# Patient Record
Sex: FEMALE | Race: BLACK OR AFRICAN AMERICAN | NOT HISPANIC OR LATINO | URBAN - METROPOLITAN AREA
[De-identification: names, ages, dates, MRNs, and addresses within clinical notes are randomized per-mention and may not be internally consistent; named-entity substitution may affect disease eponyms.]

---

## 2019-09-15 ENCOUNTER — EMERGENCY (EMERGENCY)
Facility: HOSPITAL | Age: 69
LOS: 1 days | Discharge: HOME | End: 2019-09-15
Attending: EMERGENCY MEDICINE | Admitting: EMERGENCY MEDICINE
Payer: MEDICARE

## 2019-09-15 VITALS
RESPIRATION RATE: 18 BRPM | TEMPERATURE: 98 F | HEART RATE: 80 BPM | OXYGEN SATURATION: 99 % | SYSTOLIC BLOOD PRESSURE: 186 MMHG | DIASTOLIC BLOOD PRESSURE: 90 MMHG

## 2019-09-15 DIAGNOSIS — S05.11XA CONTUSION OF EYEBALL AND ORBITAL TISSUES, RIGHT EYE, INITIAL ENCOUNTER: ICD-10-CM

## 2019-09-15 DIAGNOSIS — S80.212A ABRASION, LEFT KNEE, INITIAL ENCOUNTER: ICD-10-CM
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
## 2019-09-15 DIAGNOSIS — S09.90XA UNSPECIFIED INJURY OF HEAD, INITIAL ENCOUNTER: ICD-10-CM

## 2019-09-15 PROCEDURE — 70450 CT HEAD/BRAIN W/O DYE: CPT | Mod: 26

## 2019-09-15 PROCEDURE — 70486 CT MAXILLOFACIAL W/O DYE: CPT

## 2019-09-15 PROCEDURE — 99284 EMERGENCY DEPT VISIT MOD MDM: CPT | Mod: 25

## 2019-09-15 PROCEDURE — 70486 CT MAXILLOFACIAL W/O DYE: CPT | Mod: 26

## 2019-09-15 PROCEDURE — 99284 EMERGENCY DEPT VISIT MOD MDM: CPT

## 2019-09-15 PROCEDURE — 70450 CT HEAD/BRAIN W/O DYE: CPT

## 2019-09-15 RX ORDER — ACETAMINOPHEN 500 MG
650 TABLET ORAL ONCE
Refills: 0 | Status: COMPLETED | OUTPATIENT
Start: 2019-09-15 | End: 2019-09-15

## 2019-09-15 RX ADMIN — Medication 650 MILLIGRAM(S): at 20:58

## 2019-09-15 NOTE — ED PROVIDER NOTE - CLINICAL SUMMARY MEDICAL DECISION MAKING FREE TEXT BOX
69F PMH DM p/w pain s/p fall. Pt was walking in street ~4-5hrs ago, tripped on raised grate, landed on hands/knees and face, no LOC. Now c/o pain to R eli-orbital region, L knee, L hip. Also w/ delayed onset generalized HA. No neuro or other systemic symptoms. Hypertensive, other vitals wnl. Exam as above.   ddx: mechanical fall. Possible subsequent minor injury/facial fx. Low suspicion for ICH. Clinically no other fx.   tylenol, CTs, reassess.

## 2019-09-15 NOTE — ED PROVIDER NOTE - PHYSICAL EXAMINATION
Mild R periorbital swelling, minimal infraorbital ecchymosis, minimal periorbital bony ttp no proptosis. minimal L temporal ttp, no overlying skin changes/deformities. PERRL, EOMI, no nystagmus, acuity grossly intact. No spinal ttp, neck FROM. Strength 5/5. No bony ttp, FROM all extremities. Normal equal distal pulses. Steady unassisted gait.   Small superficial L knee abrasions.   no LE edema, normal equal distal pulses.

## 2019-09-15 NOTE — ED PROVIDER NOTE - OBJECTIVE STATEMENT
69F PMH DM p/w pain s/p fall. Pt was walking in street ~4-5hrs ago, tripped on raised grate, landed on hands/knees and face, no LOC. Now c/o pain to R eli-orbital region, L knee, L hip. Also w/ delayed onset generalized HA. Denies LOC. Denies vision changes, focal weakness/numbness, neck/back pain, SOB/CP, abd pain, NVD, black/bloody stool, urinary complaints, URI symptoms. Denies recent illnesses or medication changes. Not on AC.

## 2019-09-15 NOTE — ED PROVIDER NOTE - PATIENT PORTAL LINK FT
You can access the FollowMyHealth Patient Portal offered by [facility] by registering at the following website: [email or web address]. By joining [employer]’s FollowMyHealth portal, you will also be able to view your health information using other applications (apps) compatible with our system.

## 2019-09-15 NOTE — ED PROVIDER NOTE - NSFOLLOWUPINSTRUCTIONS_ED_ALL_ED_FT
Can take tylenol 650mg every 6hrs as needed for pain.  Stay well hydrated.  Return for fevers, persistent vomit, uncontrolled pain, worsening breathing, worsening lightheaded, focal weakness/numbness.   Follow up with primary doctor within 1-2 days.     Head Injury, Adult    There are many types of head injuries. Head injuries can be as minor as a bump, or they can be more severe. More severe head injuries include:  A jarring injury to the brain (concussion).  A bruise of the brain (contusion). This means there is bleeding in the brain that can cause swelling.  A cracked skull (skull fracture).  Bleeding in the brain that collects, clots, and forms a bump (hematoma).    After a head injury, you may need to be observed for a while in the emergency department or urgent care. Sometimes admission to the hospital is needed.    After a head injury has happened, most problems occur within the first 24 hours, but side effects may occur up to 7–10 days after the injury. It is important to watch your condition for any changes.    What are the causes?  There are many possible causes of a head injury. A serious head injury may happen to someone who is in a car accident (motor vehicle collision). Other causes of major head injuries include bicycle or motorcycle accidents, sports injuries, and falls.    Risk factors  This condition is more likely to occur in people who:  Drink a lot of alcohol or use drugs.  Are over the age of 65.  Are at risk for falls.    What are the symptoms?  There are many possible symptoms of a head injury. Visible symptoms of a head injury include a bruise, bump, or bleeding at the site of the injury. Other non-visible symptoms include:  Feeling sleepy or not being able to stay awake.  Passing out.  Headache.  Seizures.  Dizziness.  Confusion.  Memory problems.  Nausea or vomiting.  Other possible symptoms that may develop after the head injury include:  Poor attention and concentration.  Fatigue or tiring easily.  Irritability.  Being uncomfortable around bright lights or loud noises.  Anxiety or depression.  Disturbed sleep.    How is this diagnosed?  This condition can usually be diagnosed based on your symptoms, a description of the injury, and a physical exam. You may also have imaging tests done, such as a CT scan or MRI. You will also be closely watched.    How is this treated?  Treatment for this condition depends on the severity and type of injury you have. The main goal of treatment is to prevent complications and allow the brain time to heal.    For mild head injury, you may be sent home and treatment may include:  Observation. A responsible adult should stay with you for 24 hours after your injury and check on you often.  Physical rest.  Brain rest.  Pain medicines.  For severe brain injury, treatment may include:  Close observation. This includes hospitalization with frequent physical exams. You may need to go to a hospital that specializes in head injury.  Pain medicines.  Breathing support. This may include using a ventilator.  Managing the pressure inside the brain (intracranial pressure, or ICP). This may include:  Monitoring the ICP.  Giving medicines to decrease the ICP.  Positioning you to decrease the ICP.  Medicine to prevent seizures.  Surgery to stop bleeding or to remove blood clots (craniotomy).  Surgery to remove part of the skull (decompressive craniectomy). This allows room for the brain to swell.    Follow these instructions at home:  Activity   Rest as much as possible and avoid activities that are physically hard or tiring.  Make sure you get enough sleep.  Limit activities that require a lot of thought or attention, such as:  Watching TV.  Playing memory games and puzzles.  Job-related work or homework.  Working on the computer, social media, and texting.  Avoid activities that could cause another head injury, such as playing sports, until your health care provider approves. Having another head injury, especially before the first one has healed, can be dangerous.    Ask your health care provider when it is safe for you to return to your regular activities, including work or school. Ask your health care provider for a step-by-step plan for gradually returning to activities.  Ask your health care provider when you can drive, ride a bicycle, or use heavy machinery. Your ability to react may be slower after a brain injury. Never do these activities if you are dizzy.    Lifestyle     Do not drink alcohol until your health care provider approves, and avoid drug use. Alcohol and certain drugs may slow your recovery and can put you at risk of further injury.  If it is harder than usual to remember things, write them down.  If you are easily distracted, try to do one thing at a time.  Talk with family members or close friends when making important decisions.  Tell your friends, family, a trusted colleague, and  about your injury, symptoms, and restrictions. Have them watch for any new or worsening problems.  General instructions     Take over-the-counter and prescription medicines only as told by your health care provider.  Have someone stay with you for 24 hours after your head injury. This person should watch you for any changes in your symptoms and be ready to seek medical help, as needed.  Keep all follow-up visits as told by your health care provider. This is important.    How is this prevented?  Work on improving your balance and strength to avoid falls.  Wear a seatbelt when you are in a moving vehicle.  Wear a helmet when riding a bicycle, skiing, or doing any other sport or activity that has a risk of injury.  Drink alcohol only in moderation.  Take safety measures in your home, such as:  Removing clutter and tripping hazards from floors and stairways.  Using grab bars in bathrooms and handrails by stairs.  Placing non-slip mats on floors and in bathtubs.  Improving lighting in dim areas.    Get help right away if:  You have:  A severe headache that is not helped by medicine.  Trouble walking, have weakness in your arms and legs, or lose your balance.  Clear or bloody fluid coming from your nose or ears.  Changes in your vision.  A seizure.  You vomit.  Your symptoms get worse.  Your speech is slurred.  You pass out.  You are sleepier and have trouble staying awake.  Your pupils change size.  These symptoms may represent a serious problem that is an emergency. Do not wait to see if the symptoms will go away. Get medical help right away. Call your local emergency services (911 in the [place] Do not drive yourself to the hospital.

## 2020-09-13 ENCOUNTER — TRANSCRIPTION ENCOUNTER (OUTPATIENT)
Age: 70
End: 2020-09-13

## 2020-09-13 ENCOUNTER — INPATIENT (INPATIENT)
Facility: HOSPITAL | Age: 70
LOS: 2 days | Discharge: HOME | DRG: 418 | End: 2020-09-16
Attending: SURGERY | Admitting: SURGERY
Payer: COMMERCIAL

## 2020-09-13 VITALS
WEIGHT: 182.1 LBS | SYSTOLIC BLOOD PRESSURE: 158 MMHG | HEART RATE: 86 BPM | HEIGHT: 66 IN | OXYGEN SATURATION: 98 % | TEMPERATURE: 98 F | DIASTOLIC BLOOD PRESSURE: 73 MMHG | RESPIRATION RATE: 18 BRPM

## 2020-09-13 LAB
ALBUMIN SERPL ELPH-MCNC: 3.8 G/DL — SIGNIFICANT CHANGE UP (ref 3.3–5)
ALP SERPL-CCNC: 69 U/L — SIGNIFICANT CHANGE UP (ref 40–120)
ALT FLD-CCNC: 10 U/L — SIGNIFICANT CHANGE UP (ref 10–45)
ANION GAP SERPL CALC-SCNC: 10 MMOL/L — SIGNIFICANT CHANGE UP (ref 5–17)
APPEARANCE UR: CLEAR — SIGNIFICANT CHANGE UP
APTT BLD: 32 SEC — SIGNIFICANT CHANGE UP (ref 27.5–35.5)
AST SERPL-CCNC: 16 U/L — SIGNIFICANT CHANGE UP (ref 10–40)
BASOPHILS # BLD AUTO: 0.02 K/UL — SIGNIFICANT CHANGE UP (ref 0–0.2)
BASOPHILS NFR BLD AUTO: 0.2 % — SIGNIFICANT CHANGE UP (ref 0–2)
BILIRUB SERPL-MCNC: 0.7 MG/DL — SIGNIFICANT CHANGE UP (ref 0.2–1.2)
BILIRUB UR-MCNC: NEGATIVE — SIGNIFICANT CHANGE UP
BLD GP AB SCN SERPL QL: NEGATIVE — SIGNIFICANT CHANGE UP
BUN SERPL-MCNC: 22 MG/DL — SIGNIFICANT CHANGE UP (ref 7–23)
CALCIUM SERPL-MCNC: 9.2 MG/DL — SIGNIFICANT CHANGE UP (ref 8.4–10.5)
CHLORIDE SERPL-SCNC: 101 MMOL/L — SIGNIFICANT CHANGE UP (ref 96–108)
CO2 SERPL-SCNC: 26 MMOL/L — SIGNIFICANT CHANGE UP (ref 22–31)
COLOR SPEC: YELLOW — SIGNIFICANT CHANGE UP
CREAT SERPL-MCNC: 1.11 MG/DL — SIGNIFICANT CHANGE UP (ref 0.5–1.3)
DIFF PNL FLD: NEGATIVE — SIGNIFICANT CHANGE UP
EOSINOPHIL # BLD AUTO: 0.22 K/UL — SIGNIFICANT CHANGE UP (ref 0–0.5)
EOSINOPHIL NFR BLD AUTO: 1.9 % — SIGNIFICANT CHANGE UP (ref 0–6)
GLUCOSE BLDC GLUCOMTR-MCNC: 140 MG/DL — HIGH (ref 70–99)
GLUCOSE SERPL-MCNC: 121 MG/DL — HIGH (ref 70–99)
GLUCOSE UR QL: NEGATIVE — SIGNIFICANT CHANGE UP
HCT VFR BLD CALC: 33.3 % — LOW (ref 34.5–45)
HGB BLD-MCNC: 10.2 G/DL — LOW (ref 11.5–15.5)
IMM GRANULOCYTES NFR BLD AUTO: 0.5 % — SIGNIFICANT CHANGE UP (ref 0–1.5)
INR BLD: 1.12 — SIGNIFICANT CHANGE UP (ref 0.88–1.16)
KETONES UR-MCNC: NEGATIVE — SIGNIFICANT CHANGE UP
LACTATE SERPL-SCNC: 1 MMOL/L — SIGNIFICANT CHANGE UP (ref 0.5–2)
LEUKOCYTE ESTERASE UR-ACNC: NEGATIVE — SIGNIFICANT CHANGE UP
LIDOCAIN IGE QN: 29 U/L — SIGNIFICANT CHANGE UP (ref 7–60)
LYMPHOCYTES # BLD AUTO: 1.6 K/UL — SIGNIFICANT CHANGE UP (ref 1–3.3)
LYMPHOCYTES # BLD AUTO: 13.8 % — SIGNIFICANT CHANGE UP (ref 13–44)
MCHC RBC-ENTMCNC: 27.3 PG — SIGNIFICANT CHANGE UP (ref 27–34)
MCHC RBC-ENTMCNC: 30.6 GM/DL — LOW (ref 32–36)
MCV RBC AUTO: 89 FL — SIGNIFICANT CHANGE UP (ref 80–100)
MONOCYTES # BLD AUTO: 0.95 K/UL — HIGH (ref 0–0.9)
MONOCYTES NFR BLD AUTO: 8.2 % — SIGNIFICANT CHANGE UP (ref 2–14)
NEUTROPHILS # BLD AUTO: 8.75 K/UL — HIGH (ref 1.8–7.4)
NEUTROPHILS NFR BLD AUTO: 75.4 % — SIGNIFICANT CHANGE UP (ref 43–77)
NITRITE UR-MCNC: NEGATIVE — SIGNIFICANT CHANGE UP
NRBC # BLD: 0 /100 WBCS — SIGNIFICANT CHANGE UP (ref 0–0)
PH UR: 7 — SIGNIFICANT CHANGE UP (ref 5–8)
PLATELET # BLD AUTO: 226 K/UL — SIGNIFICANT CHANGE UP (ref 150–400)
POTASSIUM SERPL-MCNC: 4.2 MMOL/L — SIGNIFICANT CHANGE UP (ref 3.5–5.3)
POTASSIUM SERPL-SCNC: 4.2 MMOL/L — SIGNIFICANT CHANGE UP (ref 3.5–5.3)
PROT SERPL-MCNC: 7.2 G/DL — SIGNIFICANT CHANGE UP (ref 6–8.3)
PROT UR-MCNC: NEGATIVE MG/DL — SIGNIFICANT CHANGE UP
PROTHROM AB SERPL-ACNC: 13.4 SEC — SIGNIFICANT CHANGE UP (ref 10.6–13.6)
RBC # BLD: 3.74 M/UL — LOW (ref 3.8–5.2)
RBC # FLD: 13.5 % — SIGNIFICANT CHANGE UP (ref 10.3–14.5)
RH IG SCN BLD-IMP: POSITIVE — SIGNIFICANT CHANGE UP
SARS-COV-2 RNA SPEC QL NAA+PROBE: SIGNIFICANT CHANGE UP
SODIUM SERPL-SCNC: 137 MMOL/L — SIGNIFICANT CHANGE UP (ref 135–145)
SP GR SPEC: 1.01 — SIGNIFICANT CHANGE UP (ref 1–1.03)
UROBILINOGEN FLD QL: 0.2 E.U./DL — SIGNIFICANT CHANGE UP
WBC # BLD: 11.6 K/UL — HIGH (ref 3.8–10.5)
WBC # FLD AUTO: 11.6 K/UL — HIGH (ref 3.8–10.5)

## 2020-09-13 PROCEDURE — 99285 EMERGENCY DEPT VISIT HI MDM: CPT

## 2020-09-13 PROCEDURE — 76705 ECHO EXAM OF ABDOMEN: CPT | Mod: 26

## 2020-09-13 PROCEDURE — 71045 X-RAY EXAM CHEST 1 VIEW: CPT | Mod: 26

## 2020-09-13 PROCEDURE — 93010 ELECTROCARDIOGRAM REPORT: CPT

## 2020-09-13 RX ORDER — INFLUENZA VIRUS VACCINE 15; 15; 15; 15 UG/.5ML; UG/.5ML; UG/.5ML; UG/.5ML
0.7 SUSPENSION INTRAMUSCULAR ONCE
Refills: 0 | Status: COMPLETED | OUTPATIENT
Start: 2020-09-13 | End: 2020-09-15

## 2020-09-13 RX ORDER — GLUCAGON INJECTION, SOLUTION 0.5 MG/.1ML
1 INJECTION, SOLUTION SUBCUTANEOUS ONCE
Refills: 0 | Status: DISCONTINUED | OUTPATIENT
Start: 2020-09-13 | End: 2020-09-14

## 2020-09-13 RX ORDER — INFLUENZA VIRUS VACCINE 15; 15; 15; 15 UG/.5ML; UG/.5ML; UG/.5ML; UG/.5ML
0.5 SUSPENSION INTRAMUSCULAR ONCE
Refills: 0 | Status: DISCONTINUED | OUTPATIENT
Start: 2020-09-13 | End: 2020-09-13

## 2020-09-13 RX ORDER — INSULIN LISPRO 100/ML
VIAL (ML) SUBCUTANEOUS
Refills: 0 | Status: DISCONTINUED | OUTPATIENT
Start: 2020-09-13 | End: 2020-09-14

## 2020-09-13 RX ORDER — ONDANSETRON 8 MG/1
4 TABLET, FILM COATED ORAL ONCE
Refills: 0 | Status: COMPLETED | OUTPATIENT
Start: 2020-09-13 | End: 2020-09-13

## 2020-09-13 RX ORDER — METRONIDAZOLE 500 MG
500 TABLET ORAL EVERY 8 HOURS
Refills: 0 | Status: DISCONTINUED | OUTPATIENT
Start: 2020-09-13 | End: 2020-09-14

## 2020-09-13 RX ORDER — DEXTROSE 50 % IN WATER 50 %
12.5 SYRINGE (ML) INTRAVENOUS ONCE
Refills: 0 | Status: DISCONTINUED | OUTPATIENT
Start: 2020-09-13 | End: 2020-09-14

## 2020-09-13 RX ORDER — SODIUM CHLORIDE 9 MG/ML
1000 INJECTION, SOLUTION INTRAVENOUS
Refills: 0 | Status: DISCONTINUED | OUTPATIENT
Start: 2020-09-13 | End: 2020-09-14

## 2020-09-13 RX ORDER — ONDANSETRON 8 MG/1
4 TABLET, FILM COATED ORAL EVERY 6 HOURS
Refills: 0 | Status: DISCONTINUED | OUTPATIENT
Start: 2020-09-13 | End: 2020-09-14

## 2020-09-13 RX ORDER — HYDROMORPHONE HYDROCHLORIDE 2 MG/ML
0.5 INJECTION INTRAMUSCULAR; INTRAVENOUS; SUBCUTANEOUS EVERY 4 HOURS
Refills: 0 | Status: DISCONTINUED | OUTPATIENT
Start: 2020-09-13 | End: 2020-09-14

## 2020-09-13 RX ORDER — ACETAMINOPHEN 500 MG
1000 TABLET ORAL ONCE
Refills: 0 | Status: COMPLETED | OUTPATIENT
Start: 2020-09-13 | End: 2020-09-13

## 2020-09-13 RX ORDER — MORPHINE SULFATE 50 MG/1
4 CAPSULE, EXTENDED RELEASE ORAL ONCE
Refills: 0 | Status: DISCONTINUED | OUTPATIENT
Start: 2020-09-13 | End: 2020-09-13

## 2020-09-13 RX ORDER — DEXTROSE 50 % IN WATER 50 %
25 SYRINGE (ML) INTRAVENOUS ONCE
Refills: 0 | Status: DISCONTINUED | OUTPATIENT
Start: 2020-09-13 | End: 2020-09-14

## 2020-09-13 RX ORDER — HEPARIN SODIUM 5000 [USP'U]/ML
5000 INJECTION INTRAVENOUS; SUBCUTANEOUS EVERY 8 HOURS
Refills: 0 | Status: DISCONTINUED | OUTPATIENT
Start: 2020-09-13 | End: 2020-09-14

## 2020-09-13 RX ORDER — CEFTRIAXONE 500 MG/1
1000 INJECTION, POWDER, FOR SOLUTION INTRAMUSCULAR; INTRAVENOUS EVERY 24 HOURS
Refills: 0 | Status: DISCONTINUED | OUTPATIENT
Start: 2020-09-13 | End: 2020-09-14

## 2020-09-13 RX ORDER — DEXTROSE 50 % IN WATER 50 %
15 SYRINGE (ML) INTRAVENOUS ONCE
Refills: 0 | Status: DISCONTINUED | OUTPATIENT
Start: 2020-09-13 | End: 2020-09-14

## 2020-09-13 RX ADMIN — SODIUM CHLORIDE 110 MILLILITER(S): 9 INJECTION, SOLUTION INTRAVENOUS at 23:09

## 2020-09-13 RX ADMIN — Medication 1000 MILLIGRAM(S): at 23:24

## 2020-09-13 RX ADMIN — Medication 400 MILLIGRAM(S): at 23:09

## 2020-09-13 RX ADMIN — HEPARIN SODIUM 5000 UNIT(S): 5000 INJECTION INTRAVENOUS; SUBCUTANEOUS at 23:40

## 2020-09-13 NOTE — H&P ADULT - HISTORY OF PRESENT ILLNESS
HPI: 70 F PMH DM, thyroid disease (pt unsure), PSH csectionX2, JOANA, known cholelithiasis x3 years, presenting with one week of RUQ pain that significantly worsened this morning. Pt reports severe RUQ pain that started one week ago and slightly improved to intermittent RUQ achiness for the past week. This morning, patient awoke to severe 10/10 RUQ pain that has not improved. She denies associated nausea/vomiting/fever/chills. She reports tolerating regular diet every day without any issues. Passing flatus, having regular BM (last this morning). Voiding well without dysuria/pyuria. Last cscope 2019 and demonstrated 2 polyps, denies ever having EGD.    PMH: as above, patient unsure of thyroid disease but denies taking medication, patient is a poor historian  PSH: as above and vocal cord nodule resection  Meds: DM medication (unsure of meds)  Social: denies smoking, denies illicit drug use, denies etoh use  Allergies: PCN allergy- possible anaphylaxis

## 2020-09-13 NOTE — ED ADULT NURSE NOTE - TEMPLATE LIST FOR HEAD TO TOE ASSESSMENT

## 2020-09-13 NOTE — ED ADULT NURSE NOTE - CHPI ED NUR SYMPTOMS NEG
no vomiting/no hematuria/no burning urination/no dysuria/no diarrhea/no abdominal distension/no chills/no fever/no nausea/no blood in stool

## 2020-09-13 NOTE — ED PROVIDER NOTE - PHYSICAL EXAMINATION
GEN: Well appearing, obesem awake, alert, oriented to person, place, time/situation and in no apparent distress. NTAF  ENT: Airway patent, Nasal mucosa clear. Mouth with normal mucosa.  EYES: Clear bilaterally. PERRL, EOMI  RESPIRATORY: Breathing comfortably with normal RR. No W/C/R, no hypoxia or resp distress.  CARDIAC: Regular rate and rhythm, no M/R/G  ABDOMEN: Soft, obese, +TTP RUQ, +navarro's +bowel sounds, no rebound, rigidity, or guarding.  MSK: Range of motion is not limited, no deformities noted.  NEURO: Alert and oriented, no focal deficits.  SKIN: Skin normal color for race, warm, dry and intact. No evidence of rash.  PSYCH: Alert and oriented to person, place, time/situation. normal mood and affect. no apparent risk to self or others.

## 2020-09-13 NOTE — CHART NOTE - NSCHARTNOTEFT_GEN_A_CORE
GENERAL SURGERY - CHIEF RESIDENT NOTE     70F w/ HTN, HLD, T2DM on metformin, hypothyroidism, & known h/o cholelithiasis treated w/ ursodiol in the past who now p/w worsening RUQ abd pain x 1 wk. Pain worse w/ PO intake. No nausea/vomiting or fevers/chills. In the ED, T 97.9F, HR 86, /73, SpO2 98% RA, RR 18. Non-toxic appearing, abd soft, non-distended, TTP in RUQ w/ +Rueda sign. Labs- WBC 11.60, H/H 10.2/33.3, plts 226, INR 1.12, BUN/Cr 22/1.11, LA 1.0. RUQ US showed CBD 0.3cm, multiple small shadowing demario, no GBWT, no PCF, positive sono Rueda sign.     PLAN: Admit to regional, NPO, IVF, ISS, ceftriaxone/Flagyl, add to OR for laparoscopic cholecystectomy in AM, AM labs, DVT ppx.

## 2020-09-13 NOTE — H&P ADULT - NSHPPHYSICALEXAM_GEN_ALL_CORE
Physical exam:  General: NAD, resting comfortably  Neuro: AAOX3, EOMI, PERRLA, no scleral icterus  Pulm: CTAB, Nonlabored breathing  CV: S1/S2 normal, no murmurs  Abdomen: soft, nondistended, severe RUQ tenderness, positive Rueda's sign, no rebound, no guarding  Extremities: WWP, No LE edema b/l

## 2020-09-13 NOTE — ED PROVIDER NOTE - CLINICAL SUMMARY MEDICAL DECISION MAKING FREE TEXT BOX
70F with a h/o DM on metformin, HTN, high cholesterol, hypothyroidism, PShx of  and hysterectomy in the past who p/w RUQ pain intermittent x 1 week, worse after eating and worse today. No fever. VSS, +TTP RUQ. Bedside POCUS performed and shows +sono navarro's GB wall thickening/edema and gallstones c/w cholecystitis. Labs reveal leukocytosis. Surgery was notified and we are awaiting their evaluation.  Anticipate admission to surgery for cholecystitis.

## 2020-09-13 NOTE — H&P ADULT - NSHPLABSRESULTS_GEN_ALL_CORE
Procedure was performed in Emergency Department by a credentialed Emergency Medicine Attending Physician    EXAM:  ER US ABDOMEN LTD                          *** ADDENDUM 09/13/2020  ***    Grayscale imaging of the right upper quadrant was performed.  The gallbladder was visualized and scanned in longitudinal and transverse planes.  The anterior gallbladder wall measured  0.86 cm.  The common bile duct measured 0.29 cm.  Gallstones present.  Pericholecystic fluid: trace.  Sonographic Rueda's sign present.    IMPRESSION:Gallstones, +sonographic rueda's, and gallbladder wall thickening present, concerning for cholecystitis.    *** END OF ADDENDUM 09/13/2020  ***       PROCEDURE DATE:  09/13/2020               INTERPRETATION:  Grayscale imaging of the right upper quadrant was performed.  The gallbladder was visualized and scanned in longitudinal and transverse planes.  The anterior gallbladder wall measured  . cm.  The common bile duct measured 0.29 cm.  Gallstones present.  Pericholecystic fluid: trace.  Gallbladder wall edema present.  Sonographic Rueda's sign present.    IMPRESSION: Gallstones, +sonographic rueda's, and gallbladder wall edema and thickening present, concerning for cholecystitis.

## 2020-09-13 NOTE — ED PROVIDER NOTE - OBJECTIVE STATEMENT
70F with a h/o DM on metformin, HTN, high cholesterol, hypothyroidism, PShx of  and hysterectomy in the past who p/w RUQ pain intermittent x 1 week, worse after eating and worse today. No f/c, no n/v, no diarrhea. No urinary sx.   Pt reports hx of gallstones in the remote pasty and states she "took pills" but never followed up after that (she has previously had her care at [facility]).   Last PO 10am.

## 2020-09-13 NOTE — H&P ADULT - ASSESSMENT
A/P: 70 F PMH DM, unknown thyroid disease, PSH csectionX2, JOANA, known cholelithiasis x3 years, presenting with one week of RUQ pain that significantly worsened this morning.      - [facility] under Dr. Garcia  - Ceftriaxon/flagyl IV  - pain control   - NPO/IV fluids  - ISS  - Hepsubq   - preop ffor OR tomorrow under Dr. Clifford  - Call Dr. Rogel to consult  - Obtain her home medication regime tomorrow calling her pharmacy ([phone]) - Pt doesn't remember or have a list with active meds

## 2020-09-14 ENCOUNTER — RESULT REVIEW (OUTPATIENT)
Age: 70
End: 2020-09-14

## 2020-09-14 LAB
A1C WITH ESTIMATED AVERAGE GLUCOSE RESULT: 6.7 % — HIGH (ref 4–5.6)
ALBUMIN SERPL ELPH-MCNC: 4 G/DL — SIGNIFICANT CHANGE UP (ref 3.3–5)
ALP SERPL-CCNC: 72 U/L — SIGNIFICANT CHANGE UP (ref 40–120)
ALT FLD-CCNC: 10 U/L — SIGNIFICANT CHANGE UP (ref 10–45)
ANION GAP SERPL CALC-SCNC: 12 MMOL/L — SIGNIFICANT CHANGE UP (ref 5–17)
APTT BLD: 32.1 SEC — SIGNIFICANT CHANGE UP (ref 27.5–35.5)
AST SERPL-CCNC: 14 U/L — SIGNIFICANT CHANGE UP (ref 10–40)
BILIRUB SERPL-MCNC: 0.8 MG/DL — SIGNIFICANT CHANGE UP (ref 0.2–1.2)
BUN SERPL-MCNC: 18 MG/DL — SIGNIFICANT CHANGE UP (ref 7–23)
CALCIUM SERPL-MCNC: 9.1 MG/DL — SIGNIFICANT CHANGE UP (ref 8.4–10.5)
CHLORIDE SERPL-SCNC: 103 MMOL/L — SIGNIFICANT CHANGE UP (ref 96–108)
CO2 SERPL-SCNC: 27 MMOL/L — SIGNIFICANT CHANGE UP (ref 22–31)
CREAT SERPL-MCNC: 1.02 MG/DL — SIGNIFICANT CHANGE UP (ref 0.5–1.3)
ESTIMATED AVERAGE GLUCOSE: 146 MG/DL — HIGH (ref 68–114)
GLUCOSE BLDC GLUCOMTR-MCNC: 116 MG/DL — HIGH (ref 70–99)
GLUCOSE BLDC GLUCOMTR-MCNC: 135 MG/DL — HIGH (ref 70–99)
GLUCOSE SERPL-MCNC: 133 MG/DL — HIGH (ref 70–99)
HCT VFR BLD CALC: 31.7 % — LOW (ref 34.5–45)
HCV AB S/CO SERPL IA: 0.08 S/CO — SIGNIFICANT CHANGE UP
HCV AB SERPL-IMP: SIGNIFICANT CHANGE UP
HGB BLD-MCNC: 9.7 G/DL — LOW (ref 11.5–15.5)
INR BLD: 1.15 — SIGNIFICANT CHANGE UP (ref 0.88–1.16)
MAGNESIUM SERPL-MCNC: 2.4 MG/DL — SIGNIFICANT CHANGE UP (ref 1.6–2.6)
MCHC RBC-ENTMCNC: 26.9 PG — LOW (ref 27–34)
MCHC RBC-ENTMCNC: 30.6 GM/DL — LOW (ref 32–36)
MCV RBC AUTO: 87.8 FL — SIGNIFICANT CHANGE UP (ref 80–100)
NRBC # BLD: 0 /100 WBCS — SIGNIFICANT CHANGE UP (ref 0–0)
PHOSPHATE SERPL-MCNC: 3.8 MG/DL — SIGNIFICANT CHANGE UP (ref 2.5–4.5)
PLATELET # BLD AUTO: 218 K/UL — SIGNIFICANT CHANGE UP (ref 150–400)
POTASSIUM SERPL-MCNC: 3.7 MMOL/L — SIGNIFICANT CHANGE UP (ref 3.5–5.3)
POTASSIUM SERPL-SCNC: 3.7 MMOL/L — SIGNIFICANT CHANGE UP (ref 3.5–5.3)
PROT SERPL-MCNC: 7.1 G/DL — SIGNIFICANT CHANGE UP (ref 6–8.3)
PROTHROM AB SERPL-ACNC: 13.7 SEC — HIGH (ref 10.6–13.6)
RBC # BLD: 3.61 M/UL — LOW (ref 3.8–5.2)
RBC # FLD: 13.7 % — SIGNIFICANT CHANGE UP (ref 10.3–14.5)
SODIUM SERPL-SCNC: 142 MMOL/L — SIGNIFICANT CHANGE UP (ref 135–145)
WBC # BLD: 10.68 K/UL — HIGH (ref 3.8–10.5)
WBC # FLD AUTO: 10.68 K/UL — HIGH (ref 3.8–10.5)

## 2020-09-14 PROCEDURE — 88304 TISSUE EXAM BY PATHOLOGIST: CPT | Mod: 26

## 2020-09-14 PROCEDURE — 88300 SURGICAL PATH GROSS: CPT | Mod: 26,59

## 2020-09-14 RX ORDER — HEPARIN SODIUM 5000 [USP'U]/ML
5000 INJECTION INTRAVENOUS; SUBCUTANEOUS EVERY 8 HOURS
Refills: 0 | Status: DISCONTINUED | OUTPATIENT
Start: 2020-09-14 | End: 2020-09-16

## 2020-09-14 RX ORDER — OXYCODONE AND ACETAMINOPHEN 5; 325 MG/1; MG/1
2 TABLET ORAL EVERY 4 HOURS
Refills: 0 | Status: DISCONTINUED | OUTPATIENT
Start: 2020-09-14 | End: 2020-09-16

## 2020-09-14 RX ORDER — HYDROMORPHONE HYDROCHLORIDE 2 MG/ML
0.5 INJECTION INTRAMUSCULAR; INTRAVENOUS; SUBCUTANEOUS EVERY 6 HOURS
Refills: 0 | Status: DISCONTINUED | OUTPATIENT
Start: 2020-09-14 | End: 2020-09-14

## 2020-09-14 RX ORDER — OXYCODONE AND ACETAMINOPHEN 5; 325 MG/1; MG/1
1 TABLET ORAL EVERY 4 HOURS
Refills: 0 | Status: DISCONTINUED | OUTPATIENT
Start: 2020-09-14 | End: 2020-09-16

## 2020-09-14 RX ORDER — HYDROMORPHONE HYDROCHLORIDE 2 MG/ML
0.5 INJECTION INTRAMUSCULAR; INTRAVENOUS; SUBCUTANEOUS
Refills: 0 | Status: DISCONTINUED | OUTPATIENT
Start: 2020-09-14 | End: 2020-09-15

## 2020-09-14 RX ORDER — POTASSIUM CHLORIDE 20 MEQ
10 PACKET (EA) ORAL
Refills: 0 | Status: DISCONTINUED | OUTPATIENT
Start: 2020-09-14 | End: 2020-09-14

## 2020-09-14 RX ORDER — ONDANSETRON 8 MG/1
4 TABLET, FILM COATED ORAL EVERY 6 HOURS
Refills: 0 | Status: DISCONTINUED | OUTPATIENT
Start: 2020-09-14 | End: 2020-09-16

## 2020-09-14 RX ORDER — HYDROMORPHONE HYDROCHLORIDE 2 MG/ML
1 INJECTION INTRAMUSCULAR; INTRAVENOUS; SUBCUTANEOUS EVERY 6 HOURS
Refills: 0 | Status: DISCONTINUED | OUTPATIENT
Start: 2020-09-14 | End: 2020-09-14

## 2020-09-14 RX ADMIN — OXYCODONE AND ACETAMINOPHEN 2 TABLET(S): 5; 325 TABLET ORAL at 16:26

## 2020-09-14 RX ADMIN — OXYCODONE AND ACETAMINOPHEN 2 TABLET(S): 5; 325 TABLET ORAL at 23:47

## 2020-09-14 RX ADMIN — Medication 100 MILLIGRAM(S): at 10:23

## 2020-09-14 RX ADMIN — HYDROMORPHONE HYDROCHLORIDE 0.5 MILLIGRAM(S): 2 INJECTION INTRAMUSCULAR; INTRAVENOUS; SUBCUTANEOUS at 14:13

## 2020-09-14 RX ADMIN — HEPARIN SODIUM 5000 UNIT(S): 5000 INJECTION INTRAVENOUS; SUBCUTANEOUS at 06:02

## 2020-09-14 RX ADMIN — HEPARIN SODIUM 5000 UNIT(S): 5000 INJECTION INTRAVENOUS; SUBCUTANEOUS at 22:12

## 2020-09-14 RX ADMIN — Medication 100 MILLIGRAM(S): at 01:05

## 2020-09-14 RX ADMIN — CEFTRIAXONE 100 MILLIGRAM(S): 500 INJECTION, POWDER, FOR SOLUTION INTRAMUSCULAR; INTRAVENOUS at 00:05

## 2020-09-14 RX ADMIN — HYDROMORPHONE HYDROCHLORIDE 0.5 MILLIGRAM(S): 2 INJECTION INTRAMUSCULAR; INTRAVENOUS; SUBCUTANEOUS at 14:51

## 2020-09-14 RX ADMIN — OXYCODONE AND ACETAMINOPHEN 2 TABLET(S): 5; 325 TABLET ORAL at 16:49

## 2020-09-14 RX ADMIN — HEPARIN SODIUM 5000 UNIT(S): 5000 INJECTION INTRAVENOUS; SUBCUTANEOUS at 14:54

## 2020-09-14 NOTE — PROGRESS NOTE ADULT - ASSESSMENT
70 F PMH DM, unknown thyroid disease, PSH csectionX2, JOANA, known cholelithiasis x3 years, presenting with one week of RUQ pain. Now s/p lap dev for acute cholecystitis 9/14. Now s/p difficult Lap Dev non perf non gang 9/14 AM    - Clears, advance as tolerated  - Pain/nausea control   - IS/OOBA  - SQH/SCD  - d/c tomorrow, no AM labs

## 2020-09-14 NOTE — PROGRESS NOTE ADULT - ASSESSMENT
70 F PMH DM, unknown thyroid disease, PSH csectionX2, JOANA, known cholelithiasis x3 years, presenting with one week of RUQ pain that significantly worsened this morning.    - NPO/IVF  - IV Ceftriaxone/flagyl (9/13-)  - Pain/nausea control   - IS/OOBA  - SQH/SCD  - AM labs  - OR 70 F PMH DM, unknown thyroid disease, PSH csectionX2, JOANA, known cholelithiasis x3 years, presenting with one week of RUQ pain    - NPO/IVF  - IV Ceftriaxone/flagyl (9/13-)  - Pain/nausea control   - IS/OOBA  - SQH/SCD  - AM labs  - OR

## 2020-09-14 NOTE — BRIEF OPERATIVE NOTE - OPERATION/FINDINGS
Pt consented in the holding area, was then taken to the OR and was placed under general anaesthesia. A formal timeout was observed and an incision was made in the LUQ for the camera post. Abdomen was entered safely and intrabdominally dense adhesions of bowel to the abdominal wall were observed. Under careful visualisation 3 more ports together were placed in the RUQ. Pt placed in reverse Trendelenburg w/ right side up. Dense adhesions between the gall bladder and bowel were carefully dissected. In the process suprisingly a foreign body which looked like a metal wire was observed in the adhesions, and it was taken out and submitted to pathology.  Once we were confident that there were no more adhesions between bowel and GB, Cystic duct and artery dissected free. Critical view of safety obtained. Cystic duct and artery clipped and divided- 2 clips proximally and one distally for both. GB was later dissected from the liver bed- there seemed to be severe adhesions between the liver and the GB, possibly even a fistulous tract from a probable perc cholecystotyomy. Hemostasis achieved,. No leakage of bile from cystic duct stump. Pt consented in the holding area, was then taken to the OR and was placed under general anaesthesia. A formal timeout was observed and an incision was made in the LUQ for the camera post. Abdomen was entered safely and intrabdominally dense adhesions of bowel to the abdominal wall were observed. Under careful visualisation 3 more ports together were placed in the RUQ. Pt placed in reverse Trendelenburg w/ right side up. Dense adhesions between the gall bladder and bowel were carefully dissected. In the process suprisingly a foreign body which looked like a metal wire was observed in the adhesions, and it was taken out and submitted to pathology.  Once we were confident that there were no more adhesions between bowel and GB, Cystic duct and artery dissected free. Critical view of safety obtained. Cystic duct and artery clipped and divided- 2 clips proximally and one distally for both. GB was later dissected from the liver bed- there seemed to be severe adhesions between the liver and the GB, possibly even a fistulous tract from a probable previous (but unkown) perc cholecystotyomy. Safely taken off liver bed. Hemostasis achieved,. No leakage of bile from cystic duct stump. Post incisions closed safely.

## 2020-09-14 NOTE — BRIEF OPERATIVE NOTE - NSICDXBRIEFPOSTOP_GEN_ALL_CORE_FT
POST-OP DIAGNOSIS:  Intra-abdominal adhesions 14-Sep-2020 13:34:38  Mitchel Willis  Acute cholecystitis 14-Sep-2020 13:34:11  Mitchel Willis

## 2020-09-14 NOTE — PACU DISCHARGE NOTE - COMMENTS
transfered back to [facility], report given to RN Ignacio. Pt VSS,afberile. No sign of distress or SOB noted, 02sat 98% on RA. No c/o pain. Abdomeninal dressing of derma bond on 4 x lap site , intact, no bleeding noted. Voided 14;00, with 400ml. IVL patent and intact, no infiltration or phlebitis noted.

## 2020-09-15 ENCOUNTER — TRANSCRIPTION ENCOUNTER (OUTPATIENT)
Age: 70
End: 2020-09-15

## 2020-09-15 DIAGNOSIS — D64.9 ANEMIA, UNSPECIFIED: ICD-10-CM

## 2020-09-15 DIAGNOSIS — E11.9 TYPE 2 DIABETES MELLITUS WITHOUT COMPLICATIONS: ICD-10-CM

## 2020-09-15 DIAGNOSIS — N18.3 CHRONIC KIDNEY DISEASE, STAGE 3 (MODERATE): ICD-10-CM

## 2020-09-15 DIAGNOSIS — Z86.39 PERSONAL HISTORY OF OTHER ENDOCRINE, NUTRITIONAL AND METABOLIC DISEASE: ICD-10-CM

## 2020-09-15 DIAGNOSIS — K81.9 CHOLECYSTITIS, UNSPECIFIED: ICD-10-CM

## 2020-09-15 DIAGNOSIS — D72.829 ELEVATED WHITE BLOOD CELL COUNT, UNSPECIFIED: ICD-10-CM

## 2020-09-15 LAB
ALBUMIN SERPL ELPH-MCNC: 3.7 G/DL — SIGNIFICANT CHANGE UP (ref 3.3–5)
ALBUMIN SERPL ELPH-MCNC: 4 G/DL — SIGNIFICANT CHANGE UP (ref 3.3–5)
ALP SERPL-CCNC: 67 U/L — SIGNIFICANT CHANGE UP (ref 40–120)
ALP SERPL-CCNC: 73 U/L — SIGNIFICANT CHANGE UP (ref 40–120)
ALT FLD-CCNC: 13 U/L — SIGNIFICANT CHANGE UP (ref 10–45)
ALT FLD-CCNC: 15 U/L — SIGNIFICANT CHANGE UP (ref 10–45)
ANION GAP SERPL CALC-SCNC: 11 MMOL/L — SIGNIFICANT CHANGE UP (ref 5–17)
ANION GAP SERPL CALC-SCNC: 13 MMOL/L — SIGNIFICANT CHANGE UP (ref 5–17)
APTT BLD: 28.8 SEC — SIGNIFICANT CHANGE UP (ref 27.5–35.5)
AST SERPL-CCNC: 23 U/L — SIGNIFICANT CHANGE UP (ref 10–40)
AST SERPL-CCNC: 25 U/L — SIGNIFICANT CHANGE UP (ref 10–40)
BASOPHILS # BLD AUTO: 0.02 K/UL — SIGNIFICANT CHANGE UP (ref 0–0.2)
BASOPHILS NFR BLD AUTO: 0.2 % — SIGNIFICANT CHANGE UP (ref 0–2)
BILIRUB SERPL-MCNC: 0.5 MG/DL — SIGNIFICANT CHANGE UP (ref 0.2–1.2)
BILIRUB SERPL-MCNC: 0.7 MG/DL — SIGNIFICANT CHANGE UP (ref 0.2–1.2)
BUN SERPL-MCNC: 14 MG/DL — SIGNIFICANT CHANGE UP (ref 7–23)
BUN SERPL-MCNC: 15 MG/DL — SIGNIFICANT CHANGE UP (ref 7–23)
CALCIUM SERPL-MCNC: 8.8 MG/DL — SIGNIFICANT CHANGE UP (ref 8.4–10.5)
CALCIUM SERPL-MCNC: 8.8 MG/DL — SIGNIFICANT CHANGE UP (ref 8.4–10.5)
CHLORIDE SERPL-SCNC: 101 MMOL/L — SIGNIFICANT CHANGE UP (ref 96–108)
CHLORIDE SERPL-SCNC: 102 MMOL/L — SIGNIFICANT CHANGE UP (ref 96–108)
CO2 SERPL-SCNC: 24 MMOL/L — SIGNIFICANT CHANGE UP (ref 22–31)
CO2 SERPL-SCNC: 24 MMOL/L — SIGNIFICANT CHANGE UP (ref 22–31)
CREAT SERPL-MCNC: 1.16 MG/DL — SIGNIFICANT CHANGE UP (ref 0.5–1.3)
CREAT SERPL-MCNC: 1.19 MG/DL — SIGNIFICANT CHANGE UP (ref 0.5–1.3)
CULTURE RESULTS: SIGNIFICANT CHANGE UP
EOSINOPHIL # BLD AUTO: 0.56 K/UL — HIGH (ref 0–0.5)
EOSINOPHIL NFR BLD AUTO: 4.6 % — SIGNIFICANT CHANGE UP (ref 0–6)
GLUCOSE BLDC GLUCOMTR-MCNC: 131 MG/DL — HIGH (ref 70–99)
GLUCOSE SERPL-MCNC: 126 MG/DL — HIGH (ref 70–99)
GLUCOSE SERPL-MCNC: 138 MG/DL — HIGH (ref 70–99)
HCT VFR BLD CALC: 30.5 % — LOW (ref 34.5–45)
HCT VFR BLD CALC: 32.1 % — LOW (ref 34.5–45)
HGB BLD-MCNC: 9.5 G/DL — LOW (ref 11.5–15.5)
HGB BLD-MCNC: 9.7 G/DL — LOW (ref 11.5–15.5)
IMM GRANULOCYTES NFR BLD AUTO: 0.5 % — SIGNIFICANT CHANGE UP (ref 0–1.5)
INR BLD: 1.19 — HIGH (ref 0.88–1.16)
LACTATE SERPL-SCNC: 1 MMOL/L — SIGNIFICANT CHANGE UP (ref 0.5–2)
LYMPHOCYTES # BLD AUTO: 17.3 % — SIGNIFICANT CHANGE UP (ref 13–44)
LYMPHOCYTES # BLD AUTO: 2.12 K/UL — SIGNIFICANT CHANGE UP (ref 1–3.3)
MAGNESIUM SERPL-MCNC: 2.2 MG/DL — SIGNIFICANT CHANGE UP (ref 1.6–2.6)
MCHC RBC-ENTMCNC: 27 PG — SIGNIFICANT CHANGE UP (ref 27–34)
MCHC RBC-ENTMCNC: 27.3 PG — SIGNIFICANT CHANGE UP (ref 27–34)
MCHC RBC-ENTMCNC: 30.2 GM/DL — LOW (ref 32–36)
MCHC RBC-ENTMCNC: 31.1 GM/DL — LOW (ref 32–36)
MCV RBC AUTO: 87.6 FL — SIGNIFICANT CHANGE UP (ref 80–100)
MCV RBC AUTO: 89.4 FL — SIGNIFICANT CHANGE UP (ref 80–100)
MONOCYTES # BLD AUTO: 1.04 K/UL — HIGH (ref 0–0.9)
MONOCYTES NFR BLD AUTO: 8.5 % — SIGNIFICANT CHANGE UP (ref 2–14)
NEUTROPHILS # BLD AUTO: 8.47 K/UL — HIGH (ref 1.8–7.4)
NEUTROPHILS NFR BLD AUTO: 68.9 % — SIGNIFICANT CHANGE UP (ref 43–77)
NRBC # BLD: 0 /100 WBCS — SIGNIFICANT CHANGE UP (ref 0–0)
NRBC # BLD: 0 /100 WBCS — SIGNIFICANT CHANGE UP (ref 0–0)
PHOSPHATE SERPL-MCNC: 3.2 MG/DL — SIGNIFICANT CHANGE UP (ref 2.5–4.5)
PLATELET # BLD AUTO: 231 K/UL — SIGNIFICANT CHANGE UP (ref 150–400)
PLATELET # BLD AUTO: 245 K/UL — SIGNIFICANT CHANGE UP (ref 150–400)
POTASSIUM SERPL-MCNC: 4 MMOL/L — SIGNIFICANT CHANGE UP (ref 3.5–5.3)
POTASSIUM SERPL-MCNC: 4.1 MMOL/L — SIGNIFICANT CHANGE UP (ref 3.5–5.3)
POTASSIUM SERPL-SCNC: 4 MMOL/L — SIGNIFICANT CHANGE UP (ref 3.5–5.3)
POTASSIUM SERPL-SCNC: 4.1 MMOL/L — SIGNIFICANT CHANGE UP (ref 3.5–5.3)
PROT SERPL-MCNC: 6.9 G/DL — SIGNIFICANT CHANGE UP (ref 6–8.3)
PROT SERPL-MCNC: 7.3 G/DL — SIGNIFICANT CHANGE UP (ref 6–8.3)
PROTHROM AB SERPL-ACNC: 14.2 SEC — HIGH (ref 10.6–13.6)
RBC # BLD: 3.48 M/UL — LOW (ref 3.8–5.2)
RBC # BLD: 3.59 M/UL — LOW (ref 3.8–5.2)
RBC # FLD: 13.5 % — SIGNIFICANT CHANGE UP (ref 10.3–14.5)
RBC # FLD: 13.7 % — SIGNIFICANT CHANGE UP (ref 10.3–14.5)
SODIUM SERPL-SCNC: 136 MMOL/L — SIGNIFICANT CHANGE UP (ref 135–145)
SODIUM SERPL-SCNC: 139 MMOL/L — SIGNIFICANT CHANGE UP (ref 135–145)
SPECIMEN SOURCE: SIGNIFICANT CHANGE UP
WBC # BLD: 11.5 K/UL — HIGH (ref 3.8–10.5)
WBC # BLD: 12.27 K/UL — HIGH (ref 3.8–10.5)
WBC # FLD AUTO: 11.5 K/UL — HIGH (ref 3.8–10.5)
WBC # FLD AUTO: 12.27 K/UL — HIGH (ref 3.8–10.5)

## 2020-09-15 PROCEDURE — 99291 CRITICAL CARE FIRST HOUR: CPT

## 2020-09-15 PROCEDURE — 99223 1ST HOSP IP/OBS HIGH 75: CPT

## 2020-09-15 RX ADMIN — OXYCODONE AND ACETAMINOPHEN 2 TABLET(S): 5; 325 TABLET ORAL at 13:43

## 2020-09-15 RX ADMIN — INFLUENZA VIRUS VACCINE 0.7 MILLILITER(S): 15; 15; 15; 15 SUSPENSION INTRAMUSCULAR at 15:47

## 2020-09-15 RX ADMIN — HEPARIN SODIUM 5000 UNIT(S): 5000 INJECTION INTRAVENOUS; SUBCUTANEOUS at 05:25

## 2020-09-15 RX ADMIN — OXYCODONE AND ACETAMINOPHEN 2 TABLET(S): 5; 325 TABLET ORAL at 13:13

## 2020-09-15 RX ADMIN — OXYCODONE AND ACETAMINOPHEN 2 TABLET(S): 5; 325 TABLET ORAL at 05:25

## 2020-09-15 RX ADMIN — HEPARIN SODIUM 5000 UNIT(S): 5000 INJECTION INTRAVENOUS; SUBCUTANEOUS at 13:13

## 2020-09-15 RX ADMIN — OXYCODONE AND ACETAMINOPHEN 2 TABLET(S): 5; 325 TABLET ORAL at 00:30

## 2020-09-15 RX ADMIN — OXYCODONE AND ACETAMINOPHEN 2 TABLET(S): 5; 325 TABLET ORAL at 06:15

## 2020-09-15 RX ADMIN — OXYCODONE AND ACETAMINOPHEN 2 TABLET(S): 5; 325 TABLET ORAL at 18:58

## 2020-09-15 RX ADMIN — OXYCODONE AND ACETAMINOPHEN 2 TABLET(S): 5; 325 TABLET ORAL at 19:28

## 2020-09-15 RX ADMIN — HEPARIN SODIUM 5000 UNIT(S): 5000 INJECTION INTRAVENOUS; SUBCUTANEOUS at 22:16

## 2020-09-15 NOTE — DISCHARGE NOTE PROVIDER - INSTRUCTIONS
CHIEF COMPLAINT:    Chief Complaint   Patient presents with   • Follow-up     Recheck [facility]       HISTORY OF PRESENT ILLNESS:    Dale Mann is a 77 y.o. male who Was seen previously for a symptomatic left inguinal hernia.  He has not seen his cardiologist and obtained cardiac clearance to undergo surgery.  He was also taken off of his Effient by the cardiologist.  He would like to schedule his hernia repair soon.    EXAM:  Vitals:    11/30/17 1324   BP: 116/58         Reducible left inguinal hernia    ASSESSMENT:    Reducible left inguinal hernia, symptomatic    PLAN:    He would like to undergo left inguinal hernia repair.  The risks and benefits of open left inguinal hernia repair with mesh were discussed with him today that he is agreeable with proceeding.  He has been scheduled for 12/8/2017.        This document has been electronically signed by Dale Diehl MD on November 30, 2017 4:33 PM      
Please continue to eat a low fat diet until follow-up with Dr. Garcia in his office.

## 2020-09-15 NOTE — DISCHARGE NOTE PROVIDER - CARE PROVIDER_API CALL
Guero Garcia  SURGERY  [address] [place] [address], [facility], [place] [id number]  Phone: [phone]  Fax: [phone]  Follow Up Time:

## 2020-09-15 NOTE — DISCHARGE NOTE PROVIDER - NSDCFUADDAPPT_GEN_ALL_CORE_FT
Please follow up with Dr. Garcia in 1-2 weeks. Call his office to schedule an appointment: Call [phone].   Please follow up with Dr. Garcia in 1-2 weeks. Call his office to schedule an appointment: Call [phone].

## 2020-09-15 NOTE — DISCHARGE NOTE PROVIDER - NSDCACTIVITY_GEN_ALL_CORE
Stairs allowed/Walking - Outdoors allowed/No heavy lifting/straining/Showering allowed/Walking - Indoors allowed

## 2020-09-15 NOTE — CHART NOTE - NSCHARTNOTEFT_GEN_A_CORE
rapid response called at 1830, pt was ambulating when she became dizzy and started to fall, was caught by nurse and pca. No LOC, no head trauma. Pt gently lowered to floor, finger stick 131, VSS. Pt transfered back to room, EKG w/nl. CBC/CMP/Lactate sent, pt denies CP, SOB, endorses pain of RUQ. Stable. Discussed w rapid team, no other acute intervention needed. Per nursing/PCA, overheard discussion b/w pt and family member about "staging fall" so that pt's last night stay would be covered by insurance. Lactate w/nl. CMP w/nl, Orthostatics w/nl. CBC showed increase in wbc to 12.5 from 11.5 rapid response called at 1830, pt was ambulating when she became dizzy and started to fall, was caught by nurse and pca. No LOC, no head trauma. Pt gently lowered to floor, finger stick 131, VSS. Pt transfered back to room, EKG w/nl. CBC/CMP/Lactate sent, pt denies CP, SOB, endorses pain of RUQ. Stable. Discussed w rapid team, no other acute intervention needed. Per nursing/PCA, overheard discussion b/w pt and family member about "staging fall" so that pt's last night stay would be covered by insurance. Lactate w/nl. CMP w/nl, Orthostatics w/nl. CBC showed increase in wbc to 12.5 from 11.5    GENERAL SURGERY SENIOR NOTE:  Patient seen and examined at bedside, at this point patient was already in her room, resting in bed and communicating about what she ate for dinner and that she tolerated dinner well. She was awake and, could recall the event that occurred, and feels slightly better. She states that she felt dizzy at the time when the nurse and PCA came to help her. She did not fall to the ground, and she did not hit her head. She did not lose consciousness. In talking with her, she focuses on the fact that she wants to try to have a BM.    On physical exam,  General: resting in bed, chatty, communicative  Neuro: AAOx3, motor and neuro strength and sensation in tact of upper and lower extremities   HEENT: PERRLA, pupils 3 mm, reactive. EOM in tact.  Mucous membranes moist  Resp: no increased work of breathing. no wheezing  Cards: NSR  Abd: soft, minimally tender, nondistended. port site incisions c/d/i  LE: nonedematous b/l  Skin: dry, nondiaphoretic    Orthostatics were complete, within normal limits (147/74 laying mari-> 157/80 sitting -> 170/75 standing).     Labs show hgb stable.     Patient is currently stable, will continue to monitor. Encourage PO intake. Ambulate with assist tonight for bathroom visits and in hallways.

## 2020-09-15 NOTE — PROGRESS NOTE ADULT - ASSESSMENT
70 F PMH DM, unknown thyroid disease, PSH csectionX2, JOANA, known cholelithiasis x3 years, presenting with one week of RUQ pain. Now s/p lap dev for acute cholecystitis 9/14. Now s/p difficult Lap Dev non perf non gang 9/14.    - Clears, advance as tolerated  - Pain/nausea control   - IS/OOBA  - SQH/SCD  - Possible discharge home today.

## 2020-09-15 NOTE — CONSULT NOTE ADULT - SUBJECTIVE AND OBJECTIVE BOX
Patient is a 70y old  Female who presents with a chief complaint of acute cholecystitis (15 Sep 2020 07:45)      HPI:  70 F PMH DM, thyroid disease (pt unsure), PSH csectionX2, JOANA, known cholelithiasis x3 years, presenting with one week of RUQ pain that significantly worsened this morning. Pt reports severe RUQ pain that started one week ago and slightly improved to intermittent RUQ achiness for the past week. This morning, patient awoke to severe 10/10 RUQ pain that has not improved. She denies associated nausea/vomiting/fever/chills. She reports tolerating regular diet every day without any issues. Passing flatus, having regular BM (last this morning). Voiding well without dysuria/pyuria. Last cscope  and demonstrated 2 polyps, denies ever having EGD. Pt is now s/p lap dev    Subjective:  Pt reports trouble urinating. Does not feel that she has complete bladder emptying and feels that she needs to apply pressure/cough in order to urinate. Has some abdominal pain/sorenss after the surgery but is otherwise asymptomatic. ROS Is otherwise negative.     Allergies    penicillin (Rash)    Intolerances    Home meds: Metformin, denies thyroid medications    MEDICATIONS  (STANDING):  heparin   Injectable 5000 Unit(s) SubCutaneous every 8 hours  influenza  Vaccine (HIGH DOSE) 0.7 milliLiter(s) IntraMuscular once    MEDICATIONS  (PRN):  ondansetron    Tablet 4 milliGRAM(s) Oral every 6 hours PRN Nausea and/or Vomiting  oxycodone    5 mG/acetaminophen 325 mG 1 Tablet(s) Oral every 4 hours PRN Moderate Pain (4 - 6)  oxycodone    5 mG/acetaminophen 325 mG 2 Tablet(s) Oral every 4 hours PRN Severe Pain (7 - 10)      Drug Dosing Weight  Height (cm): 167.6 (14 Sep 2020 03:18)  Weight (kg): 82.6 (14 Sep 2020 03:18)  BMI (kg/m2): 29.4 (14 Sep 2020 03:18)  BSA (m2): 1.92 (14 Sep 2020 03:18)    PAST MEDICAL & SURGICAL HISTORY:  H/O thyroid disease    Diabetes        FAMILY HISTORY:  no cardiac history reported    SOCIAL HISTORY:  denies smoking, denies illicit drug use, denies etoh use    Vital Signs Last 24 Hrs  T(C): 36.7 (15 Sep 2020 08:40), Max: 37.2 (15 Sep 2020 00:50)  T(F): 98.1 (15 Sep 2020 08:40), Max: 98.9 (15 Sep 2020 00:50)  HR: 74 (15 Sep 2020 08:40) (63 - 89)  BP: 126/71 (15 Sep 2020 08:40) (126/71 - 148/68)  BP(mean): 89 (15 Sep 2020 05:53) (89 - 97)  ABP: --  ABP(mean): --  RR: 17 (15 Sep 2020 08:40) (13 - 20)  SpO2: 96% (15 Sep 2020 08:40) (96% - 100%)          I&O's Detail    14 Sep 2020 07:01  -  15 Sep 2020 07:00  --------------------------------------------------------  IN:    Lactated Ringers Bolus: 800 mL  Total IN: 800 mL    OUT:    Voided (mL): 1175 mL  Total OUT: 1175 mL    Total NET: -375 mL      15 Sep 2020 07:01  -  15 Sep 2020 12:53  --------------------------------------------------------  IN:    Oral Fluid: 540 mL  Total IN: 540 mL    OUT:  Total OUT: 0 mL    Total NET: 540 mL          PHYSICAL EXAM:      Constitutional: NAD  Eyes: PERRLA  ENMT: MMM  Neck: supple  Back: midline  Respiratory: CTA b/l  Cardiovascular: rrr, s1s2, no m/r/g  Gastrointestinal: soft, nondistended, appropriately tender around incisions, no rebound, no guarding, no tympany. Incisions CDI and without hematoma or erythema    Extremities: wwp  Vascular: + 2 pulses DP/TP  Neurological: AAO x 4  Skin: no rash  Lymph Nodes: no LAD  Musculoskeletal: no joint swelling  Psychiatric: normal affect        LABS:  CBC Full  -  ( 15 Sep 2020 07:04 )  WBC Count : 11.50 K/uL  RBC Count : 3.48 M/uL  Hemoglobin : 9.5 g/dL  Hematocrit : 30.5 %  Platelet Count - Automated : 231 K/uL  Mean Cell Volume : 87.6 fl  Mean Cell Hemoglobin : 27.3 pg  Mean Cell Hemoglobin Concentration : 31.1 gm/dL  Auto Neutrophil # : x  Auto Lymphocyte # : x  Auto Monocyte # : x  Auto Eosinophil # : x  Auto Basophil # : x  Auto Neutrophil % : x  Auto Lymphocyte % : x  Auto Monocyte % : x  Auto Eosinophil % : x  Auto Basophil % : x    09-15    136  |  101  |  15  ----------------------------<  126<H>  4.1   |  24  |  1.16    Ca    8.8      15 Sep 2020 07:04  Phos  3.2     09-15  Mg     2.2     09-15    TPro  6.9  /  Alb  3.7  /  TBili  0.7  /  DBili  x   /  AST  23  /  ALT  13  /  AlkPhos  67  09-15    CAPILLARY BLOOD GLUCOSE      POCT Blood Glucose.: 135 mg/dL (14 Sep 2020 14:09)    PT/INR - ( 14 Sep 2020 06:34 )   PT: 13.7 sec;   INR: 1.15          PTT - ( 14 Sep 2020 06:34 )  PTT:32.1 sec  Urinalysis Basic - ( 13 Sep 2020 17:43 )    Color: Yellow / Appearance: Clear / S.010 / pH: x  Gluc: x / Ketone: NEGATIVE  / Bili: Negative / Urobili: 0.2 E.U./dL   Blood: x / Protein: NEGATIVE mg/dL / Nitrite: NEGATIVE   Leuk Esterase: NEGATIVE / RBC: x / WBC x   Sq Epi: x / Non Sq Epi: x / Bacteria: x        EKG:    ECHO, US:    RADIOLOGY:  < from: US Abdomen Limited (ED) (20 @ 19:24) >  IMPRESSION:Gallstones, +sonographic navarro's, and gallbladder wall thickening present, concerning for cholecystitis.    < end of copied text >

## 2020-09-15 NOTE — DISCHARGE NOTE PROVIDER - NSDCMRMEDTOKEN_GEN_ALL_CORE_FT
atorvastatin 20 mg oral tablet: 1 tab(s) orally once a day  hydroCHLOROthiazide 25 mg oral tablet: 1 tab(s) orally once a day  labetalol 200 mg oral tablet: 1 tab(s) orally 2 times a day  losartan 100 mg oral tablet: 1 tab(s) orally once a day  metFORMIN 500 mg oral tablet, extended release: 1 tab(s) orally once a day  NIFEdipine 60 mg oral tablet, extended release: 1 tab(s) orally once a day  SUMAtriptan 100 mg oral tablet: 1 tab(s) orally once   atorvastatin 20 mg oral tablet: 1 tab(s) orally once a day  hydroCHLOROthiazide 25 mg oral tablet: 1 tab(s) orally once a day  labetalol 200 mg oral tablet: 1 tab(s) orally 2 times a day  losartan 100 mg oral tablet: 1 tab(s) orally once a day  metFORMIN 500 mg oral tablet, extended release: 1 tab(s) orally once a day  NIFEdipine 60 mg oral tablet, extended release: 1 tab(s) orally once a day  oxycodone-acetaminophen 5 mg-325 mg oral tablet: 1 tab(s) orally every 6 hours, As Needed -for severe pain MDD:4  SUMAtriptan 100 mg oral tablet: 1 tab(s) orally once

## 2020-09-15 NOTE — CHART NOTE - NSCHARTNOTEFT_GEN_A_CORE
***Rapid Response Clinical Impact Nurse Practitioner Note***    HPI: Patient is a 70y old  Female with PMH DM, thyroid disease (pt unsure), PSH csectionX2, JOANA, known cholelithiasis x3 years, presenting with one week of RUQ pain that significantly worsened this morning. Pt reports severe RUQ pain that started one week ago and slightly improved to intermittent RUQ achiness for the past week. This morning, patient awoke to severe 10/10 RUQ pain that has not improved. She denies associated nausea/vomiting/fever/chills. She reports tolerating regular diet every day without any issues. Passing flatus, having regular BM (last this morning). Voiding well without dysuria/pyuria. Last cscope 2019 and demonstrated 2 polyps, denies ever having EGD.  Now s/p lap dev for acute cholecystitis 9/14. Now s/p difficult Lap Dev non perf non gang 9/14.    PMH: as above, patient unsure of thyroid disease but denies taking medication, patient is a poor historian  PSH: as above and vocal cord nodule resection  Meds: DM medication (unsure of meds)  Social: denies smoking, denies illicit drug use, denies etoh use  Allergies: PCN allergy- possible anaphylaxis (13 Sep 2020 22:43)    Rapid response team called for dizziness and weakness.  Nursing staff reports pt was ambulating in hallway when she began to complain of dizziness.  Nursing staff was assisting her to sit in chair when she slid off edge of chair. She was lowered to the ground. No reported LOC or trauma.     Patient was seen and examined at the bedside by the rapid response team.    Pt reports feeling of dizziness resolved. Denies headache, palpitations, chest pain, shortness of breathe. She endorses pain from surgical sites while ambulating.     Allergies    penicillin (Rash)    Intolerances      PAST MEDICAL & SURGICAL HISTORY:  H/O thyroid disease    Diabetes    Vital Signs Last 24 Hrs  T(C): 36.3 (15 Sep 2020 17:25), Max: 37.2 (15 Sep 2020 00:50)  T(F): 97.4 (15 Sep 2020 17:25), Max: 98.9 (15 Sep 2020 00:50)  HR: 77 (15 Sep 2020 17:25) (74 - 89)  BP: 149/79 (15 Sep 2020 17:25) (126/71 - 149/79)  BP(mean): 89 (15 Sep 2020 05:53) (89 - 94)  RR: 18 (15 Sep 2020 17:25) (17 - 18)  SpO2: 97% (15 Sep 2020 17:25) (96% - 99%)    GENERAL: The patient is awake and alert in no apparent distress.   HEENT: Head is normocephalic and atraumatic. Extraocular muscles are intact. Mucous membranes are moist. No throat erythema/exudates no lymphadenopathy, no JVD,   NECK: Supple.  LUNGS: Clear to auscultation BL without wheezing, rales or rhonchi; respirations unlabored  HEART: Regular rate and rhythm ,+S1/+S2, no murmurs, rubs, gallops  ABDOMEN: Soft, mildly distended and tender, + bowel sounds in all 4 quadrants  EXTREMITIES: Without any cyanosis, clubbing, rash, lesions or edema.  SKIN: No new rashes or lesions.  MSK: strength equal BL  VASCULAR: Radial and Dorsal pedal pulses palpable BL  NEUROLOGIC: Grossly intact.  PSYCH: No new changes.    09-14 @ 07:01  -  09-15 @ 07:00  --------------------------------------------------------  IN: 800 mL / OUT: 1175 mL / NET: -375 mL    09-15 @ 07:01  -  09-15 @ 18:53  --------------------------------------------------------  IN: 1160 mL / OUT: 650 mL / NET: 510 mL                            9.5    11.50 )-----------( 231      ( 15 Sep 2020 07:04 )             30.5     09-15    136  |  101  |  15  ----------------------------<  126<H>  4.1   |  24  |  1.16    Ca    8.8      15 Sep 2020 07:04  Phos  3.2     09-15  Mg     2.2     09-15    TPro  6.9  /  Alb  3.7  /  TBili  0.7  /  DBili  x   /  AST  23  /  ALT  13  /  AlkPhos  67  09-15        LIVER FUNCTIONS - ( 15 Sep 2020 07:04 )  Alb: 3.7 g/dL / Pro: 6.9 g/dL / ALK PHOS: 67 U/L / ALT: 13 U/L / AST: 23 U/L / GGT: x              PT/INR - ( 14 Sep 2020 06:34 )   PT: 13.7 sec;   INR: 1.15          PTT - ( 14 Sep 2020 06:34 )  PTT:32.1 sec    MEDICATIONS  (STANDING):  heparin   Injectable 5000 Unit(s) SubCutaneous every 8 hours    MEDICATIONS  (PRN):  ondansetron    Tablet 4 milliGRAM(s) Oral every 6 hours PRN Nausea and/or Vomiting  oxycodone    5 mG/acetaminophen 325 mG 1 Tablet(s) Oral every 4 hours PRN Moderate Pain (4 - 6)  oxycodone    5 mG/acetaminophen 325 mG 2 Tablet(s) Oral every 4 hours PRN Severe Pain (7 - 10)      Assessment- Rapid Response called for 70y year old Female with a past medical history of DM, unknown thyroid disease, PSH csectionX2, JOANA, known cholelithiasis x3 years, presenting with one week of RUQ pain, now s/p lap dev for acute cholecystitis 9/14. Now s/p difficult Lap Dev non perf non gang 9/14.      Plan- ***Rapid Response Clinical Impact Nurse Practitioner Note***    HPI: Patient is a 70y old  Female with PMH DM, thyroid disease (pt unsure), PSH csectionX2, JOANA, known cholelithiasis x3 years, presenting with one week of RUQ pain that significantly worsened this morning. Pt reports severe RUQ pain that started one week ago and slightly improved to intermittent RUQ achiness for the past week. This morning, patient awoke to severe 10/10 RUQ pain that has not improved. She denies associated nausea/vomiting/fever/chills. She reports tolerating regular diet every day without any issues. Passing flatus, having regular BM (last this morning). Voiding well without dysuria/pyuria. Last cscope 2019 and demonstrated 2 polyps, denies ever having EGD.  Now s/p lap dev for acute cholecystitis 9/14. Now s/p difficult Lap Dev non perf non gang 9/14.    PMH: as above, patient unsure of thyroid disease but denies taking medication, patient is a poor historian  PSH: as above and vocal cord nodule resection  Meds: DM medication (unsure of meds)  Social: denies smoking, denies illicit drug use, denies etoh use  Allergies: PCN allergy- possible anaphylaxis (13 Sep 2020 22:43)    Rapid response team called for dizziness and weakness.  Nursing staff reports pt was ambulating in hallway when she began to complain of dizziness.  Nursing staff was assisting her to sit in chair when she slid off edge of chair. She was lowered to the ground. No reported LOC or trauma.     Patient was seen and examined at the bedside by the rapid response team.    Pt reports feeling of dizziness resolved. Denies headache, palpitations, chest pain, shortness of breathe. She endorses pain from surgical sites while ambulating.     Allergies    penicillin (Rash)    Intolerances      PAST MEDICAL & SURGICAL HISTORY:  H/O thyroid disease    Diabetes    Vital Signs Last 24 Hrs  T(C): 36.3 (15 Sep 2020 17:25), Max: 37.2 (15 Sep 2020 00:50)  T(F): 97.4 (15 Sep 2020 17:25), Max: 98.9 (15 Sep 2020 00:50)  HR: 77 (15 Sep 2020 17:25) (74 - 89)  BP: 149/79 (15 Sep 2020 17:25) (126/71 - 149/79)  BP(mean): 89 (15 Sep 2020 05:53) (89 - 94)  RR: 18 (15 Sep 2020 17:25) (17 - 18)  SpO2: 97% (15 Sep 2020 17:25) (96% - 99%)    GENERAL: The patient is awake and alert in no apparent distress.   HEENT: Head is normocephalic and atraumatic. Extraocular muscles are intact. Mucous membranes are moist. No throat erythema/exudates no lymphadenopathy, no JVD,   NECK: Supple.  LUNGS: Clear to auscultation BL without wheezing, rales or rhonchi; respirations unlabored  HEART: Regular rate and rhythm ,+S1/+S2, no murmurs, rubs, gallops  ABDOMEN: Soft, mildly distended and tender, + bowel sounds in all 4 quadrants  EXTREMITIES: Without any cyanosis, clubbing, rash, lesions or edema.  SKIN: No new rashes or lesions.  MSK: strength equal BL  VASCULAR: Radial and Dorsal pedal pulses palpable BL  NEUROLOGIC: Grossly intact.  PSYCH: No new changes.    09-14 @ 07:01  -  09-15 @ 07:00  --------------------------------------------------------  IN: 800 mL / OUT: 1175 mL / NET: -375 mL    09-15 @ 07:01  -  09-15 @ 18:53  --------------------------------------------------------  IN: 1160 mL / OUT: 650 mL / NET: 510 mL                            9.5    11.50 )-----------( 231      ( 15 Sep 2020 07:04 )             30.5     09-15    136  |  101  |  15  ----------------------------<  126<H>  4.1   |  24  |  1.16    Ca    8.8      15 Sep 2020 07:04  Phos  3.2     09-15  Mg     2.2     09-15    TPro  6.9  /  Alb  3.7  /  TBili  0.7  /  DBili  x   /  AST  23  /  ALT  13  /  AlkPhos  67  09-15        LIVER FUNCTIONS - ( 15 Sep 2020 07:04 )  Alb: 3.7 g/dL / Pro: 6.9 g/dL / ALK PHOS: 67 U/L / ALT: 13 U/L / AST: 23 U/L / GGT: x              PT/INR - ( 14 Sep 2020 06:34 )   PT: 13.7 sec;   INR: 1.15          PTT - ( 14 Sep 2020 06:34 )  PTT:32.1 sec    MEDICATIONS  (STANDING):  heparin   Injectable 5000 Unit(s) SubCutaneous every 8 hours    MEDICATIONS  (PRN):  ondansetron    Tablet 4 milliGRAM(s) Oral every 6 hours PRN Nausea and/or Vomiting  oxycodone    5 mG/acetaminophen 325 mG 1 Tablet(s) Oral every 4 hours PRN Moderate Pain (4 - 6)  oxycodone    5 mG/acetaminophen 325 mG 2 Tablet(s) Oral every 4 hours PRN Severe Pain (7 - 10)      Assessment- Rapid Response called for 70y year old female with a past medical history of DM, unknown thyroid disease, PSH csectionX2, JOANA, known cholelithiasis x3 years, presenting with one week of RUQ pain, s/p lap dev for acute cholecystitis 9/14, now s/p RRT for presyncopal episode. Presyncopal episode with prodrome suggestive of vasovagal event potentially triggered by pain.    Plan-  .  BP at baseline.  EKG NSR.  F/u stat labs.  Appears euvolemic on exam, Monitor I/O. Avoid dehydration.  f/u stat labs.  Ambulate with assist for safety.  Pain management.  Remainder of plan as per primary team.  Case discussed with ICU and primary team.   Pt stable to remain on 9 Uris. ***Rapid Response Clinical Impact Nurse Practitioner Note***    HPI: Patient is a 70y old  Female with PMH DM, thyroid disease (pt unsure), PSH csectionX2, JOANA, known cholelithiasis x3 years, presenting with one week of RUQ pain that significantly worsened this morning. Pt reports severe RUQ pain that started one week ago and slightly improved to intermittent RUQ achiness for the past week. This morning, patient awoke to severe 10/10 RUQ pain that has not improved. She denies associated nausea/vomiting/fever/chills. She reports tolerating regular diet every day without any issues. Passing flatus, having regular BM (last this morning). Voiding well without dysuria/pyuria. Last cscope 2019 and demonstrated 2 polyps, denies ever having EGD.  Now s/p lap dev for acute cholecystitis 9/14. Now s/p difficult Lap Dev non perf non gang 9/14.    PMH: as above, patient unsure of thyroid disease but denies taking medication, patient is a poor historian  PSH: as above and vocal cord nodule resection  Meds: DM medication (unsure of meds)  Social: denies smoking, denies illicit drug use, denies etoh use  Allergies: PCN allergy- possible anaphylaxis (13 Sep 2020 22:43)    Rapid response team called for dizziness and weakness.  Nursing staff reports pt was ambulating in hallway when she began to complain of dizziness.  Nursing staff was assisting her to sit in chair when she slid off edge of chair. She was lowered to the ground. No reported LOC or trauma.     Patient was seen and examined at the bedside by the rapid response team.    Pt reports feeling of dizziness resolved. Denies headache, palpitations, chest pain, shortness of breathe. She endorses pain from surgical sites while ambulating.     Allergies    penicillin (Rash)    Intolerances      PAST MEDICAL & SURGICAL HISTORY:  H/O thyroid disease    Diabetes    Vital Signs Last 24 Hrs  T(C): 36.3 (15 Sep 2020 17:25), Max: 37.2 (15 Sep 2020 00:50)  T(F): 97.4 (15 Sep 2020 17:25), Max: 98.9 (15 Sep 2020 00:50)  HR: 77 (15 Sep 2020 17:25) (74 - 89)  BP: 149/79 (15 Sep 2020 17:25) (126/71 - 149/79)  BP(mean): 89 (15 Sep 2020 05:53) (89 - 94)  RR: 18 (15 Sep 2020 17:25) (17 - 18)  SpO2: 97% (15 Sep 2020 17:25) (96% - 99%)    GENERAL: The patient is awake and alert in no apparent distress.   HEENT: Head is normocephalic and atraumatic. Extraocular muscles are intact. Mucous membranes are moist. No throat erythema/exudates no lymphadenopathy, no JVD,   NECK: Supple.  LUNGS: Clear to auscultation BL without wheezing, rales or rhonchi; respirations unlabored  HEART: Regular rate and rhythm ,+S1/+S2, no murmurs, rubs, gallops  ABDOMEN: Soft, mildly distended and tender, + bowel sounds in all 4 quadrants  EXTREMITIES: Without any cyanosis, clubbing, rash, lesions or edema.  SKIN: No new rashes or lesions.  MSK: strength equal BL  VASCULAR: Radial and Dorsal pedal pulses palpable BL  NEUROLOGIC: Grossly intact.  PSYCH: No new changes.    09-14 @ 07:01  -  09-15 @ 07:00  --------------------------------------------------------  IN: 800 mL / OUT: 1175 mL / NET: -375 mL    09-15 @ 07:01  -  09-15 @ 18:53  --------------------------------------------------------  IN: 1160 mL / OUT: 650 mL / NET: 510 mL                            9.5    11.50 )-----------( 231      ( 15 Sep 2020 07:04 )             30.5     09-15    136  |  101  |  15  ----------------------------<  126<H>  4.1   |  24  |  1.16    Ca    8.8      15 Sep 2020 07:04  Phos  3.2     09-15  Mg     2.2     09-15    TPro  6.9  /  Alb  3.7  /  TBili  0.7  /  DBili  x   /  AST  23  /  ALT  13  /  AlkPhos  67  09-15        LIVER FUNCTIONS - ( 15 Sep 2020 07:04 )  Alb: 3.7 g/dL / Pro: 6.9 g/dL / ALK PHOS: 67 U/L / ALT: 13 U/L / AST: 23 U/L / GGT: x              PT/INR - ( 14 Sep 2020 06:34 )   PT: 13.7 sec;   INR: 1.15          PTT - ( 14 Sep 2020 06:34 )  PTT:32.1 sec    MEDICATIONS  (STANDING):  heparin   Injectable 5000 Unit(s) SubCutaneous every 8 hours    MEDICATIONS  (PRN):  ondansetron    Tablet 4 milliGRAM(s) Oral every 6 hours PRN Nausea and/or Vomiting  oxycodone    5 mG/acetaminophen 325 mG 1 Tablet(s) Oral every 4 hours PRN Moderate Pain (4 - 6)  oxycodone    5 mG/acetaminophen 325 mG 2 Tablet(s) Oral every 4 hours PRN Severe Pain (7 - 10)      Assessment- Rapid Response called for 70y year old female with a past medical history of DM, unknown thyroid disease, PSH csectionX2, JOANA, known cholelithiasis x3 years, presenting with one week of RUQ pain, s/p lap dev for acute cholecystitis 9/14, now s/p RRT for presyncopal episode. Presyncopal episode with prodrome suggestive of vasovagal event potentially triggered by pain.    Plan-  .  BP at baseline.  EKG NSR.  F/u stat labs.  Appears euvolemic on exam, Monitor I/O. Avoid dehydration.  Ambulate with assist for safety.  Pain management.  Remainder of plan as per primary team.  Case discussed with ICU and primary team.   Pt stable to remain on 9 Uris.

## 2020-09-15 NOTE — DISCHARGE NOTE PROVIDER - HOSPITAL COURSE
70 woman with a past medical history of diabetes mellitus and a past surgical history of   section x2, total abdominal  hysterectomy and know 3 year history of cholelithiasis who presented with one week of worsening right upper quadrant pain without associated nausea/vomiting/fever/chills. And ultrasound in the emergency department was significant for cholelithiasis, positive sonographic Rueda sign, and gallbladder wall thickening concerning for acute cholecystitis.     Patient underwent laparoscopic cholecystectomy on . Post-operatively, the patient did well, passed her trial of void, and her diet was advanced as tolerated. On the day of discharge, the patient is ambulating well, tolerating a low fat diet without nausea or vomiting, and is aware to follow-up with Dr. Garcia next week. She has been counseled and demonstrates understanding of warning signs.

## 2020-09-15 NOTE — CONSULT NOTE ADULT - PROBLEM SELECTOR RECOMMENDATION 2
Pt unable to provide more comprehensive history, reports that she had some sort of thyroid surgery but that subsequent tests have shown that her thyroid level is normal  -Need to obtain collateral

## 2020-09-15 NOTE — DISCHARGE NOTE NURSING/CASE MANAGEMENT/SOCIAL WORK - NSDCFUADDAPPT_GEN_ALL_CORE_FT
Please follow up with Dr. Garcia in 1-2 weeks. Call his office to schedule an appointment: Call [phone].

## 2020-09-15 NOTE — CONSULT NOTE ADULT - ASSESSMENT
70 F PMH DM, thyroid disease (pt unsure), PSH csectionX2, JOANA, known cholelithiasis x3 years, presenting with one week of RUQ pain that significantly worsened now s/p lap dev

## 2020-09-15 NOTE — DISCHARGE NOTE PROVIDER - NSDCFUADDINST_GEN_ALL_CORE_FT
General Discharge Instructions:  Please resume all regular home medications unless specifically advised not to take a particular medication. Also, please take any new medications as prescribed.  Please get plenty of rest, continue to ambulate several times per day, and drink adequate amounts of fluids. Avoid lifting weights greater than 5-10 lbs until you follow-up with your surgeon, who will instruct you further regarding activity restrictions.  Avoid driving or operating heavy machinery while taking pain medications.  Please follow-up with your surgeon and Primary Care Provider (PCP) as advised.  Incision Care:  *Please call your doctor or nurse practitioner if you have increased pain, swelling, redness, or drainage from the incision site.  *Avoid swimming and baths until your follow-up appointment.  *You may shower, and wash surgical incisions with a mild soap and warm water. Gently pat the area dry.  *Please allow water to wash over your surgical sites/glue, pat dry after showering, do not scrub.     Warning Signs:  Please call your doctor or nurse practitioner if you experience the following:  *You experience new chest pain, pressure, squeezing or tightness.  *New or worsening cough, shortness of breath, or wheeze.  *If you are vomiting and cannot keep down fluids or your medications.  *You are getting dehydrated due to continued vomiting, diarrhea, or other reasons. Signs of dehydration include dry mouth, rapid heartbeat, or feeling dizzy or faint when standing.  *You see blood or dark/black material when you vomit or have a bowel movement.  *You experience burning when you urinate, have blood in your urine, or experience a discharge.  *Your pain is not improving within 8-12 hours or is not gone within 24 hours. Call or return immediately if your pain is getting worse, changes location, or moves to your chest or back.  *You have shaking chills, or fever greater than 101.5 degrees Fahrenheit or 38 degrees Celsius.  *Any change in your symptoms, or any new symptoms that concern you. General Discharge Instructions:  Please resume all regular home medications unless specifically advised not to take a particular medication. Also, please take any new medications as prescribed.   For pain, you may take over-the-counter Tylenol and/or NSAIDs (such as Motrin/Advil) as labeled, as needed. For breakthrough pain you may take Percocet, which contains Tylenol. Do NOT exceed 4000mg acetaminophen/Tylenol total within 24 hours. Please take Colace 1 tab twice daily while taking narcotic pain medication to avoid constipation  Please get plenty of rest, continue to ambulate several times per day, and drink adequate amounts of fluids. Avoid lifting weights greater than 5-10 lbs until you follow-up with your surgeon, who will instruct you further regarding activity restrictions.  Avoid driving or operating heavy machinery while taking pain medications.  Please follow-up with your surgeon and Primary Care Provider (PCP) as advised.  Incision Care:  *Please call your doctor or nurse practitioner if you have increased pain, swelling, redness, or drainage from the incision site.  *Avoid swimming and baths until your follow-up appointment.  *You may shower, and wash surgical incisions with a mild soap and warm water. Gently pat the area dry.  *Please allow water to wash over your surgical sites/glue, pat dry after showering, do not scrub.     Warning Signs:  Please call your doctor or nurse practitioner if you experience the following:  *You experience new chest pain, pressure, squeezing or tightness.  *New or worsening cough, shortness of breath, or wheeze.  *If you are vomiting and cannot keep down fluids or your medications.  *You are getting dehydrated due to continued vomiting, diarrhea, or other reasons. Signs of dehydration include dry mouth, rapid heartbeat, or feeling dizzy or faint when standing.  *You see blood or dark/black material when you vomit or have a bowel movement.  *You experience burning when you urinate, have blood in your urine, or experience a discharge.  *Your pain is not improving within 8-12 hours or is not gone within 24 hours. Call or return immediately if your pain is getting worse, changes location, or moves to your chest or back.  *You have shaking chills, or fever greater than 101.5 degrees Fahrenheit or 38 degrees Celsius.  *Any change in your symptoms, or any new symptoms that concern you.

## 2020-09-15 NOTE — DISCHARGE NOTE NURSING/CASE MANAGEMENT/SOCIAL WORK - PATIENT PORTAL LINK FT
You can access the FollowMyHealth Patient Portal offered by [facility] by registering at the following website: [email or web address]. By joining [email or web address]’s FollowMyHealth portal, you will also be able to view your health information using other applications (apps) compatible with our system.

## 2020-09-16 VITALS
HEART RATE: 91 BPM | OXYGEN SATURATION: 95 % | TEMPERATURE: 99 F | DIASTOLIC BLOOD PRESSURE: 63 MMHG | SYSTOLIC BLOOD PRESSURE: 103 MMHG | RESPIRATION RATE: 17 BRPM

## 2020-09-16 DIAGNOSIS — R55 SYNCOPE AND COLLAPSE: ICD-10-CM
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
## 2020-09-16 PROCEDURE — 83735 ASSAY OF MAGNESIUM: CPT

## 2020-09-16 PROCEDURE — 85025 COMPLETE CBC W/AUTO DIFF WBC: CPT

## 2020-09-16 PROCEDURE — 86900 BLOOD TYPING SEROLOGIC ABO: CPT

## 2020-09-16 PROCEDURE — 85730 THROMBOPLASTIN TIME PARTIAL: CPT

## 2020-09-16 PROCEDURE — 85610 PROTHROMBIN TIME: CPT

## 2020-09-16 PROCEDURE — 86850 RBC ANTIBODY SCREEN: CPT

## 2020-09-16 PROCEDURE — U0003: CPT

## 2020-09-16 PROCEDURE — 36415 COLL VENOUS BLD VENIPUNCTURE: CPT

## 2020-09-16 PROCEDURE — 81003 URINALYSIS AUTO W/O SCOPE: CPT

## 2020-09-16 PROCEDURE — 88300 SURGICAL PATH GROSS: CPT

## 2020-09-16 PROCEDURE — 83690 ASSAY OF LIPASE: CPT

## 2020-09-16 PROCEDURE — 76705 ECHO EXAM OF ABDOMEN: CPT

## 2020-09-16 PROCEDURE — 82962 GLUCOSE BLOOD TEST: CPT

## 2020-09-16 PROCEDURE — 90662 IIV NO PRSV INCREASED AG IM: CPT

## 2020-09-16 PROCEDURE — 93005 ELECTROCARDIOGRAM TRACING: CPT

## 2020-09-16 PROCEDURE — 86901 BLOOD TYPING SEROLOGIC RH(D): CPT

## 2020-09-16 PROCEDURE — 87086 URINE CULTURE/COLONY COUNT: CPT

## 2020-09-16 PROCEDURE — 83605 ASSAY OF LACTIC ACID: CPT

## 2020-09-16 PROCEDURE — 99285 EMERGENCY DEPT VISIT HI MDM: CPT

## 2020-09-16 PROCEDURE — 71045 X-RAY EXAM CHEST 1 VIEW: CPT

## 2020-09-16 PROCEDURE — 99233 SBSQ HOSP IP/OBS HIGH 50: CPT | Mod: GC

## 2020-09-16 PROCEDURE — 84100 ASSAY OF PHOSPHORUS: CPT

## 2020-09-16 PROCEDURE — 88304 TISSUE EXAM BY PATHOLOGIST: CPT

## 2020-09-16 PROCEDURE — 80053 COMPREHEN METABOLIC PANEL: CPT

## 2020-09-16 PROCEDURE — 86803 HEPATITIS C AB TEST: CPT

## 2020-09-16 PROCEDURE — 85027 COMPLETE CBC AUTOMATED: CPT

## 2020-09-16 PROCEDURE — 83036 HEMOGLOBIN GLYCOSYLATED A1C: CPT

## 2020-09-16 NOTE — PROGRESS NOTE ADULT - SUBJECTIVE AND OBJECTIVE BOX
SUBJECTIVE: Feeling ok, pain at incision sites, no N/V, demetri diet. Patient seen and examined bedside by chief resident.    heparin   Injectable 5000 Unit(s) SubCutaneous every 8 hours    MEDICATIONS  (PRN):  ondansetron    Tablet 4 milliGRAM(s) Oral every 6 hours PRN Nausea and/or Vomiting  oxycodone    5 mG/acetaminophen 325 mG 1 Tablet(s) Oral every 4 hours PRN Moderate Pain (4 - 6)  oxycodone    5 mG/acetaminophen 325 mG 2 Tablet(s) Oral every 4 hours PRN Severe Pain (7 - 10)      I&O's Detail    15 Sep 2020 07:01  -  16 Sep 2020 07:00  --------------------------------------------------------  IN:    Oral Fluid: 1910 mL  Total IN: 1910 mL    OUT:    Voided (mL): 1475 mL  Total OUT: 1475 mL    Total NET: 435 mL          Vital Signs Last 24 Hrs  T(C): 37.1 (16 Sep 2020 05:46), Max: 37.1 (16 Sep 2020 05:46)  T(F): 98.7 (16 Sep 2020 05:46), Max: 98.7 (16 Sep 2020 05:46)  HR: 75 (16 Sep 2020 05:46) (74 - 78)  BP: 121/70 (16 Sep 2020 05:46) (121/70 - 149/79)  BP(mean): --  RR: 17 (16 Sep 2020 05:46) (17 - 18)  SpO2: 96% (16 Sep 2020 05:46) (96% - 99%)    General: NAD, resting comfortably in bed  C/V: NSR  Pulm: Nonlabored breathing, no respiratory distress  Abd: soft, NT/ND, incision CDI  Extrem: SCDs in place    LABS:                        9.7    12.27 )-----------( 245      ( 15 Sep 2020 18:52 )             32.1     09-15    139  |  102  |  14  ----------------------------<  138<H>  4.0   |  24  |  1.19    Ca    8.8      15 Sep 2020 18:52  Phos  3.2     09-15  Mg     2.2     09-15    TPro  7.3  /  Alb  4.0  /  TBili  0.5  /  DBili  x   /  AST  25  /  ALT  15  /  AlkPhos  73  09-15    PT/INR - ( 15 Sep 2020 18:52 )   PT: 14.2 sec;   INR: 1.19          PTT - ( 15 Sep 2020 18:52 )  PTT:28.8 sec  
  SUBJECTIVE: C/o abd pain, no N/V.  Patient seen and examined bedside by chief resident.    cefTRIAXone   IVPB 1000 milliGRAM(s) IV Intermittent every 24 hours  heparin   Injectable 5000 Unit(s) SubCutaneous every 8 hours  metroNIDAZOLE  IVPB 500 milliGRAM(s) IV Intermittent every 8 hours    MEDICATIONS  (PRN):  dextrose 40% Gel 15 Gram(s) Oral once PRN Blood Glucose LESS THAN 70 milliGRAM(s)/deciliter  glucagon  Injectable 1 milliGRAM(s) IntraMuscular once PRN Glucose LESS THAN 70 milligrams/deciliter  HYDROmorphone  Injectable 0.5 milliGRAM(s) IV Push every 4 hours PRN Moderate Pain (4 - 6)  ondansetron Injectable 4 milliGRAM(s) IV Push every 6 hours PRN Nausea and/or Vomiting      I&O's Detail    13 Sep 2020 07:01  -  14 Sep 2020 07:00  --------------------------------------------------------  IN:    IV PiggyBack: 200 mL    Lactated Ringers: 825 mL  Total IN: 1025 mL    OUT:    Voided (mL): 300 mL  Total OUT: 300 mL    Total NET: 725 mL          Vital Signs Last 24 Hrs  T(C): 36.8 (14 Sep 2020 05:05), Max: 36.9 (13 Sep 2020 20:04)  T(F): 98.3 (14 Sep 2020 05:05), Max: 98.5 (13 Sep 2020 23:00)  HR: 96 (14 Sep 2020 05:05) (79 - 96)  BP: 106/54 (14 Sep 2020 05:05) (106/54 - 160/68)  BP(mean): --  RR: 17 (14 Sep 2020 05:05) (17 - 18)  SpO2: 95% (14 Sep 2020 05:05) (95% - 100%)    General: NAD, resting comfortably in bed  C/V: NSR  Pulm: Nonlabored breathing, no respiratory distress  Abd: soft, nondistended, TTP RUQ, no rebound/guarding  Extrem: SCDs in place    LABS:                        9.7    10.68 )-----------( 218      ( 14 Sep 2020 06:34 )             31.7     09-14    142  |  103  |  18  ----------------------------<  133<H>  3.7   |  27  |  1.02    Ca    9.1      14 Sep 2020 06:34  Phos  3.8     -  Mg     2.4     -    TPro  7.1  /  Alb  4.0  /  TBili  0.8  /  DBili  x   /  AST  14  /  ALT  10  /  AlkPhos  72  09-14    PT/INR - ( 14 Sep 2020 06:34 )   PT: 13.7 sec;   INR: 1.15          PTT - ( 14 Sep 2020 06:34 )  PTT:32.1 sec  Urinalysis Basic - ( 13 Sep 2020 17:43 )    Color: Yellow / Appearance: Clear / S.010 / pH: x  Gluc: x / Ketone: NEGATIVE  / Bili: Negative / Urobili: 0.2 E.U./dL   Blood: x / Protein: NEGATIVE mg/dL / Nitrite: NEGATIVE   Leuk Esterase: NEGATIVE / RBC: x / WBC x   Sq Epi: x / Non Sq Epi: x / Bacteria: x    
POST-OPERATIVE NOTE    Procedure: lap cholecystectomy    Diagnosis/Indication: cholecystitis    Surgeon: Dr. Clifford    S: Pt has no complaints. Denies CP, SOB, calf tenderness. Pain controlled with medication. Denies nausea, vomiting.    O:  T(C): 36.1 (09-14-20 @ 13:30), Max: 36.1 (09-14-20 @ 13:30)  T(F): 97 (09-14-20 @ 13:30), Max: 97 (09-14-20 @ 13:30)  HR: 65 (09-14-20 @ 14:45) (63 - 73)  BP: 132/62 (09-14-20 @ 14:45) (129/63 - 138/67)  RR: 15 (09-14-20 @ 14:30) (13 - 20)  SpO2: 99% (09-14-20 @ 14:45) (98% - 100%)  Wt(kg): --                        9.7    10.68 )-----------( 218      ( 14 Sep 2020 06:34 )             31.7     09-14    142  |  103  |  18  ----------------------------<  133<H>  3.7   |  27  |  1.02    Ca    9.1      14 Sep 2020 06:34  Phos  3.8     09-14  Mg     2.4     09-14    TPro  7.1  /  Alb  4.0  /  TBili  0.8  /  DBili  x   /  AST  14  /  ALT  10  /  AlkPhos  72  09-14      Gen: NAD, resting comfortably in bed  C/V: NSR  Pulm: Nonlabored breathing, no respiratory distress  Abd: soft, NT/ND, incisions CDI  Extrem: no calf tenderness, SCDs in place    
SUBJECTIVE: Patient seen and examined bedside by chief resident. Patient reports feeling soreness duet to procedure, and is more intense when coughing. Does report intermittent pain on RUQ. Specifically denies nausea, vomit, chest pain, SOB, calves tenderness, fever, dizziness, disuria. Good acceptance of liquid diet without nausea, vomit or postpandrial discomfort.      heparin   Injectable 5000 Unit(s) SubCutaneous every 8 hours      Vital Signs Last 24 Hrs  T(C): 37.1 (15 Sep 2020 05:53), Max: 37.2 (15 Sep 2020 00:50)  T(F): 98.8 (15 Sep 2020 05:53), Max: 98.9 (15 Sep 2020 00:50)  HR: 89 (15 Sep 2020 05:53) (63 - 89)  BP: 136/65 (15 Sep 2020 05:53) (113/69 - 148/68)  BP(mean): 89 (15 Sep 2020 05:53) (89 - 97)  RR: 18 (15 Sep 2020 05:53) (13 - 20)  SpO2: 97% (15 Sep 2020 05:53) (97% - 100%)  I&O's Detail    14 Sep 2020 07:01  -  15 Sep 2020 07:00  --------------------------------------------------------  IN:    Lactated Ringers Bolus: 800 mL  Total IN: 800 mL    OUT:    Voided (mL): 1175 mL  Total OUT: 1175 mL    Total NET: -375 mL          PHYSICAL EXAMINATION   General: NAD, resting comfortably in bed.   NEURO: AAOx3, follows commands.   HEENT: MMM, non icteric,   CV: RRR, no MRG,   PULM: nonlabored breathing, no respiratory distress  ABD: soft, nondistended, appropriately tender around incisions, no rebound, no guarding, no tympany. Incisions CDI and without hematoma or erythema    EXTREM: WWP, no edema, no calf tenderness  VASC: No cyanosis, no pallor, palpable radial and pulses pedis 2+ bilaterally  SKIN: No rashes noted  PSYCH: Appropriate affect, answers questions appropriately      LABS:                        9.5    11.50 )-----------( 231      ( 15 Sep 2020 07:04 )             30.5     -14    142  |  103  |  18  ----------------------------<  133<H>  3.7   |  27  |  1.02    Ca    9.1      14 Sep 2020 06:34  Phos  3.8     -  Mg     2.4     -    TPro  7.1  /  Alb  4.0  /  TBili  0.8  /  DBili  x   /  AST  14  /  ALT  10  /  AlkPhos  72  09-14    PT/INR - ( 14 Sep 2020 06:34 )   PT: 13.7 sec;   INR: 1.15          PTT - ( 14 Sep 2020 06:34 )  PTT:32.1 sec  Urinalysis Basic - ( 13 Sep 2020 17:43 )    Color: Yellow / Appearance: Clear / S.010 / pH: x  Gluc: x / Ketone: NEGATIVE  / Bili: Negative / Urobili: 0.2 E.U./dL   Blood: x / Protein: NEGATIVE mg/dL / Nitrite: NEGATIVE   Leuk Esterase: NEGATIVE / RBC: x / WBC x   Sq Epi: x / Non Sq Epi: x / Bacteria: x       
O/N and interval events: Patient had reported fall in the evening. Please see chart note for full details. Briefly pt had reported fall - whole event was witnessed. There was no apparent loss of consciousness. EKG, orthostatics and lab work was done and was grossly unremarkable. Additionally pt was reportedly overheard having a conversation with family discussing staging a fall to increase hospital length of stay.     Subjective:  Pt denies current complaints. Expresses concern that she has poor support at home. Denies abdominal pain, CP, palpitations, dizziness, light headedness, changes in motor/sensory. ROS Is otherwise negative.     Allergies    penicillin (Rash)    Intolerances    Home meds: Metformin, denies thyroid medications    MEDICATIONS  (STANDING):  heparin   Injectable 5000 Unit(s) SubCutaneous every 8 hours    MEDICATIONS  (PRN):  ondansetron    Tablet 4 milliGRAM(s) Oral every 6 hours PRN Nausea and/or Vomiting  oxycodone    5 mG/acetaminophen 325 mG 1 Tablet(s) Oral every 4 hours PRN Moderate Pain (4 - 6)  oxycodone    5 mG/acetaminophen 325 mG 2 Tablet(s) Oral every 4 hours PRN Severe Pain (7 - 10)        Drug Dosing Weight  Height (cm): 167.6 (14 Sep 2020 03:18)  Weight (kg): 82.6 (14 Sep 2020 03:18)  BMI (kg/m2): 29.4 (14 Sep 2020 03:18)  BSA (m2): 1.92 (14 Sep 2020 03:18)    PAST MEDICAL & SURGICAL HISTORY:  H/O thyroid disease    Diabetes        FAMILY HISTORY:  no cardiac history reported    SOCIAL HISTORY:  denies smoking, denies illicit drug use, denies etoh use    Vital Signs Last 24 Hrs  T(C): 37 (16 Sep 2020 08:04), Max: 37.1 (16 Sep 2020 05:46)  T(F): 98.6 (16 Sep 2020 08:04), Max: 98.7 (16 Sep 2020 05:46)  HR: 91 (16 Sep 2020 08:04) (75 - 91)  BP: 103/63 (16 Sep 2020 08:04) (103/63 - 121/70)  BP(mean): --  RR: 17 (16 Sep 2020 08:04) (17 - 17)  SpO2: 95% (16 Sep 2020 08:04) (95% - 96%)      PHYSICAL EXAM:      Constitutional: NAD  Eyes: PERRLA  ENMT: MMM  Neck: supple  Back: midline  Respiratory: CTA b/l  Cardiovascular: rrr, s1s2, no m/r/g  Gastrointestinal: soft, nondistended, appropriately tender around incisions, no rebound, no guarding, no tympany. Incisions CDI and without hematoma or erythema    Extremities: wwp  Vascular: + 2 pulses DP/TP  Neurological: AAO x 4  Skin: no rash  Lymph Nodes: no LAD  Musculoskeletal: no joint swelling  Psychiatric: normal affect        LABS:                        9.7    12.27 )-----------( 245      ( 15 Sep 2020 18:52 )             32.1   09-15    139  |  102  |  14  ----------------------------<  138<H>  4.0   |  24  |  1.19    Ca    8.8      15 Sep 2020 18:52  Phos  3.2     09-15  Mg     2.2     09-15    TPro  7.3  /  Alb  4.0  /  TBili  0.5  /  DBili  x   /  AST  25  /  ALT  15  /  AlkPhos  73  09-15          EKG:  NSR  ECHO, US:    RADIOLOGY:  < from: US Abdomen Limited (ED) (09.13.20 @ 19:24) >  IMPRESSION:Gallstones, +sonographic navarro's, and gallbladder wall thickening present, concerning for cholecystitis.    < end of copied text >

## 2020-09-16 NOTE — PROGRESS NOTE ADULT - PROBLEM SELECTOR PLAN 2
Possible pre-syncopal event, after review of history provided by patient and staff most likely etiology is vasovagal  -Malingering/secondary gain should be included on differential given report   -Low suspicion for ACS given normal EKG, no cardiac enzymes drawn although pt had no further symptoms after the event, furthermore does not have a reported cardiac history or prior syncopal or presyncopal events  -Orthostatics negative  -Pt also likely did not have true syncope - as she reports being able to hear/see the whole event, this was further confirmed by the staff that witnessed the event in its entirety  -Pt counseled to follow up with her outpatient provider within 1 week of discharge; plan was also explained to her family

## 2020-09-16 NOTE — PROGRESS NOTE ADULT - ASSESSMENT
70 F PMH DM, unknown thyroid disease, PSH csectionX2, JOANA, known cholelithiasis x3 years, presenting with one week of RUQ pain. Now s/p lap dev for acute cholecystitis 9/14. Now s/p difficult Lap Dev non perf non gang 9/14.    - Low fat diet   - Pain/nausea control   - IS/OOBA  - SQH/SCD  - d/c tomorrow, no AM labs
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
## 2020-09-17 LAB — SURGICAL PATHOLOGY STUDY: SIGNIFICANT CHANGE UP

## 2020-09-21 DIAGNOSIS — E78.5 HYPERLIPIDEMIA, UNSPECIFIED: ICD-10-CM

## 2020-09-21 DIAGNOSIS — D64.9 ANEMIA, UNSPECIFIED: ICD-10-CM

## 2020-09-21 DIAGNOSIS — K66.0 PERITONEAL ADHESIONS (POSTPROCEDURAL) (POSTINFECTION): ICD-10-CM

## 2020-09-21 DIAGNOSIS — Z90.710 ACQUIRED ABSENCE OF BOTH CERVIX AND UTERUS: ICD-10-CM

## 2020-09-21 DIAGNOSIS — K80.62 CALCULUS OF GALLBLADDER AND BILE DUCT WITH ACUTE CHOLECYSTITIS WITHOUT OBSTRUCTION: ICD-10-CM

## 2020-09-21 DIAGNOSIS — T81.508A: ICD-10-CM

## 2020-09-21 DIAGNOSIS — E11.22 TYPE 2 DIABETES MELLITUS WITH DIABETIC CHRONIC KIDNEY DISEASE: ICD-10-CM

## 2020-09-21 DIAGNOSIS — Z88.0 ALLERGY STATUS TO PENICILLIN: ICD-10-CM
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
## 2020-09-21 DIAGNOSIS — E03.9 HYPOTHYROIDISM, UNSPECIFIED: ICD-10-CM

## 2020-09-21 DIAGNOSIS — K82.3 FISTULA OF GALLBLADDER: ICD-10-CM

## 2020-09-21 DIAGNOSIS — E78.00 PURE HYPERCHOLESTEROLEMIA, UNSPECIFIED: ICD-10-CM

## 2020-09-21 DIAGNOSIS — R10.11 RIGHT UPPER QUADRANT PAIN: ICD-10-CM

## 2020-09-21 DIAGNOSIS — R55 SYNCOPE AND COLLAPSE: ICD-10-CM

## 2020-09-21 DIAGNOSIS — Z79.84 LONG TERM (CURRENT) USE OF ORAL HYPOGLYCEMIC DRUGS: ICD-10-CM

## 2020-09-21 DIAGNOSIS — N18.3 CHRONIC KIDNEY DISEASE, STAGE 3 (MODERATE): ICD-10-CM
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
## 2020-09-28 ENCOUNTER — APPOINTMENT (OUTPATIENT)
Dept: SURGERY | Facility: CLINIC | Age: 70
End: 2020-09-28

## 2020-10-26 NOTE — ED ADULT NURSE NOTE - OBJECTIVE STATEMENT
89 pt had a mechanical fall in the street , pain in right eli orbital area , left hip and knee , no LOC

## 2021-08-12 PROBLEM — Z86.39 PERSONAL HISTORY OF OTHER ENDOCRINE, NUTRITIONAL AND METABOLIC DISEASE: Chronic | Status: ACTIVE | Noted: 2020-09-13

## 2021-09-29 ENCOUNTER — APPOINTMENT (OUTPATIENT)
Dept: GASTROENTEROLOGY | Facility: CLINIC | Age: 71
End: 2021-09-29

## 2021-12-22 ENCOUNTER — APPOINTMENT (OUTPATIENT)
Dept: GASTROENTEROLOGY | Facility: CLINIC | Age: 71
End: 2021-12-22

## 2022-05-12 ENCOUNTER — APPOINTMENT (OUTPATIENT)
Dept: INTERNAL MEDICINE | Facility: CLINIC | Age: 72
End: 2022-05-12
Payer: MEDICARE

## 2022-05-12 VITALS
HEIGHT: 67 IN | WEIGHT: 211 LBS | SYSTOLIC BLOOD PRESSURE: 150 MMHG | BODY MASS INDEX: 33.12 KG/M2 | RESPIRATION RATE: 16 BRPM | DIASTOLIC BLOOD PRESSURE: 75 MMHG | HEART RATE: 65 BPM | OXYGEN SATURATION: 98 %

## 2022-05-12 DIAGNOSIS — Z83.3 FAMILY HISTORY OF DIABETES MELLITUS: ICD-10-CM

## 2022-05-12 DIAGNOSIS — K63.5 POLYP OF COLON: ICD-10-CM

## 2022-05-12 DIAGNOSIS — Z00.01 ENCOUNTER FOR GENERAL ADULT MEDICAL EXAMINATION WITH ABNORMAL FINDINGS: ICD-10-CM

## 2022-05-12 PROCEDURE — 99387 INIT PM E/M NEW PAT 65+ YRS: CPT | Mod: 25

## 2022-05-12 PROCEDURE — 99203 OFFICE O/P NEW LOW 30 MIN: CPT | Mod: GC,25

## 2022-05-12 NOTE — HISTORY OF PRESENT ILLNESS
[FreeTextEntry1] : establish care  [de-identified] : 72F PMH HTN, HLD, Pre-DM, and ? Hyperthyroidism ( s/p resection 40 years ago) presents to establish care

## 2022-05-17 LAB
ALBUMIN SERPL ELPH-MCNC: 4.6 G/DL
ALP BLD-CCNC: 79 U/L
ALT SERPL-CCNC: 10 U/L
ANION GAP SERPL CALC-SCNC: 14 MMOL/L
AST SERPL-CCNC: 14 U/L
BASOPHILS # BLD AUTO: 0.02 K/UL
BASOPHILS NFR BLD AUTO: 0.2 %
BILIRUB SERPL-MCNC: 0.6 MG/DL
BUN SERPL-MCNC: 28 MG/DL
CALCIUM SERPL-MCNC: 9.7 MG/DL
CHLORIDE SERPL-SCNC: 104 MMOL/L
CHOLEST SERPL-MCNC: 220 MG/DL
CO2 SERPL-SCNC: 23 MMOL/L
CREAT SERPL-MCNC: 1.24 MG/DL
EGFR: 47 ML/MIN/1.73M2
EOSINOPHIL # BLD AUTO: 0.17 K/UL
EOSINOPHIL NFR BLD AUTO: 2.1 %
ESTIMATED AVERAGE GLUCOSE: 154 MG/DL
GLUCOSE SERPL-MCNC: 103 MG/DL
HBA1C MFR BLD HPLC: 7 %
HCT VFR BLD CALC: 34.4 %
HDLC SERPL-MCNC: 63 MG/DL
HGB BLD-MCNC: 10.1 G/DL
IMM GRANULOCYTES NFR BLD AUTO: 0.4 %
LDLC SERPL CALC-MCNC: 136 MG/DL
LYMPHOCYTES # BLD AUTO: 1.63 K/UL
LYMPHOCYTES NFR BLD AUTO: 20.1 %
MAN DIFF?: NORMAL
MCHC RBC-ENTMCNC: 27.3 PG
MCHC RBC-ENTMCNC: 29.4 GM/DL
MCV RBC AUTO: 93 FL
MONOCYTES # BLD AUTO: 0.48 K/UL
MONOCYTES NFR BLD AUTO: 5.9 %
NEUTROPHILS # BLD AUTO: 5.79 K/UL
NEUTROPHILS NFR BLD AUTO: 71.3 %
NONHDLC SERPL-MCNC: 158 MG/DL
PLATELET # BLD AUTO: 255 K/UL
POTASSIUM SERPL-SCNC: 4.5 MMOL/L
PROT SERPL-MCNC: 7.1 G/DL
RBC # BLD: 3.7 M/UL
RBC # FLD: 15.4 %
SODIUM SERPL-SCNC: 142 MMOL/L
TRIGL SERPL-MCNC: 110 MG/DL
WBC # FLD AUTO: 8.12 K/UL

## 2022-06-16 ENCOUNTER — APPOINTMENT (OUTPATIENT)
Dept: INTERNAL MEDICINE | Facility: CLINIC | Age: 72
End: 2022-06-16
Payer: MEDICARE

## 2022-06-16 VITALS
TEMPERATURE: 98 F | OXYGEN SATURATION: 100 % | DIASTOLIC BLOOD PRESSURE: 57 MMHG | HEART RATE: 66 BPM | HEIGHT: 67 IN | SYSTOLIC BLOOD PRESSURE: 155 MMHG | WEIGHT: 207 LBS | BODY MASS INDEX: 32.49 KG/M2

## 2022-06-16 DIAGNOSIS — H61.20 IMPACTED CERUMEN, UNSPECIFIED EAR: ICD-10-CM

## 2022-06-16 PROCEDURE — 99214 OFFICE O/P EST MOD 30 MIN: CPT | Mod: GC

## 2022-06-16 RX ORDER — NIFEDIPINE 60 MG/1
60 TABLET, EXTENDED RELEASE ORAL
Refills: 0 | Status: DISCONTINUED | COMMUNITY
Start: 2022-05-12 | End: 2022-06-16

## 2022-06-23 NOTE — ED ADULT NURSE NOTE - NSIMPLEMENTINTERV_GEN_ALL_ED
Brigido Banks (: 2001) is a 27 y.o. female, patient, here for evaluation of the following chief complaint(s):  Fracture (left humerus fracture follow up)       ASSESSMENT/PLAN:  Below is the assessment and plan developed based on review of pertinent history, physical exam, labs, studies, and medications. 1. Closed nondisplaced avulsion fracture of medial epicondyle of left humerus with routine healing, subsequent encounter  -     XR ELBOW LT AP/LAT; Future      Return if symptoms worsen or fail to improve. She is doing well clinically and x-rays look good. She no longer needs to wear the sling. She can resume activities as tolerated and return to clinic as needed. SUBJECTIVE/OBJECTIVE:  Brigido Banks (: 2001) is a 27 y.o. female who presents today for the following:  Chief Complaint   Patient presents with    Fracture     left humerus fracture follow up       She had a medial epicondyle avulsion fracture. She wore the sling for a couple of weeks but is recently stopped. She has some mild soreness but the pain is improved a lot. She limits her motion slightly because it hurts when she tries to fully extend it. She comes in for follow-up x-rays. IMAGING:    XR Results (most recent):  Results from Appointment encounter on 22    XR ELBOW LT AP/LAT    Narrative  2 view left elbow x-rays obtained today were reviewed and show the small avulsion fracture off of her medial epicondyle. There is some early healing callus around the fracture site. No Known Allergies    No current outpatient medications on file. No current facility-administered medications for this visit. History reviewed. No pertinent past medical history. History reviewed. No pertinent surgical history. History reviewed. No pertinent family history.      Social History     Socioeconomic History    Marital status:      Spouse name: Not on file    Number of children: Not on file    Years of education: Not on file    Highest education level: Not on file   Occupational History    Not on file   Tobacco Use    Smoking status: Never Smoker    Smokeless tobacco: Never Used   Substance and Sexual Activity    Alcohol use: Not on file    Drug use: Not on file    Sexual activity: Not on file   Other Topics Concern    Not on file   Social History Narrative    Not on file     Social Determinants of Health     Financial Resource Strain:     Difficulty of Paying Living Expenses: Not on file   Food Insecurity:     Worried About Running Out of Food in the Last Year: Not on file    Braxton of Food in the Last Year: Not on file   Transportation Needs:     Lack of Transportation (Medical): Not on file    Lack of Transportation (Non-Medical): Not on file   Physical Activity:     Days of Exercise per Week: Not on file    Minutes of Exercise per Session: Not on file   Stress:     Feeling of Stress : Not on file   Social Connections:     Frequency of Communication with Friends and Family: Not on file    Frequency of Social Gatherings with Friends and Family: Not on file    Attends Restorationist Services: Not on file    Active Member of [address] [place] [address] or Organizations: Not on file    Attends Club or Organization Meetings: Not on file    Marital Status: Not on file   Intimate Partner Violence:     Fear of Current or Ex-Partner: Not on file    Emotionally Abused: Not on file    Physically Abused: Not on file    Sexually Abused: Not on file   Housing Stability:     Unable to Pay for Housing in the Last Year: Not on file    Number of [place] in the Last Year: Not on file    Unstable Housing in the Last Year: Not on file       ROS:  ROS negative with the exception of the left elbow. Vitals:  Ht 5' 5\" (1.651 m)   Wt 135 lb (61.2 kg)   BMI 22.47 kg/m²    Body mass index is 22.47 kg/m². Physical Exam    Focused exam of the left elbow shows some mild swelling over the medial epicondyle.   She does have some soreness with palpation over the medial epicondyle. There is no pain elsewhere. When we had her fully extend it she comes about 20 degrees short of terminal extension. With passive extension I can just about fully extend her although she is hesitant. She is neurovascularly intact throughout. An electronic signature was used to authenticate this note.   -- Ryan Romero MD Implemented All Universal Safety Interventions:  [place] to call system. Call bell, personal items and telephone within reach. Instruct patient to call for assistance. Room bathroom lighting operational. Non-slip footwear when patient is off stretcher. Physically safe environment: no spills, clutter or unnecessary equipment. Stretcher in lowest position, wheels locked, appropriate side rails in place.

## 2022-06-29 ENCOUNTER — APPOINTMENT (OUTPATIENT)
Dept: GASTROENTEROLOGY | Facility: CLINIC | Age: 72
End: 2022-06-29

## 2022-07-05 ENCOUNTER — APPOINTMENT (OUTPATIENT)
Dept: INTERNAL MEDICINE | Facility: CLINIC | Age: 72
End: 2022-07-05

## 2022-07-11 ENCOUNTER — APPOINTMENT (OUTPATIENT)
Dept: INTERNAL MEDICINE | Facility: CLINIC | Age: 72
End: 2022-07-11

## 2022-07-11 VITALS
RESPIRATION RATE: 16 BRPM | HEART RATE: 70 BPM | OXYGEN SATURATION: 100 % | SYSTOLIC BLOOD PRESSURE: 120 MMHG | DIASTOLIC BLOOD PRESSURE: 60 MMHG

## 2022-07-11 PROCEDURE — 99215 OFFICE O/P EST HI 40 MIN: CPT | Mod: 25,GC

## 2022-07-11 PROCEDURE — 36415 COLL VENOUS BLD VENIPUNCTURE: CPT

## 2022-07-11 PROCEDURE — 83013 H PYLORI (C-13) BREATH: CPT

## 2022-07-11 NOTE — HISTORY OF PRESENT ILLNESS
[de-identified] : 71 yo F with PMHx HTN, HLD, DM (last A1C 7 in 5/2022), chronic back pain, hemithyroidectomy presents to [facility] for BP follow-up. Patient currently complaining of years of epigastric abdominal pain. Pt says that she had an abdominal surgery (per chart review, appears to be cholecystectomy) and since then she has had abdominal pain any time that she eats. She says it doesn't matter what food it is, but when she eats she develops a twisting abdominal pain. Pt unsure if she has lost weight. At previous visit a couple weeks ago, pt was prescribed PPI for the pain that she takes suboptimally.

## 2022-07-11 NOTE — PLAN
[FreeTextEntry1] : 73 yo F with PMHx HTN, HLD, DM (last A1C 7 in 5/2022), chronic back pain, hemithyroidectomy presents to [facility] for BP follow-up. \par \par #abdominal pain\par - complaining of years of epigastric abdominal pain\par - reports that it has happened since abdominal surgery (appears to be cholecystectomy) \par - reports that she gets post prandial twisting abdominal pain that will last for hours regardless of what she eats\par - previous visit a couple weeks ago, pt was prescribed PPI for the pain that she takes suboptimally\par - epigastric tenderness on exam without RUQ pain. negative Rueda's sign\par Plan:\par - likely gastritis given epigastric location and association with food\par - switched PPI to famotidine as pt will likely need EGD\par - GI referral given for likely EGD\par - f/u urea breath test\par - advised lifestyle modifications\par - f/u CBC, Fe studies\par \par #HLD\par - increased lipitor from 20 to 40 given high ASCVD and suboptimal medication adherence\par \par #hx of hemithyroidectomy\par - unclear indication\par - f/u TSH w/ reflex T4\par \par #DM\par - a1c 7 05/2022\par - continue metformin 500mg OD\par \par #BP\par - well-controlled today \par - continue losartan 50mg, labetalol 200mg\par - avoid nifedipine as previously caused pedal edema\par - f/u BMP\par \par #ASAEL\par Hx of ASAEL, no longer on iron supplementation. Last CBC with Hb 10. No acute signs/sx of bleeding. \par - Repeat CBC and iron studies today\par \par #HCM\par - Colonoscopy (8/2020): 2 sessile polyps in sigmoid/descending colon, 6mm polyp in rectum (pedunculated)\par - DEXA (4/2021): Osteopenia, Repeat in 2023\par - Mammogram (4/2021): BIRAD1 \par \par RTC 3 weeks for repeat labs. \par

## 2022-07-11 NOTE — REVIEW OF SYSTEMS
[Abdominal Pain] : abdominal pain [Fever] : no fever [Chills] : no chills [Chest Pain] : no chest pain [Lower Ext Edema] : no lower extremity edema [Shortness Of Breath] : no shortness of breath [Dyspnea on Exertion] : no dyspnea on exertion [Nausea] : no nausea [Constipation] : no constipation [Diarrhea] : diarrhea [Vomiting] : no vomiting

## 2022-07-11 NOTE — PHYSICAL EXAM
[Normal] : normal rate, regular rhythm, normal S1 and S2 and no murmur heard [Pedal Pulses Present] : the pedal pulses are present [No Edema] : there was no peripheral edema [Soft] : abdomen soft [Non-distended] : non-distended [Coordination Grossly Intact] : coordination grossly intact [No Focal Deficits] : no focal deficits [Normal Gait] : normal gait [de-identified] : epigastric tenderness w/o rebound or guarding. negative Rueda's sign [de-identified] : tearful at times

## 2022-07-11 NOTE — END OF VISIT
[] : Resident [FreeTextEntry3] : 73 yo F with PMHx HTN, HLD, DM (last A1C 7 in 5/2022), chronic back pain, hemithyroidectomy presents to [facility] for BP follow-up. \par With persistent abdominal pain after her lap dev, felt no improvement on the PPI, took once last week otherwise has not bene taking, h pylori breath test today, GI referral, LSM and dietary changes discussed \par HLD- increase lipitor to 40mg QD\par HTN- well controlled, cw home regimen \par ASAEL- check CBC and iron panel today  [Time Spent: ___ minutes] : I have spent [unfilled] minutes of time on the encounter.

## 2022-07-14 ENCOUNTER — NON-APPOINTMENT (OUTPATIENT)
Age: 72
End: 2022-07-14

## 2022-07-14 LAB
ANION GAP SERPL CALC-SCNC: 15 MMOL/L
BASOPHILS # BLD AUTO: 0.03 K/UL
BASOPHILS NFR BLD AUTO: 0.3 %
BUN SERPL-MCNC: 26 MG/DL
CALCIUM SERPL-MCNC: 9.1 MG/DL
CHLORIDE SERPL-SCNC: 105 MMOL/L
CO2 SERPL-SCNC: 21 MMOL/L
CREAT SERPL-MCNC: 1.29 MG/DL
EGFR: 45 ML/MIN/1.73M2
EOSINOPHIL # BLD AUTO: 0.29 K/UL
EOSINOPHIL NFR BLD AUTO: 3 %
FERRITIN SERPL-MCNC: 89 NG/ML
GLUCOSE SERPL-MCNC: 141 MG/DL
HCT VFR BLD CALC: 35.3 %
HGB BLD-MCNC: 10.2 G/DL
IMM GRANULOCYTES NFR BLD AUTO: 0.3 %
IRON SATN MFR SERPL: 17 %
IRON SERPL-MCNC: 47 UG/DL
LYMPHOCYTES # BLD AUTO: 1.78 K/UL
LYMPHOCYTES NFR BLD AUTO: 18.6 %
MAN DIFF?: NORMAL
MCHC RBC-ENTMCNC: 27.2 PG
MCHC RBC-ENTMCNC: 28.9 GM/DL
MCV RBC AUTO: 94.1 FL
MONOCYTES # BLD AUTO: 0.54 K/UL
MONOCYTES NFR BLD AUTO: 5.7 %
NEUTROPHILS # BLD AUTO: 6.88 K/UL
NEUTROPHILS NFR BLD AUTO: 72.1 %
PLATELET # BLD AUTO: 214 K/UL
POTASSIUM SERPL-SCNC: 3.9 MMOL/L
RBC # BLD: 3.75 M/UL
RBC # FLD: 15.3 %
SODIUM SERPL-SCNC: 141 MMOL/L
TIBC SERPL-MCNC: 267 UG/DL
TRANSFERRIN SERPL-MCNC: 223 MG/DL
TSH SERPL-ACNC: 0.6 UIU/ML
UIBC SERPL-MCNC: 221 UG/DL
UREA BREATH TEST QL: NEGATIVE
WBC # FLD AUTO: 9.55 K/UL

## 2022-09-11 ENCOUNTER — INPATIENT (INPATIENT)
Facility: HOSPITAL | Age: 72
LOS: 39 days | Discharge: NURSING, REHAB OR LONG-TERM CARE | DRG: 329 | End: 2022-10-21
Attending: SURGERY | Admitting: SURGERY
Payer: MEDICARE

## 2022-09-11 VITALS
OXYGEN SATURATION: 99 % | HEIGHT: 66 IN | WEIGHT: 203.05 LBS | HEART RATE: 91 BPM | TEMPERATURE: 98 F | DIASTOLIC BLOOD PRESSURE: 94 MMHG | RESPIRATION RATE: 18 BRPM | SYSTOLIC BLOOD PRESSURE: 174 MMHG

## 2022-09-11 DIAGNOSIS — Z90.49 ACQUIRED ABSENCE OF OTHER SPECIFIED PARTS OF DIGESTIVE TRACT: Chronic | ICD-10-CM

## 2022-09-11 DIAGNOSIS — Z98.891 HISTORY OF UTERINE SCAR FROM PREVIOUS SURGERY: Chronic | ICD-10-CM

## 2022-09-11 DIAGNOSIS — Z90.710 ACQUIRED ABSENCE OF BOTH CERVIX AND UTERUS: Chronic | ICD-10-CM

## 2022-09-11 LAB
ALBUMIN SERPL ELPH-MCNC: 4.8 G/DL — SIGNIFICANT CHANGE UP (ref 3.3–5)
ALP SERPL-CCNC: 89 U/L — SIGNIFICANT CHANGE UP (ref 40–120)
ALT FLD-CCNC: 13 U/L — SIGNIFICANT CHANGE UP (ref 10–45)
ANION GAP SERPL CALC-SCNC: 17 MMOL/L — SIGNIFICANT CHANGE UP (ref 5–17)
APPEARANCE UR: ABNORMAL
APTT BLD: 31.8 SEC — SIGNIFICANT CHANGE UP (ref 27.5–35.5)
AST SERPL-CCNC: 24 U/L — SIGNIFICANT CHANGE UP (ref 10–40)
BASOPHILS # BLD AUTO: 0.03 K/UL — SIGNIFICANT CHANGE UP (ref 0–0.2)
BASOPHILS NFR BLD AUTO: 0.2 % — SIGNIFICANT CHANGE UP (ref 0–2)
BILIRUB SERPL-MCNC: 1.3 MG/DL — HIGH (ref 0.2–1.2)
BILIRUB UR-MCNC: NEGATIVE — SIGNIFICANT CHANGE UP
BLD GP AB SCN SERPL QL: NEGATIVE — SIGNIFICANT CHANGE UP
BUN SERPL-MCNC: 19 MG/DL — SIGNIFICANT CHANGE UP (ref 7–23)
CALCIUM SERPL-MCNC: 10.1 MG/DL — SIGNIFICANT CHANGE UP (ref 8.4–10.5)
CHLORIDE SERPL-SCNC: 96 MMOL/L — SIGNIFICANT CHANGE UP (ref 96–108)
CO2 SERPL-SCNC: 24 MMOL/L — SIGNIFICANT CHANGE UP (ref 22–31)
COLOR SPEC: YELLOW — SIGNIFICANT CHANGE UP
CREAT SERPL-MCNC: 1.5 MG/DL — HIGH (ref 0.5–1.3)
DIFF PNL FLD: ABNORMAL
EGFR: 37 ML/MIN/1.73M2 — LOW
EOSINOPHIL # BLD AUTO: 0.01 K/UL — SIGNIFICANT CHANGE UP (ref 0–0.5)
EOSINOPHIL NFR BLD AUTO: 0.1 % — SIGNIFICANT CHANGE UP (ref 0–6)
GLUCOSE SERPL-MCNC: 189 MG/DL — HIGH (ref 70–99)
GLUCOSE UR QL: NEGATIVE — SIGNIFICANT CHANGE UP
HCT VFR BLD CALC: 37.3 % — SIGNIFICANT CHANGE UP (ref 34.5–45)
HGB BLD-MCNC: 11.6 G/DL — SIGNIFICANT CHANGE UP (ref 11.5–15.5)
IMM GRANULOCYTES NFR BLD AUTO: 0.4 % — SIGNIFICANT CHANGE UP (ref 0–1.5)
INR BLD: 1.13 — SIGNIFICANT CHANGE UP (ref 0.88–1.16)
KETONES UR-MCNC: NEGATIVE — SIGNIFICANT CHANGE UP
LACTATE SERPL-SCNC: 0.7 MMOL/L — SIGNIFICANT CHANGE UP (ref 0.5–2)
LEUKOCYTE ESTERASE UR-ACNC: NEGATIVE — SIGNIFICANT CHANGE UP
LIDOCAIN IGE QN: 34 U/L — SIGNIFICANT CHANGE UP (ref 7–60)
LYMPHOCYTES # BLD AUTO: 1.17 K/UL — SIGNIFICANT CHANGE UP (ref 1–3.3)
LYMPHOCYTES # BLD AUTO: 8.3 % — LOW (ref 13–44)
MCHC RBC-ENTMCNC: 26.9 PG — LOW (ref 27–34)
MCHC RBC-ENTMCNC: 31.1 GM/DL — LOW (ref 32–36)
MCV RBC AUTO: 86.3 FL — SIGNIFICANT CHANGE UP (ref 80–100)
MONOCYTES # BLD AUTO: 0.95 K/UL — HIGH (ref 0–0.9)
MONOCYTES NFR BLD AUTO: 6.7 % — SIGNIFICANT CHANGE UP (ref 2–14)
NEUTROPHILS # BLD AUTO: 11.87 K/UL — HIGH (ref 1.8–7.4)
NEUTROPHILS NFR BLD AUTO: 84.3 % — HIGH (ref 43–77)
NITRITE UR-MCNC: NEGATIVE — SIGNIFICANT CHANGE UP
NRBC # BLD: 0 /100 WBCS — SIGNIFICANT CHANGE UP (ref 0–0)
PH UR: 6 — SIGNIFICANT CHANGE UP (ref 5–8)
PLATELET # BLD AUTO: 283 K/UL — SIGNIFICANT CHANGE UP (ref 150–400)
POTASSIUM SERPL-MCNC: 4.4 MMOL/L — SIGNIFICANT CHANGE UP (ref 3.5–5.3)
POTASSIUM SERPL-SCNC: 4.4 MMOL/L — SIGNIFICANT CHANGE UP (ref 3.5–5.3)
PROT SERPL-MCNC: 8.3 G/DL — SIGNIFICANT CHANGE UP (ref 6–8.3)
PROT UR-MCNC: 100 MG/DL
PROTHROM AB SERPL-ACNC: 13.5 SEC — HIGH (ref 10.5–13.4)
RBC # BLD: 4.32 M/UL — SIGNIFICANT CHANGE UP (ref 3.8–5.2)
RBC # FLD: 14.6 % — HIGH (ref 10.3–14.5)
RH IG SCN BLD-IMP: POSITIVE — SIGNIFICANT CHANGE UP
SARS-COV-2 RNA SPEC QL NAA+PROBE: POSITIVE
SODIUM SERPL-SCNC: 137 MMOL/L — SIGNIFICANT CHANGE UP (ref 135–145)
SP GR SPEC: 1.01 — SIGNIFICANT CHANGE UP (ref 1–1.03)
UROBILINOGEN FLD QL: 1 E.U./DL — SIGNIFICANT CHANGE UP
WBC # BLD: 14.09 K/UL — HIGH (ref 3.8–10.5)
WBC # FLD AUTO: 14.09 K/UL — HIGH (ref 3.8–10.5)

## 2022-09-11 PROCEDURE — 74018 RADEX ABDOMEN 1 VIEW: CPT | Mod: 26,76

## 2022-09-11 PROCEDURE — 99285 EMERGENCY DEPT VISIT HI MDM: CPT

## 2022-09-11 PROCEDURE — 74177 CT ABD & PELVIS W/CONTRAST: CPT | Mod: 26,MA

## 2022-09-11 DEVICE — CLIP APPLIER ETHICON LIGACLIP 11.5" MEDIUM: Type: IMPLANTABLE DEVICE | Status: FUNCTIONAL

## 2022-09-11 DEVICE — CLIP APPLIER ETHICON LIGACLIP 13" LARGE: Type: IMPLANTABLE DEVICE | Status: FUNCTIONAL

## 2022-09-11 DEVICE — CLIP APPLIER ETHICON LIGACLIP 9 3/8" SMALL: Type: IMPLANTABLE DEVICE | Status: FUNCTIONAL

## 2022-09-11 DEVICE — STAPLER ETHICON PROXIMATE RELOAD 75MM BLUE: Type: IMPLANTABLE DEVICE | Status: FUNCTIONAL

## 2022-09-11 RX ORDER — DIATRIZOATE MEGLUMINE 180 MG/ML
30 INJECTION, SOLUTION INTRAVESICAL ONCE
Refills: 0 | Status: COMPLETED | OUTPATIENT
Start: 2022-09-11 | End: 2022-09-11

## 2022-09-11 RX ORDER — MORPHINE SULFATE 50 MG/1
4 CAPSULE, EXTENDED RELEASE ORAL ONCE
Refills: 0 | Status: DISCONTINUED | OUTPATIENT
Start: 2022-09-11 | End: 2022-09-11

## 2022-09-11 RX ORDER — FAMOTIDINE 10 MG/ML
20 INJECTION INTRAVENOUS ONCE
Refills: 0 | Status: COMPLETED | OUTPATIENT
Start: 2022-09-11 | End: 2022-09-11

## 2022-09-11 RX ORDER — SODIUM CHLORIDE 9 MG/ML
1000 INJECTION, SOLUTION INTRAVENOUS
Refills: 0 | Status: DISCONTINUED | OUTPATIENT
Start: 2022-09-11 | End: 2022-09-13

## 2022-09-11 RX ORDER — HEPARIN SODIUM 5000 [USP'U]/ML
5000 INJECTION INTRAVENOUS; SUBCUTANEOUS EVERY 8 HOURS
Refills: 0 | Status: DISCONTINUED | OUTPATIENT
Start: 2022-09-11 | End: 2022-09-12

## 2022-09-11 RX ORDER — SODIUM CHLORIDE 9 MG/ML
1000 INJECTION INTRAMUSCULAR; INTRAVENOUS; SUBCUTANEOUS ONCE
Refills: 0 | Status: COMPLETED | OUTPATIENT
Start: 2022-09-11 | End: 2022-09-11

## 2022-09-11 RX ORDER — ONDANSETRON 8 MG/1
4 TABLET, FILM COATED ORAL ONCE
Refills: 0 | Status: COMPLETED | OUTPATIENT
Start: 2022-09-11 | End: 2022-09-11

## 2022-09-11 RX ORDER — ONDANSETRON 8 MG/1
4 TABLET, FILM COATED ORAL EVERY 8 HOURS
Refills: 0 | Status: DISCONTINUED | OUTPATIENT
Start: 2022-09-11 | End: 2022-10-21

## 2022-09-11 RX ADMIN — DIATRIZOATE MEGLUMINE 30 MILLILITER(S): 180 INJECTION, SOLUTION INTRAVESICAL at 15:45

## 2022-09-11 RX ADMIN — FAMOTIDINE 20 MILLIGRAM(S): 10 INJECTION INTRAVENOUS at 15:41

## 2022-09-11 RX ADMIN — ONDANSETRON 4 MILLIGRAM(S): 8 TABLET, FILM COATED ORAL at 15:41

## 2022-09-11 RX ADMIN — MORPHINE SULFATE 4 MILLIGRAM(S): 50 CAPSULE, EXTENDED RELEASE ORAL at 15:56

## 2022-09-11 RX ADMIN — MORPHINE SULFATE 4 MILLIGRAM(S): 50 CAPSULE, EXTENDED RELEASE ORAL at 15:41

## 2022-09-11 RX ADMIN — DIATRIZOATE MEGLUMINE 30 MILLILITER(S): 180 INJECTION, SOLUTION INTRAVESICAL at 23:33

## 2022-09-11 RX ADMIN — SODIUM CHLORIDE 1000 MILLILITER(S): 9 INJECTION INTRAMUSCULAR; INTRAVENOUS; SUBCUTANEOUS at 15:44

## 2022-09-11 NOTE — CHART NOTE - NSCHARTNOTEFT_GEN_A_CORE
Senior Resident Chart Note:    Called to bedside at OR  for patient report of return of bowel function and reduction of hernia. Dr. So present at bedside. Patient states she felt a pop on her abdomen, which she attributes to her hernia going back in and then had several bowel movements in the stretcher. She states she is passing gas and has the urge to continue having bowel movements. She is not nauseous, NGT is in place and draining well. Given findings, will defer operative intervention for now with plans for formal repair in the coming days. Patient will be given gastrograffin and under xray in AM to evaluate passage of contrast. Will continue to monitor very closely and alert Dr. So of any clinical changes. Plan discussed with patient.

## 2022-09-11 NOTE — ED PROVIDER NOTE - GASTROINTESTINAL, MLM
[Generalized tenderness. Distended.

## 2022-09-11 NOTE — H&P ADULT - NSHPLABSRESULTS_GEN_ALL_CORE
.  LABS:                         11.6   14.09 )-----------( 283      ( 11 Sep 2022 15:05 )             37.3         137  |  96  |  19  ----------------------------<  189<H>  4.4   |  24  |  1.50<H>    Ca    10.1      11 Sep 2022 15:05    TPro  8.3  /  Alb  4.8  /  TBili  1.3<H>  /  DBili  x   /  AST  24  /  ALT  13  /  AlkPhos  89      PT/INR - ( 11 Sep 2022 15:05 )   PT: 13.5 sec;   INR: 1.13          PTT - ( 11 Sep 2022 15:05 )  PTT:31.8 sec  Urinalysis Basic - ( 11 Sep 2022 19:11 )    Color: Yellow / Appearance: SL Cloudy / S.015 / pH: x  Gluc: x / Ketone: NEGATIVE  / Bili: Negative / Urobili: 1.0 E.U./dL   Blood: x / Protein: 100 mg/dL / Nitrite: NEGATIVE   Leuk Esterase: NEGATIVE / RBC: < 5 /HPF / WBC < 5 /HPF   Sq Epi: x / Non Sq Epi: 0-5 /HPF / Bacteria: Present /HPF        Lactate, Blood: 0.7 mmol/L ( @ 19:00)      RADIOLOGY, EKG & ADDITIONAL TESTS: Reviewed.     CT scan:  LIVER: Within normal limits.  BILE DUCTS: Mild extrahepatic biliary dilatation, likely due to postcholecystectomy state.  GALLBLADDER: Cholecystectomy.  SPLEEN: Within normal limits.  PANCREAS: Within normal limits.  ADRENALS: Within normal limits.  KIDNEYS/URETERS: Bilateral cortical thinning. No hydronephrosis or nephrolithiasis    BLADDER: Compressed by a dilated loops of bowel.  REPRODUCTIVE ORGANS: Hysterectomy.    BOWEL: Large 9 x 4 x 6 cm (tr x ap x cc) left periumbilical hernia contains fat and knuckle of thickened transverse colon. Hernia neck measures 3 x 2 cm. Colon proximally is borderline dilated and multiple small bowel loops are mildly dilated. Colon distally is collapsed. Small lower ventral wall hernia to the left of midline contains a nonthickened small bowel loop containing an air-fluid level. Small bowel loops proximal to this hernia are mildly dilated. Stomach is distended with oral contrast. Colonic diverticulosis without diverticulitis. Appendix is normal.  PERITONEUM: No ascites or pneumoperitoneum.  VESSELS: Within normal limits.  RETROPERITONEUM/LYMPH NODES: No lymphadenopathy.  ABDOMINAL WALL: Several small to moderate-sized fat-containing periumbilical and midline epigastric wall hernias. Superiormost hernia also contains mesenteric vessels. Additional hernias as described above.  BONES: Degenerative changes. Chronic deformity of the L2 superior endplate.    IMPRESSION:    Large left periumbilical hernia containing fat and a thickened loop of transverse colon which is concerning for strangulation. Upstream large and small bowel is obstructed. Additional small left lower ventral wall hernia contains a nonthickened small bowel loop with an air-fluid level for which incarceration is not excluded.

## 2022-09-11 NOTE — ED PROVIDER NOTE - ATTENDING CONTRIBUTION TO CARE
Attending Statement: I have personally performed a face to face diagnostic evaluation on this patient. I have reviewed the MS note and agree with the history, exam and plan of care, except as noted.     Attending Contribution to Care:  72F with a h/o cholecystectomy and hysterectomy in the past who p/w diffuse abd pain and distention associated with nausea, vomits and decreased BM. Pt in pain and exam concerning for obstruction, no peritoneal signs, VSS. Pain control, labs and CT performed and pt found to have 2 hernias  with incarceration/strangulation, lactate normal. Surgery as bedside for attempted reduction and NGT placement, and they request admit to surgery/tele for further mgmt.

## 2022-09-11 NOTE — H&P ADULT - NSHPPHYSICALEXAM_GEN_ALL_CORE
LOS:     VITALS:   T(C): 37 (09-11-22 @ 22:30), Max: 37.2 (09-11-22 @ 17:07)  HR: 78 (09-11-22 @ 22:30) (78 - 91)  BP: 177/81 (09-11-22 @ 22:30) (171/89 - 177/81)  RR: 16 (09-11-22 @ 22:30) (16 - 18)  SpO2: 98% (09-11-22 @ 22:30) (98% - 99%)    GENERAL: NAD, lying in bed comfortably  HEAD:  Atraumatic, Normocephalic  EYES: EOMI, PERRLA, conjunctiva and sclera clear  ENT: Moist mucous membranes  CHEST/LUNG:  Unlabored respirations  HEART: Regular rate and rhythm;  ABDOMEN: Soft, mildly tender in the R upper and lower quadrants and in the epigastric region, large hernia palpated supraumbilically and to the left of the umbilicus, minimally tender without any overlying skin changes  EXTREMITIES:  WWP, No clubbing, cyanosis, or edema  NERVOUS SYSTEM:  A&Ox3, no focal deficits   SKIN: No rashes or lesions

## 2022-09-11 NOTE — ED PROVIDER NOTE - OBJECTIVE STATEMENT
72 F, with PMH of DM, PSH of  section x2, total abdominal  hysterectomy, and cholecystectomy, presents for abdominal pain starting this morning. Pt notes associated sx of nausea and vomiting x3 today, and a decreased PO intake. Pt notes this pain is similar to previous episodes over the last 2 years of bloating and "gassy stomach" that is usually alleviated by gas-x. However the pain is more severe today. Pt reports normal BM this morning. Pt denies any blood in stool, bloody emesis, fever, CP, SOB.

## 2022-09-11 NOTE — ED ADULT NURSE NOTE - OBJECTIVE STATEMENT
Patient presents to the ED complaining of abdominal pain since her abdominal surgery 2 years ago where she reports they removed a "bag". Patient unsure of what abdominal surgery had. Reports abdominal pain on and off since surgery, however pain has gotten worst the past two days. Reports nausea and abdominal bloating. Hx of DM.

## 2022-09-11 NOTE — ED PROVIDER NOTE - CLINICAL SUMMARY MEDICAL DECISION MAKING FREE TEXT BOX
72 F presenting for abd pain and N/V starting today. Similar to previous episodes of distention and feeling gassy. Generalized abd tenderness. Tx with Zofran and Pepcid. CTAP r/o obstruction, infection.

## 2022-09-11 NOTE — ED PROVIDER NOTE - NSICDXPASTSURGICALHX_GEN_ALL_CORE_FT
PAST SURGICAL HISTORY:  History of      History of laparoscopic cholecystectomy     S/P total abdominal hysterectomy     
male

## 2022-09-11 NOTE — H&P ADULT - HISTORY OF PRESENT ILLNESS
73 yo female with PMH of HTN, DM and PSH of  x2, hysterectomy and lap dev ( w/ Dr. Clifford) no presenting with 2 days of nausea/vomiting and abdominal pain. Patient reports that she has 73 yo female with PMH of HTN, DM and PSH of  x2, hysterectomy and lap dev ( w/ Dr. Clifford) presenting with 2 days of nausea/vomiting and abd pain. Patient reports that she has felt a hernia for about a year she decreibes it as her abdomen being "bloated" but when she takes medication for gas the abdomen "flatten out." She complains of intermittent pain over the hernia but over the past 2 days it has been worse and has been traveling throughout the epigastric region. Last BM a couple of days ago. Doesn't recall if she passing gas. No fevers/chills  Colonoscopy - 10 years ago - maybe a polyp?  Endoscopy - never    ED: Afebrile, VSS. WBC 14.09, Hb 12.6, lactate 0.7. CT scan findings cocnerning for large left periumbilical hernia containing fat and a thickened loop of transverse colon which is concerning for strangulation.       PMH: HTN, DM  PSH:  x2, hysterectomy, lap dev  FHx: no cancer or IBD  Meds: Metformin 500 daily, has HTN meds that she doesn't take because they make her nauseous  Soc: No tobacco

## 2022-09-11 NOTE — ED PROVIDER NOTE - NS ED ATTENDING STATEMENT MOD
I have seen and examined this patient and fully participated in the care of this patient as the teaching attending.  The service was shared with the NINO.  I reviewed and verified the documentation and independently performed the documented:

## 2022-09-11 NOTE — ED ADULT TRIAGE NOTE - CHIEF COMPLAINT QUOTE
Pt co abdominal pain, states, "I had the surgery 3 years ago they removed the bag that everyone has, and my stomach hasn't been right since." Pt endorses taking gas-x which usually relieves sx and is concerned that sx persisted over the past few days. Denies fevers, chills, N/V/D, urinary sx.

## 2022-09-11 NOTE — H&P ADULT - ATTENDING COMMENTS
On the way to the OR incarcerated transverse colon spontaneously reduced and patient had large bowel movement and start passing flatus, on the examination abdomen distended, soft, BS+, Flatus+, BM+ , large incisional hernia, now reduced, cardiology consult, admit to surgery, F/U with abdominal XR, will repair incisional hernia on this admission.

## 2022-09-11 NOTE — H&P ADULT - ASSESSMENT
71 yo female with PMH of HTN, DM and PSH of  x2, hysterectomy and lap dev ( w/ Dr. Clifford) presenting with 2 days of nausea/vomiting and abd pain. Hemodynamically stable in the ED, WBC 14, lactate wnl. CT scan concerning for large bowel obstruction secondary to strangulated large bowel within umbilical hernia. Patient was planned to go to OR for ex lap, however, patient had return of bowel function prior to OR.     Plan:  NPO/IVF  NGT to [facility]  no ABX  IS/OOBA/HSQ  BRENDA  AM labs  Gastrograffin through NGT at [facility] and repeat XR 2 view (official) in AM  f/u recs from Dr. Fierro   Patient examined by chief resident and attending

## 2022-09-12 LAB
ALBUMIN SERPL ELPH-MCNC: 3.8 G/DL — SIGNIFICANT CHANGE UP (ref 3.3–5)
ALP SERPL-CCNC: 68 U/L — SIGNIFICANT CHANGE UP (ref 40–120)
ALT FLD-CCNC: 11 U/L — SIGNIFICANT CHANGE UP (ref 10–45)
ANION GAP SERPL CALC-SCNC: 9 MMOL/L — SIGNIFICANT CHANGE UP (ref 5–17)
AST SERPL-CCNC: 19 U/L — SIGNIFICANT CHANGE UP (ref 10–40)
BILIRUB SERPL-MCNC: 0.9 MG/DL — SIGNIFICANT CHANGE UP (ref 0.2–1.2)
BUN SERPL-MCNC: 18 MG/DL — SIGNIFICANT CHANGE UP (ref 7–23)
CALCIUM SERPL-MCNC: 8.7 MG/DL — SIGNIFICANT CHANGE UP (ref 8.4–10.5)
CHLORIDE SERPL-SCNC: 102 MMOL/L — SIGNIFICANT CHANGE UP (ref 96–108)
CO2 SERPL-SCNC: 25 MMOL/L — SIGNIFICANT CHANGE UP (ref 22–31)
CREAT SERPL-MCNC: 1.09 MG/DL — SIGNIFICANT CHANGE UP (ref 0.5–1.3)
EGFR: 55 ML/MIN/1.73M2 — LOW
GLUCOSE BLDC GLUCOMTR-MCNC: 100 MG/DL — HIGH (ref 70–99)
GLUCOSE BLDC GLUCOMTR-MCNC: 119 MG/DL — HIGH (ref 70–99)
GLUCOSE BLDC GLUCOMTR-MCNC: 85 MG/DL — SIGNIFICANT CHANGE UP (ref 70–99)
GLUCOSE BLDC GLUCOMTR-MCNC: 90 MG/DL — SIGNIFICANT CHANGE UP (ref 70–99)
GLUCOSE SERPL-MCNC: 98 MG/DL — SIGNIFICANT CHANGE UP (ref 70–99)
HCT VFR BLD CALC: 32.4 % — LOW (ref 34.5–45)
HGB BLD-MCNC: 9.8 G/DL — LOW (ref 11.5–15.5)
MAGNESIUM SERPL-MCNC: 2 MG/DL — SIGNIFICANT CHANGE UP (ref 1.6–2.6)
MCHC RBC-ENTMCNC: 26.7 PG — LOW (ref 27–34)
MCHC RBC-ENTMCNC: 30.2 GM/DL — LOW (ref 32–36)
MCV RBC AUTO: 88.3 FL — SIGNIFICANT CHANGE UP (ref 80–100)
NRBC # BLD: 0 /100 WBCS — SIGNIFICANT CHANGE UP (ref 0–0)
PHOSPHATE SERPL-MCNC: 3 MG/DL — SIGNIFICANT CHANGE UP (ref 2.5–4.5)
PLATELET # BLD AUTO: 213 K/UL — SIGNIFICANT CHANGE UP (ref 150–400)
POTASSIUM SERPL-MCNC: 3.8 MMOL/L — SIGNIFICANT CHANGE UP (ref 3.5–5.3)
POTASSIUM SERPL-SCNC: 3.8 MMOL/L — SIGNIFICANT CHANGE UP (ref 3.5–5.3)
PROT SERPL-MCNC: 6.9 G/DL — SIGNIFICANT CHANGE UP (ref 6–8.3)
RBC # BLD: 3.67 M/UL — LOW (ref 3.8–5.2)
RBC # FLD: 14.6 % — HIGH (ref 10.3–14.5)
SODIUM SERPL-SCNC: 136 MMOL/L — SIGNIFICANT CHANGE UP (ref 135–145)
WBC # BLD: 7.34 K/UL — SIGNIFICANT CHANGE UP (ref 3.8–10.5)
WBC # FLD AUTO: 7.34 K/UL — SIGNIFICANT CHANGE UP (ref 3.8–10.5)

## 2022-09-12 PROCEDURE — 99223 1ST HOSP IP/OBS HIGH 75: CPT

## 2022-09-12 PROCEDURE — 74019 RADEX ABDOMEN 2 VIEWS: CPT | Mod: 26

## 2022-09-12 RX ORDER — POTASSIUM CHLORIDE 20 MEQ
10 PACKET (EA) ORAL
Refills: 0 | Status: COMPLETED | OUTPATIENT
Start: 2022-09-12 | End: 2022-09-12

## 2022-09-12 RX ORDER — INFLUENZA VIRUS VACCINE 15; 15; 15; 15 UG/.5ML; UG/.5ML; UG/.5ML; UG/.5ML
0.7 SUSPENSION INTRAMUSCULAR ONCE
Refills: 0 | Status: DISCONTINUED | OUTPATIENT
Start: 2022-09-12 | End: 2022-10-21

## 2022-09-12 RX ORDER — NIFEDIPINE 30 MG
30 TABLET, EXTENDED RELEASE 24 HR ORAL ONCE
Refills: 0 | Status: COMPLETED | OUTPATIENT
Start: 2022-09-12 | End: 2022-09-12

## 2022-09-12 RX ORDER — ENOXAPARIN SODIUM 100 MG/ML
40 INJECTION SUBCUTANEOUS EVERY 24 HOURS
Refills: 0 | Status: DISCONTINUED | OUTPATIENT
Start: 2022-09-12 | End: 2022-09-15

## 2022-09-12 RX ORDER — DEXTROSE 50 % IN WATER 50 %
15 SYRINGE (ML) INTRAVENOUS ONCE
Refills: 0 | Status: DISCONTINUED | OUTPATIENT
Start: 2022-09-12 | End: 2022-10-21

## 2022-09-12 RX ORDER — GLUCAGON INJECTION, SOLUTION 0.5 MG/.1ML
1 INJECTION, SOLUTION SUBCUTANEOUS ONCE
Refills: 0 | Status: DISCONTINUED | OUTPATIENT
Start: 2022-09-12 | End: 2022-10-21

## 2022-09-12 RX ORDER — DEXTROSE 50 % IN WATER 50 %
25 SYRINGE (ML) INTRAVENOUS ONCE
Refills: 0 | Status: DISCONTINUED | OUTPATIENT
Start: 2022-09-12 | End: 2022-10-21

## 2022-09-12 RX ORDER — DEXTROSE 50 % IN WATER 50 %
12.5 SYRINGE (ML) INTRAVENOUS ONCE
Refills: 0 | Status: DISCONTINUED | OUTPATIENT
Start: 2022-09-12 | End: 2022-10-21

## 2022-09-12 RX ORDER — SODIUM CHLORIDE 9 MG/ML
1000 INJECTION, SOLUTION INTRAVENOUS
Refills: 0 | Status: DISCONTINUED | OUTPATIENT
Start: 2022-09-12 | End: 2022-10-21

## 2022-09-12 RX ORDER — LABETALOL HCL 100 MG
200 TABLET ORAL
Refills: 0 | Status: DISCONTINUED | OUTPATIENT
Start: 2022-09-12 | End: 2022-09-14

## 2022-09-12 RX ORDER — BENZOCAINE AND MENTHOL 5; 1 G/100ML; G/100ML
1 LIQUID ORAL
Refills: 0 | Status: DISCONTINUED | OUTPATIENT
Start: 2022-09-12 | End: 2022-09-14

## 2022-09-12 RX ORDER — ACETAMINOPHEN 500 MG
1000 TABLET ORAL ONCE
Refills: 0 | Status: COMPLETED | OUTPATIENT
Start: 2022-09-12 | End: 2022-09-12

## 2022-09-12 RX ORDER — INSULIN LISPRO 100/ML
VIAL (ML) SUBCUTANEOUS
Refills: 0 | Status: DISCONTINUED | OUTPATIENT
Start: 2022-09-12 | End: 2022-09-16

## 2022-09-12 RX ORDER — LOSARTAN POTASSIUM 100 MG/1
50 TABLET, FILM COATED ORAL DAILY
Refills: 0 | Status: DISCONTINUED | OUTPATIENT
Start: 2022-09-12 | End: 2022-09-12

## 2022-09-12 RX ORDER — NIFEDIPINE 30 MG
60 TABLET, EXTENDED RELEASE 24 HR ORAL ONCE
Refills: 0 | Status: DISCONTINUED | OUTPATIENT
Start: 2022-09-12 | End: 2022-09-12

## 2022-09-12 RX ADMIN — Medication 100 MILLIEQUIVALENT(S): at 12:03

## 2022-09-12 RX ADMIN — Medication 200 MILLIGRAM(S): at 18:02

## 2022-09-12 RX ADMIN — HEPARIN SODIUM 5000 UNIT(S): 5000 INJECTION INTRAVENOUS; SUBCUTANEOUS at 06:24

## 2022-09-12 RX ADMIN — Medication 30 MILLIGRAM(S): at 01:39

## 2022-09-12 RX ADMIN — Medication 100 MILLIEQUIVALENT(S): at 16:51

## 2022-09-12 RX ADMIN — Medication 400 MILLIGRAM(S): at 19:59

## 2022-09-12 RX ADMIN — HEPARIN SODIUM 5000 UNIT(S): 5000 INJECTION INTRAVENOUS; SUBCUTANEOUS at 16:52

## 2022-09-12 RX ADMIN — ENOXAPARIN SODIUM 40 MILLIGRAM(S): 100 INJECTION SUBCUTANEOUS at 20:44

## 2022-09-12 RX ADMIN — LOSARTAN POTASSIUM 50 MILLIGRAM(S): 100 TABLET, FILM COATED ORAL at 12:03

## 2022-09-12 RX ADMIN — Medication 1000 MILLIGRAM(S): at 20:30

## 2022-09-12 RX ADMIN — HEPARIN SODIUM 5000 UNIT(S): 5000 INJECTION INTRAVENOUS; SUBCUTANEOUS at 00:03

## 2022-09-12 NOTE — CONSULT NOTE ADULT - SUBJECTIVE AND OBJECTIVE BOX
HPI:  73 yo female with PMH of HTN, DM and PSH of  x2, hysterectomy and lap dev ( w/ Dr. Clifford) presenting with 2 days of nausea/vomiting and abd pain. Patient reports that she has felt a hernia for about a year she decreibes it as her abdomen being "bloated" but when she takes medication for gas the abdomen "flatten out." She complains of intermittent pain over the hernia but over the past 2 days it has been worse and has been traveling throughout the epigastric region. Last BM a couple of days ago. Doesn't recall if she is passing gas. No fevers/chills  Colonoscopy - 10 years ago - maybe a polyp?  Endoscopy - never    ED: Afebrile, VSS. WBC 14.09, Hb 12.6, lactate 0.7. CT scan findings cocnerning for large left periumbilical hernia containing fat and a thickened loop of transverse colon which is concerning for strangulation.     PMH: HTN, DM2  PSH:  x2, hysterectomy, lap dev  Family history: No colorectal cancer in father or in mother   Meds: Metformin 500 daily, has HTN meds that she takes inconsistently because they make her nauseated.  Social history : No tobacco, on disability      (11 Sep 2022 21:07)      PAST MEDICAL & SURGICAL HISTORY:  Diabetes      H/O thyroid disease      S/P total abdominal hysterectomy      History of laparoscopic cholecystectomy      History of         Home Medications:  atorvastatin 20 mg oral tablet: 1 tab(s) orally once a day (14 Sep 2020 10:23)  hydroCHLOROthiazide 25 mg oral tablet: 1 tab(s) orally once a day (14 Sep 2020 10:22)  labetalol 200 mg oral tablet: 1 tab(s) orally 2 times a day (14 Sep 2020 10:23)  losartan 100 mg oral tablet: 1 tab(s) orally once a day (14 Sep 2020 10:23)  metFORMIN 500 mg oral tablet, extended release: 1 tab(s) orally once a day (14 Sep 2020 10:23)  NIFEdipine 60 mg oral tablet, extended release: 1 tab(s) orally once a day (14 Sep 2020 10:22)  SUMAtriptan 100 mg oral tablet: 1 tab(s) orally once (14 Sep 2020 10:22)    Allergies    penicillin (Rash)    Intolerances      FAMILY HISTORY:  No pertinent family history in first degree relatives      Social History:  No tobacco (11 Sep 2022 21:07)      REVIEW OF SYSTEMS:  CONSTITUTIONAL: No fever, weight loss  EYES: No eye pain, or discharge  ENMT:  No tinnitus, vertigo  NECK: No pain or stiffness  RESPIRATORY: No cough, No dyspnea  CARDIOVASCULAR: No chest pain, or leg swelling  GASTROINTESTINAL: Abdominal symptoms as per HPI.   GENITOURINARY: No dysuria, frequency, or hematuria  NEUROLOGICAL: No numbness, or tremors  SKIN: No itching, burning, rashes, or lesions   ENDOCRINE: No heat or cold intolerance;  MUSCULOSKELETAL: No joint pain or swelling;   PSYCHIATRIC: No mood swings, or difficulty sleeping  HEME/LYMPH: No easy bruising, or bleeding gums  ALLERGY AND IMMUNOLOGIC: No hives or eczema    Diet, Clear Liquid (22 @ 13:59) [Active]      CURRENT MEDICATIONS:   benzocaine 15 mG/menthol 3.6 mG Lozenge 1 Lozenge Oral every 3 hours PRN  dextrose 5%. 1000 milliLiter(s) IV Continuous <Continuous>  dextrose 5%. 1000 milliLiter(s) IV Continuous <Continuous>  dextrose 50% Injectable 25 Gram(s) IV Push once  dextrose 50% Injectable 12.5 Gram(s) IV Push once  dextrose 50% Injectable 25 Gram(s) IV Push once  dextrose Oral Gel 15 Gram(s) Oral once PRN  enoxaparin Injectable 40 milliGRAM(s) SubCutaneous every 24 hours  glucagon  Injectable 1 milliGRAM(s) IntraMuscular once  influenza  Vaccine (HIGH DOSE) 0.7 milliLiter(s) IntraMuscular once  insulin lispro (ADMELOG) corrective regimen sliding scale   SubCutaneous Before meals and at bedtime  labetalol 200 milliGRAM(s) Oral two times a day  lactated ringers. 1000 milliLiter(s) IV Continuous <Continuous>  ondansetron Injectable 4 milliGRAM(s) IV Push every 8 hours PRN      VITAL SIGNS, INS/OUTS (last 24 hours):  Vital Signs Last 24 Hrs  T(C): 37.3 (12 Sep 2022 20:59), Max: 37.3 (12 Sep 2022 16:30)  T(F): 99.2 (12 Sep 2022 20:59), Max: 99.2 (12 Sep 2022 20:59)  HR: 74 (12 Sep 2022 20:59) (66 - 75)  BP: 134/64 (12 Sep 2022 20:59) (134/64 - 173/71)  BP(mean): --  RR: 16 (12 Sep 2022 20:59) (15 - 18)  SpO2: 94% (12 Sep 2022 20:59) (94% - 97%)    Parameters below as of 12 Sep 2022 20:59  Patient On (Oxygen Delivery Method): room air      I&O's Summary    11 Sep 2022 07:01  -  12 Sep 2022 07:00  --------------------------------------------------------  IN: 840 mL / OUT: 1150 mL / NET: -310 mL    12 Sep 2022 07:01  -  13 Sep 2022 01:04  --------------------------------------------------------  IN: 600 mL / OUT: 0 mL / NET: 600 mL        PHYSICAL EXAM:  Gen: Reclining in bed at time of exam, appears stated age  HEENT: NCAT, MMM, clear OP  Neck: supple, trachea at midline  CV: RRR, +S1/S2  Pulm: adequate respiratory effort, no increase in work of breathing  Abd: soft, ND;   Skin: warm and dry,  Ext: WWP, no LE edema  Neuro: AOx3, no gross focal neurological deficits  Psych: affect and behavior appropriate, pleasant at time of interview    BASIC LABS:                        9.8    7.34  )-----------( 213      ( 12 Sep 2022 05:30 )             32.4     09-12    136  |  102  |  18  ----------------------------<  98  3.8   |  25  |  1.09    Ca    8.7      12 Sep 2022 05:30  Phos  3.0     -12  Mg     2.0     -12    TPro  6.9  /  Alb  3.8  /  TBili  0.9  /  DBili  x   /  AST  19  /  ALT  11  /  AlkPhos  68  09-12    PT/INR - ( 11 Sep 2022 15:05 )   PT: 13.5 sec;   INR: 1.13          PTT - ( 11 Sep 2022 15:05 )  PTT:31.8 sec  Urinalysis Basic - ( 11 Sep 2022 19:11 )    Color: Yellow / Appearance: SL Cloudy / S.015 / pH: x  Gluc: x / Ketone: NEGATIVE  / Bili: Negative / Urobili: 1.0 E.U./dL   Blood: x / Protein: 100 mg/dL / Nitrite: NEGATIVE   Leuk Esterase: NEGATIVE / RBC: < 5 /HPF / WBC < 5 /HPF   Sq Epi: x / Non Sq Epi: 0-5 /HPF / Bacteria: Present /HPF      CAPILLARY BLOOD GLUCOSE      POCT Blood Glucose.: 119 mg/dL (12 Sep 2022 22:00)  POCT Blood Glucose.: 85 mg/dL (12 Sep 2022 17:13)  POCT Blood Glucose.: 90 mg/dL (12 Sep 2022 11:23)  POCT Blood Glucose.: 100 mg/dL (12 Sep 2022 08:02)      OTHER LABS:        MICRODATA:    Urinalysis with Rflx Culture (collected 11 Sep 2022 19:11)        IMAGING:    EKG:    #Diet - Diet, Clear Liquid (22 @ 13:59) [Active]        #DVT PPx - enoxaparin Injectable: [Ordered as LOVENOX]  40 milliGRAM(s), SubCutaneous, every 24 hours  Administration Instructions: This is a High Alert Medication.  Provider's Contact #: [phone] (22 @ 17:09)

## 2022-09-12 NOTE — PATIENT PROFILE ADULT - FALL HARM RISK - HARM RISK INTERVENTIONS

## 2022-09-12 NOTE — PROGRESS NOTE ADULT - SUBJECTIVE AND OBJECTIVE BOX
BRENDA    S: Patient feels better relative to several hours ago; less abdominal pain. She did vomit once over her NG tube when she was coming upstairs and it was disconnected from suction. No other specific complaints at this time. Lots of questions about her positive covid test. She has had several bowel movements.     O: AFVSS although hypertensive at this time, had just vomited.     Exam:     General: Awake, alert. Well appearing  HEENT: NGT in place, attached to wall but not properly configured. I reset it producing 350 cc of gastric contents. NGT retracted ~5cm.   Abd: Abdomen soft/non tender/nondistended. Ventral hernia nontender.     Imaging: Urgent KUB w/ tip of NGT prepyloric over the LUQ.     A/P:     72F w/ ventral hernia who presented with incarceration of her transverse colon which apparently has spontaneously reduced, now with bowel function and benign abdominal exam.   - Continue serial abdominal exam  - gastrograffin via NGT now  - 2 view abdominal xray in AM  - rest of plan per day team BRENDA    S: Patient feels better relative to several hours ago; less abdominal pain. She did vomit once over her NG tube when she was coming upstairs and it was disconnected from suction. No other specific complaints at this time. Lots of questions about her positive covid test. She has had several bowel movements.     O: AFVSS although hypertensive at this time, had just vomited.     Exam:     General: Awake, alert. Well appearing  HEENT: NGT in place, attached to wall but not properly configured. I reset it producing 350 cc of gastric contents. NGT retracted ~5cm.   Abd: Abdomen soft/non tender/nondistended. Ventral hernia nontender.     Imaging: Urgent KUB w/ tip of NGT prepyloric over the LUQ.     A/P:     72F w/ ventral hernia who presented with incarceration of her transverse colon which apparently has spontaneously reduced, now with bowel function and benign abdominal exam.   - Continue serial abdominal exam  - gastrograffin via NGT now  - 2 view abdominal xray in AM  - rest of plan per day team  - follow up repeat blood pressure measurement after this vomiting episode.

## 2022-09-12 NOTE — PROGRESS NOTE ADULT - ASSESSMENT
73 yo female with PMH of HTN, DM and PSH of  x2, hysterectomy and lap dev ( w/ Dr. Clifford) presenting with 2 days of nausea/vomiting and abd pain w/ imaging findings consistent with large bowel obstruction. However, prior to operative exploration, the patient had return of bowel function.     Plan:  - NPO/IVF, ISS  - NGT to [facility]  - f/u KUB in AM  - Consult Dr. Walter for cardiology clearance for possible OR wednesday  - IS/OOBA/HSQ  - BRENDA  - AM labs

## 2022-09-12 NOTE — PROGRESS NOTE ADULT - SUBJECTIVE AND OBJECTIVE BOX
INTERVAL HPI/OVERNIGHT EVENTS:  Overnight patient was admitted. Taken up to OR, but started having bowel movements and case was deferred. Abdominal pain pain improved as per patient. NGT retracted ~5cm and KUB repeated. Patient was seen and examined by chief resident and team on AM rounds. Patient without nausea or vomiting, NGT in place with 300cc output. +F/+BM.     SUBJECTIVE:    MEDICATIONS  (STANDING):  dextrose 5%. 1000 milliLiter(s) (50 mL/Hr) IV Continuous <Continuous>  dextrose 5%. 1000 milliLiter(s) (100 mL/Hr) IV Continuous <Continuous>  dextrose 50% Injectable 25 Gram(s) IV Push once  dextrose 50% Injectable 12.5 Gram(s) IV Push once  dextrose 50% Injectable 25 Gram(s) IV Push once  glucagon  Injectable 1 milliGRAM(s) IntraMuscular once  heparin   Injectable 5000 Unit(s) SubCutaneous every 8 hours  influenza  Vaccine (HIGH DOSE) 0.7 milliLiter(s) IntraMuscular once  insulin lispro (ADMELOG) corrective regimen sliding scale   SubCutaneous Before meals and at bedtime  lactated ringers. 1000 milliLiter(s) (120 mL/Hr) IV Continuous <Continuous>    MEDICATIONS  (PRN):  dextrose Oral Gel 15 Gram(s) Oral once PRN Blood Glucose LESS THAN 70 milliGRAM(s)/deciliter  ondansetron Injectable 4 milliGRAM(s) IV Push every 8 hours PRN Nausea and/or Vomiting      Vital Signs Last 24 Hrs  T(C): 37 (12 Sep 2022 05:42), Max: 37.2 (11 Sep 2022 17:07)  T(F): 98.6 (12 Sep 2022 05:42), Max: 98.9 (11 Sep 2022 17:07)  HR: 70 (12 Sep 2022 05:42) (66 - 91)  BP: 168/80 (12 Sep 2022 05:42) (159/70 - 177/81)  BP(mean): --  RR: 18 (12 Sep 2022 05:42) (16 - 18)  SpO2: 96% (12 Sep 2022 05:42) (96% - 99%)    Parameters below as of 12 Sep 2022 05:42  Patient On (Oxygen Delivery Method): room air        PHYSICAL EXAM:  Constitutional: A&Ox3  Respiratory: non labored breathing, no respiratory distress  Cardiovascular: NSR, RRR  Gastrointestinal:                  Incision:   Extremities: (-) edema        I&O's Detail    11 Sep 2022 07:01  -  12 Sep 2022 07:00  --------------------------------------------------------  IN:    Lactated Ringers: 840 mL  Total IN: 840 mL    OUT:    Nasogastric/Oral tube (mL): 300 mL    Voided (mL): 850 mL  Total OUT: 1150 mL    Total NET: -310 mL          LABS:                        11.6   14.09 )-----------( 283      ( 11 Sep 2022 15:05 )             37.3     09-11    137  |  96  |  19  ----------------------------<  189<H>  4.4   |  24  |  1.50<H>    Ca    10.1      11 Sep 2022 15:05    TPro  8.3  /  Alb  4.8  /  TBili  1.3<H>  /  DBili  x   /  AST  24  /  ALT  13  /  AlkPhos  89  09-11    PT/INR - ( 11 Sep 2022 15:05 )   PT: 13.5 sec;   INR: 1.13          PTT - ( 11 Sep 2022 15:05 )  PTT:31.8 sec  Urinalysis Basic - ( 11 Sep 2022 19:11 )    Color: Yellow / Appearance: SL Cloudy / S.015 / pH: x  Gluc: x / Ketone: NEGATIVE  / Bili: Negative / Urobili: 1.0 E.U./dL   Blood: x / Protein: 100 mg/dL / Nitrite: NEGATIVE   Leuk Esterase: NEGATIVE / RBC: < 5 /HPF / WBC < 5 /HPF   Sq Epi: x / Non Sq Epi: 0-5 /HPF / Bacteria: Present /HPF        RADIOLOGY & ADDITIONAL STUDIES: INTERVAL HPI/OVERNIGHT EVENTS:  Overnight patient was admitted. Taken up to OR, but started having bowel movements and case was deferred. Abdominal pain pain improved as per patient. NGT retracted ~5cm and KUB repeated. Patient was seen and examined by chief resident and team on AM rounds. Patient without nausea or vomiting, NGT in place with 300cc output. +F/+BM.     SUBJECTIVE:    MEDICATIONS  (STANDING):  dextrose 5%. 1000 milliLiter(s) (50 mL/Hr) IV Continuous <Continuous>  dextrose 5%. 1000 milliLiter(s) (100 mL/Hr) IV Continuous <Continuous>  dextrose 50% Injectable 25 Gram(s) IV Push once  dextrose 50% Injectable 12.5 Gram(s) IV Push once  dextrose 50% Injectable 25 Gram(s) IV Push once  glucagon  Injectable 1 milliGRAM(s) IntraMuscular once  heparin   Injectable 5000 Unit(s) SubCutaneous every 8 hours  influenza  Vaccine (HIGH DOSE) 0.7 milliLiter(s) IntraMuscular once  insulin lispro (ADMELOG) corrective regimen sliding scale   SubCutaneous Before meals and at bedtime  lactated ringers. 1000 milliLiter(s) (120 mL/Hr) IV Continuous <Continuous>    MEDICATIONS  (PRN):  dextrose Oral Gel 15 Gram(s) Oral once PRN Blood Glucose LESS THAN 70 milliGRAM(s)/deciliter  ondansetron Injectable 4 milliGRAM(s) IV Push every 8 hours PRN Nausea and/or Vomiting      Vital Signs Last 24 Hrs  T(C): 37 (12 Sep 2022 05:42), Max: 37.2 (11 Sep 2022 17:07)  T(F): 98.6 (12 Sep 2022 05:42), Max: 98.9 (11 Sep 2022 17:07)  HR: 70 (12 Sep 2022 05:42) (66 - 91)  BP: 168/80 (12 Sep 2022 05:42) (159/70 - 177/81)  BP(mean): --  RR: 18 (12 Sep 2022 05:42) (16 - 18)  SpO2: 96% (12 Sep 2022 05:42) (96% - 99%)    Parameters below as of 12 Sep 2022 05:42  Patient On (Oxygen Delivery Method): room air        PHYSICAL EXAM:  Constitutional: A&Ox3  Respiratory: non labored breathing, no respiratory distress  Cardiovascular: NSR, RRR  Gastrointestinal: soft, NTND. No rebound or guarding  Extremities: (-) edema        I&O's Detail    11 Sep 2022 07:01  -  12 Sep 2022 07:00  --------------------------------------------------------  IN:    Lactated Ringers: 840 mL  Total IN: 840 mL    OUT:    Nasogastric/Oral tube (mL): 300 mL    Voided (mL): 850 mL  Total OUT: 1150 mL    Total NET: -310 mL          LABS:                        11.6   14.09 )-----------( 283      ( 11 Sep 2022 15:05 )             37.3     09-11    137  |  96  |  19  ----------------------------<  189<H>  4.4   |  24  |  1.50<H>    Ca    10.1      11 Sep 2022 15:05    TPro  8.3  /  Alb  4.8  /  TBili  1.3<H>  /  DBili  x   /  AST  24  /  ALT  13  /  AlkPhos  89  09-11    PT/INR - ( 11 Sep 2022 15:05 )   PT: 13.5 sec;   INR: 1.13          PTT - ( 11 Sep 2022 15:05 )  PTT:31.8 sec  Urinalysis Basic - ( 11 Sep 2022 19:11 )    Color: Yellow / Appearance: SL Cloudy / S.015 / pH: x  Gluc: x / Ketone: NEGATIVE  / Bili: Negative / Urobili: 1.0 E.U./dL   Blood: x / Protein: 100 mg/dL / Nitrite: NEGATIVE   Leuk Esterase: NEGATIVE / RBC: < 5 /HPF / WBC < 5 /HPF   Sq Epi: x / Non Sq Epi: 0-5 /HPF / Bacteria: Present /HPF        RADIOLOGY & ADDITIONAL STUDIES:

## 2022-09-12 NOTE — CONSULT NOTE ADULT - ASSESSMENT
73 yo female with PMH of HTN, DM and PSH of  x2, hysterectomy and lap dev presenting with large bowel obstruction secondary to strangulated large bowel within umbilical hernia. Patient was planned to go to OR for ex lap,  No active cardiac symptoms or signs  ECG within normal limits  No exertional symptoms nor past cardiac history  Given the above and integrating surgical risks and benefits, patient is cleared for her abdominal surgery from a cardiac standpoint. Keep Labetalol eli and post op.  Will obtain echo as a baseline study.

## 2022-09-12 NOTE — CONSULT NOTE ADULT - SUBJECTIVE AND OBJECTIVE BOX
Interventional Cardiology Adult Consult Note    Subjective Assessment: No chest pain or dyspnea; still with some abdominal pains  	  MEDICATIONS:  labetalol 200 milliGRAM(s) Oral two times a day  ondansetron Injectable 4 milliGRAM(s) IV Push every 8 hours PRN      dextrose 50% Injectable 25 Gram(s) IV Push once  dextrose 50% Injectable 12.5 Gram(s) IV Push once  dextrose 50% Injectable 25 Gram(s) IV Push once  dextrose Oral Gel 15 Gram(s) Oral once PRN  glucagon  Injectable 1 milliGRAM(s) IntraMuscular once  insulin lispro (ADMELOG) corrective regimen sliding scale   SubCutaneous Before meals and at bedtime    benzocaine 15 mG/menthol 3.6 mG Lozenge 1 Lozenge Oral every 3 hours PRN  dextrose 5%. 1000 milliLiter(s) IV Continuous <Continuous>  dextrose 5%. 1000 milliLiter(s) IV Continuous <Continuous>  enoxaparin Injectable 40 milliGRAM(s) SubCutaneous every 24 hours  influenza  Vaccine (HIGH DOSE) 0.7 milliLiter(s) IntraMuscular once  lactated ringers. 1000 milliLiter(s) IV Continuous <Continuous>      	    [PHYSICAL EXAM:  TELEMETRY:  T(C): 37.3 (09-12-22 @ 16:30), Max: 37.3 (09-12-22 @ 16:30)  HR: 75 (09-12-22 @ 16:30) (66 - 78)  BP: 166/75 (09-12-22 @ 16:30) (159/70 - 177/81)  RR: 15 (09-12-22 @ 16:30) (15 - 18)  SpO2: 97% (09-12-22 @ 16:30) (96% - 98%)  Wt(kg): --  I&O's Summary    11 Sep 2022 07:01  -  12 Sep 2022 07:00  --------------------------------------------------------  IN: 840 mL / OUT: 1150 mL / NET: -310 mL      Height (cm): 167.6 (09-11 @ 21:22)  Weight (kg): 92.1 (09-11 @ 21:22)  BMI (kg/m2): 32.8 (09-11 @ 21:22)  BSA (m2): 2.01 (09-11 @ 21:22)  Rios:  Central/PICC/Mid Line:                                         Appearance: Normal	  HEENT:   Normal oral mucosa, PERRL, EOMI	  Neck: Supple, - JVD; Carotid Bruit   Cardiovascular: Normal S1 S2, No JVD, No murmurs,   Respiratory: Lungs clear to auscultation/Decreased Breath Sounds/No Rales, Rhonchi, Wheezing	  Extremities: Normal range of motion, No edema    ECG:  	NSR  RADIOLOGY:   DIAGNOSTIC TESTING:  [ ] Echocardiogram: pending  [ ]  Catheterization:  [ ] Stress Test:    [ ] KIKI  OTHER: 	    LABS:	 	  CARDIAC MARKERS:                                  9.8    7.34  )-----------( 213      ( 12 Sep 2022 05:30 )             32.4     09-12    136  |  102  |  18  ----------------------------<  98  3.8   |  25  |  1.09    Ca    8.7      12 Sep 2022 05:30  Phos  3.0     09-12  Mg     2.0     09-12    TPro  6.9  /  Alb  3.8  /  TBili  0.9  /  DBili  x   /  AST  19  /  ALT  11  /  AlkPhos  68  09-12    proBNP:   Lipid Profile:   HgA1c:   TSH:   PT/INR - ( 11 Sep 2022 15:05 )   PT: 13.5 sec;   INR: 1.13          PTT - ( 11 Sep 2022 15:05 )  PTT:31.8 sec

## 2022-09-13 DIAGNOSIS — I10 ESSENTIAL (PRIMARY) HYPERTENSION: ICD-10-CM

## 2022-09-13 DIAGNOSIS — U07.1 COVID-19: ICD-10-CM

## 2022-09-13 DIAGNOSIS — E11.65 TYPE 2 DIABETES MELLITUS WITH HYPERGLYCEMIA: ICD-10-CM

## 2022-09-13 DIAGNOSIS — Z01.818 ENCOUNTER FOR OTHER PREPROCEDURAL EXAMINATION: ICD-10-CM

## 2022-09-13 DIAGNOSIS — K56.609 UNSPECIFIED INTESTINAL OBSTRUCTION, UNSPECIFIED AS TO PARTIAL VERSUS COMPLETE OBSTRUCTION: ICD-10-CM

## 2022-09-13 LAB
ANION GAP SERPL CALC-SCNC: 11 MMOL/L — SIGNIFICANT CHANGE UP (ref 5–17)
BUN SERPL-MCNC: 15 MG/DL — SIGNIFICANT CHANGE UP (ref 7–23)
CALCIUM SERPL-MCNC: 9 MG/DL — SIGNIFICANT CHANGE UP (ref 8.4–10.5)
CHLORIDE SERPL-SCNC: 100 MMOL/L — SIGNIFICANT CHANGE UP (ref 96–108)
CO2 SERPL-SCNC: 26 MMOL/L — SIGNIFICANT CHANGE UP (ref 22–31)
CREAT SERPL-MCNC: 1.03 MG/DL — SIGNIFICANT CHANGE UP (ref 0.5–1.3)
EGFR: 58 ML/MIN/1.73M2 — LOW
GLUCOSE BLDC GLUCOMTR-MCNC: 102 MG/DL — HIGH (ref 70–99)
GLUCOSE BLDC GLUCOMTR-MCNC: 105 MG/DL — HIGH (ref 70–99)
GLUCOSE BLDC GLUCOMTR-MCNC: 81 MG/DL — SIGNIFICANT CHANGE UP (ref 70–99)
GLUCOSE BLDC GLUCOMTR-MCNC: 94 MG/DL — SIGNIFICANT CHANGE UP (ref 70–99)
GLUCOSE SERPL-MCNC: 117 MG/DL — HIGH (ref 70–99)
HCT VFR BLD CALC: 33.8 % — LOW (ref 34.5–45)
HGB BLD-MCNC: 10.2 G/DL — LOW (ref 11.5–15.5)
LACTATE SERPL-SCNC: 0.9 MMOL/L — SIGNIFICANT CHANGE UP (ref 0.5–2)
MAGNESIUM SERPL-MCNC: 2.1 MG/DL — SIGNIFICANT CHANGE UP (ref 1.6–2.6)
MCHC RBC-ENTMCNC: 26.5 PG — LOW (ref 27–34)
MCHC RBC-ENTMCNC: 30.2 GM/DL — LOW (ref 32–36)
MCV RBC AUTO: 87.8 FL — SIGNIFICANT CHANGE UP (ref 80–100)
NRBC # BLD: 0 /100 WBCS — SIGNIFICANT CHANGE UP (ref 0–0)
PHOSPHATE SERPL-MCNC: 4 MG/DL — SIGNIFICANT CHANGE UP (ref 2.5–4.5)
PLATELET # BLD AUTO: 236 K/UL — SIGNIFICANT CHANGE UP (ref 150–400)
POTASSIUM SERPL-MCNC: 3.8 MMOL/L — SIGNIFICANT CHANGE UP (ref 3.5–5.3)
POTASSIUM SERPL-SCNC: 3.8 MMOL/L — SIGNIFICANT CHANGE UP (ref 3.5–5.3)
RBC # BLD: 3.85 M/UL — SIGNIFICANT CHANGE UP (ref 3.8–5.2)
RBC # FLD: 14.5 % — SIGNIFICANT CHANGE UP (ref 10.3–14.5)
SODIUM SERPL-SCNC: 137 MMOL/L — SIGNIFICANT CHANGE UP (ref 135–145)
WBC # BLD: 8.84 K/UL — SIGNIFICANT CHANGE UP (ref 3.8–10.5)
WBC # FLD AUTO: 8.84 K/UL — SIGNIFICANT CHANGE UP (ref 3.8–10.5)

## 2022-09-13 PROCEDURE — 99232 SBSQ HOSP IP/OBS MODERATE 35: CPT

## 2022-09-13 PROCEDURE — 74018 RADEX ABDOMEN 1 VIEW: CPT | Mod: 26

## 2022-09-13 RX ORDER — OXYCODONE HYDROCHLORIDE 5 MG/1
5 TABLET ORAL ONCE
Refills: 0 | Status: DISCONTINUED | OUTPATIENT
Start: 2022-09-13 | End: 2022-09-13

## 2022-09-13 RX ORDER — ACETAMINOPHEN 500 MG
1000 TABLET ORAL ONCE
Refills: 0 | Status: COMPLETED | OUTPATIENT
Start: 2022-09-13 | End: 2022-09-13

## 2022-09-13 RX ORDER — POTASSIUM CHLORIDE 20 MEQ
10 PACKET (EA) ORAL
Refills: 0 | Status: COMPLETED | OUTPATIENT
Start: 2022-09-13 | End: 2022-09-13

## 2022-09-13 RX ORDER — DEXTROSE MONOHYDRATE, SODIUM CHLORIDE, AND POTASSIUM CHLORIDE 50; .745; 4.5 G/1000ML; G/1000ML; G/1000ML
1000 INJECTION, SOLUTION INTRAVENOUS
Refills: 0 | Status: DISCONTINUED | OUTPATIENT
Start: 2022-09-13 | End: 2022-09-14

## 2022-09-13 RX ADMIN — Medication 100 MILLIEQUIVALENT(S): at 14:12

## 2022-09-13 RX ADMIN — Medication 1000 MILLIGRAM(S): at 02:50

## 2022-09-13 RX ADMIN — OXYCODONE HYDROCHLORIDE 5 MILLIGRAM(S): 5 TABLET ORAL at 23:48

## 2022-09-13 RX ADMIN — Medication 1000 MILLIGRAM(S): at 15:00

## 2022-09-13 RX ADMIN — SODIUM CHLORIDE 130 MILLILITER(S): 9 INJECTION, SOLUTION INTRAVENOUS at 12:08

## 2022-09-13 RX ADMIN — DEXTROSE MONOHYDRATE, SODIUM CHLORIDE, AND POTASSIUM CHLORIDE 120 MILLILITER(S): 50; .745; 4.5 INJECTION, SOLUTION INTRAVENOUS at 18:33

## 2022-09-13 RX ADMIN — Medication 200 MILLIGRAM(S): at 06:34

## 2022-09-13 RX ADMIN — ENOXAPARIN SODIUM 40 MILLIGRAM(S): 100 INJECTION SUBCUTANEOUS at 18:47

## 2022-09-13 RX ADMIN — Medication 400 MILLIGRAM(S): at 02:20

## 2022-09-13 RX ADMIN — Medication 400 MILLIGRAM(S): at 14:57

## 2022-09-13 RX ADMIN — Medication 1000 MILLIGRAM(S): at 10:00

## 2022-09-13 RX ADMIN — Medication 400 MILLIGRAM(S): at 09:23

## 2022-09-13 RX ADMIN — Medication 200 MILLIGRAM(S): at 18:47

## 2022-09-13 RX ADMIN — Medication 100 MILLIEQUIVALENT(S): at 17:05

## 2022-09-13 NOTE — PROGRESS NOTE ADULT - SUBJECTIVE AND OBJECTIVE BOX
SUBJECTIVE: Pt seen and examined at bedside this am by surgery team. Patient is lying comfortably in bed complaining of mild abdominal pain. Patient denies fever, nausea, vomiting, chest pain, and shortness of breath. Patient is passing gas and having soft bowel movements.     MEDICATIONS  (STANDING):  dextrose 5%. 1000 milliLiter(s) (100 mL/Hr) IV Continuous <Continuous>  dextrose 5%. 1000 milliLiter(s) (50 mL/Hr) IV Continuous <Continuous>  dextrose 50% Injectable 25 Gram(s) IV Push once  dextrose 50% Injectable 12.5 Gram(s) IV Push once  dextrose 50% Injectable 25 Gram(s) IV Push once  enoxaparin Injectable 40 milliGRAM(s) SubCutaneous every 24 hours  glucagon  Injectable 1 milliGRAM(s) IntraMuscular once  influenza  Vaccine (HIGH DOSE) 0.7 milliLiter(s) IntraMuscular once  insulin lispro (ADMELOG) corrective regimen sliding scale   SubCutaneous Before meals and at bedtime  labetalol 200 milliGRAM(s) Oral two times a day  lactated ringers. 1000 milliLiter(s) (50 mL/Hr) IV Continuous <Continuous>    MEDICATIONS  (PRN):  benzocaine 15 mG/menthol 3.6 mG Lozenge 1 Lozenge Oral every 3 hours PRN Sore Throat  dextrose Oral Gel 15 Gram(s) Oral once PRN Blood Glucose LESS THAN 70 milliGRAM(s)/deciliter  ondansetron Injectable 4 milliGRAM(s) IV Push every 8 hours PRN Nausea and/or Vomiting      Vital Signs Last 24 Hrs  T(C): 37.3 (13 Sep 2022 06:47), Max: 37.3 (12 Sep 2022 16:30)  T(F): 99.1 (13 Sep 2022 06:47), Max: 99.2 (12 Sep 2022 20:59)  HR: 66 (13 Sep 2022 06:47) (66 - 75)  BP: 153/70 (13 Sep 2022 06:47) (134/64 - 166/75)  BP(mean): --  RR: 18 (13 Sep 2022 06:47) (15 - 18)  SpO2: 94% (13 Sep 2022 06:47) (94% - 97%)    Parameters below as of 13 Sep 2022 06:47  Patient On (Oxygen Delivery Method): room air      Physical Exam  General: Patient is doing well and lying in bed comfortably  Constitutional: alert and oriented   Pulm: Nonlabored breathing, no respiratory distress  CV: Regular rate and rhythm, normal sinus rhythm  Abd:  soft, mildly tender, mildly distended. Difficult to assess hernia due to body habitus. No rebound, no guarding.   Extremities: warm, well perfused, no edema    I&O's Detail    12 Sep 2022 07:01  -  13 Sep 2022 07:00  --------------------------------------------------------  IN:    IV PiggyBack: 100 mL    Lactated Ringers: 1100 mL  Total IN: 1200 mL    OUT:    Voided (mL): 750 mL  Total OUT: 750 mL    Total NET: 450 mL      13 Sep 2022 07:01  -  13 Sep 2022 07:48  --------------------------------------------------------  IN:    Lactated Ringers: 50 mL  Total IN: 50 mL    OUT:  Total OUT: 0 mL    Total NET: 50 mL        LABS:                        9.8    7.34  )-----------( 213      ( 12 Sep 2022 05:30 )             32.4     09-12    136  |  102  |  18  ----------------------------<  98  3.8   |  25  |  1.09    Ca    8.7      12 Sep 2022 05:30  Phos  3.0     09-12  Mg     2.0     09-12    TPro  6.9  /  Alb  3.8  /  TBili  0.9  /  DBili  x   /  AST  19  /  ALT  11  /  AlkPhos  68  09-12    PT/INR - ( 11 Sep 2022 15:05 )   PT: 13.5 sec;   INR: 1.13          PTT - ( 11 Sep 2022 15:05 )  PTT:31.8 sec  Urinalysis Basic - ( 11 Sep 2022 19:11 )    Color: Yellow / Appearance: SL Cloudy / S.015 / pH: x  Gluc: x / Ketone: NEGATIVE  / Bili: Negative / Urobili: 1.0 E.U./dL   Blood: x / Protein: 100 mg/dL / Nitrite: NEGATIVE   Leuk Esterase: NEGATIVE / RBC: < 5 /HPF / WBC < 5 /HPF   Sq Epi: x / Non Sq Epi: 0-5 /HPF / Bacteria: Present /HPF

## 2022-09-13 NOTE — PROGRESS NOTE ADULT - ASSESSMENT
71 yo female with PMH of HTN, DM and PSH of  x2, hysterectomy and lap dev ( w/ Dr. Clifford) presenting with 2 days of nausea/vomiting and abd pain w/ imaging findings consistent with large bowel obstruction. However, prior to operative exploration, the patient had return of bowel function.     CLD/IVF  ISS  IS/OOBA/HSQ  AM labs  Preop for OR

## 2022-09-13 NOTE — PROGRESS NOTE ADULT - ASSESSMENT
72 year old woman with HTN, DM2, past surgical history of  x 2, hysterectomy, and laparoscopic cholecystectomy (), admitted for large bowel obstruction, had spontaneous return of bowel function before surgical exploration.     # Large bowel obstruction   - continue to monitor serial abdominal exams   - possible OR on 22  - rest of care per primary team     # Pre-operative evaluation   Defer evaluation of eli-operative cardiac risk to cardiology consult   pending TTE    #HTN   Agree with cardiology recs to continue labetalol 200mg BID eli-operatively.   If SBP >180 mmHg persistently, [ ] Get bladder scan [ ] if pain well-controlled, and bladder scan shows no urinary retention, can use labetalol 10mg IV push if HR >60 bpm or hydralazine IV 5mg    # Type 2 Diabetes complicated by hyperglycemia   Takes metformin at home.   cw insulin sliding scale while inpatient.   resumed metformin on discharge.     # COVID 19 infection (present on admission)   Incidental finding of COVID 19 infection on admission nasal swab. Without any dyspnea, or sore throat.   No O2 requirement, does not meet criteria for remdesivir/dexamethasone.   Recommend enoxaparin for DVT ppx     DVT ppx - lovenox

## 2022-09-13 NOTE — PROGRESS NOTE ADULT - SUBJECTIVE AND OBJECTIVE BOX
Patient is a 72y old  Female who presents with a chief complaint of Large Bowel obstruction (12 Sep 2022 19:02)      INTERVAL HPI/OVERNIGHT EVENTS: abdominal pain, states she had emesis in the morning     MEDICATIONS  (STANDING):  dextrose 5%. 1000 milliLiter(s) (100 mL/Hr) IV Continuous <Continuous>  dextrose 5%. 1000 milliLiter(s) (50 mL/Hr) IV Continuous <Continuous>  dextrose 50% Injectable 25 Gram(s) IV Push once  dextrose 50% Injectable 12.5 Gram(s) IV Push once  dextrose 50% Injectable 25 Gram(s) IV Push once  enoxaparin Injectable 40 milliGRAM(s) SubCutaneous every 24 hours  glucagon  Injectable 1 milliGRAM(s) IntraMuscular once  influenza  Vaccine (HIGH DOSE) 0.7 milliLiter(s) IntraMuscular once  insulin lispro (ADMELOG) corrective regimen sliding scale   SubCutaneous Before meals and at bedtime  labetalol 200 milliGRAM(s) Oral two times a day  lactated ringers. 1000 milliLiter(s) (130 mL/Hr) IV Continuous <Continuous>  potassium chloride  10 mEq/100 mL IVPB 10 milliEquivalent(s) IV Intermittent every 1 hour    MEDICATIONS  (PRN):  benzocaine 15 mG/menthol 3.6 mG Lozenge 1 Lozenge Oral every 3 hours PRN Sore Throat  dextrose Oral Gel 15 Gram(s) Oral once PRN Blood Glucose LESS THAN 70 milliGRAM(s)/deciliter  ondansetron Injectable 4 milliGRAM(s) IV Push every 8 hours PRN Nausea and/or Vomiting      __________________________________________________  REVIEW OF SYSTEMS:    CONSTITUTIONAL: No fever,   EYES: no acute visual disturbances  NECK: No pain or stiffness  RESPIRATORY: No cough; No shortness of breath  CARDIOVASCULAR: No chest pain, no palpitations  GASTROINTESTINAL:+ abdominal pain, nausea and vomiting   NEUROLOGICAL: No headache or numbness, no tremors  MUSCULOSKELETAL: No joint pain, no muscle pain  GENITOURINARY: no dysuria, no frequency, no hesitancy  PSYCHIATRY: no depression , no anxiety  ALL OTHER  ROS negative        Vital Signs Last 24 Hrs  T(C): 36.7 (13 Sep 2022 09:05), Max: 37.3 (12 Sep 2022 16:30)  T(F): 98 (13 Sep 2022 09:05), Max: 99.2 (12 Sep 2022 20:59)  HR: 78 (13 Sep 2022 09:05) (66 - 78)  BP: 155/67 (13 Sep 2022 09:05) (134/64 - 166/75)  BP(mean): --  RR: 17 (13 Sep 2022 09:05) (15 - 18)  SpO2: 95% (13 Sep 2022 09:05) (94% - 97%)    Parameters below as of 13 Sep 2022 09:05  Patient On (Oxygen Delivery Method): room air        ________________________________________________  PHYSICAL EXAM:  GENERAL: NAD  HEENT: Normocephalic;  conjunctivae and sclerae clear; moist mucous membranes;   NECK : supple  CHEST/LUNG: Clear to auscultation bilaterally with good air entry   HEART: S1 S2  regular; no murmurs, gallops or rubs  ABDOMEN: Soft, + tender, Nondistended; Bowel sounds present  EXTREMITIES: no cyanosis; no edema; no calf tenderness  SKIN: warm and dry; no rash  NERVOUS SYSTEM:  Awake and alert; Oriented  to place, person and time ; no new deficits    _________________________________________________  LABS:                        10.2   8.84  )-----------( 236      ( 13 Sep 2022 11:00 )             33.8     09-13    137  |  100  |  15  ----------------------------<  117<H>  3.8   |  26  |  1.03    Ca    9.0      13 Sep 2022 11:00  Phos  4.0     09-13  Mg     2.1     -13    TPro  6.9  /  Alb  3.8  /  TBili  0.9  /  DBili  x   /  AST  19  /  ALT  11  /  AlkPhos  68  09-12    PT/INR - ( 11 Sep 2022 15:05 )   PT: 13.5 sec;   INR: 1.13          PTT - ( 11 Sep 2022 15:05 )  PTT:31.8 sec  Urinalysis Basic - ( 11 Sep 2022 19:11 )    Color: Yellow / Appearance: SL Cloudy / S.015 / pH: x  Gluc: x / Ketone: NEGATIVE  / Bili: Negative / Urobili: 1.0 E.U./dL   Blood: x / Protein: 100 mg/dL / Nitrite: NEGATIVE   Leuk Esterase: NEGATIVE / RBC: < 5 /HPF / WBC < 5 /HPF   Sq Epi: x / Non Sq Epi: 0-5 /HPF / Bacteria: Present /HPF      CAPILLARY BLOOD GLUCOSE      POCT Blood Glucose.: 105 mg/dL (13 Sep 2022 12:44)  POCT Blood Glucose.: 102 mg/dL (13 Sep 2022 08:06)  POCT Blood Glucose.: 119 mg/dL (12 Sep 2022 22:00)  POCT Blood Glucose.: 85 mg/dL (12 Sep 2022 17:13)        RADIOLOGY & ADDITIONAL TESTS:      Plan of care was discussed with patient and /or primary care giver; all questions and concerns were addressed and care was aligned with patient's wishes.

## 2022-09-14 ENCOUNTER — TRANSCRIPTION ENCOUNTER (OUTPATIENT)
Age: 72
End: 2022-09-14

## 2022-09-14 ENCOUNTER — RESULT REVIEW (OUTPATIENT)
Age: 72
End: 2022-09-14

## 2022-09-14 LAB
ANION GAP SERPL CALC-SCNC: 10 MMOL/L — SIGNIFICANT CHANGE UP (ref 5–17)
APTT BLD: 32.7 SEC — SIGNIFICANT CHANGE UP (ref 27.5–35.5)
BLD GP AB SCN SERPL QL: NEGATIVE — SIGNIFICANT CHANGE UP
BUN SERPL-MCNC: 17 MG/DL — SIGNIFICANT CHANGE UP (ref 7–23)
CALCIUM SERPL-MCNC: 8.5 MG/DL — SIGNIFICANT CHANGE UP (ref 8.4–10.5)
CHLORIDE SERPL-SCNC: 103 MMOL/L — SIGNIFICANT CHANGE UP (ref 96–108)
CO2 SERPL-SCNC: 24 MMOL/L — SIGNIFICANT CHANGE UP (ref 22–31)
CREAT SERPL-MCNC: 1.12 MG/DL — SIGNIFICANT CHANGE UP (ref 0.5–1.3)
EGFR: 53 ML/MIN/1.73M2 — LOW
GLUCOSE BLDC GLUCOMTR-MCNC: 114 MG/DL — HIGH (ref 70–99)
GLUCOSE BLDC GLUCOMTR-MCNC: 133 MG/DL — HIGH (ref 70–99)
GLUCOSE BLDC GLUCOMTR-MCNC: 154 MG/DL — HIGH (ref 70–99)
GLUCOSE SERPL-MCNC: 139 MG/DL — HIGH (ref 70–99)
HCT VFR BLD CALC: 33.1 % — LOW (ref 34.5–45)
HGB BLD-MCNC: 9.9 G/DL — LOW (ref 11.5–15.5)
INR BLD: 1.09 — SIGNIFICANT CHANGE UP (ref 0.88–1.16)
MAGNESIUM SERPL-MCNC: 2.2 MG/DL — SIGNIFICANT CHANGE UP (ref 1.6–2.6)
MCHC RBC-ENTMCNC: 26.7 PG — LOW (ref 27–34)
MCHC RBC-ENTMCNC: 29.9 GM/DL — LOW (ref 32–36)
MCV RBC AUTO: 89.2 FL — SIGNIFICANT CHANGE UP (ref 80–100)
NRBC # BLD: 0 /100 WBCS — SIGNIFICANT CHANGE UP (ref 0–0)
PHOSPHATE SERPL-MCNC: 3.3 MG/DL — SIGNIFICANT CHANGE UP (ref 2.5–4.5)
PLATELET # BLD AUTO: 255 K/UL — SIGNIFICANT CHANGE UP (ref 150–400)
POTASSIUM SERPL-MCNC: 3.8 MMOL/L — SIGNIFICANT CHANGE UP (ref 3.5–5.3)
POTASSIUM SERPL-SCNC: 3.8 MMOL/L — SIGNIFICANT CHANGE UP (ref 3.5–5.3)
PROTHROM AB SERPL-ACNC: 13 SEC — SIGNIFICANT CHANGE UP (ref 10.5–13.4)
RBC # BLD: 3.71 M/UL — LOW (ref 3.8–5.2)
RBC # FLD: 14.7 % — HIGH (ref 10.3–14.5)
RH IG SCN BLD-IMP: POSITIVE — SIGNIFICANT CHANGE UP
SARS-COV-2 RNA SPEC QL NAA+PROBE: DETECTED
SODIUM SERPL-SCNC: 137 MMOL/L — SIGNIFICANT CHANGE UP (ref 135–145)
WBC # BLD: 6.04 K/UL — SIGNIFICANT CHANGE UP (ref 3.8–10.5)
WBC # FLD AUTO: 6.04 K/UL — SIGNIFICANT CHANGE UP (ref 3.8–10.5)

## 2022-09-14 PROCEDURE — 88302 TISSUE EXAM BY PATHOLOGIST: CPT | Mod: 26

## 2022-09-14 PROCEDURE — 99233 SBSQ HOSP IP/OBS HIGH 50: CPT

## 2022-09-14 PROCEDURE — 88307 TISSUE EXAM BY PATHOLOGIST: CPT | Mod: 26

## 2022-09-14 PROCEDURE — 88300 SURGICAL PATH GROSS: CPT | Mod: 26,59

## 2022-09-14 DEVICE — MESH HERNIA PERFIX PLUG LARGE 1.6 X 1.90": Type: IMPLANTABLE DEVICE | Status: FUNCTIONAL

## 2022-09-14 DEVICE — MESH HERNIA PERFIX PLUG SMALL 1 X 1.35": Type: IMPLANTABLE DEVICE | Status: FUNCTIONAL

## 2022-09-14 DEVICE — MESH HERNIA PERFIX PLUG MEDIUM 1.3 X 1.55": Type: IMPLANTABLE DEVICE | Status: FUNCTIONAL

## 2022-09-14 DEVICE — MESH HERNIA VENTRAL / INCISIONAL PARIETEX PROGRIP 30 X 15CM: Type: IMPLANTABLE DEVICE | Status: FUNCTIONAL

## 2022-09-14 DEVICE — STAPLER ETHICON PROXIMATE 75MM WITH BLUE RELOAD: Type: IMPLANTABLE DEVICE | Status: FUNCTIONAL

## 2022-09-14 RX ORDER — CHLORHEXIDINE GLUCONATE 213 G/1000ML
1 SOLUTION TOPICAL DAILY
Refills: 0 | Status: DISCONTINUED | OUTPATIENT
Start: 2022-09-15 | End: 2022-09-18

## 2022-09-14 RX ORDER — HYDROMORPHONE HYDROCHLORIDE 2 MG/ML
0.25 INJECTION INTRAMUSCULAR; INTRAVENOUS; SUBCUTANEOUS ONCE
Refills: 0 | Status: DISCONTINUED | OUTPATIENT
Start: 2022-09-14 | End: 2022-09-14

## 2022-09-14 RX ORDER — ACETAMINOPHEN 500 MG
1000 TABLET ORAL ONCE
Refills: 0 | Status: COMPLETED | OUTPATIENT
Start: 2022-09-14 | End: 2022-09-14

## 2022-09-14 RX ORDER — BUPIVACAINE HCL/PF 7.5 MG/ML
400 VIAL (ML) INJECTION
Refills: 0 | Status: COMPLETED | OUTPATIENT
Start: 2022-09-14 | End: 2022-09-17

## 2022-09-14 RX ORDER — POTASSIUM CHLORIDE 20 MEQ
10 PACKET (EA) ORAL
Refills: 0 | Status: COMPLETED | OUTPATIENT
Start: 2022-09-14 | End: 2022-09-14

## 2022-09-14 RX ORDER — SODIUM CHLORIDE 9 MG/ML
500 INJECTION, SOLUTION INTRAVENOUS ONCE
Refills: 0 | Status: COMPLETED | OUTPATIENT
Start: 2022-09-14 | End: 2022-09-14

## 2022-09-14 RX ADMIN — Medication 400 MILLIGRAM(S): at 03:22

## 2022-09-14 RX ADMIN — SODIUM CHLORIDE 1000 MILLILITER(S): 9 INJECTION, SOLUTION INTRAVENOUS at 17:01

## 2022-09-14 RX ADMIN — Medication 200 MILLIGRAM(S): at 18:28

## 2022-09-14 RX ADMIN — DEXTROSE MONOHYDRATE, SODIUM CHLORIDE, AND POTASSIUM CHLORIDE 120 MILLILITER(S): 50; .745; 4.5 INJECTION, SOLUTION INTRAVENOUS at 11:25

## 2022-09-14 RX ADMIN — Medication 100 MILLIEQUIVALENT(S): at 15:56

## 2022-09-14 RX ADMIN — Medication 1000 MILLIGRAM(S): at 11:45

## 2022-09-14 RX ADMIN — Medication 2: at 18:24

## 2022-09-14 RX ADMIN — HYDROMORPHONE HYDROCHLORIDE 0.25 MILLIGRAM(S): 2 INJECTION INTRAMUSCULAR; INTRAVENOUS; SUBCUTANEOUS at 15:56

## 2022-09-14 RX ADMIN — HYDROMORPHONE HYDROCHLORIDE 0.25 MILLIGRAM(S): 2 INJECTION INTRAMUSCULAR; INTRAVENOUS; SUBCUTANEOUS at 16:15

## 2022-09-14 RX ADMIN — Medication 1000 MILLIGRAM(S): at 04:22

## 2022-09-14 RX ADMIN — Medication 400 MILLIGRAM(S): at 11:25

## 2022-09-14 RX ADMIN — Medication 200 MILLIGRAM(S): at 06:25

## 2022-09-14 RX ADMIN — OXYCODONE HYDROCHLORIDE 5 MILLIGRAM(S): 5 TABLET ORAL at 00:48

## 2022-09-14 RX ADMIN — Medication 100 MILLIEQUIVALENT(S): at 17:52

## 2022-09-14 NOTE — PRE-OP CHECKLIST - PATIENT'S PERSONAL PROPERTY GIVEN TO
[Puts on clothing] : puts on clothing [Imitates vertical line] : imitates vertical line [Turns pages of book 1 at a time] : turns pages of book 1 at a time [Jumps up] : jumps up [Kicks ball] : kicks ball [Walks up and down stairs 1 step at a time] : walks up and down stairs 1 step at a time [Speech half understanable] : speech half understandable [Says >20 words] : says >20 words [Combines words] : combines words [Follows 2 step command] : follows 2 step command [Brushes teeth with help] : brushes teeth with help [Puts on clothing with help] : puts on clothing with help [Puts on T-shirt] : puts on t-shirt [Washes and dries hands] : washes and dries hands  [Plays pretend] : plays pretend  [Copies vertical line] : copies vertical line [3-4 word phrases] : 3-4 word phrases [Understandable speech 50% of time] : understandable speech 50% of time [Names 1 color] : names 1 color [Throws ball overhead] : throws ball overhead [Balances on each foot for 1 second] : balances on each foot for 1 second [Broad jump] : broad jump  [Passed] : passed [Plays with other children] : plays with other children [FreeTextEntry3] : says "I don't want this" or "I don't like this" [FreeTextEntry1] : 0 security/safe

## 2022-09-14 NOTE — PROGRESS NOTE ADULT - SUBJECTIVE AND OBJECTIVE BOX
SUBJECTIVE: Pt seen and examined at bedside this am by surgery team. Patient is lying comfortably in bed with no complaints. Pain well controlled with current regimen. Patient denies fever, nausea, vomiting, chest pain, and shortness of breath. Patient is passing gas and having bowel movements.    MEDICATIONS  (STANDING):  acetaminophen   IVPB .. 1000 milliGRAM(s) IV Intermittent once  dextrose 5% + sodium chloride 0.45% with potassium chloride 20 mEq/L 1000 milliLiter(s) (120 mL/Hr) IV Continuous <Continuous>  dextrose 5%. 1000 milliLiter(s) (100 mL/Hr) IV Continuous <Continuous>  dextrose 5%. 1000 milliLiter(s) (50 mL/Hr) IV Continuous <Continuous>  dextrose 50% Injectable 25 Gram(s) IV Push once  dextrose 50% Injectable 12.5 Gram(s) IV Push once  dextrose 50% Injectable 25 Gram(s) IV Push once  enoxaparin Injectable 40 milliGRAM(s) SubCutaneous every 24 hours  glucagon  Injectable 1 milliGRAM(s) IntraMuscular once  influenza  Vaccine (HIGH DOSE) 0.7 milliLiter(s) IntraMuscular once  insulin lispro (ADMELOG) corrective regimen sliding scale   SubCutaneous Before meals and at bedtime  labetalol 200 milliGRAM(s) Oral two times a day    MEDICATIONS  (PRN):  benzocaine 15 mG/menthol 3.6 mG Lozenge 1 Lozenge Oral every 3 hours PRN Sore Throat  dextrose Oral Gel 15 Gram(s) Oral once PRN Blood Glucose LESS THAN 70 milliGRAM(s)/deciliter  ondansetron Injectable 4 milliGRAM(s) IV Push every 8 hours PRN Nausea and/or Vomiting      Vital Signs Last 24 Hrs  T(C): 36.5 (14 Sep 2022 08:45), Max: 37.3 (14 Sep 2022 00:43)  T(F): 97.7 (14 Sep 2022 08:45), Max: 99.2 (14 Sep 2022 00:43)  HR: 77 (14 Sep 2022 08:45) (70 - 86)  BP: 142/63 (14 Sep 2022 08:45) (139/64 - 167/72)  BP(mean): --  RR: 18 (14 Sep 2022 08:45) (16 - 19)  SpO2: 98% (14 Sep 2022 08:45) (95% - 98%)    Parameters below as of 14 Sep 2022 08:45  Patient On (Oxygen Delivery Method): room air        Physical Exam  General: Patient is doing well and lying in bed comfortably  Constitutional: alert and oriented   Pulm: Nonlabored breathing, no respiratory distress  CV: Regular rate and rhythm, normal sinus rhythm  Abd: soft, mild diffuse tenderness, mildly distended. No rebound, no guarding.   Extremities: warm, well perfused, no edema    I&O's Detail    13 Sep 2022 07:01  -  14 Sep 2022 07:00  --------------------------------------------------------  IN:    dextrose 5% + sodium chloride 0.45% w/ Additives: 960 mL    IV PiggyBack: 200 mL    Lactated Ringers: 590 mL  Total IN: 1750 mL    OUT:    Emesis (mL): 100 mL  Total OUT: 100 mL    Total NET: 1650 mL        LABS:                        10.2   8.84  )-----------( 236      ( 13 Sep 2022 11:00 )             33.8     09-13    137  |  100  |  15  ----------------------------<  117<H>  3.8   |  26  |  1.03    Ca    9.0      13 Sep 2022 11:00  Phos  4.0     09-13  Mg     2.1     09-13

## 2022-09-14 NOTE — PROGRESS NOTE ADULT - ASSESSMENT
71 yo female with PMH of HTN, DM and PSH of  x2, hysterectomy and lap dev ( w/ Dr. Clifford) presenting with 2 days of nausea/vomiting and abd pain w/ imaging findings consistent with large bowel obstruction. However, prior to operative exploration, the patient had return of bowel function.     NPO/IVF  ISS  IS/OOBA/HSQ  AM labs  OR

## 2022-09-14 NOTE — PROGRESS NOTE ADULT - ASSESSMENT
72 year old woman with HTN, DM2, past surgical history of  x 2, hysterectomy, and laparoscopic cholecystectomy (), admitted for large bowel obstruction, had spontaneous return of bowel function before surgical exploration.     # Large bowel obstruction   - continue to monitor serial abdominal exams   - possible OR on 22  - rest of care per primary team     # Pre-operative evaluation   Defer evaluation of eli-operative cardiac risk to cardiology consult     #HTN   Agree with cardiology recs to continue labetalol 200mg BID eli-operatively.   If SBP >180 mmHg persistently, [ ] Get bladder scan [ ] if pain well-controlled, and bladder scan shows no urinary retention, can use labetalol 10mg IV push if HR >60 bpm or hydralazine IV 5mg    # Type 2 Diabetes complicated by hyperglycemia   Takes metformin at home.   cw insulin sliding scale while inpatient.   resumed metformin on discharge.     # COVID 19 infection (present on admission)   Incidental finding of COVID 19 infection on admission nasal swab. Without any dyspnea, or sore throat.   No O2 requirement, does not meet criteria for remdesivir/dexamethasone.   Recommend enoxaparin for DVT ppx     DVT ppx - lovenox

## 2022-09-14 NOTE — PROGRESS NOTE ADULT - SUBJECTIVE AND OBJECTIVE BOX
Interventional Cardiology Adult Progress Note    Subjective Assessment: no chest pain or dyspnea  	  MEDICATIONS:  labetalol 200 milliGRAM(s) Oral two times a day        ondansetron Injectable 4 milliGRAM(s) IV Push every 8 hours PRN      dextrose 50% Injectable 25 Gram(s) IV Push once  dextrose 50% Injectable 12.5 Gram(s) IV Push once  dextrose 50% Injectable 25 Gram(s) IV Push once  dextrose Oral Gel 15 Gram(s) Oral once PRN  glucagon  Injectable 1 milliGRAM(s) IntraMuscular once  insulin lispro (ADMELOG) corrective regimen sliding scale   SubCutaneous Before meals and at bedtime    benzocaine 15 mG/menthol 3.6 mG Lozenge 1 Lozenge Oral every 3 hours PRN  dextrose 5% + sodium chloride 0.45% with potassium chloride 20 mEq/L 1000 milliLiter(s) IV Continuous <Continuous>  dextrose 5%. 1000 milliLiter(s) IV Continuous <Continuous>  dextrose 5%. 1000 milliLiter(s) IV Continuous <Continuous>  enoxaparin Injectable 40 milliGRAM(s) SubCutaneous every 24 hours  influenza  Vaccine (HIGH DOSE) 0.7 milliLiter(s) IntraMuscular once      	    [PHYSICAL EXAM:  TELEMETRY:  T(C): 36.6 (09-14-22 @ 05:02), Max: 37.3 (09-14-22 @ 00:43)  HR: 70 (09-14-22 @ 05:02) (70 - 86)  BP: 139/64 (09-14-22 @ 05:02) (139/64 - 167/72)  RR: 18 (09-14-22 @ 05:02) (16 - 19)  SpO2: 98% (09-14-22 @ 05:02) (95% - 98%)  Wt(kg): --  I&O's Summary    13 Sep 2022 07:01  -  14 Sep 2022 07:00  --------------------------------------------------------  IN: 1750 mL / OUT: 100 mL / NET: 1650 mL        Rois:  Central/PICC/Mid Line:                                         Appearance: Normal	  HEENT:   Normal oral mucosa, PERRL, EOMI	  Neck: Supple,  - JVD; Carotid Bruit   Cardiovascular: Normal S1 S2, No JVD, No murmurs,   Respiratory: Lungs clear to auscultation/Decreased Breath Sounds/No Rales, Rhonchi, Wheezing	  	    ECG:  	NSR  RADIOLOGY:   DIAGNOSTIC TESTING:  [ ] Echocardiogram: pending  [ ]  Catheterization:  [ ] Stress Test:    [ ] KIKI  OTHER: 	    LABS:	 	  CARDIAC MARKERS:                                  10.2   8.84  )-----------( 236      ( 13 Sep 2022 11:00 )             33.8     09-13    137  |  100  |  15  ----------------------------<  117<H>  3.8   |  26  |  1.03    Ca    9.0      13 Sep 2022 11:00  Phos  4.0     09-13  Mg     2.1     09-13      proBNP:   Lipid Profile:   HgA1c:   TSH:

## 2022-09-14 NOTE — PROGRESS NOTE ADULT - ASSESSMENT
73 yo female with PMH of HTN, DM and PSH of  x2, hysterectomy and lap dev presenting with large bowel obstruction secondary to strangulated large bowel within umbilical hernia planned to go to OR for ex lap,  No active cardiac symptoms or signs for the last 2 days  ECG within normal limits; awaiting echo as a baseline study.  No exertional symptoms nor past cardiac history  Given the above and integrating surgical risks and benefits, patient is cleared for her abdominal surgery from a cardiac standpoint. Keep Labetalol eli and post op.

## 2022-09-14 NOTE — PROGRESS NOTE ADULT - SUBJECTIVE AND OBJECTIVE BOX
Patient is a 72y old  Female who presents with a chief complaint of abdominal surgery (14 Sep 2022 08:17)    INTERVAL EVENTS:  - no nausea today, no dsyuria. ; having multiple loose bowel movements a day     SUBJECTIVE:  Patient was seen and examined at bedside.    Review of systems: No fever, chills, dizziness, HA, Changes in vision, CP, dyspnea, vomiting, dysuria, LE edema. Rest of 12 point Review of systems negative unless otherwise documented elsewhere in note.     Diet, NPO:   Except Medications (09-13-22 @ 23:06) [Active]  Diet, NPO after Midnight:      NPO Start Date: 13-Sep-2022,   NPO Start Time: 23:59 (09-13-22 @ 06:03) [Active]      MEDICATIONS:  MEDICATIONS  (STANDING):  BUpivacaine 0.5% On-Q Pump 400 milliLiter(s) (4 mL/Hr) IntraDermal. <Continuous>  chlorhexidine 0.12% Liquid 15 milliLiter(s) Oral Mucosa every 12 hours  chlorhexidine 2% Cloths 1 Application(s) Topical daily  dextrose 5%. 1000 milliLiter(s) (100 mL/Hr) IV Continuous <Continuous>  dextrose 5%. 1000 milliLiter(s) (50 mL/Hr) IV Continuous <Continuous>  dextrose 50% Injectable 25 Gram(s) IV Push once  dextrose 50% Injectable 12.5 Gram(s) IV Push once  dextrose 50% Injectable 25 Gram(s) IV Push once  fentaNYL   Infusion. 0.5 MICROgram(s)/kG/Hr (4.61 mL/Hr) IV Continuous <Continuous>  glucagon  Injectable 1 milliGRAM(s) IntraMuscular once  influenza  Vaccine (HIGH DOSE) 0.7 milliLiter(s) IntraMuscular once  insulin lispro (ADMELOG) corrective regimen sliding scale   SubCutaneous Before meals and at bedtime  pantoprazole  Injectable 40 milliGRAM(s) IV Push daily  propofol Infusion 9.048 MICROgram(s)/kG/Min (5 mL/Hr) IV Continuous <Continuous>    MEDICATIONS  (PRN):  dextrose Oral Gel 15 Gram(s) Oral once PRN Blood Glucose LESS THAN 70 milliGRAM(s)/deciliter  ondansetron Injectable 4 milliGRAM(s) IV Push every 8 hours PRN Nausea and/or Vomiting      Allergies    penicillin (Rash)    Intolerances        OBJECTIVE:  Vital Signs Last 24 Hrs  T(C): 36.4 (14 Sep 2022 16:24), Max: 36.6 (14 Sep 2022 05:02)  T(F): 97.6 (14 Sep 2022 16:24), Max: 97.9 (14 Sep 2022 05:02)  HR: 71 (15 Sep 2022 02:05) (65 - 77)  BP: 155/65 (14 Sep 2022 20:26) (139/64 - 166/65)  BP(mean): --  RR: 14 (15 Sep 2022 02:05) (14 - 18)  SpO2: 100% (15 Sep 2022 02:05) (98% - 100%)    Parameters below as of 15 Sep 2022 02:05  Patient On (Oxygen Delivery Method): ventilator    O2 Concentration (%): 100  I&O's Summary    13 Sep 2022 07:01  -  14 Sep 2022 07:00  --------------------------------------------------------  IN: 1750 mL / OUT: 100 mL / NET: 1650 mL        PHYSICAL EXAM:  Gen: Reclining in bed at time of exam, appears stated age  HEENT: NCAT, MMM, clear OP  Neck: supple, trachea at midline  CV: RRR, +S1/S2  Pulm: adequate respiratory effort, no increase in work of breathing  Abd: soft, ND; tender to palpation diffusely  Skin: warm and dry,   Ext: WWP, no LE edema  Neuro: AOx3, no gross focal neurological deficits  Psych: affect and behavior appropriate, pleasant at time of interview    LABS:                        9.9    6.04  )-----------( 255      ( 14 Sep 2022 11:33 )             33.1     09-14    137  |  103  |  17  ----------------------------<  139<H>  3.8   |  24  |  1.12    Ca    8.5      14 Sep 2022 11:33  Phos  3.3     09-14  Mg     2.2     09-14        PT/INR - ( 14 Sep 2022 11:33 )   PT: 13.0 sec;   INR: 1.09          PTT - ( 14 Sep 2022 11:33 )  PTT:32.7 sec  CAPILLARY BLOOD GLUCOSE      POCT Blood Glucose.: 154 mg/dL (14 Sep 2022 18:19)  POCT Blood Glucose.: 133 mg/dL (14 Sep 2022 11:56)  POCT Blood Glucose.: 114 mg/dL (14 Sep 2022 07:28)        MICRODATA:      RADIOLOGY/OTHER STUDIES:

## 2022-09-15 LAB
ANION GAP SERPL CALC-SCNC: 12 MMOL/L — SIGNIFICANT CHANGE UP (ref 5–17)
ANION GAP SERPL CALC-SCNC: 12 MMOL/L — SIGNIFICANT CHANGE UP (ref 5–17)
APPEARANCE UR: CLEAR — SIGNIFICANT CHANGE UP
BACTERIA # UR AUTO: PRESENT /HPF
BASE EXCESS BLDA CALC-SCNC: -6.6 MMOL/L — LOW (ref -2–3)
BILIRUB UR-MCNC: NEGATIVE — SIGNIFICANT CHANGE UP
BUN SERPL-MCNC: 14 MG/DL — SIGNIFICANT CHANGE UP (ref 7–23)
BUN SERPL-MCNC: 21 MG/DL — SIGNIFICANT CHANGE UP (ref 7–23)
CALCIUM SERPL-MCNC: 7.4 MG/DL — LOW (ref 8.4–10.5)
CALCIUM SERPL-MCNC: 7.5 MG/DL — LOW (ref 8.4–10.5)
CHLORIDE SERPL-SCNC: 105 MMOL/L — SIGNIFICANT CHANGE UP (ref 96–108)
CHLORIDE SERPL-SCNC: 105 MMOL/L — SIGNIFICANT CHANGE UP (ref 96–108)
CO2 BLDA-SCNC: 19 MMOL/L — SIGNIFICANT CHANGE UP (ref 19–24)
CO2 SERPL-SCNC: 17 MMOL/L — LOW (ref 22–31)
CO2 SERPL-SCNC: 18 MMOL/L — LOW (ref 22–31)
COLOR SPEC: YELLOW — SIGNIFICANT CHANGE UP
CREAT ?TM UR-MCNC: 42 MG/DL — SIGNIFICANT CHANGE UP
CREAT SERPL-MCNC: 1.03 MG/DL — SIGNIFICANT CHANGE UP (ref 0.5–1.3)
CREAT SERPL-MCNC: 2.26 MG/DL — HIGH (ref 0.5–1.3)
DIFF PNL FLD: ABNORMAL
EGFR: 23 ML/MIN/1.73M2 — LOW
EGFR: 58 ML/MIN/1.73M2 — LOW
GAS PNL BLDA: SIGNIFICANT CHANGE UP
GLUCOSE BLDC GLUCOMTR-MCNC: 156 MG/DL — HIGH (ref 70–99)
GLUCOSE BLDC GLUCOMTR-MCNC: 172 MG/DL — HIGH (ref 70–99)
GLUCOSE BLDC GLUCOMTR-MCNC: 178 MG/DL — HIGH (ref 70–99)
GLUCOSE BLDC GLUCOMTR-MCNC: 180 MG/DL — HIGH (ref 70–99)
GLUCOSE SERPL-MCNC: 174 MG/DL — HIGH (ref 70–99)
GLUCOSE SERPL-MCNC: 209 MG/DL — HIGH (ref 70–99)
GLUCOSE UR QL: NEGATIVE — SIGNIFICANT CHANGE UP
GRAN CASTS # UR COMP ASSIST: ABNORMAL /LPF
HCO3 BLDA-SCNC: 18 MMOL/L — LOW (ref 21–28)
HCT VFR BLD CALC: 28.6 % — LOW (ref 34.5–45)
HCT VFR BLD CALC: 34.2 % — LOW (ref 34.5–45)
HGB BLD-MCNC: 10.3 G/DL — LOW (ref 11.5–15.5)
HGB BLD-MCNC: 8.9 G/DL — LOW (ref 11.5–15.5)
HYALINE CASTS # UR AUTO: SIGNIFICANT CHANGE UP /LPF (ref 0–2)
KETONES UR-MCNC: NEGATIVE — SIGNIFICANT CHANGE UP
LACTATE SERPL-SCNC: 1.6 MMOL/L — SIGNIFICANT CHANGE UP (ref 0.5–2)
LACTATE SERPL-SCNC: 1.7 MMOL/L — SIGNIFICANT CHANGE UP (ref 0.5–2)
LEUKOCYTE ESTERASE UR-ACNC: NEGATIVE — SIGNIFICANT CHANGE UP
MAGNESIUM SERPL-MCNC: 1.6 MG/DL — SIGNIFICANT CHANGE UP (ref 1.6–2.6)
MAGNESIUM SERPL-MCNC: 2.1 MG/DL — SIGNIFICANT CHANGE UP (ref 1.6–2.6)
MCHC RBC-ENTMCNC: 26.5 PG — LOW (ref 27–34)
MCHC RBC-ENTMCNC: 27.1 PG — SIGNIFICANT CHANGE UP (ref 27–34)
MCHC RBC-ENTMCNC: 30.1 GM/DL — LOW (ref 32–36)
MCHC RBC-ENTMCNC: 31.1 GM/DL — LOW (ref 32–36)
MCV RBC AUTO: 87.2 FL — SIGNIFICANT CHANGE UP (ref 80–100)
MCV RBC AUTO: 87.9 FL — SIGNIFICANT CHANGE UP (ref 80–100)
NITRITE UR-MCNC: NEGATIVE — SIGNIFICANT CHANGE UP
NRBC # BLD: 0 /100 WBCS — SIGNIFICANT CHANGE UP (ref 0–0)
NRBC # BLD: 0 /100 WBCS — SIGNIFICANT CHANGE UP (ref 0–0)
OSMOLALITY UR: 301 MOSM/KG — SIGNIFICANT CHANGE UP (ref 300–900)
PCO2 BLDA: 34 MMHG — SIGNIFICANT CHANGE UP (ref 32–35)
PH BLDA: 7.34 — LOW (ref 7.35–7.45)
PH UR: 5.5 — SIGNIFICANT CHANGE UP (ref 5–8)
PHOSPHATE SERPL-MCNC: 2.9 MG/DL — SIGNIFICANT CHANGE UP (ref 2.5–4.5)
PHOSPHATE SERPL-MCNC: 4.5 MG/DL — SIGNIFICANT CHANGE UP (ref 2.5–4.5)
PLATELET # BLD AUTO: 245 K/UL — SIGNIFICANT CHANGE UP (ref 150–400)
PLATELET # BLD AUTO: 256 K/UL — SIGNIFICANT CHANGE UP (ref 150–400)
PO2 BLDA: 165 MMHG — HIGH (ref 83–108)
POTASSIUM SERPL-MCNC: 4.7 MMOL/L — SIGNIFICANT CHANGE UP (ref 3.5–5.3)
POTASSIUM SERPL-MCNC: 5.1 MMOL/L — SIGNIFICANT CHANGE UP (ref 3.5–5.3)
POTASSIUM SERPL-SCNC: 4.7 MMOL/L — SIGNIFICANT CHANGE UP (ref 3.5–5.3)
POTASSIUM SERPL-SCNC: 5.1 MMOL/L — SIGNIFICANT CHANGE UP (ref 3.5–5.3)
POTASSIUM UR-SCNC: 21 MMOL/L — SIGNIFICANT CHANGE UP
PROT ?TM UR-MCNC: 17 MG/DL — HIGH (ref 0–12)
PROT UR-MCNC: NEGATIVE MG/DL — SIGNIFICANT CHANGE UP
PROT/CREAT UR-RTO: 0.4 RATIO — HIGH (ref 0–0.2)
RBC # BLD: 3.28 M/UL — LOW (ref 3.8–5.2)
RBC # BLD: 3.89 M/UL — SIGNIFICANT CHANGE UP (ref 3.8–5.2)
RBC # FLD: 14.6 % — HIGH (ref 10.3–14.5)
RBC # FLD: 14.7 % — HIGH (ref 10.3–14.5)
RBC CASTS # UR COMP ASSIST: < 5 /HPF — SIGNIFICANT CHANGE UP
SAO2 % BLDA: 99.6 % — HIGH (ref 94–98)
SODIUM SERPL-SCNC: 134 MMOL/L — LOW (ref 135–145)
SODIUM SERPL-SCNC: 135 MMOL/L — SIGNIFICANT CHANGE UP (ref 135–145)
SODIUM UR-SCNC: 103 MMOL/L — SIGNIFICANT CHANGE UP
SP GR SPEC: <=1.005 — SIGNIFICANT CHANGE UP (ref 1–1.03)
UROBILINOGEN FLD QL: 0.2 E.U./DL — SIGNIFICANT CHANGE UP
UUN UR-MCNC: 84 MG/DL — SIGNIFICANT CHANGE UP
WBC # BLD: 19.44 K/UL — HIGH (ref 3.8–10.5)
WBC # BLD: 6.63 K/UL — SIGNIFICANT CHANGE UP (ref 3.8–10.5)
WBC # FLD AUTO: 19.44 K/UL — HIGH (ref 3.8–10.5)
WBC # FLD AUTO: 6.63 K/UL — SIGNIFICANT CHANGE UP (ref 3.8–10.5)
WBC UR QL: < 5 /HPF — SIGNIFICANT CHANGE UP

## 2022-09-15 PROCEDURE — 36600 WITHDRAWAL OF ARTERIAL BLOOD: CPT

## 2022-09-15 PROCEDURE — 76937 US GUIDE VASCULAR ACCESS: CPT | Mod: 26

## 2022-09-15 PROCEDURE — 74018 RADEX ABDOMEN 1 VIEW: CPT | Mod: 26

## 2022-09-15 PROCEDURE — 71045 X-RAY EXAM CHEST 1 VIEW: CPT | Mod: 26

## 2022-09-15 PROCEDURE — 99223 1ST HOSP IP/OBS HIGH 75: CPT | Mod: GC

## 2022-09-15 RX ORDER — CHLORHEXIDINE GLUCONATE 213 G/1000ML
15 SOLUTION TOPICAL EVERY 12 HOURS
Refills: 0 | Status: DISCONTINUED | OUTPATIENT
Start: 2022-09-15 | End: 2022-09-15

## 2022-09-15 RX ORDER — CEFTRIAXONE 500 MG/1
1000 INJECTION, POWDER, FOR SOLUTION INTRAMUSCULAR; INTRAVENOUS EVERY 24 HOURS
Refills: 0 | Status: DISCONTINUED | OUTPATIENT
Start: 2022-09-15 | End: 2022-09-16

## 2022-09-15 RX ORDER — HYDROMORPHONE HYDROCHLORIDE 2 MG/ML
0.25 INJECTION INTRAMUSCULAR; INTRAVENOUS; SUBCUTANEOUS EVERY 4 HOURS
Refills: 0 | Status: DISCONTINUED | OUTPATIENT
Start: 2022-09-15 | End: 2022-09-15

## 2022-09-15 RX ORDER — ACETAMINOPHEN 500 MG
1000 TABLET ORAL ONCE
Refills: 0 | Status: COMPLETED | OUTPATIENT
Start: 2022-09-15 | End: 2022-09-15

## 2022-09-15 RX ORDER — ENOXAPARIN SODIUM 100 MG/ML
40 INJECTION SUBCUTANEOUS EVERY 24 HOURS
Refills: 0 | Status: DISCONTINUED | OUTPATIENT
Start: 2022-09-15 | End: 2022-09-16

## 2022-09-15 RX ORDER — HYDROMORPHONE HYDROCHLORIDE 2 MG/ML
0.5 INJECTION INTRAMUSCULAR; INTRAVENOUS; SUBCUTANEOUS EVERY 4 HOURS
Refills: 0 | Status: DISCONTINUED | OUTPATIENT
Start: 2022-09-15 | End: 2022-09-15

## 2022-09-15 RX ORDER — METRONIDAZOLE 500 MG
500 TABLET ORAL EVERY 8 HOURS
Refills: 0 | Status: DISCONTINUED | OUTPATIENT
Start: 2022-09-15 | End: 2022-09-16

## 2022-09-15 RX ORDER — FUROSEMIDE 40 MG
20 TABLET ORAL ONCE
Refills: 0 | Status: COMPLETED | OUTPATIENT
Start: 2022-09-15 | End: 2022-09-15

## 2022-09-15 RX ORDER — SODIUM CHLORIDE 9 MG/ML
500 INJECTION, SOLUTION INTRAVENOUS ONCE
Refills: 0 | Status: DISCONTINUED | OUTPATIENT
Start: 2022-09-15 | End: 2022-09-15

## 2022-09-15 RX ORDER — SODIUM CHLORIDE 9 MG/ML
1000 INJECTION, SOLUTION INTRAVENOUS ONCE
Refills: 0 | Status: COMPLETED | OUTPATIENT
Start: 2022-09-15 | End: 2022-09-15

## 2022-09-15 RX ORDER — METRONIDAZOLE 500 MG
TABLET ORAL
Refills: 0 | Status: DISCONTINUED | OUTPATIENT
Start: 2022-09-15 | End: 2022-09-16

## 2022-09-15 RX ORDER — FENTANYL CITRATE 50 UG/ML
0.5 INJECTION INTRAVENOUS
Qty: 2500 | Refills: 0 | Status: DISCONTINUED | OUTPATIENT
Start: 2022-09-15 | End: 2022-09-15

## 2022-09-15 RX ORDER — PANTOPRAZOLE SODIUM 20 MG/1
40 TABLET, DELAYED RELEASE ORAL DAILY
Refills: 0 | Status: DISCONTINUED | OUTPATIENT
Start: 2022-09-15 | End: 2022-09-15

## 2022-09-15 RX ORDER — SODIUM CHLORIDE 9 MG/ML
1000 INJECTION, SOLUTION INTRAVENOUS
Refills: 0 | Status: ACTIVE | OUTPATIENT
Start: 2022-09-15 | End: 2023-08-14

## 2022-09-15 RX ORDER — METRONIDAZOLE 500 MG
500 TABLET ORAL ONCE
Refills: 0 | Status: COMPLETED | OUTPATIENT
Start: 2022-09-15 | End: 2022-09-15

## 2022-09-15 RX ORDER — PROPOFOL 10 MG/ML
9.05 INJECTION, EMULSION INTRAVENOUS
Qty: 1000 | Refills: 0 | Status: DISCONTINUED | OUTPATIENT
Start: 2022-09-15 | End: 2022-09-15

## 2022-09-15 RX ORDER — MAGNESIUM SULFATE 500 MG/ML
2 VIAL (ML) INJECTION ONCE
Refills: 0 | Status: COMPLETED | OUTPATIENT
Start: 2022-09-15 | End: 2022-09-15

## 2022-09-15 RX ADMIN — Medication 25 GRAM(S): at 05:20

## 2022-09-15 RX ADMIN — Medication 1000 MILLIGRAM(S): at 22:00

## 2022-09-15 RX ADMIN — Medication 400 MILLIGRAM(S): at 21:22

## 2022-09-15 RX ADMIN — Medication 400 MILLIGRAM(S): at 15:29

## 2022-09-15 RX ADMIN — PROPOFOL 5 MICROGRAM(S)/KG/MIN: 10 INJECTION, EMULSION INTRAVENOUS at 02:45

## 2022-09-15 RX ADMIN — CEFTRIAXONE 100 MILLIGRAM(S): 500 INJECTION, POWDER, FOR SOLUTION INTRAMUSCULAR; INTRAVENOUS at 13:26

## 2022-09-15 RX ADMIN — Medication 100 MILLIGRAM(S): at 21:22

## 2022-09-15 RX ADMIN — SODIUM CHLORIDE 1000 MILLILITER(S): 9 INJECTION, SOLUTION INTRAVENOUS at 13:31

## 2022-09-15 RX ADMIN — FENTANYL CITRATE 4.61 MICROGRAM(S)/KG/HR: 50 INJECTION INTRAVENOUS at 02:45

## 2022-09-15 RX ADMIN — Medication 2: at 07:59

## 2022-09-15 RX ADMIN — Medication 2: at 21:35

## 2022-09-15 RX ADMIN — Medication 2: at 17:31

## 2022-09-15 RX ADMIN — ENOXAPARIN SODIUM 40 MILLIGRAM(S): 100 INJECTION SUBCUTANEOUS at 11:23

## 2022-09-15 RX ADMIN — CHLORHEXIDINE GLUCONATE 15 MILLILITER(S): 213 SOLUTION TOPICAL at 05:20

## 2022-09-15 RX ADMIN — Medication 100 MILLIGRAM(S): at 15:29

## 2022-09-15 RX ADMIN — SODIUM CHLORIDE 150 MILLILITER(S): 9 INJECTION, SOLUTION INTRAVENOUS at 21:22

## 2022-09-15 RX ADMIN — SODIUM CHLORIDE 4000 MILLILITER(S): 9 INJECTION, SOLUTION INTRAVENOUS at 19:09

## 2022-09-15 RX ADMIN — CHLORHEXIDINE GLUCONATE 1 APPLICATION(S): 213 SOLUTION TOPICAL at 13:19

## 2022-09-15 RX ADMIN — Medication 20 MILLIGRAM(S): at 17:13

## 2022-09-15 RX ADMIN — Medication 1000 MILLIGRAM(S): at 17:00

## 2022-09-15 RX ADMIN — Medication 2: at 11:57

## 2022-09-15 RX ADMIN — Medication 100 MILLIGRAM(S): at 05:19

## 2022-09-15 RX ADMIN — SODIUM CHLORIDE 1000 MILLILITER(S): 9 INJECTION, SOLUTION INTRAVENOUS at 21:22

## 2022-09-15 NOTE — PROVIDER CONTACT NOTE (CHANGE IN STATUS NOTIFICATION) - BACKGROUND
Came to the ED with abdominal pain. Postop day one : incarcerated hernia repair with small bowel resection.

## 2022-09-15 NOTE — PROGRESS NOTE ADULT - SUBJECTIVE AND OBJECTIVE BOX
ON: Admitted to SICU intubated in PACU. Post op LA 1.6. ABG WNL    SUBJECTIVE:  Pt on ventilator this morning, sedation weaned, following commands, tolerating CPAP and was extubated.   AM X Ray showed NG Tube past pylorus, so tube was pulled back, then removed later this morning. PPI was discontinued.   Starting SQL today  PT ordered and will encourage pt to get out of bed.   No acute complaints.     MEDICATIONS  (STANDING):  BUpivacaine 0.5% On-Q Pump 400 milliLiter(s) (4 mL/Hr) IntraDermal. <Continuous>  cefTRIAXone   IVPB 1000 milliGRAM(s) IV Intermittent every 24 hours  chlorhexidine 0.12% Liquid 15 milliLiter(s) Oral Mucosa every 12 hours  chlorhexidine 2% Cloths 1 Application(s) Topical daily  dextrose 5%. 1000 milliLiter(s) (100 mL/Hr) IV Continuous <Continuous>  dextrose 5%. 1000 milliLiter(s) (50 mL/Hr) IV Continuous <Continuous>  dextrose 50% Injectable 25 Gram(s) IV Push once  dextrose 50% Injectable 12.5 Gram(s) IV Push once  dextrose 50% Injectable 25 Gram(s) IV Push once  enoxaparin Injectable 40 milliGRAM(s) SubCutaneous every 24 hours  glucagon  Injectable 1 milliGRAM(s) IntraMuscular once  influenza  Vaccine (HIGH DOSE) 0.7 milliLiter(s) IntraMuscular once  insulin lispro (ADMELOG) corrective regimen sliding scale   SubCutaneous Before meals and at bedtime  lactated ringers. 1000 milliLiter(s) (120 mL/Hr) IV Continuous <Continuous>  metroNIDAZOLE  IVPB      metroNIDAZOLE  IVPB 500 milliGRAM(s) IV Intermittent every 8 hours    MEDICATIONS  (PRN):  dextrose Oral Gel 15 Gram(s) Oral once PRN Blood Glucose LESS THAN 70 milliGRAM(s)/deciliter  ondansetron Injectable 4 milliGRAM(s) IV Push every 8 hours PRN Nausea and/or Vomiting      Drips:     ICU Vital Signs Last 24 Hrs  T(C): 36.1 (15 Sep 2022 07:55), Max: 36.7 (15 Sep 2022 02:15)  T(F): 97 (15 Sep 2022 07:55), Max: 98 (15 Sep 2022 02:15)  HR: 72 (15 Sep 2022 12:00) (66 - 81)  BP: 126/60 (15 Sep 2022 12:00) (84/55 - 166/65)  BP(mean): 86 (15 Sep 2022 12:00) (63 - 127)  ABP: --  ABP(mean): --  RR: 21 (15 Sep 2022 12:00) (11 - 29)  SpO2: 100% (15 Sep 2022 12:00) (98% - 100%)    O2 Parameters below as of 15 Sep 2022 12:00  Patient On (Oxygen Delivery Method): nasal cannula  O2 Flow (L/min): 6          Physical Exam:  General: AAOx3, NAD, following commands   HEENT: NC/AT, EOMI, PERRLA, normal hearing, no oral lesions, neck supple w/o LAD  Pulmonary: Nonlabored breathing, no respiratory distress, CTA-B  Cardiovascular: NSR, normal s1 s2, no s3 s4, no murmurs  Abdominal: soft, non distended, appropriately tender, midline vac, RLQ SHOSHANA, NG Tube removed  : velarde in place  Extremities: WWP, no edema, SCDs in place   Pulses: palpable distal pulses    I&O's Summary    14 Sep 2022 07:01  -  15 Sep 2022 07:00  --------------------------------------------------------  IN: 761.2 mL / OUT: 610 mL / NET: 151.2 mL    15 Sep 2022 07:01  -  15 Sep 2022 12:50  --------------------------------------------------------  IN: 50 mL / OUT: 0 mL / NET: 50 mL        LABS:                        10.3   6.63  )-----------( 256      ( 15 Sep 2022 03:15 )             34.2     09-15    134<L>  |  105  |  14  ----------------------------<  174<H>  4.7   |  17<L>  |  1.03    Ca    7.4<L>      15 Sep 2022 03:07  Phos  2.9     09-15  Mg     1.6     09-15      PT/INR - ( 14 Sep 2022 11:33 )   PT: 13.0 sec;   INR: 1.09          PTT - ( 14 Sep 2022 11:33 )  PTT:32.7 sec    CAPILLARY BLOOD GLUCOSE      POCT Blood Glucose.: 172 mg/dL (15 Sep 2022 11:33)  POCT Blood Glucose.: 180 mg/dL (15 Sep 2022 07:34)  POCT Blood Glucose.: 154 mg/dL (14 Sep 2022 18:19)        Cultures:    RADIOLOGY & ADDITIONAL STUDIES:

## 2022-09-15 NOTE — CONSULT NOTE ADULT - NS ATTEND AMEND GEN_ALL_CORE FT
seen and d/w SICU team.    s/p exlap, SBR  kept intubatred post op-hemodynamically stable and oxygenating  Plan for SBT, probable extubation in AM  time spent 40 min

## 2022-09-15 NOTE — PROVIDER CONTACT NOTE (OTHER) - SITUATION
Team aware that patient still has no urine output. Lasix ordered and 1L of LR bolus given. At 1705, patient's urine output was 30ml/hr. No interventions at this time.

## 2022-09-15 NOTE — CONSULT NOTE ADULT - CONSULT REASON
71 yo female with PMH of HTN, DM and PSH of  x2, hysterectomy and lap dev ( w/ Dr. Clifford) presenting with 2 days of nausea/vomiting and abd pain w/ imaging findings consistent with large bowel obstruction. However, prior to operative exploration, the patient had return of bowel function. NGT removed however bowel obstruction recurred therefore pt taken to OR for Exlap JOSEFINA, SBR ( 100 cm)  EBL:200 IVF:2000 UO:250. Pt kept intubated post op and transferred to SICU team.

## 2022-09-15 NOTE — PROVIDER CONTACT NOTE (CHANGE IN STATUS NOTIFICATION) - ASSESSMENT
Afebrile. No complains of pain. HR 76, /65, RR 21, Oxygen Saturation 100% on NC 6L. Rios catheter placed in the OR. LR 125ml/hr infusion ongoing. L 18g IV placed at the hand. L 22g IV placed at the wrist - became infiltrated.

## 2022-09-15 NOTE — PROGRESS NOTE ADULT - ASSESSMENT
73 yo female with PMH of HTN, DM and PSH of  x2, hysterectomy and lap dev ( w/ Dr. Clifford) presenting with 2 days of nausea/vomiting and abd pain w/ imaging findings consistent with large bowel obstruction. However, prior to operative exploration, the patient had return of bowel function. NGT removed however bowel obstruction recurred therefore pt taken to OR for Exlap JOSEFINA, SBR ( 100 cm)  EBL:200 IVF:2000 UO:250. Pt kept intubated post op and transferred to SICU team. Now extubated 9/15.     Neuro: on Q pump with Marcaine; no narcotics, off sedation, Zofran   CV: HD stable  Goal map > 65 Cards Following   Pulm: Extubated to NC, Asymptomatic Covid +.   GI: LBO & SBO: S/p JOSEFINA and SBR.  NPO, NG tube removed,   : Rios   ID: Ceftriaxone ( 9/15-) Flagyl (9/15-) D/c: Cefotetan x1 in OR   Endo: Hx of DM: Cont ISS   PPx: SCD, SQL  Lines: PIV x 2  Wounds: Midline incision SHOSHANA x1 in SQ  + On q Pump, Prevena vac   PT/OT: Ordered  Dispo: SICU

## 2022-09-15 NOTE — PROVIDER CONTACT NOTE (CHANGE IN STATUS NOTIFICATION) - SITUATION
Notified [facility] SICU regarding no urine output from patient's arrival at 10am. 1pm SICU team at bedside called for ultrasound guided IVs, unsuccessful placing IVs. Fluids on hold due to no IV access. Awaiting for IV access by the SICU team.

## 2022-09-15 NOTE — PROVIDER CONTACT NOTE (OTHER) - RECOMMENDATIONS
SICU team ultrasound the heart and no lungs - no fluid found. Ordered lasix and another 1L of LR bolus.

## 2022-09-15 NOTE — BRIEF OPERATIVE NOTE - OPERATION/FINDINGS
Exploratory laparotomy revealing multiple abdominal wall defects with fat, transverse colon, and small bowel. Extensive sharp lysis of adhesions was undertaken, multiple serosal tears oversewn, and in the pelvis the small bowel had been sutured to the abdominal wall with previously placed proline sutures. During takedown of this bowel multiple enterotomies were made, requiring resection of 100 cm small bowel with side to side stapled anastomosis, with 150 cm small bowel remaining. Abdomen was thoroughly washed out. Due to significant contamination with bowel contents, fascia was closed primarily with PDS and definitive repair of hernias with mesh was not undertaken. SubQ faye drain x1 placed. OnQ pump placed.

## 2022-09-15 NOTE — BRIEF OPERATIVE NOTE - NSICDXBRIEFPROCEDURE_GEN_ALL_CORE_FT
PROCEDURES:  Exploratory laparotomy with lysis of adhesions 15-Sep-2022 01:41:34  Juwan Oneal  Incisional hernia repair 15-Sep-2022 01:41:58  Juwan Oneal  Small bowel resection 15-Sep-2022 01:42:38  Juwan Oneal

## 2022-09-15 NOTE — CONSULT NOTE ADULT - SUBJECTIVE AND OBJECTIVE BOX
HPI:  73 yo female with PMH of HTN, DM and PSH of  x2, hysterectomy and lap dev ( w/ Dr. Clifford) presenting with 2 days of nausea/vomiting and abd pain w/ imaging findings consistent with large bowel obstruction. However, prior to operative exploration, the patient had return of bowel function. NGT removed however bowel obstruction recurred therefore pt taken to OR for Exlap JOSEFINA, SBR ( 100 cm)  EBL:200 IVF:2000 UO:250. Pt kept intubated post op and transferred to SICU team.      PMH: HTN, DM  PSH:  x2, hysterectomy, lap dev  FHx: no cancer or IBD  Meds: Metformin 500 daily, has HTN meds that she doesn't take because they make her nauseous  Soc: No tobacco       (11 Sep 2022 21:07)      PAST MEDICAL & SURGICAL HISTORY:  Diabetes      H/O thyroid disease      S/P total abdominal hysterectomy      History of laparoscopic cholecystectomy      History of           ROS: See HPI    MEDICATIONS  (STANDING):  BUpivacaine 0.5% On-Q Pump 400 milliLiter(s) (4 mL/Hr) IntraDermal. <Continuous>  cefTRIAXone   IVPB 1000 milliGRAM(s) IV Intermittent every 24 hours  chlorhexidine 0.12% Liquid 15 milliLiter(s) Oral Mucosa every 12 hours  chlorhexidine 2% Cloths 1 Application(s) Topical daily  dextrose 5%. 1000 milliLiter(s) (100 mL/Hr) IV Continuous <Continuous>  dextrose 5%. 1000 milliLiter(s) (50 mL/Hr) IV Continuous <Continuous>  dextrose 50% Injectable 25 Gram(s) IV Push once  dextrose 50% Injectable 12.5 Gram(s) IV Push once  dextrose 50% Injectable 25 Gram(s) IV Push once  fentaNYL   Infusion. 0.5 MICROgram(s)/kG/Hr (4.61 mL/Hr) IV Continuous <Continuous>  glucagon  Injectable 1 milliGRAM(s) IntraMuscular once  influenza  Vaccine (HIGH DOSE) 0.7 milliLiter(s) IntraMuscular once  insulin lispro (ADMELOG) corrective regimen sliding scale   SubCutaneous Before meals and at bedtime  lactated ringers. 1000 milliLiter(s) (120 mL/Hr) IV Continuous <Continuous>  metroNIDAZOLE  IVPB      metroNIDAZOLE  IVPB 500 milliGRAM(s) IV Intermittent once  metroNIDAZOLE  IVPB 500 milliGRAM(s) IV Intermittent every 8 hours  pantoprazole  Injectable 40 milliGRAM(s) IV Push daily  propofol Infusion 9.048 MICROgram(s)/kG/Min (5 mL/Hr) IV Continuous <Continuous>    MEDICATIONS  (PRN):  dextrose Oral Gel 15 Gram(s) Oral once PRN Blood Glucose LESS THAN 70 milliGRAM(s)/deciliter  ondansetron Injectable 4 milliGRAM(s) IV Push every 8 hours PRN Nausea and/or Vomiting      Allergies    penicillin (Rash)    Intolerances        SOCIAL HISTORY:  Smoke: Never Smoker  EtOH: occasional    FAMILY HISTORY:  No pertinent family history in first degree relatives        Vital Signs Last 24 Hrs  T(C): 36.4 (14 Sep 2022 16:24), Max: 36.6 (14 Sep 2022 05:02)  T(F): 97.6 (14 Sep 2022 16:24), Max: 97.9 (14 Sep 2022 05:02)  HR: 71 (15 Sep 2022 02:05) (65 - 77)  BP: 155/65 (14 Sep 2022 20:26) (139/64 - 166/65)  BP(mean): --  RR: 14 (15 Sep 2022 02:05) (14 - 18)  SpO2: 100% (15 Sep 2022 02:05) (98% - 100%)    Parameters below as of 15 Sep 2022 02:05  Patient On (Oxygen Delivery Method): ventilator    O2 Concentration (%): 100    PHYSICAL EXAM    Gen: NAD sedated intubated   Neuro: Sedated & intubated Pupils reactive to light  CV: RRR reg s1s2 no M  Pulm: CTA B/L no w/w/r  Abd: Soft, + Midline incision with prevena wound vac in place + Rt LQ SHOSHANA ss, + RUQ on- Q pump in place Port sites c/d/i   Ext: No C/C/E warm well perfused  Skin: No rashes erythema or ecchymosis  MSK: No joint swelling noted  Psych: Unable to assess    LABS:                        9.9    6.04  )-----------( 255      ( 14 Sep 2022 11:33 )             33.1     09-14    137  |  103  |  17  ----------------------------<  139<H>  3.8   |  24  |  1.12    Ca    8.5      14 Sep 2022 11:33  Phos  3.3     09-14  Mg     2.2     09-14      PT/INR - ( 14 Sep 2022 11:33 )   PT: 13.0 sec;   INR: 1.09          PTT - ( 14 Sep 2022 11:33 )  PTT:32.7 sec      RADIOLOGY & ADDITIONAL STUDIES:    Assessment and Plan:  73 yo female with PMH of HTN, DM and PSH of  x2, hysterectomy and lap dev ( w/ Dr. Clifford) presenting with 2 days of nausea/vomiting and abd pain w/ imaging findings consistent with large bowel obstruction. However, prior to operative exploration, the patient had return of bowel function. NGT removed however bowel obstruction recurred therefore pt taken to OR for Exlap JOSEFINA, SBR ( 100 cm)  EBL:200 IVF:2000 UO:250. Pt kept intubated post op and transferred to SICU team.    Neuro: Propofol fentanyl on Q pump with Marcaine wean sedation in am for Sedation holiday/SBT, Zofran   CV: HD stable  Goal map > 65 Cards Following Cont LR @120  Pulm: Pt kept intubated post op Satting well on 40%/ 470/12/5 F/u CXR, will wean to Extubate Asymptomatic Covid +.   GI: Large Bowel obstruction & SBO: S/p SBR and JOSEFINA.  NPO NGT F/u xray for placement PPI   : Rios for strict I/O while intubated.   ID: Ceftriaxone ( 9/15-) Flagyl( 9/15-) Cefotetan x1 in OR   Endo: Hx of DM: Cont ISS   PPx: SCD  SQL on hold 2* bleeding risk  Lines: PIV x2  Wounds: Midline incision SHOSHANA x1 in SQ  + On q Pump, Prevena vac   PT/OT: Not ordered strict bed rest  Dispo: SICU

## 2022-09-16 LAB
ANION GAP SERPL CALC-SCNC: 12 MMOL/L — SIGNIFICANT CHANGE UP (ref 5–17)
BUN SERPL-MCNC: 25 MG/DL — HIGH (ref 7–23)
CALCIUM SERPL-MCNC: 7.7 MG/DL — LOW (ref 8.4–10.5)
CHLORIDE SERPL-SCNC: 104 MMOL/L — SIGNIFICANT CHANGE UP (ref 96–108)
CO2 SERPL-SCNC: 19 MMOL/L — LOW (ref 22–31)
CREAT SERPL-MCNC: 1.9 MG/DL — HIGH (ref 0.5–1.3)
EGFR: 28 ML/MIN/1.73M2 — LOW
GLUCOSE BLDC GLUCOMTR-MCNC: 116 MG/DL — HIGH (ref 70–99)
GLUCOSE BLDC GLUCOMTR-MCNC: 117 MG/DL — HIGH (ref 70–99)
GLUCOSE BLDC GLUCOMTR-MCNC: 121 MG/DL — HIGH (ref 70–99)
GLUCOSE BLDC GLUCOMTR-MCNC: 128 MG/DL — HIGH (ref 70–99)
GLUCOSE SERPL-MCNC: 133 MG/DL — HIGH (ref 70–99)
HCT VFR BLD CALC: 24.4 % — LOW (ref 34.5–45)
HGB BLD-MCNC: 7.7 G/DL — LOW (ref 11.5–15.5)
MAGNESIUM SERPL-MCNC: 2.1 MG/DL — SIGNIFICANT CHANGE UP (ref 1.6–2.6)
MCHC RBC-ENTMCNC: 27 PG — SIGNIFICANT CHANGE UP (ref 27–34)
MCHC RBC-ENTMCNC: 31.6 GM/DL — LOW (ref 32–36)
MCV RBC AUTO: 85.6 FL — SIGNIFICANT CHANGE UP (ref 80–100)
NRBC # BLD: 0 /100 WBCS — SIGNIFICANT CHANGE UP (ref 0–0)
PHOSPHATE SERPL-MCNC: 3.6 MG/DL — SIGNIFICANT CHANGE UP (ref 2.5–4.5)
PLATELET # BLD AUTO: 257 K/UL — SIGNIFICANT CHANGE UP (ref 150–400)
POTASSIUM SERPL-MCNC: 4.7 MMOL/L — SIGNIFICANT CHANGE UP (ref 3.5–5.3)
POTASSIUM SERPL-SCNC: 4.7 MMOL/L — SIGNIFICANT CHANGE UP (ref 3.5–5.3)
RBC # BLD: 2.85 M/UL — LOW (ref 3.8–5.2)
RBC # FLD: 14.8 % — HIGH (ref 10.3–14.5)
SODIUM SERPL-SCNC: 135 MMOL/L — SIGNIFICANT CHANGE UP (ref 135–145)
WBC # BLD: 20.24 K/UL — HIGH (ref 3.8–10.5)
WBC # FLD AUTO: 20.24 K/UL — HIGH (ref 3.8–10.5)

## 2022-09-16 PROCEDURE — 99233 SBSQ HOSP IP/OBS HIGH 50: CPT | Mod: GC

## 2022-09-16 RX ORDER — CEFTRIAXONE 500 MG/1
2000 INJECTION, POWDER, FOR SOLUTION INTRAMUSCULAR; INTRAVENOUS EVERY 24 HOURS
Refills: 0 | Status: DISCONTINUED | OUTPATIENT
Start: 2022-09-16 | End: 2022-09-16

## 2022-09-16 RX ORDER — ACETAMINOPHEN 500 MG
1000 TABLET ORAL ONCE
Refills: 0 | Status: COMPLETED | OUTPATIENT
Start: 2022-09-16 | End: 2022-09-16

## 2022-09-16 RX ORDER — MEROPENEM 1 G/30ML
1000 INJECTION INTRAVENOUS EVERY 12 HOURS
Refills: 0 | Status: ACTIVE | OUTPATIENT
Start: 2022-09-16 | End: 2022-09-21

## 2022-09-16 RX ORDER — LABETALOL HCL 100 MG
10 TABLET ORAL ONCE
Refills: 0 | Status: COMPLETED | OUTPATIENT
Start: 2022-09-16 | End: 2022-09-16

## 2022-09-16 RX ORDER — HYDROMORPHONE HYDROCHLORIDE 2 MG/ML
0.5 INJECTION INTRAMUSCULAR; INTRAVENOUS; SUBCUTANEOUS ONCE
Refills: 0 | Status: DISCONTINUED | OUTPATIENT
Start: 2022-09-16 | End: 2022-09-16

## 2022-09-16 RX ORDER — SIMETHICONE 80 MG/1
80 TABLET, CHEWABLE ORAL ONCE
Refills: 0 | Status: COMPLETED | OUTPATIENT
Start: 2022-09-16 | End: 2022-09-16

## 2022-09-16 RX ORDER — LANOLIN ALCOHOL/MO/W.PET/CERES
3 CREAM (GRAM) TOPICAL AT BEDTIME
Refills: 0 | Status: DISCONTINUED | OUTPATIENT
Start: 2022-09-16 | End: 2022-09-19

## 2022-09-16 RX ORDER — ACETAMINOPHEN 500 MG
1000 TABLET ORAL ONCE
Refills: 0 | Status: DISCONTINUED | OUTPATIENT
Start: 2022-09-16 | End: 2022-09-16

## 2022-09-16 RX ORDER — HEPARIN SODIUM 5000 [USP'U]/ML
5000 INJECTION INTRAVENOUS; SUBCUTANEOUS EVERY 8 HOURS
Refills: 0 | Status: DISCONTINUED | OUTPATIENT
Start: 2022-09-16 | End: 2022-09-17

## 2022-09-16 RX ORDER — INSULIN LISPRO 100/ML
VIAL (ML) SUBCUTANEOUS EVERY 6 HOURS
Refills: 0 | Status: DISCONTINUED | OUTPATIENT
Start: 2022-09-16 | End: 2022-09-18

## 2022-09-16 RX ADMIN — SODIUM CHLORIDE 150 MILLILITER(S): 9 INJECTION, SOLUTION INTRAVENOUS at 20:34

## 2022-09-16 RX ADMIN — SODIUM CHLORIDE 150 MILLILITER(S): 9 INJECTION, SOLUTION INTRAVENOUS at 02:46

## 2022-09-16 RX ADMIN — HEPARIN SODIUM 5000 UNIT(S): 5000 INJECTION INTRAVENOUS; SUBCUTANEOUS at 14:26

## 2022-09-16 RX ADMIN — CEFTRIAXONE 100 MILLIGRAM(S): 500 INJECTION, POWDER, FOR SOLUTION INTRAMUSCULAR; INTRAVENOUS at 10:13

## 2022-09-16 RX ADMIN — SIMETHICONE 80 MILLIGRAM(S): 80 TABLET, CHEWABLE ORAL at 11:12

## 2022-09-16 RX ADMIN — Medication 10 MILLIGRAM(S): at 12:49

## 2022-09-16 RX ADMIN — ONDANSETRON 4 MILLIGRAM(S): 8 TABLET, FILM COATED ORAL at 17:04

## 2022-09-16 RX ADMIN — Medication 3 MILLIGRAM(S): at 00:58

## 2022-09-16 RX ADMIN — Medication 400 MILLIGRAM(S): at 08:20

## 2022-09-16 RX ADMIN — ONDANSETRON 4 MILLIGRAM(S): 8 TABLET, FILM COATED ORAL at 04:18

## 2022-09-16 RX ADMIN — Medication 400 MILLIGRAM(S): at 20:34

## 2022-09-16 RX ADMIN — CHLORHEXIDINE GLUCONATE 1 APPLICATION(S): 213 SOLUTION TOPICAL at 12:09

## 2022-09-16 RX ADMIN — HEPARIN SODIUM 5000 UNIT(S): 5000 INJECTION INTRAVENOUS; SUBCUTANEOUS at 22:34

## 2022-09-16 RX ADMIN — Medication 1000 MILLIGRAM(S): at 09:00

## 2022-09-16 RX ADMIN — HYDROMORPHONE HYDROCHLORIDE 0.5 MILLIGRAM(S): 2 INJECTION INTRAMUSCULAR; INTRAVENOUS; SUBCUTANEOUS at 02:45

## 2022-09-16 RX ADMIN — MEROPENEM 100 MILLIGRAM(S): 1 INJECTION INTRAVENOUS at 15:04

## 2022-09-16 RX ADMIN — Medication 1000 MILLIGRAM(S): at 15:08

## 2022-09-16 RX ADMIN — Medication 1000 MILLIGRAM(S): at 20:50

## 2022-09-16 RX ADMIN — HYDROMORPHONE HYDROCHLORIDE 0.5 MILLIGRAM(S): 2 INJECTION INTRAMUSCULAR; INTRAVENOUS; SUBCUTANEOUS at 02:18

## 2022-09-16 RX ADMIN — Medication 100 MILLIGRAM(S): at 05:26

## 2022-09-16 RX ADMIN — Medication 400 MILLIGRAM(S): at 14:26

## 2022-09-16 NOTE — PROGRESS NOTE ADULT - SUBJECTIVE AND OBJECTIVE BOX
ON: Urine Na 103, FEUre >1902.7% intrinsic, pain gave tylenol and later dilaudid 0.5, low UOP - 1L bolus, Bladder scan 0,    SUBJECTIVE:  Pt is POD2 from Ex Lap with JOSEFINA and SBR c/b SO.   This morning, she complains of generalized abdominal pain, improving with iv tylenol. Had mild nausea, improved with zofran, no vomiting.   Pt was making good urine overnight, >50 cc every hour. Creatinine downtrending 1.9 (from 2.26). Has LR running at 150 cc/hr. AFVSS.       MEDICATIONS  (STANDING):  acetaminophen   IVPB .. 1000 milliGRAM(s) IV Intermittent once  acetaminophen   IVPB .. 1000 milliGRAM(s) IV Intermittent once  BUpivacaine 0.5% On-Q Pump 400 milliLiter(s) (4 mL/Hr) IntraDermal. <Continuous>  cefTRIAXone   IVPB 2000 milliGRAM(s) IV Intermittent every 24 hours  chlorhexidine 2% Cloths 1 Application(s) Topical daily  dextrose 5%. 1000 milliLiter(s) (100 mL/Hr) IV Continuous <Continuous>  dextrose 5%. 1000 milliLiter(s) (50 mL/Hr) IV Continuous <Continuous>  dextrose 50% Injectable 25 Gram(s) IV Push once  dextrose 50% Injectable 12.5 Gram(s) IV Push once  dextrose 50% Injectable 25 Gram(s) IV Push once  glucagon  Injectable 1 milliGRAM(s) IntraMuscular once  heparin   Injectable 5000 Unit(s) SubCutaneous every 8 hours  influenza  Vaccine (HIGH DOSE) 0.7 milliLiter(s) IntraMuscular once  insulin lispro (ADMELOG) corrective regimen sliding scale   SubCutaneous every 6 hours  lactated ringers. 1000 milliLiter(s) (150 mL/Hr) IV Continuous <Continuous>  melatonin 3 milliGRAM(s) Oral at bedtime  metroNIDAZOLE  IVPB      metroNIDAZOLE  IVPB 500 milliGRAM(s) IV Intermittent every 8 hours    MEDICATIONS  (PRN):  dextrose Oral Gel 15 Gram(s) Oral once PRN Blood Glucose LESS THAN 70 milliGRAM(s)/deciliter  ondansetron Injectable 4 milliGRAM(s) IV Push every 8 hours PRN Nausea and/or Vomiting      Drips:     ICU Vital Signs Last 24 Hrs  T(C): 37.4 (16 Sep 2022 11:00), Max: 37.4 (16 Sep 2022 11:00)  T(F): 99.4 (16 Sep 2022 11:00), Max: 99.4 (16 Sep 2022 11:00)  HR: 98 (16 Sep 2022 12:08) (80 - 102)  BP: 174/77 (16 Sep 2022 12:08) (127/61 - 185/79)  BP(mean): 111 (16 Sep 2022 12:08) (68 - 113)  ABP: --  ABP(mean): --  RR: 19 (16 Sep 2022 12:08) (10 - 28)  SpO2: 98% (16 Sep 2022 12:08) (97% - 100%)    O2 Parameters below as of 16 Sep 2022 12:08  Patient On (Oxygen Delivery Method): room air            Physical Exam:  General: AAOx3, NAD  HEENT: EOMI, mucus membranes moist   Pulmonary: Nonlabored breathing, no respiratory distress, CTA-B on RA   Cardiovascular: NSR, normal s1 s2, no s3 s4, no murmurs  Abdominal: soft, non distended, appropriate incisional tender, binder in place, vac  Extremities: WWP, SCDs   Pulses: radial and DP pulses 2+ b/l     Lines/tubes/drains: rectal tube, RLQ drain  Rios: urine becoming less concentrated in color     Vent settings:      I&O's Summary    15 Sep 2022 07:01  -  16 Sep 2022 07:00  --------------------------------------------------------  IN: 6560 mL / OUT: 1675 mL / NET: 4885 mL    16 Sep 2022 07:01  -  16 Sep 2022 13:22  --------------------------------------------------------  IN: 900 mL / OUT: 370 mL / NET: 530 mL        LABS:                        7.7    20.24 )-----------( 257      ( 16 Sep 2022 05:26 )             24.4     09-16    135  |  104  |  25<H>  ----------------------------<  133<H>  4.7   |  19<L>  |  1.90<H>    Ca    7.7<L>      16 Sep 2022 05:26  Phos  3.6     09-16  Mg     2.1     09-16        Urinalysis Basic - ( 15 Sep 2022 19:44 )    Color: Yellow / Appearance: Clear / SG: <=1.005 / pH: x  Gluc: x / Ketone: NEGATIVE  / Bili: Negative / Urobili: 0.2 E.U./dL   Blood: x / Protein: NEGATIVE mg/dL / Nitrite: NEGATIVE   Leuk Esterase: NEGATIVE / RBC: < 5 /HPF / WBC < 5 /HPF   Sq Epi: x / Non Sq Epi: x / Bacteria: Present /HPF      CAPILLARY BLOOD GLUCOSE      POCT Blood Glucose.: 121 mg/dL (16 Sep 2022 12:02)  POCT Blood Glucose.: 128 mg/dL (16 Sep 2022 06:11)  POCT Blood Glucose.: 156 mg/dL (15 Sep 2022 21:25)  POCT Blood Glucose.: 178 mg/dL (15 Sep 2022 17:19)        Cultures:    RADIOLOGY & ADDITIONAL STUDIES:

## 2022-09-16 NOTE — PROGRESS NOTE ADULT - SUBJECTIVE AND OBJECTIVE BOX
Infectious Diseases Anti-infective Approval Note    Medication: Meropenem  Dose:1gr  Route:IV  Frequency:q12h  Duration:7 days  *THIS IS NOT AN INFECTIOUS DISEASES CONSULTATION*

## 2022-09-16 NOTE — PROGRESS NOTE ADULT - ASSESSMENT
71 yo female with PMH of HTN, DM and PSH of  x2, hysterectomy and lap dev ( w/ Dr. Clifford) presenting with 2 days of nausea/vomiting and abd pain w/ imaging findings consistent with large bowel obstruction. However, prior to operative exploration, the patient had return of bowel function. NGT removed however bowel obstruction recurred therefore pt taken to OR for Exlap JOSEFINA, SBR ( 100 cm)  EBL:200 IVF:2000 UO:250. Pt kept intubated post op and transferred to SICU team. Now extubated 9/15. c/b SO    Neuro: on Q pump with Marcaine; tylenol, avoid narcotics, off sedation, Zofran   CV: HD stable  Goal map > 65 Cards Following   Pulm: Extubated to NC, Asymptomatic Covid +.   GI: LBO & SBO: S/p JOSEFINA and SBR.  NPO, NG tube removed,   : Rios, SO, Cr downtrending   ID: Ceftriaxone ( 9/15-) Flagyl (9/15-) D/c: Cefotetan x1 in OR   Endo: Hx of DM: Cont ISS   PPx: SCD, SQH  Lines: PIV x 2  Wounds: Midline incision SHOSHANA x1 in SQ  + On q Pump, Prevena vac   PT/OT: Ordered  Dispo: SDU

## 2022-09-17 LAB
ANION GAP SERPL CALC-SCNC: 11 MMOL/L — SIGNIFICANT CHANGE UP (ref 5–17)
ANION GAP SERPL CALC-SCNC: 9 MMOL/L — SIGNIFICANT CHANGE UP (ref 5–17)
APPEARANCE UR: CLEAR — SIGNIFICANT CHANGE UP
BACTERIA # UR AUTO: SIGNIFICANT CHANGE UP /HPF
BASE EXCESS BLDV CALC-SCNC: 0.9 MMOL/L — SIGNIFICANT CHANGE UP (ref -2–3)
BILIRUB UR-MCNC: NEGATIVE — SIGNIFICANT CHANGE UP
BUN SERPL-MCNC: 18 MG/DL — SIGNIFICANT CHANGE UP (ref 7–23)
BUN SERPL-MCNC: 19 MG/DL — SIGNIFICANT CHANGE UP (ref 7–23)
CALCIUM SERPL-MCNC: 8.1 MG/DL — LOW (ref 8.4–10.5)
CALCIUM SERPL-MCNC: 8.4 MG/DL — SIGNIFICANT CHANGE UP (ref 8.4–10.5)
CHLORIDE SERPL-SCNC: 102 MMOL/L — SIGNIFICANT CHANGE UP (ref 96–108)
CHLORIDE SERPL-SCNC: 106 MMOL/L — SIGNIFICANT CHANGE UP (ref 96–108)
CO2 BLDV-SCNC: 25 MMOL/L — SIGNIFICANT CHANGE UP (ref 22–26)
CO2 SERPL-SCNC: 24 MMOL/L — SIGNIFICANT CHANGE UP (ref 22–31)
CO2 SERPL-SCNC: 25 MMOL/L — SIGNIFICANT CHANGE UP (ref 22–31)
COLOR SPEC: YELLOW — SIGNIFICANT CHANGE UP
CREAT SERPL-MCNC: 0.93 MG/DL — SIGNIFICANT CHANGE UP (ref 0.5–1.3)
CREAT SERPL-MCNC: 1.07 MG/DL — SIGNIFICANT CHANGE UP (ref 0.5–1.3)
DIFF PNL FLD: ABNORMAL
EGFR: 56 ML/MIN/1.73M2 — LOW
EGFR: 66 ML/MIN/1.73M2 — SIGNIFICANT CHANGE UP
EPI CELLS # UR: SIGNIFICANT CHANGE UP /HPF (ref 0–5)
GLUCOSE BLDC GLUCOMTR-MCNC: 109 MG/DL — HIGH (ref 70–99)
GLUCOSE BLDC GLUCOMTR-MCNC: 87 MG/DL — SIGNIFICANT CHANGE UP (ref 70–99)
GLUCOSE BLDC GLUCOMTR-MCNC: 92 MG/DL — SIGNIFICANT CHANGE UP (ref 70–99)
GLUCOSE BLDC GLUCOMTR-MCNC: 97 MG/DL — SIGNIFICANT CHANGE UP (ref 70–99)
GLUCOSE SERPL-MCNC: 101 MG/DL — HIGH (ref 70–99)
GLUCOSE SERPL-MCNC: 88 MG/DL — SIGNIFICANT CHANGE UP (ref 70–99)
GLUCOSE UR QL: NEGATIVE — SIGNIFICANT CHANGE UP
HCO3 BLDV-SCNC: 24 MMOL/L — SIGNIFICANT CHANGE UP (ref 22–29)
HCT VFR BLD CALC: 20.8 % — CRITICAL LOW (ref 34.5–45)
HCT VFR BLD CALC: 20.9 % — CRITICAL LOW (ref 34.5–45)
HCT VFR BLD CALC: 26.7 % — LOW (ref 34.5–45)
HGB BLD-MCNC: 6.4 G/DL — CRITICAL LOW (ref 11.5–15.5)
HGB BLD-MCNC: 6.6 G/DL — CRITICAL LOW (ref 11.5–15.5)
HGB BLD-MCNC: 8.6 G/DL — LOW (ref 11.5–15.5)
INR BLD: 1.26 — HIGH (ref 0.88–1.16)
KETONES UR-MCNC: 15 MG/DL
LACTATE SERPL-SCNC: 1.1 MMOL/L — SIGNIFICANT CHANGE UP (ref 0.5–2)
LEUKOCYTE ESTERASE UR-ACNC: NEGATIVE — SIGNIFICANT CHANGE UP
MAGNESIUM SERPL-MCNC: 2.1 MG/DL — SIGNIFICANT CHANGE UP (ref 1.6–2.6)
MAGNESIUM SERPL-MCNC: 2.2 MG/DL — SIGNIFICANT CHANGE UP (ref 1.6–2.6)
MCHC RBC-ENTMCNC: 26.3 PG — LOW (ref 27–34)
MCHC RBC-ENTMCNC: 26.8 PG — LOW (ref 27–34)
MCHC RBC-ENTMCNC: 27.5 PG — SIGNIFICANT CHANGE UP (ref 27–34)
MCHC RBC-ENTMCNC: 30.8 GM/DL — LOW (ref 32–36)
MCHC RBC-ENTMCNC: 31.6 GM/DL — LOW (ref 32–36)
MCHC RBC-ENTMCNC: 32.2 GM/DL — SIGNIFICANT CHANGE UP (ref 32–36)
MCV RBC AUTO: 85 FL — SIGNIFICANT CHANGE UP (ref 80–100)
MCV RBC AUTO: 85.3 FL — SIGNIFICANT CHANGE UP (ref 80–100)
MCV RBC AUTO: 85.6 FL — SIGNIFICANT CHANGE UP (ref 80–100)
NITRITE UR-MCNC: NEGATIVE — SIGNIFICANT CHANGE UP
NRBC # BLD: 0 /100 WBCS — SIGNIFICANT CHANGE UP (ref 0–0)
PCO2 BLDV: 33 MMHG — LOW (ref 39–42)
PH BLDV: 7.47 — HIGH (ref 7.32–7.43)
PH UR: 6.5 — SIGNIFICANT CHANGE UP (ref 5–8)
PHOSPHATE SERPL-MCNC: 2.2 MG/DL — LOW (ref 2.5–4.5)
PHOSPHATE SERPL-MCNC: 2.6 MG/DL — SIGNIFICANT CHANGE UP (ref 2.5–4.5)
PLATELET # BLD AUTO: 259 K/UL — SIGNIFICANT CHANGE UP (ref 150–400)
PLATELET # BLD AUTO: 273 K/UL — SIGNIFICANT CHANGE UP (ref 150–400)
PLATELET # BLD AUTO: 286 K/UL — SIGNIFICANT CHANGE UP (ref 150–400)
PO2 BLDV: 49 MMHG — HIGH (ref 25–45)
POTASSIUM SERPL-MCNC: 3.9 MMOL/L — SIGNIFICANT CHANGE UP (ref 3.5–5.3)
POTASSIUM SERPL-MCNC: 4.4 MMOL/L — SIGNIFICANT CHANGE UP (ref 3.5–5.3)
POTASSIUM SERPL-SCNC: 3.9 MMOL/L — SIGNIFICANT CHANGE UP (ref 3.5–5.3)
POTASSIUM SERPL-SCNC: 4.4 MMOL/L — SIGNIFICANT CHANGE UP (ref 3.5–5.3)
PROT UR-MCNC: ABNORMAL MG/DL
PROTHROM AB SERPL-ACNC: 15 SEC — HIGH (ref 10.5–13.4)
RBC # BLD: 2.43 M/UL — LOW (ref 3.8–5.2)
RBC # BLD: 2.46 M/UL — LOW (ref 3.8–5.2)
RBC # BLD: 3.13 M/UL — LOW (ref 3.8–5.2)
RBC # FLD: 14.7 % — HIGH (ref 10.3–14.5)
RBC # FLD: 14.8 % — HIGH (ref 10.3–14.5)
RBC # FLD: 14.8 % — HIGH (ref 10.3–14.5)
RBC CASTS # UR COMP ASSIST: < 5 /HPF — SIGNIFICANT CHANGE UP
SAO2 % BLDV: 83.9 % — SIGNIFICANT CHANGE UP (ref 67–88)
SODIUM SERPL-SCNC: 138 MMOL/L — SIGNIFICANT CHANGE UP (ref 135–145)
SODIUM SERPL-SCNC: 139 MMOL/L — SIGNIFICANT CHANGE UP (ref 135–145)
SP GR SPEC: 1.01 — SIGNIFICANT CHANGE UP (ref 1–1.03)
UROBILINOGEN FLD QL: 0.2 E.U./DL — SIGNIFICANT CHANGE UP
WBC # BLD: 19.38 K/UL — HIGH (ref 3.8–10.5)
WBC # BLD: 20 K/UL — HIGH (ref 3.8–10.5)
WBC # BLD: 21.52 K/UL — HIGH (ref 3.8–10.5)
WBC # FLD AUTO: 19.38 K/UL — HIGH (ref 3.8–10.5)
WBC # FLD AUTO: 20 K/UL — HIGH (ref 3.8–10.5)
WBC # FLD AUTO: 21.52 K/UL — HIGH (ref 3.8–10.5)
WBC UR QL: < 5 /HPF — SIGNIFICANT CHANGE UP

## 2022-09-17 PROCEDURE — 99233 SBSQ HOSP IP/OBS HIGH 50: CPT

## 2022-09-17 RX ORDER — NIFEDIPINE 30 MG
60 TABLET, EXTENDED RELEASE 24 HR ORAL EVERY 24 HOURS
Refills: 0 | Status: DISCONTINUED | OUTPATIENT
Start: 2022-09-17 | End: 2022-10-21

## 2022-09-17 RX ORDER — LABETALOL HCL 100 MG
20 TABLET ORAL ONCE
Refills: 0 | Status: COMPLETED | OUTPATIENT
Start: 2022-09-17 | End: 2022-09-17

## 2022-09-17 RX ORDER — LABETALOL HCL 100 MG
40 TABLET ORAL ONCE
Refills: 0 | Status: COMPLETED | OUTPATIENT
Start: 2022-09-17 | End: 2022-09-17

## 2022-09-17 RX ORDER — LABETALOL HCL 100 MG
200 TABLET ORAL EVERY 12 HOURS
Refills: 0 | Status: DISCONTINUED | OUTPATIENT
Start: 2022-09-17 | End: 2022-10-04

## 2022-09-17 RX ORDER — POTASSIUM PHOSPHATE, MONOBASIC POTASSIUM PHOSPHATE, DIBASIC 236; 224 MG/ML; MG/ML
15 INJECTION, SOLUTION INTRAVENOUS ONCE
Refills: 0 | Status: COMPLETED | OUTPATIENT
Start: 2022-09-17 | End: 2022-09-18

## 2022-09-17 RX ORDER — MEROPENEM 1 G/30ML
1000 INJECTION INTRAVENOUS EVERY 8 HOURS
Refills: 0 | Status: DISCONTINUED | OUTPATIENT
Start: 2022-09-17 | End: 2022-09-23

## 2022-09-17 RX ORDER — LABETALOL HCL 100 MG
10 TABLET ORAL ONCE
Refills: 0 | Status: COMPLETED | OUTPATIENT
Start: 2022-09-17 | End: 2022-09-17

## 2022-09-17 RX ADMIN — Medication 3 MILLIGRAM(S): at 22:55

## 2022-09-17 RX ADMIN — ONDANSETRON 4 MILLIGRAM(S): 8 TABLET, FILM COATED ORAL at 02:15

## 2022-09-17 RX ADMIN — Medication 60 MILLIGRAM(S): at 13:56

## 2022-09-17 RX ADMIN — HEPARIN SODIUM 5000 UNIT(S): 5000 INJECTION INTRAVENOUS; SUBCUTANEOUS at 05:04

## 2022-09-17 RX ADMIN — MEROPENEM 100 MILLIGRAM(S): 1 INJECTION INTRAVENOUS at 05:04

## 2022-09-17 RX ADMIN — Medication 10 MILLIGRAM(S): at 12:01

## 2022-09-17 RX ADMIN — MEROPENEM 100 MILLIGRAM(S): 1 INJECTION INTRAVENOUS at 21:51

## 2022-09-17 RX ADMIN — Medication 40 MILLIGRAM(S): at 16:38

## 2022-09-17 RX ADMIN — Medication 20 MILLIGRAM(S): at 12:33

## 2022-09-17 RX ADMIN — SODIUM CHLORIDE 150 MILLILITER(S): 9 INJECTION, SOLUTION INTRAVENOUS at 05:06

## 2022-09-17 RX ADMIN — Medication 200 MILLIGRAM(S): at 17:52

## 2022-09-17 RX ADMIN — Medication 4 MILLILITER(S): at 22:45

## 2022-09-17 NOTE — PROGRESS NOTE ADULT - SUBJECTIVE AND OBJECTIVE BOX
Pt seen and examined by me at bedside this AM  reports +chest pain/sob during transfusion. transfusion was stopped prior to me examining the patient  LUE excess established    VSS, SBP 200s, HR 100s, 100%RA  comfortable   +tachy  +decreased BS on bases   +BS, +surgical wound     labs reviewed

## 2022-09-17 NOTE — CONSULT NOTE ADULT - SUBJECTIVE AND OBJECTIVE BOX
SICU Consult Note    HPI:  71 yo female with PMH of HTN, DM and PSH of  x2, hysterectomy and lap dev ( w/ Dr. Clifford) presenting with 2 days of nausea/vomiting and abd pain. Patient reports that she has felt a hernia for about a year she decreibes it as her abdomen being "bloated" but when she takes medication for gas the abdomen "flatten out." She complains of intermittent pain over the hernia but over the past 2 days it has been worse and has been traveling throughout the epigastric region. Last BM a couple of days ago. Doesn't recall if she passing gas. No fevers/chills  Colonoscopy - 10 years ago - maybe a polyp?  Endoscopy - never    ED: Afebrile, VSS. WBC 14.09, Hb 12.6, lactate 0.7. CT scan findings cocnerning for large left periumbilical hernia containing fat and a thickened loop of transverse colon which is concerning for strangulation.       PMH: HTN, DM  PSH:  x2, hysterectomy, lap dev  FHx: no cancer or IBD  Meds: Metformin 500 daily, has HTN meds that she doesn't take because they make her nauseous  Soc: No tobacco       (11 Sep 2022 21:07)      SICU Addendum:  Patient on telemetry floor following step down from ICU care. SICU consulted in setting of hypertension today. Hypertensive in 170's- 200's/100, Tachycardic to 100's during blood transfusion. Received Labetalol 10mg, 20mg, 40mg followed by restarting of home procardia and labetalol with improvement in pressure to 178 systolic. Found to have Hgb 6.4 this AM s/p 1U pRBC. Subjectively feeling tired, pain is well controlled. Denies headache, vision changes, dizziness, chest pain, dyspnea, abdominal pain, nausea or vomiting.       PAST MEDICAL & SURGICAL HISTORY:  Diabetes      H/O thyroid disease      S/P total abdominal hysterectomy      History of laparoscopic cholecystectomy      History of           MEDICATIONS  (STANDING):  BUpivacaine 0.5% On-Q Pump 400 milliLiter(s) (4 mL/Hr) IntraDermal. <Continuous>  chlorhexidine 2% Cloths 1 Application(s) Topical daily  dextrose 5%. 1000 milliLiter(s) (100 mL/Hr) IV Continuous <Continuous>  dextrose 5%. 1000 milliLiter(s) (50 mL/Hr) IV Continuous <Continuous>  dextrose 50% Injectable 25 Gram(s) IV Push once  dextrose 50% Injectable 12.5 Gram(s) IV Push once  dextrose 50% Injectable 25 Gram(s) IV Push once  glucagon  Injectable 1 milliGRAM(s) IntraMuscular once  influenza  Vaccine (HIGH DOSE) 0.7 milliLiter(s) IntraMuscular once  insulin lispro (ADMELOG) corrective regimen sliding scale   SubCutaneous every 6 hours  labetalol 200 milliGRAM(s) Oral every 12 hours  melatonin 3 milliGRAM(s) Oral at bedtime  meropenem  IVPB 1000 milliGRAM(s) IV Intermittent every 12 hours  NIFEdipine XL 60 milliGRAM(s) Oral every 24 hours  potassium phosphate IVPB 15 milliMole(s) IV Intermittent once    MEDICATIONS  (PRN):  dextrose Oral Gel 15 Gram(s) Oral once PRN Blood Glucose LESS THAN 70 milliGRAM(s)/deciliter  ondansetron Injectable 4 milliGRAM(s) IV Push every 8 hours PRN Nausea and/or Vomiting      Allergies    penicillin (Rash)    Intolerances        SOCIAL HISTORY:    FAMILY HISTORY:  No pertinent family history in first degree relatives        Vital Signs Last 24 Hrs  T(C): 37.2 (17 Sep 2022 17:13), Max: 37.8 (17 Sep 2022 08:41)  T(F): 99 (17 Sep 2022 17:13), Max: 100 (17 Sep 2022 08:41)  HR: 81 (17 Sep 2022 17:13) (81 - 108)  BP: 178/96 (17 Sep 2022 17:13) (150/70 - 210/104)  BP(mean): --  RR: 18 (17 Sep 2022 17:01) (16 - 19)  SpO2: 100% (17 Sep 2022 17:01) (95% - 100%)    Parameters below as of 17 Sep 2022 17:01  Patient On (Oxygen Delivery Method): room air        I&O's Summary    16 Sep 2022 07:01  -  17 Sep 2022 07:00  --------------------------------------------------------  IN: 4500 mL / OUT: 2285 mL / NET: 2215 mL    17 Sep 2022 07:01  -  17 Sep 2022 18:09  --------------------------------------------------------  IN: 0 mL / OUT: 1505 mL / NET: -1505 mL        Physical Exam:  General: NAD, resting comfortably  HEENT: NC/AT, EOMI, normal hearing, no oral lesions, no LAD, neck supple  Pulmonary: normal resp effort, CTA-B  Cardiovascular: NSR, no murmurs  Abdominal: soft, mild to moderate distention, pravena in place, appropriately tender, drain SS, velarde in place yellow urine  Extremities: WWP, normal strength, no clubbing/cyanosis/edema  Neuro: A/O x 3, CNs II-XII grossly intact, normal sensation, no focal deficits  Pulses: palpable distal pulses    Lines/drains/tubes:    LABS:                        6.6    19.38 )-----------( 273      ( 17 Sep 2022 12:19 )             20.9     09-17    139  |  106  |  19  ----------------------------<  88  3.9   |  24  |  1.07    Ca    8.1<L>      17 Sep 2022 07:27  Phos  2.2       Mg     2.1             Urinalysis Basic - ( 17 Sep 2022 12:05 )    Color: Yellow / Appearance: Clear / S.015 / pH: x  Gluc: x / Ketone: 15 mg/dL  / Bili: Negative / Urobili: 0.2 E.U./dL   Blood: x / Protein: Trace mg/dL / Nitrite: NEGATIVE   Leuk Esterase: NEGATIVE / RBC: < 5 /HPF / WBC < 5 /HPF   Sq Epi: x / Non Sq Epi: 0-5 /HPF / Bacteria: None /HPF      CAPILLARY BLOOD GLUCOSE      POCT Blood Glucose.: 92 mg/dL (17 Sep 2022 13:38)  POCT Blood Glucose.: 87 mg/dL (17 Sep 2022 06:10)  POCT Blood Glucose.: 116 mg/dL (16 Sep 2022 22:28)  POCT Blood Glucose.: 117 mg/dL (16 Sep 2022 18:15)        Cultures:      RADIOLOGY & ADDITIONAL STUDIES:

## 2022-09-17 NOTE — CONSULT NOTE ADULT - ASSESSMENT
71 yo female with PMH of HTN, DM and PSH of  x2, hysterectomy and lap dev ( w/ Dr. Clifford) presenting with 2 days of nausea/vomiting and abd pain w/ imaging findings consistent with large bowel obstruction. However, prior to operative exploration, the patient had return of bowel function. NGT removed however bowel obstruction recurred therefore pt taken to OR for Exlap JOSEFINA, SBR ( 100 cm)  EBL:200 IVF:2000 UO:250. Pt kept intubated post op and transferred to SICU team, extubated and SDU on . Returns to SICU for hypertension and anemia    Neuro: OnQ pump with Marcaine; Tylenol, avoid narcotics, Zofran   CV: Hypertensive to 200's, non-responsive to labetalol, Restarted on Procardia, PO labetalol, Goal map > 65, Cards Following   Pulm: Asymptomatic Covid+   GI: LBO & SBO: S/p JOSEFINA and SBR on . NPO, Holding mIVF, bolus therapy  : Rios, SO resolved, Cr downtrending   ID: Meropenem 1q8 (redosed following resolution of SO (-), s/p Ceftriaxone (9/15-) Flagyl (9/15) D/c: Cefotetan x1 in OR   Endo: Hx of DM: Cont mISS  Heme: Hgb 6.4, s/p 1U PRBC on , follow up post transfusion CBC   PPx: holding SQH in setting of anemia, SCD  Lines: PIV x 2, endurance cath (-)  Wounds: Midline incision SHOSHANA x1 in SQ  + On q Pump, Prevena vac   PT/OT: Ordered  Dispo: SICU

## 2022-09-17 NOTE — PROGRESS NOTE ADULT - ASSESSMENT
73 yo female with PMH of HTN, DM and PSH of  x2, hysterectomy and lap dev ( w/ Dr. Clifford) presenting with 2 days of nausea/vomiting and abd pain w/ imaging findings consistent with large bowel obstruction. However, prior to operative exploration, the patient had return of bowel function. NGT removed however bowel obstruction recurred therefore pt taken to OR for Exlap JOSEFINA, SBR ( 100 cm)  EBL:200 IVF:2000 UO:250. Pt kept intubated post op and transferred to SICU team. Now extubated 9/15. c/b SO. Today w/ hemoglobin 6.4.    NPO/IVF  On Q pump  1U PRBC and f/u CBC  Possible CAP w/ IV contrast  Meropenem 1q12 (-), Ceftriaxone (9/15-), Flagyl (9/15)   ISS  Holding HSQ in setting of hemoglobin drop/SCDs/OOBA/IS  Rios  AM labs

## 2022-09-17 NOTE — CHART NOTE - NSCHARTNOTEFT_GEN_A_CORE
Hematology-Oncology Chart Note    Answered page by surgery at 12:15pm regarding concerns of a transfusion reaction.     Briefly, this is a 72F with HTN, DM2, past surgical history of  x 2, hysterectomy, and laparoscopic cholecystectomy (), admitted for large bowel obstruction. Pt was noted to have a hemoglobin of 6.6 this morning, and was ordered 1U pRBCs. At about 15 minutes after transfusion was initiated, pt complained of discomfort and chest tightness and transfusion was paused. Of note, pt had been hypertensive with recent BPs in the 200s/80s-90s since 8am this morning. Pt noted to be afebrile, saturating in the high 90s on room air, and tachycardic. Pt did report temporary shortness of breath which appears to be anxiety driven, and acknowledged improvement of symptoms with treatment of blood pressure. Pt has not been on her home antihypertensive regimen.     This is unlikely to be a transfusion reaction. Surgery reassured, advised to treat BP, and restart blood transfusion at slower rate with monitoring of pt's vital signs.     The presence of the following symptoms: fevers, chills, wheezing, rales, respiratory distress, hives, angioedema, HYPOtension would be more concerning for a transfusion reaction.     Went to check in on patient after she completed her blood transfusion. Still hypertensive, but no signs and symptoms of any transfusion reaction.

## 2022-09-17 NOTE — PROGRESS NOTE ADULT - ASSESSMENT
72 year old woman with HTN, DM2, past surgical history of  x 2, hysterectomy, and laparoscopic cholecystectomy (), admitted for large bowel obstruction,     # Large bowel obstruction   Ex Lap with JOSEFINA and SBR,   care as per primary team    #Acute blood loss, noted hgb to continue to downtrend  prbc ordered, check post-transfusion CBC    #HTN   s/p labetalol 10mg ivx1 and 20mg iv  started on home procardia 60mg, (home labetalol 200mg bid on hold)    # Type 2 Diabetes complicated by hyperglycemia   Takes metformin at home.   cw insulin sliding scale while inpatient.   resumed metformin on discharge.

## 2022-09-17 NOTE — PROGRESS NOTE ADULT - SUBJECTIVE AND OBJECTIVE BOX
INTERVAL HPI/OVERNIGHT EVENTS:    STATUS POST:      POST OPERATIVE DAY #:     SUBJECTIVE:      heparin   Injectable 5000 Unit(s) SubCutaneous every 8 hours  meropenem  IVPB 1000 milliGRAM(s) IV Intermittent every 12 hours      Vital Signs Last 24 Hrs  T(C): 37.1 (17 Sep 2022 05:00), Max: 37.4 (16 Sep 2022 11:00)  T(F): 98.7 (17 Sep 2022 05:00), Max: 99.4 (16 Sep 2022 11:00)  HR: 101 (17 Sep 2022 05:00) (85 - 102)  BP: 162/82 (17 Sep 2022 05:00) (139/56 - 185/79)  BP(mean): 90 (16 Sep 2022 13:00) (80 - 113)  RR: 16 (17 Sep 2022 05:00) (12 - 22)  SpO2: 96% (17 Sep 2022 05:00) (95% - 100%)    Parameters below as of 17 Sep 2022 05:00  Patient On (Oxygen Delivery Method): room air      I&O's Detail    15 Sep 2022 07:01  -  16 Sep 2022 07:00  --------------------------------------------------------  IN:    IV PiggyBack: 200 mL    IV PiggyBack: 2300 mL    Lactated Ringers: 2820 mL    Lactated Ringers Bolus: 1240 mL  Total IN: 6560 mL    OUT:    Bulb (mL): 30 mL    Indwelling Catheter - Urethral (mL): 1445 mL    Nasogastric/Oral tube (mL): 0 mL    Rectal Tube (mL): 200 mL  Total OUT: 1675 mL    Total NET: 4885 mL      16 Sep 2022 07:01  -  17 Sep 2022 06:14  --------------------------------------------------------  IN:    IV PiggyBack: 1050 mL    IV PiggyBack: 100 mL    IV PiggyBack: 50 mL    Lactated Ringers: 3300 mL  Total IN: 4500 mL    OUT:    Bulb (mL): 25 mL    Emesis (mL): 200 mL    Indwelling Catheter - Urethral (mL): 2010 mL    Rectal Tube (mL): 50 mL  Total OUT: 2285 mL    Total NET: 2215 mL          General: NAD, resting comfortably in bed  C/V: NSR  Pulm: Nonlabored breathing, no respiratory distress  Abd: soft, NT/ND.  Extrem: WWP, no edema, SCDs in place  Drains:  Rios:      LABS:                        7.7    20.24 )-----------( 257      ( 16 Sep 2022 05:26 )             24.4     09-16    135  |  104  |  25<H>  ----------------------------<  133<H>  4.7   |  19<L>  |  1.90<H>    Ca    7.7<L>      16 Sep 2022 05:26  Phos  3.6     09-16  Mg     2.1     09-16        Urinalysis Basic - ( 15 Sep 2022 19:44 )    Color: Yellow / Appearance: Clear / SG: <=1.005 / pH: x  Gluc: x / Ketone: NEGATIVE  / Bili: Negative / Urobili: 0.2 E.U./dL   Blood: x / Protein: NEGATIVE mg/dL / Nitrite: NEGATIVE   Leuk Esterase: NEGATIVE / RBC: < 5 /HPF / WBC < 5 /HPF   Sq Epi: x / Non Sq Epi: x / Bacteria: Present /HPF        RADIOLOGY & ADDITIONAL STUDIES:   INTERVAL HPI/OVERNIGHT EVENTS: Emesis x 1; refusing NGT; refusing Zofran    STATUS POST: ex-lap JOSEFINA and SBR 9/14    SUBJECTIVE: Patient seen and examined at bedside with chief. She states overnight has improved, denies any emesis, abdominal pain, cp, sob. +gas/+BM per patient.       heparin   Injectable 5000 Unit(s) SubCutaneous every 8 hours  meropenem  IVPB 1000 milliGRAM(s) IV Intermittent every 12 hours      Vital Signs Last 24 Hrs  T(C): 37.1 (17 Sep 2022 05:00), Max: 37.4 (16 Sep 2022 11:00)  T(F): 98.7 (17 Sep 2022 05:00), Max: 99.4 (16 Sep 2022 11:00)  HR: 101 (17 Sep 2022 05:00) (85 - 102)  BP: 162/82 (17 Sep 2022 05:00) (139/56 - 185/79)  BP(mean): 90 (16 Sep 2022 13:00) (80 - 113)  RR: 16 (17 Sep 2022 05:00) (12 - 22)  SpO2: 96% (17 Sep 2022 05:00) (95% - 100%)    Parameters below as of 17 Sep 2022 05:00  Patient On (Oxygen Delivery Method): room air      I&O's Detail    15 Sep 2022 07:01  -  16 Sep 2022 07:00  --------------------------------------------------------  IN:    IV PiggyBack: 200 mL    IV PiggyBack: 2300 mL    Lactated Ringers: 2820 mL    Lactated Ringers Bolus: 1240 mL  Total IN: 6560 mL    OUT:    Bulb (mL): 30 mL    Indwelling Catheter - Urethral (mL): 1445 mL    Nasogastric/Oral tube (mL): 0 mL    Rectal Tube (mL): 200 mL  Total OUT: 1675 mL    Total NET: 4885 mL      16 Sep 2022 07:01  -  17 Sep 2022 06:14  --------------------------------------------------------  IN:    IV PiggyBack: 1050 mL    IV PiggyBack: 100 mL    IV PiggyBack: 50 mL    Lactated Ringers: 3300 mL  Total IN: 4500 mL    OUT:    Bulb (mL): 25 mL    Emesis (mL): 200 mL    Indwelling Catheter - Urethral (mL): 2010 mL    Rectal Tube (mL): 50 mL  Total OUT: 2285 mL    Total NET: 2215 mL          General: NAD, resting comfortably in bed  C/V: NSR  Pulm: Nonlabored breathing, no respiratory distress  Abd: soft, NT/ND. Incisions c/d/i.  Extrem: WWP, no edema, SCDs in place  Rios to gravity      LABS:                        7.7    20.24 )-----------( 257      ( 16 Sep 2022 05:26 )             24.4     09-16    135  |  104  |  25<H>  ----------------------------<  133<H>  4.7   |  19<L>  |  1.90<H>    Ca    7.7<L>      16 Sep 2022 05:26  Phos  3.6     09-16  Mg     2.1     09-16        Urinalysis Basic - ( 15 Sep 2022 19:44 )    Color: Yellow / Appearance: Clear / SG: <=1.005 / pH: x  Gluc: x / Ketone: NEGATIVE  / Bili: Negative / Urobili: 0.2 E.U./dL   Blood: x / Protein: NEGATIVE mg/dL / Nitrite: NEGATIVE   Leuk Esterase: NEGATIVE / RBC: < 5 /HPF / WBC < 5 /HPF   Sq Epi: x / Non Sq Epi: x / Bacteria: Present /HPF        RADIOLOGY & ADDITIONAL STUDIES:

## 2022-09-18 LAB
ANION GAP SERPL CALC-SCNC: 14 MMOL/L — SIGNIFICANT CHANGE UP (ref 5–17)
ANISOCYTOSIS BLD QL: SIGNIFICANT CHANGE UP
BASOPHILS # BLD AUTO: 0 K/UL — SIGNIFICANT CHANGE UP (ref 0–0.2)
BASOPHILS NFR BLD AUTO: 0 % — SIGNIFICANT CHANGE UP (ref 0–2)
BUN SERPL-MCNC: 18 MG/DL — SIGNIFICANT CHANGE UP (ref 7–23)
BURR CELLS BLD QL SMEAR: SLIGHT — SIGNIFICANT CHANGE UP
CALCIUM SERPL-MCNC: 8.5 MG/DL — SIGNIFICANT CHANGE UP (ref 8.4–10.5)
CHLORIDE SERPL-SCNC: 101 MMOL/L — SIGNIFICANT CHANGE UP (ref 96–108)
CO2 SERPL-SCNC: 24 MMOL/L — SIGNIFICANT CHANGE UP (ref 22–31)
CREAT SERPL-MCNC: 0.83 MG/DL — SIGNIFICANT CHANGE UP (ref 0.5–1.3)
EGFR: 76 ML/MIN/1.73M2 — SIGNIFICANT CHANGE UP
ELLIPTOCYTES BLD QL SMEAR: SLIGHT — SIGNIFICANT CHANGE UP
EOSINOPHIL # BLD AUTO: 0 K/UL — SIGNIFICANT CHANGE UP (ref 0–0.5)
EOSINOPHIL NFR BLD AUTO: 0 % — SIGNIFICANT CHANGE UP (ref 0–6)
GIANT PLATELETS BLD QL SMEAR: PRESENT — SIGNIFICANT CHANGE UP
GLUCOSE BLDC GLUCOMTR-MCNC: 164 MG/DL — HIGH (ref 70–99)
GLUCOSE BLDC GLUCOMTR-MCNC: 190 MG/DL — HIGH (ref 70–99)
GLUCOSE SERPL-MCNC: 107 MG/DL — HIGH (ref 70–99)
HCT VFR BLD CALC: 24.2 % — LOW (ref 34.5–45)
HCT VFR BLD CALC: 24.7 % — LOW (ref 34.5–45)
HCT VFR BLD CALC: 25.5 % — LOW (ref 34.5–45)
HGB BLD-MCNC: 8 G/DL — LOW (ref 11.5–15.5)
HGB BLD-MCNC: 8.1 G/DL — LOW (ref 11.5–15.5)
HGB BLD-MCNC: 8.3 G/DL — LOW (ref 11.5–15.5)
HYPOCHROMIA BLD QL: SLIGHT — SIGNIFICANT CHANGE UP
LYMPHOCYTES # BLD AUTO: 1.11 K/UL — SIGNIFICANT CHANGE UP (ref 1–3.3)
LYMPHOCYTES # BLD AUTO: 7.7 % — LOW (ref 13–44)
MACROCYTES BLD QL: SLIGHT — SIGNIFICANT CHANGE UP
MAGNESIUM SERPL-MCNC: 2.2 MG/DL — SIGNIFICANT CHANGE UP (ref 1.6–2.6)
MANUAL SMEAR VERIFICATION: SIGNIFICANT CHANGE UP
MCHC RBC-ENTMCNC: 27.3 PG — SIGNIFICANT CHANGE UP (ref 27–34)
MCHC RBC-ENTMCNC: 27.5 PG — SIGNIFICANT CHANGE UP (ref 27–34)
MCHC RBC-ENTMCNC: 27.6 PG — SIGNIFICANT CHANGE UP (ref 27–34)
MCHC RBC-ENTMCNC: 32.5 GM/DL — SIGNIFICANT CHANGE UP (ref 32–36)
MCHC RBC-ENTMCNC: 32.8 GM/DL — SIGNIFICANT CHANGE UP (ref 32–36)
MCHC RBC-ENTMCNC: 33.1 GM/DL — SIGNIFICANT CHANGE UP (ref 32–36)
MCV RBC AUTO: 82.6 FL — SIGNIFICANT CHANGE UP (ref 80–100)
MCV RBC AUTO: 83.7 FL — SIGNIFICANT CHANGE UP (ref 80–100)
MCV RBC AUTO: 84.7 FL — SIGNIFICANT CHANGE UP (ref 80–100)
MONOCYTES # BLD AUTO: 0.75 K/UL — SIGNIFICANT CHANGE UP (ref 0–0.9)
MONOCYTES NFR BLD AUTO: 5.2 % — SIGNIFICANT CHANGE UP (ref 2–14)
MYELOCYTES NFR BLD: 0.9 % — HIGH (ref 0–0)
NEUTROPHILS # BLD AUTO: 12.27 K/UL — HIGH (ref 1.8–7.4)
NEUTROPHILS NFR BLD AUTO: 84.5 % — HIGH (ref 43–77)
NEUTS BAND # BLD: 0.8 % — SIGNIFICANT CHANGE UP (ref 0–8)
NRBC # BLD: 0 /100 WBCS — SIGNIFICANT CHANGE UP (ref 0–0)
NRBC # BLD: 0 /100 WBCS — SIGNIFICANT CHANGE UP (ref 0–0)
OVALOCYTES BLD QL SMEAR: SLIGHT — SIGNIFICANT CHANGE UP
PHOSPHATE SERPL-MCNC: 3 MG/DL — SIGNIFICANT CHANGE UP (ref 2.5–4.5)
PLAT MORPH BLD: ABNORMAL
PLATELET # BLD AUTO: 312 K/UL — SIGNIFICANT CHANGE UP (ref 150–400)
PLATELET # BLD AUTO: 317 K/UL — SIGNIFICANT CHANGE UP (ref 150–400)
PLATELET # BLD AUTO: 337 K/UL — SIGNIFICANT CHANGE UP (ref 150–400)
POIKILOCYTOSIS BLD QL AUTO: SIGNIFICANT CHANGE UP
POLYCHROMASIA BLD QL SMEAR: SLIGHT — SIGNIFICANT CHANGE UP
POTASSIUM SERPL-MCNC: 4.2 MMOL/L — SIGNIFICANT CHANGE UP (ref 3.5–5.3)
POTASSIUM SERPL-SCNC: 4.2 MMOL/L — SIGNIFICANT CHANGE UP (ref 3.5–5.3)
RBC # BLD: 2.93 M/UL — LOW (ref 3.8–5.2)
RBC # BLD: 2.95 M/UL — LOW (ref 3.8–5.2)
RBC # BLD: 3.01 M/UL — LOW (ref 3.8–5.2)
RBC # FLD: 14.6 % — HIGH (ref 10.3–14.5)
RBC # FLD: 14.7 % — HIGH (ref 10.3–14.5)
RBC # FLD: 15 % — HIGH (ref 10.3–14.5)
RBC BLD AUTO: ABNORMAL
SODIUM SERPL-SCNC: 139 MMOL/L — SIGNIFICANT CHANGE UP (ref 135–145)
VARIANT LYMPHS # BLD: 0.9 % — SIGNIFICANT CHANGE UP (ref 0–6)
WBC # BLD: 14.38 K/UL — HIGH (ref 3.8–10.5)
WBC # BLD: 18.63 K/UL — HIGH (ref 3.8–10.5)
WBC # BLD: 19.75 K/UL — HIGH (ref 3.8–10.5)
WBC # FLD AUTO: 14.38 K/UL — HIGH (ref 3.8–10.5)
WBC # FLD AUTO: 18.63 K/UL — HIGH (ref 3.8–10.5)
WBC # FLD AUTO: 19.75 K/UL — HIGH (ref 3.8–10.5)

## 2022-09-18 PROCEDURE — 99233 SBSQ HOSP IP/OBS HIGH 50: CPT | Mod: GC

## 2022-09-18 RX ORDER — INSULIN LISPRO 100/ML
VIAL (ML) SUBCUTANEOUS
Refills: 0 | Status: DISCONTINUED | OUTPATIENT
Start: 2022-09-18 | End: 2022-10-21

## 2022-09-18 RX ORDER — INSULIN LISPRO 100/ML
VIAL (ML) SUBCUTANEOUS AT BEDTIME
Refills: 0 | Status: DISCONTINUED | OUTPATIENT
Start: 2022-09-18 | End: 2022-10-21

## 2022-09-18 RX ORDER — HEPARIN SODIUM 5000 [USP'U]/ML
5000 INJECTION INTRAVENOUS; SUBCUTANEOUS EVERY 8 HOURS
Refills: 0 | Status: DISCONTINUED | OUTPATIENT
Start: 2022-09-18 | End: 2022-10-07

## 2022-09-18 RX ADMIN — Medication 60 MILLIGRAM(S): at 12:00

## 2022-09-18 RX ADMIN — MEROPENEM 100 MILLIGRAM(S): 1 INJECTION INTRAVENOUS at 13:47

## 2022-09-18 RX ADMIN — MEROPENEM 100 MILLIGRAM(S): 1 INJECTION INTRAVENOUS at 05:38

## 2022-09-18 RX ADMIN — Medication 2: at 18:40

## 2022-09-18 RX ADMIN — HEPARIN SODIUM 5000 UNIT(S): 5000 INJECTION INTRAVENOUS; SUBCUTANEOUS at 22:30

## 2022-09-18 RX ADMIN — Medication 2: at 12:08

## 2022-09-18 RX ADMIN — Medication 200 MILLIGRAM(S): at 05:38

## 2022-09-18 RX ADMIN — Medication 200 MILLIGRAM(S): at 19:23

## 2022-09-18 RX ADMIN — POTASSIUM PHOSPHATE, MONOBASIC POTASSIUM PHOSPHATE, DIBASIC 62.5 MILLIMOLE(S): 236; 224 INJECTION, SOLUTION INTRAVENOUS at 00:21

## 2022-09-18 RX ADMIN — MEROPENEM 100 MILLIGRAM(S): 1 INJECTION INTRAVENOUS at 22:30

## 2022-09-18 NOTE — DIETITIAN INITIAL EVALUATION ADULT - NSICDXPASTSURGICALHX_GEN_ALL_CORE_FT
PAST SURGICAL HISTORY:  History of      History of laparoscopic cholecystectomy     S/P total abdominal hysterectomy

## 2022-09-18 NOTE — DIETITIAN INITIAL EVALUATION ADULT - ADD RECOMMEND
1. Continue clear liquid diet +add Ensure Clear TID (each 240kcal/8gpro)  >>Monitor tolerance to PO and advance diet as tolerated to low fiber  >>If unable to advance past clears within 24hrs, consider alternate nutrition route as pt not meeting nutrition needs since admission (7 days)  2. Continue zofran  3. Monitor bowel function  4. Provide education prn

## 2022-09-18 NOTE — DIETITIAN INITIAL EVALUATION ADULT - OTHER INFO
71 yo female with PMH of HTN, DM and PSH of  x2, hysterectomy and lap dev ( w/ Dr. Clifford) presenting with 2 days of nausea/vomiting and abd pain w/ imaging findings consistent with large bowel obstruction. However, prior to operative exploration, the patient had return of bowel function. NGT removed however bowel obstruction recurred therefore pt taken to OR for Exlap JOSEFINA, SBR ( 100 cm)  EBL:200 IVF:2000 UO:250. Pt kept intubated post op and transferred to SICU team. Now extubated 9/15.    Pt seen in room for nutrition assessment, receiving care by RN. Pt NPO/clears since admission (x7days). NGT d/c 9/15. Last episode emesis . Pt had BM , diet now advanced to clears this AM. Recommend monitor tolerance to PO and advance as tolerated to low fiber. Per team, not need for PICC/TPN. Per EMR wts: (182lbs) Sep 2020, (203lbs (Sep 2022) reflects 21lb/11% gain x2 years. Will continue to monitor nutrition plan and provide recommendations prn. Please see full nutrition recommendations below. Will continue to follow per RD protocol.

## 2022-09-18 NOTE — PROGRESS NOTE ADULT - SUBJECTIVE AND OBJECTIVE BOX
STATUS POST:  ex-lap JOSEFINA and SBR     POST OPERATIVE DAY #: 4    SUBJECTIVE: Pt seen and examined at bedside this am by surgery team. Patient is sitting comfortably in chair with no complaints. She is hungry. Pain is well controlled with current regimen. Patient denies fever, nausea, vomiting, chest pain, and shortness of breath. Patient is passing gas and having bowel movements.     MEDICATIONS  (STANDING):  chlorhexidine 2% Cloths 1 Application(s) Topical daily  dextrose 5%. 1000 milliLiter(s) (100 mL/Hr) IV Continuous <Continuous>  dextrose 5%. 1000 milliLiter(s) (50 mL/Hr) IV Continuous <Continuous>  dextrose 50% Injectable 25 Gram(s) IV Push once  dextrose 50% Injectable 12.5 Gram(s) IV Push once  dextrose 50% Injectable 25 Gram(s) IV Push once  glucagon  Injectable 1 milliGRAM(s) IntraMuscular once  influenza  Vaccine (HIGH DOSE) 0.7 milliLiter(s) IntraMuscular once  insulin lispro (ADMELOG) corrective regimen sliding scale   SubCutaneous every 6 hours  labetalol 200 milliGRAM(s) Oral every 12 hours  melatonin 3 milliGRAM(s) Oral at bedtime  meropenem  IVPB 1000 milliGRAM(s) IV Intermittent every 8 hours  NIFEdipine XL 60 milliGRAM(s) Oral every 24 hours    MEDICATIONS  (PRN):  dextrose Oral Gel 15 Gram(s) Oral once PRN Blood Glucose LESS THAN 70 milliGRAM(s)/deciliter  ondansetron Injectable 4 milliGRAM(s) IV Push every 8 hours PRN Nausea and/or Vomiting      Vital Signs Last 24 Hrs  T(C): 37.4 (18 Sep 2022 02:50), Max: 37.4 (17 Sep 2022 12:48)  T(F): 99.4 (18 Sep 2022 02:50), Max: 99.4 (17 Sep 2022 12:48)  HR: 81 (18 Sep 2022 11:00) (81 - 94)  BP: 133/60 (18 Sep 2022 11:00) (122/58 - 210/104)  BP(mean): 87 (18 Sep 2022 11:00) (83 - 114)  RR: 16 (18 Sep 2022 11:00) (13 - 19)  SpO2: 95% (18 Sep 2022 11:00) (91% - 100%)    Parameters below as of 18 Sep 2022 11:00  Patient On (Oxygen Delivery Method): room air    Physical Exam  General: Patient is doing well and sitting in chair comfortably  Constitutional: alert and oriented   Pulm: Nonlabored breathing, no respiratory distress  CV: Regular rate and rhythm, normal sinus rhythm  Abd:  soft, nontender, nondistended. No rebound, no guarding.             Incisions: clean, dry, intact and healing well, no erythema/induration/edema  Extremities: warm, well perfused, no edema      I&O's Detail    17 Sep 2022 07:01  -  18 Sep 2022 07:00  --------------------------------------------------------  IN:    IV PiggyBack: 362.5 mL  Total IN: 362.5 mL    OUT:    Bulb (mL): 10 mL    Drain (mL): 0 mL    Indwelling Catheter - Urethral (mL): 2900 mL    Voided (mL): 5 mL  Total OUT: 2915 mL    Total NET: -2552.5 mL      18 Sep 2022 07:01  -  18 Sep 2022 11:54  --------------------------------------------------------  IN:  Total IN: 0 mL    OUT:    Bulb (mL): 10 mL    Indwelling Catheter - Urethral (mL): 295 mL  Total OUT: 305 mL    Total NET: -305 mL        LABS:                        8.0    18.63 )-----------( 312      ( 18 Sep 2022 04:53 )             24.2     09-18    139  |  101  |  18  ----------------------------<  107<H>  4.2   |  24  |  0.83    Ca    8.5      18 Sep 2022 04:53  Phos  3.0     09-18  Mg     2.2     09-18      PT/INR - ( 17 Sep 2022 18:53 )   PT: 15.0 sec;   INR: 1.26            Urinalysis Basic - ( 17 Sep 2022 12:05 )    Color: Yellow / Appearance: Clear / S.015 / pH: x  Gluc: x / Ketone: 15 mg/dL  / Bili: Negative / Urobili: 0.2 E.U./dL   Blood: x / Protein: Trace mg/dL / Nitrite: NEGATIVE   Leuk Esterase: NEGATIVE / RBC: < 5 /HPF / WBC < 5 /HPF   Sq Epi: x / Non Sq Epi: 0-5 /HPF / Bacteria: None /HPF

## 2022-09-18 NOTE — DIETITIAN INITIAL EVALUATION ADULT - PERTINENT LABORATORY DATA
09-18    139  |  101  |  18  ----------------------------<  107<H>  4.2   |  24  |  0.83    Ca    8.5      18 Sep 2022 04:53  Phos  3.0     09-18  Mg     2.2     09-18    POCT Blood Glucose.: 109 mg/dL (09-17-22 @ 23:45)

## 2022-09-18 NOTE — DIETITIAN INITIAL EVALUATION ADULT - PERTINENT MEDS FT
MEDICATIONS  (STANDING):  chlorhexidine 2% Cloths 1 Application(s) Topical daily  dextrose 5%. 1000 milliLiter(s) (100 mL/Hr) IV Continuous <Continuous>  dextrose 5%. 1000 milliLiter(s) (50 mL/Hr) IV Continuous <Continuous>  dextrose 50% Injectable 25 Gram(s) IV Push once  dextrose 50% Injectable 12.5 Gram(s) IV Push once  dextrose 50% Injectable 25 Gram(s) IV Push once  glucagon  Injectable 1 milliGRAM(s) IntraMuscular once  influenza  Vaccine (HIGH DOSE) 0.7 milliLiter(s) IntraMuscular once  insulin lispro (ADMELOG) corrective regimen sliding scale   SubCutaneous every 6 hours  labetalol 200 milliGRAM(s) Oral every 12 hours  melatonin 3 milliGRAM(s) Oral at bedtime  meropenem  IVPB 1000 milliGRAM(s) IV Intermittent every 8 hours  NIFEdipine XL 60 milliGRAM(s) Oral every 24 hours    MEDICATIONS  (PRN):  dextrose Oral Gel 15 Gram(s) Oral once PRN Blood Glucose LESS THAN 70 milliGRAM(s)/deciliter  ondansetron Injectable 4 milliGRAM(s) IV Push every 8 hours PRN Nausea and/or Vomiting

## 2022-09-18 NOTE — PROGRESS NOTE ADULT - ASSESSMENT
71 yo female with PMH of HTN, DM and PSH of  x2, hysterectomy and lap dev ( w/ Dr. Clifford) presenting with 2 days of nausea/vomiting and abd pain w/ imaging findings consistent with large bowel obstruction. However, prior to operative exploration, the patient had return of bowel function. NGT removed however bowel obstruction recurred therefore pt taken to OR for Exlap JOSEFINA, SBR ( 100 cm)  EBL:200 IVF:2000 UO:250. Pt kept intubated post op and transferred to SICU team. Now extubated 9/15. c/b SO. Today w/ hemoglobin 6.4.    Step Down   Recommend CLD  Continue with on Q pump  Continue with Meropenem q12 (-), Ceftriaxone (9/15-), Flagyl (9/15)   ISS  Holding HSQ in setting of hemoglobin drop/SCDs/OOBA/IS  AM labs

## 2022-09-18 NOTE — PROGRESS NOTE ADULT - ASSESSMENT
72-year-old female with PMHx of HTN, DM and PSH of  x2, hysterectomy and lap dev ( w/ Dr. Clifford) presenting with 2 days of nausea/vomiting and abd pain w/ imaging findings consistent with large bowel obstruction. However, prior to operative exploration, the patient had return of bowel function. NGT removed however bowel obstruction recurred, therefore pt taken to OR.  She is POD 2 s/p Exploratory Laparotomy, JOSEFINA, incisional hernia repair and SBR (100 cm) with EBL:200 IVF:2000 UO:250. Patient was originally kept intubated post-op and transferred to SICU team followed by extubation and step-down on 2022. Patient returned to SICU for management of hypertension after non-responsive to Labetalol and anemia.    Neuro: OnQ pump with Marcaine; Tylenol, avoid narcotics  CV: Hypertensive to 200's, Restarted Procardia, PO labetalol, Goal MAP > 65mmHg, Cards Following   Pulm: Asymptomatic Covid+   GI: LBO & SBO: S/p JOSEFINA and SBR on . NPO, Holding mIVF, bolus therapy, Zofran  : Rios, SO resolved, Cr downtrending   ID: Meropenem 1q8 (redosed following resolution of SO (-), s/p Ceftriaxone (9/15-) Flagyl (9/15) D/c: Cefotetan x1 in OR   Endo: Hx of DM: Cont mISS  Heme: Hgb 6.4, s/p 1U PRBC on , follow up post transfusion CBC   PPx: holding SQH in setting of anemia, SCD  Lines: PIV x 2, endurance cath (-)  Wounds: Midline incision SHOSHANA x1 in SQ  + On q Pump, Prevena vac   PT/OT: Ordered  Dispo: SICU

## 2022-09-19 LAB
A1C WITH ESTIMATED AVERAGE GLUCOSE RESULT: 6.8 % — HIGH (ref 4–5.6)
ANION GAP SERPL CALC-SCNC: 9 MMOL/L — SIGNIFICANT CHANGE UP (ref 5–17)
BASOPHILS # BLD AUTO: 0.02 K/UL — SIGNIFICANT CHANGE UP (ref 0–0.2)
BASOPHILS NFR BLD AUTO: 0.2 % — SIGNIFICANT CHANGE UP (ref 0–2)
BUN SERPL-MCNC: 18 MG/DL — SIGNIFICANT CHANGE UP (ref 7–23)
CALCIUM SERPL-MCNC: 8.5 MG/DL — SIGNIFICANT CHANGE UP (ref 8.4–10.5)
CHLORIDE SERPL-SCNC: 101 MMOL/L — SIGNIFICANT CHANGE UP (ref 96–108)
CO2 SERPL-SCNC: 27 MMOL/L — SIGNIFICANT CHANGE UP (ref 22–31)
CREAT SERPL-MCNC: 0.79 MG/DL — SIGNIFICANT CHANGE UP (ref 0.5–1.3)
EGFR: 80 ML/MIN/1.73M2 — SIGNIFICANT CHANGE UP
EOSINOPHIL # BLD AUTO: 0.21 K/UL — SIGNIFICANT CHANGE UP (ref 0–0.5)
EOSINOPHIL NFR BLD AUTO: 1.6 % — SIGNIFICANT CHANGE UP (ref 0–6)
ESTIMATED AVERAGE GLUCOSE: 148 MG/DL — HIGH (ref 68–114)
GLUCOSE BLDC GLUCOMTR-MCNC: 123 MG/DL — HIGH (ref 70–99)
GLUCOSE BLDC GLUCOMTR-MCNC: 129 MG/DL — HIGH (ref 70–99)
GLUCOSE BLDC GLUCOMTR-MCNC: 132 MG/DL — HIGH (ref 70–99)
GLUCOSE BLDC GLUCOMTR-MCNC: 151 MG/DL — HIGH (ref 70–99)
GLUCOSE SERPL-MCNC: 144 MG/DL — HIGH (ref 70–99)
HCT VFR BLD CALC: 24.7 % — LOW (ref 34.5–45)
HGB BLD-MCNC: 7.8 G/DL — LOW (ref 11.5–15.5)
IMM GRANULOCYTES NFR BLD AUTO: 5.5 % — HIGH (ref 0–0.9)
LYMPHOCYTES # BLD AUTO: 1.87 K/UL — SIGNIFICANT CHANGE UP (ref 1–3.3)
LYMPHOCYTES # BLD AUTO: 14.1 % — SIGNIFICANT CHANGE UP (ref 13–44)
MAGNESIUM SERPL-MCNC: 2.2 MG/DL — SIGNIFICANT CHANGE UP (ref 1.6–2.6)
MCHC RBC-ENTMCNC: 27.1 PG — SIGNIFICANT CHANGE UP (ref 27–34)
MCHC RBC-ENTMCNC: 31.6 GM/DL — LOW (ref 32–36)
MCV RBC AUTO: 85.8 FL — SIGNIFICANT CHANGE UP (ref 80–100)
MONOCYTES # BLD AUTO: 1.36 K/UL — HIGH (ref 0–0.9)
MONOCYTES NFR BLD AUTO: 10.2 % — SIGNIFICANT CHANGE UP (ref 2–14)
NEUTROPHILS # BLD AUTO: 9.08 K/UL — HIGH (ref 1.8–7.4)
NEUTROPHILS NFR BLD AUTO: 68.4 % — SIGNIFICANT CHANGE UP (ref 43–77)
NRBC # BLD: 0 /100 WBCS — SIGNIFICANT CHANGE UP (ref 0–0)
PHOSPHATE SERPL-MCNC: 1.8 MG/DL — LOW (ref 2.5–4.5)
PLATELET # BLD AUTO: 377 K/UL — SIGNIFICANT CHANGE UP (ref 150–400)
POTASSIUM SERPL-MCNC: 4 MMOL/L — SIGNIFICANT CHANGE UP (ref 3.5–5.3)
POTASSIUM SERPL-SCNC: 4 MMOL/L — SIGNIFICANT CHANGE UP (ref 3.5–5.3)
RBC # BLD: 2.88 M/UL — LOW (ref 3.8–5.2)
RBC # FLD: 15.2 % — HIGH (ref 10.3–14.5)
SODIUM SERPL-SCNC: 137 MMOL/L — SIGNIFICANT CHANGE UP (ref 135–145)
WBC # BLD: 13.27 K/UL — HIGH (ref 3.8–10.5)
WBC # FLD AUTO: 13.27 K/UL — HIGH (ref 3.8–10.5)

## 2022-09-19 PROCEDURE — 99233 SBSQ HOSP IP/OBS HIGH 50: CPT

## 2022-09-19 RX ORDER — LOSARTAN POTASSIUM 100 MG/1
50 TABLET, FILM COATED ORAL DAILY
Refills: 0 | Status: DISCONTINUED | OUTPATIENT
Start: 2022-09-19 | End: 2022-09-21

## 2022-09-19 RX ORDER — ATORVASTATIN CALCIUM 80 MG/1
20 TABLET, FILM COATED ORAL AT BEDTIME
Refills: 0 | Status: DISCONTINUED | OUTPATIENT
Start: 2022-09-19 | End: 2022-10-07

## 2022-09-19 RX ORDER — BUPIVACAINE HCL/PF 7.5 MG/ML
400 VIAL (ML) INJECTION
Refills: 0 | Status: DISCONTINUED | OUTPATIENT
Start: 2022-09-19 | End: 2022-10-01

## 2022-09-19 RX ADMIN — HEPARIN SODIUM 5000 UNIT(S): 5000 INJECTION INTRAVENOUS; SUBCUTANEOUS at 06:30

## 2022-09-19 RX ADMIN — MEROPENEM 100 MILLIGRAM(S): 1 INJECTION INTRAVENOUS at 13:29

## 2022-09-19 RX ADMIN — Medication 200 MILLIGRAM(S): at 06:30

## 2022-09-19 RX ADMIN — Medication 200 MILLIGRAM(S): at 19:31

## 2022-09-19 RX ADMIN — Medication 60 MILLIGRAM(S): at 13:27

## 2022-09-19 RX ADMIN — HEPARIN SODIUM 5000 UNIT(S): 5000 INJECTION INTRAVENOUS; SUBCUTANEOUS at 22:22

## 2022-09-19 RX ADMIN — HEPARIN SODIUM 5000 UNIT(S): 5000 INJECTION INTRAVENOUS; SUBCUTANEOUS at 13:27

## 2022-09-19 RX ADMIN — Medication 2: at 12:08

## 2022-09-19 RX ADMIN — ATORVASTATIN CALCIUM 20 MILLIGRAM(S): 80 TABLET, FILM COATED ORAL at 22:22

## 2022-09-19 RX ADMIN — MEROPENEM 100 MILLIGRAM(S): 1 INJECTION INTRAVENOUS at 22:22

## 2022-09-19 RX ADMIN — Medication 85 MILLIMOLE(S): at 14:41

## 2022-09-19 RX ADMIN — MEROPENEM 100 MILLIGRAM(S): 1 INJECTION INTRAVENOUS at 06:30

## 2022-09-19 NOTE — PHYSICAL THERAPY INITIAL EVALUATION ADULT - GAIT DEVIATIONS NOTED, PT EVAL
slightly unsteady gait however no LOB/knee buckling noted; increased time required with turning; patient reports "I just don't feel like I have all my strength back yet"; patient endorses she appreciates use of rolling walker/decreased isaac/decreased velocity of limb motion/decreased step length/decreased weight-shifting ability

## 2022-09-19 NOTE — PHYSICAL THERAPY INITIAL EVALUATION ADULT - NSPTDMEREC_GEN_A_CORE
Patient may benefit from rolling walker upon discharge from [facility]; will continue to assess need

## 2022-09-19 NOTE — PHYSICAL THERAPY INITIAL EVALUATION ADULT - ADDITIONAL COMMENTS
Patient reports previously independent with all ADLs/IADLs prior to admission. No additional HHAs. Denies history of mechanical falls within the past 6 months. Patient reports "I don't like anyone coming to my house".

## 2022-09-19 NOTE — PHYSICAL THERAPY INITIAL EVALUATION ADULT - PERTINENT HX OF CURRENT PROBLEM, REHAB EVAL
Contacted pt regarding ACT group referral.  Pt reported ongoing interest in ACT and reported he was available on Wednesday afternoons.      During call, pt brought up a concern regarding discontinued physical therapy and ongoing needs for back exercise equipment.  Pt reported he has some dumbbells at home but reported he needed more.  Spoke with pt about potentially being able to find out about gym resources or low cost gym memberships and pt reported feeling comfortable going to the gym to work out and continue his physical therapy exercises.  Clinician to follow up with treatment team regarding request.   71 yo female with PMH of HTN, DM and PSH of  x2, hysterectomy and lap dev ( w/ Dr. Clifford) presenting with 2 days of nausea/vomiting and abd pain. Patient reports that she has felt a hernia for about a year she decreibes it as her abdomen being "bloated" but when she takes medication for gas the abdomen "flatten out." She complains of intermittent pain over the hernia but over the past 2 days it has been worse and has been traveling throughout the epigastric region. Last BM a couple of days ago. Doesn't recall if she passing gas. No fevers/chills. Colonoscopy - 10 years ago - maybe a polyp?. Endoscopy - never... Please refer to [facility]&P on [place] for remaining.

## 2022-09-19 NOTE — PHYSICAL THERAPY INITIAL EVALUATION ADULT - THERAPY FREQUENCY, PT EVAL
Patient educated on frequency of inpatient physical therapy at [facility], patient verbalized understanding./3-5x/week

## 2022-09-19 NOTE — PROGRESS NOTE ADULT - SUBJECTIVE AND OBJECTIVE BOX
SUBJECTIVE: Feeling well, demetri clears, +BF. Patient seen and examined bedside by chief resident.    heparin   Injectable 5000 Unit(s) SubCutaneous every 8 hours  labetalol 200 milliGRAM(s) Oral every 12 hours  meropenem  IVPB 1000 milliGRAM(s) IV Intermittent every 8 hours  NIFEdipine XL 60 milliGRAM(s) Oral every 24 hours    MEDICATIONS  (PRN):  dextrose Oral Gel 15 Gram(s) Oral once PRN Blood Glucose LESS THAN 70 milliGRAM(s)/deciliter  ondansetron Injectable 4 milliGRAM(s) IV Push every 8 hours PRN Nausea and/or Vomiting      I&O's Detail    18 Sep 2022 07:01  -  19 Sep 2022 07:00  --------------------------------------------------------  IN:    IV PiggyBack: 100 mL  Total IN: 100 mL    OUT:    Bulb (mL): 30 mL    Drain (mL): 0 mL    Indwelling Catheter - Urethral (mL): 905 mL  Total OUT: 935 mL    Total NET: -835 mL          Vital Signs Last 24 Hrs  T(C): 37.1 (19 Sep 2022 08:44), Max: 37.4 (18 Sep 2022 16:13)  T(F): 98.8 (19 Sep 2022 08:44), Max: 99.3 (18 Sep 2022 16:13)  HR: 83 (19 Sep 2022 08:44) (81 - 84)  BP: 158/78 (19 Sep 2022 08:44) (113/56 - 158/78)  BP(mean): 88 (18 Sep 2022 15:00) (81 - 98)  RR: 19 (19 Sep 2022 08:44) (14 - 22)  SpO2: 95% (19 Sep 2022 08:44) (93% - 96%)    Parameters below as of 19 Sep 2022 08:44  Patient On (Oxygen Delivery Method): room air        General: NAD, resting comfortably in bed  C/V: NSR  Pulm: Nonlabored breathing, no respiratory distress  Abd: soft, NT/ND, vac in place and functional  Extrem: SCDs in place    LABS:                        7.8    13.27 )-----------( 377      ( 19 Sep 2022 05:30 )             24.7     09-19    137  |  101  |  18  ----------------------------<  144<H>  4.0   |  27  |  0.79    Ca    8.5      19 Sep 2022 05:30  Phos  1.8       Mg     2.2           PT/INR - ( 17 Sep 2022 18:53 )   PT: 15.0 sec;   INR: 1.26            Urinalysis Basic - ( 17 Sep 2022 12:05 )    Color: Yellow / Appearance: Clear / S.015 / pH: x  Gluc: x / Ketone: 15 mg/dL  / Bili: Negative / Urobili: 0.2 E.U./dL   Blood: x / Protein: Trace mg/dL / Nitrite: NEGATIVE   Leuk Esterase: NEGATIVE / RBC: < 5 /HPF / WBC < 5 /HPF   Sq Epi: x / Non Sq Epi: 0-5 /HPF / Bacteria: None /HPF

## 2022-09-19 NOTE — PHYSICAL THERAPY INITIAL EVALUATION ADULT - GENERAL OBSERVATIONS, REHAB EVAL
Received call from [place] at [facility] with [employer]. Subjective: Caller states was working outside last [employer], something got in his left eye,thought he got particle out. Current Symptoms: Left top eyelid swollen,red,tender to touch. No drainage this morning. Has had white drainage few times since Thurs. Onset: Last Thurs    Associated Symptoms: N/A    Pain Severity:Denies    Temperature: Denies    What has been tried:       Recommended disposition: See PCP within 24 Hours    Care advice provided, patient verbalizes understanding; denies any other questions or concerns; instructed to call back for any new or worsening symptoms. Patient/Caller agrees with recommended disposition; writer provided warm transfer to SOMA [place] at [facility] for appointment scheduling    Attention Provider: Thank you for allowing me to participate in the care of your patient. The patient was connected to triage in response to information provided to the [facility]. Please do not respond through this encounter as the response is not directed to a shared pool.     Reason for Disposition   Eyelid is red and painful (or tender to touch)    Protocols used: EYE - [facility] PT IE completed. Chart reviewed. Patient without complaints of pain at rest, agreeable to PT. Patient received semi-supine, NAD, +CONTACT/AIRBONE precautions, +tele, +abdominal binder, +abd wound vac, +(L)IV, +(R)abd SHOSHANA, +On-Q pump, VILMA Montanez cleared patient for treatment.

## 2022-09-19 NOTE — PROGRESS NOTE ADULT - SUBJECTIVE AND OBJECTIVE BOX
Patient is a 72y old  Female who presents with a chief complaint of ABD PAIN  (18 Sep 2022 11:29)    INTERVAL EVENTS:  - no dyspnea, no dysuria ; had loose bowel movements x 2 this AM ; Tolerating full liquid diet     SUBJECTIVE:  Patient was seen and examined at bedside.  Review of systems: No fever, chills, HA, CP, dyspnea, nausea or vomiting, dysuria, LE edema. Rest of 12 point Review of systems negative unless otherwise documented elsewhere in note.     MEDICATIONS:  MEDICATIONS  (STANDING):  atorvastatin 20 milliGRAM(s) Oral at bedtime  BUpivacaine 0.5% On-Q Pump 400 milliLiter(s) (4 mL/Hr) IntraDermal. <Continuous>  dextrose 5%. 1000 milliLiter(s) (100 mL/Hr) IV Continuous <Continuous>  dextrose 5%. 1000 milliLiter(s) (50 mL/Hr) IV Continuous <Continuous>  dextrose 50% Injectable 25 Gram(s) IV Push once  dextrose 50% Injectable 12.5 Gram(s) IV Push once  dextrose 50% Injectable 25 Gram(s) IV Push once  glucagon  Injectable 1 milliGRAM(s) IntraMuscular once  heparin   Injectable 5000 Unit(s) SubCutaneous every 8 hours  influenza  Vaccine (HIGH DOSE) 0.7 milliLiter(s) IntraMuscular once  insulin lispro (ADMELOG) corrective regimen sliding scale   SubCutaneous three times a day before meals  insulin lispro (ADMELOG) corrective regimen sliding scale   SubCutaneous at bedtime  labetalol 200 milliGRAM(s) Oral every 12 hours  losartan 50 milliGRAM(s) Oral daily  meropenem  IVPB 1000 milliGRAM(s) IV Intermittent every 8 hours  NIFEdipine XL 60 milliGRAM(s) Oral every 24 hours    MEDICATIONS  (PRN):  dextrose Oral Gel 15 Gram(s) Oral once PRN Blood Glucose LESS THAN 70 milliGRAM(s)/deciliter  ondansetron Injectable 4 milliGRAM(s) IV Push every 8 hours PRN Nausea and/or Vomiting      Allergies    penicillin (Rash)    Intolerances        OBJECTIVE:  Vital Signs Last 24 Hrs  T(C): 37.1 (19 Sep 2022 08:44), Max: 37.1 (18 Sep 2022 22:36)  T(F): 98.8 (19 Sep 2022 08:44), Max: 98.8 (18 Sep 2022 22:36)  HR: 79 (19 Sep 2022 13:03) (79 - 84)  BP: 158/80 (19 Sep 2022 13:03) (134/61 - 158/80)  BP(mean): --  RR: 17 (19 Sep 2022 13:03) (17 - 22)  SpO2: 98% (19 Sep 2022 13:03) (93% - 98%)    Parameters below as of 19 Sep 2022 13:03  Patient On (Oxygen Delivery Method): room air      I&O's Summary    18 Sep 2022 07:01  -  19 Sep 2022 07:00  --------------------------------------------------------  IN: 100 mL / OUT: 935 mL / NET: -835 mL    19 Sep 2022 07:01  -  19 Sep 2022 16:56  --------------------------------------------------------  IN: 0 mL / OUT: 200 mL / NET: -200 mL        PHYSICAL EXAM:  Gen: Reclining in bed at time of exam, appears stated age  HEENT: NCAT, MMM, clear OP  Neck: supple, trachea at midline  CV: RRR, +S1/S2  Pulm: adequate respiratory effort, no increase in work of breathing  Abd: soft, ND ; minimal tenderness to light palpation ; abdominal incisions clean, dry.   Skin: warm and dry,   Ext: WWP, no LE edema  Neuro: AOx3, no gross focal neurological deficits  Psych: affect and behavior appropriate, pleasant at time of interview    LABS:                        7.8    13.27 )-----------( 377      ( 19 Sep 2022 05:30 )             24.7     09-19    137  |  101  |  18  ----------------------------<  144<H>  4.0   |  27  |  0.79    Ca    8.5      19 Sep 2022 05:30  Phos  1.8     09-19  Mg     2.2     09-19        PT/INR - ( 17 Sep 2022 18:53 )   PT: 15.0 sec;   INR: 1.26            CAPILLARY BLOOD GLUCOSE      POCT Blood Glucose.: 151 mg/dL (19 Sep 2022 11:53)  POCT Blood Glucose.: 132 mg/dL (19 Sep 2022 07:17)  POCT Blood Glucose.: 164 mg/dL (18 Sep 2022 22:20)  POCT Blood Glucose.: 190 mg/dL (18 Sep 2022 18:19)        MICRODATA:    Urinalysis with Rflx Culture (collected 17 Sep 2022 12:05)        RADIOLOGY/OTHER STUDIES:

## 2022-09-19 NOTE — PROGRESS NOTE ADULT - ASSESSMENT
73 yo female with PMH of HTN, DM and PSH of  x2, hysterectomy and lap dev ( w/ Dr. Clifford) admitted  to surgery [facility] with 2 days of nausea/vomiting and abd pain w/ imaging findings consistent with large bowel obstruction. However, prior to operative exploration, the patient had return of bowel function. NGT removed however bowel obstruction recurred therefore pt taken to OR 9/15 for Exlap JOSEFINA, SBR ( 100 cm)  EBL:200 IVF:2000 UO:250. Pt kept intubated post op and transferred to SICU team, extubated on POD#0, stable for transfer to tele on POD#1. return to SICU POD#2 due to hypertension (BP  210/104), gotten labetalol 10, then 20, then 40 while in telemetry, asymptomatic, repeat BP down to 165/67. She was transfer back to SICU for close BP monitoring. pt baseline on labetalol 200 PO bid, losartan 100 qd, hctz 25 qd and nifedipine 60 qd at home. Her SBP remained below 160's all night, did not need any additional antihypertensives. transfer back to SDU next day ()     FLD, ISS  Pain/nausea control, avoid narcotics  Procardia 60qd, PO labetalol 200 bid,   Meropenem 1q12 (-) ID following  SCD/SQH  OOBA/IS  Endurance cath (-)  SHOSHANA x1 in SQ   AM labs

## 2022-09-19 NOTE — PROGRESS NOTE ADULT - ASSESSMENT
72 year old woman with HTN, DM2, past surgical history of  x 2, hysterectomy, and laparoscopic cholecystectomy (), admitted for large bowel obstruction; had spontaneous return of bowel function initially; however, bowel obstruction recurred, so taken to OR for ex lap with lysis of adhesions and bowel resection () . Monitored on SICU post -op .     # Large bowel obstruction   s/p OR for ex lap with lysis of adhesions and bowel resection ()  - rest of care per primary team         #HTN   Agree with cardiology recs to continue labetalol 200mg BID eli-operatively.   If SBP >180 mmHg persistently, [ ] Get bladder scan [ ] if pain well-controlled, and bladder scan shows no urinary retention, can use labetalol 10mg IV push if HR >60 bpm or hydralazine IV 5mg    # Type 2 Diabetes complicated by hyperglycemia   Takes metformin at home.   cw insulin sliding scale while inpatient.   resumed metformin on discharge.     # COVID 19 infection (present on admission)   Incidental finding of COVID 19 infection on admission nasal swab. Without any dyspnea, or sore throat.   No O2 requirement, does not meet criteria for remdesivir/dexamethasone.   Recommend enoxaparin for DVT ppx     DVT ppx - lovenox    72 year old woman with HTN, DM2, past surgical history of  x 2, hysterectomy, and laparoscopic cholecystectomy (), admitted for large bowel obstruction; had spontaneous return of bowel function initially; however, bowel obstruction recurred, so taken to OR for ex lap with lysis of adhesions and bowel resection () . Monitored on SICU post -op .     # Large bowel obstruction   s/p OR for ex lap with lysis of adhesions and bowel resection ()  - rest of care per primary team     #HTN   takes HCTZ 25mg qd, losartan 100mg qd, labetalol 200mg BID, nifedipine 60mg ER qd at home.   If SBP >180 mmHg persistently, [ ] Get bladder scan [ ] if pain well-controlled, and bladder scan shows no urinary retention, can use labetalol 10mg IV push if HR >60 bpm or hydralazine IV 5mg  - continue labetalol 200mg BID, nifepidine 60m ER qd  - Resume losartan at 50mg qd given BPs consistently >150mmHg; If remains elevated on 50mg qd, can resume full dose at 100mg qd   - Continue holding HCTZ until consistently tolerating PO    # Type 2 Diabetes complicated by hyperglycemia   Takes metformin at home.   cw insulin sliding scale while inpatient.   resume metformin on discharge.     # COVID 19 infection (present on admission)   Incidental finding of COVID 19 infection on admission nasal swab. Without any dyspnea, or sore throat.   No O2 requirement, does not meet criteria for remdesivir/dexamethasone.   Recommend enoxaparin for DVT ppx     # Acute blood loss anemia   Hgb downtrended from time of admission  Hemoglobin: 7.8 g/dL (22 @ 05:30)  Hemoglobin: 9.9 g/dL (22 @ 11:33)  Hemoglobin: 10.2 g/dL (22 @ 11:00)  Partially attributable to operative blood losses   continue to trend daily CBC while inpatient  [ ] recommend checking   - age appropriate cancer screening outpatient     # Hypophosphatemia - Phosphorus Level, Serum: 1.8 mg/dL (22 @ 05:30)  Phosphorus Level, Serum: 3.0 mg/dL (22 @ 04:53)  Phosphorus Level, Serum: 2.6 mg/dL (22 @ 18:53)   ; Likely 2/2 poor PO intake Replete    DVT ppx - lovenox

## 2022-09-20 LAB
ANION GAP SERPL CALC-SCNC: 11 MMOL/L — SIGNIFICANT CHANGE UP (ref 5–17)
BUN SERPL-MCNC: 11 MG/DL — SIGNIFICANT CHANGE UP (ref 7–23)
C DIFF GDH STL QL: NEGATIVE — SIGNIFICANT CHANGE UP
C DIFF GDH STL QL: SIGNIFICANT CHANGE UP
CALCIUM SERPL-MCNC: 8.5 MG/DL — SIGNIFICANT CHANGE UP (ref 8.4–10.5)
CHLORIDE SERPL-SCNC: 102 MMOL/L — SIGNIFICANT CHANGE UP (ref 96–108)
CO2 SERPL-SCNC: 25 MMOL/L — SIGNIFICANT CHANGE UP (ref 22–31)
CREAT SERPL-MCNC: 0.73 MG/DL — SIGNIFICANT CHANGE UP (ref 0.5–1.3)
EGFR: 88 ML/MIN/1.73M2 — SIGNIFICANT CHANGE UP
FERRITIN SERPL-MCNC: 442 NG/ML — HIGH (ref 15–150)
FOLATE SERPL-MCNC: 10.5 NG/ML — SIGNIFICANT CHANGE UP
GLUCOSE BLDC GLUCOMTR-MCNC: 111 MG/DL — HIGH (ref 70–99)
GLUCOSE BLDC GLUCOMTR-MCNC: 114 MG/DL — HIGH (ref 70–99)
GLUCOSE BLDC GLUCOMTR-MCNC: 160 MG/DL — HIGH (ref 70–99)
GLUCOSE BLDC GLUCOMTR-MCNC: 99 MG/DL — SIGNIFICANT CHANGE UP (ref 70–99)
GLUCOSE SERPL-MCNC: 111 MG/DL — HIGH (ref 70–99)
HCT VFR BLD CALC: 24.9 % — LOW (ref 34.5–45)
HGB BLD-MCNC: 7.9 G/DL — LOW (ref 11.5–15.5)
IRON SATN MFR SERPL: 22 % — SIGNIFICANT CHANGE UP (ref 14–50)
IRON SATN MFR SERPL: 29 UG/DL — LOW (ref 30–160)
MAGNESIUM SERPL-MCNC: 2 MG/DL — SIGNIFICANT CHANGE UP (ref 1.6–2.6)
MCHC RBC-ENTMCNC: 27.5 PG — SIGNIFICANT CHANGE UP (ref 27–34)
MCHC RBC-ENTMCNC: 31.7 GM/DL — LOW (ref 32–36)
MCV RBC AUTO: 86.8 FL — SIGNIFICANT CHANGE UP (ref 80–100)
NRBC # BLD: 0 /100 WBCS — SIGNIFICANT CHANGE UP (ref 0–0)
PHOSPHATE SERPL-MCNC: 2.7 MG/DL — SIGNIFICANT CHANGE UP (ref 2.5–4.5)
PLATELET # BLD AUTO: 384 K/UL — SIGNIFICANT CHANGE UP (ref 150–400)
POTASSIUM SERPL-MCNC: 3.8 MMOL/L — SIGNIFICANT CHANGE UP (ref 3.5–5.3)
POTASSIUM SERPL-SCNC: 3.8 MMOL/L — SIGNIFICANT CHANGE UP (ref 3.5–5.3)
RBC # BLD: 2.87 M/UL — LOW (ref 3.8–5.2)
RBC # FLD: 15.5 % — HIGH (ref 10.3–14.5)
SODIUM SERPL-SCNC: 138 MMOL/L — SIGNIFICANT CHANGE UP (ref 135–145)
TIBC SERPL-MCNC: 130 UG/DL — LOW (ref 220–430)
UIBC SERPL-MCNC: 101 UG/DL — LOW (ref 110–370)
VIT B12 SERPL-MCNC: 1480 PG/ML — HIGH (ref 232–1245)
WBC # BLD: 15.82 K/UL — HIGH (ref 3.8–10.5)
WBC # FLD AUTO: 15.82 K/UL — HIGH (ref 3.8–10.5)

## 2022-09-20 PROCEDURE — 99232 SBSQ HOSP IP/OBS MODERATE 35: CPT

## 2022-09-20 PROCEDURE — 36569 INSJ PICC 5 YR+ W/O IMAGING: CPT

## 2022-09-20 RX ORDER — DIATRIZOATE MEGLUMINE 180 MG/ML
30 INJECTION, SOLUTION INTRAVESICAL ONCE
Refills: 0 | Status: COMPLETED | OUTPATIENT
Start: 2022-09-20 | End: 2022-09-20

## 2022-09-20 RX ORDER — CHLORHEXIDINE GLUCONATE 213 G/1000ML
1 SOLUTION TOPICAL
Refills: 0 | Status: DISCONTINUED | OUTPATIENT
Start: 2022-09-20 | End: 2022-10-21

## 2022-09-20 RX ORDER — LIDOCAINE 4 G/100G
1 CREAM TOPICAL ONCE
Refills: 0 | Status: COMPLETED | OUTPATIENT
Start: 2022-09-20 | End: 2022-09-20

## 2022-09-20 RX ORDER — POTASSIUM PHOSPHATE, MONOBASIC POTASSIUM PHOSPHATE, DIBASIC 236; 224 MG/ML; MG/ML
15 INJECTION, SOLUTION INTRAVENOUS ONCE
Refills: 0 | Status: COMPLETED | OUTPATIENT
Start: 2022-09-20 | End: 2022-09-20

## 2022-09-20 RX ORDER — SODIUM CHLORIDE 9 MG/ML
10 INJECTION INTRAMUSCULAR; INTRAVENOUS; SUBCUTANEOUS
Refills: 0 | Status: DISCONTINUED | OUTPATIENT
Start: 2022-09-20 | End: 2022-10-21

## 2022-09-20 RX ADMIN — HEPARIN SODIUM 5000 UNIT(S): 5000 INJECTION INTRAVENOUS; SUBCUTANEOUS at 14:49

## 2022-09-20 RX ADMIN — LIDOCAINE 1 PATCH: 4 CREAM TOPICAL at 18:44

## 2022-09-20 RX ADMIN — MEROPENEM 100 MILLIGRAM(S): 1 INJECTION INTRAVENOUS at 14:49

## 2022-09-20 RX ADMIN — POTASSIUM PHOSPHATE, MONOBASIC POTASSIUM PHOSPHATE, DIBASIC 62.5 MILLIMOLE(S): 236; 224 INJECTION, SOLUTION INTRAVENOUS at 10:54

## 2022-09-20 RX ADMIN — LOSARTAN POTASSIUM 50 MILLIGRAM(S): 100 TABLET, FILM COATED ORAL at 05:57

## 2022-09-20 RX ADMIN — Medication 200 MILLIGRAM(S): at 21:00

## 2022-09-20 RX ADMIN — MEROPENEM 100 MILLIGRAM(S): 1 INJECTION INTRAVENOUS at 21:00

## 2022-09-20 RX ADMIN — Medication 2: at 12:13

## 2022-09-20 RX ADMIN — ATORVASTATIN CALCIUM 20 MILLIGRAM(S): 80 TABLET, FILM COATED ORAL at 21:00

## 2022-09-20 RX ADMIN — HEPARIN SODIUM 5000 UNIT(S): 5000 INJECTION INTRAVENOUS; SUBCUTANEOUS at 05:57

## 2022-09-20 RX ADMIN — MEROPENEM 100 MILLIGRAM(S): 1 INJECTION INTRAVENOUS at 05:57

## 2022-09-20 RX ADMIN — DIATRIZOATE MEGLUMINE 30 MILLILITER(S): 180 INJECTION, SOLUTION INTRAVESICAL at 16:56

## 2022-09-20 RX ADMIN — Medication 60 MILLIGRAM(S): at 14:49

## 2022-09-20 RX ADMIN — HEPARIN SODIUM 5000 UNIT(S): 5000 INJECTION INTRAVENOUS; SUBCUTANEOUS at 21:00

## 2022-09-20 RX ADMIN — Medication 200 MILLIGRAM(S): at 09:53

## 2022-09-20 NOTE — CONSULT NOTE ADULT - SUBJECTIVE AND OBJECTIVE BOX
Vascular Access Service Consult Note    72yFemaleHEAL ISSUES - PROBLEM Dx:  Large bowel obstruction    Pre-operative examination    Hypertension    Type 2 diabetes mellitus with hyperglycemia    2019 novel coronavirus disease (COVID-19)               Diagnosis: abdominal abscess     Indications for Vascular Access (Check all that apply)  [ X ]  Antibiotic Therapy       Antibiotic Prescribed: meropenem x 7 days                                                                                  [  ]  IV Hydration  [  ]  Total Parenteral Nutrition  [  ]  Chemotherapy  [  ]  Difficult Venous Access  [  ]  CVP monitoring  [  ]  Medications with high potential for tissue necrosis on extravasation  [  ]  Other    Screening (Check all that apply)  Previous Radiation to chest  [  ] Yes      [X  ]  No  Breast Cancer                          [  ] Left     [  ]  Right    [ X ]  No  Lymph Node Dissection         [  ] Left     [  ]  Right    [ X ]  No  Pacemaker or ICD                   [  ] Left     [  ]  Right    [X  ]  No  Upper Extremity DVT             [  ] Left     [  ]  Right    [  X]  No  Chronic Kidney Disease         [  ]  Yes     [X  ]  No  Hemodialysis                           [  ]  Yes     [X ]  No  AV Fistula/ Graft                     [  ]  Left    [  ]  Right    [ X ]  No  Temp>101F in past 24 H       [  ]  Yes     [  ]X  No  H/O PICC/Midline                   [  ]  Yes     [ X ]  No    Lab data:                        7.9    15.82 )-----------( 384      ( 20 Sep 2022 07:40 )             24.9     09-20    138  |  102  |  11  ----------------------------<  111<H>  3.8   |  25  |  0.73    Ca    8.5      20 Sep 2022 07:40  Phos  2.7     09-20  Mg     2.0     09-20                I have reviewed the chart, interviewed and examined the patient and determined that this patient:  [  ] Is a candidate for a PICC line  [ X ] Is a candidate for a Midline  [  ] Is not a candidate for vascular access device (reason)    Lumens:    [X  ] Single  [  ] Double

## 2022-09-20 NOTE — PROGRESS NOTE ADULT - ASSESSMENT
72 year old woman with HTN, DM2, past surgical history of  x 2, hysterectomy, and laparoscopic cholecystectomy (), admitted for large bowel obstruction; had spontaneous return of bowel function initially; however, bowel obstruction recurred, so taken to OR for ex lap with lysis of adhesions and bowel resection () . Monitored on SICU post -op .     # Large bowel obstruction   s/p OR for ex lap with lysis of adhesions and bowel resection ()  - rest of care per primary team     #HTN   takes HCTZ 25mg qd, losartan 100mg qd, labetalol 200mg BID, nifedipine 60mg ER qd at home.   If SBP >180 mmHg persistently, [ ] Get bladder scan [ ] if pain well-controlled, and bladder scan shows no urinary retention, can use labetalol 10mg IV push if HR >60 bpm or hydralazine IV 5mg  - continue labetalol 200mg BID, nifepidine 60m ER qd  - continue losartan at 50mg qd; If BP remains consistently elevated >150mmHg on 50mg qd, can resume full home dose at 100mg qd   - Continue holding HCTZ until consistently tolerating PO    # Type 2 Diabetes complicated by hyperglycemia   Takes metformin at home.   cw insulin sliding scale while inpatient.   resume metformin on discharge.     # COVID 19 infection (present on admission)   Incidental finding of COVID 19 infection on admission nasal swab. Without any dyspnea, or sore throat.   No O2 requirement, does not meet criteria for remdesivir/dexamethasone.   Recommend enoxaparin for DVT ppx     # Acute blood loss anemia   Hgb downtrended from time of admission  Hemoglobin: 7.8 g/dL (22 @ 05:30)  Hemoglobin: 9.9 g/dL (22 @ 11:33)  Hemoglobin: 10.2 g/dL (22 @ 11:00)  Partially attributable to operative blood losses   continue to trend daily CBC while inpatient;   ferritin, iron sat not low  - age appropriate cancer screening outpatient     # Hypophosphatemia - resolved    DVT ppx - lovenox

## 2022-09-20 NOTE — PROGRESS NOTE ADULT - ASSESSMENT
71 yo female with PMH of HTN, DM and PSH of  x2, hysterectomy and lap dev ( w/ Dr. Clifford) admitted  to surgery [facility] with 2 days of nausea/vomiting and abd pain w/ imaging findings consistent with large bowel obstruction. However, prior to operative exploration, the patient had return of bowel function. NGT removed however bowel obstruction recurred therefore pt taken to OR 9/15 for Exlap JOSEFINA, SBR ( 100 cm)  EBL:200 IVF:2000 UO:250. Pt kept intubated post op and transferred to SICU team, extubated on POD#0, stable for transfer to tele on POD#1. return to SICU POD#2 due to hypertension (BP  210/104), gotten labetalol 10, then 20, then 40 while in telemetry, asymptomatic, repeat BP down to 165/67. She was transfer back to SICU for close BP monitoring. pt baseline on labetalol 200 PO bid, losartan 100 qd, hctz 25 qd and nifedipine 60 qd at home. Her SBP remained below 160's all night, did not need any additional antihypertensives. transfer back to SDU next day ()     Soft diet, ISS  Pain/nausea control, avoid narcotics  Procardia 60qd, PO labetalol 200 bid,   Meropenem 1q12 (-) ID following  SCD/SQH  OOBA/IS  Endurance cath (-)  SHOSHANA x1 in SQ   AM labs

## 2022-09-20 NOTE — PROGRESS NOTE ADULT - SUBJECTIVE AND OBJECTIVE BOX
STATUS POST:  Exlap JOSEFINA, SBR (100cm)    POST OPERATIVE DAY #: 5    SUBJECTIVE: Pt seen and examined at bedside this am by surgery team. Patient is lying comfortably in bed. Tolerating diet, pain well controlled with current regimen. Patient denies fever, nausea, vomiting, chest pain, and shortness of breath. Patient is passing gas and having bowel movements.     MEDICATIONS  (STANDING):  atorvastatin 20 milliGRAM(s) Oral at bedtime  BUpivacaine 0.5% On-Q Pump 400 milliLiter(s) (4 mL/Hr) IntraDermal. <Continuous>  dextrose 5%. 1000 milliLiter(s) (100 mL/Hr) IV Continuous <Continuous>  dextrose 5%. 1000 milliLiter(s) (50 mL/Hr) IV Continuous <Continuous>  dextrose 50% Injectable 25 Gram(s) IV Push once  dextrose 50% Injectable 12.5 Gram(s) IV Push once  dextrose 50% Injectable 25 Gram(s) IV Push once  glucagon  Injectable 1 milliGRAM(s) IntraMuscular once  heparin   Injectable 5000 Unit(s) SubCutaneous every 8 hours  influenza  Vaccine (HIGH DOSE) 0.7 milliLiter(s) IntraMuscular once  insulin lispro (ADMELOG) corrective regimen sliding scale   SubCutaneous three times a day before meals  insulin lispro (ADMELOG) corrective regimen sliding scale   SubCutaneous at bedtime  labetalol 200 milliGRAM(s) Oral every 12 hours  losartan 50 milliGRAM(s) Oral daily  meropenem  IVPB 1000 milliGRAM(s) IV Intermittent every 8 hours  NIFEdipine XL 60 milliGRAM(s) Oral every 24 hours    MEDICATIONS  (PRN):  dextrose Oral Gel 15 Gram(s) Oral once PRN Blood Glucose LESS THAN 70 milliGRAM(s)/deciliter  ondansetron Injectable 4 milliGRAM(s) IV Push every 8 hours PRN Nausea and/or Vomiting      Vital Signs Last 24 Hrs  T(C): 37.1 (20 Sep 2022 05:35), Max: 37.2 (19 Sep 2022 17:20)  T(F): 98.7 (20 Sep 2022 05:35), Max: 98.9 (19 Sep 2022 17:20)  HR: 85 (20 Sep 2022 05:35) (79 - 90)  BP: 162/75 (20 Sep 2022 05:35) (152/79 - 162/75)  BP(mean): --  RR: 18 (20 Sep 2022 05:35) (16 - 19)  SpO2: 96% (20 Sep 2022 05:35) (95% - 99%)    Parameters below as of 20 Sep 2022 05:35  Patient On (Oxygen Delivery Method): room air        Physical Exam  General: Patient is doing well and lying in bed comfortably  Constitutional: alert and oriented   Pulm: Nonlabored breathing, no respiratory distress  CV: Regular rate and rhythm, normal sinus rhythm  Abd: soft, nontender, nondistended. No rebound, no guarding.             Incisions: clean, dry, intact and healing well, no erythema/induration/edema  Extremities: warm, well perfused, no edema    I&O's Detail    19 Sep 2022 07:01  -  20 Sep 2022 07:00  --------------------------------------------------------  IN:  Total IN: 0 mL    OUT:    Voided (mL): 550 mL  Total OUT: 550 mL    Total NET: -550 mL        LABS:                        7.9    15.82 )-----------( 384      ( 20 Sep 2022 07:40 )             24.9     09-19    137  |  101  |  18  ----------------------------<  144<H>  4.0   |  27  |  0.79    Ca    8.5      19 Sep 2022 05:30  Phos  1.8     09-19  Mg     2.2     09-19

## 2022-09-20 NOTE — PROGRESS NOTE ADULT - SUBJECTIVE AND OBJECTIVE BOX
Patient is a 72y old  Female who presents with a chief complaint of Large bowel obstruction (19 Sep 2022 16:55)    INTERVAL EVENTS:  - WBC uptrending today   - C diff negative   - no nausea, no dysuria     SUBJECTIVE:  Patient was seen and examined at bedside.  Review of systems: No fever, chills, HA, CP, dyspnea, nausea or vomiting, dysuria, LE edema. Rest of 12 point Review of systems negative unless otherwise documented elsewhere in note.     MEDICATIONS:  MEDICATIONS  (STANDING):  atorvastatin 20 milliGRAM(s) Oral at bedtime  BUpivacaine 0.5% On-Q Pump 400 milliLiter(s) (4 mL/Hr) IntraDermal. <Continuous>  chlorhexidine 2% Cloths 1 Application(s) Topical <User Schedule>  dextrose 5%. 1000 milliLiter(s) (100 mL/Hr) IV Continuous <Continuous>  dextrose 5%. 1000 milliLiter(s) (50 mL/Hr) IV Continuous <Continuous>  dextrose 50% Injectable 25 Gram(s) IV Push once  dextrose 50% Injectable 12.5 Gram(s) IV Push once  dextrose 50% Injectable 25 Gram(s) IV Push once  diatrizoate meglumine/diatrizoate sodium. 30 milliLiter(s) Oral once  glucagon  Injectable 1 milliGRAM(s) IntraMuscular once  heparin   Injectable 5000 Unit(s) SubCutaneous every 8 hours  influenza  Vaccine (HIGH DOSE) 0.7 milliLiter(s) IntraMuscular once  insulin lispro (ADMELOG) corrective regimen sliding scale   SubCutaneous three times a day before meals  insulin lispro (ADMELOG) corrective regimen sliding scale   SubCutaneous at bedtime  labetalol 200 milliGRAM(s) Oral every 12 hours  lidocaine   4% Patch 1 Patch Transdermal once  losartan 50 milliGRAM(s) Oral daily  meropenem  IVPB 1000 milliGRAM(s) IV Intermittent every 8 hours  NIFEdipine XL 60 milliGRAM(s) Oral every 24 hours    MEDICATIONS  (PRN):  dextrose Oral Gel 15 Gram(s) Oral once PRN Blood Glucose LESS THAN 70 milliGRAM(s)/deciliter  ondansetron Injectable 4 milliGRAM(s) IV Push every 8 hours PRN Nausea and/or Vomiting  sodium chloride 0.9% lock flush 10 milliLiter(s) IV Push every 1 hour PRN Pre/post blood products, medications, blood draw, and to maintain line patency    Allergies    penicillin (Rash)    Intolerances    OBJECTIVE:  Vital Signs Last 24 Hrs  T(C): 36.4 (20 Sep 2022 12:45), Max: 37.2 (19 Sep 2022 17:20)  T(F): 97.5 (20 Sep 2022 12:45), Max: 98.9 (19 Sep 2022 17:20)  HR: 84 (20 Sep 2022 14:30) (79 - 90)  BP: 177/84 (20 Sep 2022 14:30) (141/67 - 177/84)  BP(mean): --  RR: 18 (20 Sep 2022 12:45) (16 - 18)  SpO2: 97% (20 Sep 2022 12:45) (96% - 99%)    Parameters below as of 20 Sep 2022 12:45  Patient On (Oxygen Delivery Method): room air      I&O's Summary    19 Sep 2022 07:01  -  20 Sep 2022 07:00  --------------------------------------------------------  IN: 0 mL / OUT: 565 mL / NET: -565 mL      PHYSICAL EXAM:  Gen: Reclining in bed at time of exam, appears stated age  HEENT: NCAT, MMM, clear OP  Neck: supple, trachea at midline  CV: RRR, +S1/S2  Pulm: adequate respiratory effort, no increase in work of breathing  Abd: soft, ND ; minimal tenderness to light palpation ; abdominal incisions clean, dry.   Skin: warm and dry,   Ext: WWP, no LE edema  Neuro: AOx3, no gross focal neurological deficits  Psych: affect and behavior appropriate, pleasant at time of interview      LABS:                        7.9    15.82 )-----------( 384      ( 20 Sep 2022 07:40 )             24.9     09-20    138  |  102  |  11  ----------------------------<  111<H>  3.8   |  25  |  0.73    Ca    8.5      20 Sep 2022 07:40  Phos  2.7     09-20  Mg     2.0     09-20          CAPILLARY BLOOD GLUCOSE      POCT Blood Glucose.: 160 mg/dL (20 Sep 2022 12:07)  POCT Blood Glucose.: 114 mg/dL (20 Sep 2022 08:02)  POCT Blood Glucose.: 123 mg/dL (19 Sep 2022 22:24)  POCT Blood Glucose.: 129 mg/dL (19 Sep 2022 17:42)        MICRODATA:      RADIOLOGY/OTHER STUDIES:

## 2022-09-21 LAB
ANION GAP SERPL CALC-SCNC: 12 MMOL/L — SIGNIFICANT CHANGE UP (ref 5–17)
APPEARANCE UR: CLEAR — SIGNIFICANT CHANGE UP
BACTERIA # UR AUTO: SIGNIFICANT CHANGE UP /HPF
BILIRUB UR-MCNC: NEGATIVE — SIGNIFICANT CHANGE UP
BUN SERPL-MCNC: 9 MG/DL — SIGNIFICANT CHANGE UP (ref 7–23)
CALCIUM SERPL-MCNC: 8.7 MG/DL — SIGNIFICANT CHANGE UP (ref 8.4–10.5)
CHLORIDE SERPL-SCNC: 101 MMOL/L — SIGNIFICANT CHANGE UP (ref 96–108)
CO2 SERPL-SCNC: 25 MMOL/L — SIGNIFICANT CHANGE UP (ref 22–31)
COLOR SPEC: YELLOW — SIGNIFICANT CHANGE UP
COMMENT - URINE: SIGNIFICANT CHANGE UP
CREAT SERPL-MCNC: 0.7 MG/DL — SIGNIFICANT CHANGE UP (ref 0.5–1.3)
DIFF PNL FLD: ABNORMAL
EGFR: 93 ML/MIN/1.73M2 — SIGNIFICANT CHANGE UP
EPI CELLS # UR: ABNORMAL /HPF (ref 0–5)
GLUCOSE BLDC GLUCOMTR-MCNC: 101 MG/DL — HIGH (ref 70–99)
GLUCOSE BLDC GLUCOMTR-MCNC: 111 MG/DL — HIGH (ref 70–99)
GLUCOSE BLDC GLUCOMTR-MCNC: 86 MG/DL — SIGNIFICANT CHANGE UP (ref 70–99)
GLUCOSE BLDC GLUCOMTR-MCNC: 99 MG/DL — SIGNIFICANT CHANGE UP (ref 70–99)
GLUCOSE SERPL-MCNC: 94 MG/DL — SIGNIFICANT CHANGE UP (ref 70–99)
GLUCOSE UR QL: NEGATIVE — SIGNIFICANT CHANGE UP
HCT VFR BLD CALC: 26.5 % — LOW (ref 34.5–45)
HGB BLD-MCNC: 8.5 G/DL — LOW (ref 11.5–15.5)
KETONES UR-MCNC: ABNORMAL MG/DL
LEUKOCYTE ESTERASE UR-ACNC: NEGATIVE — SIGNIFICANT CHANGE UP
MAGNESIUM SERPL-MCNC: 2.2 MG/DL — SIGNIFICANT CHANGE UP (ref 1.6–2.6)
MCHC RBC-ENTMCNC: 27.3 PG — SIGNIFICANT CHANGE UP (ref 27–34)
MCHC RBC-ENTMCNC: 32.1 GM/DL — SIGNIFICANT CHANGE UP (ref 32–36)
MCV RBC AUTO: 85.2 FL — SIGNIFICANT CHANGE UP (ref 80–100)
NITRITE UR-MCNC: NEGATIVE — SIGNIFICANT CHANGE UP
NRBC # BLD: 0 /100 WBCS — SIGNIFICANT CHANGE UP (ref 0–0)
PH UR: 6.5 — SIGNIFICANT CHANGE UP (ref 5–8)
PHOSPHATE SERPL-MCNC: 3 MG/DL — SIGNIFICANT CHANGE UP (ref 2.5–4.5)
PLATELET # BLD AUTO: 442 K/UL — HIGH (ref 150–400)
POTASSIUM SERPL-MCNC: 4.1 MMOL/L — SIGNIFICANT CHANGE UP (ref 3.5–5.3)
POTASSIUM SERPL-SCNC: 4.1 MMOL/L — SIGNIFICANT CHANGE UP (ref 3.5–5.3)
PROT UR-MCNC: 30 MG/DL
RBC # BLD: 3.11 M/UL — LOW (ref 3.8–5.2)
RBC # FLD: 15.9 % — HIGH (ref 10.3–14.5)
RBC CASTS # UR COMP ASSIST: < 5 /HPF — SIGNIFICANT CHANGE UP
SODIUM SERPL-SCNC: 138 MMOL/L — SIGNIFICANT CHANGE UP (ref 135–145)
SP GR SPEC: 1.01 — SIGNIFICANT CHANGE UP (ref 1–1.03)
UROBILINOGEN FLD QL: 0.2 E.U./DL — SIGNIFICANT CHANGE UP
WBC # BLD: 17.17 K/UL — HIGH (ref 3.8–10.5)
WBC # FLD AUTO: 17.17 K/UL — HIGH (ref 3.8–10.5)
WBC UR QL: < 5 /HPF — SIGNIFICANT CHANGE UP

## 2022-09-21 PROCEDURE — 74177 CT ABD & PELVIS W/CONTRAST: CPT | Mod: 26

## 2022-09-21 PROCEDURE — 99232 SBSQ HOSP IP/OBS MODERATE 35: CPT

## 2022-09-21 RX ORDER — LOSARTAN POTASSIUM 100 MG/1
100 TABLET, FILM COATED ORAL DAILY
Refills: 0 | Status: DISCONTINUED | OUTPATIENT
Start: 2022-09-21 | End: 2022-09-21

## 2022-09-21 RX ORDER — NIFEDIPINE 30 MG
30 TABLET, EXTENDED RELEASE 24 HR ORAL ONCE
Refills: 0 | Status: DISCONTINUED | OUTPATIENT
Start: 2022-09-21 | End: 2022-09-21

## 2022-09-21 RX ORDER — LABETALOL HCL 100 MG
10 TABLET ORAL ONCE
Refills: 0 | Status: COMPLETED | OUTPATIENT
Start: 2022-09-21 | End: 2022-09-21

## 2022-09-21 RX ORDER — LOSARTAN POTASSIUM 100 MG/1
50 TABLET, FILM COATED ORAL ONCE
Refills: 0 | Status: COMPLETED | OUTPATIENT
Start: 2022-09-21 | End: 2022-09-21

## 2022-09-21 RX ORDER — LOSARTAN POTASSIUM 100 MG/1
50 TABLET, FILM COATED ORAL DAILY
Refills: 0 | Status: DISCONTINUED | OUTPATIENT
Start: 2022-09-22 | End: 2022-10-07

## 2022-09-21 RX ADMIN — HEPARIN SODIUM 5000 UNIT(S): 5000 INJECTION INTRAVENOUS; SUBCUTANEOUS at 15:12

## 2022-09-21 RX ADMIN — Medication 60 MILLIGRAM(S): at 15:11

## 2022-09-21 RX ADMIN — CHLORHEXIDINE GLUCONATE 1 APPLICATION(S): 213 SOLUTION TOPICAL at 07:05

## 2022-09-21 RX ADMIN — LOSARTAN POTASSIUM 50 MILLIGRAM(S): 100 TABLET, FILM COATED ORAL at 07:07

## 2022-09-21 RX ADMIN — MEROPENEM 100 MILLIGRAM(S): 1 INJECTION INTRAVENOUS at 06:12

## 2022-09-21 RX ADMIN — MEROPENEM 100 MILLIGRAM(S): 1 INJECTION INTRAVENOUS at 15:11

## 2022-09-21 RX ADMIN — Medication 200 MILLIGRAM(S): at 06:13

## 2022-09-21 RX ADMIN — LIDOCAINE 1 PATCH: 4 CREAM TOPICAL at 07:05

## 2022-09-21 RX ADMIN — LOSARTAN POTASSIUM 50 MILLIGRAM(S): 100 TABLET, FILM COATED ORAL at 06:13

## 2022-09-21 RX ADMIN — Medication 10 MILLIGRAM(S): at 02:57

## 2022-09-21 RX ADMIN — ATORVASTATIN CALCIUM 20 MILLIGRAM(S): 80 TABLET, FILM COATED ORAL at 22:55

## 2022-09-21 RX ADMIN — MEROPENEM 100 MILLIGRAM(S): 1 INJECTION INTRAVENOUS at 22:55

## 2022-09-21 RX ADMIN — HEPARIN SODIUM 5000 UNIT(S): 5000 INJECTION INTRAVENOUS; SUBCUTANEOUS at 22:56

## 2022-09-21 RX ADMIN — HEPARIN SODIUM 5000 UNIT(S): 5000 INJECTION INTRAVENOUS; SUBCUTANEOUS at 06:13

## 2022-09-21 RX ADMIN — Medication 200 MILLIGRAM(S): at 19:03

## 2022-09-21 NOTE — PROGRESS NOTE ADULT - SUBJECTIVE AND OBJECTIVE BOX
STATUS POST:  ERIC Martinez   POST OPERATIVE DAY #: 6    SUBJECTIVE: Pt seen and examined by chief resident. Pt is doing well, resting comfortably on bed. Pain controlled. Diet tolerated. +F/+loose BMs. No nausea or vomiting. No complaints at this time.      Vital Signs Last 24 Hrs  T(C): 37.6 (21 Sep 2022 05:45), Max: 37.6 (20 Sep 2022 18:00)  T(F): 99.6 (21 Sep 2022 05:45), Max: 99.6 (20 Sep 2022 18:00)  HR: 84 (21 Sep 2022 05:45) (78 - 91)  BP: 158/83 (21 Sep 2022 05:45) (141/67 - 177/84)  BP(mean): --  RR: 17 (21 Sep 2022 05:45) (17 - 18)  SpO2: 96% (21 Sep 2022 00:11) (96% - 99%)    Parameters below as of 21 Sep 2022 05:45  Patient On (Oxygen Delivery Method): room air        I&O's Summary    20 Sep 2022 07:01  -  21 Sep 2022 07:00  --------------------------------------------------------  IN: 304 mL / OUT: 360 mL / NET: -56 mL        Physical Exam:  General Appearance: Appears well, NAD  Pulmonary: Nonlabored breathing, no respiratory distress  Abdomen: Soft, nondisteded, nontender, SHOSHANA serosanguinous prevena midline holding suction  Extremities: WWP, SCD's in place     LABS:                        7.9    15.82 )-----------( 384      ( 20 Sep 2022 07:40 )             24.9     09-20    138  |  102  |  11  ----------------------------<  111<H>  3.8   |  25  |  0.73    Ca    8.5      20 Sep 2022 07:40  Phos  2.7     09-20  Mg     2.0     09-20

## 2022-09-21 NOTE — PROGRESS NOTE ADULT - ASSESSMENT
72 year old woman with HTN, DM2, past surgical history of  x 2, hysterectomy, and laparoscopic cholecystectomy (), admitted for large bowel obstruction; had spontaneous return of bowel function initially; however, bowel obstruction recurred, so taken to OR for ex lap with lysis of adhesions and bowel resection () . Monitored in SICU post -op ; now on SDU     # Large bowel obstruction   s/p OR for ex lap with lysis of adhesions and bowel resection ()  - rest of care per primary team     # Leukocytosis - unclear etiology; Unlikely 2/2 COVID given that patient first tested positive on . continue meropenem for now; f/u CT abd pelvis final read     #HTN   takes HCTZ 25mg qd, losartan 100mg qd, labetalol 200mg BID, nifedipine 60mg ER qd at home.   If SBP >180 mmHg persistently, [ ] Get bladder scan [ ] if pain well-controlled, and bladder scan shows no urinary retention, can use labetalol 10mg IV push if HR >60 bpm or hydralazine IV 5mg  - continue labetalol 200mg BID, nifepidine 60m ER qd  - continue losartan at 50mg qd; If BP remains consistently elevated >150mmHg on 50mg qd, can resume full home dose at 100mg qd   - Continue holding HCTZ until consistently tolerating PO    # Type 2 Diabetes complicated by hyperglycemia   Takes metformin at home.   cw insulin sliding scale while inpatient.   resume metformin on discharge.     # COVID 19 infection (present on admission)   Incidental finding of COVID 19 infection on admission nasal swab. Without any dyspnea, or sore throat.   No O2 requirement, does not meet criteria for remdesivir/dexamethasone.   Recommend enoxaparin for DVT ppx     # Acute blood loss anemia   Hgb downtrended from time of admission  Hemoglobin: 7.8 g/dL (22 @ 05:30)  Hemoglobin: 9.9 g/dL (22 @ 11:33)  Hemoglobin: 10.2 g/dL (22 @ 11:00)  Partially attributable to operative blood losses   continue to trend daily CBC while inpatient;   ferritin, iron sat not low  - age appropriate cancer screening outpatient     # Hypophosphatemia - resolved    DVT ppx - lovenox

## 2022-09-21 NOTE — PROGRESS NOTE ADULT - SUBJECTIVE AND OBJECTIVE BOX
Patient is a 72y old  Female who presents with a chief complaint of Large bowel obstruction (20 Sep 2022 15:24)    INTERVAL EVENTS:  WBC still uptrending   no nausea, no dysuria, no dyspnea     SUBJECTIVE:  Patient was seen and examined at bedside.    Review of systems: No fever, chills, dizziness, HA, Changes in vision, CP, dyspnea, nausea or vomiting, dysuria, changes in bowel movements, LE edema. Rest of 12 point Review of systems negative unless otherwise documented elsewhere in note.     MEDICATIONS:  MEDICATIONS  (STANDING):  atorvastatin 20 milliGRAM(s) Oral at bedtime  BUpivacaine 0.5% On-Q Pump 400 milliLiter(s) (4 mL/Hr) IntraDermal. <Continuous>  chlorhexidine 2% Cloths 1 Application(s) Topical <User Schedule>  dextrose 5%. 1000 milliLiter(s) (100 mL/Hr) IV Continuous <Continuous>  dextrose 5%. 1000 milliLiter(s) (50 mL/Hr) IV Continuous <Continuous>  dextrose 50% Injectable 25 Gram(s) IV Push once  dextrose 50% Injectable 12.5 Gram(s) IV Push once  dextrose 50% Injectable 25 Gram(s) IV Push once  glucagon  Injectable 1 milliGRAM(s) IntraMuscular once  heparin   Injectable 5000 Unit(s) SubCutaneous every 8 hours  influenza  Vaccine (HIGH DOSE) 0.7 milliLiter(s) IntraMuscular once  insulin lispro (ADMELOG) corrective regimen sliding scale   SubCutaneous three times a day before meals  insulin lispro (ADMELOG) corrective regimen sliding scale   SubCutaneous at bedtime  labetalol 200 milliGRAM(s) Oral every 12 hours  losartan 50 milliGRAM(s) Oral daily  meropenem  IVPB 1000 milliGRAM(s) IV Intermittent every 8 hours  NIFEdipine XL 60 milliGRAM(s) Oral every 24 hours    MEDICATIONS  (PRN):  dextrose Oral Gel 15 Gram(s) Oral once PRN Blood Glucose LESS THAN 70 milliGRAM(s)/deciliter  ondansetron Injectable 4 milliGRAM(s) IV Push every 8 hours PRN Nausea and/or Vomiting  sodium chloride 0.9% lock flush 10 milliLiter(s) IV Push every 1 hour PRN Pre/post blood products, medications, blood draw, and to maintain line patency    Allergies    penicillin (Rash)    Intolerances        OBJECTIVE:  Vital Signs Last 24 Hrs  T(C): 36.6 (21 Sep 2022 21:01), Max: 37.6 (21 Sep 2022 05:45)  T(F): 97.8 (21 Sep 2022 21:), Max: 99.6 (21 Sep 2022 05:45)  HR: 88 (21 Sep 2022 21:) (80 - 91)  BP: 152/73 (21 Sep 2022 21:) (147/75 - 170/78)  BP(mean): --  RR: 18 (21 Sep 2022 21:) (17 - 18)  SpO2: 100% (21 Sep 2022 21:) (98% - 100%)    Parameters below as of 21 Sep 2022 21:  Patient On (Oxygen Delivery Method): room air      I&O's Summary    20 Sep 2022 07:01  -  21 Sep 2022 07:00  --------------------------------------------------------  IN: 304 mL / OUT: 370 mL / NET: -66 mL    21 Sep 2022 07:01  -  22 Sep 2022 00:13  --------------------------------------------------------  IN: 50 mL / OUT: 440 mL / NET: -390 mL        PHYSICAL EXAM:  Gen: Reclining in bed at time of exam, appears stated age  HEENT: NCAT, MMM, clear OP  Neck: supple, trachea at midline  CV: RRR, +S1/S2  Pulm: adequate respiratory effort, no increase in work of breathing  Abd: soft, NTND  Skin: warm and dry,   Ext: WWP, no LE edema  Neuro: AOx3, no gross focal neurological deficits  Psych: affect and behavior appropriate, pleasant at time of interview    LABS:                        8.5    17.17 )-----------( 442      ( 21 Sep 2022 05:30 )             26.5     09-21    138  |  101  |  9   ----------------------------<  94  4.1   |  25  |  0.70    Ca    8.7      21 Sep 2022 05:30  Phos  3.0     -  Mg     2.2     -          CAPILLARY BLOOD GLUCOSE      POCT Blood Glucose.: 99 mg/dL (21 Sep 2022 22:28)  POCT Blood Glucose.: 101 mg/dL (21 Sep 2022 18:36)  POCT Blood Glucose.: 111 mg/dL (21 Sep 2022 12:00)  POCT Blood Glucose.: 86 mg/dL (21 Sep 2022 08:06)    Urinalysis Basic - ( 21 Sep 2022 17:06 )    Color: Yellow / Appearance: Clear / S.015 / pH: x  Gluc: x / Ketone: Trace mg/dL  / Bili: Negative / Urobili: 0.2 E.U./dL   Blood: x / Protein: 30 mg/dL / Nitrite: NEGATIVE   Leuk Esterase: NEGATIVE / RBC: < 5 /HPF / WBC < 5 /HPF   Sq Epi: x / Non Sq Epi: Moderate /HPF / Bacteria: None /HPF        MICRODATA:      RADIOLOGY/OTHER STUDIES:

## 2022-09-21 NOTE — PROGRESS NOTE ADULT - ASSESSMENT
73 yo female with PMH of HTN, DM and PSH of  x2, hysterectomy and lap dev ( w/ Dr. Clifford) admitted  to surgery svc with 2 days of nausea/vomiting and abd pain w/ imaging findings consistent with large bowel obstruction. However, prior to operative exploration, the patient had return of bowel function. NGT removed however bowel obstruction recurred therefore pt taken to OR 9/15 for Exlap JOSEFINA, SBR ( 100 cm)  EBL:200 IVF:2000 UO:250. Pt kept intubated post op and transferred to SICU team, extubated on POD#0, stable for transfer to tele on POD#1. return to SICU POD#2 due to hypertension (BP  210/104), gotten labetalol 10, then 20, then 40 while in telemetry, asymptomatic, repeat BP down to 165/67. She was transfer back to SICU for close BP monitoring. pt baseline on labetalol 200 PO bid, losartan 100 qd, hctz 25 qd and nifedipine 60 qd at home. Her SBP remained below 160's all night, did not need any additional antihypertensives. transfer back to SDU next day ()     Soft diet, ISS  Pain/nausea control, avoid narcotics  Procardia 60qd, PO labetalol 200 bid,   Meropenem (-) ID following  SCD/SQH  OOBA/IS  Endurance cath (-)  SHOSHANA x1 in SQ   AM labs  PICC (-)

## 2022-09-22 LAB
ANION GAP SERPL CALC-SCNC: 8 MMOL/L — SIGNIFICANT CHANGE UP (ref 5–17)
BUN SERPL-MCNC: 8 MG/DL — SIGNIFICANT CHANGE UP (ref 7–23)
CALCIUM SERPL-MCNC: 8.6 MG/DL — SIGNIFICANT CHANGE UP (ref 8.4–10.5)
CHLORIDE SERPL-SCNC: 98 MMOL/L — SIGNIFICANT CHANGE UP (ref 96–108)
CO2 SERPL-SCNC: 28 MMOL/L — SIGNIFICANT CHANGE UP (ref 22–31)
CREAT SERPL-MCNC: 0.73 MG/DL — SIGNIFICANT CHANGE UP (ref 0.5–1.3)
EGFR: 88 ML/MIN/1.73M2 — SIGNIFICANT CHANGE UP
GLUCOSE BLDC GLUCOMTR-MCNC: 102 MG/DL — HIGH (ref 70–99)
GLUCOSE BLDC GLUCOMTR-MCNC: 87 MG/DL — SIGNIFICANT CHANGE UP (ref 70–99)
GLUCOSE BLDC GLUCOMTR-MCNC: 94 MG/DL — SIGNIFICANT CHANGE UP (ref 70–99)
GLUCOSE BLDC GLUCOMTR-MCNC: 98 MG/DL — SIGNIFICANT CHANGE UP (ref 70–99)
GLUCOSE SERPL-MCNC: 84 MG/DL — SIGNIFICANT CHANGE UP (ref 70–99)
GRAM STN FLD: SIGNIFICANT CHANGE UP
HCT VFR BLD CALC: 23.1 % — LOW (ref 34.5–45)
HGB BLD-MCNC: 7.3 G/DL — LOW (ref 11.5–15.5)
MAGNESIUM SERPL-MCNC: 2.7 MG/DL — HIGH (ref 1.6–2.6)
MCHC RBC-ENTMCNC: 27.8 PG — SIGNIFICANT CHANGE UP (ref 27–34)
MCHC RBC-ENTMCNC: 31.6 GM/DL — LOW (ref 32–36)
MCV RBC AUTO: 87.8 FL — SIGNIFICANT CHANGE UP (ref 80–100)
NRBC # BLD: 0 /100 WBCS — SIGNIFICANT CHANGE UP (ref 0–0)
PHOSPHATE SERPL-MCNC: 2.8 MG/DL — SIGNIFICANT CHANGE UP (ref 2.5–4.5)
PLATELET # BLD AUTO: 498 K/UL — HIGH (ref 150–400)
POTASSIUM SERPL-MCNC: 4 MMOL/L — SIGNIFICANT CHANGE UP (ref 3.5–5.3)
POTASSIUM SERPL-SCNC: 4 MMOL/L — SIGNIFICANT CHANGE UP (ref 3.5–5.3)
RBC # BLD: 2.63 M/UL — LOW (ref 3.8–5.2)
RBC # FLD: 15.9 % — HIGH (ref 10.3–14.5)
SODIUM SERPL-SCNC: 134 MMOL/L — LOW (ref 135–145)
SPECIMEN SOURCE: SIGNIFICANT CHANGE UP
SURGICAL PATHOLOGY STUDY: SIGNIFICANT CHANGE UP
WBC # BLD: 16.08 K/UL — HIGH (ref 3.8–10.5)
WBC # FLD AUTO: 16.08 K/UL — HIGH (ref 3.8–10.5)

## 2022-09-22 PROCEDURE — 99232 SBSQ HOSP IP/OBS MODERATE 35: CPT

## 2022-09-22 RX ORDER — ACETAMINOPHEN 500 MG
650 TABLET ORAL EVERY 6 HOURS
Refills: 0 | Status: DISCONTINUED | OUTPATIENT
Start: 2022-09-22 | End: 2022-10-05

## 2022-09-22 RX ADMIN — MEROPENEM 100 MILLIGRAM(S): 1 INJECTION INTRAVENOUS at 06:54

## 2022-09-22 RX ADMIN — CHLORHEXIDINE GLUCONATE 1 APPLICATION(S): 213 SOLUTION TOPICAL at 07:24

## 2022-09-22 RX ADMIN — MEROPENEM 100 MILLIGRAM(S): 1 INJECTION INTRAVENOUS at 21:33

## 2022-09-22 RX ADMIN — Medication 650 MILLIGRAM(S): at 06:16

## 2022-09-22 RX ADMIN — HEPARIN SODIUM 5000 UNIT(S): 5000 INJECTION INTRAVENOUS; SUBCUTANEOUS at 05:15

## 2022-09-22 RX ADMIN — Medication 200 MILLIGRAM(S): at 05:16

## 2022-09-22 RX ADMIN — Medication 60 MILLIGRAM(S): at 19:05

## 2022-09-22 RX ADMIN — HEPARIN SODIUM 5000 UNIT(S): 5000 INJECTION INTRAVENOUS; SUBCUTANEOUS at 14:41

## 2022-09-22 RX ADMIN — MEROPENEM 100 MILLIGRAM(S): 1 INJECTION INTRAVENOUS at 14:41

## 2022-09-22 RX ADMIN — HEPARIN SODIUM 5000 UNIT(S): 5000 INJECTION INTRAVENOUS; SUBCUTANEOUS at 21:33

## 2022-09-22 RX ADMIN — Medication 650 MILLIGRAM(S): at 05:16

## 2022-09-22 RX ADMIN — ATORVASTATIN CALCIUM 20 MILLIGRAM(S): 80 TABLET, FILM COATED ORAL at 21:33

## 2022-09-22 RX ADMIN — LOSARTAN POTASSIUM 50 MILLIGRAM(S): 100 TABLET, FILM COATED ORAL at 05:16

## 2022-09-22 RX ADMIN — Medication 200 MILLIGRAM(S): at 19:05

## 2022-09-22 NOTE — CHART NOTE - NSCHARTNOTEFT_GEN_A_CORE
Admitting Diagnosis:   Patient is a 72y old  Female who presents with a chief complaint of Large bowel obstruction (22 Sep 2022 13:24)      PAST MEDICAL & SURGICAL HISTORY:  Diabetes      H/O thyroid disease      S/P total abdominal hysterectomy      History of laparoscopic cholecystectomy      History of           Current Nutrition Order:   CST CHO, soft and bite sized diet    PO Intake: Good (%) [ x  ]  Fair (50-75%) [   ] Poor (<25%) [   ]    GI Issues: Pt denies n/v/d/c. +BM . no abd discomfort/distention    Pain: Denies pain    Skin Integrity: wanda 19, surgical incision noted  No PU or edema    Labs:       134<L>  |  98  |  8   ----------------------------<  84  4.0   |  28  |  0.73    Ca    8.6      22 Sep 2022 07:56  Phos  2.8       Mg     2.7           CAPILLARY BLOOD GLUCOSE      POCT Blood Glucose.: 102 mg/dL (22 Sep 2022 11:53)  POCT Blood Glucose.: 98 mg/dL (22 Sep 2022 07:40)  POCT Blood Glucose.: 99 mg/dL (21 Sep 2022 22:28)  POCT Blood Glucose.: 101 mg/dL (21 Sep 2022 18:36)      Medications:  MEDICATIONS  (STANDING):  atorvastatin 20 milliGRAM(s) Oral at bedtime  BUpivacaine 0.5% On-Q Pump 400 milliLiter(s) (4 mL/Hr) IntraDermal. <Continuous>  chlorhexidine 2% Cloths 1 Application(s) Topical <User Schedule>  dextrose 5%. 1000 milliLiter(s) (100 mL/Hr) IV Continuous <Continuous>  dextrose 5%. 1000 milliLiter(s) (50 mL/Hr) IV Continuous <Continuous>  dextrose 50% Injectable 25 Gram(s) IV Push once  dextrose 50% Injectable 12.5 Gram(s) IV Push once  dextrose 50% Injectable 25 Gram(s) IV Push once  glucagon  Injectable 1 milliGRAM(s) IntraMuscular once  heparin   Injectable 5000 Unit(s) SubCutaneous every 8 hours  influenza  Vaccine (HIGH DOSE) 0.7 milliLiter(s) IntraMuscular once  insulin lispro (ADMELOG) corrective regimen sliding scale   SubCutaneous three times a day before meals  insulin lispro (ADMELOG) corrective regimen sliding scale   SubCutaneous at bedtime  labetalol 200 milliGRAM(s) Oral every 12 hours  losartan 50 milliGRAM(s) Oral daily  meropenem  IVPB 1000 milliGRAM(s) IV Intermittent every 8 hours  NIFEdipine XL 60 milliGRAM(s) Oral every 24 hours    MEDICATIONS  (PRN):  acetaminophen     Tablet .. 650 milliGRAM(s) Oral every 6 hours PRN Mild Pain (1 - 3), Moderate Pain (4 - 6)  dextrose Oral Gel 15 Gram(s) Oral once PRN Blood Glucose LESS THAN 70 milliGRAM(s)/deciliter  ondansetron Injectable 4 milliGRAM(s) IV Push every 8 hours PRN Nausea and/or Vomiting  sodium chloride 0.9% lock flush 10 milliLiter(s) IV Push every 1 hour PRN Pre/post blood products, medications, blood draw, and to maintain line patency    Adm Anthropometrics:  Height for BMI (FEET)	5 Feet  Height for BMI (INCHES)	6 Inch(s)  Height for BMI (CENTIMETERS)	167.64 Centimeter(s)  Weight for BMI (lbs)	203 lb  Weight for BMI (kg)	92.1 kg  Body Mass Index	32.7    Weight Change: No new wts in EMR. Please obtain new wts biweekly    Estimated energy needs:  5089-3608 kcals (30-35 kcals/kg, IBW)  70.68-82.46 g protein (1.2-1.4 g/kg, IBW)  8267-0656 mls (30-35 mls/kg, IBW)  IBW used for calculations as pt >120% of IBW (156%), adjusted for age, post-op    Subjective:  71 yo female with PMH of HTN, DM and PSH of  x2, hysterectomy and lap dev ( w/ Dr. Clifford) presenting with 2 days of nausea/vomiting and abd pain w/ imaging findings consistent with large bowel obstruction. However, prior to operative exploration, the patient had return of bowel function. NGT removed however bowel obstruction recurred therefore pt taken to OR for Exlap JOSEFINA, SBR ( 100 cm)  EBL:200 IVF:2000 UO:250. Pt kept intubated post op and transferred to SICU team. Now extubated 9/15. stable for transfer to tele on POD#1. return to SICU POD#2 due to hypertension (BP  210/104), gotten labetalol 10, then 20, then 40 while in telemetry, asymptomatic, repeat BP down to 165/67. She was transfer back to SICU for close BP monitoring. pt baseline on labetalol 200 PO bid, losartan 100 qd, hctz 25 qd and nifedipine 60 qd at home. Her SBP remained below 160's all night, did not need any additional antihypertensives. transfer back to SDU next day ().    Pt seen resting in bed. On assessment, pt endorsed discomfort, she was irritated that she was transferred to a non-private room, endorsed that she has been unable to get any peace/silence while in current room, reports unable to get restful sleep. Limited diet edu  at this time d/t pt current emotional state. Discussed with pt current diet, importance of following, and making sure we follow diet restrictions for s/p OR. Pt verbalized understanding. She reports enjoying meals here, eating 100% of meals. Reviewed labs, , Mg 2.7, other BMP WNL. RD to follow per protocol. Please see below for nutr recs.      Previous Nutrition Diagnosis:  Inadeqate Energy Intake  RT NPO/clears x7 days  AEB meeting <75% EER    Active [   ]  Resolved [ x  ]    If resolved, new PES:   Nutrition/food knowledge deficit RT need to review diet edu/modification at this time AEB currently on CST CHO/ soft and bite sized diet    Goal: Pt to recall 2 main concepts from edu    Recommendations:  1. Continue current diet, CST CHO/soft and bite sized diet, include low fiber diet restriction  2. BM and pain regimen per team  3. Monitor GI tolerance, s/s distress  4. Monitor BMP, BG, POCT, lytes, replete prn    Education: reviewed current diet    Risk Level: High [   ] Moderate [ x  ] Low [   ]

## 2022-09-22 NOTE — PROGRESS NOTE ADULT - SUBJECTIVE AND OBJECTIVE BOX
STATUS POST:  Exlap JOSEFINA SBR    POST OPERATIVE DAY #: 7    SUBJECTIVE: Pt seen and examined at bedside this am by surgery team. Patient is lying comfortably in bed, upset that she was unable to sleep well due to noise. Tolerating diet, pain well controlled with current regimen. Patient denies fever, nausea, vomiting, chest pain, and shortness of breath. Patient is passing gas and having bowel movements, 2 loose BMs overnight.    MEDICATIONS  (STANDING):  atorvastatin 20 milliGRAM(s) Oral at bedtime  BUpivacaine 0.5% On-Q Pump 400 milliLiter(s) (4 mL/Hr) IntraDermal. <Continuous>  chlorhexidine 2% Cloths 1 Application(s) Topical <User Schedule>  dextrose 5%. 1000 milliLiter(s) (100 mL/Hr) IV Continuous <Continuous>  dextrose 5%. 1000 milliLiter(s) (50 mL/Hr) IV Continuous <Continuous>  dextrose 50% Injectable 25 Gram(s) IV Push once  dextrose 50% Injectable 12.5 Gram(s) IV Push once  dextrose 50% Injectable 25 Gram(s) IV Push once  glucagon  Injectable 1 milliGRAM(s) IntraMuscular once  heparin   Injectable 5000 Unit(s) SubCutaneous every 8 hours  influenza  Vaccine (HIGH DOSE) 0.7 milliLiter(s) IntraMuscular once  insulin lispro (ADMELOG) corrective regimen sliding scale   SubCutaneous three times a day before meals  insulin lispro (ADMELOG) corrective regimen sliding scale   SubCutaneous at bedtime  labetalol 200 milliGRAM(s) Oral every 12 hours  losartan 50 milliGRAM(s) Oral daily  meropenem  IVPB 1000 milliGRAM(s) IV Intermittent every 8 hours  NIFEdipine XL 60 milliGRAM(s) Oral every 24 hours    MEDICATIONS  (PRN):  acetaminophen     Tablet .. 650 milliGRAM(s) Oral every 6 hours PRN Mild Pain (1 - 3), Moderate Pain (4 - 6)  dextrose Oral Gel 15 Gram(s) Oral once PRN Blood Glucose LESS THAN 70 milliGRAM(s)/deciliter  ondansetron Injectable 4 milliGRAM(s) IV Push every 8 hours PRN Nausea and/or Vomiting  sodium chloride 0.9% lock flush 10 milliLiter(s) IV Push every 1 hour PRN Pre/post blood products, medications, blood draw, and to maintain line patency      Vital Signs Last 24 Hrs  T(C): 36.6 (22 Sep 2022 05:14), Max: 37.1 (22 Sep 2022 00:01)  T(F): 97.9 (22 Sep 2022 05:14), Max: 98.7 (22 Sep 2022 00:01)  HR: 90 (22 Sep 2022 05:14) (80 - 90)  BP: 158/69 (22 Sep 2022 05:14) (147/75 - 165/82)  BP(mean): --  RR: 18 (22 Sep 2022 05:14) (17 - 18)  SpO2: 100% (22 Sep 2022 05:14) (98% - 100%)    Parameters below as of 22 Sep 2022 05:14  Patient On (Oxygen Delivery Method): room air        Physical Exam  General: Patient is doing well and lying in bed comfortably  Constitutional: alert and oriented   Pulm: Nonlabored breathing, no respiratory distress  CV: Regular rate and rhythm, normal sinus rhythm  Abd:  soft, nontender, nondistended. No rebound, no guarding.             Incisions: staples intact, clean, dry and healing well, no erythema/induration/edema  Extremities: warm, well perfused, no edema    I&O's Detail    21 Sep 2022 07:01  -  22 Sep 2022 07:00  --------------------------------------------------------  IN:    IV PiggyBack: 50 mL  Total IN: 50 mL    OUT:    Bulb (mL): 40 mL    Voided (mL): 800 mL  Total OUT: 840 mL    Total NET: -790 mL        LABS:                        8.5    17.17 )-----------( 442      ( 21 Sep 2022 05:30 )             26.5     09-21    138  |  101  |  9   ----------------------------<  94  4.1   |  25  |  0.70    Ca    8.7      21 Sep 2022 05:30  Phos  3.0     09-21  Mg     2.2     09-21        Urinalysis Basic - ( 21 Sep 2022 17:06 )    Color: Yellow / Appearance: Clear / S.015 / pH: x  Gluc: x / Ketone: Trace mg/dL  / Bili: Negative / Urobili: 0.2 E.U./dL   Blood: x / Protein: 30 mg/dL / Nitrite: NEGATIVE   Leuk Esterase: NEGATIVE / RBC: < 5 /HPF / WBC < 5 /HPF   Sq Epi: x / Non Sq Epi: Moderate /HPF / Bacteria: None /HPF

## 2022-09-22 NOTE — PROGRESS NOTE ADULT - ASSESSMENT
72 year old woman with HTN, DM2, past surgical history of  x 2, hysterectomy, and laparoscopic cholecystectomy (), admitted for large bowel obstruction; had spontaneous return of bowel function initially; however, bowel obstruction recurred, so taken to OR for ex lap with lysis of adhesions and bowel resection () . Monitored in SICU post -op ; now on SDU     # Large bowel obstruction   s/p OR for ex lap with lysis of adhesions and bowel resection ()  - rest of care per primary team     # Leukocytosis -wbc still in 16, CT finding reviewed, 6 X 3 cm hematoma on right paracolic jenaro, ID following - continue on Meropenem through    ( she has 3 iv access- to remove the unnecessary one )    #HTN   takes HCTZ 25mg qd, losartan 100mg qd, labetalol 200mg BID, nifedipine 60mg ER qd at home.   If SBP >180 mmHg persistently, [ ] Get bladder scan [ ] if pain well-controlled, and bladder scan shows no urinary retention, can use labetalol 10mg IV push if HR >60 bpm or hydralazine IV 5mg  - continue labetalol 200mg BID, nifepidine 60m ER qd  - continue losartan at 50mg qd; If BP remains consistently elevated >150mmHg on 50mg qd, can resume full home dose at 100mg qd   - Continue holding HCTZ until consistently tolerating PO-- continue to hold HCTZ due to mild Hyponatremia.    # Type 2 Diabetes complicated by hyperglycemia   Takes metformin at home.   cw insulin sliding scale while inpatient.   resume metformin on discharge.     # COVID 19 infection (present on admission)   Incidental finding of COVID 19 infection on admission nasal swab. Without any dyspnea, or sore throat.   No O2 requirement, does not meet criteria for remdesivir/dexamethasone.   Recommend enoxaparin for DVT ppx     # Acute blood loss anemia   Hgb downtrended from time of admission  Hemoglobin: 7.8 g/dL (22 @ 05:30)  Hemoglobin: 9.9 g/dL (22 @ 11:33)  Hemoglobin: 10.2 g/dL (22 @ 11:00)  Partially attributable to operative blood losses   continue to trend daily CBC while inpatient;   ferritin, iron sat not low  - age appropriate cancer screening outpatient     # Hypophosphatemia - resolved    DVT ppx - lovenox

## 2022-09-22 NOTE — PROGRESS NOTE ADULT - SUBJECTIVE AND OBJECTIVE BOX
Patient is a 72y old  Female who presents with a chief complaint of Large bowel obstruction (20 Sep 2022 15:24)    INTERVAL EVENTS:  could not sleep due to noise from neighour.   she express desire to get up ambulate and be independent again,   no chest pain, no shortness of breath.    MEDICATIONS:  MEDICATIONS  (STANDING):  atorvastatin 20 milliGRAM(s) Oral at bedtime  BUpivacaine 0.5% On-Q Pump 400 milliLiter(s) (4 mL/Hr) IntraDermal. <Continuous>  chlorhexidine 2% Cloths 1 Application(s) Topical <User Schedule>  dextrose 5%. 1000 milliLiter(s) (100 mL/Hr) IV Continuous <Continuous>  dextrose 5%. 1000 milliLiter(s) (50 mL/Hr) IV Continuous <Continuous>  dextrose 50% Injectable 25 Gram(s) IV Push once  dextrose 50% Injectable 12.5 Gram(s) IV Push once  dextrose 50% Injectable 25 Gram(s) IV Push once  glucagon  Injectable 1 milliGRAM(s) IntraMuscular once  heparin   Injectable 5000 Unit(s) SubCutaneous every 8 hours  influenza  Vaccine (HIGH DOSE) 0.7 milliLiter(s) IntraMuscular once  insulin lispro (ADMELOG) corrective regimen sliding scale   SubCutaneous three times a day before meals  insulin lispro (ADMELOG) corrective regimen sliding scale   SubCutaneous at bedtime  labetalol 200 milliGRAM(s) Oral every 12 hours  losartan 50 milliGRAM(s) Oral daily  meropenem  IVPB 1000 milliGRAM(s) IV Intermittent every 8 hours  NIFEdipine XL 60 milliGRAM(s) Oral every 24 hours    MEDICATIONS  (PRN):  acetaminophen     Tablet .. 650 milliGRAM(s) Oral every 6 hours PRN Mild Pain (1 - 3), Moderate Pain (4 - 6)  dextrose Oral Gel 15 Gram(s) Oral once PRN Blood Glucose LESS THAN 70 milliGRAM(s)/deciliter  ondansetron Injectable 4 milliGRAM(s) IV Push every 8 hours PRN Nausea and/or Vomiting  sodium chloride 0.9% lock flush 10 milliLiter(s) IV Push every 1 hour PRN Pre/post blood products, medications, blood draw, and to maintain line patency      Allergies    penicillin (Rash)    Intolerances        OBJECTIVE:  Vital Signs Last 24 Hrs  T(C): 36.7 (22 Sep 2022 09:34), Max: 37.1 (22 Sep 2022 00:01)  T(F): 98.1 (22 Sep 2022 09:34), Max: 98.7 (22 Sep 2022 00:01)  HR: 84 (22 Sep 2022 09:34) (81 - 90)  BP: 155/67 (22 Sep 2022 09:34) (151/67 - 158/69)    RR: 18 (22 Sep 2022 09:34) (17 - 18)  SpO2: 99% (22 Sep 2022 09:34) (99% - 100%)    O2 Parameters below as of 22 Sep 2022 09:34  Patient On (Oxygen Delivery Method): room air              I&O's Summary    20 Sep 2022 07:01  -  21 Sep 2022 07:00  --------------------------------------------------------  IN: 304 mL / OUT: 370 mL / NET: -66 mL    21 Sep 2022 07:01  -  22 Sep 2022 00:13  --------------------------------------------------------  IN: 50 mL / OUT: 440 mL / NET: -390 mL        PHYSICAL EXAM:  Gen:  in bed, awake, conversant , on room air,   - she has two iv access on right hand/arm and one on left.   CV: RRR, +S1/S2  Pulm: adequate respiratory effort, no increase in work of breathing  Abd: abdominal binder in place, mid-line staples, wound clean and dry, soft, NTND, BS present.  Skin: warm and dry,   Ext: no LE edema, no tenderness.  Neuro: AOx3, no gross focal neurological deficits      LABS:                        7.3    16.08 )-----------( 498      ( 22 Sep 2022 07:44 )             23.1   09-22    134<L>  |  98  |  8   ----------------------------<  84  4.0   |  28  |  0.73    Ca    8.6      22 Sep 2022 07:56  Phos  2.8     -  Mg     2.7              Color: Yellow / Appearance: Clear / S.015 / pH: x  Gluc: x / Ketone: Trace mg/dL  / Bili: Negative / Urobili: 0.2 E.U./dL   Blood: x / Protein: 30 mg/dL / Nitrite: NEGATIVE   Leuk Esterase: NEGATIVE / RBC: < 5 /HPF / WBC < 5 /HPF   Sq Epi: x / Non Sq Epi: Moderate /HPF / Bacteria: None /HPF        MICRODATA:      RADIOLOGY/OTHER STUDIES:

## 2022-09-23 LAB
ANION GAP SERPL CALC-SCNC: 11 MMOL/L — SIGNIFICANT CHANGE UP (ref 5–17)
BUN SERPL-MCNC: 9 MG/DL — SIGNIFICANT CHANGE UP (ref 7–23)
CALCIUM SERPL-MCNC: 8.7 MG/DL — SIGNIFICANT CHANGE UP (ref 8.4–10.5)
CHLORIDE SERPL-SCNC: 96 MMOL/L — SIGNIFICANT CHANGE UP (ref 96–108)
CO2 SERPL-SCNC: 26 MMOL/L — SIGNIFICANT CHANGE UP (ref 22–31)
CREAT SERPL-MCNC: 0.68 MG/DL — SIGNIFICANT CHANGE UP (ref 0.5–1.3)
EGFR: 94 ML/MIN/1.73M2 — SIGNIFICANT CHANGE UP
GLUCOSE BLDC GLUCOMTR-MCNC: 71 MG/DL — SIGNIFICANT CHANGE UP (ref 70–99)
GLUCOSE BLDC GLUCOMTR-MCNC: 83 MG/DL — SIGNIFICANT CHANGE UP (ref 70–99)
GLUCOSE BLDC GLUCOMTR-MCNC: 87 MG/DL — SIGNIFICANT CHANGE UP (ref 70–99)
GLUCOSE BLDC GLUCOMTR-MCNC: 97 MG/DL — SIGNIFICANT CHANGE UP (ref 70–99)
GLUCOSE SERPL-MCNC: 71 MG/DL — SIGNIFICANT CHANGE UP (ref 70–99)
HCT VFR BLD CALC: 24.5 % — LOW (ref 34.5–45)
HGB BLD-MCNC: 7.7 G/DL — LOW (ref 11.5–15.5)
MAGNESIUM SERPL-MCNC: 2.1 MG/DL — SIGNIFICANT CHANGE UP (ref 1.6–2.6)
MCHC RBC-ENTMCNC: 27.6 PG — SIGNIFICANT CHANGE UP (ref 27–34)
MCHC RBC-ENTMCNC: 31.4 GM/DL — LOW (ref 32–36)
MCV RBC AUTO: 87.8 FL — SIGNIFICANT CHANGE UP (ref 80–100)
NRBC # BLD: 0 /100 WBCS — SIGNIFICANT CHANGE UP (ref 0–0)
PHOSPHATE SERPL-MCNC: 3 MG/DL — SIGNIFICANT CHANGE UP (ref 2.5–4.5)
PLATELET # BLD AUTO: 579 K/UL — HIGH (ref 150–400)
POTASSIUM SERPL-MCNC: 4.3 MMOL/L — SIGNIFICANT CHANGE UP (ref 3.5–5.3)
POTASSIUM SERPL-SCNC: 4.3 MMOL/L — SIGNIFICANT CHANGE UP (ref 3.5–5.3)
RBC # BLD: 2.79 M/UL — LOW (ref 3.8–5.2)
RBC # FLD: 16 % — HIGH (ref 10.3–14.5)
SARS-COV-2 RNA SPEC QL NAA+PROBE: DETECTED
SODIUM SERPL-SCNC: 133 MMOL/L — LOW (ref 135–145)
WBC # BLD: 18.7 K/UL — HIGH (ref 3.8–10.5)
WBC # FLD AUTO: 18.7 K/UL — HIGH (ref 3.8–10.5)

## 2022-09-23 PROCEDURE — 99232 SBSQ HOSP IP/OBS MODERATE 35: CPT

## 2022-09-23 RX ORDER — MEROPENEM 1 G/30ML
1000 INJECTION INTRAVENOUS EVERY 8 HOURS
Refills: 0 | Status: COMPLETED | OUTPATIENT
Start: 2022-09-23 | End: 2022-09-30

## 2022-09-23 RX ADMIN — Medication 200 MILLIGRAM(S): at 05:01

## 2022-09-23 RX ADMIN — LOSARTAN POTASSIUM 50 MILLIGRAM(S): 100 TABLET, FILM COATED ORAL at 05:01

## 2022-09-23 RX ADMIN — MEROPENEM 100 MILLIGRAM(S): 1 INJECTION INTRAVENOUS at 13:34

## 2022-09-23 RX ADMIN — MEROPENEM 100 MILLIGRAM(S): 1 INJECTION INTRAVENOUS at 21:57

## 2022-09-23 RX ADMIN — ATORVASTATIN CALCIUM 20 MILLIGRAM(S): 80 TABLET, FILM COATED ORAL at 21:25

## 2022-09-23 RX ADMIN — HEPARIN SODIUM 5000 UNIT(S): 5000 INJECTION INTRAVENOUS; SUBCUTANEOUS at 13:29

## 2022-09-23 RX ADMIN — Medication 650 MILLIGRAM(S): at 05:01

## 2022-09-23 RX ADMIN — Medication 650 MILLIGRAM(S): at 22:20

## 2022-09-23 RX ADMIN — Medication 200 MILLIGRAM(S): at 18:37

## 2022-09-23 RX ADMIN — MEROPENEM 100 MILLIGRAM(S): 1 INJECTION INTRAVENOUS at 06:54

## 2022-09-23 RX ADMIN — CHLORHEXIDINE GLUCONATE 1 APPLICATION(S): 213 SOLUTION TOPICAL at 06:55

## 2022-09-23 RX ADMIN — Medication 60 MILLIGRAM(S): at 12:51

## 2022-09-23 RX ADMIN — HEPARIN SODIUM 5000 UNIT(S): 5000 INJECTION INTRAVENOUS; SUBCUTANEOUS at 05:01

## 2022-09-23 RX ADMIN — HEPARIN SODIUM 5000 UNIT(S): 5000 INJECTION INTRAVENOUS; SUBCUTANEOUS at 21:24

## 2022-09-23 RX ADMIN — Medication 650 MILLIGRAM(S): at 06:01

## 2022-09-23 RX ADMIN — Medication 650 MILLIGRAM(S): at 21:25

## 2022-09-23 NOTE — PROGRESS NOTE ADULT - SUBJECTIVE AND OBJECTIVE BOX
STATUS POST:  Exploratory laparotomy with lysis of adhesions, SBR  POST OPERATIVE DAY #: 8  SUBJECTIVE: Pt seen and examined by chief resident. Pt is doing well, resting comfortably on bed. Pain controlled. Clear liquid diet tolerated. +F/-BM. No nausea or vomiting. No complaints at this time.    Vital Signs Last 24 Hrs  T(C): 36.4 (23 Sep 2022 04:57), Max: 36.7 (22 Sep 2022 09:34)  T(F): 97.6 (23 Sep 2022 04:57), Max: 98.1 (22 Sep 2022 09:34)  HR: 92 (23 Sep 2022 04:57) (80 - 92)  BP: 147/66 (23 Sep 2022 04:57) (147/66 - 157/64)  BP(mean): --  RR: 18 (23 Sep 2022 04:57) (16 - 18)  SpO2: 97% (23 Sep 2022 04:57) (96% - 100%)    Parameters below as of 23 Sep 2022 04:57  Patient On (Oxygen Delivery Method): room air        I&O's Summary    22 Sep 2022 07:01  -  23 Sep 2022 07:00  --------------------------------------------------------  IN: 1920 mL / OUT: 1341 mL / NET: 579 mL        Physical Exam:  General Appearance: Appears well, NAD  Pulmonary: Nonlabored breathing, no respiratory distress  Abdomen: Soft, nondisteded, appropriate incisional tenderness, midline incision clean and dry and intact, SHOSHANA serosanguinous with brown tint  Extremities: WWP, SCD's in place     LABS:                        7.3    16.08 )-----------( 498      ( 22 Sep 2022 07:44 )             23.1     09-22    134<L>  |  98  |  8   ----------------------------<  84  4.0   |  28  |  0.73    Ca    8.6      22 Sep 2022 07:56  Phos  2.8     09-22  Mg     2.7             Urinalysis Basic - ( 21 Sep 2022 17:06 )    Color: Yellow / Appearance: Clear / S.015 / pH: x  Gluc: x / Ketone: Trace mg/dL  / Bili: Negative / Urobili: 0.2 E.U./dL   Blood: x / Protein: 30 mg/dL / Nitrite: NEGATIVE   Leuk Esterase: NEGATIVE / RBC: < 5 /HPF / WBC < 5 /HPF   Sq Epi: x / Non Sq Epi: Moderate /HPF / Bacteria: None /HPF

## 2022-09-23 NOTE — CONSULT NOTE ADULT - SUBJECTIVE AND OBJECTIVE BOX
HPI:  73 yo female with PMH of HTN, DM and PSH of  x2, hysterectomy and lap dev ( w/ Dr. Clifford) presenting with 2 days of nausea/vomiting and abd pain. Patient reports that she has felt a hernia for about a year she decreibes it as her abdomen being "bloated" but when she takes medication for gas the abdomen "flatten out." She complains of intermittent pain over the hernia but over the past 2 days it has been worse and has been traveling throughout the epigastric region. Last BM a couple of days ago. Doesn't recall if she passing gas. No fevers/chills  Colonoscopy - 10 years ago - maybe a polyp?  Endoscopy - never    ED: Afebrile, VSS. WBC 14.09, Hb 12.6, lactate 0.7. CT scan findings cocnerning for large left periumbilical hernia containing fat and a thickened loop of transverse colon which is concerning for strangulation.       PMH: HTN, DM  PSH:  x2, hysterectomy, lap dev  FHx: no cancer or IBD  Meds: Metformin 500 daily, has HTN meds that she doesn't take because they make her nauseous  Soc: No tobacco       (11 Sep 2022 21:07)      PAST MEDICAL & SURGICAL HISTORY:  Diabetes      H/O thyroid disease      S/P total abdominal hysterectomy      History of laparoscopic cholecystectomy      History of           REVIEW OF SYSTEMS      General:	    Skin/Breast:  	  Ophthalmologic:  	  ENMT:	    Respiratory and Thorax:  	  Cardiovascular:	    Gastrointestinal:	    Genitourinary:	    Musculoskeletal:	    Neurological:	    Psychiatric:	    Hematology/Lymphatics:	    Endocrine:	    Allergic/Immunologic:	    MEDICATIONS  (STANDING):  atorvastatin 20 milliGRAM(s) Oral at bedtime  BUpivacaine 0.5% On-Q Pump 400 milliLiter(s) (4 mL/Hr) IntraDermal. <Continuous>  chlorhexidine 2% Cloths 1 Application(s) Topical <User Schedule>  dextrose 5%. 1000 milliLiter(s) (100 mL/Hr) IV Continuous <Continuous>  dextrose 5%. 1000 milliLiter(s) (50 mL/Hr) IV Continuous <Continuous>  dextrose 50% Injectable 25 Gram(s) IV Push once  dextrose 50% Injectable 12.5 Gram(s) IV Push once  dextrose 50% Injectable 25 Gram(s) IV Push once  glucagon  Injectable 1 milliGRAM(s) IntraMuscular once  heparin   Injectable 5000 Unit(s) SubCutaneous every 8 hours  influenza  Vaccine (HIGH DOSE) 0.7 milliLiter(s) IntraMuscular once  insulin lispro (ADMELOG) corrective regimen sliding scale   SubCutaneous three times a day before meals  insulin lispro (ADMELOG) corrective regimen sliding scale   SubCutaneous at bedtime  labetalol 200 milliGRAM(s) Oral every 12 hours  losartan 50 milliGRAM(s) Oral daily  meropenem  IVPB 1000 milliGRAM(s) IV Intermittent every 8 hours  NIFEdipine XL 60 milliGRAM(s) Oral every 24 hours    MEDICATIONS  (PRN):  acetaminophen     Tablet .. 650 milliGRAM(s) Oral every 6 hours PRN Mild Pain (1 - 3), Moderate Pain (4 - 6)  dextrose Oral Gel 15 Gram(s) Oral once PRN Blood Glucose LESS THAN 70 milliGRAM(s)/deciliter  ondansetron Injectable 4 milliGRAM(s) IV Push every 8 hours PRN Nausea and/or Vomiting  sodium chloride 0.9% lock flush 10 milliLiter(s) IV Push every 1 hour PRN Pre/post blood products, medications, blood draw, and to maintain line patency      Allergies    penicillin (Rash)    Intolerances        SOCIAL HISTORY:    FAMILY HISTORY:  No pertinent family history in first degree relatives        Vital Signs Last 24 Hrs  T(C): 36.7 (23 Sep 2022 09:17), Max: 36.7 (22 Sep 2022 16:59)  T(F): 98 (23 Sep 2022 09:17), Max: 98.1 (22 Sep 2022 21:11)  HR: 89 (23 Sep 2022 09:17) (80 - 92)  BP: 143/67 (23 Sep 2022 09:17) (143/67 - 157/64)  BP(mean): --  RR: 18 (23 Sep 2022 09:17) (16 - 18)  SpO2: 98% (23 Sep 2022 09:17) (96% - 100%)    Parameters below as of 23 Sep 2022 09:17  Patient On (Oxygen Delivery Method): room air        LABS:                        7.7    18.70 )-----------( 579      ( 23 Sep 2022 10:24 )             24.5     09-23    133<L>  |  96  |  9   ----------------------------<  71  4.3   |  26  |  0.68    Ca    8.7      23 Sep 2022 10:24  Phos  3.0     09-  Mg     2.1     -        Urinalysis Basic - ( 21 Sep 2022 17:06 )    Color: Yellow / Appearance: Clear / S.015 / pH: x  Gluc: x / Ketone: Trace mg/dL  / Bili: Negative / Urobili: 0.2 E.U./dL   Blood: x / Protein: 30 mg/dL / Nitrite: NEGATIVE   Leuk Esterase: NEGATIVE / RBC: < 5 /HPF / WBC < 5 /HPF   Sq Epi: x / Non Sq Epi: Moderate /HPF / Bacteria: None /HPF        RADIOLOGY & ADDITIONAL STUDIES: HPI:  71 yo female with PMH of HTN, DM and PSH of  x2, hysterectomy and lap dev ( w/ Dr. Clfiford) presenting with 2 days of nausea/vomiting and abd pain. Patient reports that she has felt a hernia for about a year she decreibes it as her abdomen being "bloated" but when she takes medication for gas the abdomen "flatten out." She complains of intermittent pain over the hernia but over the past 2 days it has been worse and has been traveling throughout the epigastric region. Last BM a couple of days ago. Doesn't recall if she passing gas. No fevers/chills  Colonoscopy - 10 years ago - maybe a polyp?  Endoscopy - never      On 9/15/22 patient underwent ex lap surgery with SBR          PMH: HTN, DM  PSH:  x2, hysterectomy, lap dev  FHx: no cancer or IBD  Meds: Metformin 500 daily, has HTN meds that she doesn't take because they make her nauseous  Soc: No tobacco       (11 Sep 2022 21:07)      PAST MEDICAL & SURGICAL HISTORY:  Diabetes      H/O thyroid disease      S/P total abdominal hysterectomy      History of laparoscopic cholecystectomy      History of           REVIEW OF SYSTEMS      General:	    Skin/Breast:  	  Ophthalmologic:  	  ENMT:	    Respiratory and Thorax:  	  Cardiovascular:	    Gastrointestinal:	    Genitourinary:	    Musculoskeletal:	    Neurological:	    Psychiatric:	    Hematology/Lymphatics:	    Endocrine:	    Allergic/Immunologic:	    MEDICATIONS  (STANDING):  atorvastatin 20 milliGRAM(s) Oral at bedtime  BUpivacaine 0.5% On-Q Pump 400 milliLiter(s) (4 mL/Hr) IntraDermal. <Continuous>  chlorhexidine 2% Cloths 1 Application(s) Topical <User Schedule>  dextrose 5%. 1000 milliLiter(s) (100 mL/Hr) IV Continuous <Continuous>  dextrose 5%. 1000 milliLiter(s) (50 mL/Hr) IV Continuous <Continuous>  dextrose 50% Injectable 25 Gram(s) IV Push once  dextrose 50% Injectable 12.5 Gram(s) IV Push once  dextrose 50% Injectable 25 Gram(s) IV Push once  glucagon  Injectable 1 milliGRAM(s) IntraMuscular once  heparin   Injectable 5000 Unit(s) SubCutaneous every 8 hours  influenza  Vaccine (HIGH DOSE) 0.7 milliLiter(s) IntraMuscular once  insulin lispro (ADMELOG) corrective regimen sliding scale   SubCutaneous three times a day before meals  insulin lispro (ADMELOG) corrective regimen sliding scale   SubCutaneous at bedtime  labetalol 200 milliGRAM(s) Oral every 12 hours  losartan 50 milliGRAM(s) Oral daily  meropenem  IVPB 1000 milliGRAM(s) IV Intermittent every 8 hours  NIFEdipine XL 60 milliGRAM(s) Oral every 24 hours    MEDICATIONS  (PRN):  acetaminophen     Tablet .. 650 milliGRAM(s) Oral every 6 hours PRN Mild Pain (1 - 3), Moderate Pain (4 - 6)  dextrose Oral Gel 15 Gram(s) Oral once PRN Blood Glucose LESS THAN 70 milliGRAM(s)/deciliter  ondansetron Injectable 4 milliGRAM(s) IV Push every 8 hours PRN Nausea and/or Vomiting  sodium chloride 0.9% lock flush 10 milliLiter(s) IV Push every 1 hour PRN Pre/post blood products, medications, blood draw, and to maintain line patency      Allergies    penicillin (Rash)    Intolerances        SOCIAL HISTORY:    FAMILY HISTORY:  No pertinent family history in first degree relatives        Vital Signs Last 24 Hrs  T(C): 36.7 (23 Sep 2022 09:17), Max: 36.7 (22 Sep 2022 16:59)  T(F): 98 (23 Sep 2022 09:17), Max: 98.1 (22 Sep 2022 21:11)  HR: 89 (23 Sep 2022 09:17) (80 - 92)  BP: 143/67 (23 Sep 2022 09:17) (143/67 - 157/64)  BP(mean): --  RR: 18 (23 Sep 2022 09:17) (16 - 18)  SpO2: 98% (23 Sep 2022 09:17) (96% - 100%)    Parameters below as of 23 Sep 2022 09:17  Patient On (Oxygen Delivery Method): room air        LABS:                        7.7    18.70 )-----------( 579      ( 23 Sep 2022 10:24 )             24.5     09-23    133<L>  |  96  |  9   ----------------------------<  71  4.3   |  26  |  0.68    Ca    8.7      23 Sep 2022 10:24  Phos  3.0     09-23  Mg     2.1     09-23        Urinalysis Basic - ( 21 Sep 2022 17:06 )    Color: Yellow / Appearance: Clear / S.015 / pH: x  Gluc: x / Ketone: Trace mg/dL  / Bili: Negative / Urobili: 0.2 E.U./dL   Blood: x / Protein: 30 mg/dL / Nitrite: NEGATIVE   Leuk Esterase: NEGATIVE / RBC: < 5 /HPF / WBC < 5 /HPF   Sq Epi: x / Non Sq Epi: Moderate /HPF / Bacteria: None /HPF        RADIOLOGY & ADDITIONAL STUDIES: HPI:  73 yo female with PMH of HTN, DM and PSH of  x2, hysterectomy and lap dev ( w/ Dr. Clifford) presenting with 2 days of nausea/vomiting and abd pain. Patient reports that she has felt a hernia for about a year she decreibes it as her abdomen being "bloated" but when she takes medication for gas the abdomen "flatten out." She complains of intermittent pain over the hernia but over the past 2 days it has been worse and has been traveling throughout the epigastric region. Last BM a couple of days ago. Doesn't recall if she passing gas. No fevers/chills  Colonoscopy - 10 years ago - maybe a polyp?  Endoscopy - never    ============================================    On 9/15/22 patient underwent ex lap surgery with extensive take down of prior enterotomies, SBR, JOSEFINA, repair of incisional hernias and wash out.  Per Operative Report, there was inadvertent contamination with bowel content.   Initially on Ceftriaxone and Metronidazole, antibiotics were changed to Meropenem on 22.  Regardless, patient's WBC has increased.  Also, patient was COVID positive on admission without meeting criteria for monoclonal antibodies or other treatment.  She was NPO but now started on clears    Subjectively, she c/o periodical exacerbations of abdominal pain/discomfort, loose stools and coughing.   Denies fever, congestion, sore throat or headache.  No N/V.  Otherwise ROS is negative        PMH: HTN, DM  PSH:  x2, hysterectomy, lap dev  FHx: no cancer or IBD  Meds: Metformin 500 daily, has HTN meds that she doesn't take because they make her nauseous  Soc: No tobacco       (11 Sep 2022 21:07)      PAST MEDICAL & SURGICAL HISTORY:  Diabetes      H/O thyroid disease      S/P total abdominal hysterectomy      History of laparoscopic cholecystectomy      History of         MEDICATIONS  (STANDING):  atorvastatin 20 milliGRAM(s) Oral at bedtime  BUpivacaine 0.5% On-Q Pump 400 milliLiter(s) (4 mL/Hr) IntraDermal. <Continuous>  chlorhexidine 2% Cloths 1 Application(s) Topical <User Schedule>  dextrose 5%. 1000 milliLiter(s) (100 mL/Hr) IV Continuous <Continuous>  dextrose 5%. 1000 milliLiter(s) (50 mL/Hr) IV Continuous <Continuous>  dextrose 50% Injectable 25 Gram(s) IV Push once  dextrose 50% Injectable 12.5 Gram(s) IV Push once  dextrose 50% Injectable 25 Gram(s) IV Push once  glucagon  Injectable 1 milliGRAM(s) IntraMuscular once  heparin   Injectable 5000 Unit(s) SubCutaneous every 8 hours  influenza  Vaccine (HIGH DOSE) 0.7 milliLiter(s) IntraMuscular once  insulin lispro (ADMELOG) corrective regimen sliding scale   SubCutaneous three times a day before meals  insulin lispro (ADMELOG) corrective regimen sliding scale   SubCutaneous at bedtime  labetalol 200 milliGRAM(s) Oral every 12 hours  losartan 50 milliGRAM(s) Oral daily  meropenem  IVPB 1000 milliGRAM(s) IV Intermittent every 8 hours  NIFEdipine XL 60 milliGRAM(s) Oral every 24 hours    MEDICATIONS  (PRN):  acetaminophen     Tablet .. 650 milliGRAM(s) Oral every 6 hours PRN Mild Pain (1 - 3), Moderate Pain (4 - 6)  dextrose Oral Gel 15 Gram(s) Oral once PRN Blood Glucose LESS THAN 70 milliGRAM(s)/deciliter  ondansetron Injectable 4 milliGRAM(s) IV Push every 8 hours PRN Nausea and/or Vomiting  sodium chloride 0.9% lock flush 10 milliLiter(s) IV Push every 1 hour PRN Pre/post blood products, medications, blood draw, and to maintain line patency      Allergies    penicillin (Rash)      SOCIAL HISTORY:  Lives at home      FAMILY HISTORY:  No pertinent family history in first degree relatives      EXAM  Vital Signs Last 24 Hrs  T(C): 36.7 (23 Sep 2022 09:17), Max: 36.7 (22 Sep 2022 16:59)  T(F): 98 (23 Sep 2022 09:17), Max: 98.1 (22 Sep 2022 21:11)  HR: 89 (23 Sep 2022 09:17) (80 - 92)  BP: 143/67 (23 Sep 2022 09:17) (143/67 - 157/64)  BP(mean): --  RR: 18 (23 Sep 2022 09:17) (16 - 18)  SpO2: 98% (23 Sep 2022 09:17) (96% - 100%)    Parameters below as of 23 Sep 2022 09:17  Patient On (Oxygen Delivery Method): room air  Awake and alert and responding appropriately   No distress  No oral lesions/thrush  No cervical adenopathy  RRR  Chest CTA  Abd softly distended and uncomfortable to light palpation  Staples intact and no wound dehiscence  SHOSHANA still in place with large amount of slightly turbid brownish output  LEs no edema without calf tenderness  No joint swelling.  Finger nails very long /synthetic  PICC in left arm without tenderness or erythema or swelling      LABS:                        7.7    18.70 )-----------( 579      ( 23 Sep 2022 10:24 )             24.5     09-23    133<L>  |  96  |  9   ----------------------------<  71  4.3   |  26  |  0.68    Ca    8.7      23 Sep 2022 10:24  Phos  3.0       Mg     2.1             Urinalysis Basic - ( 21 Sep 2022 17:06 )    Color: Yellow / Appearance: Clear / S.015 / pH: x  Gluc: x / Ketone: Trace mg/dL  / Bili: Negative / Urobili: 0.2 E.U./dL   Blood: x / Protein: 30 mg/dL / Nitrite: NEGATIVE   Leuk Esterase: NEGATIVE / RBC: < 5 /HPF / WBC < 5 /HPF   Sq Epi: x / Non Sq Epi: Moderate /HPF / Bacteria: None /HPF      COVID-19 PCR . (22 @ 13:54)    COVID-19 PCR: Detected: You can help in the fight against COVID-19. [facility] may contact  you to see if you are interested in voluntarily participating in one of  our clinical trials.  Testing is performed using polymerase chain reaction (PCR) or  transcription mediated amplification (TMA). This COVID-19 (SARS-CoV-2)  nucleic acid amplification test was validated by [facility] and is in use under the FDA Emergency Use Authorization  (EUA) for clinical labs CLIA-certified to perform high complexity  testing. Test results should be correlated with clinical presentation,  patient history, and epidemiology.    Culture - Body Fluid with Gram Stain (22 @ 15:57)    Gram Stain:   No organisms seen  Few WBC's    Specimen Source: Abdominal Fl Abdominal Fluid    Culture Results:   Rare Enterobacter cloacae complex  Susceptibility to follow.        RADIOLOGY & ADDITIONAL STUDIES:    CT Abdomen and Pelvis w/ Oral Cont and w/ IV Cont (22 @ 02:51) >  ACC: [id number] EXAM:  CT ABDOMEN AND PELVIS OC IC                          PROCEDURE DATE:  2022          INTERPRETATION:  CLINICAL INFORMATION: Rising white count    COMPARISON:     CONTRAST/COMPLICATIONS:  IV Contrast: Isovue 370  100 ccadministered  Oral Contrast: NONE  Complications: None reported at time of study completion    PROCEDURE:  CT of the Abdomen and Pelvis was performed.  Sagittal and coronal reformats were performed.    FINDINGS:  LOWER CHEST: Small effusions.    LIVER: Within normal limits.  BILE DUCTS: Normal caliber.  GALLBLADDER: Cholecystectomy.  SPLEEN: Within normal limits.  PANCREAS: Within normal limits.  ADRENALS: Within normal limits.  KIDNEYS/URETERS: Within normal limits.    BLADDER: Within normal limits.  REPRODUCTIVE ORGANS: Hysterectomy.    BOWEL: Dilated proximal small bowel without discrete transition point.   Oral contrast in the rectum. Status post small bowel resection with   anastomotic sutures in the left lower quadrant small none organized.   Anastomotic fluid without extravasated contrast or significant   extraluminal air to suggest active leak. 6 x 3 cm hematoma in the right   paracolic gutter.  PERITONEUM: Small anterior pelvic ascites. No contrast leak. No abscess.   No pneumoperitoneum. Small likely extraperitoneal air in the left groin.  VESSELS: Within normal limits.  RETROPERITONEUM/LYMPH NODES: No lymphadenopathy.  ABDOMINAL WALL: Postsurgical changes. Percutaneous drain. No abscess.  BONES: Within normal limits.    IMPRESSION:    Ileus versus partial small bowel obstruction. No evidence of leak,   abscess or significant pneumoperitoneum.    Small extraperitoneal hematoma in the right paracolic gutter/rectus   sheath. Small anterior pelvic ascites. Other postsurgical changes as   above.     HPI:  73 yo female with PMH of HTN, DM and PSH of  x2, hysterectomy and lap dev ( w/ Dr. Clifford) presenting with 2 days of nausea/vomiting and abd pain. Patient reports that she has felt a hernia for about a year she decreibes it as her abdomen being "bloated" but when she takes medication for gas the abdomen "flatten out." She complains of intermittent pain over the hernia but over the past 2 days it has been worse and has been traveling throughout the epigastric region. Last BM a couple of days ago. Doesn't recall if she passing gas. No fevers/chills  Colonoscopy - 10 years ago - maybe a polyp?  Endoscopy - never    ============================================    On 9/15/22 patient underwent ex lap surgery with extensive take down of prior enterotomies, SBR, JOSEFINA, repair of incisional hernias and wash out.  Per Operative Report, there was inadvertent contamination with bowel content.   Initially on Ceftriaxone and Metronidazole, antibiotics were changed to Meropenem on 22.  Regardless, patient's WBC has increased.  Also, patient was COVID positive on admission without meeting criteria for monoclonal antibodies or other treatment.  She was NPO but now started on clears    Subjectively, she c/o periodical exacerbations of abdominal pain/discomfort, loose stools and coughing.   Denies fever, congestion, sore throat or headache.  No N/V.  Otherwise ROS is negative        PMH: HTN, DM  PSH:  x2, hysterectomy, lap dev  FHx: no cancer or IBD  Meds: Metformin 500 daily, has HTN meds that she doesn't take because they make her nauseous  Soc: No tobacco       (11 Sep 2022 21:07)      PAST MEDICAL & SURGICAL HISTORY:  Diabetes      H/O thyroid disease      S/P total abdominal hysterectomy      History of laparoscopic cholecystectomy      History of         MEDICATIONS  (STANDING):  atorvastatin 20 milliGRAM(s) Oral at bedtime  BUpivacaine 0.5% On-Q Pump 400 milliLiter(s) (4 mL/Hr) IntraDermal. <Continuous>  chlorhexidine 2% Cloths 1 Application(s) Topical <User Schedule>  dextrose 5%. 1000 milliLiter(s) (100 mL/Hr) IV Continuous <Continuous>  dextrose 5%. 1000 milliLiter(s) (50 mL/Hr) IV Continuous <Continuous>  dextrose 50% Injectable 25 Gram(s) IV Push once  dextrose 50% Injectable 12.5 Gram(s) IV Push once  dextrose 50% Injectable 25 Gram(s) IV Push once  glucagon  Injectable 1 milliGRAM(s) IntraMuscular once  heparin   Injectable 5000 Unit(s) SubCutaneous every 8 hours  influenza  Vaccine (HIGH DOSE) 0.7 milliLiter(s) IntraMuscular once  insulin lispro (ADMELOG) corrective regimen sliding scale   SubCutaneous three times a day before meals  insulin lispro (ADMELOG) corrective regimen sliding scale   SubCutaneous at bedtime  labetalol 200 milliGRAM(s) Oral every 12 hours  losartan 50 milliGRAM(s) Oral daily  meropenem  IVPB 1000 milliGRAM(s) IV Intermittent every 8 hours  NIFEdipine XL 60 milliGRAM(s) Oral every 24 hours    MEDICATIONS  (PRN):  acetaminophen     Tablet .. 650 milliGRAM(s) Oral every 6 hours PRN Mild Pain (1 - 3), Moderate Pain (4 - 6)  dextrose Oral Gel 15 Gram(s) Oral once PRN Blood Glucose LESS THAN 70 milliGRAM(s)/deciliter  ondansetron Injectable 4 milliGRAM(s) IV Push every 8 hours PRN Nausea and/or Vomiting  sodium chloride 0.9% lock flush 10 milliLiter(s) IV Push every 1 hour PRN Pre/post blood products, medications, blood draw, and to maintain line patency      Allergies    penicillin (Rash)      SOCIAL HISTORY:  Lives at home      FAMILY HISTORY:  No pertinent family history in first degree relatives      EXAM  Vital Signs Last 24 Hrs  T(C): 36.7 (23 Sep 2022 09:17), Max: 36.7 (22 Sep 2022 16:59)  T(F): 98 (23 Sep 2022 09:17), Max: 98.1 (22 Sep 2022 21:11)  HR: 89 (23 Sep 2022 09:17) (80 - 92)  BP: 143/67 (23 Sep 2022 09:17) (143/67 - 157/64)  BP(mean): --  RR: 18 (23 Sep 2022 09:17) (16 - 18)  SpO2: 98% (23 Sep 2022 09:17) (96% - 100%)    Parameters below as of 23 Sep 2022 09:17  Patient On (Oxygen Delivery Method): room air  Awake and alert and responding appropriately   No distress  No oral lesions/thrush  No cervical adenopathy  RRR  Chest CTA  Abd softly distended and uncomfortable to light palpation  Staples intact and no wound dehiscence  SHOSHANA still in place with large amount of slightly turbid brownish output  LEs no edema without calf tenderness  No joint swelling.  Finger nails very long /synthetic  PICC in left arm without tenderness or erythema or swelling      LABS:                        7.7    18.70 )-----------( 579      ( 23 Sep 2022 10:24 )             24.5     09-23    133<L>  |  96  |  9   ----------------------------<  71  4.3   |  26  |  0.68    Ca    8.7      23 Sep 2022 10:24  Phos  3.0       Mg     2.1             Urinalysis Basic - ( 21 Sep 2022 17:06 )    Color: Yellow / Appearance: Clear / S.015 / pH: x  Gluc: x / Ketone: Trace mg/dL  / Bili: Negative / Urobili: 0.2 E.U./dL   Blood: x / Protein: 30 mg/dL / Nitrite: NEGATIVE   Leuk Esterase: NEGATIVE / RBC: < 5 /HPF / WBC < 5 /HPF   Sq Epi: x / Non Sq Epi: Moderate /HPF / Bacteria: None /HPF      COVID-19 PCR . (22 @ 13:54)    COVID-19 PCR: Detected: You can help in the fight against COVID-19. [facility] may contact  you to see if you are interested in voluntarily participating in one of  our clinical trials.  Testing is performed using polymerase chain reaction (PCR) or  transcription mediated amplification (TMA). This COVID-19 (SARS-CoV-2)  nucleic acid amplification test was validated by [facility] and is in use under the FDA Emergency Use Authorization  (EUA) for clinical labs CLIA-certified to perform high complexity  testing. Test results should be correlated with clinical presentation,  patient history, and epidemiology.    Culture - Body Fluid with Gram Stain (22 @ 15:57)    Gram Stain:   No organisms seen  Few WBC's    Specimen Source: Abdominal Fl Abdominal Fluid    Culture Results:   Rare Enterobacter cloacae complex  Susceptibility to follow.      C. difficile GDH &amp; toxins A/B by EIA (22 @ 13:11)    Clostridium difficile GDH Toxins A&amp;B, EIA:   Negative    Clostridium difficile GDH Interpretation: Negative for toxigenic C. Difficile.  This specimen is negative for C.  Difficile glutamate dehydrogenase (GDH) antigen and negative for C.  Difficile Toxins A & B, by EIA.  GDH is a highly sensitive screening  marker for C. Difficile that is produced in large amounts by all C.  Difficile strains, both toxigenic and nontoxigenic.  This assay has not  been validated as a test of cure.  Repeat testing during the same episode  of diarrhea is of limited value and is discouraged.  The results of this  assay should always be interpreted in conjunction with pateint's clinical  history.        RADIOLOGY & ADDITIONAL STUDIES:    CT Abdomen and Pelvis w/ Oral Cont and w/ IV Cont (22 @ 02:51) >  ACC: [id number] EXAM:  CT ABDOMEN AND PELVIS OC IC                          PROCEDURE DATE:  2022          INTERPRETATION:  CLINICAL INFORMATION: Rising white count    COMPARISON:     CONTRAST/COMPLICATIONS:  IV Contrast: Isovue 370  100 ccadministered  Oral Contrast: NONE  Complications: None reported at time of study completion    PROCEDURE:  CT of the Abdomen and Pelvis was performed.  Sagittal and coronal reformats were performed.    FINDINGS:  LOWER CHEST: Small effusions.    LIVER: Within normal limits.  BILE DUCTS: Normal caliber.  GALLBLADDER: Cholecystectomy.  SPLEEN: Within normal limits.  PANCREAS: Within normal limits.  ADRENALS: Within normal limits.  KIDNEYS/URETERS: Within normal limits.    BLADDER: Within normal limits.  REPRODUCTIVE ORGANS: Hysterectomy.    BOWEL: Dilated proximal small bowel without discrete transition point.   Oral contrast in the rectum. Status post small bowel resection with   anastomotic sutures in the left lower quadrant small none organized.   Anastomotic fluid without extravasated contrast or significant   extraluminal air to suggest active leak. 6 x 3 cm hematoma in the right   paracolic gutter.  PERITONEUM: Small anterior pelvic ascites. No contrast leak. No abscess.   No pneumoperitoneum. Small likely extraperitoneal air in the left groin.  VESSELS: Within normal limits.  RETROPERITONEUM/LYMPH NODES: No lymphadenopathy.  ABDOMINAL WALL: Postsurgical changes. Percutaneous drain. No abscess.  BONES: Within normal limits.    IMPRESSION:    Ileus versus partial small bowel obstruction. No evidence of leak,   abscess or significant pneumoperitoneum.    Small extraperitoneal hematoma in the right paracolic gutter/rectus   sheath. Small anterior pelvic ascites. Other postsurgical changes as   above.

## 2022-09-23 NOTE — PROGRESS NOTE ADULT - ASSESSMENT
72 year old woman with HTN, DM2, past surgical history of  x 2, hysterectomy, and laparoscopic cholecystectomy (), admitted for large bowel obstruction; had spontaneous return of bowel function initially; however, bowel obstruction recurred, so taken to OR for ex lap with lysis of adhesions and bowel resection () . Monitored in SICU post -op ; now on SDU     # Large bowel obstruction   s/p OR for ex lap with lysis of adhesions and bowel resection ()  - rest of care per primary team     # Leukocytosis -wbc uptrending. Unclear etiology -- defer recs for workup and management to ID consult.     #HTN   takes HCTZ 25mg qd, losartan 100mg qd, labetalol 200mg BID, nifedipine 60mg ER qd at home.   If SBP >180 mmHg persistently, [ ] Get bladder scan [ ] if pain well-controlled, and bladder scan shows no urinary retention, can use labetalol 10mg IV push if HR >60 bpm or hydralazine IV 5mg  - continue labetalol 200mg BID, nifepidine 60m ER qd  - continue losartan at 50mg qd; If BP remains consistently elevated >150mmHg on 50mg qd, can resume full home dose at 100mg qd   - Continue to hold HCTZ due to mild hyponatremia ;     # Type 2 Diabetes complicated by hyperglycemia   Takes metformin at home.   cw insulin sliding scale while inpatient.   resume metformin on discharge.     # COVID 19 infection (present on admission)   Incidental finding of COVID 19 infection on admission nasal swab. Without any dyspnea, or sore throat. Per [facility] treatment algorithm, did not meet criteria for monoclonal antibodies at time of admission.   No O2 requirement, does not meet criteria for remdesivir/dexamethasone.   Recommend enoxaparin for DVT ppx     # Acute blood loss anemia   Hgb downtrended from time of admission  Hemoglobin: 7.7 g/dL (22)  Hemoglobin: 9.9 g/dL (22 @ 11:33)  Hemoglobin: 10.2 g/dL (22 @ 11:00)  Partially attributable to operative blood losses   continue to trend daily CBC while inpatient;   ferritin, iron sat not low  - age appropriate cancer screening outpatient     # Hypophosphatemia - resolved    DVT ppx - lovenox    72 year old woman with HTN, DM2, past surgical history of  x 2, hysterectomy, and laparoscopic cholecystectomy (), admitted for large bowel obstruction; had spontaneous return of bowel function initially; however, bowel obstruction recurred, so taken to OR for ex lap with lysis of adhesions and bowel resection () . Monitored in SICU post -op ; now on SDU     # Large bowel obstruction   s/p OR for ex lap with lysis of adhesions and bowel resection ()  - rest of care per primary team     # Leukocytosis -wbc uptrending. Unclear etiology -- defer recs for workup and management to ID consult. [  ] Add on cell diff to CBC; Check CBCs with cell diff.     #HTN   takes HCTZ 25mg qd, losartan 100mg qd, labetalol 200mg BID, nifedipine 60mg ER qd at home.   If SBP >180 mmHg persistently, [ ] Get bladder scan [ ] if pain well-controlled, and bladder scan shows no urinary retention, can use labetalol 10mg IV push if HR >60 bpm or hydralazine IV 5mg  - continue labetalol 200mg BID, nifepidine 60m ER qd  - continue losartan at 50mg qd; If BP remains consistently elevated >150mmHg on 50mg qd, can resume full home dose at 100mg qd   - Continue to hold HCTZ due to mild hyponatremia ;     # Type 2 Diabetes complicated by hyperglycemia   Takes metformin at home.   cw insulin sliding scale while inpatient.   resume metformin on discharge.     # COVID 19 infection (present on admission)   Incidental finding of COVID 19 infection on admission nasal swab. Without any dyspnea, or sore throat. Per [facility] treatment algorithm, did not meet criteria for monoclonal antibodies at time of admission.   No O2 requirement, does not meet criteria for remdesivir/dexamethasone.   Recommend enoxaparin for DVT ppx     # Acute blood loss anemia   Hgb downtrended from time of admission  Hemoglobin: 7.7 g/dL (22)  Hemoglobin: 9.9 g/dL (22 @ 11:33)  Hemoglobin: 10.2 g/dL (22 @ 11:00)  Partially attributable to operative blood losses   continue to trend daily CBC while inpatient;   ferritin, iron sat not low  - age appropriate cancer screening outpatient     # Hypophosphatemia - resolved    DVT ppx - lovenox    72 year old woman with HTN, DM2, past surgical history of  x 2, hysterectomy, and laparoscopic cholecystectomy (), admitted for large bowel obstruction; had spontaneous return of bowel function initially; however, bowel obstruction recurred, so taken to OR for ex lap with lysis of adhesions and bowel resection () . Monitored in SICU post -op ; now on regional. Hospital course complicated by up-trending leukocytosis of unknown etiology.     # Large bowel obstruction   s/p OR for ex lap with lysis of adhesions and bowel resection ()  - rest of care per primary team     # Leukocytosis -wbc uptrending. Unclear etiology -- defer recs for workup and management to ID consult. [  ] Add on cell diff to CBC; Check CBCs with cell diff.     #HTN   takes HCTZ 25mg qd, losartan 100mg qd, labetalol 200mg BID, nifedipine 60mg ER qd at home.   If SBP >180 mmHg persistently, [ ] Get bladder scan [ ] if pain well-controlled, and bladder scan shows no urinary retention, can use labetalol 10mg IV push if HR >60 bpm or hydralazine IV 5mg  - continue labetalol 200mg BID, nifepidine 60m ER qd  - continue losartan at 50mg qd; If BP remains consistently elevated >150mmHg on 50mg qd, can resume full home dose at 100mg qd   - Continue to hold HCTZ due to mild hyponatremia ;     # Type 2 Diabetes complicated by hyperglycemia   Takes metformin at home.   cw insulin sliding scale while inpatient.   resume metformin on discharge.     # COVID 19 infection (present on admission)   Incidental finding of COVID 19 infection on admission nasal swab. Without any dyspnea, or sore throat. Per [facility] treatment algorithm, did not meet criteria for monoclonal antibodies at time of admission.   No O2 requirement, does not meet criteria for remdesivir/dexamethasone.   Recommend enoxaparin for DVT ppx     # Acute blood loss anemia   Hgb downtrended from time of admission  Hemoglobin: 7.7 g/dL (22)  Hemoglobin: 9.9 g/dL (22 @ 11:33)  Hemoglobin: 10.2 g/dL (22 @ 11:00)  Partially attributable to operative blood losses   continue to trend daily CBC while inpatient;   ferritin, iron sat not low  - age appropriate cancer screening outpatient     # Hypophosphatemia - resolved    DVT ppx - lovenox

## 2022-09-23 NOTE — PROGRESS NOTE ADULT - SUBJECTIVE AND OBJECTIVE BOX
Patient is a 72y old  Female who presents with a chief complaint of Large bowel obstruction (22 Sep 2022 13:24)    INTERVAL EVENTS:  - Tolerating diet   - no dyspnea, no dysuria, no nausea.   - last bowel movement today, soft     SUBJECTIVE:  Patient was seen and examined at bedside.  Review of systems: No fever, chills, HA, CP, dyspnea, nausea or vomiting, dysuria, LE edema. Rest of 12 point Review of systems negative unless otherwise documented elsewhere in note.     MEDICATIONS:  MEDICATIONS  (STANDING):  atorvastatin 20 milliGRAM(s) Oral at bedtime  BUpivacaine 0.5% On-Q Pump 400 milliLiter(s) (4 mL/Hr) IntraDermal. <Continuous>  chlorhexidine 2% Cloths 1 Application(s) Topical <User Schedule>  dextrose 5%. 1000 milliLiter(s) (100 mL/Hr) IV Continuous <Continuous>  dextrose 5%. 1000 milliLiter(s) (50 mL/Hr) IV Continuous <Continuous>  dextrose 50% Injectable 25 Gram(s) IV Push once  dextrose 50% Injectable 12.5 Gram(s) IV Push once  dextrose 50% Injectable 25 Gram(s) IV Push once  glucagon  Injectable 1 milliGRAM(s) IntraMuscular once  heparin   Injectable 5000 Unit(s) SubCutaneous every 8 hours  influenza  Vaccine (HIGH DOSE) 0.7 milliLiter(s) IntraMuscular once  insulin lispro (ADMELOG) corrective regimen sliding scale   SubCutaneous three times a day before meals  insulin lispro (ADMELOG) corrective regimen sliding scale   SubCutaneous at bedtime  labetalol 200 milliGRAM(s) Oral every 12 hours  losartan 50 milliGRAM(s) Oral daily  meropenem  IVPB 1000 milliGRAM(s) IV Intermittent every 8 hours  NIFEdipine XL 60 milliGRAM(s) Oral every 24 hours    MEDICATIONS  (PRN):  acetaminophen     Tablet .. 650 milliGRAM(s) Oral every 6 hours PRN Mild Pain (1 - 3), Moderate Pain (4 - 6)  dextrose Oral Gel 15 Gram(s) Oral once PRN Blood Glucose LESS THAN 70 milliGRAM(s)/deciliter  ondansetron Injectable 4 milliGRAM(s) IV Push every 8 hours PRN Nausea and/or Vomiting  sodium chloride 0.9% lock flush 10 milliLiter(s) IV Push every 1 hour PRN Pre/post blood products, medications, blood draw, and to maintain line patency    Allergies    penicillin (Rash)    Intolerances        OBJECTIVE:  Vital Signs Last 24 Hrs  T(C): 37.1 (23 Sep 2022 22:40), Max: 37.1 (23 Sep 2022 20:52)  T(F): 98.7 (23 Sep 2022 22:40), Max: 98.7 (23 Sep 2022 20:52)  HR: 86 (23 Sep 2022 22:40) (82 - 92)  BP: 138/74 (23 Sep 2022 22:40) (137/69 - 169/70)  BP(mean): --  RR: 17 (23 Sep 2022 22:40) (17 - 18)  SpO2: 96% (23 Sep 2022 22:40) (96% - 98%)    Parameters below as of 23 Sep 2022 22:40  Patient On (Oxygen Delivery Method): room air      I&O's Summary    22 Sep 2022 07:01  -  23 Sep 2022 07:00  --------------------------------------------------------  IN: 1920 mL / OUT: 1341 mL / NET: 579 mL    23 Sep 2022 07:01  -  23 Sep 2022 23:28  --------------------------------------------------------  IN: 1340 mL / OUT: 1320 mL / NET: 20 mL    PHYSICAL EXAM:  Gen: Reclining in bed at time of exam, appears stated age  HEENT: NCAT, MMM, clear OP  Neck: supple, trachea at midline  CV: RRR, +S1/S2  Pulm: adequate respiratory effort, no increase in work of breathing  Abd: soft, ND ; minimal tenderness to light palpation ; abdominal incisions clean, dry, staples in place. SHOSHANA drain in place with serosanguinous output ;   Skin: warm and dry,   Ext: WWP, no LE edema  Neuro: AOx3, no gross focal neurological deficits  Psych: affect and behavior appropriate, pleasant at time of interview    LABS:                        7.7    18.70 )-----------( 579      ( 23 Sep 2022 10:24 )             24.5     09-23    133<L>  |  96  |  9   ----------------------------<  71  4.3   |  26  |  0.68    Ca    8.7      23 Sep 2022 10:24  Phos  3.0     09-23  Mg     2.1     09-23          CAPILLARY BLOOD GLUCOSE      POCT Blood Glucose.: 97 mg/dL (23 Sep 2022 21:54)  POCT Blood Glucose.: 87 mg/dL (23 Sep 2022 15:49)  POCT Blood Glucose.: 83 mg/dL (23 Sep 2022 11:55)  POCT Blood Glucose.: 71 mg/dL (23 Sep 2022 07:32)        MICRODATA:    Culture - Body Fluid with Gram Stain (collected 22 Sep 2022 15:57)  Source: Abdominal Fl Abdominal Fluid  Gram Stain (22 Sep 2022 17:00):    No organisms seen    Few WBC's  Preliminary Report (23 Sep 2022 10:58):    Rare Enterobacter cloacae complex    Susceptibility to follow.        RADIOLOGY/OTHER STUDIES:

## 2022-09-23 NOTE — CONSULT NOTE ADULT - ASSESSMENT
RECOMMEND  Continue Meropenem for now  Check ESR and CRP  Check Fungitell level (Beta-D-Glucan)  Perform manual WBC differential to r/o left shift      Discussed with the PA      [phone] S/P Extensive abdominal surgery, following persistent SBO, including extensive SBR with anastomosis and inadvertent bowel content leak  Peritonitis   Presence of Enterobacter cloacae in the intraabdominal drainage - indicating patient at least colonized and at risk for infection with this potentially MDR organism  No abscess/collection on recent CT except for a right paracolic gutter hematoma but it appears extraperitoneal  COVID infection without prior evidence of pneumonia  Cough  Leukocytosis - unclear whether ongoing infectious process vs reactive e.g. due to hematoma.  Need to search for thrombosis if no further explanation of escalating leukocytosis      RECOMMEND  Continue Meropenem for now  Check ESR and CRP  Check Fungitell level (Beta-D-Glucan)  Perform manual WBC differential to r/o left shift   CXR / Chest imaging r/o pneumonia   Swab nostrils for MRSA to assess for MRSA/Staph aureus colonization  Screen for HIV         Discussed with the PA      [phone]

## 2022-09-23 NOTE — PROGRESS NOTE ADULT - ASSESSMENT
71 yo female with PMH of HTN, DM and PSH of  x2, hysterectomy and lap dev ( w/ Dr. Clifford) admitted  to surgery svc with 2 days of nausea/vomiting and abd pain w/ imaging findings consistent with large bowel obstruction. However, prior to operative exploration, the patient had return of bowel function. NGT removed however bowel obstruction recurred therefore pt taken to OR 9/15 for Exlap JOSEFINA, SBR ( 100 cm)  EBL:200 IVF:2000 UO:250. Pt kept intubated post op and transferred to SICU team, extubated on POD#0, stable for transfer to tele on POD#1. return to SICU POD#2 due to hypertension (BP  210/104), gotten labetalol 10, then 20, then 40 while in telemetry, asymptomatic, repeat BP down to 165/67. She was transfer back to SICU for close BP monitoring. pt baseline on labetalol 200 PO bid, losartan 100 qd, hctz 25 qd and nifedipine 60 qd at home. Her SBP remained below 160's all night, did not need any additional antihypertensives. transfer back to SDU next day ()     Soft diet, ISS  Pain/nausea control, avoid narcotics  Procardia 60qd, PO labetalol 200 bid,   Meropenem (-) ID following  SCD/SQH  OOBA/IS  Endurance cath (-)  SHOSHANA x1 in SQ , SHOSHANA cx sent yesterday  AM labs  PICC (-)

## 2022-09-24 LAB
-  AMPICILLIN/SULBACTAM: SIGNIFICANT CHANGE UP
-  AMPICILLIN: SIGNIFICANT CHANGE UP
-  CEFAZOLIN: SIGNIFICANT CHANGE UP
-  CEFEPIME: SIGNIFICANT CHANGE UP
-  CEFOXITIN: SIGNIFICANT CHANGE UP
-  CEFTRIAXONE: SIGNIFICANT CHANGE UP
-  CIPROFLOXACIN: SIGNIFICANT CHANGE UP
-  ERTAPENEM: SIGNIFICANT CHANGE UP
-  GENTAMICIN: SIGNIFICANT CHANGE UP
-  PIPERACILLIN/TAZOBACTAM: SIGNIFICANT CHANGE UP
-  TOBRAMYCIN: SIGNIFICANT CHANGE UP
-  TRIMETHOPRIM/SULFAMETHOXAZOLE: SIGNIFICANT CHANGE UP
ANION GAP SERPL CALC-SCNC: 13 MMOL/L — SIGNIFICANT CHANGE UP (ref 5–17)
BUN SERPL-MCNC: 10 MG/DL — SIGNIFICANT CHANGE UP (ref 7–23)
CALCIUM SERPL-MCNC: 9.2 MG/DL — SIGNIFICANT CHANGE UP (ref 8.4–10.5)
CHLORIDE SERPL-SCNC: 97 MMOL/L — SIGNIFICANT CHANGE UP (ref 96–108)
CO2 SERPL-SCNC: 25 MMOL/L — SIGNIFICANT CHANGE UP (ref 22–31)
CREAT SERPL-MCNC: 0.72 MG/DL — SIGNIFICANT CHANGE UP (ref 0.5–1.3)
CRP SERPL-MCNC: 203.2 MG/L — HIGH (ref 0–4)
CRP SERPL-MCNC: 208.2 MG/L — HIGH (ref 0–4)
EGFR: 90 ML/MIN/1.73M2 — SIGNIFICANT CHANGE UP
ERYTHROCYTE [SEDIMENTATION RATE] IN BLOOD: 120 MM/HR — HIGH
GLUCOSE BLDC GLUCOMTR-MCNC: 74 MG/DL — SIGNIFICANT CHANGE UP (ref 70–99)
GLUCOSE BLDC GLUCOMTR-MCNC: 83 MG/DL — SIGNIFICANT CHANGE UP (ref 70–99)
GLUCOSE BLDC GLUCOMTR-MCNC: 88 MG/DL — SIGNIFICANT CHANGE UP (ref 70–99)
GLUCOSE BLDC GLUCOMTR-MCNC: 89 MG/DL — SIGNIFICANT CHANGE UP (ref 70–99)
GLUCOSE SERPL-MCNC: 92 MG/DL — SIGNIFICANT CHANGE UP (ref 70–99)
HCT VFR BLD CALC: 26.6 % — LOW (ref 34.5–45)
HGB BLD-MCNC: 8.2 G/DL — LOW (ref 11.5–15.5)
MAGNESIUM SERPL-MCNC: 2 MG/DL — SIGNIFICANT CHANGE UP (ref 1.6–2.6)
MCHC RBC-ENTMCNC: 27.5 PG — SIGNIFICANT CHANGE UP (ref 27–34)
MCHC RBC-ENTMCNC: 30.8 GM/DL — LOW (ref 32–36)
MCV RBC AUTO: 89.3 FL — SIGNIFICANT CHANGE UP (ref 80–100)
METHOD TYPE: SIGNIFICANT CHANGE UP
NRBC # BLD: 0 /100 WBCS — SIGNIFICANT CHANGE UP (ref 0–0)
PHOSPHATE SERPL-MCNC: 3.6 MG/DL — SIGNIFICANT CHANGE UP (ref 2.5–4.5)
PLATELET # BLD AUTO: 396 K/UL — SIGNIFICANT CHANGE UP (ref 150–400)
POTASSIUM SERPL-MCNC: 4.2 MMOL/L — SIGNIFICANT CHANGE UP (ref 3.5–5.3)
POTASSIUM SERPL-SCNC: 4.2 MMOL/L — SIGNIFICANT CHANGE UP (ref 3.5–5.3)
RBC # BLD: 2.98 M/UL — LOW (ref 3.8–5.2)
RBC # FLD: 16 % — HIGH (ref 10.3–14.5)
SODIUM SERPL-SCNC: 135 MMOL/L — SIGNIFICANT CHANGE UP (ref 135–145)
WBC # BLD: 18.75 K/UL — HIGH (ref 3.8–10.5)
WBC # FLD AUTO: 18.75 K/UL — HIGH (ref 3.8–10.5)

## 2022-09-24 PROCEDURE — 99233 SBSQ HOSP IP/OBS HIGH 50: CPT

## 2022-09-24 PROCEDURE — 71045 X-RAY EXAM CHEST 1 VIEW: CPT | Mod: 26

## 2022-09-24 PROCEDURE — 74177 CT ABD & PELVIS W/CONTRAST: CPT | Mod: 26

## 2022-09-24 PROCEDURE — 71260 CT THORAX DX C+: CPT | Mod: 26

## 2022-09-24 RX ORDER — DIATRIZOATE MEGLUMINE 180 MG/ML
30 INJECTION, SOLUTION INTRAVESICAL ONCE
Refills: 0 | Status: COMPLETED | OUTPATIENT
Start: 2022-09-24 | End: 2022-09-24

## 2022-09-24 RX ORDER — LIDOCAINE 4 G/100G
1 CREAM TOPICAL ONCE
Refills: 0 | Status: COMPLETED | OUTPATIENT
Start: 2022-09-24 | End: 2022-09-24

## 2022-09-24 RX ADMIN — ATORVASTATIN CALCIUM 20 MILLIGRAM(S): 80 TABLET, FILM COATED ORAL at 21:40

## 2022-09-24 RX ADMIN — Medication 200 MILLIGRAM(S): at 07:02

## 2022-09-24 RX ADMIN — MEROPENEM 100 MILLIGRAM(S): 1 INJECTION INTRAVENOUS at 21:41

## 2022-09-24 RX ADMIN — HEPARIN SODIUM 5000 UNIT(S): 5000 INJECTION INTRAVENOUS; SUBCUTANEOUS at 13:30

## 2022-09-24 RX ADMIN — LIDOCAINE 1 PATCH: 4 CREAM TOPICAL at 09:46

## 2022-09-24 RX ADMIN — Medication 60 MILLIGRAM(S): at 15:04

## 2022-09-24 RX ADMIN — CHLORHEXIDINE GLUCONATE 1 APPLICATION(S): 213 SOLUTION TOPICAL at 07:02

## 2022-09-24 RX ADMIN — LIDOCAINE 1 PATCH: 4 CREAM TOPICAL at 18:52

## 2022-09-24 RX ADMIN — Medication 200 MILLIGRAM(S): at 18:50

## 2022-09-24 RX ADMIN — HEPARIN SODIUM 5000 UNIT(S): 5000 INJECTION INTRAVENOUS; SUBCUTANEOUS at 07:02

## 2022-09-24 RX ADMIN — Medication 650 MILLIGRAM(S): at 23:48

## 2022-09-24 RX ADMIN — Medication 650 MILLIGRAM(S): at 04:00

## 2022-09-24 RX ADMIN — Medication 650 MILLIGRAM(S): at 22:48

## 2022-09-24 RX ADMIN — ONDANSETRON 4 MILLIGRAM(S): 8 TABLET, FILM COATED ORAL at 19:07

## 2022-09-24 RX ADMIN — DIATRIZOATE MEGLUMINE 30 MILLILITER(S): 180 INJECTION, SOLUTION INTRAVESICAL at 14:58

## 2022-09-24 RX ADMIN — HEPARIN SODIUM 5000 UNIT(S): 5000 INJECTION INTRAVENOUS; SUBCUTANEOUS at 21:41

## 2022-09-24 RX ADMIN — MEROPENEM 100 MILLIGRAM(S): 1 INJECTION INTRAVENOUS at 13:29

## 2022-09-24 RX ADMIN — LOSARTAN POTASSIUM 50 MILLIGRAM(S): 100 TABLET, FILM COATED ORAL at 07:02

## 2022-09-24 RX ADMIN — LIDOCAINE 1 PATCH: 4 CREAM TOPICAL at 22:00

## 2022-09-24 RX ADMIN — Medication 650 MILLIGRAM(S): at 04:45

## 2022-09-24 RX ADMIN — MEROPENEM 100 MILLIGRAM(S): 1 INJECTION INTRAVENOUS at 07:01

## 2022-09-24 NOTE — PROGRESS NOTE ADULT - SUBJECTIVE AND OBJECTIVE BOX
INTERVAL HPI/OVERNIGHT EVENTS:    STATUS POST:      POST OPERATIVE DAY #:     SUBJECTIVE:  Flatus: [ ] YES [ ] NO             Bowel Movement: [ ] YES [ ] NO  Pain (0-10):            Pain Control Adequate: [ ] YES [ ] NO  Nausea: [ ] YES [ ] NO            Vomiting: [ ] YES [ ] NO  Diarrhea: [ ] YES [ ] NO         Constipation: [ ] YES [ ] NO     Chest Pain: [ ] YES [ ] NO    SOB:  [ ] YES [ ] NO    MEDICATIONS  (STANDING):  atorvastatin 20 milliGRAM(s) Oral at bedtime  BUpivacaine 0.5% On-Q Pump 400 milliLiter(s) (4 mL/Hr) IntraDermal. <Continuous>  chlorhexidine 2% Cloths 1 Application(s) Topical <User Schedule>  dextrose 5%. 1000 milliLiter(s) (100 mL/Hr) IV Continuous <Continuous>  dextrose 5%. 1000 milliLiter(s) (50 mL/Hr) IV Continuous <Continuous>  dextrose 50% Injectable 25 Gram(s) IV Push once  dextrose 50% Injectable 12.5 Gram(s) IV Push once  dextrose 50% Injectable 25 Gram(s) IV Push once  glucagon  Injectable 1 milliGRAM(s) IntraMuscular once  heparin   Injectable 5000 Unit(s) SubCutaneous every 8 hours  influenza  Vaccine (HIGH DOSE) 0.7 milliLiter(s) IntraMuscular once  insulin lispro (ADMELOG) corrective regimen sliding scale   SubCutaneous three times a day before meals  insulin lispro (ADMELOG) corrective regimen sliding scale   SubCutaneous at bedtime  labetalol 200 milliGRAM(s) Oral every 12 hours  losartan 50 milliGRAM(s) Oral daily  meropenem  IVPB 1000 milliGRAM(s) IV Intermittent every 8 hours  NIFEdipine XL 60 milliGRAM(s) Oral every 24 hours    MEDICATIONS  (PRN):  acetaminophen     Tablet .. 650 milliGRAM(s) Oral every 6 hours PRN Mild Pain (1 - 3), Moderate Pain (4 - 6)  dextrose Oral Gel 15 Gram(s) Oral once PRN Blood Glucose LESS THAN 70 milliGRAM(s)/deciliter  ondansetron Injectable 4 milliGRAM(s) IV Push every 8 hours PRN Nausea and/or Vomiting  sodium chloride 0.9% lock flush 10 milliLiter(s) IV Push every 1 hour PRN Pre/post blood products, medications, blood draw, and to maintain line patency      Vital Signs Last 24 Hrs  T(C): 36.9 (24 Sep 2022 05:00), Max: 37.4 (24 Sep 2022 00:00)  T(F): 98.5 (24 Sep 2022 05:00), Max: 99.3 (24 Sep 2022 00:00)  HR: 85 (24 Sep 2022 05:00) (82 - 90)  BP: 137/67 (24 Sep 2022 05:00) (137/67 - 169/70)  BP(mean): 90 (24 Sep 2022 05:00) (90 - 95)  RR: 17 (24 Sep 2022 05:00) (17 - 18)  SpO2: 99% (24 Sep 2022 05:00) (96% - 99%)    Parameters below as of 24 Sep 2022 05:00  Patient On (Oxygen Delivery Method): room air        PHYSICAL EXAM:      Constitutional: A&Ox3    Breasts:    Respiratory: non labored breathing, no respiratory distress    Cardiovascular: NSR, RRR    Gastrointestinal:                 Incision:    Genitourinary:    Extremities: (-) edema                  I&O's Detail    23 Sep 2022 07:01  -  24 Sep 2022 07:00  --------------------------------------------------------  IN:    Oral Fluid: 1340 mL  Total IN: 1340 mL    OUT:    Bulb (mL): 30 mL    Voided (mL): 1600 mL  Total OUT: 1630 mL    Total NET: -290 mL          LABS:                        8.2    18.75 )-----------( 396      ( 24 Sep 2022 06:45 )             26.6     09-24    135  |  97  |  10  ----------------------------<  92  4.2   |  25  |  0.72    Ca    9.2      24 Sep 2022 06:45  Phos  3.6     09-24  Mg     2.0     09-24            RADIOLOGY & ADDITIONAL STUDIES: INTERVAL HPI/OVERNIGHT EVENTS:    STATUS POST:  Exploratory Laparotomy , Extensive lysis of adhesions , small bowel resection with primary anastomosis, repair of recurrent incisional hernia with myofascial flaps bilaterally no mesh, abdominal washout, closure, drainage, PREVINA VAC, ON q pump.     POST OPERATIVE DAY #: 9    SUBJECTIVE:  Flatus: [x ] YES [ ] NO             Bowel Movement: [x ] YES [ ] NO  Pain (0-10):         2   Pain Control Adequate: [x ] YES [ ] NO  Nausea: [ ] YES [x ] NO            Vomiting: [ ] YES [x ] NO  Diarrhea: [ ] YES [x ] NO         Constipation: [ ] YES [x ] NO     Chest Pain: [ ] YES [ x] NO    SOB:  [ ] YES [x ] NO    MEDICATIONS  (STANDING):  atorvastatin 20 milliGRAM(s) Oral at bedtime  BUpivacaine 0.5% On-Q Pump 400 milliLiter(s) (4 mL/Hr) IntraDermal. <Continuous>  chlorhexidine 2% Cloths 1 Application(s) Topical <User Schedule>  dextrose 5%. 1000 milliLiter(s) (100 mL/Hr) IV Continuous <Continuous>  dextrose 5%. 1000 milliLiter(s) (50 mL/Hr) IV Continuous <Continuous>  dextrose 50% Injectable 25 Gram(s) IV Push once  dextrose 50% Injectable 12.5 Gram(s) IV Push once  dextrose 50% Injectable 25 Gram(s) IV Push once  glucagon  Injectable 1 milliGRAM(s) IntraMuscular once  heparin   Injectable 5000 Unit(s) SubCutaneous every 8 hours  influenza  Vaccine (HIGH DOSE) 0.7 milliLiter(s) IntraMuscular once  insulin lispro (ADMELOG) corrective regimen sliding scale   SubCutaneous three times a day before meals  insulin lispro (ADMELOG) corrective regimen sliding scale   SubCutaneous at bedtime  labetalol 200 milliGRAM(s) Oral every 12 hours  losartan 50 milliGRAM(s) Oral daily  meropenem  IVPB 1000 milliGRAM(s) IV Intermittent every 8 hours  NIFEdipine XL 60 milliGRAM(s) Oral every 24 hours    MEDICATIONS  (PRN):  acetaminophen     Tablet .. 650 milliGRAM(s) Oral every 6 hours PRN Mild Pain (1 - 3), Moderate Pain (4 - 6)  dextrose Oral Gel 15 Gram(s) Oral once PRN Blood Glucose LESS THAN 70 milliGRAM(s)/deciliter  ondansetron Injectable 4 milliGRAM(s) IV Push every 8 hours PRN Nausea and/or Vomiting  sodium chloride 0.9% lock flush 10 milliLiter(s) IV Push every 1 hour PRN Pre/post blood products, medications, blood draw, and to maintain line patency      Vital Signs Last 24 Hrs  T(C): 36.9 (24 Sep 2022 05:00), Max: 37.4 (24 Sep 2022 00:00)  T(F): 98.5 (24 Sep 2022 05:00), Max: 99.3 (24 Sep 2022 00:00)  HR: 85 (24 Sep 2022 05:00) (82 - 90)  BP: 137/67 (24 Sep 2022 05:00) (137/67 - 169/70)  BP(mean): 90 (24 Sep 2022 05:00) (90 - 95)  RR: 17 (24 Sep 2022 05:00) (17 - 18)  SpO2: 99% (24 Sep 2022 05:00) (96% - 99%)    Parameters below as of 24 Sep 2022 05:00  Patient On (Oxygen Delivery Method): room air        PHYSICAL EXAM:      Constitutional: A&Ox3    Breasts:    Respiratory: non labored breathing, no respiratory distress    Cardiovascular: NSR, RRR    Gastrointestinal:                 Incision:    Genitourinary:    Extremities: (-) edema                  I&O's Detail    23 Sep 2022 07:01  -  24 Sep 2022 07:00  --------------------------------------------------------  IN:    Oral Fluid: 1340 mL  Total IN: 1340 mL    OUT:    Bulb (mL): 30 mL    Voided (mL): 1600 mL  Total OUT: 1630 mL    Total NET: -290 mL          LABS:                        8.2    18.75 )-----------( 396      ( 24 Sep 2022 06:45 )             26.6     09-24    135  |  97  |  10  ----------------------------<  92  4.2   |  25  |  0.72    Ca    9.2      24 Sep 2022 06:45  Phos  3.6     09-24  Mg     2.0     09-24            RADIOLOGY & ADDITIONAL STUDIES: INTERVAL HPI/OVERNIGHT EVENTS:  AZUCENA overnight. Patient seen and examined by chief resident and team on AM rounds. +n/-v/+f/+bmx1 this AM.     STATUS POST:  Exploratory Laparotomy , Extensive lysis of adhesions , small bowel resection with primary anastomosis, repair of recurrent incisional hernia with myofascial flaps bilaterally no mesh, abdominal washout, closure, drainage, PREVINA VAC, ON q pump.     POST OPERATIVE DAY #: 9    SUBJECTIVE:  Flatus: [x ] YES [ ] NO             Bowel Movement: [x ] YES [ ] NO  Pain (0-10):         2   Pain Control Adequate: [x ] YES [ ] NO  Nausea: [ ] YES [x ] NO            Vomiting: [ ] YES [x ] NO  Diarrhea: [ ] YES [x ] NO         Constipation: [ ] YES [x ] NO     Chest Pain: [ ] YES [ x] NO    SOB:  [ ] YES [x ] NO    MEDICATIONS  (STANDING):  atorvastatin 20 milliGRAM(s) Oral at bedtime  BUpivacaine 0.5% On-Q Pump 400 milliLiter(s) (4 mL/Hr) IntraDermal. <Continuous>  chlorhexidine 2% Cloths 1 Application(s) Topical <User Schedule>  dextrose 5%. 1000 milliLiter(s) (100 mL/Hr) IV Continuous <Continuous>  dextrose 5%. 1000 milliLiter(s) (50 mL/Hr) IV Continuous <Continuous>  dextrose 50% Injectable 25 Gram(s) IV Push once  dextrose 50% Injectable 12.5 Gram(s) IV Push once  dextrose 50% Injectable 25 Gram(s) IV Push once  glucagon  Injectable 1 milliGRAM(s) IntraMuscular once  heparin   Injectable 5000 Unit(s) SubCutaneous every 8 hours  influenza  Vaccine (HIGH DOSE) 0.7 milliLiter(s) IntraMuscular once  insulin lispro (ADMELOG) corrective regimen sliding scale   SubCutaneous three times a day before meals  insulin lispro (ADMELOG) corrective regimen sliding scale   SubCutaneous at bedtime  labetalol 200 milliGRAM(s) Oral every 12 hours  losartan 50 milliGRAM(s) Oral daily  meropenem  IVPB 1000 milliGRAM(s) IV Intermittent every 8 hours  NIFEdipine XL 60 milliGRAM(s) Oral every 24 hours    MEDICATIONS  (PRN):  acetaminophen     Tablet .. 650 milliGRAM(s) Oral every 6 hours PRN Mild Pain (1 - 3), Moderate Pain (4 - 6)  dextrose Oral Gel 15 Gram(s) Oral once PRN Blood Glucose LESS THAN 70 milliGRAM(s)/deciliter  ondansetron Injectable 4 milliGRAM(s) IV Push every 8 hours PRN Nausea and/or Vomiting  sodium chloride 0.9% lock flush 10 milliLiter(s) IV Push every 1 hour PRN Pre/post blood products, medications, blood draw, and to maintain line patency      Vital Signs Last 24 Hrs  T(C): 36.9 (24 Sep 2022 05:00), Max: 37.4 (24 Sep 2022 00:00)  T(F): 98.5 (24 Sep 2022 05:00), Max: 99.3 (24 Sep 2022 00:00)  HR: 85 (24 Sep 2022 05:00) (82 - 90)  BP: 137/67 (24 Sep 2022 05:00) (137/67 - 169/70)  BP(mean): 90 (24 Sep 2022 05:00) (90 - 95)  RR: 17 (24 Sep 2022 05:00) (17 - 18)  SpO2: 99% (24 Sep 2022 05:00) (96% - 99%)    Parameters below as of 24 Sep 2022 05:00  Patient On (Oxygen Delivery Method): room air        PHYSICAL EXAM:      Constitutional: A&Ox3  Respiratory: non labored breathing, no respiratory distress  Cardiovascular: NSR, RRR  Gastrointestinal: soft, NTND abdomen, midline incision c/d/i. SHOSHANA with minimal serosanguinous output.    Extremities: (-) edema                  I&O's Detail    23 Sep 2022 07:01  -  24 Sep 2022 07:00  --------------------------------------------------------  IN:    Oral Fluid: 1340 mL  Total IN: 1340 mL    OUT:    Bulb (mL): 30 mL    Voided (mL): 1600 mL  Total OUT: 1630 mL    Total NET: -290 mL          LABS:                        8.2    18.75 )-----------( 396      ( 24 Sep 2022 06:45 )             26.6     09-24    135  |  97  |  10  ----------------------------<  92  4.2   |  25  |  0.72    Ca    9.2      24 Sep 2022 06:45  Phos  3.6     09-24  Mg     2.0     09-24            RADIOLOGY & ADDITIONAL STUDIES:

## 2022-09-24 NOTE — PROGRESS NOTE ADULT - SUBJECTIVE AND OBJECTIVE BOX
INTERVAL EVENTS: Passing gas, having BM. No N/V/D    PAST MEDICAL & SURGICAL HISTORY:  Diabetes    H/O thyroid disease    S/P total abdominal hysterectomy    History of laparoscopic cholecystectomy    History of         MEDICATIONS  (STANDING):  atorvastatin 20 milliGRAM(s) Oral at bedtime  BUpivacaine 0.5% On-Q Pump 400 milliLiter(s) (4 mL/Hr) IntraDermal. <Continuous>  chlorhexidine 2% Cloths 1 Application(s) Topical <User Schedule>  dextrose 5%. 1000 milliLiter(s) (100 mL/Hr) IV Continuous <Continuous>  dextrose 5%. 1000 milliLiter(s) (50 mL/Hr) IV Continuous <Continuous>  dextrose 50% Injectable 25 Gram(s) IV Push once  dextrose 50% Injectable 12.5 Gram(s) IV Push once  dextrose 50% Injectable 25 Gram(s) IV Push once  glucagon  Injectable 1 milliGRAM(s) IntraMuscular once  heparin   Injectable 5000 Unit(s) SubCutaneous every 8 hours  influenza  Vaccine (HIGH DOSE) 0.7 milliLiter(s) IntraMuscular once  insulin lispro (ADMELOG) corrective regimen sliding scale   SubCutaneous three times a day before meals  insulin lispro (ADMELOG) corrective regimen sliding scale   SubCutaneous at bedtime  labetalol 200 milliGRAM(s) Oral every 12 hours  losartan 50 milliGRAM(s) Oral daily  meropenem  IVPB 1000 milliGRAM(s) IV Intermittent every 8 hours  NIFEdipine XL 60 milliGRAM(s) Oral every 24 hours    MEDICATIONS  (PRN):  acetaminophen     Tablet .. 650 milliGRAM(s) Oral every 6 hours PRN Mild Pain (1 - 3), Moderate Pain (4 - 6)  dextrose Oral Gel 15 Gram(s) Oral once PRN Blood Glucose LESS THAN 70 milliGRAM(s)/deciliter  ondansetron Injectable 4 milliGRAM(s) IV Push every 8 hours PRN Nausea and/or Vomiting  sodium chloride 0.9% lock flush 10 milliLiter(s) IV Push every 1 hour PRN Pre/post blood products, medications, blood draw, and to maintain line patency    Vital Signs Last 24 Hrs  T(C): 36.6 (24 Sep 2022 08:48), Max: 37.4 (24 Sep 2022 00:00)  T(F): 97.9 (24 Sep 2022 08:48), Max: 99.3 (24 Sep 2022 00:00)  HR: 87 (24 Sep 2022 08:48) (82 - 90)  BP: 139/77 (24 Sep 2022 08:48) (137/67 - 169/70)  BP(mean): 90 (24 Sep 2022 05:00) (90 - 95)  RR: 17 (24 Sep 2022 08:48) (17 - 18)  SpO2: 99% (24 Sep 2022 08:48) (96% - 99%)    Parameters below as of 24 Sep 2022 08:48  Patient On (Oxygen Delivery Method): room air        PHYSICAL EXAM:  GEN: Awake, alert. NAD.   HEENT: NCAT, PERRL, EOMI. Mucosa moist. No JVD.  RESP: CTA b/l  CV: RRR. Normal S1/S2. No m/r/g.  ABD: Soft. NT/ND. BS+  EXT: Warm. No edema, clubbing, or cyanosis.   NEURO: AAOx3. No focal deficits.     LABS:                        8.2    18.75 )-----------( 396      ( 24 Sep 2022 06:45 )             26.6         135  |  97  |  10  ----------------------------<  92  4.2   |  25  |  0.72    Ca    9.2      24 Sep 2022 06:45  Phos  3.6       Mg     2.0                   I&O's Summary    23 Sep 2022 07:  -  24 Sep 2022 07:00  --------------------------------------------------------  IN: 1340 mL / OUT: 1630 mL / NET: -290 mL    24 Sep 2022 07:01  -  24 Sep 2022 11:24  --------------------------------------------------------  IN: 230 mL / OUT: 300 mL / NET: -70 mL

## 2022-09-24 NOTE — PROGRESS NOTE ADULT - ASSESSMENT
72 year old woman with HTN, DM2, past surgical history of  x 2, hysterectomy, and laparoscopic cholecystectomy (), admitted for large bowel obstruction; had spontaneous return of bowel function initially; however, bowel obstruction recurred, so taken to OR for ex lap with lysis of adhesions and bowel resection () . Monitored in SICU post -op ; now on regional. Hospital course complicated by up-trending leukocytosis of unknown etiology.     # Large bowel obstruction   s/p OR for ex lap with lysis of adhesions and bowel resection ()  - rest of care per primary team     # Leukocytosis -wbc uptrending. Unclear etiology -- defer recs for workup and management to ID consult  Was started on meropenem by ID yesterday     #HTN   takes HCTZ 25mg qd, losartan 100mg qd, labetalol 200mg BID, nifedipine 60mg ER qd at home.   - continue labetalol 200mg BID, nifepidine 60m ER qd  - continue losartan at 50mg qd; If BP remains consistently elevated >150mmHg on 50mg qd, can resume full home dose at 100mg qd   - Continue to hold HCTZ for now    # Type 2 Diabetes complicated by hyperglycemia   Takes metformin at home.   cw insulin sliding scale while inpatient.   resume metformin on discharge.     # COVID 19 infection (present on admission)   Incidental finding of COVID 19 infection on admission nasal swab. Without any dyspnea, or sore throat. Per [facility] treatment algorithm, did not meet criteria for monoclonal antibodies at time of admission.   No O2 requirement, does not meet criteria for remdesivir/dexamethasone.   Recommend enoxaparin for DVT ppx     # Acute blood loss anemia   Hgb downtrended from time of admission  Partially attributable to operative blood losses   continue to trend daily CBC while inpatient;   ferritin, iron sat not low  - age appropriate cancer screening outpatient       DVT ppx - lovenox

## 2022-09-24 NOTE — PROGRESS NOTE ADULT - ASSESSMENT
73 yo female with PMH of HTN, DM and PSH of  x2, hysterectomy and lap dev ( w/ Dr. Clifford) admitted  to surgery [facility] with 2 days of nausea/vomiting and abd pain w/ imaging findings consistent with large bowel obstruction. However, prior to operative exploration, the patient had return of bowel function. NGT removed however bowel obstruction recurred therefore pt taken to OR 9/15 for Exlap JOSEFINA, SBR ( 100 cm)  EBL:200 IVF:2000 UO:250. Pt kept intubated post op and transferred to SICU team, extubated on POD#0, stable for transfer to tele on POD#1. return to SICU POD#2 due to hypertension (BP  210/104), gotten labetalol 10, then 20, then 40 while in telemetry, asymptomatic, repeat BP down to 165/67. She was transfer back to SICU for close BP monitoring. pt baseline on labetalol 200 PO bid, losartan 100 qd, hctz 25 qd and nifedipine 60 qd at home. Her SBP remained below 160's all night, did not need any additional antihypertensives. transfer back to SDU next day ().    Plan:  -  71 yo female with PMH of HTN, DM and PSH of  x2, hysterectomy and lap dev ( w/ Dr. Clifford) admitted  to surgery [facility] [place] with 2 days of nausea/vomiting and abd pain w/ imaging findings consistent with large bowel obstruction. However, prior to operative exploration, the patient had return of bowel function. NGT removed however bowel obstruction recurred therefore pt taken to OR 9/15 for Exlap JOSEFINA, SBR ( 100 cm)  EBL:200 IVF:2000 UO:250. Pt kept intubated post op and transferred to SICU team, extubated on POD#0, stable for transfer to tele on POD#1. return to SICU POD#2 due to hypertension (BP  210/104), gotten labetalol 10, then 20, then 40 while in telemetry, asymptomatic, repeat BP down to 165/67. She was transfer back to SICU for close BP monitoring. pt baseline on labetalol 200 PO bid, losartan 100 qd, hctz 25 qd and nifedipine 60 qd at home. Her SBP remained below 160's all night, did not need any additional antihypertensives. transfer back to SDU next day ().    Plan:  - soft diet  - pain/nausea control prn  - appreciate ID recs  - CTAP +chest with PO/IV contrast as per Dr. So  - c/w home meds as appropriate  - SQH, SCDs

## 2022-09-25 LAB
-  CLINDAMYCIN: SIGNIFICANT CHANGE UP
-  ERYTHROMYCIN: SIGNIFICANT CHANGE UP
-  LINEZOLID: SIGNIFICANT CHANGE UP
-  OXACILLIN: SIGNIFICANT CHANGE UP
-  RIFAMPIN: SIGNIFICANT CHANGE UP
-  TRIMETHOPRIM/SULFAMETHOXAZOLE: SIGNIFICANT CHANGE UP
-  VANCOMYCIN: SIGNIFICANT CHANGE UP
ANION GAP SERPL CALC-SCNC: 12 MMOL/L — SIGNIFICANT CHANGE UP (ref 5–17)
ANION GAP SERPL CALC-SCNC: 15 MMOL/L — SIGNIFICANT CHANGE UP (ref 5–17)
BUN SERPL-MCNC: 11 MG/DL — SIGNIFICANT CHANGE UP (ref 7–23)
BUN SERPL-MCNC: 12 MG/DL — SIGNIFICANT CHANGE UP (ref 7–23)
CALCIUM SERPL-MCNC: 8.8 MG/DL — SIGNIFICANT CHANGE UP (ref 8.4–10.5)
CALCIUM SERPL-MCNC: 9 MG/DL — SIGNIFICANT CHANGE UP (ref 8.4–10.5)
CHLORIDE SERPL-SCNC: 96 MMOL/L — SIGNIFICANT CHANGE UP (ref 96–108)
CHLORIDE SERPL-SCNC: 96 MMOL/L — SIGNIFICANT CHANGE UP (ref 96–108)
CO2 SERPL-SCNC: 25 MMOL/L — SIGNIFICANT CHANGE UP (ref 22–31)
CO2 SERPL-SCNC: 26 MMOL/L — SIGNIFICANT CHANGE UP (ref 22–31)
CREAT SERPL-MCNC: 0.74 MG/DL — SIGNIFICANT CHANGE UP (ref 0.5–1.3)
CREAT SERPL-MCNC: 0.76 MG/DL — SIGNIFICANT CHANGE UP (ref 0.5–1.3)
EGFR: 84 ML/MIN/1.73M2 — SIGNIFICANT CHANGE UP
EGFR: 87 ML/MIN/1.73M2 — SIGNIFICANT CHANGE UP
GLUCOSE BLDC GLUCOMTR-MCNC: 111 MG/DL — HIGH (ref 70–99)
GLUCOSE BLDC GLUCOMTR-MCNC: 123 MG/DL — HIGH (ref 70–99)
GLUCOSE BLDC GLUCOMTR-MCNC: 80 MG/DL — SIGNIFICANT CHANGE UP (ref 70–99)
GLUCOSE BLDC GLUCOMTR-MCNC: 85 MG/DL — SIGNIFICANT CHANGE UP (ref 70–99)
GLUCOSE SERPL-MCNC: 116 MG/DL — HIGH (ref 70–99)
GLUCOSE SERPL-MCNC: 74 MG/DL — SIGNIFICANT CHANGE UP (ref 70–99)
HCT VFR BLD CALC: 24.7 % — LOW (ref 34.5–45)
HCT VFR BLD CALC: 25.3 % — LOW (ref 34.5–45)
HGB BLD-MCNC: 7.8 G/DL — LOW (ref 11.5–15.5)
HGB BLD-MCNC: 8.1 G/DL — LOW (ref 11.5–15.5)
HIV 1+2 AB+HIV1 P24 AG SERPL QL IA: SIGNIFICANT CHANGE UP
LACTATE SERPL-SCNC: 1 MMOL/L — SIGNIFICANT CHANGE UP (ref 0.5–2)
MAGNESIUM SERPL-MCNC: 2.1 MG/DL — SIGNIFICANT CHANGE UP (ref 1.6–2.6)
MAGNESIUM SERPL-MCNC: 2.1 MG/DL — SIGNIFICANT CHANGE UP (ref 1.6–2.6)
MCHC RBC-ENTMCNC: 27.6 PG — SIGNIFICANT CHANGE UP (ref 27–34)
MCHC RBC-ENTMCNC: 27.6 PG — SIGNIFICANT CHANGE UP (ref 27–34)
MCHC RBC-ENTMCNC: 31.6 GM/DL — LOW (ref 32–36)
MCHC RBC-ENTMCNC: 32 GM/DL — SIGNIFICANT CHANGE UP (ref 32–36)
MCV RBC AUTO: 86.3 FL — SIGNIFICANT CHANGE UP (ref 80–100)
MCV RBC AUTO: 87.3 FL — SIGNIFICANT CHANGE UP (ref 80–100)
METHOD TYPE: SIGNIFICANT CHANGE UP
NRBC # BLD: 0 /100 WBCS — SIGNIFICANT CHANGE UP (ref 0–0)
NRBC # BLD: 0 /100 WBCS — SIGNIFICANT CHANGE UP (ref 0–0)
PHOSPHATE SERPL-MCNC: 2.7 MG/DL — SIGNIFICANT CHANGE UP (ref 2.5–4.5)
PHOSPHATE SERPL-MCNC: 3.3 MG/DL — SIGNIFICANT CHANGE UP (ref 2.5–4.5)
PLATELET # BLD AUTO: 646 K/UL — HIGH (ref 150–400)
PLATELET # BLD AUTO: 736 K/UL — HIGH (ref 150–400)
POTASSIUM SERPL-MCNC: 3.9 MMOL/L — SIGNIFICANT CHANGE UP (ref 3.5–5.3)
POTASSIUM SERPL-MCNC: 4 MMOL/L — SIGNIFICANT CHANGE UP (ref 3.5–5.3)
POTASSIUM SERPL-SCNC: 3.9 MMOL/L — SIGNIFICANT CHANGE UP (ref 3.5–5.3)
POTASSIUM SERPL-SCNC: 4 MMOL/L — SIGNIFICANT CHANGE UP (ref 3.5–5.3)
RBC # BLD: 2.83 M/UL — LOW (ref 3.8–5.2)
RBC # BLD: 2.93 M/UL — LOW (ref 3.8–5.2)
RBC # FLD: 16.2 % — HIGH (ref 10.3–14.5)
RBC # FLD: 16.5 % — HIGH (ref 10.3–14.5)
SODIUM SERPL-SCNC: 134 MMOL/L — LOW (ref 135–145)
SODIUM SERPL-SCNC: 136 MMOL/L — SIGNIFICANT CHANGE UP (ref 135–145)
WBC # BLD: 16.75 K/UL — HIGH (ref 3.8–10.5)
WBC # BLD: 17.85 K/UL — HIGH (ref 3.8–10.5)
WBC # FLD AUTO: 16.75 K/UL — HIGH (ref 3.8–10.5)
WBC # FLD AUTO: 17.85 K/UL — HIGH (ref 3.8–10.5)

## 2022-09-25 PROCEDURE — 99233 SBSQ HOSP IP/OBS HIGH 50: CPT

## 2022-09-25 RX ADMIN — Medication 650 MILLIGRAM(S): at 22:04

## 2022-09-25 RX ADMIN — HEPARIN SODIUM 5000 UNIT(S): 5000 INJECTION INTRAVENOUS; SUBCUTANEOUS at 22:04

## 2022-09-25 RX ADMIN — CHLORHEXIDINE GLUCONATE 1 APPLICATION(S): 213 SOLUTION TOPICAL at 06:28

## 2022-09-25 RX ADMIN — HEPARIN SODIUM 5000 UNIT(S): 5000 INJECTION INTRAVENOUS; SUBCUTANEOUS at 14:19

## 2022-09-25 RX ADMIN — HEPARIN SODIUM 5000 UNIT(S): 5000 INJECTION INTRAVENOUS; SUBCUTANEOUS at 06:27

## 2022-09-25 RX ADMIN — Medication 200 MILLIGRAM(S): at 18:19

## 2022-09-25 RX ADMIN — ATORVASTATIN CALCIUM 20 MILLIGRAM(S): 80 TABLET, FILM COATED ORAL at 22:04

## 2022-09-25 RX ADMIN — Medication 650 MILLIGRAM(S): at 06:26

## 2022-09-25 RX ADMIN — MEROPENEM 100 MILLIGRAM(S): 1 INJECTION INTRAVENOUS at 22:03

## 2022-09-25 RX ADMIN — MEROPENEM 100 MILLIGRAM(S): 1 INJECTION INTRAVENOUS at 13:26

## 2022-09-25 RX ADMIN — LOSARTAN POTASSIUM 50 MILLIGRAM(S): 100 TABLET, FILM COATED ORAL at 06:27

## 2022-09-25 RX ADMIN — Medication 60 MILLIGRAM(S): at 13:26

## 2022-09-25 RX ADMIN — MEROPENEM 100 MILLIGRAM(S): 1 INJECTION INTRAVENOUS at 06:28

## 2022-09-25 RX ADMIN — Medication 200 MILLIGRAM(S): at 06:27

## 2022-09-25 RX ADMIN — Medication 650 MILLIGRAM(S): at 13:26

## 2022-09-25 RX ADMIN — Medication 650 MILLIGRAM(S): at 07:26

## 2022-09-25 NOTE — PROGRESS NOTE ADULT - SUBJECTIVE AND OBJECTIVE BOX
INTERVAL EVENTS: No new complaints     PAST MEDICAL & SURGICAL HISTORY:  Diabetes    H/O thyroid disease    S/P total abdominal hysterectomy    History of laparoscopic cholecystectomy    History of         MEDICATIONS  (STANDING):  atorvastatin 20 milliGRAM(s) Oral at bedtime  BUpivacaine 0.5% On-Q Pump 400 milliLiter(s) (4 mL/Hr) IntraDermal. <Continuous>  chlorhexidine 2% Cloths 1 Application(s) Topical <User Schedule>  dextrose 5%. 1000 milliLiter(s) (100 mL/Hr) IV Continuous <Continuous>  dextrose 5%. 1000 milliLiter(s) (50 mL/Hr) IV Continuous <Continuous>  dextrose 50% Injectable 25 Gram(s) IV Push once  dextrose 50% Injectable 12.5 Gram(s) IV Push once  dextrose 50% Injectable 25 Gram(s) IV Push once  glucagon  Injectable 1 milliGRAM(s) IntraMuscular once  heparin   Injectable 5000 Unit(s) SubCutaneous every 8 hours  influenza  Vaccine (HIGH DOSE) 0.7 milliLiter(s) IntraMuscular once  insulin lispro (ADMELOG) corrective regimen sliding scale   SubCutaneous three times a day before meals  insulin lispro (ADMELOG) corrective regimen sliding scale   SubCutaneous at bedtime  labetalol 200 milliGRAM(s) Oral every 12 hours  losartan 50 milliGRAM(s) Oral daily  meropenem  IVPB 1000 milliGRAM(s) IV Intermittent every 8 hours  NIFEdipine XL 60 milliGRAM(s) Oral every 24 hours    MEDICATIONS  (PRN):  acetaminophen     Tablet .. 650 milliGRAM(s) Oral every 6 hours PRN Mild Pain (1 - 3), Moderate Pain (4 - 6)  dextrose Oral Gel 15 Gram(s) Oral once PRN Blood Glucose LESS THAN 70 milliGRAM(s)/deciliter  ondansetron Injectable 4 milliGRAM(s) IV Push every 8 hours PRN Nausea and/or Vomiting  sodium chloride 0.9% lock flush 10 milliLiter(s) IV Push every 1 hour PRN Pre/post blood products, medications, blood draw, and to maintain line patency    Vital Signs Last 24 Hrs  T(C): 36.6 (25 Sep 2022 08:59), Max: 37.1 (24 Sep 2022 17:20)  T(F): 97.9 (25 Sep 2022 08:59), Max: 98.8 (24 Sep 2022 17:20)  HR: 84 (25 Sep 2022 08:59) (80 - 88)  BP: 133/74 (25 Sep 2022 08:59) (114/56 - 148/81)  BP(mean): --  RR: 17 (25 Sep 2022 08:59) (17 - 18)  SpO2: 99% (25 Sep 2022 08:59) (96% - 100%)    Parameters below as of 25 Sep 2022 08:59  Patient On (Oxygen Delivery Method): room air        PHYSICAL EXAM:  GEN: Awake, alert. NAD.   HEENT: NCAT, PERRL, EOMI. Mucosa moist. No JVD.  RESP: CTA b/l  CV: RRR. Normal S1/S2. No m/r/g.  ABD: Soft. NT/ND. BS+  EXT: Warm. No edema, clubbing, or cyanosis.   NEURO: AAOx3. No focal deficits.     LABS:                        7.8    17.85 )-----------( 646      ( 25 Sep 2022 05:30 )             24.7     0925    134<L>  |  96  |  11  ----------------------------<  74  3.9   |  26  |  0.76    Ca    8.8      25 Sep 2022 05:30  Phos  3.3       Mg     2.1                   I&O's Summary    24 Sep 2022 07:01  -  25 Sep 2022 07:00  --------------------------------------------------------  IN: 460 mL / OUT: 1800 mL / NET: -1340 mL      CT abdomen/ pelvis:   There is a multiloculated hematoma running along the right flank into   the right pelvis.    Small fluid collections in the pelvis are only partially walled off.   They could represent small hematomas or early developing abscesses.   Recommend clinical correlation and imaging follow-up as indicated.    There are bilateral lung nodules, measuring up to 7 mm in the right   upper lobe. Recommend follow-up chest CT in three months to ensure   stability.       INTERVAL EVENTS: Complaining of abdominal discomfort on the right side. Pain is limiting her ability to ambulate.     PAST MEDICAL & SURGICAL HISTORY:  Diabetes    H/O thyroid disease    S/P total abdominal hysterectomy    History of laparoscopic cholecystectomy    History of         MEDICATIONS  (STANDING):  atorvastatin 20 milliGRAM(s) Oral at bedtime  BUpivacaine 0.5% On-Q Pump 400 milliLiter(s) (4 mL/Hr) IntraDermal. <Continuous>  chlorhexidine 2% Cloths 1 Application(s) Topical <User Schedule>  dextrose 5%. 1000 milliLiter(s) (100 mL/Hr) IV Continuous <Continuous>  dextrose 5%. 1000 milliLiter(s) (50 mL/Hr) IV Continuous <Continuous>  dextrose 50% Injectable 25 Gram(s) IV Push once  dextrose 50% Injectable 12.5 Gram(s) IV Push once  dextrose 50% Injectable 25 Gram(s) IV Push once  glucagon  Injectable 1 milliGRAM(s) IntraMuscular once  heparin   Injectable 5000 Unit(s) SubCutaneous every 8 hours  influenza  Vaccine (HIGH DOSE) 0.7 milliLiter(s) IntraMuscular once  insulin lispro (ADMELOG) corrective regimen sliding scale   SubCutaneous three times a day before meals  insulin lispro (ADMELOG) corrective regimen sliding scale   SubCutaneous at bedtime  labetalol 200 milliGRAM(s) Oral every 12 hours  losartan 50 milliGRAM(s) Oral daily  meropenem  IVPB 1000 milliGRAM(s) IV Intermittent every 8 hours  NIFEdipine XL 60 milliGRAM(s) Oral every 24 hours    MEDICATIONS  (PRN):  acetaminophen     Tablet .. 650 milliGRAM(s) Oral every 6 hours PRN Mild Pain (1 - 3), Moderate Pain (4 - 6)  dextrose Oral Gel 15 Gram(s) Oral once PRN Blood Glucose LESS THAN 70 milliGRAM(s)/deciliter  ondansetron Injectable 4 milliGRAM(s) IV Push every 8 hours PRN Nausea and/or Vomiting  sodium chloride 0.9% lock flush 10 milliLiter(s) IV Push every 1 hour PRN Pre/post blood products, medications, blood draw, and to maintain line patency    Vital Signs Last 24 Hrs  T(C): 36.6 (25 Sep 2022 08:59), Max: 37.1 (24 Sep 2022 17:20)  T(F): 97.9 (25 Sep 2022 08:59), Max: 98.8 (24 Sep 2022 17:20)  HR: 84 (25 Sep 2022 08:59) (80 - 88)  BP: 133/74 (25 Sep 2022 08:59) (114/56 - 148/81)  BP(mean): --  RR: 17 (25 Sep 2022 08:59) (17 - 18)  SpO2: 99% (25 Sep 2022 08:59) (96% - 100%)    Parameters below as of 25 Sep 2022 08:59  Patient On (Oxygen Delivery Method): room air        PHYSICAL EXAM:  GEN: Awake, alert. NAD.   HEENT: NCAT, PERRL, EOMI. Mucosa moist. No JVD.  RESP: CTA b/l  CV: RRR. Normal S1/S2. No m/r/g.  ABD: Soft. NT/ND. BS+  EXT: Warm. No edema, clubbing, or cyanosis.   NEURO: AAOx3. No focal deficits.     LABS:                        7.8    17.85 )-----------( 646      ( 25 Sep 2022 05:30 )             24.7     09-25    134<L>  |  96  |  11  ----------------------------<  74  3.9   |  26  |  0.76    Ca    8.8      25 Sep 2022 05:30  Phos  3.3     25  Mg     2.1     25              I&O's Summary    24 Sep 2022 07:01  -  25 Sep 2022 07:00  --------------------------------------------------------  IN: 460 mL / OUT: 1800 mL / NET: -1340 mL      CT abdomen/ pelvis:   There is a multiloculated hematoma running along the right flank into   the right pelvis.    Small fluid collections in the pelvis are only partially walled off.   They could represent small hematomas or early developing abscesses.   Recommend clinical correlation and imaging follow-up as indicated.    There are bilateral lung nodules, measuring up to 7 mm in the right   upper lobe. Recommend follow-up chest CT in three months to ensure   stability.

## 2022-09-25 NOTE — PROGRESS NOTE ADULT - ASSESSMENT
72 year old woman with HTN, DM2, past surgical history of  x 2, hysterectomy, and laparoscopic cholecystectomy (), admitted for large bowel obstruction; had spontaneous return of bowel function initially; however, bowel obstruction recurred, so taken to OR for ex lap with lysis of adhesions and bowel resection () . Monitored in SICU post -op ; now on regional. Hospital course complicated by up-trending leukocytosis of unknown etiology.     # Large bowel obstruction   s/p OR for ex lap with lysis of adhesions and bowel resection ()  - rest of care per primary team     # Leukocytosis -wbc uptrending. Unclear etiology -- Had CT imaging done that showed : Small fluid collections in the pelvis are only partially walled off. They could represent small hematomas or early developing abscesses  Was started on meropenem by ID yesterday  Defer further management to ID    #HTN   takes HCTZ 25mg qd, losartan 100mg qd, labetalol 200mg BID, nifedipine 60mg ER qd at home.   - continue labetalol 200mg BID, nifepidine 60m ER qd  - continue losartan at 50mg qd; If BP remains consistently elevated >150mmHg on 50mg qd, can resume full home dose at 100mg qd   - Continue to hold HCTZ for now    # Type 2 Diabetes complicated by hyperglycemia   Takes metformin at home.   cw insulin sliding scale while inpatient.   resume metformin on discharge.     #Incidental finding of bilateral lung nodules  CT chest showing bilateral lung nodules, measuring up to 7 mm in the right   upper lobe   Recommend follow-up chest CT in three months to ensure stability.    # COVID 19 infection (present on admission)   Incidental finding of COVID 19 infection on admission nasal swab. Without any dyspnea, or sore throat. Per [facility] treatment algorithm, did not meet criteria for monoclonal antibodies at time of admission.   No O2 requirement, does not meet criteria for remdesivir/dexamethasone.   Recommend enoxaparin for DVT ppx     # Acute blood loss anemia   Hgb downtrended from time of admission  Partially attributable to operative blood losses   continue to trend daily CBC while inpatient;   ferritin, iron sat not low  - age appropriate cancer screening outpatient     DVT ppx - lovenox

## 2022-09-25 NOTE — CHART NOTE - NSCHARTNOTEFT_GEN_A_CORE
SUBJECTIVE  Surgery team paged by RN regarding mild drainage at incision site and around SHOSHANA.   Pt seen and examined at bedside; patient is lying comfortably complaining of drainage primarily around SHOSHANA site. SHOSHANA output shows changes to brown color with minimal drainage noted around SHOSHANA insertion and at incision site.   Pt denies fever/chills, is tolerating her diet, and denies n/v, chest pain/sob. She continues to pass gas and have BMs.     OBJECTIVE  Vital Signs Last 24 Hrs  T(C): 37 (09-25-22 @ 16:37), Max: 37.1 (09-25-22 @ 05:19)  T(F): 98.6 (09-25-22 @ 16:37), Max: 98.8 (09-25-22 @ 05:19)  HR: 86 (09-25-22 @ 16:37) (78 - 86)  BP: 133/75 (09-25-22 @ 16:37) (123/73 - 148/81)  BP(mean): --  RR: 18 (09-25-22 @ 16:37) (17 - 18)  SpO2: 97% (09-25-22 @ 16:37) (97% - 100%)    Parameters below as of 25 Sep 2022 16:37  Patient On (Oxygen Delivery Method): room air    Physical Exam  General: Patient is doing well and lying in bed comfortably  Constitutional: alert and oriented   Pulm: Nonlabored breathing, no respiratory distress  CV: Regular rate and rhythm, normal sinus rhythm  Abd: soft, nontender, nondistended. No rebound, no guarding.             Incisions: staple line - clean, dry, intact. minimal drainage noted at inferior aspect of staple line on gauze - no active draining, unable to express fluid from staple line. Minimal drainage around SHOSHANA site - drain sponge changed. No erythema/induration/edema. No tenderness noted at staple line or SHOSHANA insertion.   Extremities: warm, well perfused, no edema      A&P    72 year F now POD 10 from an exlap, JOSEFINA, and SBR with minimal drainage at incision sites and change in SHOSHANA output; afebrile, HDS, with abd exam wnl, concerned for leak vs liquefied hematoma.    Stat Labs  Discussed CT vs OR with attendings, will re-examine tomorrow AM SUBJECTIVE  Surgery team paged by RN regarding mild drainage at incision site and around SHOSHANA.   Pt seen and examined at bedside; patient is lying comfortably complaining of drainage primarily around SHOSHANA site. SHOSHANA output shows changes to brown color with minimal drainage noted around SHOSHANA insertion and at incision site.   Pt denies fever/chills, is tolerating her diet, and denies n/v, chest pain/sob. She continues to pass gas and have BMs.     OBJECTIVE  Vital Signs Last 24 Hrs  T(C): 37 (09-25-22 @ 16:37), Max: 37.1 (09-25-22 @ 05:19)  T(F): 98.6 (09-25-22 @ 16:37), Max: 98.8 (09-25-22 @ 05:19)  HR: 86 (09-25-22 @ 16:37) (78 - 86)  BP: 133/75 (09-25-22 @ 16:37) (123/73 - 148/81)  BP(mean): --  RR: 18 (09-25-22 @ 16:37) (17 - 18)  SpO2: 97% (09-25-22 @ 16:37) (97% - 100%)    Parameters below as of 25 Sep 2022 16:37  Patient On (Oxygen Delivery Method): room air    Physical Exam  General: Patient is doing well and lying in bed comfortably  Constitutional: alert and oriented   Pulm: Nonlabored breathing, no respiratory distress  CV: Regular rate and rhythm, normal sinus rhythm  Abd: soft, nontender, nondistended. No rebound, no guarding.             Incisions: staple line - clean, dry, intact. minimal drainage noted at inferior aspect of staple line on gauze - no active draining, unable to express fluid from staple line. Minimal drainage around SHOSHANA site - drain sponge changed. No erythema/induration/edema. No tenderness noted at staple line or SHOSHANA insertion.   Extremities: warm, well perfused, no edema      A&P    72 year F now POD 10 from an exlap, JOSEFINA, and SBR with minimal drainage at incision sites and change in SHOSHANA output; afebrile, HDS, with abd exam wnl, concerned for leak vs liquefied hematoma.    Stat Labs  Discussed CT vs OR with attendings, will re-examine tomorrow AM    CHIEF RESIDENT ADDENDUM  Agree with above. 72F POD10 ex lap small bowel resection complicated by persistently high WBC and intermittent fevers, s/p CT scan yesterday with pelvic fluid collections, anastomotic edema, contrast passing through the anastomosis, with sudden change in SHOSHANA output today. This morning, serosanguinous but now, foul smelling, thick and brown. Stat labs sent. Concern for anastomotic leak but SHOSHANA superficial to intact fascia vs liquefied hematoma. Given extensive dissection of skin flaps and overall clinically well, more likely liquified hematoma. Discussed utility of CT scan with PO contrast to re-evaluate the anastomosis but given CT obtained yesterday, will hold off per attending surgeon. Discussed in detail with attending. Will continue to closely monitor tonight. SUBJECTIVE  Surgery team paged by RN regarding mild drainage at incision site and around SHOSHANA.   Pt seen and examined at bedside; patient is lying comfortably complaining of drainage primarily around SHOSHANA site. SHOSHANA output shows changes to brown color with minimal drainage noted around SHOSHANA insertion and at incision site.   Pt denies fever/chills, is tolerating her diet, and denies n/v, chest pain/sob. She continues to pass gas and have BMs.     OBJECTIVE  Vital Signs Last 24 Hrs  T(C): 37 (09-25-22 @ 16:37), Max: 37.1 (09-25-22 @ 05:19)  T(F): 98.6 (09-25-22 @ 16:37), Max: 98.8 (09-25-22 @ 05:19)  HR: 86 (09-25-22 @ 16:37) (78 - 86)  BP: 133/75 (09-25-22 @ 16:37) (123/73 - 148/81)  BP(mean): --  RR: 18 (09-25-22 @ 16:37) (17 - 18)  SpO2: 97% (09-25-22 @ 16:37) (97% - 100%)    Parameters below as of 25 Sep 2022 16:37  Patient On (Oxygen Delivery Method): room air    Physical Exam  General: Patient is doing well and lying in bed comfortably  Constitutional: alert and oriented   Pulm: Nonlabored breathing, no respiratory distress  CV: Regular rate and rhythm, normal sinus rhythm  Abd: soft, nontender, nondistended. No rebound, no guarding.             Incisions: staple line - clean, dry, intact. minimal drainage noted at inferior aspect of staple line on gauze - no active draining, unable to express fluid from staple line. Minimal drainage around SHOSHANA site - drain sponge changed. No erythema/induration/edema. No tenderness noted at staple line or SHOSHANA insertion.   Extremities: warm, well perfused, no edema      A&P    72 year F now POD 10 from an exlap, JOSEFINA, and SBR with minimal drainage at incision sites and change in SHOSHANA output; afebrile, HDS, with abd exam wnl, concerned for leak vs liquefied hematoma.    Stat Labs  Discussed CT vs OR with attendings, will re-examine tomorrow AM    CHIEF RESIDENT ADDENDUM  Agree with above. 72F POD10 ex lap small bowel resection complicated by persistently high WBC and intermittent fevers, s/p CT scan yesterday with pelvic fluid collections, anastomotic edema, contrast passing through the anastomosis, with sudden change in SHOSHANA output today. This morning, serosanguinous but now, foul smelling, thick and brown. VSS, exam otherwise with soft abdomen, nontender, nondistended. Stat labs sent. Concern for anastomotic leak but SHOSHANA superficial to intact fascia vs liquefied hematoma. Given extensive dissection of skin flaps and overall clinically well, more likely liquified hematoma. Discussed utility of CT scan with PO contrast to re-evaluate the anastomosis but given CT obtained yesterday, will hold off per attending surgeon. Discussed in detail with attending. Will continue to closely monitor tonight.

## 2022-09-25 NOTE — PROGRESS NOTE ADULT - SUBJECTIVE AND OBJECTIVE BOX
STATUS POST:  ExLap, JOSEFINA, SBR on 9/15    POST OPERATIVE DAY #: 10    SUBJECTIVE: Pt seen and examined at bedside this am by surgery team. Patient is lying comfortably in bed with no complaints. Tolerating diet, pain well controlled with current regimen. Patient denies fever, nausea, vomiting, chest pain, and shortness of breath. Patient is passing gas and having bowel movements.     MEDICATIONS  (STANDING):  atorvastatin 20 milliGRAM(s) Oral at bedtime  BUpivacaine 0.5% On-Q Pump 400 milliLiter(s) (4 mL/Hr) IntraDermal. <Continuous>  chlorhexidine 2% Cloths 1 Application(s) Topical <User Schedule>  dextrose 5%. 1000 milliLiter(s) (100 mL/Hr) IV Continuous <Continuous>  dextrose 5%. 1000 milliLiter(s) (50 mL/Hr) IV Continuous <Continuous>  dextrose 50% Injectable 25 Gram(s) IV Push once  dextrose 50% Injectable 12.5 Gram(s) IV Push once  dextrose 50% Injectable 25 Gram(s) IV Push once  glucagon  Injectable 1 milliGRAM(s) IntraMuscular once  heparin   Injectable 5000 Unit(s) SubCutaneous every 8 hours  influenza  Vaccine (HIGH DOSE) 0.7 milliLiter(s) IntraMuscular once  insulin lispro (ADMELOG) corrective regimen sliding scale   SubCutaneous three times a day before meals  insulin lispro (ADMELOG) corrective regimen sliding scale   SubCutaneous at bedtime  labetalol 200 milliGRAM(s) Oral every 12 hours  losartan 50 milliGRAM(s) Oral daily  meropenem  IVPB 1000 milliGRAM(s) IV Intermittent every 8 hours  NIFEdipine XL 60 milliGRAM(s) Oral every 24 hours    MEDICATIONS  (PRN):  acetaminophen     Tablet .. 650 milliGRAM(s) Oral every 6 hours PRN Mild Pain (1 - 3), Moderate Pain (4 - 6)  dextrose Oral Gel 15 Gram(s) Oral once PRN Blood Glucose LESS THAN 70 milliGRAM(s)/deciliter  ondansetron Injectable 4 milliGRAM(s) IV Push every 8 hours PRN Nausea and/or Vomiting  sodium chloride 0.9% lock flush 10 milliLiter(s) IV Push every 1 hour PRN Pre/post blood products, medications, blood draw, and to maintain line patency      Vital Signs Last 24 Hrs  T(C): 37 (25 Sep 2022 16:37), Max: 37.1 (25 Sep 2022 05:19)  T(F): 98.6 (25 Sep 2022 16:37), Max: 98.8 (25 Sep 2022 05:19)  HR: 86 (25 Sep 2022 16:37) (78 - 86)  BP: 133/75 (25 Sep 2022 16:37) (123/73 - 148/81)  BP(mean): --  RR: 18 (25 Sep 2022 16:37) (17 - 18)  SpO2: 97% (25 Sep 2022 16:37) (97% - 100%)    Parameters below as of 25 Sep 2022 16:37  Patient On (Oxygen Delivery Method): room air      Physical Exam  General: Patient is doing well and lying in bed comfortably  Constitutional: alert and oriented   Pulm: Nonlabored breathing, no respiratory distress  CV: Regular rate and rhythm, normal sinus rhythm  Abd:  soft, nontender, nondistended. No rebound, no guarding.             Incisions: clean, dry, intact and healing well; staples in place, no erythema/induration/edema. SHOSHANA with ss output.   Extremities: warm, well perfused, no edema    I&O's Detail    24 Sep 2022 07:01  -  25 Sep 2022 07:00  --------------------------------------------------------  IN:    Oral Fluid: 460 mL  Total IN: 460 mL    OUT:    Bulb (mL): 50 mL    Voided (mL): 1750 mL  Total OUT: 1800 mL    Total NET: -1340 mL      25 Sep 2022 07:01  -  25 Sep 2022 20:34  --------------------------------------------------------  IN:    Oral Fluid: 380 mL  Total IN: 380 mL    OUT:    Bulb (mL): 15 mL  Total OUT: 15 mL    Total NET: 365 mL        LABS:                        7.8    17.85 )-----------( 646      ( 25 Sep 2022 05:30 )             24.7     09-25    134<L>  |  96  |  11  ----------------------------<  74  3.9   |  26  |  0.76    Ca    8.8      25 Sep 2022 05:30  Phos  3.3     09-25  Mg     2.1     09-25

## 2022-09-26 LAB
-  AMPICILLIN: SIGNIFICANT CHANGE UP
-  AMPICILLIN: SIGNIFICANT CHANGE UP
-  VANCOMYCIN: SIGNIFICANT CHANGE UP
-  VANCOMYCIN: SIGNIFICANT CHANGE UP
ANION GAP SERPL CALC-SCNC: 17 MMOL/L — SIGNIFICANT CHANGE UP (ref 5–17)
BUN SERPL-MCNC: 14 MG/DL — SIGNIFICANT CHANGE UP (ref 7–23)
CALCIUM SERPL-MCNC: 8.6 MG/DL — SIGNIFICANT CHANGE UP (ref 8.4–10.5)
CHLORIDE SERPL-SCNC: 99 MMOL/L — SIGNIFICANT CHANGE UP (ref 96–108)
CO2 SERPL-SCNC: 19 MMOL/L — LOW (ref 22–31)
CREAT SERPL-MCNC: 0.85 MG/DL — SIGNIFICANT CHANGE UP (ref 0.5–1.3)
CULTURE RESULTS: SIGNIFICANT CHANGE UP
EGFR: 74 ML/MIN/1.73M2 — SIGNIFICANT CHANGE UP
FUNGITELL: <31 PG/ML — SIGNIFICANT CHANGE UP
FUNGITELL: <31 PG/ML — SIGNIFICANT CHANGE UP
GLUCOSE BLDC GLUCOMTR-MCNC: 111 MG/DL — HIGH (ref 70–99)
GLUCOSE BLDC GLUCOMTR-MCNC: 129 MG/DL — HIGH (ref 70–99)
GLUCOSE BLDC GLUCOMTR-MCNC: 144 MG/DL — HIGH (ref 70–99)
GLUCOSE BLDC GLUCOMTR-MCNC: 180 MG/DL — HIGH (ref 70–99)
GLUCOSE SERPL-MCNC: 91 MG/DL — SIGNIFICANT CHANGE UP (ref 70–99)
HCT VFR BLD CALC: 28 % — LOW (ref 34.5–45)
HGB BLD-MCNC: 8 G/DL — LOW (ref 11.5–15.5)
MAGNESIUM SERPL-MCNC: 2.2 MG/DL — SIGNIFICANT CHANGE UP (ref 1.6–2.6)
MCHC RBC-ENTMCNC: 27.7 PG — SIGNIFICANT CHANGE UP (ref 27–34)
MCHC RBC-ENTMCNC: 28.6 GM/DL — LOW (ref 32–36)
MCV RBC AUTO: 96.9 FL — SIGNIFICANT CHANGE UP (ref 80–100)
METHOD TYPE: SIGNIFICANT CHANGE UP
METHOD TYPE: SIGNIFICANT CHANGE UP
NRBC # BLD: 0 /100 WBCS — SIGNIFICANT CHANGE UP (ref 0–0)
ORGANISM # SPEC MICROSCOPIC CNT: SIGNIFICANT CHANGE UP
PHOSPHATE SERPL-MCNC: 3.1 MG/DL — SIGNIFICANT CHANGE UP (ref 2.5–4.5)
PLATELET # BLD AUTO: 566 K/UL — HIGH (ref 150–400)
POTASSIUM SERPL-MCNC: 4.9 MMOL/L — SIGNIFICANT CHANGE UP (ref 3.5–5.3)
POTASSIUM SERPL-SCNC: 4.9 MMOL/L — SIGNIFICANT CHANGE UP (ref 3.5–5.3)
RBC # BLD: 2.89 M/UL — LOW (ref 3.8–5.2)
RBC # FLD: 17.1 % — HIGH (ref 10.3–14.5)
SODIUM SERPL-SCNC: 135 MMOL/L — SIGNIFICANT CHANGE UP (ref 135–145)
SPECIMEN SOURCE: SIGNIFICANT CHANGE UP
WBC # BLD: 14.23 K/UL — HIGH (ref 3.8–10.5)
WBC # FLD AUTO: 14.23 K/UL — HIGH (ref 3.8–10.5)

## 2022-09-26 PROCEDURE — 99233 SBSQ HOSP IP/OBS HIGH 50: CPT

## 2022-09-26 RX ADMIN — Medication 200 MILLIGRAM(S): at 17:40

## 2022-09-26 RX ADMIN — Medication 200 MILLIGRAM(S): at 06:09

## 2022-09-26 RX ADMIN — Medication 650 MILLIGRAM(S): at 10:59

## 2022-09-26 RX ADMIN — MEROPENEM 100 MILLIGRAM(S): 1 INJECTION INTRAVENOUS at 06:00

## 2022-09-26 RX ADMIN — Medication 650 MILLIGRAM(S): at 00:12

## 2022-09-26 RX ADMIN — Medication 650 MILLIGRAM(S): at 21:18

## 2022-09-26 RX ADMIN — CHLORHEXIDINE GLUCONATE 1 APPLICATION(S): 213 SOLUTION TOPICAL at 06:05

## 2022-09-26 RX ADMIN — HEPARIN SODIUM 5000 UNIT(S): 5000 INJECTION INTRAVENOUS; SUBCUTANEOUS at 06:00

## 2022-09-26 RX ADMIN — Medication 2: at 09:52

## 2022-09-26 RX ADMIN — HEPARIN SODIUM 5000 UNIT(S): 5000 INJECTION INTRAVENOUS; SUBCUTANEOUS at 21:44

## 2022-09-26 RX ADMIN — ATORVASTATIN CALCIUM 20 MILLIGRAM(S): 80 TABLET, FILM COATED ORAL at 21:44

## 2022-09-26 RX ADMIN — Medication 60 MILLIGRAM(S): at 23:13

## 2022-09-26 RX ADMIN — Medication 650 MILLIGRAM(S): at 20:18

## 2022-09-26 RX ADMIN — HEPARIN SODIUM 5000 UNIT(S): 5000 INJECTION INTRAVENOUS; SUBCUTANEOUS at 14:46

## 2022-09-26 RX ADMIN — MEROPENEM 100 MILLIGRAM(S): 1 INJECTION INTRAVENOUS at 14:46

## 2022-09-26 RX ADMIN — LOSARTAN POTASSIUM 50 MILLIGRAM(S): 100 TABLET, FILM COATED ORAL at 06:09

## 2022-09-26 RX ADMIN — Medication 650 MILLIGRAM(S): at 11:59

## 2022-09-26 RX ADMIN — ONDANSETRON 4 MILLIGRAM(S): 8 TABLET, FILM COATED ORAL at 03:11

## 2022-09-26 RX ADMIN — MEROPENEM 100 MILLIGRAM(S): 1 INJECTION INTRAVENOUS at 21:43

## 2022-09-26 NOTE — PROGRESS NOTE ADULT - SUBJECTIVE AND OBJECTIVE BOX
INTERVAL HPI/OVERNIGHT EVENTS:    Still c/o abdominal pain - central underneath the incision.    SHOSHANA was removed and several staples removed with some purulent material underlying the incision removed and debrided - per Surgery house staff    MEDICATIONS  (STANDING):  atorvastatin 20 milliGRAM(s) Oral at bedtime  BUpivacaine 0.5% On-Q Pump 400 milliLiter(s) (4 mL/Hr) IntraDermal. <Continuous>  chlorhexidine 2% Cloths 1 Application(s) Topical <User Schedule>  dextrose 5%. 1000 milliLiter(s) (100 mL/Hr) IV Continuous <Continuous>  dextrose 5%. 1000 milliLiter(s) (50 mL/Hr) IV Continuous <Continuous>  dextrose 50% Injectable 25 Gram(s) IV Push once  dextrose 50% Injectable 12.5 Gram(s) IV Push once  dextrose 50% Injectable 25 Gram(s) IV Push once  glucagon  Injectable 1 milliGRAM(s) IntraMuscular once  heparin   Injectable 5000 Unit(s) SubCutaneous every 8 hours  influenza  Vaccine (HIGH DOSE) 0.7 milliLiter(s) IntraMuscular once  insulin lispro (ADMELOG) corrective regimen sliding scale   SubCutaneous at bedtime  insulin lispro (ADMELOG) corrective regimen sliding scale   SubCutaneous three times a day before meals  labetalol 200 milliGRAM(s) Oral every 12 hours  losartan 50 milliGRAM(s) Oral daily  meropenem  IVPB 1000 milliGRAM(s) IV Intermittent every 8 hours  NIFEdipine XL 60 milliGRAM(s) Oral every 24 hours    MEDICATIONS  (PRN):  acetaminophen     Tablet .. 650 milliGRAM(s) Oral every 6 hours PRN Mild Pain (1 - 3), Moderate Pain (4 - 6)  dextrose Oral Gel 15 Gram(s) Oral once PRN Blood Glucose LESS THAN 70 milliGRAM(s)/deciliter  ondansetron Injectable 4 milliGRAM(s) IV Push every 8 hours PRN Nausea and/or Vomiting  sodium chloride 0.9% lock flush 10 milliLiter(s) IV Push every 1 hour PRN Pre/post blood products, medications, blood draw, and to maintain line patency      Allergies    penicillin (Rash)      EXAM  Vital Signs Last 24 Hrs  T(C): 36.9 (26 Sep 2022 17:00), Max: 37.4 (25 Sep 2022 21:25)  T(F): 98.4 (26 Sep 2022 17:00), Max: 99.3 (25 Sep 2022 21:25)  HR: 84 (26 Sep 2022 17:38) (81 - 85)  BP: 131/75 (26 Sep 2022 17:38) (105/68 - 131/78)  BP(mean): --  RR: 18 (26 Sep 2022 17:38) (18 - 18)  SpO2: 98% (26 Sep 2022 17:38) (94% - 100%)    Parameters below as of 26 Sep 2022 17:38  Patient On (Oxygen Delivery Method): room air  Awake and alert  No distress  No rash  No thrush  RRR  Chest CTA  Abd softly distended and tender to palpation center abdomen  LEs no edema        LABS:                        8.0    14.23 )-----------( 566      ( 26 Sep 2022 08:17 )             28.0     09-26    135  |  99  |  14  ----------------------------<  91  4.9   |  19<L>  |  0.85    Ca    8.6      26 Sep 2022 08:17  Phos  3.1     09-26  Mg     2.2     09-26    Sedimentation Rate, Erythrocyte (09.24.22 @ 10:16)    Sedimentation Rate, Erythrocyte: 120: [facility] performs Erythrocyte Sedimentation Rate assay  utilizing the Westergren method mm/Hr    C-Reactive Protein, Serum (09.24.22 @ 10:16)    C-Reactive Protein, Serum: 208.2 mg/L        MICROBIOLOGY:    Culture - Body Fluid with Gram Stain (09.22.22 @ 15:57)    -  Ampicillin: R >16 These ampicillin results predict results for amoxicillin    -  Ampicillin: S <=2 Predicts results to ampicillin/sulbactam, amoxacillin-clavulanate and  piperacillin-tazobactam.    -  Ampicillin: S <=2 Predicts results to ampicillin/sulbactam, amoxacillin-clavulanate and  piperacillin-tazobactam.    -  Ampicillin/Sulbactam: R 16/8 Enterobacter, Klebsiella aerogenes, Citrobacter, and Serratia may develop resistance during prolonged therapy (3-4 days)    -  Cefazolin: R >16 Enterobacter, Klebsiella aerogenes, Citrobacter, and Serratia may develop resistance during prolonged therapy (3-4 days)    -  Cefepime: S <=2    -  Cefoxitin: R >16    -  Ceftriaxone: S <=1 Enterobacter, Klebsiella aerogenes, Citrobacter, and Serratia may develop resistance during prolonged therapy    -  Ciprofloxacin: S <=0.25    -  Clindamycin: R <=0.25 This isolate is presumed to be clindamycin resistant based on detection of inducible resistance. Clindamycin may still be effective in some patients.    -  Ertapenem: S <=0.5    -  Erythromycin: R >4    -  Gentamicin: S <=2    -  Linezolid: S 1    -  Oxacillin: R >2    -  Piperacillin/Tazobactam: S <=8    -  Rifampin: S <=1 Should not be used as monotherapy    -  Tobramycin: S <=2    -  Trimethoprim/Sulfamethoxazole: S <=0.5/9.5    -  Trimethoprim/Sulfamethoxazole: S <=0.5/9.5    -  Vancomycin: S 2    -  Vancomycin: S 0.5    -  Vancomycin: S 2    Gram Stain:   No organisms seen  Few WBC's    Specimen Source: Abdominal Fl Abdominal Fluid    Culture Results:   Rare Enterobacter cloacae complex  Rare Staphylococcus epidermidis  Rare Enterococcus faecalis  Rare Enterococcus avium    Organism Identification: Enterobacter cloacae complex  Staphylococcus epidermidis  Enterococcus faecalis  Enterococcus avium    Organism: Enterobacter cloacae complex    Organism: Staphylococcus epidermidis    Organism: Enterococcus faecalis    Organism: Enterococcus avium    Method Type: CHRIS    Method Type: CHRIS    Method Type: CHRIS    Method Type: CHRIS  ===============================================    HIV-1/2 Antigen/Antibody Screen by [facility] (09.25.22 @ 05:30)    HIV-1/2 Combo Result: Nonreact: The HIV Ag/Ab Combo test performed screens for HIV-1 p24 antigen,  antibodies to HIV-1 (group M and group O), and antibodies to HIV-2. All  specimens repeatedly reactive will reflex to an HIV 1/2 antibody  confirmation and differentiation test. This assay detects p24 antigen  which may be present prior to the development of HIV antibodies,  therefore a reactive result with a negative HIV 1/2 AB Confirmation  should be followed up with HIV-1 RNA, HIV-2 RNA and repeat testing in 4-8  weeks. A nonreactive result does not preclude previous exposure to or  infection with HIV-1 or HIV-2.  ----------------------------------------    Osvaldo (09.24.22 @ 10:16)    Fungitell: <31: Interpretation: The Fungitell assay does not detect certain fungal  species such as the genus Cryptococcus (Wilma et al. 1991) which  produces very low levels of (1-3)-Beta-D-Glucan. The assay also does  not detect the Zygomycetes such as Absidia, Mucor and Rhizopus  (Geovanni et al. 1994) which are not known to produce  (1-3)-Beta-D-Glucan. In addition, the yeast phase of Blastomyces  dermatitidis produces little (1-3)-Beta-D-Glucan and may not be  detected by the assay (Bean et al. 2007).  Reference Range:  Less than 60 pg/mL. Glucan values of less than 60 pg/mL are  interpreted as negative.  Glucan values of 60 to 79 pg/mL are interpreted as indeterminate,  and suggest a possible fungal infection. Additional sampling and  testing of sera is required to interpret the results.  Glucan values of greater than or equal to 80 pg/mL are interpreted  as positive.  Due to the potential for environmental contamination when  transferred to pour-off tubes, which can lead to false positive  results, interpret positive results from samples provided in  pour-off tubes with caution.  Results should be used in conjunction  with clinical findings, and should not form the sole basis for a  diagnosis or treatment decision. The Fungitell test is approved or  cleared for in vitro diagnostic use by the U.[place] Food and Drug  Administration. Modifications to the approved package insert have  been made and the performance characteristics for these  modifications were determined by [facility].  Ifsample result is greater than 500 pg/mL, physician may order a  titer of the sample. Please contact [facility] if you would  like to order a retest of this sample to obtain an actual value.  Samples are held for 1 week after initial testing date.  ____________________________________________________________  Performed at:  [facility]  [address] [place] [address]  : Damián Kuo Ph.D., KEL (ABBI)  IA#: [id number]  Phone: [phone] pg/mL            RADIOLOGY & ADDITIONAL STUDIES:    CT Abdomen and Pelvis w/ Oral Cont and w/ IV Cont (09.24.22 @ 16:48) >  ACC: [id number] EXAM:  CT CHEST OC IC                        ACC: [id number] EXAM:  CT ABDOMEN AND PELVIS OC IC                          PROCEDURE DATE:  09/24/2022          INTERPRETATION:  CLINICAL INFORMATION: Status post exploratory   laparoscopy and small bowel resection. Leukocytosis.    COMPARISON: Most recent CT scan of abdomen and pelvis from 9/21/2022 and   additional prior imaging studies dating back to 9/13/2020.    CONTRAST/COMPLICATIONS:  IV Contrast: Isovue 370  93 cc administered   7 cc discarded  Oral Contrast: Gastroview  Complications: None reported at time of study completion    PROCEDURE:  CT of the Chest, Abdomen and Pelvis was performed.  Sagittal and coronal reformats were performed.    FINDINGS:  CHEST:  LUNGS AND LARGE AIRWAYS: 7 mm solid nodular opacity right apex, with   extension to the pleura. Has a tubular configuration. Could be lung   nodule versus an impacted bronchus. Small amount of passive atelectasis   in each lower lobe adjacent to pleural effusions. Linear atelectasis   right lower lobe and lingula. 5 mm groundglass opacity nodule anterior   segment left upper lobe. A few solid micronodules are present in the   right lung.  PLEURA: New small bilateral pleural effusions.  VESSELS: Small calcified plaque aorta.  HEART: Heart size is normal. Increased small pericardial effusion.  MEDIASTINUM AND DESTIN: No lymphadenopathy.  CHEST WALL AND LOWER NECK: Enlarged multinodular thyroid extends into the   superior mediastinum. The left lobe has mass effect upon the esophagus.   The trachea is mildly narrowed between both lobes.    ABDOMEN AND PELVIS:  LIVER: A few subcentimeter low-density lesions are too small to   characterize.  BILE DUCTS: Normal caliber.  GALLBLADDER: Cholecystectomy.  SPLEEN: Within normal limits.  PANCREAS: Within normal limits.  ADRENALS: Within normal limits.  KIDNEYS/URETERS: Within normal limits.    BLADDER: Small amount of gas in bladder is likely secondary to recent   instrumentation.  REPRODUCTIVE ORGANS: Hysterectomy.    BOWEL: Interval small bowel resection, with anastomosis in left lower   quadrant. The stomach, duodenum, and jejunum are abnormally dilated.   There is a transition point at the level of the anastomosis in the left   lower quadrant. The small bowel proximal to the anastomosis has a   diameter of 4.7 cm and the small bowel distal to the anastomosis has a   diameter of 2.2 cm. The anastomosis appears widely patent. Contrast   passes through the anastomosis. Minimal wall thickening at anastomosis.   These findings could represent low-grade incomplete small bowel   obstruction versus ileus. Contrast passes distally through normal caliber   small bowel into the cecum, which is located in the right pelvis. The   cecum and remainder of the colonare incompletely distended with   contrast, somewhat limiting evaluation. Contrast does reach the rectum. A   few diverticula are present in the sigmoid colon.  PERITONEUM: Small ascites. A heterogeneous fluid collection that runs   from the lateral right mid abdomen into the right lateral pelvis contains   dense components measuring up to 61 Hounsfield units, consistent with a   hematoma. This hematoma is multiloculated, with the largest portion at   the level of the right iliac crest measuring 7.8 x 5.5 x 2.9 cm. There   are a few smaller fluid collections in the pelvis which are partially   walled off. These collections are also more dense than simple fluid. They   could represent additional sites of hematoma, but early abscess   collection is not excluded. Mesenteric edema present.  VESSELS: Within normal limits.  RETROPERITONEUM/LYMPH NODES: No lymphadenopathy.  ABDOMINAL WALL: Postsurgical changes. Repair of ventral hernias. Midline   skin staples. Drain via right lower quadrant runs along the course of the   midline scar. No fluid collection.  BONES: Degenerative changes. Mild compression fracture L2.    IMPRESSION:  1. Since 9/11/2022, there has been small bowel resection and repair of   ventral hernias.    2. The stomach, duodenum, and jejunum are abnormally dilated, with   transition point at small bowel anastomosis in left lower quadrant. The   anastomosis appears widely patent and contrast passes through the   anastomosis. There could be a low-grade partial small bowelobstruction   due to edema at the anastomosis versus ileus causing the dilatation of   the stomach and proximal small bowel.    3. There is a multiloculated hematoma running along the right flank into   the right pelvis.    4. Small fluid collections in the pelvis are only partially walled off.   They could represent small hematomas or early developing abscesses.   Recommend clinical correlation and imaging follow-up as indicated.    5. There are bilateral lung nodules, measuring up to 7 mm in the right   upper lobe. Recommend follow-up chest CT in three months to ensure   stability.    6. Small bilateral pleural effusions and bibasilar atelectasis.    7. Multinodular goiter.    8. Small pericardial effusion.

## 2022-09-26 NOTE — PROGRESS NOTE ADULT - SUBJECTIVE AND OBJECTIVE BOX
Patient is a 72y old  Female who presents with a chief complaint of Large bowel obstruction (23 Sep 2022 17:27)      INTERVAL HPI/OVERNIGHT EVENTS: drainage around surgical site, packed by attending in am     MEDICATIONS  (STANDING):  atorvastatin 20 milliGRAM(s) Oral at bedtime  BUpivacaine 0.5% On-Q Pump 400 milliLiter(s) (4 mL/Hr) IntraDermal. <Continuous>  chlorhexidine 2% Cloths 1 Application(s) Topical <User Schedule>  dextrose 5%. 1000 milliLiter(s) (100 mL/Hr) IV Continuous <Continuous>  dextrose 5%. 1000 milliLiter(s) (50 mL/Hr) IV Continuous <Continuous>  dextrose 50% Injectable 25 Gram(s) IV Push once  dextrose 50% Injectable 12.5 Gram(s) IV Push once  dextrose 50% Injectable 25 Gram(s) IV Push once  glucagon  Injectable 1 milliGRAM(s) IntraMuscular once  heparin   Injectable 5000 Unit(s) SubCutaneous every 8 hours  influenza  Vaccine (HIGH DOSE) 0.7 milliLiter(s) IntraMuscular once  insulin lispro (ADMELOG) corrective regimen sliding scale   SubCutaneous three times a day before meals  insulin lispro (ADMELOG) corrective regimen sliding scale   SubCutaneous at bedtime  labetalol 200 milliGRAM(s) Oral every 12 hours  losartan 50 milliGRAM(s) Oral daily  meropenem  IVPB 1000 milliGRAM(s) IV Intermittent every 8 hours  NIFEdipine XL 60 milliGRAM(s) Oral every 24 hours    MEDICATIONS  (PRN):  acetaminophen     Tablet .. 650 milliGRAM(s) Oral every 6 hours PRN Mild Pain (1 - 3), Moderate Pain (4 - 6)  dextrose Oral Gel 15 Gram(s) Oral once PRN Blood Glucose LESS THAN 70 milliGRAM(s)/deciliter  ondansetron Injectable 4 milliGRAM(s) IV Push every 8 hours PRN Nausea and/or Vomiting  sodium chloride 0.9% lock flush 10 milliLiter(s) IV Push every 1 hour PRN Pre/post blood products, medications, blood draw, and to maintain line patency      __________________________________________________  REVIEW OF SYSTEMS:    CONSTITUTIONAL: No fever,   EYES: no acute visual disturbances  NECK: No pain or stiffness  RESPIRATORY: No cough; No shortness of breath  CARDIOVASCULAR: No chest pain, no palpitations  GASTROINTESTINAL: + abdominal pain. No nausea or vomiting; No diarrhea   NEUROLOGICAL: No headache or numbness, no tremors  MUSCULOSKELETAL: No joint pain, no muscle pain  GENITOURINARY: no dysuria, no frequency, no hesitancy  PSYCHIATRY: no depression , no anxiety  ALL OTHER  ROS negative        Vital Signs Last 24 Hrs  T(C): 36.7 (26 Sep 2022 12:22), Max: 37.4 (25 Sep 2022 21:25)  T(F): 98.1 (26 Sep 2022 12:22), Max: 99.3 (25 Sep 2022 21:25)  HR: 81 (26 Sep 2022 12:22) (78 - 86)  BP: 105/68 (26 Sep 2022 12:22) (105/68 - 133/75)  BP(mean): --  RR: 18 (26 Sep 2022 12:22) (18 - 18)  SpO2: 99% (26 Sep 2022 12:22) (94% - 100%)    Parameters below as of 26 Sep 2022 12:22  Patient On (Oxygen Delivery Method): room air        ________________________________________________  PHYSICAL EXAM:  GENERAL: NAD  HEENT: Normocephalic;  conjunctivae and sclerae clear; moist mucous membranes;   NECK : supple  CHEST/LUNG: Clear to auscultation bilaterally with good air entry   HEART: S1 S2  regular; no murmurs, gallops or rubs  ABDOMEN: Soft, +tenderness in middle quadrants, nondisteded, brownish drainage on dressing.  EXTREMITIES: no cyanosis; no edema; no calf tenderness  SKIN: warm and dry; no rash  NERVOUS SYSTEM:  Awake and alert; Oriented  to place, person and time ; no new deficits    _________________________________________________  LABS:                        8.0    14.23 )-----------( 566      ( 26 Sep 2022 08:17 )             28.0     09-26    135  |  99  |  14  ----------------------------<  91  4.9   |  19<L>  |  0.85    Ca    8.6      26 Sep 2022 08:17  Phos  3.1     09-26  Mg     2.2     09-26          CAPILLARY BLOOD GLUCOSE      POCT Blood Glucose.: 180 mg/dL (26 Sep 2022 09:49)  POCT Blood Glucose.: 123 mg/dL (25 Sep 2022 22:12)  POCT Blood Glucose.: 111 mg/dL (25 Sep 2022 17:33)        RADIOLOGY & ADDITIONAL TESTS:      Plan of care was discussed with patient and /or primary care giver; all questions and concerns were addressed and care was aligned with patient's wishes.

## 2022-09-26 NOTE — PROGRESS NOTE ADULT - ASSESSMENT
S/P Complex abdominal surgery including bowel resection following SBO  Peritonitis   Polymicrobial ariella in intraabdominal fluid including Enterobacter - but fluid collected from SHOSHANA days after surgery  Small fluid collections in the pelvis  Right sided extraperitoneal hematoma  Postoperative leukocytosis - improving  COVID positive since admission without apparent pneumonia  Markedly elevated inflammatory markers: ESR and CRP      RECOMMEND   Continue Meropenem for now  Please send cultures if further pocket of puss/drainage found/drained  Recheck ESR and CRP  Follow WBC trend        Discussed with Surgery PA    [phone]

## 2022-09-26 NOTE — PROGRESS NOTE ADULT - ASSESSMENT
71 yo female with PMH of HTN, DM and PSH of  x2, hysterectomy and lap dev ( w/ Dr. Clifford) admitted  to surgery svc with 2 days of nausea/vomiting and abd pain w/ imaging findings consistent with large bowel obstruction. However, prior to operative exploration, the patient had return of bowel function. NGT removed however bowel obstruction recurred therefore pt taken to OR 9/15 for Exlap JOSEFINA, SBR ( 100 cm)  EBL:200 IVF:2000 UO:250. Pt kept intubated post op and transferred to SICU team, extubated on POD#0, stable for transfer to tele on POD#1. return to SICU POD#2 due to hypertension (BP  210/104), gotten labetalol 10, then 20, then 40 while in telemetry, asymptomatic, repeat BP down to 165/67. She was transfer back to SICU for close BP monitoring. pt baseline on labetalol 200 PO bid, losartan 100 qd, hctz 25 qd and nifedipine 60 qd at home. Her SBP remained below 160's all night, did not need any additional antihypertensives. transfer back to SDU next day ()     Soft Diet  Pain/nausea prn  Meropenem (-), ID following  SQH/SCDs/OOBA/IS  ISS  SHOSHANA x1 in SQ   AM labs  Endurance Cath (-), PICC (-)

## 2022-09-26 NOTE — PROGRESS NOTE ADULT - SUBJECTIVE AND OBJECTIVE BOX
STATUS POST:  Exploratory laparotomy with lysis of adhesions. SBR    POST OPERATIVE DAY #: 15    SUBJECTIVE: Pt seen and examined by chief resident. Pt is doing well, resting comfortably on bed. Some lower abdominal pain. No nausea or vomiting.    Vital Signs Last 24 Hrs  T(C): 37.1 (26 Sep 2022 05:03), Max: 37.4 (25 Sep 2022 21:25)  T(F): 98.7 (26 Sep 2022 05:03), Max: 99.3 (25 Sep 2022 21:25)  HR: 85 (26 Sep 2022 05:03) (78 - 86)  BP: 123/71 (26 Sep 2022 05:03) (114/72 - 133/75)  BP(mean): --  RR: 18 (26 Sep 2022 05:03) (17 - 18)  SpO2: 100% (26 Sep 2022 05:03) (94% - 100%)    Parameters below as of 26 Sep 2022 05:03  Patient On (Oxygen Delivery Method): room air        I&O's Summary    25 Sep 2022 07:01  -  26 Sep 2022 07:00  --------------------------------------------------------  IN: 430 mL / OUT: 80 mL / NET: 350 mL        Physical Exam:  General Appearance: Appears well, NAD  Pulmonary: Nonlabored breathing, no respiratory distress  Cardiovascular: NSR  Abdomen: Soft, nondisteded, inferior aspect of midline incision with some brownish drainage. SHOSHANA drain with light brown output  Extremities: WWP, SCD's in place     LABS:                        8.1    16.75 )-----------( 736      ( 25 Sep 2022 21:13 )             25.3     09-25    136  |  96  |  12  ----------------------------<  116<H>  4.0   |  25  |  0.74    Ca    9.0      25 Sep 2022 21:13  Phos  2.7     09-25  Mg     2.1     09-25

## 2022-09-26 NOTE — PROGRESS NOTE ADULT - ASSESSMENT
72 year old woman with HTN, DM2, past surgical history of  x 2, hysterectomy, and laparoscopic cholecystectomy (), admitted for large bowel obstruction; had spontaneous return of bowel function initially; however, bowel obstruction recurred, so taken to OR for ex lap with lysis of adhesions and bowel resection () . Monitored in SICU post -op ; now on regional. Hospital course complicated by up-trending leukocytosis of unknown etiology.     # Large bowel obstruction   #post operative state  s/p OR for ex lap with lysis of adhesions and bowel resection ()  - rest of care per primary team     # Leukocytosis -wbc uptrending- down to 14k today   unclear etiology -- Had CT imaging done that showed : Small fluid collections in the pelvis are only partially walled off. They could represent small hematomas or early developing abscesses  Was started on meropenem by ID   Defer further management to ID  midline     #HTN   takes HCTZ 25mg qd, losartan 100mg qd, labetalol 200mg BID, nifedipine 60mg ER qd at home.   - continue labetalol 200mg BID  - on losartan at 50mg qd   - on nifepidine 60m ER qd ( can hold if bp is low )   - Continue to hold HCTZ for now    # Type 2 Diabetes complicated by hyperglycemia   Takes metformin at home.   cw insulin sliding scale while inpatient.   resume metformin on discharge.     #Incidental finding of bilateral lung nodules  CT chest showing bilateral lung nodules, measuring up to 7 mm in the right   upper lobe  Recommend follow-up chest CT in three months to ensure stability.    # COVID 19 infection (present on admission)   Incidental finding of COVID 19 infection on admission nasal swab. Without any dyspnea, or sore throat. Per [facility] treatment algorithm, did not meet criteria for monoclonal antibodies at time of admission.   No O2 requirement, does not meet criteria for remdesivir/dexamethasone.   Recommend enoxaparin for DVT ppx     # Acute blood loss anemia   Hgb downtrended from time of admission to 8   Partially attributable to operative blood losses   continue to trend daily CBC while inpatient;  ferritin, iron sat not low  age appropriate cancer screening outpatient     DVT ppx - lovenox    72 year old woman with HTN, DM2, past surgical history of  x 2, hysterectomy, and laparoscopic cholecystectomy (), admitted for large bowel obstruction; had spontaneous return of bowel function initially; however, bowel obstruction recurred, so taken to OR for ex lap with lysis of adhesions and bowel resection () . Monitored in SICU post -op ; now on regional. Hospital course complicated by up-trending leukocytosis of unknown etiology.     # Large bowel obstruction   #post operative state  s/p OR for ex lap with lysis of adhesions and bowel resection ()  - rest of care per primary team     # Leukocytosis -wbc uptrending- down to 14k today   unclear etiology -- Had CT imaging done that showed : Small fluid collections in the pelvis are only partially walled off. They could represent small hematomas or early developing abscesses  Was started on meropenem by ID   Defer further management to ID  midline     #HTN   takes HCTZ 25mg qd, losartan 100mg qd, labetalol 200mg BID, nifedipine 60mg ER qd at home.   - continue labetalol 200mg BID  - on losartan at 50mg qd   - on nifepidine 60m ER qd ( can hold if bp is low )   - Continue to hold HCTZ for now    # Type 2 Diabetes complicated by hyperglycemia   Takes metformin at home.   cw insulin sliding scale while inpatient.   resume metformin on discharge.     #Incidental finding of bilateral lung nodules  CT chest showing bilateral lung nodules, measuring up to 7 mm in the right   upper lobe  Recommend follow-up chest CT in three months to ensure stability.    # COVID 19 infection (present on admission)   Incidental finding of COVID 19 infection on admission nasal swab. Without any dyspnea, or sore throat. Per [facility] treatment algorithm, did not meet criteria for monoclonal antibodies at time of admission.   No O2 requirement, does not meet criteria for remdesivir/dexamethasone.   Recommend enoxaparin for DVT ppx     # Acute blood loss anemia   Hgb downtrended from time of admission to 8   Partially attributable to operative blood losses   continue to trend daily CBC while inpatient;  ferritin, iron sat not low  age appropriate cancer screening outpatient     DVT ppx - HSQ

## 2022-09-27 LAB
ANION GAP SERPL CALC-SCNC: 9 MMOL/L — SIGNIFICANT CHANGE UP (ref 5–17)
BUN SERPL-MCNC: 16 MG/DL — SIGNIFICANT CHANGE UP (ref 7–23)
CALCIUM SERPL-MCNC: 8.5 MG/DL — SIGNIFICANT CHANGE UP (ref 8.4–10.5)
CHLORIDE SERPL-SCNC: 97 MMOL/L — SIGNIFICANT CHANGE UP (ref 96–108)
CO2 SERPL-SCNC: 29 MMOL/L — SIGNIFICANT CHANGE UP (ref 22–31)
CREAT SERPL-MCNC: 0.92 MG/DL — SIGNIFICANT CHANGE UP (ref 0.5–1.3)
CRP SERPL-MCNC: 134.2 MG/L — HIGH (ref 0–4)
EGFR: 67 ML/MIN/1.73M2 — SIGNIFICANT CHANGE UP
ERYTHROCYTE [SEDIMENTATION RATE] IN BLOOD: 100 MM/HR — HIGH
GLUCOSE BLDC GLUCOMTR-MCNC: 110 MG/DL — HIGH (ref 70–99)
GLUCOSE BLDC GLUCOMTR-MCNC: 112 MG/DL — HIGH (ref 70–99)
GLUCOSE BLDC GLUCOMTR-MCNC: 116 MG/DL — HIGH (ref 70–99)
GLUCOSE BLDC GLUCOMTR-MCNC: 129 MG/DL — HIGH (ref 70–99)
GLUCOSE SERPL-MCNC: 109 MG/DL — HIGH (ref 70–99)
HCT VFR BLD CALC: 24.9 % — LOW (ref 34.5–45)
HGB BLD-MCNC: 7.7 G/DL — LOW (ref 11.5–15.5)
MAGNESIUM SERPL-MCNC: 2.3 MG/DL — SIGNIFICANT CHANGE UP (ref 1.6–2.6)
MCHC RBC-ENTMCNC: 27.4 PG — SIGNIFICANT CHANGE UP (ref 27–34)
MCHC RBC-ENTMCNC: 30.9 GM/DL — LOW (ref 32–36)
MCV RBC AUTO: 88.6 FL — SIGNIFICANT CHANGE UP (ref 80–100)
NRBC # BLD: 0 /100 WBCS — SIGNIFICANT CHANGE UP (ref 0–0)
PHOSPHATE SERPL-MCNC: 2.9 MG/DL — SIGNIFICANT CHANGE UP (ref 2.5–4.5)
PLATELET # BLD AUTO: 677 K/UL — HIGH (ref 150–400)
POTASSIUM SERPL-MCNC: 4.1 MMOL/L — SIGNIFICANT CHANGE UP (ref 3.5–5.3)
POTASSIUM SERPL-SCNC: 4.1 MMOL/L — SIGNIFICANT CHANGE UP (ref 3.5–5.3)
RBC # BLD: 2.81 M/UL — LOW (ref 3.8–5.2)
RBC # FLD: 16.4 % — HIGH (ref 10.3–14.5)
SARS-COV-2 RNA SPEC QL NAA+PROBE: SIGNIFICANT CHANGE UP
SODIUM SERPL-SCNC: 135 MMOL/L — SIGNIFICANT CHANGE UP (ref 135–145)
WBC # BLD: 15.65 K/UL — HIGH (ref 3.8–10.5)
WBC # FLD AUTO: 15.65 K/UL — HIGH (ref 3.8–10.5)

## 2022-09-27 PROCEDURE — 99233 SBSQ HOSP IP/OBS HIGH 50: CPT

## 2022-09-27 RX ADMIN — Medication 200 MILLIGRAM(S): at 18:17

## 2022-09-27 RX ADMIN — Medication 200 MILLIGRAM(S): at 05:44

## 2022-09-27 RX ADMIN — LOSARTAN POTASSIUM 50 MILLIGRAM(S): 100 TABLET, FILM COATED ORAL at 05:44

## 2022-09-27 RX ADMIN — ATORVASTATIN CALCIUM 20 MILLIGRAM(S): 80 TABLET, FILM COATED ORAL at 21:54

## 2022-09-27 RX ADMIN — MEROPENEM 100 MILLIGRAM(S): 1 INJECTION INTRAVENOUS at 13:29

## 2022-09-27 RX ADMIN — MEROPENEM 100 MILLIGRAM(S): 1 INJECTION INTRAVENOUS at 05:43

## 2022-09-27 RX ADMIN — HEPARIN SODIUM 5000 UNIT(S): 5000 INJECTION INTRAVENOUS; SUBCUTANEOUS at 21:55

## 2022-09-27 RX ADMIN — HEPARIN SODIUM 5000 UNIT(S): 5000 INJECTION INTRAVENOUS; SUBCUTANEOUS at 13:23

## 2022-09-27 RX ADMIN — Medication 650 MILLIGRAM(S): at 02:24

## 2022-09-27 RX ADMIN — HEPARIN SODIUM 5000 UNIT(S): 5000 INJECTION INTRAVENOUS; SUBCUTANEOUS at 05:43

## 2022-09-27 RX ADMIN — Medication 650 MILLIGRAM(S): at 23:10

## 2022-09-27 RX ADMIN — MEROPENEM 100 MILLIGRAM(S): 1 INJECTION INTRAVENOUS at 21:55

## 2022-09-27 RX ADMIN — CHLORHEXIDINE GLUCONATE 1 APPLICATION(S): 213 SOLUTION TOPICAL at 05:44

## 2022-09-27 RX ADMIN — ONDANSETRON 4 MILLIGRAM(S): 8 TABLET, FILM COATED ORAL at 09:02

## 2022-09-27 RX ADMIN — Medication 60 MILLIGRAM(S): at 13:28

## 2022-09-27 RX ADMIN — Medication 650 MILLIGRAM(S): at 03:24

## 2022-09-27 NOTE — PROGRESS NOTE ADULT - SUBJECTIVE AND OBJECTIVE BOX
Patient is a 72y old  Female who presents with a chief complaint of Large bowel obstruction (23 Sep 2022 17:27)      INTERVAL HPI/OVERNIGHT EVENTS: continues to have drainage from abdominal surgical site    MEDICATIONS  (STANDING):  atorvastatin 20 milliGRAM(s) Oral at bedtime  BUpivacaine 0.5% On-Q Pump 400 milliLiter(s) (4 mL/Hr) IntraDermal. <Continuous>  chlorhexidine 2% Cloths 1 Application(s) Topical <User Schedule>  dextrose 5%. 1000 milliLiter(s) (100 mL/Hr) IV Continuous <Continuous>  dextrose 5%. 1000 milliLiter(s) (50 mL/Hr) IV Continuous <Continuous>  dextrose 50% Injectable 25 Gram(s) IV Push once  dextrose 50% Injectable 12.5 Gram(s) IV Push once  dextrose 50% Injectable 25 Gram(s) IV Push once  glucagon  Injectable 1 milliGRAM(s) IntraMuscular once  heparin   Injectable 5000 Unit(s) SubCutaneous every 8 hours  influenza  Vaccine (HIGH DOSE) 0.7 milliLiter(s) IntraMuscular once  insulin lispro (ADMELOG) corrective regimen sliding scale   SubCutaneous three times a day before meals  insulin lispro (ADMELOG) corrective regimen sliding scale   SubCutaneous at bedtime  labetalol 200 milliGRAM(s) Oral every 12 hours  losartan 50 milliGRAM(s) Oral daily  meropenem  IVPB 1000 milliGRAM(s) IV Intermittent every 8 hours  NIFEdipine XL 60 milliGRAM(s) Oral every 24 hours    MEDICATIONS  (PRN):  acetaminophen     Tablet .. 650 milliGRAM(s) Oral every 6 hours PRN Mild Pain (1 - 3), Moderate Pain (4 - 6)  dextrose Oral Gel 15 Gram(s) Oral once PRN Blood Glucose LESS THAN 70 milliGRAM(s)/deciliter  ondansetron Injectable 4 milliGRAM(s) IV Push every 8 hours PRN Nausea and/or Vomiting  sodium chloride 0.9% lock flush 10 milliLiter(s) IV Push every 1 hour PRN Pre/post blood products, medications, blood draw, and to maintain line patency      __________________________________________________  REVIEW OF SYSTEMS:    CONSTITUTIONAL: No fever,   EYES: no acute visual disturbances  NECK: No pain or stiffness  RESPIRATORY: No cough; No shortness of breath  CARDIOVASCULAR: No chest pain, no palpitations  GASTROINTESTINAL: + abdominal pain    NEUROLOGICAL: No headache or numbness, no tremors  MUSCULOSKELETAL: No joint pain, no muscle pain  GENITOURINARY: no dysuria, no frequency, no hesitancy  PSYCHIATRY: no depression , no anxiety  ALL OTHER  ROS negative        Vital Signs Last 24 Hrs  T(C): 37.1 (27 Sep 2022 08:33), Max: 37.1 (27 Sep 2022 05:03)  T(F): 98.7 (27 Sep 2022 08:33), Max: 98.7 (27 Sep 2022 05:03)  HR: 81 (27 Sep 2022 08:33) (80 - 86)  BP: 117/57 (27 Sep 2022 08:33) (105/68 - 131/78)  BP(mean): --  RR: 17 (27 Sep 2022 08:33) (16 - 18)  SpO2: 93% (27 Sep 2022 08:33) (93% - 100%)    Parameters below as of 27 Sep 2022 08:33  Patient On (Oxygen Delivery Method): room air        ________________________________________________  PHYSICAL EXAM:  GENERAL: NAD  HEENT: Normocephalic;  conjunctivae and sclerae clear; moist mucous membranes;   NECK : supple  CHEST/LUNG: Clear to auscultation bilaterally with good air entry   HEART: S1 S2  regular; no murmurs, gallops or rubs  ABDOMEN: Soft, tender to palpate with brown drainage, Nondistended; Bowel sounds present  EXTREMITIES: no cyanosis; no edema; no calf tenderness  SKIN: warm and dry; no rash  NERVOUS SYSTEM:  Awake and alert; Oriented  to place, person and time ; no new deficits    _________________________________________________  LABS:                        7.7    15.65 )-----------( 677      ( 27 Sep 2022 05:30 )             24.9     09-27    135  |  97  |  16  ----------------------------<  109<H>  4.1   |  29  |  0.92    Ca    8.5      27 Sep 2022 05:30  Phos  2.9     09-27  Mg     2.3     09-27          CAPILLARY BLOOD GLUCOSE      POCT Blood Glucose.: 112 mg/dL (27 Sep 2022 07:41)  POCT Blood Glucose.: 144 mg/dL (26 Sep 2022 22:07)  POCT Blood Glucose.: 129 mg/dL (26 Sep 2022 17:09)  POCT Blood Glucose.: 111 mg/dL (26 Sep 2022 13:51)        RADIOLOGY & ADDITIONAL TESTS:      Plan of care was discussed with patient and /or primary care giver; all questions and concerns were addressed and care was aligned with patient's wishes.

## 2022-09-27 NOTE — PROGRESS NOTE ADULT - ASSESSMENT
73 yo female with PMH of HTN, DM and PSH of  x2, hysterectomy and lap dev ( w/ Dr. Clifford) admitted  to surgery sv with 2 days of nausea/vomiting and abd pain w/ imaging findings consistent with large bowel obstruction. However, prior to operative exploration, the patient had return of bowel function. NGT removed however bowel obstruction recurred therefore pt taken to OR 9/15 for Exlap JOSEFINA, SBR ( 100 cm)  EBL:200 IVF:2000 UO:250. Pt kept intubated post op and transferred to SICU team, extubated on POD#0, stable for transfer to tele on POD#1. return to SICU POD#2 due to hypertension (BP  210/104), gotten labetalol 10, then 20, then 40 while in telemetry, asymptomatic, repeat BP down to 165/67. She was transfer back to SICU for close BP monitoring. pt baseline on labetalol 200 PO bid, losartan 100 qd, hctz 25 qd and nifedipine 60 qd at home. Her SBP remained below 160's all night, did not need any additional antihypertensives. transfer back to SDU next day ()     Soft Diet  Pain/nausea prn  Meropenem (-), ID following  SQH/SCDs/OOBA/IS  ISS  AM labs  Endurance Cath (-), PICC (-)

## 2022-09-27 NOTE — PROGRESS NOTE ADULT - ASSESSMENT
72 year old woman with HTN, DM2, past surgical history of  x 2, hysterectomy, and laparoscopic cholecystectomy (), admitted for large bowel obstruction; had spontaneous return of bowel function initially; however, bowel obstruction recurred, so taken to OR for ex lap with lysis of adhesions and bowel resection () . Monitored in SICU post -op ; now on regional. Hospital course complicated by up-trending leukocytosis of unknown etiology.     # Large bowel obstruction   #post operative state  s/p OR for ex lap with lysis of adhesions and bowel resection ()  - rest of care per primary team     # Leukocytosis -wbc uptrending- down to 15.6k today   unclear etiology -- Had CT imaging done that showed : Small fluid collections in the pelvis are only partially walled off. They could represent small hematomas or early developing abscesses  Was started on meropenem by ID   ESR/CRP eleavted   Defer further management to ID  midline     #HTN   takes HCTZ 25mg qd, losartan 100mg qd, labetalol 200mg BID, nifedipine 60mg ER qd at home.   - continue labetalol 200mg BID  - on losartan at 50mg qd   - on nifepidine 60m ER qd ( can hold if bp is low )   - Continue to hold HCTZ for now    # Type 2 Diabetes complicated by hyperglycemia   Takes metformin at home.   cw insulin sliding scale while inpatient.   resume metformin on discharge.     #Incidental finding of bilateral lung nodules  CT chest showing bilateral lung nodules, measuring up to 7 mm in the right   upper lobe  Recommend follow-up chest CT in three months to ensure stability.    # COVID 19 infection (present on admission)   Incidental finding of COVID 19 infection on admission nasal swab. Without any dyspnea, or sore throat. Per [facility] treatment algorithm, did not meet criteria for monoclonal antibodies at time of admission.   No O2 requirement, does not meet criteria for remdesivir/dexamethasone.   Recommend enoxaparin for DVT ppx     # Acute blood loss anemia   Hgb downtrended from time of admission to-->7.7  Partially attributable to operative blood losses   continue to trend daily CBC while inpatient;  ferritin, iron sat not low  age appropriate cancer screening outpatient     DVT ppx - HSQ

## 2022-09-27 NOTE — PROGRESS NOTE ADULT - SUBJECTIVE AND OBJECTIVE BOX
STATUS POST:  Exkathy ROCHA, SBR     POST OPERATIVE DAY #:12    SUBJECTIVE: Pt seen and examined at bedside this am by surgery team. Patient is lying comfortably in bed with no complaints. Tolerating diet, pain well controlled with current regimen. Patient denies fever, nausea, vomiting, chest pain, and shortness of breath. Patient is passing gas and having loose bowel movements.     MEDICATIONS  (STANDING):  atorvastatin 20 milliGRAM(s) Oral at bedtime  BUpivacaine 0.5% On-Q Pump 400 milliLiter(s) (4 mL/Hr) IntraDermal. <Continuous>  chlorhexidine 2% Cloths 1 Application(s) Topical <User Schedule>  dextrose 5%. 1000 milliLiter(s) (100 mL/Hr) IV Continuous <Continuous>  dextrose 5%. 1000 milliLiter(s) (50 mL/Hr) IV Continuous <Continuous>  dextrose 50% Injectable 25 Gram(s) IV Push once  dextrose 50% Injectable 12.5 Gram(s) IV Push once  dextrose 50% Injectable 25 Gram(s) IV Push once  glucagon  Injectable 1 milliGRAM(s) IntraMuscular once  heparin   Injectable 5000 Unit(s) SubCutaneous every 8 hours  influenza  Vaccine (HIGH DOSE) 0.7 milliLiter(s) IntraMuscular once  insulin lispro (ADMELOG) corrective regimen sliding scale   SubCutaneous three times a day before meals  insulin lispro (ADMELOG) corrective regimen sliding scale   SubCutaneous at bedtime  labetalol 200 milliGRAM(s) Oral every 12 hours  losartan 50 milliGRAM(s) Oral daily  meropenem  IVPB 1000 milliGRAM(s) IV Intermittent every 8 hours  NIFEdipine XL 60 milliGRAM(s) Oral every 24 hours    MEDICATIONS  (PRN):  acetaminophen     Tablet .. 650 milliGRAM(s) Oral every 6 hours PRN Mild Pain (1 - 3), Moderate Pain (4 - 6)  dextrose Oral Gel 15 Gram(s) Oral once PRN Blood Glucose LESS THAN 70 milliGRAM(s)/deciliter  ondansetron Injectable 4 milliGRAM(s) IV Push every 8 hours PRN Nausea and/or Vomiting  sodium chloride 0.9% lock flush 10 milliLiter(s) IV Push every 1 hour PRN Pre/post blood products, medications, blood draw, and to maintain line patency      Vital Signs Last 24 Hrs  T(C): 37.1 (27 Sep 2022 05:03), Max: 37.1 (27 Sep 2022 05:03)  T(F): 98.7 (27 Sep 2022 05:03), Max: 98.7 (27 Sep 2022 05:03)  HR: 86 (27 Sep 2022 05:03) (80 - 86)  BP: 116/68 (27 Sep 2022 05:03) (105/68 - 131/78)  BP(mean): --  RR: 16 (27 Sep 2022 05:03) (16 - 18)  SpO2: 96% (27 Sep 2022 05:03) (96% - 100%)    Parameters below as of 27 Sep 2022 05:03  Patient On (Oxygen Delivery Method): room air        Physical Exam  General: Patient is doing well and lying in bed comfortably  Constitutional: alert and oriented   Pulm: Nonlabored breathing, no respiratory distress  CV: Regular rate and rhythm, normal sinus rhythm  Abd:  soft, nontender, nondistended. No rebound, no guarding.             Incisions: Packed at inferior end of incision with betadine kerlix and dressed with gauze and abd pad  Extremities: warm, well perfused, no edema    I&O's Detail    26 Sep 2022 07:01  -  27 Sep 2022 07:00  --------------------------------------------------------  IN:    IV PiggyBack: 100 mL    Oral Fluid: 640 mL  Total IN: 740 mL    OUT:  Total OUT: 0 mL    Total NET: 740 mL        LABS:                        8.0    14.23 )-----------( 566      ( 26 Sep 2022 08:17 )             28.0     09-26    135  |  99  |  14  ----------------------------<  91  4.9   |  19<L>  |  0.85    Ca    8.6      26 Sep 2022 08:17  Phos  3.1     09-26  Mg     2.2     09-26

## 2022-09-28 LAB
ANION GAP SERPL CALC-SCNC: 11 MMOL/L — SIGNIFICANT CHANGE UP (ref 5–17)
APTT BLD: 32.3 SEC — SIGNIFICANT CHANGE UP (ref 27.5–35.5)
BLD GP AB SCN SERPL QL: NEGATIVE — SIGNIFICANT CHANGE UP
BUN SERPL-MCNC: 15 MG/DL — SIGNIFICANT CHANGE UP (ref 7–23)
CALCIUM SERPL-MCNC: 8.7 MG/DL — SIGNIFICANT CHANGE UP (ref 8.4–10.5)
CHLORIDE SERPL-SCNC: 96 MMOL/L — SIGNIFICANT CHANGE UP (ref 96–108)
CO2 SERPL-SCNC: 29 MMOL/L — SIGNIFICANT CHANGE UP (ref 22–31)
CREAT SERPL-MCNC: 0.91 MG/DL — SIGNIFICANT CHANGE UP (ref 0.5–1.3)
EGFR: 68 ML/MIN/1.73M2 — SIGNIFICANT CHANGE UP
GLUCOSE BLDC GLUCOMTR-MCNC: 111 MG/DL — HIGH (ref 70–99)
GLUCOSE BLDC GLUCOMTR-MCNC: 114 MG/DL — HIGH (ref 70–99)
GLUCOSE BLDC GLUCOMTR-MCNC: 114 MG/DL — HIGH (ref 70–99)
GLUCOSE BLDC GLUCOMTR-MCNC: 122 MG/DL — HIGH (ref 70–99)
GLUCOSE SERPL-MCNC: 105 MG/DL — HIGH (ref 70–99)
HCT VFR BLD CALC: 26.4 % — LOW (ref 34.5–45)
HGB BLD-MCNC: 8 G/DL — LOW (ref 11.5–15.5)
INR BLD: 1.17 — HIGH (ref 0.88–1.16)
MAGNESIUM SERPL-MCNC: 2.2 MG/DL — SIGNIFICANT CHANGE UP (ref 1.6–2.6)
MCHC RBC-ENTMCNC: 26.9 PG — LOW (ref 27–34)
MCHC RBC-ENTMCNC: 30.3 GM/DL — LOW (ref 32–36)
MCV RBC AUTO: 88.9 FL — SIGNIFICANT CHANGE UP (ref 80–100)
NRBC # BLD: 0 /100 WBCS — SIGNIFICANT CHANGE UP (ref 0–0)
PHOSPHATE SERPL-MCNC: 3.1 MG/DL — SIGNIFICANT CHANGE UP (ref 2.5–4.5)
PLATELET # BLD AUTO: 719 K/UL — HIGH (ref 150–400)
POTASSIUM SERPL-MCNC: 4.2 MMOL/L — SIGNIFICANT CHANGE UP (ref 3.5–5.3)
POTASSIUM SERPL-SCNC: 4.2 MMOL/L — SIGNIFICANT CHANGE UP (ref 3.5–5.3)
PROTHROM AB SERPL-ACNC: 13.9 SEC — HIGH (ref 10.5–13.4)
RBC # BLD: 2.97 M/UL — LOW (ref 3.8–5.2)
RBC # FLD: 16.8 % — HIGH (ref 10.3–14.5)
RH IG SCN BLD-IMP: POSITIVE — SIGNIFICANT CHANGE UP
SODIUM SERPL-SCNC: 136 MMOL/L — SIGNIFICANT CHANGE UP (ref 135–145)
WBC # BLD: 11.25 K/UL — HIGH (ref 3.8–10.5)
WBC # FLD AUTO: 11.25 K/UL — HIGH (ref 3.8–10.5)

## 2022-09-28 PROCEDURE — 99233 SBSQ HOSP IP/OBS HIGH 50: CPT

## 2022-09-28 RX ADMIN — MEROPENEM 100 MILLIGRAM(S): 1 INJECTION INTRAVENOUS at 21:48

## 2022-09-28 RX ADMIN — Medication 200 MILLIGRAM(S): at 19:26

## 2022-09-28 RX ADMIN — ONDANSETRON 4 MILLIGRAM(S): 8 TABLET, FILM COATED ORAL at 06:46

## 2022-09-28 RX ADMIN — LOSARTAN POTASSIUM 50 MILLIGRAM(S): 100 TABLET, FILM COATED ORAL at 05:35

## 2022-09-28 RX ADMIN — MEROPENEM 100 MILLIGRAM(S): 1 INJECTION INTRAVENOUS at 13:36

## 2022-09-28 RX ADMIN — ATORVASTATIN CALCIUM 20 MILLIGRAM(S): 80 TABLET, FILM COATED ORAL at 21:48

## 2022-09-28 RX ADMIN — Medication 60 MILLIGRAM(S): at 13:36

## 2022-09-28 RX ADMIN — HEPARIN SODIUM 5000 UNIT(S): 5000 INJECTION INTRAVENOUS; SUBCUTANEOUS at 13:36

## 2022-09-28 RX ADMIN — Medication 200 MILLIGRAM(S): at 05:34

## 2022-09-28 RX ADMIN — HEPARIN SODIUM 5000 UNIT(S): 5000 INJECTION INTRAVENOUS; SUBCUTANEOUS at 05:34

## 2022-09-28 RX ADMIN — MEROPENEM 100 MILLIGRAM(S): 1 INJECTION INTRAVENOUS at 05:32

## 2022-09-28 RX ADMIN — CHLORHEXIDINE GLUCONATE 1 APPLICATION(S): 213 SOLUTION TOPICAL at 05:35

## 2022-09-28 RX ADMIN — Medication 650 MILLIGRAM(S): at 23:39

## 2022-09-28 RX ADMIN — HEPARIN SODIUM 5000 UNIT(S): 5000 INJECTION INTRAVENOUS; SUBCUTANEOUS at 21:48

## 2022-09-28 NOTE — PROGRESS NOTE ADULT - ASSESSMENT
71 yo female with PMH of HTN, DM and PSH of  x2, hysterectomy and lap dev ( w/ Dr. Clifford) admitted  to surgery sv with 2 days of nausea/vomiting and abd pain w/ imaging findings consistent with large bowel obstruction. However, prior to operative exploration, the patient had return of bowel function. NGT removed however bowel obstruction recurred therefore pt taken to OR 9/15 for Exlap JOSEFINA, SBR ( 100 cm)  EBL:200 IVF:2000 UO:250. Pt kept intubated post op and transferred to SICU team, extubated on POD#0, stable for transfer to tele on POD#1. return to SICU POD#2 due to hypertension (BP  210/104), gotten labetalol 10, then 20, then 40 while in telemetry, asymptomatic, repeat BP down to 165/67. She was transfer back to SICU for close BP monitoring. pt baseline on labetalol 200 PO bid, losartan 100 qd, hctz 25 qd and nifedipine 60 qd at home. Her SBP remained below 160's all night, did not need any additional antihypertensives. transfer back to SDU next day ()     Soft Diet  Pain/nausea prn  Meropenem (-), ID following  SQH/SCDs/OOBA/IS  ISS  AM labs  Endurance Cath (-), PICC (-)

## 2022-09-28 NOTE — PROGRESS NOTE ADULT - ASSESSMENT
72 year old woman with HTN, DM2, past surgical history of  x 2, hysterectomy, and laparoscopic cholecystectomy (), admitted for large bowel obstruction; had spontaneous return of bowel function initially; however, bowel obstruction recurred, so taken to OR for ex lap with lysis of adhesions and bowel resection () . Monitored in SICU post -op ; now on regional. Hospital course complicated by up-trending leukocytosis of unknown etiology.     # Large bowel obstruction   #post operative state  s/p OR for ex lap with lysis of adhesions and bowel resection ()  - rest of care per primary team     # Leukocytosis -wbc uptrending- down to 15.6k today   unclear etiology -- Had CT imaging done that showed : Small fluid collections in the pelvis are only partially walled off. They could represent small hematomas or early developing abscesses  Was started on meropenem by ID   ESR/CRP eleavted   midline   wbc trending down today to 11.2 k today from 15 k   Defer further management to ID    #reactive thrombocytosis  plts elevated from 672 --> 719k   will continue to monitor     #HTN   takes HCTZ 25mg qd, losartan 100mg qd, labetalol 200mg BID, nifedipine 60mg ER qd at home.   - continue labetalol 200mg BID  - on losartan at 50mg qd   - on nifepidine 60m ER qd ( can hold if bp is low )   - Continue to hold HCTZ for now    # Type 2 Diabetes complicated by hyperglycemia   Takes metformin at home.   cw insulin sliding scale while inpatient.   resume metformin on discharge.     #Incidental finding of bilateral lung nodules  CT chest showing bilateral lung nodules, measuring up to 7 mm in the right   upper lobe  Recommend follow-up chest CT in three months to ensure stability.    # COVID 19 infection (present on admission)   Incidental finding of COVID 19 infection on admission nasal swab. Without any dyspnea, or sore throat. Per [facility] treatment algorithm, did not meet criteria for monoclonal antibodies at time of admission.   No O2 requirement, does not meet criteria for remdesivir/dexamethasone.   Recommend enoxaparin for DVT ppx     # Acute blood loss anemia   Hgb downtrended from time of admission to-->8  Partially attributable to operative blood losses   continue to trend daily CBC while inpatient;  ferritin, iron sat not low  age appropriate cancer screening outpatient     DVT ppx - HSQ

## 2022-09-28 NOTE — PROGRESS NOTE ADULT - SUBJECTIVE AND OBJECTIVE BOX
STATUS POST: ERIC Martinez   POST OPERATIVE DAY #: 13    SUBJECTIVE: Pt seen and examined by chief resident. Pt is doing well, resting comfortably on bed. Pain controlled. Diet tolerated. Ambulating out of bed. +F/+BM. No nausea or vomiting. No complaints at this time.    Vital Signs Last 24 Hrs  T(C): 37.1 (28 Sep 2022 05:02), Max: 37.4 (27 Sep 2022 21:47)  T(F): 98.7 (28 Sep 2022 05:02), Max: 99.4 (27 Sep 2022 21:47)  HR: 85 (28 Sep 2022 05:02) (77 - 85)  BP: 113/68 (28 Sep 2022 05:02) (113/68 - 119/61)  BP(mean): 83 (28 Sep 2022 05:02) (79 - 83)  RR: 18 (28 Sep 2022 05:02) (17 - 18)  SpO2: 95% (28 Sep 2022 05:02) (93% - 99%)    Parameters below as of 28 Sep 2022 05:02  Patient On (Oxygen Delivery Method): room air        I&O's Summary    27 Sep 2022 07:01  -  28 Sep 2022 07:00  --------------------------------------------------------  IN: 740 mL / OUT: 800 mL / NET: -60 mL        Physical Exam:  General Appearance: Appears well, NAD  Pulmonary: Nonlabored breathing, no respiratory distress  Abdomen: Soft, nondisteded, midline incision betadine packing replaced, no drainage.   Extremities: WWP, SCD's in place     LABS:                        7.7    15.65 )-----------( 677      ( 27 Sep 2022 05:30 )             24.9     09-27    135  |  97  |  16  ----------------------------<  109<H>  4.1   |  29  |  0.92    Ca    8.5      27 Sep 2022 05:30  Phos  2.9     09-27  Mg     2.3     09-27

## 2022-09-29 ENCOUNTER — TRANSCRIPTION ENCOUNTER (OUTPATIENT)
Age: 72
End: 2022-09-29

## 2022-09-29 LAB
ANION GAP SERPL CALC-SCNC: 11 MMOL/L — SIGNIFICANT CHANGE UP (ref 5–17)
BUN SERPL-MCNC: 16 MG/DL — SIGNIFICANT CHANGE UP (ref 7–23)
CALCIUM SERPL-MCNC: 8.4 MG/DL — SIGNIFICANT CHANGE UP (ref 8.4–10.5)
CHLORIDE SERPL-SCNC: 94 MMOL/L — LOW (ref 96–108)
CO2 SERPL-SCNC: 27 MMOL/L — SIGNIFICANT CHANGE UP (ref 22–31)
CREAT SERPL-MCNC: 0.92 MG/DL — SIGNIFICANT CHANGE UP (ref 0.5–1.3)
EGFR: 67 ML/MIN/1.73M2 — SIGNIFICANT CHANGE UP
GLUCOSE BLDC GLUCOMTR-MCNC: 100 MG/DL — HIGH (ref 70–99)
GLUCOSE BLDC GLUCOMTR-MCNC: 115 MG/DL — HIGH (ref 70–99)
GLUCOSE BLDC GLUCOMTR-MCNC: 97 MG/DL — SIGNIFICANT CHANGE UP (ref 70–99)
GLUCOSE BLDC GLUCOMTR-MCNC: 98 MG/DL — SIGNIFICANT CHANGE UP (ref 70–99)
GLUCOSE SERPL-MCNC: 86 MG/DL — SIGNIFICANT CHANGE UP (ref 70–99)
HCT VFR BLD CALC: 24.4 % — LOW (ref 34.5–45)
HGB BLD-MCNC: 7.5 G/DL — LOW (ref 11.5–15.5)
MAGNESIUM SERPL-MCNC: 2.2 MG/DL — SIGNIFICANT CHANGE UP (ref 1.6–2.6)
MCHC RBC-ENTMCNC: 27.2 PG — SIGNIFICANT CHANGE UP (ref 27–34)
MCHC RBC-ENTMCNC: 30.7 GM/DL — LOW (ref 32–36)
MCV RBC AUTO: 88.4 FL — SIGNIFICANT CHANGE UP (ref 80–100)
NRBC # BLD: 0 /100 WBCS — SIGNIFICANT CHANGE UP (ref 0–0)
PHOSPHATE SERPL-MCNC: 3 MG/DL — SIGNIFICANT CHANGE UP (ref 2.5–4.5)
PLATELET # BLD AUTO: 614 K/UL — HIGH (ref 150–400)
POTASSIUM SERPL-MCNC: 4.3 MMOL/L — SIGNIFICANT CHANGE UP (ref 3.5–5.3)
POTASSIUM SERPL-SCNC: 4.3 MMOL/L — SIGNIFICANT CHANGE UP (ref 3.5–5.3)
RBC # BLD: 2.76 M/UL — LOW (ref 3.8–5.2)
RBC # FLD: 16.8 % — HIGH (ref 10.3–14.5)
SODIUM SERPL-SCNC: 132 MMOL/L — LOW (ref 135–145)
WBC # BLD: 10.67 K/UL — HIGH (ref 3.8–10.5)
WBC # FLD AUTO: 10.67 K/UL — HIGH (ref 3.8–10.5)

## 2022-09-29 PROCEDURE — 99233 SBSQ HOSP IP/OBS HIGH 50: CPT

## 2022-09-29 RX ADMIN — LOSARTAN POTASSIUM 50 MILLIGRAM(S): 100 TABLET, FILM COATED ORAL at 06:19

## 2022-09-29 RX ADMIN — ATORVASTATIN CALCIUM 20 MILLIGRAM(S): 80 TABLET, FILM COATED ORAL at 21:03

## 2022-09-29 RX ADMIN — Medication 650 MILLIGRAM(S): at 00:45

## 2022-09-29 RX ADMIN — MEROPENEM 100 MILLIGRAM(S): 1 INJECTION INTRAVENOUS at 14:00

## 2022-09-29 RX ADMIN — HEPARIN SODIUM 5000 UNIT(S): 5000 INJECTION INTRAVENOUS; SUBCUTANEOUS at 21:03

## 2022-09-29 RX ADMIN — HEPARIN SODIUM 5000 UNIT(S): 5000 INJECTION INTRAVENOUS; SUBCUTANEOUS at 06:20

## 2022-09-29 RX ADMIN — Medication 60 MILLIGRAM(S): at 14:00

## 2022-09-29 RX ADMIN — CHLORHEXIDINE GLUCONATE 1 APPLICATION(S): 213 SOLUTION TOPICAL at 06:19

## 2022-09-29 RX ADMIN — MEROPENEM 100 MILLIGRAM(S): 1 INJECTION INTRAVENOUS at 21:02

## 2022-09-29 RX ADMIN — MEROPENEM 100 MILLIGRAM(S): 1 INJECTION INTRAVENOUS at 06:19

## 2022-09-29 RX ADMIN — Medication 200 MILLIGRAM(S): at 06:19

## 2022-09-29 RX ADMIN — HEPARIN SODIUM 5000 UNIT(S): 5000 INJECTION INTRAVENOUS; SUBCUTANEOUS at 14:00

## 2022-09-29 NOTE — DISCHARGE NOTE PROVIDER - NSDCFUADDAPPT_GEN_ALL_CORE_FT
Please follow up with Dr. So in one week; you may call the office to make an appointment at your earliest convenience.

## 2022-09-29 NOTE — DISCHARGE NOTE PROVIDER - NSDCMRMEDTOKEN_GEN_ALL_CORE_FT
atorvastatin 20 mg oral tablet: 1 tab(s) orally once a day  hydroCHLOROthiazide 25 mg oral tablet: 1 tab(s) orally once a day  labetalol 200 mg oral tablet: 1 tab(s) orally 2 times a day  losartan 100 mg oral tablet: 1 tab(s) orally once a day  metFORMIN 500 mg oral tablet, extended release: 1 tab(s) orally once a day  NIFEdipine 60 mg oral tablet, extended release: 1 tab(s) orally once a day  oxycodone-acetaminophen 5 mg-325 mg oral tablet: 1 tab(s) orally every 6 hours, As Needed -for severe pain MDD:4  rolling walker ICD 10: 56.69:   SUMAtriptan 100 mg oral tablet: 1 tab(s) orally once   atorvastatin 20 mg oral tablet: 1 tab(s) orally once a day  Cozaar 50 mg oral tablet: 1 tab(s) orally once a day  hydroCHLOROthiazide 25 mg oral tablet: 1 tab(s) orally once a day  labetalol 200 mg oral tablet: 1 tab(s) orally 2 times a day  metFORMIN 500 mg oral tablet, extended release: 1 tab(s) orally once a day  NIFEdipine 60 mg oral tablet, extended release: 1 tab(s) orally once a day  SUMAtriptan 100 mg oral tablet: 1 tab(s) orally once   atorvastatin 20 mg oral tablet: 1 tab(s) orally once a day  Cozaar 50 mg oral tablet: 1 tab(s) orally once a day  DAPTOmycin: 750 milligram(s) intravenous every 24 hours through 10/14  hydroCHLOROthiazide 25 mg oral tablet: 1 tab(s) orally once a day  labetalol 200 mg oral tablet: 1 tab(s) orally 2 times a day  metFORMIN 500 mg oral tablet, extended release: 1 tab(s) orally once a day  NIFEdipine 60 mg oral tablet, extended release: 1 tab(s) orally once a day  SUMAtriptan 100 mg oral tablet: 1 tab(s) orally once  vancomycin 250 mg/5 mL oral solution: 5 milliliter(s) orally every 6 hours savita 10/14   acetaminophen 325 mg oral tablet: 1 tab(s) orally every 4 hours, As needed, Mild Pain (1 - 3)  atorvastatin 20 mg oral tablet: 1 tab(s) orally once a day  Cozaar 50 mg oral tablet: 1 tab(s) orally once a day  hydroCHLOROthiazide 25 mg oral tablet: 1 tab(s) orally once a day  labetalol 200 mg oral tablet: 1 tab(s) orally 2 times a day  metFORMIN 500 mg oral tablet, extended release: 1 tab(s) orally once a day  NIFEdipine 60 mg oral tablet, extended release: 1 tab(s) orally once a day  SUMAtriptan 100 mg oral tablet: 1 tab(s) orally once   atorvastatin 40 mg oral tablet: 1 tab(s) orally once a day  Cozaar 50 mg oral tablet: 1 tab(s) orally once a day  ferrous sulfate 325 mg (65 mg elemental iron) oral tablet: 1 tab(s) orally 3 times a day  metFORMIN 500 mg oral tablet, extended release: 1 tab(s) orally once a day  methIMAzole 5 mg oral tablet: 0.5 tab(s) orally once a day   propranolol 20 mg oral tablet: 1 tab(s) orally once a day

## 2022-09-29 NOTE — PROGRESS NOTE ADULT - ASSESSMENT
71 yo female with PMH of HTN, DM and PSH of  x2, hysterectomy and lap dev ( w/ Dr. Clifford) admitted  to surgery sv with 2 days of nausea/vomiting and abd pain w/ imaging findings consistent with large bowel obstruction. However, prior to operative exploration, the patient had return of bowel function. NGT removed however bowel obstruction recurred therefore pt taken to OR 9/15 for Exlap JOSEFINA, SBR ( 100 cm)  EBL:200 IVF:2000 UO:250. Pt kept intubated post op and transferred to SICU team, extubated on POD#0, stable for transfer to tele on POD#1. return to SICU POD#2 due to hypertension (BP  210/104), gotten labetalol 10, then 20, then 40 while in telemetry, asymptomatic, repeat BP down to 165/67. She was transfer back to SICU for close BP monitoring. pt baseline on labetalol 200 PO bid, losartan 100 qd, hctz 25 qd and nifedipine 60 qd at home. Her SBP remained below 160's all night, did not need any additional antihypertensives. transfer back to SDU next day ()     Soft Diet  Pain/nausea prn  Meropenem (-), ID following  SQH/SCDs/OOBA/IS  ISS  AM labs  Endurance Cath (-), PICC (-)  wound vac placement today

## 2022-09-29 NOTE — CHART NOTE - NSCHARTNOTEFT_GEN_A_CORE
Admitting Diagnosis:   Patient is a 72y old  Female who presents with a chief complaint of Large bowel obstruction (23 Sep 2022 17:27)      PAST MEDICAL & SURGICAL HISTORY:  Diabetes      H/O thyroid disease      S/P total abdominal hysterectomy      History of laparoscopic cholecystectomy      History of           Current Nutrition Order:   CST CHO Diet, soft and bite sized, Ensure Enlive TID (1050 kcal, 60g protein, 540mL free H2O)     PO Intake: Good (%) [   ]  Fair (50-75%) [   ] Poor (<25%) [   ] -- <50% PO intake     GI Issues: No n/v/d/c. Last BM . Belching    Pain: Back pain 4/10    Skin Integrity: Hany 20, surgical incision noted  No PU noted  No edema noted    Labs:       132<L>  |  94<L>  |  16  ----------------------------<  86  4.3   |  27  |  0.92    Ca    8.4      29 Sep 2022 05:44  Phos  3.0       Mg     2.2           CAPILLARY BLOOD GLUCOSE      POCT Blood Glucose.: 100 mg/dL (29 Sep 2022 12:23)  POCT Blood Glucose.: 115 mg/dL (29 Sep 2022 07:36)  POCT Blood Glucose.: 114 mg/dL (28 Sep 2022 21:50)  POCT Blood Glucose.: 111 mg/dL (28 Sep 2022 17:07)      Medications:  MEDICATIONS  (STANDING):  atorvastatin 20 milliGRAM(s) Oral at bedtime  BUpivacaine 0.5% On-Q Pump 400 milliLiter(s) (4 mL/Hr) IntraDermal. <Continuous>  chlorhexidine 2% Cloths 1 Application(s) Topical <User Schedule>  dextrose 5%. 1000 milliLiter(s) (100 mL/Hr) IV Continuous <Continuous>  dextrose 5%. 1000 milliLiter(s) (50 mL/Hr) IV Continuous <Continuous>  dextrose 50% Injectable 25 Gram(s) IV Push once  dextrose 50% Injectable 12.5 Gram(s) IV Push once  dextrose 50% Injectable 25 Gram(s) IV Push once  glucagon  Injectable 1 milliGRAM(s) IntraMuscular once  heparin   Injectable 5000 Unit(s) SubCutaneous every 8 hours  influenza  Vaccine (HIGH DOSE) 0.7 milliLiter(s) IntraMuscular once  insulin lispro (ADMELOG) corrective regimen sliding scale   SubCutaneous three times a day before meals  insulin lispro (ADMELOG) corrective regimen sliding scale   SubCutaneous at bedtime  labetalol 200 milliGRAM(s) Oral every 12 hours  losartan 50 milliGRAM(s) Oral daily  meropenem  IVPB 1000 milliGRAM(s) IV Intermittent every 8 hours  NIFEdipine XL 60 milliGRAM(s) Oral every 24 hours    MEDICATIONS  (PRN):  acetaminophen     Tablet .. 650 milliGRAM(s) Oral every 6 hours PRN Mild Pain (1 - 3), Moderate Pain (4 - 6)  dextrose Oral Gel 15 Gram(s) Oral once PRN Blood Glucose LESS THAN 70 milliGRAM(s)/deciliter  ondansetron Injectable 4 milliGRAM(s) IV Push every 8 hours PRN Nausea and/or Vomiting  sodium chloride 0.9% lock flush 10 milliLiter(s) IV Push every 1 hour PRN Pre/post blood products, medications, blood draw, and to maintain line patency    Adm Anthropometrics:  Height for BMI (FEET)	5 Feet  Height for BMI (INCHES)	6 Inch(s)  Height for BMI (CENTIMETERS)	167.64 Centimeter(s)  Weight for BMI (lbs)	203 lb  Weight for BMI (kg)	92.1 kg  Body Mass Index	32.7    Weight Change: No new wts in EMR. Please obtain new wts weekly    Estimated energy needs:   8142-7188 kcals (30-35 kcals/kg, IBW)  70.68-82.46 g protein (1.2-1.4 g/kg, IBW)  9219-2437 mls (30-35 mls/kg, IBW)  IBW used for calculations as pt >120% of IBW (156%), adjusted for age, post-op    Subjective:  71 yo female with PMH of HTN, DM and PSH of  x2, hysterectomy and lap dev ( w/ Dr. Clifford) presenting with 2 days of nausea/vomiting and abd pain w/ imaging findings consistent with large bowel obstruction. However, prior to operative exploration, the patient had return of bowel function. NGT removed however bowel obstruction recurred therefore pt taken to OR for Exlap JOSEFINA, SBR ( 100 cm)  EBL:200 IVF:2000 UO:250. Pt kept intubated post op and transferred to SICU team. Now extubated 9/15. stable for transfer to tele on POD#1. return to SICU POD#2 due to hypertension (BP  210/104), gotten labetalol 10, then 20, then 40 while in telemetry, asymptomatic, repeat BP down to 165/67. She was transfer back to SICU for close BP monitoring. pt baseline on labetalol 200 PO bid, losartan 100 qd, hctz 25 qd and nifedipine 60 qd at home. Her SBP remained below 160's all night, did not need any additional antihypertensives. transfer back to SDU next day (). Hospital course complicated by up-trending leukocytosis of unknown etiology, WBC trending down now .    Pt seen resting bed. Pt was emotional, reports that she wasn't able to eat her meals today, d/t pt was sleeping, and was not aware that meal tray arrived. Per PCA, she reports that she had tried to wake pt up but pt wanted to rest. Had observed ~50% PO intake at breakfast. Pt normally eating well, as noted in previous RD visit. Attempted to provided diet edu to pt, but unable to d/t pt's emotional state. Encouraged pt to eat as able, had ensure shakes on bedside table but pt reports that she has note tried yet. Encouraged to trial to supplement and need adequate energy needs. RD to follow per protocol. Please see below for nutr recs.    Previous Nutrition Diagnosis:  Nutrition/food knowledge deficit RT need to review diet edu/modification at this time AEB currently on CST CHO/ soft and bite sized diet  Active [   ]  Resolved [   ] -- update below    If resolved, new PES:  Nutrition/food knowledge deficit RT need to review diet edu/modification at this time AEB s/p Exlap JOSEFINA, SBR     Goal: Pt to recall 2 main concepts from edu    Recommendations:  1. Continue current diet, CST CHO/soft and bite sized diet, include low fiber diet restriction  2. BM and pain regimen per team  3. Monitor GI tolerance, s/s distress  4. Monitor BMP, BG, POCT, lytes, replete prn  5. diet edu as able    Education: deferred    Risk Level: High [   ] Moderate [  x ] Low [   ] Admitting Diagnosis:   Patient is a 72y old  Female who presents with a chief complaint of Large bowel obstruction (23 Sep 2022 17:27)      PAST MEDICAL & SURGICAL HISTORY:  Diabetes      H/O thyroid disease      S/P total abdominal hysterectomy      History of laparoscopic cholecystectomy      History of           Current Nutrition Order:   CST CHO Diet, soft and bite sized, Ensure Enlive TID (1050 kcal, 60g protein, 540mL free H2O)     PO Intake: Good (%) [   ]  Fair (50-75%) [   ] Poor (<25%) [   ] -- <50% PO intake     GI Issues: No n/v/d/c. Last BM . Belching    Pain: Back pain 4/10    Skin Integrity: Hany 20, surgical incision noted  No PU noted  No edema noted    Labs:       132<L>  |  94<L>  |  16  ----------------------------<  86  4.3   |  27  |  0.92    Ca    8.4      29 Sep 2022 05:44  Phos  3.0       Mg     2.2           CAPILLARY BLOOD GLUCOSE      POCT Blood Glucose.: 100 mg/dL (29 Sep 2022 12:23)  POCT Blood Glucose.: 115 mg/dL (29 Sep 2022 07:36)  POCT Blood Glucose.: 114 mg/dL (28 Sep 2022 21:50)  POCT Blood Glucose.: 111 mg/dL (28 Sep 2022 17:07)      Medications:  MEDICATIONS  (STANDING):  atorvastatin 20 milliGRAM(s) Oral at bedtime  BUpivacaine 0.5% On-Q Pump 400 milliLiter(s) (4 mL/Hr) IntraDermal. <Continuous>  chlorhexidine 2% Cloths 1 Application(s) Topical <User Schedule>  dextrose 5%. 1000 milliLiter(s) (100 mL/Hr) IV Continuous <Continuous>  dextrose 5%. 1000 milliLiter(s) (50 mL/Hr) IV Continuous <Continuous>  dextrose 50% Injectable 25 Gram(s) IV Push once  dextrose 50% Injectable 12.5 Gram(s) IV Push once  dextrose 50% Injectable 25 Gram(s) IV Push once  glucagon  Injectable 1 milliGRAM(s) IntraMuscular once  heparin   Injectable 5000 Unit(s) SubCutaneous every 8 hours  influenza  Vaccine (HIGH DOSE) 0.7 milliLiter(s) IntraMuscular once  insulin lispro (ADMELOG) corrective regimen sliding scale   SubCutaneous three times a day before meals  insulin lispro (ADMELOG) corrective regimen sliding scale   SubCutaneous at bedtime  labetalol 200 milliGRAM(s) Oral every 12 hours  losartan 50 milliGRAM(s) Oral daily  meropenem  IVPB 1000 milliGRAM(s) IV Intermittent every 8 hours  NIFEdipine XL 60 milliGRAM(s) Oral every 24 hours    MEDICATIONS  (PRN):  acetaminophen     Tablet .. 650 milliGRAM(s) Oral every 6 hours PRN Mild Pain (1 - 3), Moderate Pain (4 - 6)  dextrose Oral Gel 15 Gram(s) Oral once PRN Blood Glucose LESS THAN 70 milliGRAM(s)/deciliter  ondansetron Injectable 4 milliGRAM(s) IV Push every 8 hours PRN Nausea and/or Vomiting  sodium chloride 0.9% lock flush 10 milliLiter(s) IV Push every 1 hour PRN Pre/post blood products, medications, blood draw, and to maintain line patency    Adm Anthropometrics:  Height for BMI (FEET)	5 Feet  Height for BMI (INCHES)	6 Inch(s)  Height for BMI (CENTIMETERS)	167.64 Centimeter(s)  Weight for BMI (lbs)	203 lb  Weight for BMI (kg)	92.1 kg  Body Mass Index	32.7    Weight Change: No new wts in EMR. Please obtain new wts weekly    Estimated energy needs:   0996-1212 kcals (30-35 kcals/kg, IBW)  70.68-82.46 g protein (1.2-1.4 g/kg, IBW)  2232-7580 mls (30-35 mls/kg, IBW)  IBW used for calculations as pt >120% of IBW (156%), adjusted for age, post-op    Subjective:  73 yo female with PMH of HTN, DM and PSH of  x2, hysterectomy and lap dev ( w/ Dr. Clifford) presenting with 2 days of nausea/vomiting and abd pain w/ imaging findings consistent with large bowel obstruction. However, prior to operative exploration, the patient had return of bowel function. NGT removed however bowel obstruction recurred therefore pt taken to OR for Exlap JOSEFINA, SBR ( 100 cm)  EBL:200 IVF:2000 UO:250. Pt kept intubated post op and transferred to SICU team. Now extubated 9/15. stable for transfer to tele on POD#1. return to SICU POD#2 due to hypertension (BP  210/104), gotten labetalol 10, then 20, then 40 while in telemetry, asymptomatic, repeat BP down to 165/67. She was transfer back to SICU for close BP monitoring. pt baseline on labetalol 200 PO bid, losartan 100 qd, hctz 25 qd and nifedipine 60 qd at home. Her SBP remained below 160's all night, did not need any additional antihypertensives. transfer back to SDU next day (). Hospital course complicated by up-trending leukocytosis of unknown etiology, WBC trending down now .    Pt seen resting bed. Pt was emotional, reports that she wasn't able to eat her meals today, d/t pt was sleeping, and was not aware that meal tray arrived. Per PCA, she reports that she had tried to wake pt up but pt wanted to rest. Had observed ~50% PO intake at breakfast. Pt normally eating well, as noted in previous RD visit. Attempted to provided diet edu to pt, but unable to d/t pt's emotional state. Encouraged pt to eat as able, had ensure shakes on bedside table but pt reports that she has note tried yet. Encouraged to trial to supplement and need adequate energy needs. RD to follow per protocol. Please see below for nutr recs.    Previous Nutrition Diagnosis:  Nutrition/food knowledge deficit RT need to review diet edu/modification at this time AEB currently on CST CHO/ soft and bite sized diet  Active [   ]  Resolved [   ] -- update below    If resolved, new PES:  Nutrition/food knowledge deficit RT need to review diet edu/modification at this time AEB s/p Exlap JOSEFINA, SBR     Goal: Pt to recall 2 main concepts from edu    Recommendations:  1. Continue current diet, CST CHO/soft and bite sized diet, include low fiber diet restriction  2. BM and pain regimen per team  3. Monitor GI tolerance, s/s distress  4. Monitor BMP, BG, POCT, lytes, replete prn  5. diet edu as able  **Paged team    Education: deferred    Risk Level: High [   ] Moderate [  x ] Low [   ]

## 2022-09-29 NOTE — DISCHARGE NOTE PROVIDER - NSDCCPTREATMENT_GEN_ALL_CORE_FT
PRINCIPAL PROCEDURE  Procedure: Exploratory laparotomy with lysis of adhesions  Findings and Treatment:       SECONDARY PROCEDURE  Procedure: Small bowel resection  Findings and Treatment:     Procedure: Insertion of intravenous catheter with ultrasound guidance  Findings and Treatment:     Procedure: Incisional hernia repair  Findings and Treatment:

## 2022-09-29 NOTE — PROGRESS NOTE ADULT - ASSESSMENT
S/P Complex abdominal surgery including bowel resection following SBO  Peritonitis   Polymicrobial ariella in intraabdominal fluid including Enterobacter - but fluid collected from [facility] days after surgery  Small fluid collections in the pelvis  Right sided extraperitoneal hematoma  Superficial wound collection - possibly infected.  Now resolved after incision opened while still on antibiotics.  Postoperative leukocytosis - improving/resolving  S/P COVID   Markedly elevated inflammatory markers: ESR and CRP - improving      RECOMMEND  Continue Meropenem until discharge - anticipate next 24-48 hours  No need for antibiotics after discharge  Agree with plans for a wound VAC  Remove PICC before discharge      Discussed with Surgery house staff    [phone]

## 2022-09-29 NOTE — PROGRESS NOTE ADULT - SUBJECTIVE AND OBJECTIVE BOX
STATUS POST:  Exploratory laparotomy with lysis of adhesions    Incisional hernia repair    Small bowel resection    POST OPERATIVE DAY #: 14    SUBJECTIVE: Pt seen and examined by chief resident. Pt is doing well, resting comfortably on bed. Pain controlled. Diet tolerated. Ambulating out of bed to bathroom.  No complaints at this time.    Vital Signs Last 24 Hrs  T(C): 36.7 (29 Sep 2022 05:01), Max: 37.2 (28 Sep 2022 14:35)  T(F): 98 (29 Sep 2022 05:01), Max: 99 (28 Sep 2022 14:35)  HR: 83 (29 Sep 2022 05:01) (81 - 91)  BP: 130/69 (29 Sep 2022 05:01) (102/67 - 130/69)  BP(mean): 89 (29 Sep 2022 05:01) (89 - 89)  RR: 16 (29 Sep 2022 05:01) (16 - 19)  SpO2: 99% (29 Sep 2022 05:01) (96% - 100%)    Parameters below as of 29 Sep 2022 05:01  Patient On (Oxygen Delivery Method): room air        I&O's Summary    28 Sep 2022 07:01  -  29 Sep 2022 07:00  --------------------------------------------------------  IN: 760 mL / OUT: 950 mL / NET: -190 mL        Physical Exam:  General Appearance: Appears well, NAD  Pulmonary: Nonlabored breathing, no respiratory distress  Abdomen: Soft, nondisteded, midline incision betadine packing replaced, no drainage.   Extremities: WWP, SCD's in place     LABS:                        7.5    10.67 )-----------( 614      ( 29 Sep 2022 05:44 )             24.4     09-29    132<L>  |  94<L>  |  16  ----------------------------<  86  4.3   |  27  |  0.92    Ca    8.4      29 Sep 2022 05:44  Phos  3.0     09-29  Mg     2.2     09-29      PT/INR - ( 28 Sep 2022 05:30 )   PT: 13.9 sec;   INR: 1.17          PTT - ( 28 Sep 2022 05:30 )  PTT:32.3 sec

## 2022-09-29 NOTE — PROGRESS NOTE ADULT - ASSESSMENT
72 year old woman with HTN, DM2, past surgical history of  x 2, hysterectomy, and laparoscopic cholecystectomy (), admitted for large bowel obstruction; had spontaneous return of bowel function initially; however, bowel obstruction recurred, so taken to OR for ex lap with lysis of adhesions and bowel resection () . Monitored in SICU post -op ; now on regional. Hospital course complicated by up-trending leukocytosis of unknown etiology.     # Large bowel obstruction   #post operative state  s/p OR for ex lap with lysis of adhesions and bowel resection ()  - rest of care per primary team     # Leukocytosis -wbc uptrending- down to 15.6k today   unclear etiology -- Had CT imaging done that showed : Small fluid collections in the pelvis are only partially walled off. They could represent small hematomas or early developing abscesses  Was started on meropenem by ID   ESR/CRP eleavted   midline   wbc trending down today to 10 k today from 15 k   Defer duration of management to ID    #reactive thrombocytosis  plts elevated from 672 --> 719k ->615  will continue to monitor     #HTN   takes HCTZ 25mg qd, losartan 100mg qd, labetalol 200mg BID, nifedipine 60mg ER qd at home.   - continue labetalol 200mg BID  - on losartan at 50mg qd   - on nifepidine 60m ER qd ( can hold if bp is low )   - Continue to hold HCTZ for now    # Type 2 Diabetes complicated by hyperglycemia   Takes metformin at home.   cw insulin sliding scale while inpatient.   resume metformin on discharge.     #Incidental finding of bilateral lung nodules  CT chest showing bilateral lung nodules, measuring up to 7 mm in the right   upper lobe  Recommend follow-up chest CT in three months to ensure stability.    # COVID 19 infection (present on admission)   Incidental finding of COVID 19 infection on admission nasal swab. Without any dyspnea, or sore throat. Per [facility] treatment algorithm, did not meet criteria for monoclonal antibodies at time of admission.   No O2 requirement, does not meet criteria for remdesivir/dexamethasone.   Recommend enoxaparin for DVT ppx     # Acute blood loss anemia   Hgb downtrended from time of admission to-->8-> 7.5  Partially attributable to operative blood losses   continue to trend daily CBC while inpatient;  ferritin, iron sat not low  age appropriate cancer screening outpatient     DVT ppx - HSQ

## 2022-09-29 NOTE — PROGRESS NOTE ADULT - SUBJECTIVE AND OBJECTIVE BOX
INTERVAL HPI/OVERNIGHT EVENTS:    ANTIBIOTICS/RELEVANT:    MEDICATIONS  (STANDING):  atorvastatin 20 milliGRAM(s) Oral at bedtime  BUpivacaine 0.5% On-Q Pump 400 milliLiter(s) (4 mL/Hr) IntraDermal. <Continuous>  chlorhexidine 2% Cloths 1 Application(s) Topical <User Schedule>  dextrose 5%. 1000 milliLiter(s) (100 mL/Hr) IV Continuous <Continuous>  dextrose 5%. 1000 milliLiter(s) (50 mL/Hr) IV Continuous <Continuous>  dextrose 50% Injectable 25 Gram(s) IV Push once  dextrose 50% Injectable 12.5 Gram(s) IV Push once  dextrose 50% Injectable 25 Gram(s) IV Push once  glucagon  Injectable 1 milliGRAM(s) IntraMuscular once  heparin   Injectable 5000 Unit(s) SubCutaneous every 8 hours  influenza  Vaccine (HIGH DOSE) 0.7 milliLiter(s) IntraMuscular once  insulin lispro (ADMELOG) corrective regimen sliding scale   SubCutaneous three times a day before meals  insulin lispro (ADMELOG) corrective regimen sliding scale   SubCutaneous at bedtime  labetalol 200 milliGRAM(s) Oral every 12 hours  losartan 50 milliGRAM(s) Oral daily  meropenem  IVPB 1000 milliGRAM(s) IV Intermittent every 8 hours  NIFEdipine XL 60 milliGRAM(s) Oral every 24 hours    MEDICATIONS  (PRN):  acetaminophen     Tablet .. 650 milliGRAM(s) Oral every 6 hours PRN Mild Pain (1 - 3), Moderate Pain (4 - 6)  dextrose Oral Gel 15 Gram(s) Oral once PRN Blood Glucose LESS THAN 70 milliGRAM(s)/deciliter  ondansetron Injectable 4 milliGRAM(s) IV Push every 8 hours PRN Nausea and/or Vomiting  sodium chloride 0.9% lock flush 10 milliLiter(s) IV Push every 1 hour PRN Pre/post blood products, medications, blood draw, and to maintain line patency      Allergies    penicillin (Rash)    Intolerances        Vital Signs Last 24 Hrs  T(C): 36.7 (29 Sep 2022 05:01), Max: 37.2 (28 Sep 2022 14:35)  T(F): 98 (29 Sep 2022 05:01), Max: 99 (28 Sep 2022 14:35)  HR: 83 (29 Sep 2022 05:01) (81 - 91)  BP: 130/69 (29 Sep 2022 05:01) (102/67 - 130/69)  BP(mean): 89 (29 Sep 2022 05:01) (89 - 89)  RR: 16 (29 Sep 2022 05:01) (16 - 19)  SpO2: 99% (29 Sep 2022 05:01) (96% - 100%)    Parameters below as of 29 Sep 2022 05:01  Patient On (Oxygen Delivery Method): room air        PHYSICAL EXAM:      Constitutional:    Eyes:    ENMT:    Neck:    Breasts:    Back:    Respiratory:    Cardiovascular:    Gastrointestinal:    Genitourinary:    Rectal:    Extremities:    Vascular:    Neurological:    Skin:    Lymph Nodes:    Musculoskeletal:    Psychiatric:        LABS:                        7.5    10.67 )-----------( 614      ( 29 Sep 2022 05:44 )             24.4     09-29    132<L>  |  94<L>  |  16  ----------------------------<  86  4.3   |  27  |  0.92    Ca    8.4      29 Sep 2022 05:44  Phos  3.0     09-29  Mg     2.2     09-29      PT/INR - ( 28 Sep 2022 05:30 )   PT: 13.9 sec;   INR: 1.17          PTT - ( 28 Sep 2022 05:30 )  PTT:32.3 sec      MICROBIOLOGY:    RADIOLOGY & ADDITIONAL STUDIES: INTERVAL HPI/OVERNIGHT EVENTS:    C/O abdominal discomfort after eating and pain at the abdominal wound  Diet advanced    MEDICATIONS  (STANDING):  atorvastatin 20 milliGRAM(s) Oral at bedtime  BUpivacaine 0.5% On-Q Pump 400 milliLiter(s) (4 mL/Hr) IntraDermal. <Continuous>  chlorhexidine 2% Cloths 1 Application(s) Topical <User Schedule>  dextrose 5%. 1000 milliLiter(s) (100 mL/Hr) IV Continuous <Continuous>  dextrose 5%. 1000 milliLiter(s) (50 mL/Hr) IV Continuous <Continuous>  dextrose 50% Injectable 25 Gram(s) IV Push once  dextrose 50% Injectable 12.5 Gram(s) IV Push once  dextrose 50% Injectable 25 Gram(s) IV Push once  glucagon  Injectable 1 milliGRAM(s) IntraMuscular once  heparin   Injectable 5000 Unit(s) SubCutaneous every 8 hours  influenza  Vaccine (HIGH DOSE) 0.7 milliLiter(s) IntraMuscular once  insulin lispro (ADMELOG) corrective regimen sliding scale   SubCutaneous three times a day before meals  insulin lispro (ADMELOG) corrective regimen sliding scale   SubCutaneous at bedtime  labetalol 200 milliGRAM(s) Oral every 12 hours  losartan 50 milliGRAM(s) Oral daily  meropenem  IVPB 1000 milliGRAM(s) IV Intermittent every 8 hours  NIFEdipine XL 60 milliGRAM(s) Oral every 24 hours    MEDICATIONS  (PRN):  acetaminophen     Tablet .. 650 milliGRAM(s) Oral every 6 hours PRN Mild Pain (1 - 3), Moderate Pain (4 - 6)  dextrose Oral Gel 15 Gram(s) Oral once PRN Blood Glucose LESS THAN 70 milliGRAM(s)/deciliter  ondansetron Injectable 4 milliGRAM(s) IV Push every 8 hours PRN Nausea and/or Vomiting  sodium chloride 0.9% lock flush 10 milliLiter(s) IV Push every 1 hour PRN Pre/post blood products, medications, blood draw, and to maintain line patency      Allergies    penicillin (Rash)      EXAM  Vital Signs Last 24 Hrs  T(C): 36.7 (29 Sep 2022 05:01), Max: 37.2 (28 Sep 2022 14:35)  T(F): 98 (29 Sep 2022 05:01), Max: 99 (28 Sep 2022 14:35)  HR: 83 (29 Sep 2022 05:01) (81 - 91)  BP: 130/69 (29 Sep 2022 05:01) (102/67 - 130/69)  BP(mean): 89 (29 Sep 2022 05:01) (89 - 89)  RR: 16 (29 Sep 2022 05:01) (16 - 19)  SpO2: 99% (29 Sep 2022 05:01) (96% - 100%)    Parameters below as of 29 Sep 2022 05:01  Patient On (Oxygen Delivery Method): room air  Awake and alert  Comfortable appearing  No rash  No thrush  RRR  Chest CTA  Abd soft and uncomfortable to palpation but largely around wound.  Inferior surcical incision partially opened and packed with iodine soaked gauze  LEs no edema - patient supine in bed  PICC and IV sites OK        LABS:                        7.5    10.67 )-----------( 614      ( 29 Sep 2022 05:44 )             24.4     09-29    132<L>  |  94<L>  |  16  ----------------------------<  86  4.3   |  27  |  0.92    Ca    8.4      29 Sep 2022 05:44  Phos  3.0     09-29  Mg     2.2     09-29      PT/INR - ( 28 Sep 2022 05:30 )   PT: 13.9 sec;   INR: 1.17          PTT - ( 28 Sep 2022 05:30 )  PTT:32.3 sec      MICROBIOLOGY:      COVID-19 PCR . (09.27.22 @ 17:43)    COVID-19 PCR: NotDetec: You can help in the fight against COVID-19. [facility] may contact  you to see if you are interested in voluntarily participating in one of  our clinical trials.  Testing is performed using polymerase chain reaction (PCR) or  transcription mediated amplification (TMA). This COVID-19 (SARS-CoV-2)  nucleic acid amplification test was validated by [facility] and is in use under the FDA Emergency Use Authorization  (EUA) for clinical labs CLIA-certified to perform high complexity  testing. Test results should be correlated with clinical presentation,  patient history, and epidemiology.    Culture - Body Fluid with Gram Stain (09.22.22 @ 15:57)    -  Ampicillin: S <=2 Predicts results to ampicillin/sulbactam, amoxacillin-clavulanate and  piperacillin-tazobactam.    -  Ampicillin: S <=2 Predicts results to ampicillin/sulbactam, amoxacillin-clavulanate and  piperacillin-tazobactam.    -  Ampicillin: R >16 These ampicillin results predict results for amoxicillin    -  Ampicillin/Sulbactam: R 16/8 Enterobacter, Klebsiella aerogenes, Citrobacter, and Serratia may develop resistance during prolonged therapy (3-4 days)    -  Cefazolin: R >16 Enterobacter, Klebsiella aerogenes, Citrobacter, and Serratia may develop resistance during prolonged therapy (3-4 days)    -  Cefepime: S <=2    -  Cefoxitin: R >16    -  Ceftriaxone: S <=1 Enterobacter, Klebsiella aerogenes, Citrobacter, and Serratia may develop resistance during prolonged therapy    -  Ciprofloxacin: S <=0.25    -  Clindamycin: R <=0.25 This isolate is presumed to be clindamycin resistant based on detection of inducible resistance. Clindamycin may still be effective in some patients.    -  Ertapenem: S <=0.5    -  Erythromycin: R >4    -  Gentamicin: S <=2    -  Linezolid: S 1    -  Oxacillin: R >2    -  Piperacillin/Tazobactam: S <=8    -  Rifampin: S <=1 Should not be used as monotherapy    -  Tobramycin: S <=2    -  Trimethoprim/Sulfamethoxazole: S <=0.5/9.5    -  Trimethoprim/Sulfamethoxazole: S <=0.5/9.5    -  Vancomycin: S 2    -  Vancomycin: S 0.5    -  Vancomycin: S 2    Gram Stain:   No organisms seen  Few WBC's    Specimen Source: Abdominal Fl Abdominal Fluid    Culture Results:   Rare Enterobacter cloacae complex  Rare Staphylococcus epidermidis  Rare Enterococcus faecalis  Rare Enterococcus avium    Organism Identification: Enterobacter cloacae complex  Staphylococcus epidermidis  Enterococcus faecalis  Enterococcus avium    Organism: Enterobacter cloacae complex    Organism: Staphylococcus epidermidis    Organism: Enterococcus faecalis    Organism: Enterococcus avium    Method Type: CHRIS    Method Type: CHRIS    Method Type: CHRIS    Method Type: CHRIS

## 2022-09-29 NOTE — DISCHARGE NOTE PROVIDER - ATTENDING ATTESTATION STATEMENT
I have personally seen and examined the patient. I have collaborated with and supervised the
Epigastric pain

## 2022-09-29 NOTE — DISCHARGE NOTE PROVIDER - NSDCCPCAREPLAN_GEN_ALL_CORE_FT
PRINCIPAL DISCHARGE DIAGNOSIS  Diagnosis: Bowel obstruction  Assessment and Plan of Treatment:       SECONDARY DISCHARGE DIAGNOSES  Diagnosis: Incarcerated hernia  Assessment and Plan of Treatment:

## 2022-09-29 NOTE — DISCHARGE NOTE PROVIDER - HOSPITAL COURSE
73 yo female with PMH of HTN, DM and PSH of  x2, hysterectomy and lap dev ( w/ Dr. Clifford) admitted  to surgery service with 2 days of nausea/vomiting and abd pain w/ imaging findings consistent with large bowel obstruction. NGT was placed and pt began to have return of bowel function. NGT was removed and pt bowl obstruction reoccured so pt was taken to the OR on 9/15 for Exlap JOSEFINA, SBR ( 100 cm)  EBL:200 IVF:2000 UO:250. Pt was kept intubated post op and transferred to SICU team, extubated on POD#0, and then stable for transfer to tele on POD#1. On POD2 pt returned to SICU due to hypertension (BP  210/104), antihypertensives were titrated up, BP remained stable and stepped back down to tele. Hospital course was also complicated by up-trending leukocytosis of unknown etiology. ID was consulted and placed on meropenem. On POD 10 brown purulent drainage was noted from inferior aspect of midline wound and wound was opened up and drained bedside. Betadine packing was placed with daily dressing changes and WBC began to improve. Pts diet was advanced, tolerated, had persisent bowel function. On day of discharge pt completed entire abx course and was stable for discharge to [facility]     73 yo female with PMH of HTN, DM and PSH of  x2, hysterectomy and lap dev ( w/ Dr. Clifford) admitted  to surgery service with 2 days of nausea/vomiting and abd pain w/ imaging findings consistent with large bowel obstruction. NGT was placed and pt began to have return of bowel function. NGT was removed and pt bowl obstruction reoccured so pt was taken to the OR on 9/15 for Exlap JOSEFINA, SBR ( 100 cm)  EBL:200 IVF:2000 UO:250. Pt was kept intubated post op and transferred to SICU team, extubated on POD#0, and then stable for transfer to [facility] on POD#1. On POD2 pt returned to SICU due to hypertension (BP  210/104), antihypertensives were titrated up, BP remained stable and stepped back down to tele. Hospital course was also complicated by up-trending leukocytosis of unknown etiology. ID was consulted and placed on meropenem. On POD 10 brown purulent drainage was noted from inferior aspect of midline wound and wound was opened up and drained bedside. Betadine packing was placed with daily dressing changes and WBC began to improve. Patient now has wound vac in place on incision. Pts diet was advanced, tolerated, had persisent bowel function. On day of discharge pt completed entire abx course and was stable for discharge to [facility] with wound vac.      73 yo female with PMH of HTN, DM and PSH of  x2, hysterectomy and lap dev ( w/ Dr. Clifford) admitted  to surgery service with 2 days of nausea/vomiting and abd pain w/ imaging findings consistent with large bowel obstruction. NGT was placed and pt began to have return of bowel function. NGT was removed and pt bowl obstruction reoccured so pt was taken to the OR on 9/15 for Exlap JOSEFINA, SBR ( 100 cm)  EBL:200 IVF:2000 UO:250. Pt was kept intubated post op and transferred to SICU team, extubated on POD#0, and then stable for transfer to tele on POD#1. On POD2 pt returned to SICU due to hypertension (BP  210/104), antihypertensives were titrated up, BP remained stable and stepped back down to tele. Hospital course was also complicated by up-trending leukocytosis of unknown etiology. ID was consulted and placed on meropenem. On POD 10 brown purulent drainage was noted from inferior aspect of midline wound and wound was opened up and drained bedside. Betadine packing was placed with daily dressing changes and WBC began to improve. On POD 17 pts WBC began to rise once again, found to be c.diff positive, and blood cx were obtained which showed entercoccus bacteremia and pt was started on daptomycin and PO vanc as per ID. WBC improved once again but then increased to 20 with CPK of 24,000 on POD 25 and pt was found to have rhabdomyolysis 2/2 daptomycin. Daptomycin was dc'd and pt improved with adequate IVF and recs as per nephrology.        71 yo female with PMH of HTN, DM and PSH of  x2, hysterectomy and lap dev ( w/ Dr. Clifford) admitted  to surgery service with 2 days of nausea/vomiting and abd pain w/ imaging findings consistent with large bowel obstruction. NGT was placed and pt began to have return of bowel function. NGT was removed and pt bowl obstruction reoccured so pt was taken to the OR on 9/15 for Exlap JOSEFINA, SBR ( 100 cm)  EBL:200 IVF:2000 UO:250. Pt was kept intubated post op and transferred to SICU team, extubated on POD#0, and then stable for transfer to tele on POD#1. On POD2 pt returned to SICU due to hypertension (BP  210/104), antihypertensives were titrated up, BP remained stable and stepped back down to tele. Hospital course was also complicated by up-trending leukocytosis of unknown etiology. ID was consulted and placed on meropenem. On POD 10 brown purulent drainage was noted from inferior aspect of midline wound and wound was opened up and drained bedside. Betadine packing was placed with daily dressing changes and WBC began to improve. On POD 17 pts WBC began to rise once again, found to be c.diff positive, and blood cx were obtained which showed entercoccus bacteremia and pt was started on daptomycin and PO vanc (2 week course) as per ID. WBC improved once again but then increased to 20 with CPK of 24,000 on POD 25 and pt was found to have rhabdomyolysis 2/2 daptomycin. Daptomycin was dc'd and pt improved with adequate IVF and recs as per nephrology. Picc removed prior to discharge. Pt tolerating diet, ambulating without difficulty, and voiding spontaneously with bowel function. On day of discharge, pt deemed stable and ready to return home with plan to follow up as an outpatient.     71 yo female with PMH of HTN, DM and PSH of  x2, hysterectomy and lap dev ( w/ Dr. Clifford) admitted  to surgery service with 2 days of nausea/vomiting and abd pain w/ imaging findings consistent with large bowel obstruction. NGT was placed and pt began to have return of bowel function. NGT was removed and pt bowl obstruction reoccured so pt was taken to the OR on 9/15 for Exlap JOSEFINA, SBR ( 100 cm)  EBL:200 IVF:2000 UO:250. Pt was kept intubated post op and transferred to SICU team, extubated on POD#0, and then stable for transfer to tele on POD#1. On POD2 pt returned to SICU due to hypertension (BP  210/104), antihypertensives were titrated up, BP remained stable and stepped back down to tele. Hospital course was also complicated by up-trending leukocytosis of unknown etiology. ID was consulted and placed on meropenem. On POD 10 brown purulent drainage was noted from inferior aspect of midline wound and wound was opened up and drained bedside. Betadine packing was placed with daily dressing changes and WBC began to improve. On POD 17 pts WBC began to rise once again, found to be c.diff positive, and blood cx were obtained which showed entercoccus bacteremia and pt was started on daptomycin and PO vanc (2 week course) as per ID. WBC improved once again but then increased to 20 with CPK of 24,000 on POD 25 and pt was found to have rhabdomyolysis 2/2 daptomycin. Daptomycin was dc'd and pt improved with adequate IVF and recs as per nephrology. Picc removed prior to discharge. Pt tolerating diet, ambulating without difficulty, and voiding spontaneously with bowel function. On day of discharge, pt deemed stable and ready to go to [facility] with plan to follow up as an outpatient.

## 2022-09-29 NOTE — DISCHARGE NOTE PROVIDER - CARE PROVIDER_API CALL
Herminia So (MD)  Surgery  [address], [address]  [place], [place] [address]  Phone: [phone]  Fax: [phone]  Follow Up Time:

## 2022-09-29 NOTE — DISCHARGE NOTE PROVIDER - NSDCFUADDINST_GEN_ALL_CORE_FT
Please follow up with Dr. So in one week; you may call the office to make an appointment at your earliest convenience.    Wound Care:  V.A.C. wound care. Change 3 times per week at 125 mmHg.  IF VAC malfunctions, then dampen gauze with saline solution. Pack into wound. Cover with dry gauze and Kerlix wrap daily.  Wound Size: 10cm x 8cm    General Discharge Instructions:  Please resume all regular home medications unless specifically advised not to take a particular medication. Also, please take any new medications as prescribed.  Please get plenty of rest, continue to ambulate several times per day, and drink adequate amounts of fluids. Avoid lifting weights greater than 5-10 lbs until you follow-up with your surgeon, who will instruct you further regarding activity restrictions.  Avoid driving or operating heavy machinery while taking pain medications.  Please follow-up with your surgeon and Primary Care Provider (PCP) as advised.  Incision Care:  *Please call your doctor if you have increased pain, swelling, redness, or drainage from the incision site.  *Avoid swimming and baths until your follow-up appointment.  *You may shower, and wash surgical incisions with a mild soap and warm water. Gently pat the area dry.    Warning Signs:  Please call your doctor if you experience the following:  *You experience new chest pain, pressure, squeezing or tightness.  *New or worsening cough, shortness of breath, or wheeze.  *If you are vomiting and cannot keep down fluids or your medications.  *You are getting dehydrated due to continued vomiting, diarrhea, or other reasons. Signs of dehydration include dry mouth, rapid heartbeat, or feeling dizzy or faint when standing.  *You see blood or dark/black material when you vomit or have a bowel movement.  *You experience burning when you urinate, have blood in your urine, or experience a discharge.  *Your pain is not improving within 8-12 hours or is not gone within 24 hours. Call or return immediately if your pain is getting worse, changes location, or moves to your chest or back.  *You have shaking chills, or fever greater than 101.5 degrees Fahrenheit or 38 degrees Celsius.  *Any change in your symptoms, or any new symptoms that concern you.     Medications: Please continue to take all your BP medications except for Losartan 100mg. Instead you may continue with Losartan 50mg daily and follow up with your PCP for continued evaluation.     Wound Care:  V.A.C. wound care. Change 3 times per week at 125 mmHg.  IF VAC malfunctions, then dampen gauze with saline solution. Pack into wound. Cover with dry gauze and Kerlix wrap daily.  Wound Size: 10cm x 8cm    General Discharge Instructions:  Please resume all regular home medications unless specifically advised not to take a particular medication. Also, please take any new medications as prescribed.  Please get plenty of rest, continue to ambulate several times per day, and drink adequate amounts of fluids. Avoid lifting weights greater than 5-10 lbs until you follow-up with your surgeon, who will instruct you further regarding activity restrictions.  Avoid driving or operating heavy machinery while taking pain medications.  Please follow-up with your surgeon and Primary Care Provider (PCP) as advised.  Incision Care:  *Please call your doctor if you have increased pain, swelling, redness, or drainage from the incision site.  *Avoid swimming and baths until your follow-up appointment.  *You may shower, and wash surgical incisions with a mild soap and warm water. Gently pat the area dry.    Warning Signs:  Please call your doctor if you experience the following:  *You experience new chest pain, pressure, squeezing or tightness.  *New or worsening cough, shortness of breath, or wheeze.  *If you are vomiting and cannot keep down fluids or your medications.  *You are getting dehydrated due to continued vomiting, diarrhea, or other reasons. Signs of dehydration include dry mouth, rapid heartbeat, or feeling dizzy or faint when standing.  *You see blood or dark/black material when you vomit or have a bowel movement.  *You experience burning when you urinate, have blood in your urine, or experience a discharge.  *Your pain is not improving within 8-12 hours or is not gone within 24 hours. Call or return immediately if your pain is getting worse, changes location, or moves to your chest or back.  *You have shaking chills, or fever greater than 101.5 degrees Fahrenheit or 38 degrees Celsius.  *Any change in your symptoms, or any new symptoms that concern you.     Antibiotics:  Please continue Daptomycin 750mg IV infusions every 24 hours through 10/14  Please continue Vancomycin 250mg PO solution every 6 hours through 10/14    Medications: Please continue to take all your BP medications except for Losartan 100mg. Instead you may continue with Losartan 50mg daily and follow up with your PCP for continued evaluation.     Wound Care:  V.A.C. wound care. Change 3 times per week at 125 mmHg.  IF VAC malfunctions, then dampen gauze with saline solution. Pack into wound. Cover with dry gauze and Kerlix wrap daily.  Wound Size: 10cm x 8cm    General Discharge Instructions:  Please resume all regular home medications unless specifically advised not to take a particular medication. Also, please take any new medications as prescribed.  Please get plenty of rest, continue to ambulate several times per day, and drink adequate amounts of fluids. Avoid lifting weights greater than 5-10 lbs until you follow-up with your surgeon, who will instruct you further regarding activity restrictions.  Avoid driving or operating heavy machinery while taking pain medications.  Please follow-up with your surgeon and Primary Care Provider (PCP) as advised.  Incision Care:  *Please call your doctor if you have increased pain, swelling, redness, or drainage from the incision site.  *Avoid swimming and baths until your follow-up appointment.  *You may shower, and wash surgical incisions with a mild soap and warm water. Gently pat the area dry.    Warning Signs:  Please call your doctor if you experience the following:  *You experience new chest pain, pressure, squeezing or tightness.  *New or worsening cough, shortness of breath, or wheeze.  *If you are vomiting and cannot keep down fluids or your medications.  *You are getting dehydrated due to continued vomiting, diarrhea, or other reasons. Signs of dehydration include dry mouth, rapid heartbeat, or feeling dizzy or faint when standing.  *You see blood or dark/black material when you vomit or have a bowel movement.  *You experience burning when you urinate, have blood in your urine, or experience a discharge.  *Your pain is not improving within 8-12 hours or is not gone within 24 hours. Call or return immediately if your pain is getting worse, changes location, or moves to your chest or back.  *You have shaking chills, or fever greater than 101.5 degrees Fahrenheit or 38 degrees Celsius.  *Any change in your symptoms, or any new symptoms that concern you.     Medications: Please continue to take all your BP medications except for Losartan 100mg. Instead you may continue with Losartan 50mg daily and follow up with your PCP for continued evaluation.     Wound Care:  V.A.C. wound care. Change 3 times per week at 125 mmHg.  IF VAC malfunctions, then dampen gauze with saline solution. Pack into wound. Cover with dry gauze and Kerlix wrap daily.  Wound Size: 5cm x 5cm    General Discharge Instructions:  Please resume all regular home medications unless specifically advised not to take a particular medication. Also, please take any new medications as prescribed.  Please get plenty of rest, continue to ambulate several times per day, and drink adequate amounts of fluids. Avoid lifting weights greater than 5-10 lbs until you follow-up with your surgeon, who will instruct you further regarding activity restrictions.  Avoid driving or operating heavy machinery while taking pain medications.  Please follow-up with your surgeon and Primary Care Provider (PCP) as advised.  Incision Care:  *Please call your doctor if you have increased pain, swelling, redness, or drainage from the incision site.  *Avoid swimming and baths until your follow-up appointment.  *You may shower, and wash surgical incisions with a mild soap and warm water. Gently pat the area dry.    Warning Signs:  Please call your doctor if you experience the following:  *You experience new chest pain, pressure, squeezing or tightness.  *New or worsening cough, shortness of breath, or wheeze.  *If you are vomiting and cannot keep down fluids or your medications.  *You are getting dehydrated due to continued vomiting, diarrhea, or other reasons. Signs of dehydration include dry mouth, rapid heartbeat, or feeling dizzy or faint when standing.  *You see blood or dark/black material when you vomit or have a bowel movement.  *You experience burning when you urinate, have blood in your urine, or experience a discharge.  *Your pain is not improving within 8-12 hours or is not gone within 24 hours. Call or return immediately if your pain is getting worse, changes location, or moves to your chest or back.  *You have shaking chills, or fever greater than 101.5 degrees Fahrenheit or 38 degrees Celsius.  *Any change in your symptoms, or any new symptoms that concern you.     Medications: Please continue to take all your BP medications except for Losartan 100mg. Instead you may continue with Losartan 50mg daily and follow up with your PCP for continued evaluation.     Wound Care:  V.A.C. wound care. Change 3 times per week at 125 mmHg.  IF VAC malfunctions, then dampen gauze with saline solution. Pack into wound. Cover with dry gauze and and secure with tape daily.  Wound Size: 5cm x 5cm    General Discharge Instructions:  Please resume all regular home medications unless specifically advised not to take a particular medication. Also, please take any new medications as prescribed.  Please get plenty of rest, continue to ambulate several times per day, and drink adequate amounts of fluids. Avoid lifting weights greater than 5-10 lbs until you follow-up with your surgeon, who will instruct you further regarding activity restrictions.  Avoid driving or operating heavy machinery while taking pain medications.  Please follow-up with your surgeon and Primary Care Provider (PCP) as advised.  Incision Care:  *Please call your doctor if you have increased pain, swelling, redness, or drainage from the incision site.  *Avoid swimming and baths until your follow-up appointment.  *You may shower, and wash surgical incisions with a mild soap and warm water. Gently pat the area dry.    Warning Signs:  Please call your doctor if you experience the following:  *You experience new chest pain, pressure, squeezing or tightness.  *New or worsening cough, shortness of breath, or wheeze.  *If you are vomiting and cannot keep down fluids or your medications.  *You are getting dehydrated due to continued vomiting, diarrhea, or other reasons. Signs of dehydration include dry mouth, rapid heartbeat, or feeling dizzy or faint when standing.  *You see blood or dark/black material when you vomit or have a bowel movement.  *You experience burning when you urinate, have blood in your urine, or experience a discharge.  *Your pain is not improving within 8-12 hours or is not gone within 24 hours. Call or return immediately if your pain is getting worse, changes location, or moves to your chest or back.  *You have shaking chills, or fever greater than 101.5 degrees Fahrenheit or 38 degrees Celsius.  *Any change in your symptoms, or any new symptoms that concern you.

## 2022-09-29 NOTE — PROGRESS NOTE ADULT - SUBJECTIVE AND OBJECTIVE BOX
Patient is a 72y old  Female who presents with a chief complaint of Large bowel obstruction (23 Sep 2022 17:27)      INTERVAL HPI/OVERNIGHT EVENTS: offers no new complaints; current symptoms resolving    MEDICATIONS  (STANDING):  atorvastatin 20 milliGRAM(s) Oral at bedtime  BUpivacaine 0.5% On-Q Pump 400 milliLiter(s) (4 mL/Hr) IntraDermal. <Continuous>  chlorhexidine 2% Cloths 1 Application(s) Topical <User Schedule>  dextrose 5%. 1000 milliLiter(s) (100 mL/Hr) IV Continuous <Continuous>  dextrose 5%. 1000 milliLiter(s) (50 mL/Hr) IV Continuous <Continuous>  dextrose 50% Injectable 25 Gram(s) IV Push once  dextrose 50% Injectable 12.5 Gram(s) IV Push once  dextrose 50% Injectable 25 Gram(s) IV Push once  glucagon  Injectable 1 milliGRAM(s) IntraMuscular once  heparin   Injectable 5000 Unit(s) SubCutaneous every 8 hours  influenza  Vaccine (HIGH DOSE) 0.7 milliLiter(s) IntraMuscular once  insulin lispro (ADMELOG) corrective regimen sliding scale   SubCutaneous three times a day before meals  insulin lispro (ADMELOG) corrective regimen sliding scale   SubCutaneous at bedtime  labetalol 200 milliGRAM(s) Oral every 12 hours  losartan 50 milliGRAM(s) Oral daily  meropenem  IVPB 1000 milliGRAM(s) IV Intermittent every 8 hours  NIFEdipine XL 60 milliGRAM(s) Oral every 24 hours    MEDICATIONS  (PRN):  acetaminophen     Tablet .. 650 milliGRAM(s) Oral every 6 hours PRN Mild Pain (1 - 3), Moderate Pain (4 - 6)  dextrose Oral Gel 15 Gram(s) Oral once PRN Blood Glucose LESS THAN 70 milliGRAM(s)/deciliter  ondansetron Injectable 4 milliGRAM(s) IV Push every 8 hours PRN Nausea and/or Vomiting  sodium chloride 0.9% lock flush 10 milliLiter(s) IV Push every 1 hour PRN Pre/post blood products, medications, blood draw, and to maintain line patency      __________________________________________________  REVIEW OF SYSTEMS:    CONSTITUTIONAL: No fever,   EYES: no acute visual disturbances  NECK: No pain or stiffness  RESPIRATORY: No cough; No shortness of breath  CARDIOVASCULAR: No chest pain, no palpitations  GASTROINTESTINAL: +abd pain. No nausea or vomiting; No diarrhea   NEUROLOGICAL: No headache or numbness, no tremors  MUSCULOSKELETAL: No joint pain, no muscle pain  GENITOURINARY: no dysuria, no frequency, no hesitancy  PSYCHIATRY: no depression , no anxiety  ALL OTHER  ROS negative        Vital Signs Last 24 Hrs  T(C): 36.8 (29 Sep 2022 13:09), Max: 37.2 (28 Sep 2022 14:35)  T(F): 98.3 (29 Sep 2022 13:09), Max: 99 (28 Sep 2022 14:35)  HR: 83 (29 Sep 2022 13:09) (81 - 91)  BP: 101/67 (29 Sep 2022 13:09) (101/55 - 130/69)  BP(mean): 89 (29 Sep 2022 05:01) (89 - 89)  RR: 18 (29 Sep 2022 13:09) (16 - 18)  SpO2: 95% (29 Sep 2022 13:09) (95% - 100%)    Parameters below as of 29 Sep 2022 13:09  Patient On (Oxygen Delivery Method): room air        ________________________________________________  PHYSICAL EXAM:  GENERAL: NAD  HEENT: Normocephalic;  conjunctivae and sclerae clear; moist mucous membranes;   NECK : supple  CHEST/LUNG: Clear to auscultation bilaterally with good air entry   HEART: S1 S2  regular; no murmurs, gallops or rubs  ABDOMEN: Soft, tender to palpate at the site of incisions, Nondistended; Bowel sounds present  EXTREMITIES: no cyanosis; no edema; no calf tenderness  SKIN: warm and dry; no rash  NERVOUS SYSTEM:  Awake and alert; Oriented  to place, person and time ; no new deficits    _________________________________________________  LABS:                        7.5    10.67 )-----------( 614      ( 29 Sep 2022 05:44 )             24.4     09-29    132<L>  |  94<L>  |  16  ----------------------------<  86  4.3   |  27  |  0.92    Ca    8.4      29 Sep 2022 05:44  Phos  3.0     09-29  Mg     2.2     09-29      PT/INR - ( 28 Sep 2022 05:30 )   PT: 13.9 sec;   INR: 1.17          PTT - ( 28 Sep 2022 05:30 )  PTT:32.3 sec    CAPILLARY BLOOD GLUCOSE      POCT Blood Glucose.: 100 mg/dL (29 Sep 2022 12:23)  POCT Blood Glucose.: 115 mg/dL (29 Sep 2022 07:36)  POCT Blood Glucose.: 114 mg/dL (28 Sep 2022 21:50)  POCT Blood Glucose.: 111 mg/dL (28 Sep 2022 17:07)        RADIOLOGY & ADDITIONAL TESTS:      Plan of care was discussed with patient and /or primary care giver; all questions and concerns were addressed and care was aligned with patient's wishes.

## 2022-09-30 LAB
ANION GAP SERPL CALC-SCNC: 13 MMOL/L — SIGNIFICANT CHANGE UP (ref 5–17)
BUN SERPL-MCNC: 13 MG/DL — SIGNIFICANT CHANGE UP (ref 7–23)
CALCIUM SERPL-MCNC: 8.4 MG/DL — SIGNIFICANT CHANGE UP (ref 8.4–10.5)
CHLORIDE SERPL-SCNC: 97 MMOL/L — SIGNIFICANT CHANGE UP (ref 96–108)
CO2 SERPL-SCNC: 26 MMOL/L — SIGNIFICANT CHANGE UP (ref 22–31)
CREAT SERPL-MCNC: 0.85 MG/DL — SIGNIFICANT CHANGE UP (ref 0.5–1.3)
EGFR: 74 ML/MIN/1.73M2 — SIGNIFICANT CHANGE UP
GLUCOSE BLDC GLUCOMTR-MCNC: 109 MG/DL — HIGH (ref 70–99)
GLUCOSE BLDC GLUCOMTR-MCNC: 114 MG/DL — HIGH (ref 70–99)
GLUCOSE BLDC GLUCOMTR-MCNC: 121 MG/DL — HIGH (ref 70–99)
GLUCOSE BLDC GLUCOMTR-MCNC: 98 MG/DL — SIGNIFICANT CHANGE UP (ref 70–99)
GLUCOSE SERPL-MCNC: 79 MG/DL — SIGNIFICANT CHANGE UP (ref 70–99)
HCT VFR BLD CALC: 24.8 % — LOW (ref 34.5–45)
HGB BLD-MCNC: 7.7 G/DL — LOW (ref 11.5–15.5)
MAGNESIUM SERPL-MCNC: 2.2 MG/DL — SIGNIFICANT CHANGE UP (ref 1.6–2.6)
MCHC RBC-ENTMCNC: 27.3 PG — SIGNIFICANT CHANGE UP (ref 27–34)
MCHC RBC-ENTMCNC: 31 GM/DL — LOW (ref 32–36)
MCV RBC AUTO: 87.9 FL — SIGNIFICANT CHANGE UP (ref 80–100)
NRBC # BLD: 0 /100 WBCS — SIGNIFICANT CHANGE UP (ref 0–0)
PHOSPHATE SERPL-MCNC: 2.4 MG/DL — LOW (ref 2.5–4.5)
PLATELET # BLD AUTO: 633 K/UL — HIGH (ref 150–400)
POTASSIUM SERPL-MCNC: 4.5 MMOL/L — SIGNIFICANT CHANGE UP (ref 3.5–5.3)
POTASSIUM SERPL-SCNC: 4.5 MMOL/L — SIGNIFICANT CHANGE UP (ref 3.5–5.3)
RBC # BLD: 2.82 M/UL — LOW (ref 3.8–5.2)
RBC # FLD: 16.3 % — HIGH (ref 10.3–14.5)
SODIUM SERPL-SCNC: 136 MMOL/L — SIGNIFICANT CHANGE UP (ref 135–145)
WBC # BLD: 9.94 K/UL — SIGNIFICANT CHANGE UP (ref 3.8–10.5)
WBC # FLD AUTO: 9.94 K/UL — SIGNIFICANT CHANGE UP (ref 3.8–10.5)

## 2022-09-30 PROCEDURE — 99233 SBSQ HOSP IP/OBS HIGH 50: CPT

## 2022-09-30 RX ORDER — LOSARTAN POTASSIUM 100 MG/1
1 TABLET, FILM COATED ORAL
Qty: 0 | Refills: 0 | DISCHARGE

## 2022-09-30 RX ORDER — LOSARTAN POTASSIUM 100 MG/1
1 TABLET, FILM COATED ORAL
Qty: 0 | Refills: 0 | DISCHARGE
Start: 2022-09-30

## 2022-09-30 RX ADMIN — HEPARIN SODIUM 5000 UNIT(S): 5000 INJECTION INTRAVENOUS; SUBCUTANEOUS at 13:25

## 2022-09-30 RX ADMIN — Medication 650 MILLIGRAM(S): at 00:52

## 2022-09-30 RX ADMIN — LOSARTAN POTASSIUM 50 MILLIGRAM(S): 100 TABLET, FILM COATED ORAL at 05:07

## 2022-09-30 RX ADMIN — CHLORHEXIDINE GLUCONATE 1 APPLICATION(S): 213 SOLUTION TOPICAL at 05:08

## 2022-09-30 RX ADMIN — HEPARIN SODIUM 5000 UNIT(S): 5000 INJECTION INTRAVENOUS; SUBCUTANEOUS at 21:00

## 2022-09-30 RX ADMIN — Medication 60 MILLIGRAM(S): at 13:25

## 2022-09-30 RX ADMIN — HEPARIN SODIUM 5000 UNIT(S): 5000 INJECTION INTRAVENOUS; SUBCUTANEOUS at 05:08

## 2022-09-30 RX ADMIN — Medication 200 MILLIGRAM(S): at 05:07

## 2022-09-30 RX ADMIN — MEROPENEM 100 MILLIGRAM(S): 1 INJECTION INTRAVENOUS at 21:00

## 2022-09-30 RX ADMIN — ATORVASTATIN CALCIUM 20 MILLIGRAM(S): 80 TABLET, FILM COATED ORAL at 21:01

## 2022-09-30 RX ADMIN — Medication 200 MILLIGRAM(S): at 18:03

## 2022-09-30 RX ADMIN — MEROPENEM 100 MILLIGRAM(S): 1 INJECTION INTRAVENOUS at 13:25

## 2022-09-30 RX ADMIN — MEROPENEM 100 MILLIGRAM(S): 1 INJECTION INTRAVENOUS at 05:08

## 2022-09-30 RX ADMIN — Medication 650 MILLIGRAM(S): at 00:00

## 2022-09-30 RX ADMIN — Medication 62.5 MILLIMOLE(S): at 16:02

## 2022-09-30 NOTE — PROGRESS NOTE ADULT - SUBJECTIVE AND OBJECTIVE BOX
Patient is a 72y old  Female who presents with a chief complaint of abd pain (29 Sep 2022 13:45)      INTERVAL HPI/OVERNIGHT EVENTS: offers no new complaints; current symptoms resolving    MEDICATIONS  (STANDING):  atorvastatin 20 milliGRAM(s) Oral at bedtime  BUpivacaine 0.5% On-Q Pump 400 milliLiter(s) (4 mL/Hr) IntraDermal. <Continuous>  chlorhexidine 2% Cloths 1 Application(s) Topical <User Schedule>  dextrose 5%. 1000 milliLiter(s) (100 mL/Hr) IV Continuous <Continuous>  dextrose 5%. 1000 milliLiter(s) (50 mL/Hr) IV Continuous <Continuous>  dextrose 50% Injectable 25 Gram(s) IV Push once  dextrose 50% Injectable 12.5 Gram(s) IV Push once  dextrose 50% Injectable 25 Gram(s) IV Push once  glucagon  Injectable 1 milliGRAM(s) IntraMuscular once  heparin   Injectable 5000 Unit(s) SubCutaneous every 8 hours  influenza  Vaccine (HIGH DOSE) 0.7 milliLiter(s) IntraMuscular once  insulin lispro (ADMELOG) corrective regimen sliding scale   SubCutaneous three times a day before meals  insulin lispro (ADMELOG) corrective regimen sliding scale   SubCutaneous at bedtime  labetalol 200 milliGRAM(s) Oral every 12 hours  losartan 50 milliGRAM(s) Oral daily  meropenem  IVPB 1000 milliGRAM(s) IV Intermittent every 8 hours  NIFEdipine XL 60 milliGRAM(s) Oral every 24 hours  sodium phosphate IVPB 15 milliMole(s) IV Intermittent once    MEDICATIONS  (PRN):  acetaminophen     Tablet .. 650 milliGRAM(s) Oral every 6 hours PRN Mild Pain (1 - 3), Moderate Pain (4 - 6)  dextrose Oral Gel 15 Gram(s) Oral once PRN Blood Glucose LESS THAN 70 milliGRAM(s)/deciliter  ondansetron Injectable 4 milliGRAM(s) IV Push every 8 hours PRN Nausea and/or Vomiting  sodium chloride 0.9% lock flush 10 milliLiter(s) IV Push every 1 hour PRN Pre/post blood products, medications, blood draw, and to maintain line patency      __________________________________________________  REVIEW OF SYSTEMS:    CONSTITUTIONAL: No fever,   EYES: no acute visual disturbances  NECK: No pain or stiffness  RESPIRATORY: No cough; No shortness of breath  CARDIOVASCULAR: No chest pain, no palpitations  GASTROINTESTINAL: + pain. No nausea or vomiting; No diarrhea   NEUROLOGICAL: No headache or numbness, no tremors  MUSCULOSKELETAL: +back pain   GENITOURINARY: no dysuria, no frequency, no hesitancy  PSYCHIATRY: no depression , no anxiety  ALL OTHER  ROS negative        Vital Signs Last 24 Hrs  T(C): 36.7 (30 Sep 2022 09:10), Max: 37.3 (29 Sep 2022 20:28)  T(F): 98 (30 Sep 2022 09:10), Max: 99.2 (29 Sep 2022 20:28)  HR: 86 (30 Sep 2022 09:10) (82 - 91)  BP: 121/65 (30 Sep 2022 09:10) (101/67 - 125/70)  BP(mean): --  RR: 18 (30 Sep 2022 09:10) (17 - 18)  SpO2: 94% (30 Sep 2022 09:10) (94% - 100%)    Parameters below as of 30 Sep 2022 09:10  Patient On (Oxygen Delivery Method): room air        ________________________________________________  PHYSICAL EXAM:  GENERAL: NAD  HEENT: Normocephalic;  conjunctivae and sclerae clear; moist mucous membranes;   NECK : supple  CHEST/LUNG: Clear to auscultation bilaterally with good air entry   HEART: S1 S2  regular; no murmurs, gallops or rubs  ABDOMEN: Soft, tender to palpate at the site of incisions, Nondistended; Bowel sounds present  EXTREMITIES: no cyanosis; no edema; no calf tenderness  SKIN: warm and dry; no rash  NERVOUS SYSTEM:  Awake and alert; Oriented  to place, person and time ; no new deficits  _________________________________________________  LABS:                        7.7    9.94  )-----------( 633      ( 30 Sep 2022 05:30 )             24.8     09-30    136  |  97  |  13  ----------------------------<  79  4.5   |  26  |  0.85    Ca    8.4      30 Sep 2022 05:30  Phos  2.4     09-30  Mg     2.2     09-30          CAPILLARY BLOOD GLUCOSE      POCT Blood Glucose.: 109 mg/dL (30 Sep 2022 11:39)  POCT Blood Glucose.: 98 mg/dL (30 Sep 2022 07:34)  POCT Blood Glucose.: 97 mg/dL (29 Sep 2022 21:54)  POCT Blood Glucose.: 98 mg/dL (29 Sep 2022 16:49)  POCT Blood Glucose.: 100 mg/dL (29 Sep 2022 12:23)        RADIOLOGY & ADDITIONAL TESTS:      Plan of care was discussed with patient and /or primary care giver; all questions and concerns were addressed and care was aligned with patient's wishes.

## 2022-09-30 NOTE — PROGRESS NOTE ADULT - SUBJECTIVE AND OBJECTIVE BOX
STATUS POST:  Exploratory laparotomy with lysis of adhesions    POST OPERATIVE DAY #: 15    SUBJECTIVE: Pt seen and examined at bedside this am by surgery team. Patient is lying comfortably in bed with no complaints. Tolerating diet, pain well controlled with current regimen. Patient denies fever, nausea, vomiting, chest pain, and shortness of breath. Patient is passing gas and having bowel movements.     MEDICATIONS  (STANDING):  atorvastatin 20 milliGRAM(s) Oral at bedtime  BUpivacaine 0.5% On-Q Pump 400 milliLiter(s) (4 mL/Hr) IntraDermal. <Continuous>  chlorhexidine 2% Cloths 1 Application(s) Topical <User Schedule>  dextrose 5%. 1000 milliLiter(s) (100 mL/Hr) IV Continuous <Continuous>  dextrose 5%. 1000 milliLiter(s) (50 mL/Hr) IV Continuous <Continuous>  dextrose 50% Injectable 25 Gram(s) IV Push once  dextrose 50% Injectable 12.5 Gram(s) IV Push once  dextrose 50% Injectable 25 Gram(s) IV Push once  glucagon  Injectable 1 milliGRAM(s) IntraMuscular once  heparin   Injectable 5000 Unit(s) SubCutaneous every 8 hours  influenza  Vaccine (HIGH DOSE) 0.7 milliLiter(s) IntraMuscular once  insulin lispro (ADMELOG) corrective regimen sliding scale   SubCutaneous three times a day before meals  insulin lispro (ADMELOG) corrective regimen sliding scale   SubCutaneous at bedtime  labetalol 200 milliGRAM(s) Oral every 12 hours  losartan 50 milliGRAM(s) Oral daily  meropenem  IVPB 1000 milliGRAM(s) IV Intermittent every 8 hours  NIFEdipine XL 60 milliGRAM(s) Oral every 24 hours  sodium phosphate IVPB 15 milliMole(s) IV Intermittent once    MEDICATIONS  (PRN):  acetaminophen     Tablet .. 650 milliGRAM(s) Oral every 6 hours PRN Mild Pain (1 - 3), Moderate Pain (4 - 6)  dextrose Oral Gel 15 Gram(s) Oral once PRN Blood Glucose LESS THAN 70 milliGRAM(s)/deciliter  ondansetron Injectable 4 milliGRAM(s) IV Push every 8 hours PRN Nausea and/or Vomiting  sodium chloride 0.9% lock flush 10 milliLiter(s) IV Push every 1 hour PRN Pre/post blood products, medications, blood draw, and to maintain line patency      Vital Signs Last 24 Hrs  T(C): 36.7 (30 Sep 2022 09:10), Max: 37.3 (29 Sep 2022 20:28)  T(F): 98 (30 Sep 2022 09:10), Max: 99.2 (29 Sep 2022 20:28)  HR: 86 (30 Sep 2022 09:10) (82 - 91)  BP: 121/65 (30 Sep 2022 09:10) (101/67 - 125/70)  BP(mean): --  RR: 18 (30 Sep 2022 09:10) (17 - 18)  SpO2: 94% (30 Sep 2022 09:10) (94% - 100%)    Parameters below as of 30 Sep 2022 09:10  Patient On (Oxygen Delivery Method): room air      Physical Exam  General: Patient is doing well and lying in bed comfortably  Constitutional: alert and oriented   Pulm: Nonlabored breathing, no respiratory distress  CV: Regular rate and rhythm, normal sinus rhythm  Abd:  soft, appropriately tender around incision site, nondistended. No rebound, no guarding.             Incisions: clean, dry, intact and healing well, no erythema/induration/edema. Wound vac in place with good suction.   Extremities: warm, well perfused, no edema       I&O's Detail    29 Sep 2022 07:01  -  30 Sep 2022 07:00  --------------------------------------------------------  IN:    IV PiggyBack: 100 mL    Oral Fluid: 840 mL  Total IN: 940 mL    OUT:    VAC (Vacuum Assisted Closure) System (mL): 25 mL    Voided (mL): 950 mL  Total OUT: 975 mL    Total NET: -35 mL      30 Sep 2022 07:01  -  30 Sep 2022 11:20  --------------------------------------------------------  IN:  Total IN: 0 mL    OUT:    Voided (mL): 350 mL  Total OUT: 350 mL    Total NET: -350 mL        LABS:                        7.7    9.94  )-----------( 633      ( 30 Sep 2022 05:30 )             24.8     09-30    136  |  97  |  13  ----------------------------<  79  4.5   |  26  |  0.85    Ca    8.4      30 Sep 2022 05:30  Phos  2.4     09-30  Mg     2.2     09-30

## 2022-09-30 NOTE — PROGRESS NOTE ADULT - ASSESSMENT
72 year old F with a PMHx of HTN, DM and PSHx of  x 2, hysterectomy and lap dev ( w/ Dr. Clifford) admitted on  with 2 days of n/v, abd pain, imaging demonstrating LBO, which resolved prior to operative exploration. Recurrent bowel obstruction, now s/p Exlap JOSEFINA, SBR ( 100 cm), transferred to SICU for hemodynamic monitoring and stepped down to tele on POD1, now on regional floors.    Soft Diet  Pain/nausea prn  Meropenem (-), ID following  Wound Vac  SQH/SCDs/OOBA/IS  ISS  AM labs  Endurance Cath (-), PICC (-)  Dispo: [facility]

## 2022-10-01 LAB
ANION GAP SERPL CALC-SCNC: 10 MMOL/L — SIGNIFICANT CHANGE UP (ref 5–17)
BUN SERPL-MCNC: 11 MG/DL — SIGNIFICANT CHANGE UP (ref 7–23)
CALCIUM SERPL-MCNC: 8.5 MG/DL — SIGNIFICANT CHANGE UP (ref 8.4–10.5)
CHLORIDE SERPL-SCNC: 96 MMOL/L — SIGNIFICANT CHANGE UP (ref 96–108)
CO2 SERPL-SCNC: 27 MMOL/L — SIGNIFICANT CHANGE UP (ref 22–31)
CREAT SERPL-MCNC: 0.8 MG/DL — SIGNIFICANT CHANGE UP (ref 0.5–1.3)
EGFR: 79 ML/MIN/1.73M2 — SIGNIFICANT CHANGE UP
GLUCOSE BLDC GLUCOMTR-MCNC: 107 MG/DL — HIGH (ref 70–99)
GLUCOSE BLDC GLUCOMTR-MCNC: 88 MG/DL — SIGNIFICANT CHANGE UP (ref 70–99)
GLUCOSE BLDC GLUCOMTR-MCNC: 95 MG/DL — SIGNIFICANT CHANGE UP (ref 70–99)
GLUCOSE BLDC GLUCOMTR-MCNC: 97 MG/DL — SIGNIFICANT CHANGE UP (ref 70–99)
GLUCOSE SERPL-MCNC: 83 MG/DL — SIGNIFICANT CHANGE UP (ref 70–99)
HCT VFR BLD CALC: 27.4 % — LOW (ref 34.5–45)
HGB BLD-MCNC: 7.8 G/DL — LOW (ref 11.5–15.5)
MAGNESIUM SERPL-MCNC: 2.3 MG/DL — SIGNIFICANT CHANGE UP (ref 1.6–2.6)
MCHC RBC-ENTMCNC: 27.8 PG — SIGNIFICANT CHANGE UP (ref 27–34)
MCHC RBC-ENTMCNC: 28.5 GM/DL — LOW (ref 32–36)
MCV RBC AUTO: 97.5 FL — SIGNIFICANT CHANGE UP (ref 80–100)
NRBC # BLD: 0 /100 WBCS — SIGNIFICANT CHANGE UP (ref 0–0)
PHOSPHATE SERPL-MCNC: 3.3 MG/DL — SIGNIFICANT CHANGE UP (ref 2.5–4.5)
PLATELET # BLD AUTO: 548 K/UL — HIGH (ref 150–400)
POTASSIUM SERPL-MCNC: 4.7 MMOL/L — SIGNIFICANT CHANGE UP (ref 3.5–5.3)
POTASSIUM SERPL-SCNC: 4.7 MMOL/L — SIGNIFICANT CHANGE UP (ref 3.5–5.3)
RBC # BLD: 2.81 M/UL — LOW (ref 3.8–5.2)
RBC # FLD: 16.7 % — HIGH (ref 10.3–14.5)
SODIUM SERPL-SCNC: 133 MMOL/L — LOW (ref 135–145)
WBC # BLD: 4.49 K/UL — SIGNIFICANT CHANGE UP (ref 3.8–10.5)
WBC # FLD AUTO: 4.49 K/UL — SIGNIFICANT CHANGE UP (ref 3.8–10.5)

## 2022-10-01 PROCEDURE — 99233 SBSQ HOSP IP/OBS HIGH 50: CPT

## 2022-10-01 RX ADMIN — ATORVASTATIN CALCIUM 20 MILLIGRAM(S): 80 TABLET, FILM COATED ORAL at 22:10

## 2022-10-01 RX ADMIN — CHLORHEXIDINE GLUCONATE 1 APPLICATION(S): 213 SOLUTION TOPICAL at 05:45

## 2022-10-01 RX ADMIN — Medication 60 MILLIGRAM(S): at 13:08

## 2022-10-01 RX ADMIN — HEPARIN SODIUM 5000 UNIT(S): 5000 INJECTION INTRAVENOUS; SUBCUTANEOUS at 13:04

## 2022-10-01 RX ADMIN — HEPARIN SODIUM 5000 UNIT(S): 5000 INJECTION INTRAVENOUS; SUBCUTANEOUS at 05:18

## 2022-10-01 RX ADMIN — Medication 650 MILLIGRAM(S): at 01:15

## 2022-10-01 RX ADMIN — HEPARIN SODIUM 5000 UNIT(S): 5000 INJECTION INTRAVENOUS; SUBCUTANEOUS at 22:10

## 2022-10-01 RX ADMIN — Medication 650 MILLIGRAM(S): at 00:27

## 2022-10-01 RX ADMIN — LOSARTAN POTASSIUM 50 MILLIGRAM(S): 100 TABLET, FILM COATED ORAL at 05:18

## 2022-10-01 RX ADMIN — Medication 200 MILLIGRAM(S): at 17:19

## 2022-10-01 RX ADMIN — ONDANSETRON 4 MILLIGRAM(S): 8 TABLET, FILM COATED ORAL at 03:05

## 2022-10-01 NOTE — PROGRESS NOTE ADULT - SUBJECTIVE AND OBJECTIVE BOX
Patient is a 72y old  Female who presents with a chief complaint of abd pain (29 Sep 2022 13:45)      INTERVAL HPI/OVERNIGHT EVENTS: offers no new complaints; current symptoms resolving    MEDICATIONS  (STANDING):  atorvastatin 20 milliGRAM(s) Oral at bedtime  BUpivacaine 0.5% On-Q Pump 400 milliLiter(s) (4 mL/Hr) IntraDermal. <Continuous>  chlorhexidine 2% Cloths 1 Application(s) Topical <User Schedule>  dextrose 5%. 1000 milliLiter(s) (100 mL/Hr) IV Continuous <Continuous>  dextrose 5%. 1000 milliLiter(s) (50 mL/Hr) IV Continuous <Continuous>  dextrose 50% Injectable 25 Gram(s) IV Push once  dextrose 50% Injectable 12.5 Gram(s) IV Push once  dextrose 50% Injectable 25 Gram(s) IV Push once  glucagon  Injectable 1 milliGRAM(s) IntraMuscular once  heparin   Injectable 5000 Unit(s) SubCutaneous every 8 hours  influenza  Vaccine (HIGH DOSE) 0.7 milliLiter(s) IntraMuscular once  insulin lispro (ADMELOG) corrective regimen sliding scale   SubCutaneous three times a day before meals  insulin lispro (ADMELOG) corrective regimen sliding scale   SubCutaneous at bedtime  labetalol 200 milliGRAM(s) Oral every 12 hours  losartan 50 milliGRAM(s) Oral daily  NIFEdipine XL 60 milliGRAM(s) Oral every 24 hours    MEDICATIONS  (PRN):  acetaminophen     Tablet .. 650 milliGRAM(s) Oral every 6 hours PRN Mild Pain (1 - 3), Moderate Pain (4 - 6)  dextrose Oral Gel 15 Gram(s) Oral once PRN Blood Glucose LESS THAN 70 milliGRAM(s)/deciliter  ondansetron Injectable 4 milliGRAM(s) IV Push every 8 hours PRN Nausea and/or Vomiting  sodium chloride 0.9% lock flush 10 milliLiter(s) IV Push every 1 hour PRN Pre/post blood products, medications, blood draw, and to maintain line patency      __________________________________________________  REVIEW OF SYSTEMS:    CONSTITUTIONAL: No fever,   EYES: no acute visual disturbances  NECK: No pain or stiffness  RESPIRATORY: No cough; No shortness of breath  CARDIOVASCULAR: No chest pain, no palpitations  GASTROINTESTINAL: No pain. No nausea or vomiting; No diarrhea   NEUROLOGICAL: No headache or numbness, no tremors  MUSCULOSKELETAL: No joint pain, no muscle pain  GENITOURINARY: no dysuria, no frequency, no hesitancy  PSYCHIATRY: no depression , no anxiety  ALL OTHER  ROS negative        Vital Signs Last 24 Hrs  T(C): 37.2 (01 Oct 2022 13:08), Max: 37.2 (30 Sep 2022 20:03)  T(F): 99 (01 Oct 2022 13:08), Max: 99 (01 Oct 2022 13:08)  HR: 87 (01 Oct 2022 13:08) (84 - 87)  BP: 115/71 (01 Oct 2022 13:08) (106/64 - 122/75)  BP(mean): --  RR: 16 (01 Oct 2022 13:08) (15 - 17)  SpO2: 99% (01 Oct 2022 13:08) (94% - 100%)    Parameters below as of 01 Oct 2022 13:08  Patient On (Oxygen Delivery Method): room air        ________________________________________________  PHYSICAL EXAM:  GENERAL: NAD  HEENT: Normocephalic;  conjunctivae and sclerae clear; moist mucous membranes;   NECK : supple  CHEST/LUNG: Clear to auscultation bilaterally with good air entry   HEART: S1 S2  regular; no murmurs, gallops or rubs  ABDOMEN: Soft, Nontender, Nondistended; Bowel sounds present, wound vac in place  EXTREMITIES: no cyanosis; no edema; no calf tenderness  SKIN: warm and dry; no rash  NERVOUS SYSTEM:  Awake and alert; Oriented  to place, person and time ; no new deficits    _________________________________________________  LABS:                        7.8    4.49  )-----------( 548      ( 01 Oct 2022 06:43 )             27.4     10-01    133<L>  |  96  |  11  ----------------------------<  83  4.7   |  27  |  0.80    Ca    8.5      01 Oct 2022 06:43  Phos  3.3     10-01  Mg     2.3     10-01          CAPILLARY BLOOD GLUCOSE      POCT Blood Glucose.: 97 mg/dL (01 Oct 2022 12:50)  POCT Blood Glucose.: 88 mg/dL (01 Oct 2022 07:51)  POCT Blood Glucose.: 114 mg/dL (30 Sep 2022 21:53)  POCT Blood Glucose.: 121 mg/dL (30 Sep 2022 18:06)        RADIOLOGY & ADDITIONAL TESTS:      Plan of care was discussed with patient and /or primary care giver; all questions and concerns were addressed and care was aligned with patient's wishes.     · CONSTITUTIONAL: Well appearing, well nourished, awake, alert, oriented to person, place, time/situation and in moderate distress due to back pain and left hip pain.  · ENMT: Airway patent. Nasal mucosa clear. Mouth with normal mucosa. Throat has no vesicles, no oropharyngeal exudates and uvula is midline.  · EYES: Clear bilaterally, pupils equal, round and reactive to light.  · CARDIAC: Normal rate, regular rhythm.  Heart sounds S1, S2.  No murmurs, rubs or gallops.  · RESPIRATORY: Breath sounds clear and equal bilaterally.  · GASTROINTESTINAL: Abdomen soft, non-tender, no rebound, no guarding.  · GENITOURINARY: - - -  · Bladder: non-tender  non-distended  · MUSCULOSKELETAL: - - -  · MUSC EXAM: TTP of L4-L5 spinous process and left iliac crest  · Costovertebral Angle Tenderness: no tenderness  · MUSC WEIGHT BEAR: able  · NEUROLOGICAL: Alert and oriented, no focal deficits, no motor or sensory deficits.  · SKIN: Skin normal color for race, warm, dry and intact. No evidence of rash.  · PSYCHIATRIC: Alert and oriented to person, place, time/situation. normal mood and affect. no apparent risk to self or others.  · HEME LYMPH: No adenopathy or splenomegaly. No cervical or inguinal lymphadenopathy.

## 2022-10-01 NOTE — PROGRESS NOTE ADULT - SUBJECTIVE AND OBJECTIVE BOX
INTERVAL HPI/OVERNIGHT EVENTS:   SURGERY ATTENDING    STATUS POST:  Exploratory laparotomy with lysis of adhesions extensive one hour , Small bowel resection with primary anastomosis, Repair of recurrent Incisional hernia with myofascial flaps no mesh, washout, drainage, PREVENA      POST OPERATIVE DAY #: 16          SUBJECTIVE:  Flatus: [x ] YES [ ] NO             Bowel Movement: [x ] YES [ ] NO  Pain (0-10):        1    Pain Control Adequate: [x ] YES [ ] NO  Nausea: [ ] YES [x ] NO            Vomiting: [ ] YES [x ] NO  Diarrhea: [ ] YES [x ] NO         Constipation: [ ] YES [x ] NO     Chest Pain: [ ] YES [x ] NO    SOB:  [ ] YES [x ] NO    MEDICATIONS  (STANDING):  atorvastatin 20 milliGRAM(s) Oral at bedtime  BUpivacaine 0.5% On-Q Pump 400 milliLiter(s) (4 mL/Hr) IntraDermal. <Continuous>  chlorhexidine 2% Cloths 1 Application(s) Topical <User Schedule>  dextrose 5%. 1000 milliLiter(s) (100 mL/Hr) IV Continuous <Continuous>  dextrose 5%. 1000 milliLiter(s) (50 mL/Hr) IV Continuous <Continuous>  dextrose 50% Injectable 25 Gram(s) IV Push once  dextrose 50% Injectable 12.5 Gram(s) IV Push once  dextrose 50% Injectable 25 Gram(s) IV Push once  glucagon  Injectable 1 milliGRAM(s) IntraMuscular once  heparin   Injectable 5000 Unit(s) SubCutaneous every 8 hours  influenza  Vaccine (HIGH DOSE) 0.7 milliLiter(s) IntraMuscular once  insulin lispro (ADMELOG) corrective regimen sliding scale   SubCutaneous three times a day before meals  insulin lispro (ADMELOG) corrective regimen sliding scale   SubCutaneous at bedtime  labetalol 200 milliGRAM(s) Oral every 12 hours  losartan 50 milliGRAM(s) Oral daily  NIFEdipine XL 60 milliGRAM(s) Oral every 24 hours    MEDICATIONS  (PRN):  acetaminophen     Tablet .. 650 milliGRAM(s) Oral every 6 hours PRN Mild Pain (1 - 3), Moderate Pain (4 - 6)  dextrose Oral Gel 15 Gram(s) Oral once PRN Blood Glucose LESS THAN 70 milliGRAM(s)/deciliter  ondansetron Injectable 4 milliGRAM(s) IV Push every 8 hours PRN Nausea and/or Vomiting  sodium chloride 0.9% lock flush 10 milliLiter(s) IV Push every 1 hour PRN Pre/post blood products, medications, blood draw, and to maintain line patency      Vital Signs Last 24 Hrs  T(C): 37.2 (01 Oct 2022 13:08), Max: 37.2 (30 Sep 2022 20:03)  T(F): 99 (01 Oct 2022 13:08), Max: 99 (01 Oct 2022 13:08)  HR: 87 (01 Oct 2022 13:08) (84 - 87)  BP: 115/71 (01 Oct 2022 13:08) (106/64 - 122/75)  BP(mean): --  RR: 16 (01 Oct 2022 13:08) (15 - 17)  SpO2: 99% (01 Oct 2022 13:08) (94% - 100%)    Parameters below as of 01 Oct 2022 13:08  Patient On (Oxygen Delivery Method): room air        PHYSICAL EXAM:      Constitutional:    Eyes:    ENMT:    Neck:    Breasts:    Back:    Respiratory:    Cardiovascular:    Gastrointestinal:    Genitourinary:    Rectal:    Extremities:    Vascular:    Neurological:    Skin:    Lymph Nodes:    Musculoskeletal:    Psychiatric:        I&O's Detail    30 Sep 2022 07:01  -  01 Oct 2022 07:00  --------------------------------------------------------  IN:    IV PiggyBack: 250 mL    IV PiggyBack: 150 mL  Total IN: 400 mL    OUT:    VAC (Vacuum Assisted Closure) System (mL): 75 mL    Voided (mL): 1075 mL  Total OUT: 1150 mL    Total NET: -750 mL      01 Oct 2022 07:01  -  01 Oct 2022 14:36  --------------------------------------------------------  IN:    Oral Fluid: 480 mL  Total IN: 480 mL    OUT:    VAC (Vacuum Assisted Closure) System (mL): 50 mL    Voided (mL): 300 mL  Total OUT: 350 mL    Total NET: 130 mL          LABS:                        7.8    4.49  )-----------( 548      ( 01 Oct 2022 06:43 )             27.4     10-01    133<L>  |  96  |  11  ----------------------------<  83  4.7   |  27  |  0.80    Ca    8.5      01 Oct 2022 06:43  Phos  3.3     10-01  Mg     2.3     10-01            RADIOLOGY & ADDITIONAL STUDIES:

## 2022-10-01 NOTE — PROGRESS NOTE ADULT - ASSESSMENT
S/P Complex abdominal surgery including bowel resection following SBO  Peritonitis   Small fluid collections in the pelvis  Right sided extraperitoneal hematoma  Superficial wound collection - possibly infected.  Now resolved after incision opened while still on antibiotics.  Postoperative leukocytosis - resolved  S/P COVID   Markedly elevated inflammatory markers: ESR and CRP - improving  S/P 2 weeks of Meropenem       RECOMMEND  D/C Meropenem  Follow ESR and CRP        [phone]

## 2022-10-01 NOTE — PROGRESS NOTE ADULT - ASSESSMENT
72 year old F with a PMHx of HTN, DM and PSHx of  x 2, hysterectomy and lap dev ( w/ Dr. Clifford) admitted on  with 2 days of n/v, abd pain, imaging demonstrating LBO, which resolved prior to operative exploration. Recurrent bowel obstruction, now s/p Exlap JOSEFINA, SBR ( 100 cm), transferred to SICU for hemodynamic monitoring and stepped down to tele on POD1, now on regional floors.    Vac change today  Regular diet  Pain/nausea prn  SQH/SCDs/OOBA/IS  ISS  AM labs  Endurance Cath (-), PICC (-)  Dispo: [facility]

## 2022-10-01 NOTE — PROGRESS NOTE ADULT - ASSESSMENT
72 year old woman with HTN, DM2, past surgical history of  x 2, hysterectomy, and laparoscopic cholecystectomy (), admitted for large bowel obstruction; had spontaneous return of bowel function initially; however, bowel obstruction recurred, so taken to OR for ex lap with lysis of adhesions and bowel resection () . Monitored in SICU post -op ; now on regional. Hospital course complicated by up-trending leukocytosis of unknown etiology.     # Large bowel obstruction   #post operative state  s/p OR for ex lap with lysis of adhesions and bowel resection ()  - rest of care per primary team     # Leukocytosis -wbc down trending- to normal 9k today   unclear etiology -- Had CT imaging done that showed : Small fluid collections in the pelvis are only partially walled off. They could represent small hematomas or early developing abscesses  Was started on meropenem by ID   ESR/CRP eleavted   midline - needs to be removed at discharge as abx are being discontinued on d/c      #reactive thrombocytosis  plts elevated from 672 --> 719k ->615-> 633->548  will continue to monitor     #hypophosphatemia   improved    #HTN   takes HCTZ 25mg qd, losartan 100mg qd, labetalol 200mg BID, nifedipine 60mg ER qd at home.   - continue labetalol 200mg BID  - on losartan at 50mg qd   - on nifepidine 60m ER qd ( can hold if bp is low )   - Continue to hold HCTZ on discharge as pt's BP is well controlled on above 3 anti-htn medication     # Type 2 Diabetes complicated by hyperglycemia   Takes metformin at home.   cw insulin sliding scale while inpatient.   pt's a1c at goal for her age, resume metformin on discharge.     #Incidental finding of bilateral lung nodules  CT chest showing bilateral lung nodules, measuring up to 7 mm in the right   upper lobe  Recommend follow-up chest CT in three months to ensure stability.    # COVID 19 infection (present on admission)   Incidental finding of COVID 19 infection on admission nasal swab. Without any dyspnea, or sore throat. Per [facility] treatment algorithm, did not meet criteria for monoclonal antibodies at time of admission.   No O2 requirement, does not meet criteria for remdesivir/dexamethasone.   Recommend enoxaparin for DVT ppx     # Acute blood loss anemia   Hgb downtrended from time of admission to-->8-> 7.5->7.8  Partially attributable to operative blood losses and Right sided extraperitoneal hematoma  continue to trend daily CBC while inpatient;  ferritin, iron sat not low  age appropriate cancer screening outpatient     DVT ppx - HSQ

## 2022-10-01 NOTE — PROGRESS NOTE ADULT - SUBJECTIVE AND OBJECTIVE BOX
INTERVAL HPI/OVERNIGHT EVENTS:    ANTIBIOTICS/RELEVANT:    MEDICATIONS  (STANDING):  atorvastatin 20 milliGRAM(s) Oral at bedtime  BUpivacaine 0.5% On-Q Pump 400 milliLiter(s) (4 mL/Hr) IntraDermal. <Continuous>  chlorhexidine 2% Cloths 1 Application(s) Topical <User Schedule>  dextrose 5%. 1000 milliLiter(s) (100 mL/Hr) IV Continuous <Continuous>  dextrose 5%. 1000 milliLiter(s) (50 mL/Hr) IV Continuous <Continuous>  dextrose 50% Injectable 25 Gram(s) IV Push once  dextrose 50% Injectable 12.5 Gram(s) IV Push once  dextrose 50% Injectable 25 Gram(s) IV Push once  glucagon  Injectable 1 milliGRAM(s) IntraMuscular once  heparin   Injectable 5000 Unit(s) SubCutaneous every 8 hours  influenza  Vaccine (HIGH DOSE) 0.7 milliLiter(s) IntraMuscular once  insulin lispro (ADMELOG) corrective regimen sliding scale   SubCutaneous three times a day before meals  insulin lispro (ADMELOG) corrective regimen sliding scale   SubCutaneous at bedtime  labetalol 200 milliGRAM(s) Oral every 12 hours  losartan 50 milliGRAM(s) Oral daily  NIFEdipine XL 60 milliGRAM(s) Oral every 24 hours    MEDICATIONS  (PRN):  acetaminophen     Tablet .. 650 milliGRAM(s) Oral every 6 hours PRN Mild Pain (1 - 3), Moderate Pain (4 - 6)  dextrose Oral Gel 15 Gram(s) Oral once PRN Blood Glucose LESS THAN 70 milliGRAM(s)/deciliter  ondansetron Injectable 4 milliGRAM(s) IV Push every 8 hours PRN Nausea and/or Vomiting  sodium chloride 0.9% lock flush 10 milliLiter(s) IV Push every 1 hour PRN Pre/post blood products, medications, blood draw, and to maintain line patency      Allergies    penicillin (Rash)    Intolerances        Vital Signs Last 24 Hrs  T(C): 37.2 (01 Oct 2022 16:43), Max: 37.2 (30 Sep 2022 20:03)  T(F): 98.9 (01 Oct 2022 16:43), Max: 99 (01 Oct 2022 13:08)  HR: 88 (01 Oct 2022 16:43) (84 - 88)  BP: 128/72 (01 Oct 2022 16:43) (106/64 - 128/72)  BP(mean): --  RR: 17 (01 Oct 2022 16:43) (15 - 17)  SpO2: 100% (01 Oct 2022 16:43) (94% - 100%)    Parameters below as of 01 Oct 2022 16:43  Patient On (Oxygen Delivery Method): room air        PHYSICAL EXAM:      Constitutional:    Eyes:    ENMT:    Neck:    Breasts:    Back:    Respiratory:    Cardiovascular:    Gastrointestinal:    Genitourinary:    Rectal:    Extremities:    Vascular:    Neurological:    Skin:    Lymph Nodes:    Musculoskeletal:    Psychiatric:        LABS:                        7.8    4.49  )-----------( 548      ( 01 Oct 2022 06:43 )             27.4     10-01    133<L>  |  96  |  11  ----------------------------<  83  4.7   |  27  |  0.80    Ca    8.5      01 Oct 2022 06:43  Phos  3.3     10-01  Mg     2.3     10-01            MICROBIOLOGY:    RADIOLOGY & ADDITIONAL STUDIES: INTERVAL HPI/OVERNIGHT EVENTS:    Still c/o abdominal discomfort especially with eating    MEDICATIONS  (STANDING):  atorvastatin 20 milliGRAM(s) Oral at bedtime  BUpivacaine 0.5% On-Q Pump 400 milliLiter(s) (4 mL/Hr) IntraDermal. <Continuous>  chlorhexidine 2% Cloths 1 Application(s) Topical <User Schedule>  dextrose 5%. 1000 milliLiter(s) (100 mL/Hr) IV Continuous <Continuous>  dextrose 5%. 1000 milliLiter(s) (50 mL/Hr) IV Continuous <Continuous>  dextrose 50% Injectable 25 Gram(s) IV Push once  dextrose 50% Injectable 12.5 Gram(s) IV Push once  dextrose 50% Injectable 25 Gram(s) IV Push once  glucagon  Injectable 1 milliGRAM(s) IntraMuscular once  heparin   Injectable 5000 Unit(s) SubCutaneous every 8 hours  influenza  Vaccine (HIGH DOSE) 0.7 milliLiter(s) IntraMuscular once  insulin lispro (ADMELOG) corrective regimen sliding scale   SubCutaneous three times a day before meals  insulin lispro (ADMELOG) corrective regimen sliding scale   SubCutaneous at bedtime  labetalol 200 milliGRAM(s) Oral every 12 hours  losartan 50 milliGRAM(s) Oral daily  NIFEdipine XL 60 milliGRAM(s) Oral every 24 hours    MEDICATIONS  (PRN):  acetaminophen     Tablet .. 650 milliGRAM(s) Oral every 6 hours PRN Mild Pain (1 - 3), Moderate Pain (4 - 6)  dextrose Oral Gel 15 Gram(s) Oral once PRN Blood Glucose LESS THAN 70 milliGRAM(s)/deciliter  ondansetron Injectable 4 milliGRAM(s) IV Push every 8 hours PRN Nausea and/or Vomiting  sodium chloride 0.9% lock flush 10 milliLiter(s) IV Push every 1 hour PRN Pre/post blood products, medications, blood draw, and to maintain line patency      Allergies    penicillin (Rash)      EXAM  Vital Signs Last 24 Hrs  T(C): 37.2 (01 Oct 2022 16:43), Max: 37.2 (30 Sep 2022 20:03)  T(F): 98.9 (01 Oct 2022 16:43), Max: 99 (01 Oct 2022 13:08)  HR: 88 (01 Oct 2022 16:43) (84 - 88)  BP: 128/72 (01 Oct 2022 16:43) (106/64 - 128/72)  BP(mean): --  RR: 17 (01 Oct 2022 16:43) (15 - 17)  SpO2: 100% (01 Oct 2022 16:43) (94% - 100%)    Parameters below as of 01 Oct 2022 16:43  Patient On (Oxygen Delivery Method): room air  Awake and alert  No rash  No thrush  RRR  Chest CTA  Abd softly distended and uncomfortable to palpation.  VAC on the wound  LEs no edema        LABS:                        7.8    4.49  )-----------( 548      ( 01 Oct 2022 06:43 )             27.4     10-01    133<L>  |  96  |  11  ----------------------------<  83  4.7   |  27  |  0.80    Ca    8.5      01 Oct 2022 06:43  Phos  3.3     10-01  Mg     2.3     10-01

## 2022-10-01 NOTE — PROGRESS NOTE ADULT - SUBJECTIVE AND OBJECTIVE BOX
STATUS POST:  Exploratory laparotomy with lysis of adhesions    Incisional hernia repair    Small bowel resection        POST OPERATIVE DAY #: 16    SUBJECTIVE:   Patient seen and examined on am rounds. No new complaints. -n-v-cp-sob.    Vital Signs Last 24 Hrs  T(C): 37.2 (01 Oct 2022 16:43), Max: 37.2 (30 Sep 2022 20:03)  T(F): 98.9 (01 Oct 2022 16:43), Max: 99 (01 Oct 2022 13:08)  HR: 88 (01 Oct 2022 16:43) (84 - 88)  BP: 128/72 (01 Oct 2022 16:43) (106/64 - 128/72)  BP(mean): --  RR: 17 (01 Oct 2022 16:43) (15 - 17)  SpO2: 100% (01 Oct 2022 16:43) (94% - 100%)    Parameters below as of 01 Oct 2022 16:43  Patient On (Oxygen Delivery Method): room air        I&O's Summary    30 Sep 2022 07:01  -  01 Oct 2022 07:00  --------------------------------------------------------  IN: 400 mL / OUT: 1150 mL / NET: -750 mL    01 Oct 2022 07:01  -  01 Oct 2022 17:34  --------------------------------------------------------  IN: 930 mL / OUT: 600 mL / NET: 330 mL        Physical Exam:  General Appearance: Appears well, NAD  Pulmonary: Nonlabored breathing, no respiratory distress  Cardiovascular: NSR  Abdomen: Soft, nondisteded  Extremities: WWP, SCD's in place     LABS:                        7.8    4.49  )-----------( 548      ( 01 Oct 2022 06:43 )             27.4     10-01    133<L>  |  96  |  11  ----------------------------<  83  4.7   |  27  |  0.80    Ca    8.5      01 Oct 2022 06:43  Phos  3.3     10-01  Mg     2.3     10-01

## 2022-10-02 LAB
ALBUMIN SERPL ELPH-MCNC: 2.8 G/DL — LOW (ref 3.3–5)
ALP SERPL-CCNC: 77 U/L — SIGNIFICANT CHANGE UP (ref 40–120)
ALT FLD-CCNC: 9 U/L — LOW (ref 10–45)
ANION GAP SERPL CALC-SCNC: 11 MMOL/L — SIGNIFICANT CHANGE UP (ref 5–17)
AST SERPL-CCNC: 13 U/L — SIGNIFICANT CHANGE UP (ref 10–40)
BILIRUB SERPL-MCNC: 0.5 MG/DL — SIGNIFICANT CHANGE UP (ref 0.2–1.2)
BUN SERPL-MCNC: 11 MG/DL — SIGNIFICANT CHANGE UP (ref 7–23)
CALCIUM SERPL-MCNC: 8.8 MG/DL — SIGNIFICANT CHANGE UP (ref 8.4–10.5)
CHLORIDE SERPL-SCNC: 100 MMOL/L — SIGNIFICANT CHANGE UP (ref 96–108)
CO2 SERPL-SCNC: 23 MMOL/L — SIGNIFICANT CHANGE UP (ref 22–31)
CREAT SERPL-MCNC: 0.87 MG/DL — SIGNIFICANT CHANGE UP (ref 0.5–1.3)
EGFR: 72 ML/MIN/1.73M2 — SIGNIFICANT CHANGE UP
GLUCOSE BLDC GLUCOMTR-MCNC: 102 MG/DL — HIGH (ref 70–99)
GLUCOSE BLDC GLUCOMTR-MCNC: 124 MG/DL — HIGH (ref 70–99)
GLUCOSE BLDC GLUCOMTR-MCNC: 86 MG/DL — SIGNIFICANT CHANGE UP (ref 70–99)
GLUCOSE BLDC GLUCOMTR-MCNC: 96 MG/DL — SIGNIFICANT CHANGE UP (ref 70–99)
GLUCOSE SERPL-MCNC: 101 MG/DL — HIGH (ref 70–99)
HCT VFR BLD CALC: 25.6 % — LOW (ref 34.5–45)
HGB BLD-MCNC: 7.9 G/DL — LOW (ref 11.5–15.5)
MAGNESIUM SERPL-MCNC: 2.1 MG/DL — SIGNIFICANT CHANGE UP (ref 1.6–2.6)
MCHC RBC-ENTMCNC: 27.3 PG — SIGNIFICANT CHANGE UP (ref 27–34)
MCHC RBC-ENTMCNC: 30.9 GM/DL — LOW (ref 32–36)
MCV RBC AUTO: 88.6 FL — SIGNIFICANT CHANGE UP (ref 80–100)
NRBC # BLD: 0 /100 WBCS — SIGNIFICANT CHANGE UP (ref 0–0)
PHOSPHATE SERPL-MCNC: 2.9 MG/DL — SIGNIFICANT CHANGE UP (ref 2.5–4.5)
PLATELET # BLD AUTO: 502 K/UL — HIGH (ref 150–400)
POTASSIUM SERPL-MCNC: 4.8 MMOL/L — SIGNIFICANT CHANGE UP (ref 3.5–5.3)
POTASSIUM SERPL-SCNC: 4.8 MMOL/L — SIGNIFICANT CHANGE UP (ref 3.5–5.3)
PROT SERPL-MCNC: 6.6 G/DL — SIGNIFICANT CHANGE UP (ref 6–8.3)
RBC # BLD: 2.89 M/UL — LOW (ref 3.8–5.2)
RBC # FLD: 16.2 % — HIGH (ref 10.3–14.5)
SODIUM SERPL-SCNC: 134 MMOL/L — LOW (ref 135–145)
WBC # BLD: 13.49 K/UL — HIGH (ref 3.8–10.5)
WBC # FLD AUTO: 13.49 K/UL — HIGH (ref 3.8–10.5)

## 2022-10-02 PROCEDURE — 99233 SBSQ HOSP IP/OBS HIGH 50: CPT

## 2022-10-02 RX ORDER — SENNA PLUS 8.6 MG/1
2 TABLET ORAL AT BEDTIME
Refills: 0 | Status: DISCONTINUED | OUTPATIENT
Start: 2022-10-02 | End: 2022-10-21

## 2022-10-02 RX ADMIN — HEPARIN SODIUM 5000 UNIT(S): 5000 INJECTION INTRAVENOUS; SUBCUTANEOUS at 21:42

## 2022-10-02 RX ADMIN — Medication 200 MILLIGRAM(S): at 06:33

## 2022-10-02 RX ADMIN — HEPARIN SODIUM 5000 UNIT(S): 5000 INJECTION INTRAVENOUS; SUBCUTANEOUS at 05:30

## 2022-10-02 RX ADMIN — Medication 650 MILLIGRAM(S): at 00:43

## 2022-10-02 RX ADMIN — CHLORHEXIDINE GLUCONATE 1 APPLICATION(S): 213 SOLUTION TOPICAL at 06:43

## 2022-10-02 RX ADMIN — LOSARTAN POTASSIUM 50 MILLIGRAM(S): 100 TABLET, FILM COATED ORAL at 06:33

## 2022-10-02 RX ADMIN — Medication 60 MILLIGRAM(S): at 14:20

## 2022-10-02 RX ADMIN — Medication 650 MILLIGRAM(S): at 01:43

## 2022-10-02 RX ADMIN — Medication 200 MILLIGRAM(S): at 17:25

## 2022-10-02 RX ADMIN — ATORVASTATIN CALCIUM 20 MILLIGRAM(S): 80 TABLET, FILM COATED ORAL at 21:42

## 2022-10-02 RX ADMIN — HEPARIN SODIUM 5000 UNIT(S): 5000 INJECTION INTRAVENOUS; SUBCUTANEOUS at 14:20

## 2022-10-02 RX ADMIN — ONDANSETRON 4 MILLIGRAM(S): 8 TABLET, FILM COATED ORAL at 06:33

## 2022-10-02 NOTE — PROGRESS NOTE ADULT - SUBJECTIVE AND OBJECTIVE BOX
SUBJECTIVE: Doing well, tolerating diet.     Vital Signs Last 24 Hrs  T(C): 37.1 (02 Oct 2022 17:), Max: 37.1 (02 Oct 2022 17:)  T(F): 98.8 (02 Oct 2022 17:), Max: 98.8 (02 Oct 2022 17:)  HR: 92 (02 Oct 2022 17:) (84 - 92)  BP: 126/73 (02 Oct 2022 17:) (103/64 - 131/76)  BP(mean): --  RR: 14 (02 Oct 2022 17:22) (14 - 16)  SpO2: 95% (02 Oct 2022 17:22) (95% - 100%)    Parameters below as of 02 Oct 2022 17:22  Patient On (Oxygen Delivery Method): room air        Physical Exam:  General: NAD  Pulmonary: Nonlabored breathing, no respiratory distress  Cardiovascular: NSR  Abdominal: soft, NT/ND, no organomegaly, wound vac in place holding suction  Extremities: WWP, SCDs in place    Lines/drains/tubes:    I&O's Summary    01 Oct 2022 07:01  -  02 Oct 2022 07:00  --------------------------------------------------------  IN: 930 mL / OUT: 600 mL / NET: 330 mL    02 Oct 2022 07:01  -  02 Oct 2022 17:35  --------------------------------------------------------  IN: 840 mL / OUT: 450 mL / NET: 390 mL        LABS:                        7.8    4.49  )-----------( 548      ( 01 Oct 2022 06:43 )             27.4     10    133<L>  |  96  |  11  ----------------------------<  83  4.7   |  27  |  0.80    Ca    8.5      01 Oct 2022 06:43  Phos  3.3     10-01  Mg     2.3     10-01          CAPILLARY BLOOD GLUCOSE      POCT Blood Glucose.: 96 mg/dL (02 Oct 2022 17:19)  POCT Blood Glucose.: 86 mg/dL (02 Oct 2022 12:08)  POCT Blood Glucose.: 102 mg/dL (02 Oct 2022 07:55)  POCT Blood Glucose.: 107 mg/dL (01 Oct 2022 21:51)        RADIOLOGY & ADDITIONAL STUDIES:    72 year old F with a PMHx of HTN, DM and PSHx of  x 2, hysterectomy and lap dev ( w/ Dr. Clifford) admitted on  with 2 days of n/v, abd pain, imaging demonstrating LBO, which resolved prior to operative exploration. Recurrent bowel obstruction, now s/p Exlap JOSEFINA, SBR ( 100 cm), transferred to SICU for hemodynamic monitoring and stepped down to tele on POD1, now on regional floors.    Regular  Pain/nausea prn  Meropenem (-), ID following  Wound Vac  SQH/SCDs/OOBA/IS  ISS  AM labs  Endurance Cath (-), PICC (-)  Dispo: [facility], awaiting auth

## 2022-10-02 NOTE — PROGRESS NOTE ADULT - SUBJECTIVE AND OBJECTIVE BOX
Patient is a 72y old  Female who presents with a chief complaint of abd pain (29 Sep 2022 13:45)      INTERVAL HPI/OVERNIGHT EVENTS: offers no new complaints; current symptoms resolving    MEDICATIONS  (STANDING):  atorvastatin 20 milliGRAM(s) Oral at bedtime  chlorhexidine 2% Cloths 1 Application(s) Topical <User Schedule>  dextrose 5%. 1000 milliLiter(s) (50 mL/Hr) IV Continuous <Continuous>  dextrose 5%. 1000 milliLiter(s) (100 mL/Hr) IV Continuous <Continuous>  dextrose 50% Injectable 25 Gram(s) IV Push once  dextrose 50% Injectable 12.5 Gram(s) IV Push once  dextrose 50% Injectable 25 Gram(s) IV Push once  glucagon  Injectable 1 milliGRAM(s) IntraMuscular once  heparin   Injectable 5000 Unit(s) SubCutaneous every 8 hours  influenza  Vaccine (HIGH DOSE) 0.7 milliLiter(s) IntraMuscular once  insulin lispro (ADMELOG) corrective regimen sliding scale   SubCutaneous three times a day before meals  insulin lispro (ADMELOG) corrective regimen sliding scale   SubCutaneous at bedtime  labetalol 200 milliGRAM(s) Oral every 12 hours  losartan 50 milliGRAM(s) Oral daily  NIFEdipine XL 60 milliGRAM(s) Oral every 24 hours  senna 2 Tablet(s) Oral at bedtime    MEDICATIONS  (PRN):  acetaminophen     Tablet .. 650 milliGRAM(s) Oral every 6 hours PRN Mild Pain (1 - 3), Moderate Pain (4 - 6)  dextrose Oral Gel 15 Gram(s) Oral once PRN Blood Glucose LESS THAN 70 milliGRAM(s)/deciliter  ondansetron Injectable 4 milliGRAM(s) IV Push every 8 hours PRN Nausea and/or Vomiting  sodium chloride 0.9% lock flush 10 milliLiter(s) IV Push every 1 hour PRN Pre/post blood products, medications, blood draw, and to maintain line patency      __________________________________________________  REVIEW OF SYSTEMS:    CONSTITUTIONAL: No fever,   EYES: no acute visual disturbances  NECK: No pain or stiffness  RESPIRATORY: No cough; No shortness of breath  CARDIOVASCULAR: No chest pain, no palpitations  GASTROINTESTINAL: No pain. No nausea or vomiting; No diarrhea   NEUROLOGICAL: No headache or numbness, no tremors  MUSCULOSKELETAL: No joint pain, no muscle pain  GENITOURINARY: no dysuria, no frequency, no hesitancy  PSYCHIATRY: no depression , no anxiety  ALL OTHER  ROS negative        Vital Signs Last 24 Hrs  T(C): 36.9 (02 Oct 2022 14:22), Max: 37.2 (01 Oct 2022 16:43)  T(F): 98.4 (02 Oct 2022 14:22), Max: 98.9 (01 Oct 2022 16:43)  HR: 88 (02 Oct 2022 14:22) (84 - 88)  BP: 122/78 (02 Oct 2022 14:22) (103/64 - 131/76)  BP(mean): --  RR: 16 (02 Oct 2022 14:22) (15 - 17)  SpO2: 97% (02 Oct 2022 14:22) (97% - 100%)    Parameters below as of 02 Oct 2022 14:22  Patient On (Oxygen Delivery Method): room air        ________________________________________________  PHYSICAL EXAM:  GENERAL: NAD  HEENT: Normocephalic;  conjunctivae and sclerae clear; moist mucous membranes;   NECK : supple  CHEST/LUNG: Clear to auscultation bilaterally with good air entry   HEART: S1 S2  regular; no murmurs, gallops or rubs  ABDOMEN: Soft, Nontender, wound vac in place, Nondistended; Bowel sounds present  EXTREMITIES: no cyanosis; no edema; no calf tenderness  SKIN: warm and dry; no rash  NERVOUS SYSTEM:  Awake and alert; Oriented  to place, person and time ; no new deficits    _________________________________________________  LABS:                        7.8    4.49  )-----------( 548      ( 01 Oct 2022 06:43 )             27.4     10-01    133<L>  |  96  |  11  ----------------------------<  83  4.7   |  27  |  0.80    Ca    8.5      01 Oct 2022 06:43  Phos  3.3     10-01  Mg     2.3     10-01          CAPILLARY BLOOD GLUCOSE      POCT Blood Glucose.: 86 mg/dL (02 Oct 2022 12:08)  POCT Blood Glucose.: 102 mg/dL (02 Oct 2022 07:55)  POCT Blood Glucose.: 107 mg/dL (01 Oct 2022 21:51)  POCT Blood Glucose.: 95 mg/dL (01 Oct 2022 16:37)        RADIOLOGY & ADDITIONAL TESTS:      Plan of care was discussed with patient and /or primary care giver; all questions and concerns were addressed and care was aligned with patient's wishes.

## 2022-10-02 NOTE — PROGRESS NOTE ADULT - SUBJECTIVE AND OBJECTIVE BOX
INTERVAL HPI/OVERNIGHT EVENTS:   SURGERY ATTENDING    STATUS POST:  Exploratory laparotomy with lysis of adhesions extensive one hour , Small bowel resection with primary anastomosis, Repair of recurrent Incisional hernia with myofascial flaps no mesh, washout, drainage, PREVENA          POST OPERATIVE DAY #: 17    SUBJECTIVE:  Flatus: [x ] YES [ ] NO             Bowel Movement: [x ] YES [ ] NO  Pain (0-10):        1    Pain Control Adequate: [x ] YES [ ] NO  Nausea: [ ] YES [x ] NO            Vomiting: [ ] YES [x ] NO  Diarrhea: [ ] YES [x ] NO         Constipation: [ ] YES [x ] NO     Chest Pain: [ ] YES [x ] NO    SOB:  [ ] YES [x ] NO      MEDICATIONS  (STANDING):  atorvastatin 20 milliGRAM(s) Oral at bedtime  chlorhexidine 2% Cloths 1 Application(s) Topical <User Schedule>  dextrose 5%. 1000 milliLiter(s) (100 mL/Hr) IV Continuous <Continuous>  dextrose 5%. 1000 milliLiter(s) (50 mL/Hr) IV Continuous <Continuous>  dextrose 50% Injectable 25 Gram(s) IV Push once  dextrose 50% Injectable 12.5 Gram(s) IV Push once  dextrose 50% Injectable 25 Gram(s) IV Push once  glucagon  Injectable 1 milliGRAM(s) IntraMuscular once  heparin   Injectable 5000 Unit(s) SubCutaneous every 8 hours  influenza  Vaccine (HIGH DOSE) 0.7 milliLiter(s) IntraMuscular once  insulin lispro (ADMELOG) corrective regimen sliding scale   SubCutaneous three times a day before meals  insulin lispro (ADMELOG) corrective regimen sliding scale   SubCutaneous at bedtime  labetalol 200 milliGRAM(s) Oral every 12 hours  losartan 50 milliGRAM(s) Oral daily  NIFEdipine XL 60 milliGRAM(s) Oral every 24 hours  senna 2 Tablet(s) Oral at bedtime    MEDICATIONS  (PRN):  acetaminophen     Tablet .. 650 milliGRAM(s) Oral every 6 hours PRN Mild Pain (1 - 3), Moderate Pain (4 - 6)  dextrose Oral Gel 15 Gram(s) Oral once PRN Blood Glucose LESS THAN 70 milliGRAM(s)/deciliter  ondansetron Injectable 4 milliGRAM(s) IV Push every 8 hours PRN Nausea and/or Vomiting  sodium chloride 0.9% lock flush 10 milliLiter(s) IV Push every 1 hour PRN Pre/post blood products, medications, blood draw, and to maintain line patency      Vital Signs Last 24 Hrs  T(C): 37.1 (02 Oct 2022 17:22), Max: 37.1 (02 Oct 2022 17:22)  T(F): 98.8 (02 Oct 2022 17:22), Max: 98.8 (02 Oct 2022 17:22)  HR: 92 (02 Oct 2022 17:22) (84 - 92)  BP: 126/73 (02 Oct 2022 17:22) (103/64 - 131/76)  BP(mean): --  RR: 14 (02 Oct 2022 17:22) (14 - 16)  SpO2: 95% (02 Oct 2022 17:22) (95% - 100%)    Parameters below as of 02 Oct 2022 17:22  Patient On (Oxygen Delivery Method): room air        PHYSICAL EXAM:      Constitutional:    Eyes:    ENMT:    Neck:    Breasts:    Back:    Respiratory:    Cardiovascular:    Gastrointestinal:    Genitourinary:    Rectal:    Extremities:    Vascular:    Neurological:    Skin:    Lymph Nodes:    Musculoskeletal:    Psychiatric:        I&O's Detail    01 Oct 2022 07:01  -  02 Oct 2022 07:00  --------------------------------------------------------  IN:    Oral Fluid: 930 mL  Total IN: 930 mL    OUT:    VAC (Vacuum Assisted Closure) System (mL): 50 mL    Voided (mL): 550 mL  Total OUT: 600 mL    Total NET: 330 mL      02 Oct 2022 07:01  -  02 Oct 2022 18:12  --------------------------------------------------------  IN:    Oral Fluid: 840 mL  Total IN: 840 mL    OUT:    VAC (Vacuum Assisted Closure) System (mL): 50 mL    Voided (mL): 400 mL  Total OUT: 450 mL    Total NET: 390 mL          LABS:                        7.8    4.49  )-----------( 548      ( 01 Oct 2022 06:43 )             27.4     10-01    133<L>  |  96  |  11  ----------------------------<  83  4.7   |  27  |  0.80    Ca    8.5      01 Oct 2022 06:43  Phos  3.3     10-01  Mg     2.3     10-01            RADIOLOGY & ADDITIONAL STUDIES:

## 2022-10-03 LAB
ANION GAP SERPL CALC-SCNC: 11 MMOL/L — SIGNIFICANT CHANGE UP (ref 5–17)
BUN SERPL-MCNC: 15 MG/DL — SIGNIFICANT CHANGE UP (ref 7–23)
CALCIUM SERPL-MCNC: 8.9 MG/DL — SIGNIFICANT CHANGE UP (ref 8.4–10.5)
CHLORIDE SERPL-SCNC: 98 MMOL/L — SIGNIFICANT CHANGE UP (ref 96–108)
CO2 SERPL-SCNC: 24 MMOL/L — SIGNIFICANT CHANGE UP (ref 22–31)
CREAT SERPL-MCNC: 0.96 MG/DL — SIGNIFICANT CHANGE UP (ref 0.5–1.3)
EGFR: 64 ML/MIN/1.73M2 — SIGNIFICANT CHANGE UP
GLUCOSE BLDC GLUCOMTR-MCNC: 103 MG/DL — HIGH (ref 70–99)
GLUCOSE BLDC GLUCOMTR-MCNC: 112 MG/DL — HIGH (ref 70–99)
GLUCOSE BLDC GLUCOMTR-MCNC: 116 MG/DL — HIGH (ref 70–99)
GLUCOSE SERPL-MCNC: 113 MG/DL — HIGH (ref 70–99)
GRAM STN FLD: SIGNIFICANT CHANGE UP
GRAM STN FLD: SIGNIFICANT CHANGE UP
HCT VFR BLD CALC: 27.2 % — LOW (ref 34.5–45)
HGB BLD-MCNC: 8.3 G/DL — LOW (ref 11.5–15.5)
MAGNESIUM SERPL-MCNC: 2.2 MG/DL — SIGNIFICANT CHANGE UP (ref 1.6–2.6)
MCHC RBC-ENTMCNC: 27.4 PG — SIGNIFICANT CHANGE UP (ref 27–34)
MCHC RBC-ENTMCNC: 30.5 GM/DL — LOW (ref 32–36)
MCV RBC AUTO: 89.8 FL — SIGNIFICANT CHANGE UP (ref 80–100)
NRBC # BLD: 0 /100 WBCS — SIGNIFICANT CHANGE UP (ref 0–0)
PHOSPHATE SERPL-MCNC: 3.1 MG/DL — SIGNIFICANT CHANGE UP (ref 2.5–4.5)
PLATELET # BLD AUTO: 526 K/UL — HIGH (ref 150–400)
POTASSIUM SERPL-MCNC: 4.6 MMOL/L — SIGNIFICANT CHANGE UP (ref 3.5–5.3)
POTASSIUM SERPL-SCNC: 4.6 MMOL/L — SIGNIFICANT CHANGE UP (ref 3.5–5.3)
RBC # BLD: 3.03 M/UL — LOW (ref 3.8–5.2)
RBC # FLD: 16.4 % — HIGH (ref 10.3–14.5)
SODIUM SERPL-SCNC: 133 MMOL/L — LOW (ref 135–145)
SPECIMEN SOURCE: SIGNIFICANT CHANGE UP
SPECIMEN SOURCE: SIGNIFICANT CHANGE UP
WBC # BLD: 14.31 K/UL — HIGH (ref 3.8–10.5)
WBC # FLD AUTO: 14.31 K/UL — HIGH (ref 3.8–10.5)

## 2022-10-03 PROCEDURE — 99232 SBSQ HOSP IP/OBS MODERATE 35: CPT

## 2022-10-03 RX ADMIN — HEPARIN SODIUM 5000 UNIT(S): 5000 INJECTION INTRAVENOUS; SUBCUTANEOUS at 14:02

## 2022-10-03 RX ADMIN — Medication 650 MILLIGRAM(S): at 22:09

## 2022-10-03 RX ADMIN — Medication 200 MILLIGRAM(S): at 17:20

## 2022-10-03 RX ADMIN — Medication 650 MILLIGRAM(S): at 23:09

## 2022-10-03 RX ADMIN — CHLORHEXIDINE GLUCONATE 1 APPLICATION(S): 213 SOLUTION TOPICAL at 05:08

## 2022-10-03 RX ADMIN — Medication 200 MILLIGRAM(S): at 06:11

## 2022-10-03 RX ADMIN — HEPARIN SODIUM 5000 UNIT(S): 5000 INJECTION INTRAVENOUS; SUBCUTANEOUS at 05:08

## 2022-10-03 RX ADMIN — LOSARTAN POTASSIUM 50 MILLIGRAM(S): 100 TABLET, FILM COATED ORAL at 06:11

## 2022-10-03 RX ADMIN — ATORVASTATIN CALCIUM 20 MILLIGRAM(S): 80 TABLET, FILM COATED ORAL at 22:09

## 2022-10-03 RX ADMIN — HEPARIN SODIUM 5000 UNIT(S): 5000 INJECTION INTRAVENOUS; SUBCUTANEOUS at 22:10

## 2022-10-03 RX ADMIN — Medication 60 MILLIGRAM(S): at 14:03

## 2022-10-03 NOTE — PROGRESS NOTE ADULT - SUBJECTIVE AND OBJECTIVE BOX
INTERVAL HPI/OVERNIGHT EVENTS:   SURGERY ATTENDING    STATUS POST:  Exploratory laparotomy with lysis of adhesions extensive one hour , Small bowel resection with primary anastomosis, Repair of recurrent Incisional hernia with myofascial flaps no mesh, washout, drainage, PREVENA        POST OPERATIVE DAY #: 18    SUBJECTIVE:  Flatus: [x ] YES [ ] NO             Bowel Movement: [x ] YES [ ] NO  Pain (0-10):        1    Pain Control Adequate: [x ] YES [ ] NO  Nausea: [ ] YES [x ] NO            Vomiting: [ ] YES [x ] NO  Diarrhea: [ ] YES [x ] NO         Constipation: [ ] YES [x ] NO     Chest Pain: [ ] YES [x ] NO    SOB:  [ ] YES [x ] NO        MEDICATIONS  (STANDING):  atorvastatin 20 milliGRAM(s) Oral at bedtime  chlorhexidine 2% Cloths 1 Application(s) Topical <User Schedule>  dextrose 5%. 1000 milliLiter(s) (100 mL/Hr) IV Continuous <Continuous>  dextrose 5%. 1000 milliLiter(s) (50 mL/Hr) IV Continuous <Continuous>  dextrose 50% Injectable 25 Gram(s) IV Push once  dextrose 50% Injectable 12.5 Gram(s) IV Push once  dextrose 50% Injectable 25 Gram(s) IV Push once  glucagon  Injectable 1 milliGRAM(s) IntraMuscular once  heparin   Injectable 5000 Unit(s) SubCutaneous every 8 hours  influenza  Vaccine (HIGH DOSE) 0.7 milliLiter(s) IntraMuscular once  insulin lispro (ADMELOG) corrective regimen sliding scale   SubCutaneous three times a day before meals  insulin lispro (ADMELOG) corrective regimen sliding scale   SubCutaneous at bedtime  labetalol 200 milliGRAM(s) Oral every 12 hours  losartan 50 milliGRAM(s) Oral daily  NIFEdipine XL 60 milliGRAM(s) Oral every 24 hours  senna 2 Tablet(s) Oral at bedtime    MEDICATIONS  (PRN):  acetaminophen     Tablet .. 650 milliGRAM(s) Oral every 6 hours PRN Mild Pain (1 - 3), Moderate Pain (4 - 6)  dextrose Oral Gel 15 Gram(s) Oral once PRN Blood Glucose LESS THAN 70 milliGRAM(s)/deciliter  ondansetron Injectable 4 milliGRAM(s) IV Push every 8 hours PRN Nausea and/or Vomiting  sodium chloride 0.9% lock flush 10 milliLiter(s) IV Push every 1 hour PRN Pre/post blood products, medications, blood draw, and to maintain line patency      Vital Signs Last 24 Hrs  T(C): 36.9 (03 Oct 2022 09:11), Max: 37.4 (03 Oct 2022 05:00)  T(F): 98.4 (03 Oct 2022 09:11), Max: 99.4 (03 Oct 2022 05:00)  HR: 85 (03 Oct 2022 09:11) (84 - 92)  BP: 119/75 (03 Oct 2022 09:11) (113/63 - 126/73)  BP(mean): 85 (03 Oct 2022 05:00) (85 - 85)  RR: 17 (03 Oct 2022 09:11) (14 - 17)  SpO2: 97% (03 Oct 2022 09:11) (95% - 100%)    Parameters below as of 03 Oct 2022 09:11  Patient On (Oxygen Delivery Method): room air        PHYSICAL EXAM:      Constitutional:    Eyes:    ENMT:    Neck:    Breasts:    Back:    Respiratory:    Cardiovascular:    Gastrointestinal:    Genitourinary:    Rectal:    Extremities:    Vascular:    Neurological:    Skin:    Lymph Nodes:    Musculoskeletal:    Psychiatric:        I&O's Detail    02 Oct 2022 07:01  -  03 Oct 2022 07:00  --------------------------------------------------------  IN:    Oral Fluid: 1040 mL  Total IN: 1040 mL    OUT:    Emesis (mL): 0 mL    VAC (Vacuum Assisted Closure) System (mL): 75 mL    Voided (mL): 400 mL  Total OUT: 475 mL    Total NET: 565 mL      03 Oct 2022 07:01  -  03 Oct 2022 11:22  --------------------------------------------------------  IN:  Total IN: 0 mL    OUT:    VAC (Vacuum Assisted Closure) System (mL): 0 mL  Total OUT: 0 mL    Total NET: 0 mL          LABS:                        7.9    13.49 )-----------( 502      ( 02 Oct 2022 20:37 )             25.6     10-02    134<L>  |  100  |  11  ----------------------------<  101<H>  4.8   |  23  |  0.87    Ca    8.8      02 Oct 2022 20:37  Phos  2.9     10-02  Mg     2.1     10-02    TPro  6.6  /  Alb  2.8<L>  /  TBili  0.5  /  DBili  x   /  AST  13  /  ALT  9<L>  /  AlkPhos  77  10-02          RADIOLOGY & ADDITIONAL STUDIES:

## 2022-10-03 NOTE — PROGRESS NOTE ADULT - ASSESSMENT
RECOMMEND  Please collect cultures from the wound during opening/debridement to guide potential further antibiotic therapy  If wound clean but WBC increasing, will need A/P CT        Discussed with Dr. So S/P Complex abdominal surgery including bowel resection following SBO  Peritonitis   Small fluid collections in the pelvis  Right sided extraperitoneal hematoma  Superficial wound collection - possibly infected.  Now resolved after incision opened while still on antibiotics.  Postoperative leukocytosis - resolved and now increasing  S/P 2 weeks of Meropenem           RECOMMEND  Please collect cultures from the wound during opening/debridement (if undertaken) to guide potential further antibiotic therapy  If wound clean but WBC increasing, recommend performing A/P CT   Check for Cdiff if margarita diarrhea present      Discussed with Dr. So     [phone]

## 2022-10-03 NOTE — PROGRESS NOTE ADULT - ASSESSMENT
72 F PMH HTN, DM and PSH  x 2, hysterectomy and lap dev ( w/ Dr. Clifford) admitted on  with 2 days of n/v, abd pain, imaging demonstrating LBO, which resolved prior to operative exploration. Recurrent bowel obstruction, now s/p Exlap JOSEFINA, SBR (100 cm) (9/15). now with wound vac, pending YOSEPH placement    Regular  Pain/nausea prn  Meropenem (-), ID following  Wound Vac  SQH/SCDs/OOBA/IS  ISS  AM labs  Endurance Cath (-), PICC (-)  Consulted cardiology  Consulted ID who recommend to f/u on chest CT in 3 months to ensure stability of lung nodule

## 2022-10-03 NOTE — PROGRESS NOTE ADULT - SUBJECTIVE AND OBJECTIVE BOX
INTERVAL HPI/OVERNIGHT EVENTS:    ANTIBIOTICS/RELEVANT:    MEDICATIONS  (STANDING):  atorvastatin 20 milliGRAM(s) Oral at bedtime  chlorhexidine 2% Cloths 1 Application(s) Topical <User Schedule>  dextrose 5%. 1000 milliLiter(s) (100 mL/Hr) IV Continuous <Continuous>  dextrose 5%. 1000 milliLiter(s) (50 mL/Hr) IV Continuous <Continuous>  dextrose 50% Injectable 25 Gram(s) IV Push once  dextrose 50% Injectable 12.5 Gram(s) IV Push once  dextrose 50% Injectable 25 Gram(s) IV Push once  glucagon  Injectable 1 milliGRAM(s) IntraMuscular once  heparin   Injectable 5000 Unit(s) SubCutaneous every 8 hours  influenza  Vaccine (HIGH DOSE) 0.7 milliLiter(s) IntraMuscular once  insulin lispro (ADMELOG) corrective regimen sliding scale   SubCutaneous three times a day before meals  insulin lispro (ADMELOG) corrective regimen sliding scale   SubCutaneous at bedtime  labetalol 200 milliGRAM(s) Oral every 12 hours  losartan 50 milliGRAM(s) Oral daily  NIFEdipine XL 60 milliGRAM(s) Oral every 24 hours  senna 2 Tablet(s) Oral at bedtime    MEDICATIONS  (PRN):  acetaminophen     Tablet .. 650 milliGRAM(s) Oral every 6 hours PRN Mild Pain (1 - 3), Moderate Pain (4 - 6)  dextrose Oral Gel 15 Gram(s) Oral once PRN Blood Glucose LESS THAN 70 milliGRAM(s)/deciliter  ondansetron Injectable 4 milliGRAM(s) IV Push every 8 hours PRN Nausea and/or Vomiting  sodium chloride 0.9% lock flush 10 milliLiter(s) IV Push every 1 hour PRN Pre/post blood products, medications, blood draw, and to maintain line patency      Allergies    penicillin (Rash)    Intolerances        Vital Signs Last 24 Hrs  T(C): 36.9 (03 Oct 2022 13:53), Max: 37.4 (03 Oct 2022 05:00)  T(F): 98.5 (03 Oct 2022 13:53), Max: 99.4 (03 Oct 2022 05:00)  HR: 86 (03 Oct 2022 13:53) (84 - 92)  BP: 112/67 (03 Oct 2022 13:53) (112/67 - 126/73)  BP(mean): 85 (03 Oct 2022 05:00) (85 - 85)  RR: 18 (03 Oct 2022 13:53) (14 - 18)  SpO2: 97% (03 Oct 2022 13:53) (95% - 100%)    Parameters below as of 03 Oct 2022 13:53  Patient On (Oxygen Delivery Method): room air          LABS:                        8.3    14.31 )-----------( 526      ( 03 Oct 2022 15:12 )             27.2     10-03    133<L>  |  98  |  15  ----------------------------<  113<H>  4.6   |  24  |  0.96    Ca    8.9      03 Oct 2022 15:12  Phos  3.1     10-03  Mg     2.2     10-03    TPro  6.6  /  Alb  2.8<L>  /  TBili  0.5  /  DBili  x   /  AST  13  /  ALT  9<L>  /  AlkPhos  77  10-02          MICROBIOLOGY:    RADIOLOGY & ADDITIONAL STUDIES: INTERVAL HPI/OVERNIGHT EVENTS:    Leukocytosis recurred  No overt diarrhea   Abdominal discomfort subjectively unchanged  States eating some of her meals    MEDICATIONS  (STANDING):  atorvastatin 20 milliGRAM(s) Oral at bedtime  chlorhexidine 2% Cloths 1 Application(s) Topical <User Schedule>  dextrose 5%. 1000 milliLiter(s) (100 mL/Hr) IV Continuous <Continuous>  dextrose 5%. 1000 milliLiter(s) (50 mL/Hr) IV Continuous <Continuous>  dextrose 50% Injectable 25 Gram(s) IV Push once  dextrose 50% Injectable 12.5 Gram(s) IV Push once  dextrose 50% Injectable 25 Gram(s) IV Push once  glucagon  Injectable 1 milliGRAM(s) IntraMuscular once  heparin   Injectable 5000 Unit(s) SubCutaneous every 8 hours  influenza  Vaccine (HIGH DOSE) 0.7 milliLiter(s) IntraMuscular once  insulin lispro (ADMELOG) corrective regimen sliding scale   SubCutaneous three times a day before meals  insulin lispro (ADMELOG) corrective regimen sliding scale   SubCutaneous at bedtime  labetalol 200 milliGRAM(s) Oral every 12 hours  losartan 50 milliGRAM(s) Oral daily  NIFEdipine XL 60 milliGRAM(s) Oral every 24 hours  senna 2 Tablet(s) Oral at bedtime    MEDICATIONS  (PRN):  acetaminophen     Tablet .. 650 milliGRAM(s) Oral every 6 hours PRN Mild Pain (1 - 3), Moderate Pain (4 - 6)  dextrose Oral Gel 15 Gram(s) Oral once PRN Blood Glucose LESS THAN 70 milliGRAM(s)/deciliter  ondansetron Injectable 4 milliGRAM(s) IV Push every 8 hours PRN Nausea and/or Vomiting  sodium chloride 0.9% lock flush 10 milliLiter(s) IV Push every 1 hour PRN Pre/post blood products, medications, blood draw, and to maintain line patency      Allergies    penicillin (Rash)      EXAM  Vital Signs Last 24 Hrs  T(C): 36.9 (03 Oct 2022 13:53), Max: 37.4 (03 Oct 2022 05:00)  T(F): 98.5 (03 Oct 2022 13:53), Max: 99.4 (03 Oct 2022 05:00)  HR: 86 (03 Oct 2022 13:53) (84 - 92)  BP: 112/67 (03 Oct 2022 13:53) (112/67 - 126/73)  BP(mean): 85 (03 Oct 2022 05:00) (85 - 85)  RR: 18 (03 Oct 2022 13:53) (14 - 18)  SpO2: 97% (03 Oct 2022 13:53) (95% - 100%)    Parameters below as of 03 Oct 2022 13:53  Patient On (Oxygen Delivery Method): room air  Awake and alert  No rash  RRR  Chest CTA  Abd soft and appears less tender to palpation  Part of the incision with staples and part covered with VAC dressing  LEs no edema      LABS:                        8.3    14.31 )-----------( 526      ( 03 Oct 2022 15:12 )             27.2     10-03    133<L>  |  98  |  15  ----------------------------<  113<H>  4.6   |  24  |  0.96    Ca    8.9      03 Oct 2022 15:12  Phos  3.1     10-03  Mg     2.2     10-03    TPro  6.6  /  Alb  2.8<L>  /  TBili  0.5  /  DBili  x   /  AST  13  /  ALT  9<L>  /  AlkPhos  77  10-02        MICROBIOLOGY:    RADIOLOGY & ADDITIONAL STUDIES: INTERVAL HPI/OVERNIGHT EVENTS:    Leukocytosis recurred  No overt diarrhea   Abdominal discomfort subjectively unchanged  States eating some of her meals    MEDICATIONS  (STANDING):  atorvastatin 20 milliGRAM(s) Oral at bedtime  chlorhexidine 2% Cloths 1 Application(s) Topical <User Schedule>  dextrose 5%. 1000 milliLiter(s) (100 mL/Hr) IV Continuous <Continuous>  dextrose 5%. 1000 milliLiter(s) (50 mL/Hr) IV Continuous <Continuous>  dextrose 50% Injectable 25 Gram(s) IV Push once  dextrose 50% Injectable 12.5 Gram(s) IV Push once  dextrose 50% Injectable 25 Gram(s) IV Push once  glucagon  Injectable 1 milliGRAM(s) IntraMuscular once  heparin   Injectable 5000 Unit(s) SubCutaneous every 8 hours  influenza  Vaccine (HIGH DOSE) 0.7 milliLiter(s) IntraMuscular once  insulin lispro (ADMELOG) corrective regimen sliding scale   SubCutaneous three times a day before meals  insulin lispro (ADMELOG) corrective regimen sliding scale   SubCutaneous at bedtime  labetalol 200 milliGRAM(s) Oral every 12 hours  losartan 50 milliGRAM(s) Oral daily  NIFEdipine XL 60 milliGRAM(s) Oral every 24 hours  senna 2 Tablet(s) Oral at bedtime    MEDICATIONS  (PRN):  acetaminophen     Tablet .. 650 milliGRAM(s) Oral every 6 hours PRN Mild Pain (1 - 3), Moderate Pain (4 - 6)  dextrose Oral Gel 15 Gram(s) Oral once PRN Blood Glucose LESS THAN 70 milliGRAM(s)/deciliter  ondansetron Injectable 4 milliGRAM(s) IV Push every 8 hours PRN Nausea and/or Vomiting  sodium chloride 0.9% lock flush 10 milliLiter(s) IV Push every 1 hour PRN Pre/post blood products, medications, blood draw, and to maintain line patency      Allergies    penicillin (Rash)      EXAM  Vital Signs Last 24 Hrs  T(C): 36.9 (03 Oct 2022 13:53), Max: 37.4 (03 Oct 2022 05:00)  T(F): 98.5 (03 Oct 2022 13:53), Max: 99.4 (03 Oct 2022 05:00)  HR: 86 (03 Oct 2022 13:53) (84 - 92)  BP: 112/67 (03 Oct 2022 13:53) (112/67 - 126/73)  BP(mean): 85 (03 Oct 2022 05:00) (85 - 85)  RR: 18 (03 Oct 2022 13:53) (14 - 18)  SpO2: 97% (03 Oct 2022 13:53) (95% - 100%)    Parameters below as of 03 Oct 2022 13:53  Patient On (Oxygen Delivery Method): room air  Awake and alert  No rash  RRR  Chest CTA  Abd soft and appears less tender to palpation  Part of the incision with staples and part covered with VAC dressing  LEs no edema      LABS:                        8.3    14.31 )-----------( 526      ( 03 Oct 2022 15:12 )             27.2     10-03    133<L>  |  98  |  15  ----------------------------<  113<H>  4.6   |  24  |  0.96    Ca    8.9      03 Oct 2022 15:12  Phos  3.1     10-03  Mg     2.2     10-03    TPro  6.6  /  Alb  2.8<L>  /  TBili  0.5  /  DBili  x   /  AST  13  /  ALT  9<L>  /  AlkPhos  77  10-02

## 2022-10-03 NOTE — PROGRESS NOTE ADULT - SUBJECTIVE AND OBJECTIVE BOX
GENERAL SURGERY PROGRESS NOTE    SUBJECTIVE: Patient seen and examined bedside.    heparin   Injectable 5000 Unit(s) SubCutaneous every 8 hours  labetalol 200 milliGRAM(s) Oral every 12 hours  losartan 50 milliGRAM(s) Oral daily  NIFEdipine XL 60 milliGRAM(s) Oral every 24 hours      Vital Signs Last 24 Hrs  T(C): 36.9 (03 Oct 2022 09:11), Max: 37.4 (03 Oct 2022 05:00)  T(F): 98.4 (03 Oct 2022 09:11), Max: 99.4 (03 Oct 2022 05:00)  HR: 85 (03 Oct 2022 09:11) (84 - 92)  BP: 119/75 (03 Oct 2022 09:11) (113/63 - 126/73)  BP(mean): 85 (03 Oct 2022 05:00) (85 - 85)  RR: 17 (03 Oct 2022 09:11) (14 - 17)  SpO2: 97% (03 Oct 2022 09:11) (95% - 100%)    Parameters below as of 03 Oct 2022 09:11  Patient On (Oxygen Delivery Method): room air      I&O's Detail    02 Oct 2022 07:01  -  03 Oct 2022 07:00  --------------------------------------------------------  IN:    Oral Fluid: 1040 mL  Total IN: 1040 mL    OUT:    Emesis (mL): 0 mL    VAC (Vacuum Assisted Closure) System (mL): 75 mL    Voided (mL): 400 mL  Total OUT: 475 mL    Total NET: 565 mL          PHYSICAL EXAM    General: NAD, resting comfortably in bed  C/V: NSR  Pulm: Nonlabored breathing, no respiratory distress on room air  Abd: soft, ND, wound vac holding appropriate suction, no rebound tenderness, no guarding  Extrem: WWP, no edema, SCDs in place        LABS:                        7.9    13.49 )-----------( 502      ( 02 Oct 2022 20:37 )             25.6     10-02    134<L>  |  100  |  11  ----------------------------<  101<H>  4.8   |  23  |  0.87    Ca    8.8      02 Oct 2022 20:37  Phos  2.9     10-02  Mg     2.1     10-02    TPro  6.6  /  Alb  2.8<L>  /  TBili  0.5  /  DBili  x   /  AST  13  /  ALT  9<L>  /  AlkPhos  77  10-02          RADIOLOGY & ADDITIONAL STUDIES:

## 2022-10-03 NOTE — PROGRESS NOTE ADULT - ASSESSMENT
72 year old woman with HTN, DM2, past surgical history of  x 2, hysterectomy, and laparoscopic cholecystectomy (), admitted for large bowel obstruction; had spontaneous return of bowel function initially; however, bowel obstruction recurred, so taken to OR for ex lap with lysis of adhesions and bowel resection () . Monitored in SICU post -op ; now on regional. Hospital course complicated by up-trending leukocytosis of unknown etiology.     # Large bowel obstruction   #post operative state  s/p OR for ex lap with lysis of adhesions and bowel resection ()  - rest of care per primary team     # Leukocytosis -wbc down trending- to normal 9k today   unclear etiology -- Had CT imaging done that showed : Small fluid collections in the pelvis are only partially walled off. They could represent small hematomas or early developing abscesses  Was started on meropenem by ID   Pending repeat labs this AM      #reactive thrombocytosis  plts elevated from 672 --> 719k ->615-> 633->548  will continue to monitor     #hypophosphatemia   improved    #HTN   takes HCTZ 25mg qd, losartan 100mg qd, labetalol 200mg BID, nifedipine 60mg ER qd at home.   - continue labetalol 200mg BID  - on losartan at 50mg qd   - on nifepidine 60m ER qd ( can hold if bp is low )   - Continue to hold HCTZ on discharge as pt's BP is well controlled on above 3 anti-htn medication     # Type 2 Diabetes complicated by hyperglycemia   Takes metformin at home.   cw insulin sliding scale while inpatient.   pt's a1c at goal for her age, resume metformin on discharge.     #Incidental finding of bilateral lung nodules  CT chest showing bilateral lung nodules, measuring up to 7 mm in the right   upper lobe  Recommend follow-up chest CT in three months to ensure stability.    # COVID 19 infection (present on admission)   Incidental finding of COVID 19 infection on admission nasal swab. Without any dyspnea, or sore throat. Per [facility] treatment algorithm, did not meet criteria for monoclonal antibodies at time of admission.   No O2 requirement, does not meet criteria for remdesivir/dexamethasone.   Recommend enoxaparin for DVT ppx     # Acute blood loss anemia   Hgb downtrended from time of admission to-->8-> 7.5->7.8  Partially attributable to operative blood losses and Right sided extraperitoneal hematoma  continue to trend daily CBC while inpatient;  ferritin, iron sat not low  age appropriate cancer screening outpatient     DVT ppx - HSQ

## 2022-10-03 NOTE — PROGRESS NOTE ADULT - SUBJECTIVE AND OBJECTIVE BOX
Subjective: patient seen bedside. Having some back pain today, feels like her chronic back pain just worse from the bed. Is trying to ambulate but some difficulty because she feels tight. Has lidocaine patch which is helping. Has not had repeat labs today because the draw was hurting.    Vital Signs Last 12 Hrs  T(F): 98.4 (10-03-22 @ 09:11), Max: 99.4 (10-03-22 @ 05:00)  HR: 85 (10-03-22 @ 09:11) (85 - 88)  BP: 119/75 (10-03-22 @ 09:11) (115/71 - 119/75)  BP(mean): 85 (10-03-22 @ 05:00) (85 - 85)  RR: 17 (10-03-22 @ 09:11) (17 - 17)  SpO2: 97% (10-03-22 @ 09:11) (96% - 97%)  I&O's Summary    02 Oct 2022 07:01  -  03 Oct 2022 07:00  --------------------------------------------------------  IN: 1040 mL / OUT: 475 mL / NET: 565 mL    03 Oct 2022 07:01  -  03 Oct 2022 11:59  --------------------------------------------------------  IN: 0 mL / OUT: 300 mL / NET: -300 mL        PHYSICAL EXAM:  Constitutional: pleasant female in bed in NAD  HEENT: EOMI.   Neck: Supple, no JVD  Respiratory: CTA B/L   Cardiovascular: RRR, normal S1 and S2    Gastrointestinal: +BS, soft NTND, s   Extremities: no edema    Neurological: AAOx3 .   Skin: No gross skin abnormalities or rashes        LABS:                        7.9    13.49 )-----------( 502      ( 02 Oct 2022 20:37 )             25.6     10-02    134<L>  |  100  |  11  ----------------------------<  101<H>  4.8   |  23  |  0.87    Ca    8.8      02 Oct 2022 20:37  Phos  2.9     10-02  Mg     2.1     10-02    TPro  6.6  /  Alb  2.8<L>  /  TBili  0.5  /  DBili  x   /  AST  13  /  ALT  9<L>  /  AlkPhos  77  10-02            RADIOLOGY & ADDITIONAL TESTS:    MEDICATIONS  (STANDING):  atorvastatin 20 milliGRAM(s) Oral at bedtime  chlorhexidine 2% Cloths 1 Application(s) Topical <User Schedule>  dextrose 5%. 1000 milliLiter(s) (100 mL/Hr) IV Continuous <Continuous>  dextrose 5%. 1000 milliLiter(s) (50 mL/Hr) IV Continuous <Continuous>  dextrose 50% Injectable 25 Gram(s) IV Push once  dextrose 50% Injectable 12.5 Gram(s) IV Push once  dextrose 50% Injectable 25 Gram(s) IV Push once  glucagon  Injectable 1 milliGRAM(s) IntraMuscular once  heparin   Injectable 5000 Unit(s) SubCutaneous every 8 hours  influenza  Vaccine (HIGH DOSE) 0.7 milliLiter(s) IntraMuscular once  insulin lispro (ADMELOG) corrective regimen sliding scale   SubCutaneous three times a day before meals  insulin lispro (ADMELOG) corrective regimen sliding scale   SubCutaneous at bedtime  labetalol 200 milliGRAM(s) Oral every 12 hours  losartan 50 milliGRAM(s) Oral daily  NIFEdipine XL 60 milliGRAM(s) Oral every 24 hours  senna 2 Tablet(s) Oral at bedtime    MEDICATIONS  (PRN):  acetaminophen     Tablet .. 650 milliGRAM(s) Oral every 6 hours PRN Mild Pain (1 - 3), Moderate Pain (4 - 6)  dextrose Oral Gel 15 Gram(s) Oral once PRN Blood Glucose LESS THAN 70 milliGRAM(s)/deciliter  ondansetron Injectable 4 milliGRAM(s) IV Push every 8 hours PRN Nausea and/or Vomiting  sodium chloride 0.9% lock flush 10 milliLiter(s) IV Push every 1 hour PRN Pre/post blood products, medications, blood draw, and to maintain line patency

## 2022-10-04 LAB
ANION GAP SERPL CALC-SCNC: 14 MMOL/L — SIGNIFICANT CHANGE UP (ref 5–17)
BUN SERPL-MCNC: 19 MG/DL — SIGNIFICANT CHANGE UP (ref 7–23)
C DIFF GDH STL QL: SIGNIFICANT CHANGE UP
C DIFF GDH STL QL: SIGNIFICANT CHANGE UP
CALCIUM SERPL-MCNC: 9 MG/DL — SIGNIFICANT CHANGE UP (ref 8.4–10.5)
CHLORIDE SERPL-SCNC: 100 MMOL/L — SIGNIFICANT CHANGE UP (ref 96–108)
CK SERPL-CCNC: 25 U/L — SIGNIFICANT CHANGE UP (ref 25–170)
CO2 SERPL-SCNC: 20 MMOL/L — LOW (ref 22–31)
CREAT SERPL-MCNC: 1.1 MG/DL — SIGNIFICANT CHANGE UP (ref 0.5–1.3)
CRP SERPL-MCNC: 245.7 MG/L — HIGH (ref 0–4)
EGFR: 54 ML/MIN/1.73M2 — LOW
ENTEROCOC DNA BLD POS QL NAA+NON-PROBE: SIGNIFICANT CHANGE UP
ERYTHROCYTE [SEDIMENTATION RATE] IN BLOOD: 122 MM/HR — HIGH
GLUCOSE BLDC GLUCOMTR-MCNC: 133 MG/DL — HIGH (ref 70–99)
GLUCOSE BLDC GLUCOMTR-MCNC: 141 MG/DL — HIGH (ref 70–99)
GLUCOSE BLDC GLUCOMTR-MCNC: 150 MG/DL — HIGH (ref 70–99)
GLUCOSE BLDC GLUCOMTR-MCNC: 155 MG/DL — HIGH (ref 70–99)
GLUCOSE SERPL-MCNC: 126 MG/DL — HIGH (ref 70–99)
GRAM STN FLD: SIGNIFICANT CHANGE UP
HCT VFR BLD CALC: 27.8 % — LOW (ref 34.5–45)
HGB BLD-MCNC: 8.7 G/DL — LOW (ref 11.5–15.5)
MAGNESIUM SERPL-MCNC: 2.2 MG/DL — SIGNIFICANT CHANGE UP (ref 1.6–2.6)
MCHC RBC-ENTMCNC: 27.4 PG — SIGNIFICANT CHANGE UP (ref 27–34)
MCHC RBC-ENTMCNC: 31.3 GM/DL — LOW (ref 32–36)
MCV RBC AUTO: 87.7 FL — SIGNIFICANT CHANGE UP (ref 80–100)
METHOD TYPE: SIGNIFICANT CHANGE UP
NRBC # BLD: 0 /100 WBCS — SIGNIFICANT CHANGE UP (ref 0–0)
PHOSPHATE SERPL-MCNC: 3.8 MG/DL — SIGNIFICANT CHANGE UP (ref 2.5–4.5)
PLATELET # BLD AUTO: 505 K/UL — HIGH (ref 150–400)
POTASSIUM SERPL-MCNC: 3.9 MMOL/L — SIGNIFICANT CHANGE UP (ref 3.5–5.3)
POTASSIUM SERPL-SCNC: 3.9 MMOL/L — SIGNIFICANT CHANGE UP (ref 3.5–5.3)
RBC # BLD: 3.17 M/UL — LOW (ref 3.8–5.2)
RBC # FLD: 16.1 % — HIGH (ref 10.3–14.5)
SODIUM SERPL-SCNC: 134 MMOL/L — LOW (ref 135–145)
WBC # BLD: 12.35 K/UL — HIGH (ref 3.8–10.5)
WBC # FLD AUTO: 12.35 K/UL — HIGH (ref 3.8–10.5)

## 2022-10-04 PROCEDURE — 99232 SBSQ HOSP IP/OBS MODERATE 35: CPT

## 2022-10-04 PROCEDURE — 36000 PLACE NEEDLE IN VEIN: CPT

## 2022-10-04 PROCEDURE — 76937 US GUIDE VASCULAR ACCESS: CPT | Mod: 26

## 2022-10-04 RX ORDER — LABETALOL HCL 100 MG
200 TABLET ORAL EVERY 12 HOURS
Refills: 0 | Status: DISCONTINUED | OUTPATIENT
Start: 2022-10-04 | End: 2022-10-21

## 2022-10-04 RX ORDER — SODIUM CHLORIDE 9 MG/ML
1000 INJECTION, SOLUTION INTRAVENOUS
Refills: 0 | Status: DISCONTINUED | OUTPATIENT
Start: 2022-10-04 | End: 2022-10-05

## 2022-10-04 RX ORDER — DAPTOMYCIN 500 MG/10ML
750 INJECTION, POWDER, LYOPHILIZED, FOR SOLUTION INTRAVENOUS EVERY 24 HOURS
Refills: 0 | Status: DISCONTINUED | OUTPATIENT
Start: 2022-10-04 | End: 2022-10-07

## 2022-10-04 RX ORDER — LINEZOLID 600 MG/300ML
INJECTION, SOLUTION INTRAVENOUS
Refills: 0 | Status: DISCONTINUED | OUTPATIENT
Start: 2022-10-04 | End: 2022-10-04

## 2022-10-04 RX ORDER — VANCOMYCIN HCL 1 G
250 VIAL (EA) INTRAVENOUS EVERY 6 HOURS
Refills: 0 | Status: DISCONTINUED | OUTPATIENT
Start: 2022-10-04 | End: 2022-10-07

## 2022-10-04 RX ORDER — SODIUM CHLORIDE 9 MG/ML
500 INJECTION, SOLUTION INTRAVENOUS ONCE
Refills: 0 | Status: COMPLETED | OUTPATIENT
Start: 2022-10-04 | End: 2022-10-04

## 2022-10-04 RX ORDER — VANCOMYCIN HCL 1 G
125 VIAL (EA) INTRAVENOUS EVERY 6 HOURS
Refills: 0 | Status: DISCONTINUED | OUTPATIENT
Start: 2022-10-04 | End: 2022-10-04

## 2022-10-04 RX ADMIN — HEPARIN SODIUM 5000 UNIT(S): 5000 INJECTION INTRAVENOUS; SUBCUTANEOUS at 06:22

## 2022-10-04 RX ADMIN — LOSARTAN POTASSIUM 50 MILLIGRAM(S): 100 TABLET, FILM COATED ORAL at 06:22

## 2022-10-04 RX ADMIN — Medication 125 MILLIGRAM(S): at 10:18

## 2022-10-04 RX ADMIN — SODIUM CHLORIDE 50 MILLILITER(S): 9 INJECTION, SOLUTION INTRAVENOUS at 17:47

## 2022-10-04 RX ADMIN — CHLORHEXIDINE GLUCONATE 1 APPLICATION(S): 213 SOLUTION TOPICAL at 06:40

## 2022-10-04 RX ADMIN — Medication 650 MILLIGRAM(S): at 19:13

## 2022-10-04 RX ADMIN — Medication 250 MILLIGRAM(S): at 16:06

## 2022-10-04 RX ADMIN — DAPTOMYCIN 130 MILLIGRAM(S): 500 INJECTION, POWDER, LYOPHILIZED, FOR SOLUTION INTRAVENOUS at 17:47

## 2022-10-04 RX ADMIN — SODIUM CHLORIDE 500 MILLILITER(S): 9 INJECTION, SOLUTION INTRAVENOUS at 18:22

## 2022-10-04 RX ADMIN — Medication 60 MILLIGRAM(S): at 12:21

## 2022-10-04 RX ADMIN — HEPARIN SODIUM 5000 UNIT(S): 5000 INJECTION INTRAVENOUS; SUBCUTANEOUS at 21:36

## 2022-10-04 RX ADMIN — Medication 650 MILLIGRAM(S): at 19:40

## 2022-10-04 RX ADMIN — HEPARIN SODIUM 5000 UNIT(S): 5000 INJECTION INTRAVENOUS; SUBCUTANEOUS at 14:31

## 2022-10-04 RX ADMIN — Medication 250 MILLIGRAM(S): at 21:37

## 2022-10-04 RX ADMIN — ONDANSETRON 4 MILLIGRAM(S): 8 TABLET, FILM COATED ORAL at 00:54

## 2022-10-04 RX ADMIN — Medication 200 MILLIGRAM(S): at 06:22

## 2022-10-04 RX ADMIN — ATORVASTATIN CALCIUM 20 MILLIGRAM(S): 80 TABLET, FILM COATED ORAL at 21:37

## 2022-10-04 RX ADMIN — ONDANSETRON 4 MILLIGRAM(S): 8 TABLET, FILM COATED ORAL at 19:13

## 2022-10-04 NOTE — PROGRESS NOTE ADULT - SUBJECTIVE AND OBJECTIVE BOX
SUBJECTIVE:  Patient seen and examined at bedside. Has developed significant diarrhea since yesterday afernoon. Some abdminal tenderness. Back pain somewhat improved. Is anxious about overall health given another complication now that she is C diff positive. Otherwise no fevers, chills, CP, SOB, NV, dysuria etc.     Vital Signs Last 12 Hrs  T(F): 98.5 (10-04-22 @ 08:25), Max: 99 (10-04-22 @ 04:44)  HR: 90 (10-04-22 @ 08:25) (90 - 95)  BP: 122/72 (10-04-22 @ 08:25) (111/65 - 122/72)  BP(mean): --  RR: 17 (10-04-22 @ 08:25) (17 - 18)  SpO2: 99% (10-04-22 @ 08:25) (99% - 100%)  I&O's Summary    03 Oct 2022 07:01  -  04 Oct 2022 07:00  --------------------------------------------------------  IN: 1860 mL / OUT: 1700 mL / NET: 160 mL    04 Oct 2022 07:01  -  04 Oct 2022 14:12  --------------------------------------------------------  IN: 840 mL / OUT: 2 mL / NET: 838 mL        PHYSICAL EXAM:  Constitutional: pleasant female looks anxious  HEENT: NC/AT, EOMI  Neck: Supple, no JVD  Respiratory: CTA B/L.   Cardiovascular: RRR, normal S1 and S2,   Gastrointestinal: +BS, minimal tenderness  Extremities: no edema.    Neurological: AAOx3        LABS:                        8.7    12.35 )-----------( 505      ( 04 Oct 2022 10:22 )             27.8     10-04    134<L>  |  100  |  19  ----------------------------<  126<H>  3.9   |  20<L>  |  1.10    Ca    9.0      04 Oct 2022 10:22  Phos  3.8     10-04  Mg     2.2     10-04    TPro  6.6  /  Alb  2.8<L>  /  TBili  0.5  /  DBili  x   /  AST  13  /  ALT  9<L>  /  AlkPhos  77  10-02            RADIOLOGY & ADDITIONAL TESTS:    MEDICATIONS  (STANDING):  atorvastatin 20 milliGRAM(s) Oral at bedtime  chlorhexidine 2% Cloths 1 Application(s) Topical <User Schedule>  dextrose 5% + sodium chloride 0.45%. 1000 milliLiter(s) (50 mL/Hr) IV Continuous <Continuous>  dextrose 5%. 1000 milliLiter(s) (100 mL/Hr) IV Continuous <Continuous>  dextrose 5%. 1000 milliLiter(s) (50 mL/Hr) IV Continuous <Continuous>  dextrose 50% Injectable 25 Gram(s) IV Push once  dextrose 50% Injectable 12.5 Gram(s) IV Push once  dextrose 50% Injectable 25 Gram(s) IV Push once  glucagon  Injectable 1 milliGRAM(s) IntraMuscular once  heparin   Injectable 5000 Unit(s) SubCutaneous every 8 hours  influenza  Vaccine (HIGH DOSE) 0.7 milliLiter(s) IntraMuscular once  insulin lispro (ADMELOG) corrective regimen sliding scale   SubCutaneous three times a day before meals  insulin lispro (ADMELOG) corrective regimen sliding scale   SubCutaneous at bedtime  labetalol 200 milliGRAM(s) Oral every 12 hours  losartan 50 milliGRAM(s) Oral daily  NIFEdipine XL 60 milliGRAM(s) Oral every 24 hours  senna 2 Tablet(s) Oral at bedtime  vancomycin    Solution 250 milliGRAM(s) Oral every 6 hours    MEDICATIONS  (PRN):  acetaminophen     Tablet .. 650 milliGRAM(s) Oral every 6 hours PRN Mild Pain (1 - 3), Moderate Pain (4 - 6)  dextrose Oral Gel 15 Gram(s) Oral once PRN Blood Glucose LESS THAN 70 milliGRAM(s)/deciliter  ondansetron Injectable 4 milliGRAM(s) IV Push every 8 hours PRN Nausea and/or Vomiting  sodium chloride 0.9% lock flush 10 milliLiter(s) IV Push every 1 hour PRN Pre/post blood products, medications, blood draw, and to maintain line patency

## 2022-10-04 NOTE — PROCEDURE NOTE - NSICDXPROCEDURE_GEN_ALL_CORE_FT
PROCEDURES:  Insertion of intravenous catheter with ultrasound guidance 04-Oct-2022 22:16:46  Zuleika Potts

## 2022-10-04 NOTE — PROGRESS NOTE ADULT - ASSESSMENT
72 year old woman with HTN, DM2, past surgical history of  x 2, hysterectomy, and laparoscopic cholecystectomy (), admitted for large bowel obstruction; had spontaneous return of bowel function initially; however, bowel obstruction recurred, so taken to OR for ex lap with lysis of adhesions and bowel resection () . Monitored in SICU post -op ; now on regional. Hospital course complicated by up-trending leukocytosis of unknown etiology.     # Large bowel obstruction   #post operative state  s/p OR for ex lap with lysis of adhesions and bowel resection ()  - rest of care per primary team     #C diff; non severe  -Test positive this AM. No bump in Cr. WBC not >15K at this time.   -First instance a/p patient  -Oral vancomycin is DOC in first instance of non severe C diff. Generally 125mg q6 but using 250mg I suppose given her recent bowel surgery.   -Can use banatrol for symptom management    # Leukocytosis   Likely due to C idff infection      #reactive thrombocytosis  will continue to monitor     #hypophosphatemia   improved    #HTN   takes HCTZ 25mg qd, losartan 100mg qd, labetalol 200mg BID, nifedipine 60mg ER qd at home.   - continue labetalol 200mg BID  - on losartan at 50mg qd   - on nifepidine 60m ER qd ( can hold if bp is low )   - Continue to hold HCTZ on discharge as pt's BP is well controlled on above 3 anti-htn medication     # Type 2 Diabetes complicated by hyperglycemia   Takes metformin at home.   cw insulin sliding scale while inpatient.   pt's a1c at goal for her age, resume metformin on discharge.     #Incidental finding of bilateral lung nodules  CT chest showing bilateral lung nodules, measuring up to 7 mm in the right   upper lobe  Recommend follow-up chest CT in three months to ensure stability.    # COVID 19 infection (present on admission)   Incidental finding of COVID 19 infection on admission nasal swab. Without any dyspnea, or sore throat. Per [facility] treatment algorithm, did not meet criteria for monoclonal antibodies at time of admission.   No O2 requirement, does not meet criteria for remdesivir/dexamethasone.     # Acute blood loss anemia   continue to trend daily CBC while inpatient;  ferritin, iron sat not low  age appropriate cancer screening outpatient     DVT ppx - HSQ 72 year old woman with HTN, DM2, past surgical history of  x 2, hysterectomy, and laparoscopic cholecystectomy (), admitted for large bowel obstruction; had spontaneous return of bowel function initially; however, bowel obstruction recurred, so taken to OR for ex lap with lysis of adhesions and bowel resection () . Monitored in SICU post -op ; now on regional. Hospital course complicated by up-trending leukocytosis of unknown etiology.     # Large bowel obstruction   #post operative state  s/p OR for ex lap with lysis of adhesions and bowel resection ()  - rest of care per primary team     #C diff; non severe  -Test positive this AM. No bump in Cr. WBC not >15K at this time.   -First instance a/p patient  -Oral vancomycin is DOC in first instance of non severe C diff. Generally 125mg q6 but using 250mg I suppose given her recent bowel surgery.   -Can use banatrol for symptom management    #E. faecalis bacteremia  -ID consult. Afebrile at this time.     # Leukocytosis   Likely due to C idff infection and bacteremia E. faecalis that was just seen  ID consulted, f/u their recs  Repeat blood cultures after starting antimicrobial    #reactive thrombocytosis  will continue to monitor     #hypophosphatemia   improved    #HTN   takes HCTZ 25mg qd, losartan 100mg qd, labetalol 200mg BID, nifedipine 60mg ER qd at home.   - continue labetalol 200mg BID  - on losartan at 50mg qd   - on nifepidine 60m ER qd ( can hold if bp is low )   - Continue to hold HCTZ on discharge as pt's BP is well controlled on above 3 anti-htn medication     # Type 2 Diabetes complicated by hyperglycemia   Takes metformin at home.   cw insulin sliding scale while inpatient.   pt's a1c at goal for her age, resume metformin on discharge.     #Incidental finding of bilateral lung nodules  CT chest showing bilateral lung nodules, measuring up to 7 mm in the right   upper lobe  Recommend follow-up chest CT in three months to ensure stability.    # COVID 19 infection (present on admission)   Incidental finding of COVID 19 infection on admission nasal swab. Without any dyspnea, or sore throat. Per [facility] treatment algorithm, did not meet criteria for monoclonal antibodies at time of admission.   No O2 requirement, does not meet criteria for remdesivir/dexamethasone.     # Acute blood loss anemia   continue to trend daily CBC while inpatient;  ferritin, iron sat not low  age appropriate cancer screening outpatient     DVT ppx - HSQ

## 2022-10-04 NOTE — PROGRESS NOTE ADULT - SUBJECTIVE AND OBJECTIVE BOX
INTERVAL HPI/OVERNIGHT EVENTS:      MEDICATIONS  (STANDING):  atorvastatin 20 milliGRAM(s) Oral at bedtime  chlorhexidine 2% Cloths 1 Application(s) Topical <User Schedule>  dextrose 5% + sodium chloride 0.45%. 1000 milliLiter(s) (50 mL/Hr) IV Continuous <Continuous>  dextrose 5%. 1000 milliLiter(s) (100 mL/Hr) IV Continuous <Continuous>  dextrose 5%. 1000 milliLiter(s) (50 mL/Hr) IV Continuous <Continuous>  dextrose 50% Injectable 25 Gram(s) IV Push once  dextrose 50% Injectable 12.5 Gram(s) IV Push once  dextrose 50% Injectable 25 Gram(s) IV Push once  glucagon  Injectable 1 milliGRAM(s) IntraMuscular once  heparin   Injectable 5000 Unit(s) SubCutaneous every 8 hours  influenza  Vaccine (HIGH DOSE) 0.7 milliLiter(s) IntraMuscular once  insulin lispro (ADMELOG) corrective regimen sliding scale   SubCutaneous three times a day before meals  insulin lispro (ADMELOG) corrective regimen sliding scale   SubCutaneous at bedtime  labetalol 200 milliGRAM(s) Oral every 12 hours  losartan 50 milliGRAM(s) Oral daily  NIFEdipine XL 60 milliGRAM(s) Oral every 24 hours  senna 2 Tablet(s) Oral at bedtime  vancomycin    Solution 250 milliGRAM(s) Oral every 6 hours    MEDICATIONS  (PRN):  acetaminophen     Tablet .. 650 milliGRAM(s) Oral every 6 hours PRN Mild Pain (1 - 3), Moderate Pain (4 - 6)  dextrose Oral Gel 15 Gram(s) Oral once PRN Blood Glucose LESS THAN 70 milliGRAM(s)/deciliter  ondansetron Injectable 4 milliGRAM(s) IV Push every 8 hours PRN Nausea and/or Vomiting  sodium chloride 0.9% lock flush 10 milliLiter(s) IV Push every 1 hour PRN Pre/post blood products, medications, blood draw, and to maintain line patency      Allergies    penicillin (Rash)        Vital Signs Last 24 Hrs  T(C): 36.9 (04 Oct 2022 08:25), Max: 37.2 (03 Oct 2022 16:39)  T(F): 98.5 (04 Oct 2022 08:25), Max: 99 (04 Oct 2022 04:44)  HR: 90 (04 Oct 2022 08:25) (90 - 97)  BP: 122/72 (04 Oct 2022 08:25) (111/65 - 137/74)  BP(mean): --  RR: 17 (04 Oct 2022 08:25) (16 - 18)  SpO2: 99% (04 Oct 2022 08:25) (99% - 100%)    Parameters below as of 04 Oct 2022 08:25  Patient On (Oxygen Delivery Method): room air        LABS:                        8.7    12.35 )-----------( 505      ( 04 Oct 2022 10:22 )             27.8     10-04    134<L>  |  100  |  19  ----------------------------<  126<H>  3.9   |  20<L>  |  1.10    Ca    9.0      04 Oct 2022 10:22  Phos  3.8     10-04  Mg     2.2     10-04    TPro  6.6  /  Alb  2.8<L>  /  TBili  0.5  /  DBili  x   /  AST  13  /  ALT  9<L>  /  AlkPhos  77  10-02        MICROBIOLOGY:    Culture - Other (10.03.22 @ 18:45)    Gram Stain:   Rare Yeast  Rare Gram Positive Cocci in Pairs and Chains  Rare to few Gram Negative Rods  Few WBC's    Specimen Source: Wound Wound    Culture Results:   Few Enterobacter cloacae complex  Few Klebsiella oxytoca/Raoultella ornithinolytica  Few Enterococcus faecium  Moderate Candida albicans  Culture in progress    Culture - Other (10.03.22 @ 18:45)    Gram Stain:   Rare Yeast  Rare Gram positive cocci in pairs  Rare to few Gram Negative Rods  Few WBC's    Specimen Source: Wound Wound    Culture Results:   Few Enterobacter cloacae complex  Few Klebsiella oxytoca/Raoultella ornithinolytica  Moderate Enterococcus faecium  Moderate Enterococcus faecalis  Culture in progress    Culture - Blood (10.03.22 @ 18:35)    Gram Stain:   Anaerobic Bottle: Gram Positive Cocci in Pairs and Chains  Result called to and read back byESTEFANIA Sparrow RN (9UR)  10/04/2022  12:01:13  ***Blood Panel PCR results on this specimen are available  approximately 3 hours after the Gram stain result.***  Gram stain, PCR, and/or culture results may not always  correspond due to difference in methodologies.  ************************************************************  This PCR assay was performed using [employer].  The following targets are tested for: Enterococcus,  vancomycin resistant enterococci, Listeria monocytogenes,  coagulase negative staphylococci, S. aureus,  methicillin resistant S. aureus, Streptococcus agalactiae  (Group B), S. pneumoniae, S. pyogenes (Group A),  Acinetobacter baumannii, Enterobacter cloacae, E. coli,  Klebsiella oxytoca, K. pneumoniae, Proteus sp.,  Serratia marcescens, Haemophilus influenzae,  Neisseria meningitidis, Pseudomonas aeruginosa, Candida  albicans, C. glabrata, C krusei, C parapsilosis,  C.tropicalis and the KPC resistance gene.    -  Enterococcus species: Detec    Specimen Source: .Blood Blood    Organism: Blood Culture PCR    Culture Results:   Culture in progress    Organism Identification: Blood Culture PCR    Method Type: PCR    Culture - Blood (10.03.22 @ 18:30)    Specimen Source: .Blood Blood    Culture Results:   No growth at 12 hours    Culture - Catheter (10.03.22 @ 17:53)    Specimen Source: .Catheter PICC Tip    Culture Results:   No growth to date        RADIOLOGY & ADDITIONAL STUDIES: INTERVAL HPI/OVERNIGHT EVENTS:      MEDICATIONS  (STANDING):  atorvastatin 20 milliGRAM(s) Oral at bedtime  chlorhexidine 2% Cloths 1 Application(s) Topical <User Schedule>  dextrose 5% + sodium chloride 0.45%. 1000 milliLiter(s) (50 mL/Hr) IV Continuous <Continuous>  dextrose 5%. 1000 milliLiter(s) (100 mL/Hr) IV Continuous <Continuous>  dextrose 5%. 1000 milliLiter(s) (50 mL/Hr) IV Continuous <Continuous>  dextrose 50% Injectable 25 Gram(s) IV Push once  dextrose 50% Injectable 12.5 Gram(s) IV Push once  dextrose 50% Injectable 25 Gram(s) IV Push once  glucagon  Injectable 1 milliGRAM(s) IntraMuscular once  heparin   Injectable 5000 Unit(s) SubCutaneous every 8 hours  influenza  Vaccine (HIGH DOSE) 0.7 milliLiter(s) IntraMuscular once  insulin lispro (ADMELOG) corrective regimen sliding scale   SubCutaneous three times a day before meals  insulin lispro (ADMELOG) corrective regimen sliding scale   SubCutaneous at bedtime  labetalol 200 milliGRAM(s) Oral every 12 hours  losartan 50 milliGRAM(s) Oral daily  NIFEdipine XL 60 milliGRAM(s) Oral every 24 hours  senna 2 Tablet(s) Oral at bedtime  vancomycin    Solution 250 milliGRAM(s) Oral every 6 hours    MEDICATIONS  (PRN):  acetaminophen     Tablet .. 650 milliGRAM(s) Oral every 6 hours PRN Mild Pain (1 - 3), Moderate Pain (4 - 6)  dextrose Oral Gel 15 Gram(s) Oral once PRN Blood Glucose LESS THAN 70 milliGRAM(s)/deciliter  ondansetron Injectable 4 milliGRAM(s) IV Push every 8 hours PRN Nausea and/or Vomiting  sodium chloride 0.9% lock flush 10 milliLiter(s) IV Push every 1 hour PRN Pre/post blood products, medications, blood draw, and to maintain line patency      Allergies    penicillin (Rash)        Vital Signs Last 24 Hrs  T(C): 36.9 (04 Oct 2022 08:25), Max: 37.2 (03 Oct 2022 16:39)  T(F): 98.5 (04 Oct 2022 08:25), Max: 99 (04 Oct 2022 04:44)  HR: 90 (04 Oct 2022 08:25) (90 - 97)  BP: 122/72 (04 Oct 2022 08:25) (111/65 - 137/74)  BP(mean): --  RR: 17 (04 Oct 2022 08:25) (16 - 18)  SpO2: 99% (04 Oct 2022 08:25) (99% - 100%)    Parameters below as of 04 Oct 2022 08:25  Patient On (Oxygen Delivery Method): room air        LABS:                        8.7    12.35 )-----------( 505      ( 04 Oct 2022 10:22 )             27.8     10-04    134<L>  |  100  |  19  ----------------------------<  126<H>  3.9   |  20<L>  |  1.10    Ca    9.0      04 Oct 2022 10:22  Phos  3.8     10-04  Mg     2.2     10-04    TPro  6.6  /  Alb  2.8<L>  /  TBili  0.5  /  DBili  x   /  AST  13  /  ALT  9<L>  /  AlkPhos  77  10-02        MICROBIOLOGY:    Culture - Other (10.03.22 @ 18:45)    Gram Stain:   Rare Yeast  Rare Gram Positive Cocci in Pairs and Chains  Rare to few Gram Negative Rods  Few WBC's    Specimen Source: Wound Wound    Culture Results:   Few Enterobacter cloacae complex  Few Klebsiella oxytoca/Raoultella ornithinolytica  Few Enterococcus faecium  Moderate Candida albicans  Culture in progress    Culture - Other (10.03.22 @ 18:45)    Gram Stain:   Rare Yeast  Rare Gram positive cocci in pairs  Rare to few Gram Negative Rods  Few WBC's    Specimen Source: Wound Wound    Culture Results:   Few Enterobacter cloacae complex  Few Klebsiella oxytoca/Raoultella ornithinolytica  Moderate Enterococcus faecium  Moderate Enterococcus faecalis  Culture in progress    Culture - Blood (10.03.22 @ 18:35)    Gram Stain:   Anaerobic Bottle: Gram Positive Cocci in Pairs and Chains  Result called to and read back byESTEFANIA Sparrow RN (9UR)  10/04/2022  12:01:13  ***Blood Panel PCR results on this specimen are available  approximately 3 hours after the Gram stain result.***  Gram stain, PCR, and/or culture results may not always  correspond due to difference in methodologies.  ************************************************************  This PCR assay was performed using [employer].  The following targets are tested for: Enterococcus,  vancomycin resistant enterococci, Listeria monocytogenes,  coagulase negative staphylococci, S. aureus,  methicillin resistant S. aureus, Streptococcus agalactiae  (Group B), S. pneumoniae, S. pyogenes (Group A),  Acinetobacter baumannii, Enterobacter cloacae, E. coli,  Klebsiella oxytoca, K. pneumoniae, Proteus sp.,  Serratia marcescens, Haemophilus influenzae,  Neisseria meningitidis, Pseudomonas aeruginosa, Candida  albicans, C. glabrata, C krusei, C parapsilosis,  C.tropicalis and the KPC resistance gene.    -  Enterococcus species: Detec    Specimen Source: .Blood Blood    Organism: Blood Culture PCR    Culture Results:   Culture in progress    Organism Identification: Blood Culture PCR    Method Type: PCR    Culture - Blood (10.03.22 @ 18:30)    Specimen Source: .Blood Blood    Culture Results:   No growth at 12 hours    Culture - Catheter (10.03.22 @ 17:53)    Specimen Source: .Catheter PICC Tip    Culture Results:   No growth to date      C. difficile GDH &amp; toxins A/B by EIA (10.04.22 @ 06:47)    Clostridium difficile GDH Toxins A&amp;B, EIA:   Positive TYPE:(C=Critical, N=Notification, A=Abnormal) _  TESTS: CDIFF  DATE/TIME CALLED: _  CALLED TO: JOSEP Reilly  READ BACK (2 Patient Identifiers)(Y/N): _y  READ BACK VALUES (Y/N): _y  CALLED BY: _AR    Clostridium difficile GDH Interpretation: Positive for toxigenic C. Difficile.  This specimen is positive for C.  Difficile glutamate dehydrogenase (GDH) antigen and positive for C.  Difficile Toxins A & B, by EIA.  GDH is a highly sensitive marker for C.  Difficile that is produced in largeamounts by all C. Difficile strains,  both toxigenic and nontoxigenic.  This assay has not been validated as a  test of cure.  The results of this assay should always be interpreted in  conjunction with patient's clinical history.       INTERVAL HPI/OVERNIGHT EVENTS:    Diarrhea worsened and tested Cdiff positive  PICC removed  Abdominal wound debrided     MEDICATIONS  (STANDING):  atorvastatin 20 milliGRAM(s) Oral at bedtime  chlorhexidine 2% Cloths 1 Application(s) Topical <User Schedule>  dextrose 5% + sodium chloride 0.45%. 1000 milliLiter(s) (50 mL/Hr) IV Continuous <Continuous>  dextrose 5%. 1000 milliLiter(s) (100 mL/Hr) IV Continuous <Continuous>  dextrose 5%. 1000 milliLiter(s) (50 mL/Hr) IV Continuous <Continuous>  dextrose 50% Injectable 25 Gram(s) IV Push once  dextrose 50% Injectable 12.5 Gram(s) IV Push once  dextrose 50% Injectable 25 Gram(s) IV Push once  glucagon  Injectable 1 milliGRAM(s) IntraMuscular once  heparin   Injectable 5000 Unit(s) SubCutaneous every 8 hours  influenza  Vaccine (HIGH DOSE) 0.7 milliLiter(s) IntraMuscular once  insulin lispro (ADMELOG) corrective regimen sliding scale   SubCutaneous three times a day before meals  insulin lispro (ADMELOG) corrective regimen sliding scale   SubCutaneous at bedtime  labetalol 200 milliGRAM(s) Oral every 12 hours  losartan 50 milliGRAM(s) Oral daily  NIFEdipine XL 60 milliGRAM(s) Oral every 24 hours  senna 2 Tablet(s) Oral at bedtime  vancomycin    Solution 250 milliGRAM(s) Oral every 6 hours    MEDICATIONS  (PRN):  acetaminophen     Tablet .. 650 milliGRAM(s) Oral every 6 hours PRN Mild Pain (1 - 3), Moderate Pain (4 - 6)  dextrose Oral Gel 15 Gram(s) Oral once PRN Blood Glucose LESS THAN 70 milliGRAM(s)/deciliter  ondansetron Injectable 4 milliGRAM(s) IV Push every 8 hours PRN Nausea and/or Vomiting  sodium chloride 0.9% lock flush 10 milliLiter(s) IV Push every 1 hour PRN Pre/post blood products, medications, blood draw, and to maintain line patency      Allergies    penicillin (Rash)      EXAM  Vital Signs Last 24 Hrs  T(C): 36.9 (04 Oct 2022 08:25), Max: 37.2 (03 Oct 2022 16:39)  T(F): 98.5 (04 Oct 2022 08:25), Max: 99 (04 Oct 2022 04:44)  HR: 90 (04 Oct 2022 08:25) (90 - 97)  BP: 122/72 (04 Oct 2022 08:25) (111/65 - 137/74)  BP(mean): --  RR: 17 (04 Oct 2022 08:25) (16 - 18)  SpO2: 99% (04 Oct 2022 08:25) (99% - 100%)    Parameters below as of 04 Oct 2022 08:25  Patient On (Oxygen Delivery Method): room air  Awake and alert  No distress  No rash  RRR  Chest CTA  Abd soft, tender at and around the wound with a VAC in place.  Staples removed  LEs no edema      LABS:                        8.7    12.35 )-----------( 505      ( 04 Oct 2022 10:22 )             27.8     10-04    134<L>  |  100  |  19  ----------------------------<  126<H>  3.9   |  20<L>  |  1.10    Ca    9.0      04 Oct 2022 10:22  Phos  3.8     10-04  Mg     2.2     10-04    TPro  6.6  /  Alb  2.8<L>  /  TBili  0.5  /  DBili  x   /  AST  13  /  ALT  9<L>  /  AlkPhos  77  10-02        MICROBIOLOGY:    Culture - Other (10.03.22 @ 18:45)    Gram Stain:   Rare Yeast  Rare Gram Positive Cocci in Pairs and Chains  Rare to few Gram Negative Rods  Few WBC's    Specimen Source: Wound Wound    Culture Results:   Few Enterobacter cloacae complex  Few Klebsiella oxytoca/Raoultella ornithinolytica  Few Enterococcus faecium  Moderate Candida albicans  Culture in progress    Culture - Other (10.03.22 @ 18:45)    Gram Stain:   Rare Yeast  Rare Gram positive cocci in pairs  Rare to few Gram Negative Rods  Few WBC's    Specimen Source: Wound Wound    Culture Results:   Few Enterobacter cloacae complex  Few Klebsiella oxytoca/Raoultella ornithinolytica  Moderate Enterococcus faecium  Moderate Enterococcus faecalis  Culture in progress    Culture - Blood (10.03.22 @ 18:35)    Gram Stain:   Anaerobic Bottle: Gram Positive Cocci in Pairs and Chains  Result called to and read back byESTEFANIA Sparrow RN (9UR)  10/04/2022  12:01:13  ***Blood Panel PCR results on this specimen are available  approximately 3 hours after the Gram stain result.***  Gram stain, PCR, and/or culture results may not always  correspond due to difference in methodologies.  ************************************************************  This PCR assay was performed using [employer].  The following targets are tested for: Enterococcus,  vancomycin resistant enterococci, Listeria monocytogenes,  coagulase negative staphylococci, S. aureus,  methicillin resistant S. aureus, Streptococcus agalactiae  (Group B), S. pneumoniae, S. pyogenes (Group A),  Acinetobacter baumannii, Enterobacter cloacae, E. coli,  Klebsiella oxytoca, K. pneumoniae, Proteus sp.,  Serratia marcescens, Haemophilus influenzae,  Neisseria meningitidis, Pseudomonas aeruginosa, Candida  albicans, C. glabrata, C krusei, C parapsilosis,  C.tropicalis and the KPC resistance gene.    -  Enterococcus species: Detec    Specimen Source: .Blood Blood    Organism: Blood Culture PCR    Culture Results:   Culture in progress    Organism Identification: Blood Culture PCR    Method Type: PCR    Culture - Blood (10.03.22 @ 18:30)    Specimen Source: .Blood Blood    Culture Results:   No growth at 12 hours    Culture - Catheter (10.03.22 @ 17:53)    Specimen Source: .Catheter PICC Tip    Culture Results:   No growth to date      C. difficile GDH &amp; toxins A/B by EIA (10.04.22 @ 06:47)    Clostridium difficile GDH Toxins A&amp;B, EIA:   Positive TYPE:(C=Critical, N=Notification, A=Abnormal) _  TESTS: CDIFF  DATE/TIME CALLED: _  CALLED TO: JOSEP White_VILMA  READ BACK (2 Patient Identifiers)(Y/N): _y  READ BACK VALUES (Y/N): _y  CALLED BY: _AR    Clostridium difficile GDH Interpretation: Positive for toxigenic C. Difficile.  This specimen is positive for C.  Difficile glutamate dehydrogenase (GDH) antigen and positive for C.  Difficile Toxins A & B, by EIA.  GDH is a highly sensitive marker for C.  Difficile that is produced in largeamounts by all C. Difficile strains,  both toxigenic and nontoxigenic.  This assay has not been validated as a  test of cure.  The results of this assay should always be interpreted in  conjunction with patient's clinical history.

## 2022-10-04 NOTE — PROGRESS NOTE ADULT - SUBJECTIVE AND OBJECTIVE BOX
INTERVAL HPI/OVERNIGHT EVENTS:   SURGERY ATTENDING    STATUS POST:  Exploratory laparotomy with lysis of adhesions extensive one hour , Small bowel resection with primary anastomosis, Repair of recurrent Incisional hernia with myofascial flaps no mesh, washout, drainage, PREVENA        POST OPERATIVE DAY #: 19    SUBJECTIVE:  Flatus: [x ] YES [ ] NO             Bowel Movement: [x ] YES [ ] NO  Pain (0-10):        1    Pain Control Adequate: [x ] YES [ ] NO  Nausea: [ ] YES [x ] NO            Vomiting: [ ] YES [x ] NO  Diarrhea: [ ] YES [x ] NO         Constipation: [ ] YES [x ] NO     Chest Pain: [ ] YES [x ] NO    SOB:  [ ] YES [x ] NO      MEDICATIONS  (STANDING):  atorvastatin 20 milliGRAM(s) Oral at bedtime  chlorhexidine 2% Cloths 1 Application(s) Topical <User Schedule>  dextrose 5% + sodium chloride 0.45%. 1000 milliLiter(s) (50 mL/Hr) IV Continuous <Continuous>  dextrose 5%. 1000 milliLiter(s) (100 mL/Hr) IV Continuous <Continuous>  dextrose 5%. 1000 milliLiter(s) (50 mL/Hr) IV Continuous <Continuous>  dextrose 50% Injectable 25 Gram(s) IV Push once  dextrose 50% Injectable 12.5 Gram(s) IV Push once  dextrose 50% Injectable 25 Gram(s) IV Push once  glucagon  Injectable 1 milliGRAM(s) IntraMuscular once  heparin   Injectable 5000 Unit(s) SubCutaneous every 8 hours  influenza  Vaccine (HIGH DOSE) 0.7 milliLiter(s) IntraMuscular once  insulin lispro (ADMELOG) corrective regimen sliding scale   SubCutaneous three times a day before meals  insulin lispro (ADMELOG) corrective regimen sliding scale   SubCutaneous at bedtime  labetalol 200 milliGRAM(s) Oral every 12 hours  losartan 50 milliGRAM(s) Oral daily  NIFEdipine XL 60 milliGRAM(s) Oral every 24 hours  senna 2 Tablet(s) Oral at bedtime  vancomycin    Solution 250 milliGRAM(s) Oral every 6 hours    MEDICATIONS  (PRN):  acetaminophen     Tablet .. 650 milliGRAM(s) Oral every 6 hours PRN Mild Pain (1 - 3), Moderate Pain (4 - 6)  dextrose Oral Gel 15 Gram(s) Oral once PRN Blood Glucose LESS THAN 70 milliGRAM(s)/deciliter  ondansetron Injectable 4 milliGRAM(s) IV Push every 8 hours PRN Nausea and/or Vomiting  sodium chloride 0.9% lock flush 10 milliLiter(s) IV Push every 1 hour PRN Pre/post blood products, medications, blood draw, and to maintain line patency      Vital Signs Last 24 Hrs  T(C): 36.9 (04 Oct 2022 08:25), Max: 37.2 (03 Oct 2022 16:39)  T(F): 98.5 (04 Oct 2022 08:25), Max: 99 (04 Oct 2022 04:44)  HR: 90 (04 Oct 2022 08:25) (86 - 97)  BP: 122/72 (04 Oct 2022 08:25) (111/65 - 137/74)  BP(mean): --  RR: 17 (04 Oct 2022 08:25) (16 - 18)  SpO2: 99% (04 Oct 2022 08:25) (97% - 100%)    Parameters below as of 04 Oct 2022 08:25  Patient On (Oxygen Delivery Method): room air        PHYSICAL EXAM:      Constitutional:    Eyes:    ENMT:    Neck:    Breasts:    Back:    Respiratory:    Cardiovascular:    Gastrointestinal:    Genitourinary:    Rectal:    Extremities:    Vascular:    Neurological:    Skin:    Lymph Nodes:    Musculoskeletal:    Psychiatric:        I&O's Detail    03 Oct 2022 07:01  -  04 Oct 2022 07:00  --------------------------------------------------------  IN:    Oral Fluid: 1860 mL  Total IN: 1860 mL    OUT:    Stool (mL): 400 mL    VAC (Vacuum Assisted Closure) System (mL): 50 mL    Voided (mL): 1250 mL  Total OUT: 1700 mL    Total NET: 160 mL      04 Oct 2022 07:01  -  04 Oct 2022 13:08  --------------------------------------------------------  IN:    Oral Fluid: 840 mL  Total IN: 840 mL    OUT:    Stool (mL): 2 mL  Total OUT: 2 mL    Total NET: 838 mL          LABS:                        8.7    12.35 )-----------( 505      ( 04 Oct 2022 10:22 )             27.8     10-04    134<L>  |  100  |  19  ----------------------------<  126<H>  3.9   |  20<L>  |  1.10    Ca    9.0      04 Oct 2022 10:22  Phos  3.8     10-04  Mg     2.2     10-04    TPro  6.6  /  Alb  2.8<L>  /  TBili  0.5  /  DBili  x   /  AST  13  /  ALT  9<L>  /  AlkPhos  77  10-02          RADIOLOGY & ADDITIONAL STUDIES:

## 2022-10-04 NOTE — PROGRESS NOTE ADULT - ASSESSMENT
72 F PMH HTN, DM and PSH  x 2, hysterectomy and lap dev ( w/ Dr. Clifford) admitted on  with 2 days of n/v, abd pain, imaging demonstrating LBO, which resolved prior to operative exploration. Recurrent bowel obstruction, now s/p Exlap JOSEFINA, SBR (100 cm) (9/15). now with wound vac, pending YOSEPH placement    f/u c diff  c/u wound cx   Regular, ISS  Pain/nausea prn  Wound Vac  SQH/SCDs/OOBA/IS  AM labs  Dispo: YOSEPH

## 2022-10-04 NOTE — PROGRESS NOTE ADULT - SUBJECTIVE AND OBJECTIVE BOX
INTERVAL HPI/OVERNIGHT EVENTS: Bcx, wound cx, PICC sent, vac replaced due to persistent blockage, having watery BMs      STATUS POST:  9/15:  Exlap JOSEFINA, SBR ( 100 cm)   EBL:200 IVF:2000 UO:250     POST OPERATIVE DAY #: 19    SUBJECTIVE: Pt seen and examined at bedside this am by surgery team. Complains of diarrhea overnight. C diff pending. Wound growing rare yeast, GPC in pairs, GNR.     Vital Signs Last 24 Hrs  T(C): 37.2 (04 Oct 2022 04:44), Max: 37.2 (03 Oct 2022 16:39)  T(F): 99 (04 Oct 2022 04:44), Max: 99 (04 Oct 2022 04:44)  HR: 95 (04 Oct 2022 04:44) (85 - 97)  BP: 111/65 (04 Oct 2022 04:44) (111/65 - 137/74)  BP(mean): --  RR: 18 (04 Oct 2022 04:44) (16 - 18)  SpO2: 100% (04 Oct 2022 04:44) (97% - 100%)    Parameters below as of 04 Oct 2022 04:44  Patient On (Oxygen Delivery Method): room air        PHYSICAL EXAM:   Gen: Awake, alert, NAD  CV: NSR  Pulm: no respiratory distress on RA  Abd: soft, ND, wound vac holding suction, no rebound or guarding   Ext: WWP, no edema, SCDs in place     I&O's Detail    03 Oct 2022 07:01  -  04 Oct 2022 07:00  --------------------------------------------------------  IN:    Oral Fluid: 1860 mL  Total IN: 1860 mL    OUT:    Stool (mL): 400 mL    VAC (Vacuum Assisted Closure) System (mL): 50 mL    Voided (mL): 1250 mL  Total OUT: 1700 mL    Total NET: 160 mL          LABS:                        8.3    14.31 )-----------( 526      ( 03 Oct 2022 15:12 )             27.2     10-03    133<L>  |  98  |  15  ----------------------------<  113<H>  4.6   |  24  |  0.96    Ca    8.9      03 Oct 2022 15:12  Phos  3.1     10-03  Mg     2.2     10-03    TPro  6.6  /  Alb  2.8<L>  /  TBili  0.5  /  DBili  x   /  AST  13  /  ALT  9<L>  /  AlkPhos  77  10-02    LIVER FUNCTIONS - ( 02 Oct 2022 20:37 )  Alb: 2.8 g/dL / Pro: 6.6 g/dL / ALK PHOS: 77 U/L / ALT: 9 U/L / AST: 13 U/L / GGT: x             CAPILLARY BLOOD GLUCOSE      POCT Blood Glucose.: 116 mg/dL (03 Oct 2022 21:58)  POCT Blood Glucose.: 112 mg/dL (03 Oct 2022 16:20)  POCT Blood Glucose.: 103 mg/dL (03 Oct 2022 07:44)            Culture - Other (collected 03 Oct 2022 18:45)  Source: Wound Wound  Gram Stain (03 Oct 2022 20:25):    Rare Yeast    Rare Gram Positive Cocci in Pairs and Chains    Rare to few Gram Negative Rods    Few WBC's    Culture - Other (collected 03 Oct 2022 18:45)  Source: Wound Wound  Gram Stain (03 Oct 2022 20:25):    Rare Yeast    Rare Gram positive cocci in pairs    Rare to few Gram Negative Rods    Few WBC's         RADIOLOGY & ADDITIONAL STUDIES:

## 2022-10-04 NOTE — PROGRESS NOTE ADULT - ASSESSMENT
RECOMMEND  Collect blood cultures x 2 now / STAT  After BCxs collected, give Daptomycin 750 mg IV daily, first dose STAT  Give PO Vancomycin 250 mg 4 times a day  Agree with removing PICC  Wound VAC.  Per surgery, collection now completely evacuated and no ongoing signs of wound infection - therefore, would hold off giving Meropenem again as patient now with Cdiff  Check Fungitell level  Check CPK and ESR and CRP      Discussed with Dr. So and Surgery PA    [phone] Enterococcal bacteremia - unclear portal of entry.    Wound infection appears superficial  - per surgical description   Patient also had a PICC  C diff colitis  S/P Complex abdominal surgery including SBR for SBO  S/P Sepsis and peritonitis        RECOMMEND  Collect blood cultures x 2 now / STAT  After BCxs collected, give Daptomycin 750 mg IV daily, first dose STAT  Give PO Vancomycin 250 mg 4 times a day  Agree with removing PICC  Wound VAC.  Per surgery, collection now completely evacuated and no ongoing signs of wound infection - therefore, would hold off giving Meropenem as patient now with diarrhea and Cdiff.   Check Fungitell level  Check CPK and ESR and CRP      Discussed with Dr. So and Surgery PA    [phone]

## 2022-10-04 NOTE — PROCEDURE NOTE - NSPOSTCAREGUIDE_GEN_A_CORE
Verbal/written post procedure instructions were given to patient/caregiver/Instructed patient/caregiver to follow-up with primary care physician/Instructed patient/caregiver regarding signs and symptoms of infection/Keep the cast/splint/dressing clean and dry/Care for catheter as per unit/ICU protocols
Verbal/written post procedure instructions were given to patient/caregiver/Care for catheter as per unit/ICU protocols

## 2022-10-04 NOTE — PROCEDURE NOTE - NSPROCDETAILS_GEN_ALL_CORE
location identified, draped/prepped, sterile technique used/sterile dressing applied/sterile technique, catheter placed
location identified, draped/prepped, sterile technique used/blood seen on insertion/dressing applied/flushes easily/secured in place/sterile technique, catheter placed

## 2022-10-05 LAB
-  AMPICILLIN/SULBACTAM: SIGNIFICANT CHANGE UP
-  AMPICILLIN: SIGNIFICANT CHANGE UP
-  CEFAZOLIN: SIGNIFICANT CHANGE UP
-  CEFEPIME: SIGNIFICANT CHANGE UP
-  CEFEPIME: SIGNIFICANT CHANGE UP
-  CEFOXITIN: SIGNIFICANT CHANGE UP
-  CEFOXITIN: SIGNIFICANT CHANGE UP
-  CEFTRIAXONE: SIGNIFICANT CHANGE UP
-  CIPROFLOXACIN: SIGNIFICANT CHANGE UP
-  ERTAPENEM: SIGNIFICANT CHANGE UP
-  GENTAMICIN: SIGNIFICANT CHANGE UP
-  PIPERACILLIN/TAZOBACTAM: SIGNIFICANT CHANGE UP
-  TOBRAMYCIN: SIGNIFICANT CHANGE UP
-  TRIMETHOPRIM/SULFAMETHOXAZOLE: SIGNIFICANT CHANGE UP
-  VANCOMYCIN: SIGNIFICANT CHANGE UP
ANION GAP SERPL CALC-SCNC: 10 MMOL/L — SIGNIFICANT CHANGE UP (ref 5–17)
ANION GAP SERPL CALC-SCNC: 9 MMOL/L — SIGNIFICANT CHANGE UP (ref 5–17)
BUN SERPL-MCNC: 22 MG/DL — SIGNIFICANT CHANGE UP (ref 7–23)
BUN SERPL-MCNC: 23 MG/DL — SIGNIFICANT CHANGE UP (ref 7–23)
CALCIUM SERPL-MCNC: 8.3 MG/DL — LOW (ref 8.4–10.5)
CALCIUM SERPL-MCNC: 8.7 MG/DL — SIGNIFICANT CHANGE UP (ref 8.4–10.5)
CHLORIDE SERPL-SCNC: 100 MMOL/L — SIGNIFICANT CHANGE UP (ref 96–108)
CHLORIDE SERPL-SCNC: 96 MMOL/L — SIGNIFICANT CHANGE UP (ref 96–108)
CO2 SERPL-SCNC: 23 MMOL/L — SIGNIFICANT CHANGE UP (ref 22–31)
CO2 SERPL-SCNC: 25 MMOL/L — SIGNIFICANT CHANGE UP (ref 22–31)
CREAT SERPL-MCNC: 1.38 MG/DL — HIGH (ref 0.5–1.3)
CREAT SERPL-MCNC: 1.62 MG/DL — HIGH (ref 0.5–1.3)
EGFR: 34 ML/MIN/1.73M2 — LOW
EGFR: 41 ML/MIN/1.73M2 — LOW
GLUCOSE BLDC GLUCOMTR-MCNC: 122 MG/DL — HIGH (ref 70–99)
GLUCOSE BLDC GLUCOMTR-MCNC: 167 MG/DL — HIGH (ref 70–99)
GLUCOSE SERPL-MCNC: 117 MG/DL — HIGH (ref 70–99)
GLUCOSE SERPL-MCNC: 141 MG/DL — HIGH (ref 70–99)
HCT VFR BLD CALC: 26.3 % — LOW (ref 34.5–45)
HGB BLD-MCNC: 8.1 G/DL — LOW (ref 11.5–15.5)
MAGNESIUM SERPL-MCNC: 2 MG/DL — SIGNIFICANT CHANGE UP (ref 1.6–2.6)
MCHC RBC-ENTMCNC: 27.1 PG — SIGNIFICANT CHANGE UP (ref 27–34)
MCHC RBC-ENTMCNC: 30.8 GM/DL — LOW (ref 32–36)
MCV RBC AUTO: 88 FL — SIGNIFICANT CHANGE UP (ref 80–100)
METHOD TYPE: SIGNIFICANT CHANGE UP
NRBC # BLD: 0 /100 WBCS — SIGNIFICANT CHANGE UP (ref 0–0)
PHOSPHATE SERPL-MCNC: 3.6 MG/DL — SIGNIFICANT CHANGE UP (ref 2.5–4.5)
PLATELET # BLD AUTO: 515 K/UL — HIGH (ref 150–400)
POTASSIUM SERPL-MCNC: 3.2 MMOL/L — LOW (ref 3.5–5.3)
POTASSIUM SERPL-MCNC: 3.7 MMOL/L — SIGNIFICANT CHANGE UP (ref 3.5–5.3)
POTASSIUM SERPL-SCNC: 3.2 MMOL/L — LOW (ref 3.5–5.3)
POTASSIUM SERPL-SCNC: 3.7 MMOL/L — SIGNIFICANT CHANGE UP (ref 3.5–5.3)
RBC # BLD: 2.99 M/UL — LOW (ref 3.8–5.2)
RBC # FLD: 16.2 % — HIGH (ref 10.3–14.5)
SODIUM SERPL-SCNC: 130 MMOL/L — LOW (ref 135–145)
SODIUM SERPL-SCNC: 133 MMOL/L — LOW (ref 135–145)
WBC # BLD: 10.44 K/UL — SIGNIFICANT CHANGE UP (ref 3.8–10.5)
WBC # FLD AUTO: 10.44 K/UL — SIGNIFICANT CHANGE UP (ref 3.8–10.5)

## 2022-10-05 PROCEDURE — 99232 SBSQ HOSP IP/OBS MODERATE 35: CPT

## 2022-10-05 RX ORDER — SODIUM CHLORIDE 9 MG/ML
1000 INJECTION, SOLUTION INTRAVENOUS
Refills: 0 | Status: DISCONTINUED | OUTPATIENT
Start: 2022-10-05 | End: 2022-10-07

## 2022-10-05 RX ORDER — POTASSIUM CHLORIDE 20 MEQ
40 PACKET (EA) ORAL ONCE
Refills: 0 | Status: COMPLETED | OUTPATIENT
Start: 2022-10-05 | End: 2022-10-05

## 2022-10-05 RX ORDER — SODIUM CHLORIDE 9 MG/ML
1000 INJECTION INTRAMUSCULAR; INTRAVENOUS; SUBCUTANEOUS ONCE
Refills: 0 | Status: COMPLETED | OUTPATIENT
Start: 2022-10-05 | End: 2022-10-05

## 2022-10-05 RX ORDER — SODIUM CHLORIDE 9 MG/ML
500 INJECTION INTRAMUSCULAR; INTRAVENOUS; SUBCUTANEOUS ONCE
Refills: 0 | Status: COMPLETED | OUTPATIENT
Start: 2022-10-05 | End: 2022-10-05

## 2022-10-05 RX ORDER — LIDOCAINE 4 G/100G
1 CREAM TOPICAL EVERY 24 HOURS
Refills: 0 | Status: DISCONTINUED | OUTPATIENT
Start: 2022-10-05 | End: 2022-10-21

## 2022-10-05 RX ORDER — SODIUM CHLORIDE 9 MG/ML
500 INJECTION, SOLUTION INTRAVENOUS ONCE
Refills: 0 | Status: COMPLETED | OUTPATIENT
Start: 2022-10-05 | End: 2022-10-05

## 2022-10-05 RX ORDER — ACETAMINOPHEN 500 MG
1000 TABLET ORAL ONCE
Refills: 0 | Status: COMPLETED | OUTPATIENT
Start: 2022-10-05 | End: 2022-10-05

## 2022-10-05 RX ADMIN — Medication 200 MILLIGRAM(S): at 06:16

## 2022-10-05 RX ADMIN — Medication 1000 MILLIGRAM(S): at 22:57

## 2022-10-05 RX ADMIN — LIDOCAINE 1 PATCH: 4 CREAM TOPICAL at 11:08

## 2022-10-05 RX ADMIN — Medication 60 MILLIGRAM(S): at 12:24

## 2022-10-05 RX ADMIN — SENNA PLUS 2 TABLET(S): 8.6 TABLET ORAL at 22:27

## 2022-10-05 RX ADMIN — Medication 650 MILLIGRAM(S): at 14:30

## 2022-10-05 RX ADMIN — SODIUM CHLORIDE 150 MILLILITER(S): 9 INJECTION, SOLUTION INTRAVENOUS at 17:40

## 2022-10-05 RX ADMIN — SODIUM CHLORIDE 150 MILLILITER(S): 9 INJECTION, SOLUTION INTRAVENOUS at 09:52

## 2022-10-05 RX ADMIN — SODIUM CHLORIDE 1000 MILLILITER(S): 9 INJECTION INTRAMUSCULAR; INTRAVENOUS; SUBCUTANEOUS at 17:41

## 2022-10-05 RX ADMIN — Medication 650 MILLIGRAM(S): at 13:58

## 2022-10-05 RX ADMIN — ATORVASTATIN CALCIUM 20 MILLIGRAM(S): 80 TABLET, FILM COATED ORAL at 22:27

## 2022-10-05 RX ADMIN — Medication 650 MILLIGRAM(S): at 01:55

## 2022-10-05 RX ADMIN — HEPARIN SODIUM 5000 UNIT(S): 5000 INJECTION INTRAVENOUS; SUBCUTANEOUS at 13:55

## 2022-10-05 RX ADMIN — LIDOCAINE 1 PATCH: 4 CREAM TOPICAL at 23:42

## 2022-10-05 RX ADMIN — Medication 250 MILLIGRAM(S): at 23:16

## 2022-10-05 RX ADMIN — Medication 650 MILLIGRAM(S): at 02:44

## 2022-10-05 RX ADMIN — SODIUM CHLORIDE 1000 MILLILITER(S): 9 INJECTION, SOLUTION INTRAVENOUS at 01:06

## 2022-10-05 RX ADMIN — LIDOCAINE 1 PATCH: 4 CREAM TOPICAL at 18:27

## 2022-10-05 RX ADMIN — Medication 400 MILLIGRAM(S): at 22:27

## 2022-10-05 RX ADMIN — HEPARIN SODIUM 5000 UNIT(S): 5000 INJECTION INTRAVENOUS; SUBCUTANEOUS at 06:15

## 2022-10-05 RX ADMIN — LOSARTAN POTASSIUM 50 MILLIGRAM(S): 100 TABLET, FILM COATED ORAL at 06:15

## 2022-10-05 RX ADMIN — Medication 250 MILLIGRAM(S): at 18:22

## 2022-10-05 RX ADMIN — Medication 2: at 12:00

## 2022-10-05 RX ADMIN — SODIUM CHLORIDE 1000 MILLILITER(S): 9 INJECTION INTRAMUSCULAR; INTRAVENOUS; SUBCUTANEOUS at 23:48

## 2022-10-05 RX ADMIN — Medication 250 MILLIGRAM(S): at 06:13

## 2022-10-05 RX ADMIN — DAPTOMYCIN 130 MILLIGRAM(S): 500 INJECTION, POWDER, LYOPHILIZED, FOR SOLUTION INTRAVENOUS at 18:22

## 2022-10-05 RX ADMIN — Medication 250 MILLIGRAM(S): at 12:00

## 2022-10-05 RX ADMIN — Medication 40 MILLIEQUIVALENT(S): at 16:34

## 2022-10-05 RX ADMIN — Medication 2: at 16:34

## 2022-10-05 RX ADMIN — HEPARIN SODIUM 5000 UNIT(S): 5000 INJECTION INTRAVENOUS; SUBCUTANEOUS at 22:27

## 2022-10-05 RX ADMIN — SODIUM CHLORIDE 2000 MILLILITER(S): 9 INJECTION INTRAMUSCULAR; INTRAVENOUS; SUBCUTANEOUS at 09:53

## 2022-10-05 RX ADMIN — Medication 40 MILLIEQUIVALENT(S): at 11:02

## 2022-10-05 RX ADMIN — ONDANSETRON 4 MILLIGRAM(S): 8 TABLET, FILM COATED ORAL at 13:56

## 2022-10-05 NOTE — PROGRESS NOTE ADULT - ASSESSMENT
Enterococcal bacteremia - I/4 bottles positive and negative another set of Bcxs collected before antibiotics.  Possibly the positive blood culture represents blood culture contamination not a true blood stream infection.  Wound infection / collection -  s/p debridement and wound VAC with wound appearing not infected on surgical inspection   C diff colitis  Leukocytosis resolved  N/V - possibly due to PO Vancomycin   Renal insufficiency - likely pre-renal   S/P Complex abdominal surgery including SBR for SBO  S/P Sepsis and peritonitis        RECOMMEND   Confirm N/V indeed following PO Vancomycin.  If so, try to decrease the dose to 125 mg 4 times a day  If intolerant to PO Vancomycin, use fidaxomicin  Continue IV Daptomycin   IV fluids     Discussed with surgical team    [phone]

## 2022-10-05 NOTE — PROGRESS NOTE ADULT - ASSESSMENT
72 F PMH HTN, DM and PSH  x 2, hysterectomy and lap dev ( w/ Dr. Clifford) admitted on  with 2 days of n/v, abd pain, imaging demonstrating LBO, which resolved prior to operative exploration. Recurrent bowel obstruction, now s/p Exlap JOSEFINA, SBR (100 cm) (9/15). now with wound vac, pending YOSEPH placement now w/ c dif    Regular, IVF, ISS  Pain/nausea prn  Vanco PO (10/4-), Daptomycin (10/4-)  Wound Vac  SQH/SCDs/OOBA/IS  AM labs  Dispo: Y[facility]

## 2022-10-05 NOTE — PROGRESS NOTE ADULT - SUBJECTIVE AND OBJECTIVE BOX
INTERVAL HPI/OVERNIGHT EVENTS:    ANTIBIOTICS/RELEVANT:    MEDICATIONS  (STANDING):  atorvastatin 20 milliGRAM(s) Oral at bedtime  chlorhexidine 2% Cloths 1 Application(s) Topical <User Schedule>  DAPTOmycin IVPB 750 milliGRAM(s) IV Intermittent every 24 hours  dextrose 5% + sodium chloride 0.45%. 1000 milliLiter(s) (150 mL/Hr) IV Continuous <Continuous>  dextrose 5%. 1000 milliLiter(s) (100 mL/Hr) IV Continuous <Continuous>  dextrose 5%. 1000 milliLiter(s) (50 mL/Hr) IV Continuous <Continuous>  dextrose 50% Injectable 25 Gram(s) IV Push once  dextrose 50% Injectable 12.5 Gram(s) IV Push once  dextrose 50% Injectable 25 Gram(s) IV Push once  glucagon  Injectable 1 milliGRAM(s) IntraMuscular once  heparin   Injectable 5000 Unit(s) SubCutaneous every 8 hours  influenza  Vaccine (HIGH DOSE) 0.7 milliLiter(s) IntraMuscular once  insulin lispro (ADMELOG) corrective regimen sliding scale   SubCutaneous three times a day before meals  insulin lispro (ADMELOG) corrective regimen sliding scale   SubCutaneous at bedtime  labetalol 200 milliGRAM(s) Oral every 12 hours  lidocaine   4% Patch 1 Patch Transdermal every 24 hours  losartan 50 milliGRAM(s) Oral daily  NIFEdipine XL 60 milliGRAM(s) Oral every 24 hours  potassium chloride   Powder 40 milliEquivalent(s) Oral once  senna 2 Tablet(s) Oral at bedtime  sodium chloride 0.9% Bolus 500 milliLiter(s) IV Bolus once  vancomycin    Solution 250 milliGRAM(s) Oral every 6 hours    MEDICATIONS  (PRN):  acetaminophen     Tablet .. 650 milliGRAM(s) Oral every 6 hours PRN Mild Pain (1 - 3), Moderate Pain (4 - 6)  dextrose Oral Gel 15 Gram(s) Oral once PRN Blood Glucose LESS THAN 70 milliGRAM(s)/deciliter  ondansetron Injectable 4 milliGRAM(s) IV Push every 8 hours PRN Nausea and/or Vomiting  sodium chloride 0.9% lock flush 10 milliLiter(s) IV Push every 1 hour PRN Pre/post blood products, medications, blood draw, and to maintain line patency      Allergies    penicillin (Rash)    Intolerances        Vital Signs Last 24 Hrs  T(C): 36.8 (05 Oct 2022 14:01), Max: 36.8 (04 Oct 2022 17:00)  T(F): 98.3 (05 Oct 2022 14:01), Max: 98.3 (04 Oct 2022 17:00)  HR: 84 (05 Oct 2022 14:01) (74 - 92)  BP: 105/65 (05 Oct 2022 14:01) (105/65 - 130/74)  BP(mean): 90 (04 Oct 2022 21:30) (90 - 90)  RR: 17 (05 Oct 2022 14:01) (17 - 18)  SpO2: 97% (05 Oct 2022 14:01) (93% - 99%)    Parameters below as of 05 Oct 2022 14:01  Patient On (Oxygen Delivery Method): room air            LABS:                        8.1    10.44 )-----------( 515      ( 05 Oct 2022 05:30 )             26.3     10-05    133<L>  |  100  |  22  ----------------------------<  141<H>  3.7   |  23  |  1.38<H>    Ca    8.3<L>      05 Oct 2022 14:37  Phos  3.6     10-05  Mg     2.0     10-05            MICROBIOLOGY:      Culture - Blood (10.04.22 @ 15:15)    Specimen Source: .Blood Blood    Culture Results:   No growth at 1 day.    Culture - Blood (10.04.22 @ 14:31)    Specimen Source: .Blood Blood    Culture Results:   No growth at 1 day.    Culture - Blood (10.03.22 @ 18:35)    Gram Stain:   Anaerobic Bottle: Gram Positive Cocci in Pairs and Chains  Result called to and read back byESTEFANIA Sparrow RN (9UR)  10/04/2022  12:01:13  ***Blood Panel PCR results on this specimen are available  approximately 3 hours after the Gram stain result.***  Gram stain, PCR, and/or culture results may not always  correspond due to difference in methodologies.  ************************************************************  This PCR assay was performed using [employer].  The following targets are tested for: Enterococcus,  vancomycin resistant enterococci, Listeria monocytogenes,  coagulase negative staphylococci, S. aureus,  methicillin resistant S. aureus, Streptococcus agalactiae  (Group B), S. pneumoniae, S. pyogenes (Group A),  Acinetobacter baumannii, Enterobacter cloacae, E. coli,  Klebsiella oxytoca, K. pneumoniae, Proteus sp.,  Serratia marcescens, Haemophilus influenzae,  Neisseria meningitidis, Pseudomonas aeruginosa, Candida  albicans, C. glabrata, C krusei, C parapsilosis,  C.tropicalis and the KPC resistance gene.    -  Enterococcus species: Detec    Specimen Source: .Blood Blood    Organism: Blood Culture PCR    Culture Results:   Growth in anaerobic bottle: Enterococcus faecium  Susceptibility to follow.    Organism Identification: Blood Culture PCR    Method Type: PCR    Culture - Blood (10.03.22 @ 18:30)    Specimen Source: .Blood Blood    Culture Results:   No growth at 1 day.    Culture - Other (10.03.22 @ 18:45)    -  Tobramycin: S <=2    -  Tobramycin: S <=2    -  Trimethoprim/Sulfamethoxazole: S <=0.5/9.5    -  Trimethoprim/Sulfamethoxazole: S <=0.5/9.5    -  Vancomycin: S 1    -  Cefazolin: S 8 Enterobacter, Klebsiella aerogenes, Citrobacter, and Serratia may develop resistance during prolonged therapy (3-4 days)    -  Cefepime: S <=2    -  Cefoxitin: R >16    -  Ceftriaxone: S <=1 Enterobacter, Klebsiella aerogenes, Citrobacter, and Serratia may develop resistance during prolonged therapy    -  Ceftriaxone: S <=1 Enterobacter, Klebsiella aerogenes, Citrobacter, and Serratia may develop resistance during prolonged therapy    -  Ciprofloxacin: S <=0.25    -  Ciprofloxacin: S <=0.25    -  Ertapenem: S <=0.5    -  Ertapenem: S <=0.5    -  Gentamicin: S <=2    -  Gentamicin: S <=2    -  Piperacillin/Tazobactam: S <=8    -  Piperacillin/Tazobactam: S <=8    Gram Stain:   Rare Yeast  Rare Gram Positive Cocci in Pairs and Chains  Rare to few Gram Negative Rods  Few WBC's    -  Ampicillin: R >16 These ampicillin results predict results for amoxicillin    -  Ampicillin: S 8 Predicts results to ampicillin/sulbactam, amoxacillin-clavulanate and  piperacillin-tazobactam.    -  Ampicillin: R >16 These ampicillin results predict results for amoxicillin    -  Ampicillin/Sulbactam: S 8/4 Enterobacter, Klebsiella aerogenes, Citrobacter, and Serratia may develop resistance during prolonged therapy (3-4 days)    -  Ampicillin/Sulbactam: R 16/8 Enterobacter, Klebsiella aerogenes, Citrobacter, and Serratia may develop resistance during prolonged therapy (3-4 days)    -  Cefazolin: R >16 Enterobacter, Klebsiella aerogenes, Citrobacter, and Serratia may develop resistance during prolonged therapy (3-4 days)    Specimen Source: Wound Wound    Culture Results:   Few Enterobacter cloacae complex  Few Klebsiella oxytoca/Raoultella ornithinolytica  Few Enterococcus faecium  Few Enterococcus faecalis  Moderate Candida albicans  Culture in progress    Organism Identification: Enterobacter cloacae complex  Klebsiella oxytoca /Raoutella ornithinolytica  Enterococcus faecium    Organism: Enterobacter cloacae complex    Organism: Enterococcus faecium    Organism: Klebsiella oxytoca /Raoutella ornithinolytica    Method Type: CHRIS    Method Type: CHRIS    Method Type: CHRIS        RADIOLOGY & ADDITIONAL STUDIES: INTERVAL HPI/OVERNIGHT EVENTS:    C/O being nauseated and vomiting  Eating only liquids but wants solid food   Diarrhea has improved    MEDICATIONS  (STANDING):  atorvastatin 20 milliGRAM(s) Oral at bedtime  chlorhexidine 2% Cloths 1 Application(s) Topical <User Schedule>  DAPTOmycin IVPB 750 milliGRAM(s) IV Intermittent every 24 hours  dextrose 5% + sodium chloride 0.45%. 1000 milliLiter(s) (150 mL/Hr) IV Continuous <Continuous>  dextrose 5%. 1000 milliLiter(s) (100 mL/Hr) IV Continuous <Continuous>  dextrose 5%. 1000 milliLiter(s) (50 mL/Hr) IV Continuous <Continuous>  dextrose 50% Injectable 25 Gram(s) IV Push once  dextrose 50% Injectable 12.5 Gram(s) IV Push once  dextrose 50% Injectable 25 Gram(s) IV Push once  glucagon  Injectable 1 milliGRAM(s) IntraMuscular once  heparin   Injectable 5000 Unit(s) SubCutaneous every 8 hours  influenza  Vaccine (HIGH DOSE) 0.7 milliLiter(s) IntraMuscular once  insulin lispro (ADMELOG) corrective regimen sliding scale   SubCutaneous three times a day before meals  insulin lispro (ADMELOG) corrective regimen sliding scale   SubCutaneous at bedtime  labetalol 200 milliGRAM(s) Oral every 12 hours  lidocaine   4% Patch 1 Patch Transdermal every 24 hours  losartan 50 milliGRAM(s) Oral daily  NIFEdipine XL 60 milliGRAM(s) Oral every 24 hours  potassium chloride   Powder 40 milliEquivalent(s) Oral once  senna 2 Tablet(s) Oral at bedtime  sodium chloride 0.9% Bolus 500 milliLiter(s) IV Bolus once  vancomycin    Solution 250 milliGRAM(s) Oral every 6 hours    MEDICATIONS  (PRN):  acetaminophen     Tablet .. 650 milliGRAM(s) Oral every 6 hours PRN Mild Pain (1 - 3), Moderate Pain (4 - 6)  dextrose Oral Gel 15 Gram(s) Oral once PRN Blood Glucose LESS THAN 70 milliGRAM(s)/deciliter  ondansetron Injectable 4 milliGRAM(s) IV Push every 8 hours PRN Nausea and/or Vomiting  sodium chloride 0.9% lock flush 10 milliLiter(s) IV Push every 1 hour PRN Pre/post blood products, medications, blood draw, and to maintain line patency      Allergies    penicillin (Rash)        EXAM  Vital Signs Last 24 Hrs  T(C): 36.8 (05 Oct 2022 14:01), Max: 36.8 (04 Oct 2022 17:00)  T(F): 98.3 (05 Oct 2022 14:01), Max: 98.3 (04 Oct 2022 17:00)  HR: 84 (05 Oct 2022 14:01) (74 - 92)  BP: 105/65 (05 Oct 2022 14:01) (105/65 - 130/74)  BP(mean): 90 (04 Oct 2022 21:30) (90 - 90)  RR: 17 (05 Oct 2022 14:01) (17 - 18)  SpO2: 97% (05 Oct 2022 14:01) (93% - 99%)    Parameters below as of 05 Oct 2022 14:01  Patient On (Oxygen Delivery Method): room air  Awake and alert  No rash  RR  Chest CTA  Abd softly distended and uncomfortable to palpaiton  LEs no edema      LABS:                        8.1    10.44 )-----------( 515      ( 05 Oct 2022 05:30 )             26.3     10-05    133<L>  |  100  |  22  ----------------------------<  141<H>  3.7   |  23  |  1.38<H>    Ca    8.3<L>      05 Oct 2022 14:37  Phos  3.6     10-05  Mg     2.0     10-05    Creatine Kinase, Serum (10.04.22 @ 15:20)    Creatine Kinase, Serum: 25 U/L    Sedimentation Rate, Erythrocyte (10.04.22 @ 15:20)    Sedimentation Rate, Erythrocyte: 122: [facility] performs Erythrocyte Sedimentation Rate assay  utilizing the Westergren method mm/Hr      C-Reactive Protein, Serum (10.04.22 @ 15:20)    C-Reactive Protein, Serum: 245.7 mg/L            MICROBIOLOGY:      Culture - Blood (10.04.22 @ 15:15)    Specimen Source: .Blood Blood    Culture Results:   No growth at 1 day.    Culture - Blood (10.04.22 @ 14:31)    Specimen Source: .Blood Blood    Culture Results:   No growth at 1 day.    Culture - Blood (10.03.22 @ 18:35)    Gram Stain:   Anaerobic Bottle: Gram Positive Cocci in Pairs and Chains  Result called to and read back byESTEFANIA Sparrow RN (9UR)  10/04/2022  12:01:13  ***Blood Panel PCR results on this specimen are available  approximately 3 hours after the Gram stain result.***  Gram stain, PCR, and/or culture results may not always  correspond due to difference in methodologies.  ************************************************************  This PCR assay was performed using [employer].  The following targets are tested for: Enterococcus,  vancomycin resistant enterococci, Listeria monocytogenes,  coagulase negative staphylococci, S. aureus,  methicillin resistant S. aureus, Streptococcus agalactiae  (Group B), S. pneumoniae, S. pyogenes (Group A),  Acinetobacter baumannii, Enterobacter cloacae, E. coli,  Klebsiella oxytoca, K. pneumoniae, Proteus sp.,  Serratia marcescens, Haemophilus influenzae,  Neisseria meningitidis, Pseudomonas aeruginosa, Candida  albicans, C. glabrata, C krusei, C parapsilosis,  C.tropicalis and the KPC resistance gene.    -  Enterococcus species: Detec    Specimen Source: .Blood Blood    Organism: Blood Culture PCR    Culture Results:   Growth in anaerobic bottle: Enterococcus faecium  Susceptibility to follow.    Organism Identification: Blood Culture PCR    Method Type: PCR    Culture - Blood (10.03.22 @ 18:30)    Specimen Source: .Blood Blood    Culture Results:   No growth at 1 day.    Culture - Other (10.03.22 @ 18:45)    -  Tobramycin: S <=2    -  Tobramycin: S <=2    -  Trimethoprim/Sulfamethoxazole: S <=0.5/9.5    -  Trimethoprim/Sulfamethoxazole: S <=0.5/9.5    -  Vancomycin: S 1    -  Cefazolin: S 8 Enterobacter, Klebsiella aerogenes, Citrobacter, and Serratia may develop resistance during prolonged therapy (3-4 days)    -  Cefepime: S <=2    -  Cefoxitin: R >16    -  Ceftriaxone: S <=1 Enterobacter, Klebsiella aerogenes, Citrobacter, and Serratia may develop resistance during prolonged therapy    -  Ceftriaxone: S <=1 Enterobacter, Klebsiella aerogenes, Citrobacter, and Serratia may develop resistance during prolonged therapy    -  Ciprofloxacin: S <=0.25    -  Ciprofloxacin: S <=0.25    -  Ertapenem: S <=0.5    -  Ertapenem: S <=0.5    -  Gentamicin: S <=2    -  Gentamicin: S <=2    -  Piperacillin/Tazobactam: S <=8    -  Piperacillin/Tazobactam: S <=8    Gram Stain:   Rare Yeast  Rare Gram Positive Cocci in Pairs and Chains  Rare to few Gram Negative Rods  Few WBC's    -  Ampicillin: R >16 These ampicillin results predict results for amoxicillin    -  Ampicillin: S 8 Predicts results to ampicillin/sulbactam, amoxacillin-clavulanate and  piperacillin-tazobactam.    -  Ampicillin: R >16 These ampicillin results predict results for amoxicillin    -  Ampicillin/Sulbactam: S 8/4 Enterobacter, Klebsiella aerogenes, Citrobacter, and Serratia may develop resistance during prolonged therapy (3-4 days)    -  Ampicillin/Sulbactam: R 16/8 Enterobacter, Klebsiella aerogenes, Citrobacter, and Serratia may develop resistance during prolonged therapy (3-4 days)    -  Cefazolin: R >16 Enterobacter, Klebsiella aerogenes, Citrobacter, and Serratia may develop resistance during prolonged therapy (3-4 days)    Specimen Source: Wound Wound    Culture Results:   Few Enterobacter cloacae complex  Few Klebsiella oxytoca/Raoultella ornithinolytica  Few Enterococcus faecium  Few Enterococcus faecalis  Moderate Candida albicans  Culture in progress    Organism Identification: Enterobacter cloacae complex  Klebsiella oxytoca /Raoutella ornithinolytica  Enterococcus faecium    Organism: Enterobacter cloacae complex    Organism: Enterococcus faecium    Organism: Klebsiella oxytoca /Raoutella ornithinolytica    Method Type: CHRIS    Method Type: CHRIS    Method Type: CHRIS

## 2022-10-05 NOTE — PROGRESS NOTE ADULT - SUBJECTIVE AND OBJECTIVE BOX
STATUS POST: ERIC Martinez   POST OPERATIVE DAY #: 20    SUBJECTIVE: Pt seen and examined by chief resident. Pt is doing well, resting comfortably on bed. Reporting some abdominal discomfort. Diarrhea x 1 overnight.  No nausea or vomiting.     Vital Signs Last 24 Hrs  T(C): 36.7 (05 Oct 2022 05:53), Max: 36.9 (04 Oct 2022 08:25)  T(F): 98.1 (05 Oct 2022 05:53), Max: 98.5 (04 Oct 2022 08:25)  HR: 74 (05 Oct 2022 05:53) (74 - 92)  BP: 130/74 (05 Oct 2022 05:53) (107/68 - 130/74)  BP(mean): 90 (04 Oct 2022 21:30) (90 - 90)  RR: 17 (05 Oct 2022 05:53) (17 - 18)  SpO2: 95% (05 Oct 2022 05:53) (93% - 99%)    Parameters below as of 05 Oct 2022 05:53  Patient On (Oxygen Delivery Method): room air        I&O's Summary    04 Oct 2022 07:01  -  05 Oct 2022 07:00  --------------------------------------------------------  IN: 3150 mL / OUT: 230 mL / NET: 2920 mL        Physical Exam:  General Appearance: Appears well, NAD  Pulmonary: Nonlabored breathing, no respiratory distress  Abdomen: Soft, nondisteded, wound vac in place holding suction. nontender.  Extremities: WWP, SCD's in place     LABS:                        8.7    12.35 )-----------( 505      ( 04 Oct 2022 10:22 )             27.8     10-04    134<L>  |  100  |  19  ----------------------------<  126<H>  3.9   |  20<L>  |  1.10    Ca    9.0      04 Oct 2022 10:22  Phos  3.8     10-04  Mg     2.2     10-04

## 2022-10-05 NOTE — PROGRESS NOTE ADULT - SUBJECTIVE AND OBJECTIVE BOX
SUBJECTIVE:  Patient seen and examined at bedside. No acute complaints no overnight events.     Vital Signs Last 12 Hrs  T(F): 98.1 (10-05-22 @ 08:30), Max: 98.1 (10-05-22 @ 05:53)  HR: 81 (10-05-22 @ 08:30) (74 - 81)  BP: 106/62 (10-05-22 @ 08:30) (106/62 - 130/74)  BP(mean): --  RR: 18 (10-05-22 @ 08:30) (17 - 18)  SpO2: 99% (10-05-22 @ 08:30) (95% - 99%)  I&O's Summary    04 Oct 2022 07:01  -  05 Oct 2022 07:00  --------------------------------------------------------  IN: 3150 mL / OUT: 230 mL / NET: 2920 mL    05 Oct 2022 07:01  -  05 Oct 2022 11:39  --------------------------------------------------------  IN: 500 mL / OUT: 0 mL / NET: 500 mL        PHYSICAL EXAM:  Constitutional: pleasant female looks anxious  HEENT: NC/AT, EOMI  Neck: Supple, no JVD  Respiratory: CTA B/L.   Cardiovascular: RRR, normal S1 and S2,   Gastrointestinal: +BS, minimal tenderness  Extremities: no edema.    Neurological: AAOx3        LABS:                        8.1    10.44 )-----------( 515      ( 05 Oct 2022 05:30 )             26.3     10-05    130<L>  |  96  |  23  ----------------------------<  117<H>  3.2<L>   |  25  |  1.62<H>    Ca    8.7      05 Oct 2022 05:30  Phos  3.6     10-05  Mg     2.0     10-05              RADIOLOGY & ADDITIONAL TESTS:    MEDICATIONS  (STANDING):  atorvastatin 20 milliGRAM(s) Oral at bedtime  chlorhexidine 2% Cloths 1 Application(s) Topical <User Schedule>  DAPTOmycin IVPB 750 milliGRAM(s) IV Intermittent every 24 hours  dextrose 5% + sodium chloride 0.45%. 1000 milliLiter(s) (150 mL/Hr) IV Continuous <Continuous>  dextrose 5%. 1000 milliLiter(s) (100 mL/Hr) IV Continuous <Continuous>  dextrose 5%. 1000 milliLiter(s) (50 mL/Hr) IV Continuous <Continuous>  dextrose 50% Injectable 25 Gram(s) IV Push once  dextrose 50% Injectable 12.5 Gram(s) IV Push once  dextrose 50% Injectable 25 Gram(s) IV Push once  glucagon  Injectable 1 milliGRAM(s) IntraMuscular once  heparin   Injectable 5000 Unit(s) SubCutaneous every 8 hours  influenza  Vaccine (HIGH DOSE) 0.7 milliLiter(s) IntraMuscular once  insulin lispro (ADMELOG) corrective regimen sliding scale   SubCutaneous three times a day before meals  insulin lispro (ADMELOG) corrective regimen sliding scale   SubCutaneous at bedtime  labetalol 200 milliGRAM(s) Oral every 12 hours  lidocaine   4% Patch 1 Patch Transdermal every 24 hours  losartan 50 milliGRAM(s) Oral daily  NIFEdipine XL 60 milliGRAM(s) Oral every 24 hours  potassium chloride   Powder 40 milliEquivalent(s) Oral once  senna 2 Tablet(s) Oral at bedtime  vancomycin    Solution 250 milliGRAM(s) Oral every 6 hours    MEDICATIONS  (PRN):  acetaminophen     Tablet .. 650 milliGRAM(s) Oral every 6 hours PRN Mild Pain (1 - 3), Moderate Pain (4 - 6)  dextrose Oral Gel 15 Gram(s) Oral once PRN Blood Glucose LESS THAN 70 milliGRAM(s)/deciliter  ondansetron Injectable 4 milliGRAM(s) IV Push every 8 hours PRN Nausea and/or Vomiting  sodium chloride 0.9% lock flush 10 milliLiter(s) IV Push every 1 hour PRN Pre/post blood products, medications, blood draw, and to maintain line patency

## 2022-10-05 NOTE — PROGRESS NOTE ADULT - ASSESSMENT
72 year old woman with HTN, DM2, past surgical history of  x 2, hysterectomy, and laparoscopic cholecystectomy (), admitted for large bowel obstruction; had spontaneous return of bowel function initially; however, bowel obstruction recurred, so taken to OR for ex lap with lysis of adhesions and bowel resection () . Monitored in SICU post -op ; now on regional. Hospital course complicated by up-trending leukocytosis of unknown etiology.     # Large bowel obstruction   #post operative state  s/p OR for ex lap with lysis of adhesions and bowel resection ()  - rest of care per primary team     #C diff; non severe  -On PO vanc 10/4-    #E. faecalis bacteremia  -ID consult. Afebrile at this time.   -On dapto 10/4-  -Repeat surveillance cultures if yesterday's also positive    #SO  -Likely prerenal due to diarrhea. IVF. Encourage PO. Continue to trend. Avoid nephrotoxic meds.       # Leukocytosis   Likely due to C idff infection and bacteremia E. faecalis that was just seen  ID consulted, f/u their recs  Repeat blood cultures after starting antimicrobial    #reactive thrombocytosis  will continue to monitor     #hypophosphatemia   improved    #HTN   takes HCTZ 25mg qd, losartan 100mg qd, labetalol 200mg BID, nifedipine 60mg ER qd at home.   - continue labetalol 200mg BID  - on losartan at 50mg qd   - on nifepidine 60m ER qd ( can hold if bp is low )   - Continue to hold HCTZ on discharge as pt's BP is well controlled on above 3 anti-htn medication     # Type 2 Diabetes complicated by hyperglycemia   Takes metformin at home.   cw insulin sliding scale while inpatient.   pt's a1c at goal for her age, resume metformin on discharge.     #Incidental finding of bilateral lung nodules  CT chest showing bilateral lung nodules, measuring up to 7 mm in the right   upper lobe  Recommend follow-up chest CT in three months to ensure stability.    # COVID 19 infection (present on admission)   Incidental finding of COVID 19 infection on admission nasal swab. Without any dyspnea, or sore throat. Per [facility] treatment algorithm, did not meet criteria for monoclonal antibodies at time of admission.   No O2 requirement, does not meet criteria for remdesivir/dexamethasone.     # Acute blood loss anemia   continue to trend daily CBC while inpatient;  ferritin, iron sat not low  age appropriate cancer screening outpatient     DVT ppx - HSQ

## 2022-10-06 LAB
-  AMPICILLIN: SIGNIFICANT CHANGE UP
-  AMPICILLIN: SIGNIFICANT CHANGE UP
-  VANCOMYCIN: SIGNIFICANT CHANGE UP
-  VANCOMYCIN: SIGNIFICANT CHANGE UP
ANION GAP SERPL CALC-SCNC: 11 MMOL/L — SIGNIFICANT CHANGE UP (ref 5–17)
BUN SERPL-MCNC: 11 MG/DL — SIGNIFICANT CHANGE UP (ref 7–23)
CALCIUM SERPL-MCNC: 8.1 MG/DL — LOW (ref 8.4–10.5)
CHLORIDE SERPL-SCNC: 108 MMOL/L — SIGNIFICANT CHANGE UP (ref 96–108)
CO2 SERPL-SCNC: 19 MMOL/L — LOW (ref 22–31)
CREAT SERPL-MCNC: 0.7 MG/DL — SIGNIFICANT CHANGE UP (ref 0.5–1.3)
CULTURE RESULTS: SIGNIFICANT CHANGE UP
EGFR: 93 ML/MIN/1.73M2 — SIGNIFICANT CHANGE UP
GLUCOSE BLDC GLUCOMTR-MCNC: 183 MG/DL — HIGH (ref 70–99)
GLUCOSE BLDC GLUCOMTR-MCNC: 192 MG/DL — HIGH (ref 70–99)
GLUCOSE BLDC GLUCOMTR-MCNC: 215 MG/DL — HIGH (ref 70–99)
GLUCOSE SERPL-MCNC: 196 MG/DL — HIGH (ref 70–99)
HCT VFR BLD CALC: 29 % — LOW (ref 34.5–45)
HGB BLD-MCNC: 8.8 G/DL — LOW (ref 11.5–15.5)
MAGNESIUM SERPL-MCNC: 1.6 MG/DL — SIGNIFICANT CHANGE UP (ref 1.6–2.6)
MCHC RBC-ENTMCNC: 27.3 PG — SIGNIFICANT CHANGE UP (ref 27–34)
MCHC RBC-ENTMCNC: 30.3 GM/DL — LOW (ref 32–36)
MCV RBC AUTO: 90.1 FL — SIGNIFICANT CHANGE UP (ref 80–100)
METHOD TYPE: SIGNIFICANT CHANGE UP
NRBC # BLD: 0 /100 WBCS — SIGNIFICANT CHANGE UP (ref 0–0)
ORGANISM # SPEC MICROSCOPIC CNT: SIGNIFICANT CHANGE UP
PHOSPHATE SERPL-MCNC: 2.6 MG/DL — SIGNIFICANT CHANGE UP (ref 2.5–4.5)
PLATELET # BLD AUTO: 434 K/UL — HIGH (ref 150–400)
POTASSIUM SERPL-MCNC: 3.6 MMOL/L — SIGNIFICANT CHANGE UP (ref 3.5–5.3)
POTASSIUM SERPL-SCNC: 3.6 MMOL/L — SIGNIFICANT CHANGE UP (ref 3.5–5.3)
RBC # BLD: 3.22 M/UL — LOW (ref 3.8–5.2)
RBC # FLD: 15.8 % — HIGH (ref 10.3–14.5)
SODIUM SERPL-SCNC: 138 MMOL/L — SIGNIFICANT CHANGE UP (ref 135–145)
SPECIMEN SOURCE: SIGNIFICANT CHANGE UP
WBC # BLD: 9.72 K/UL — SIGNIFICANT CHANGE UP (ref 3.8–10.5)
WBC # FLD AUTO: 9.72 K/UL — SIGNIFICANT CHANGE UP (ref 3.8–10.5)

## 2022-10-06 PROCEDURE — 99232 SBSQ HOSP IP/OBS MODERATE 35: CPT

## 2022-10-06 PROCEDURE — 99221 1ST HOSP IP/OBS SF/LOW 40: CPT

## 2022-10-06 RX ORDER — MEN-PHOR .5; .5 G/G; G/G
2 LOTION TOPICAL ONCE
Refills: 0 | Status: COMPLETED | OUTPATIENT
Start: 2022-10-06 | End: 2022-10-06

## 2022-10-06 RX ORDER — LIDOCAINE 4 G/100G
1 CREAM TOPICAL ONCE
Refills: 0 | Status: COMPLETED | OUTPATIENT
Start: 2022-10-06 | End: 2022-10-06

## 2022-10-06 RX ORDER — ACETAMINOPHEN 500 MG
650 TABLET ORAL EVERY 6 HOURS
Refills: 0 | Status: DISCONTINUED | OUTPATIENT
Start: 2022-10-06 | End: 2022-10-09

## 2022-10-06 RX ORDER — ACETAMINOPHEN 500 MG
1000 TABLET ORAL ONCE
Refills: 0 | Status: COMPLETED | OUTPATIENT
Start: 2022-10-06 | End: 2022-10-06

## 2022-10-06 RX ORDER — LANOLIN ALCOHOL/MO/W.PET/CERES
5 CREAM (GRAM) TOPICAL AT BEDTIME
Refills: 0 | Status: DISCONTINUED | OUTPATIENT
Start: 2022-10-06 | End: 2022-10-21

## 2022-10-06 RX ORDER — MAGNESIUM SULFATE 500 MG/ML
2 VIAL (ML) INJECTION EVERY 4 HOURS
Refills: 0 | Status: COMPLETED | OUTPATIENT
Start: 2022-10-06 | End: 2022-10-06

## 2022-10-06 RX ORDER — POTASSIUM CHLORIDE 20 MEQ
40 PACKET (EA) ORAL ONCE
Refills: 0 | Status: COMPLETED | OUTPATIENT
Start: 2022-10-06 | End: 2022-10-06

## 2022-10-06 RX ADMIN — LIDOCAINE 1 APPLICATION(S): 4 CREAM TOPICAL at 10:09

## 2022-10-06 RX ADMIN — MEN-PHOR 2 APPLICATION(S): .5; .5 LOTION TOPICAL at 17:15

## 2022-10-06 RX ADMIN — Medication 5 MILLIGRAM(S): at 23:07

## 2022-10-06 RX ADMIN — Medication 40 MILLIEQUIVALENT(S): at 12:51

## 2022-10-06 RX ADMIN — Medication 250 MILLIGRAM(S): at 07:10

## 2022-10-06 RX ADMIN — Medication 400 MILLIGRAM(S): at 05:02

## 2022-10-06 RX ADMIN — ATORVASTATIN CALCIUM 20 MILLIGRAM(S): 80 TABLET, FILM COATED ORAL at 23:07

## 2022-10-06 RX ADMIN — Medication 200 MILLIGRAM(S): at 17:15

## 2022-10-06 RX ADMIN — LIDOCAINE 1 PATCH: 4 CREAM TOPICAL at 15:00

## 2022-10-06 RX ADMIN — Medication 650 MILLIGRAM(S): at 19:22

## 2022-10-06 RX ADMIN — Medication 2: at 11:34

## 2022-10-06 RX ADMIN — Medication 25 GRAM(S): at 12:52

## 2022-10-06 RX ADMIN — SENNA PLUS 2 TABLET(S): 8.6 TABLET ORAL at 23:07

## 2022-10-06 RX ADMIN — Medication 250 MILLIGRAM(S): at 23:08

## 2022-10-06 RX ADMIN — Medication 250 MILLIGRAM(S): at 12:51

## 2022-10-06 RX ADMIN — HEPARIN SODIUM 5000 UNIT(S): 5000 INJECTION INTRAVENOUS; SUBCUTANEOUS at 23:23

## 2022-10-06 RX ADMIN — Medication 200 MILLIGRAM(S): at 07:10

## 2022-10-06 RX ADMIN — Medication 4: at 17:58

## 2022-10-06 RX ADMIN — DAPTOMYCIN 130 MILLIGRAM(S): 500 INJECTION, POWDER, LYOPHILIZED, FOR SOLUTION INTRAVENOUS at 17:16

## 2022-10-06 RX ADMIN — Medication 60 MILLIGRAM(S): at 13:05

## 2022-10-06 RX ADMIN — LOSARTAN POTASSIUM 50 MILLIGRAM(S): 100 TABLET, FILM COATED ORAL at 07:09

## 2022-10-06 RX ADMIN — LIDOCAINE 1 PATCH: 4 CREAM TOPICAL at 07:42

## 2022-10-06 RX ADMIN — Medication 5 MILLIGRAM(S): at 01:12

## 2022-10-06 RX ADMIN — Medication 1000 MILLIGRAM(S): at 11:07

## 2022-10-06 RX ADMIN — CHLORHEXIDINE GLUCONATE 1 APPLICATION(S): 213 SOLUTION TOPICAL at 07:10

## 2022-10-06 RX ADMIN — Medication 25 GRAM(S): at 17:57

## 2022-10-06 RX ADMIN — Medication 400 MILLIGRAM(S): at 10:52

## 2022-10-06 RX ADMIN — LIDOCAINE 1 PATCH: 4 CREAM TOPICAL at 03:14

## 2022-10-06 RX ADMIN — Medication 250 MILLIGRAM(S): at 17:58

## 2022-10-06 NOTE — BH CONSULTATION LIAISON ASSESSMENT NOTE - NSBHCHARTREVIEWVS_PSY_A_CORE FT
Vital Signs Last 24 Hrs  T(C): 37.1 (06 Oct 2022 13:10), Max: 37.1 (06 Oct 2022 13:10)  T(F): 98.8 (06 Oct 2022 13:10), Max: 98.8 (06 Oct 2022 13:10)  HR: 84 (06 Oct 2022 13:10) (81 - 89)  BP: 155/82 (06 Oct 2022 13:10) (96/60 - 155/82)  BP(mean): --  RR: 16 (06 Oct 2022 13:10) (16 - 18)  SpO2: 98% (06 Oct 2022 13:10) (96% - 100%)    Parameters below as of 06 Oct 2022 13:10  Patient On (Oxygen Delivery Method): room air

## 2022-10-06 NOTE — PROGRESS NOTE ADULT - SUBJECTIVE AND OBJECTIVE BOX
INTERVAL HPI/OVERNIGHT EVENTS:    C/O musculoskeletal pains and no being able to eat      MEDICATIONS  (STANDING):  atorvastatin 20 milliGRAM(s) Oral at bedtime  camphor 0.5%/menthol 0.5% Topical Lotion 2 Application(s) Topical once  chlorhexidine 2% Cloths 1 Application(s) Topical <User Schedule>  DAPTOmycin IVPB 750 milliGRAM(s) IV Intermittent every 24 hours  dextrose 5% + sodium chloride 0.45%. 1000 milliLiter(s) (150 mL/Hr) IV Continuous <Continuous>  dextrose 5%. 1000 milliLiter(s) (100 mL/Hr) IV Continuous <Continuous>  dextrose 5%. 1000 milliLiter(s) (50 mL/Hr) IV Continuous <Continuous>  dextrose 50% Injectable 25 Gram(s) IV Push once  dextrose 50% Injectable 12.5 Gram(s) IV Push once  dextrose 50% Injectable 25 Gram(s) IV Push once  glucagon  Injectable 1 milliGRAM(s) IntraMuscular once  heparin   Injectable 5000 Unit(s) SubCutaneous every 8 hours  influenza  Vaccine (HIGH DOSE) 0.7 milliLiter(s) IntraMuscular once  insulin lispro (ADMELOG) corrective regimen sliding scale   SubCutaneous three times a day before meals  insulin lispro (ADMELOG) corrective regimen sliding scale   SubCutaneous at bedtime  labetalol 200 milliGRAM(s) Oral every 12 hours  lidocaine   4% Patch 1 Patch Transdermal every 24 hours  losartan 50 milliGRAM(s) Oral daily  magnesium sulfate  IVPB 2 Gram(s) IV Intermittent every 4 hours  melatonin 5 milliGRAM(s) Oral at bedtime  NIFEdipine XL 60 milliGRAM(s) Oral every 24 hours  potassium chloride   Powder 40 milliEquivalent(s) Oral once  senna 2 Tablet(s) Oral at bedtime  vancomycin    Solution 250 milliGRAM(s) Oral every 6 hours    MEDICATIONS  (PRN):  dextrose Oral Gel 15 Gram(s) Oral once PRN Blood Glucose LESS THAN 70 milliGRAM(s)/deciliter  ondansetron Injectable 4 milliGRAM(s) IV Push every 8 hours PRN Nausea and/or Vomiting  sodium chloride 0.9% lock flush 10 milliLiter(s) IV Push every 1 hour PRN Pre/post blood products, medications, blood draw, and to maintain line patency      Allergies    penicillin (Rash)      Vital Signs Last 24 Hrs  T(C): 36.3 (06 Oct 2022 08:14), Max: 37 (05 Oct 2022 16:48)  T(F): 97.3 (06 Oct 2022 08:14), Max: 98.6 (05 Oct 2022 16:48)  HR: 86 (06 Oct 2022 08:14) (81 - 89)  BP: 147/85 (06 Oct 2022 08:14) (96/60 - 148/73)  BP(mean): --  RR: 18 (06 Oct 2022 08:14) (16 - 18)  SpO2: 100% (06 Oct 2022 08:14) (96% - 100%)    Parameters below as of 06 Oct 2022 08:14  Patient On (Oxygen Delivery Method): room air              LABS:                        8.8    9.72  )-----------( 434      ( 06 Oct 2022 11:22 )             29.0     10-06    138  |  108  |  x   ----------------------------<  196<H>  3.6   |  19<L>  |  0.70    Ca    8.1<L>      06 Oct 2022 11:22  Phos  2.6     10-06  Mg     1.6     10-06            MICROBIOLOGY:    RADIOLOGY & ADDITIONAL STUDIES: INTERVAL HPI/OVERNIGHT EVENTS:    C/O musculoskeletal pains and no being able to eat      MEDICATIONS  (STANDING):  atorvastatin 20 milliGRAM(s) Oral at bedtime  camphor 0.5%/menthol 0.5% Topical Lotion 2 Application(s) Topical once  chlorhexidine 2% Cloths 1 Application(s) Topical <User Schedule>  DAPTOmycin IVPB 750 milliGRAM(s) IV Intermittent every 24 hours  dextrose 5% + sodium chloride 0.45%. 1000 milliLiter(s) (150 mL/Hr) IV Continuous <Continuous>  dextrose 5%. 1000 milliLiter(s) (100 mL/Hr) IV Continuous <Continuous>  dextrose 5%. 1000 milliLiter(s) (50 mL/Hr) IV Continuous <Continuous>  dextrose 50% Injectable 25 Gram(s) IV Push once  dextrose 50% Injectable 12.5 Gram(s) IV Push once  dextrose 50% Injectable 25 Gram(s) IV Push once  glucagon  Injectable 1 milliGRAM(s) IntraMuscular once  heparin   Injectable 5000 Unit(s) SubCutaneous every 8 hours  influenza  Vaccine (HIGH DOSE) 0.7 milliLiter(s) IntraMuscular once  insulin lispro (ADMELOG) corrective regimen sliding scale   SubCutaneous three times a day before meals  insulin lispro (ADMELOG) corrective regimen sliding scale   SubCutaneous at bedtime  labetalol 200 milliGRAM(s) Oral every 12 hours  lidocaine   4% Patch 1 Patch Transdermal every 24 hours  losartan 50 milliGRAM(s) Oral daily  magnesium sulfate  IVPB 2 Gram(s) IV Intermittent every 4 hours  melatonin 5 milliGRAM(s) Oral at bedtime  NIFEdipine XL 60 milliGRAM(s) Oral every 24 hours  potassium chloride   Powder 40 milliEquivalent(s) Oral once  senna 2 Tablet(s) Oral at bedtime  vancomycin    Solution 250 milliGRAM(s) Oral every 6 hours    MEDICATIONS  (PRN):  dextrose Oral Gel 15 Gram(s) Oral once PRN Blood Glucose LESS THAN 70 milliGRAM(s)/deciliter  ondansetron Injectable 4 milliGRAM(s) IV Push every 8 hours PRN Nausea and/or Vomiting  sodium chloride 0.9% lock flush 10 milliLiter(s) IV Push every 1 hour PRN Pre/post blood products, medications, blood draw, and to maintain line patency      Allergies    penicillin (Rash)      Vital Signs Last 24 Hrs  T(C): 36.3 (06 Oct 2022 08:14), Max: 37 (05 Oct 2022 16:48)  T(F): 97.3 (06 Oct 2022 08:14), Max: 98.6 (05 Oct 2022 16:48)  HR: 86 (06 Oct 2022 08:14) (81 - 89)  BP: 147/85 (06 Oct 2022 08:14) (96/60 - 148/73)  BP(mean): --  RR: 18 (06 Oct 2022 08:14) (16 - 18)  SpO2: 100% (06 Oct 2022 08:14) (96% - 100%)    Parameters below as of 06 Oct 2022 08:14  Patient On (Oxygen Delivery Method): room air  Awake and alert  No rash  RRR  Chest CTA  Abd soft and less tender            LABS:                        8.8    9.72  )-----------( 434      ( 06 Oct 2022 11:22 )             29.0     10-06    138  |  108  |  x   ----------------------------<  196<H>  3.6   |  19<L>  |  0.70    Ca    8.1<L>      06 Oct 2022 11:22  Phos  2.6     10-06  Mg     1.6     10-06

## 2022-10-06 NOTE — CONSULT NOTE ADULT - CONSULT REQUESTED DATE/TIME
15-Sep-2022 03:14
17-Sep-2022 18:09
20-Sep-2022 08:37
23-Sep-2022 13:15
06-Oct-2022 13:47
12-Sep-2022 17:04
12-Sep-2022

## 2022-10-06 NOTE — PROGRESS NOTE ADULT - ASSESSMENT
72 year old woman with HTN, DM2, past surgical history of  x 2, hysterectomy, and laparoscopic cholecystectomy (), admitted for large bowel obstruction; had spontaneous return of bowel function initially; however, bowel obstruction recurred, so taken to OR for ex lap with lysis of adhesions and bowel resection () . Monitored in SICU post -op ; now on regional. Hospital course complicated by up-trending leukocytosis of unknown etiology.     # Large bowel obstruction   #post operative state  s/p OR for ex lap with lysis of adhesions and bowel resection ()  - rest of care per primary team     #C diff; non severe  -ID consulted  -On PO vanc 10/4-  -Had some nausea which has since resolved    #E. faecalis bacteremia  -ID consult. Afebrile at this time.   -On dapto 10/4-  -Repeat Cx's negative to date    #SO  -Likely prerenal due to diarrhea. IVF. Encourage PO. Continue to trend. Avoid nephrotoxic meds.   -Improved with some gentle IVF and PO intake. continue to monitor.    #Obesity  -Discussed lifestyle/dietary modications  -Not a candidate for bariatrics at this time    # Leukocytosis   Improving    #reactive thrombocytosis  will continue to monitor     #hypophosphatemia   improved    #HTN   takes HCTZ 25mg qd, losartan 100mg qd, labetalol 200mg BID, nifedipine 60mg ER qd at home.   - continue labetalol 200mg BID  - on losartan at 50mg qd   - on nifepidine 60m ER qd ( can hold if bp is low )   - Continue to hold HCTZ on discharge as pt's BP is well controlled on above 3 anti-htn medication     # Type 2 Diabetes complicated by hyperglycemia   cw insulin sliding scale while inpatient.   pt's a1c at goal for her age, resume metformin on discharge.     #Incidental finding of bilateral lung nodules  CT chest showing bilateral lung nodules, measuring up to 7 mm in the right   upper lobe  Recommend follow-up chest CT in three months to ensure stability.    # COVID 19 infection (present on admission)   Improved/resolved. Off isolation.     # Acute blood loss anemia   continue to trend daily CBC while inpatient;  age appropriate cancer screening outpatient     DVT ppx - HSQ

## 2022-10-06 NOTE — BH CONSULTATION LIAISON ASSESSMENT NOTE - RISK ASSESSMENT
low acute suicide risk laverne per lack of SI, lack of significant other psychiatric sx, pt's persistently future-oriented TC with themes of high self-worth (though not grandiose), resilience, etc.  Despite medical issues and frustration with long hospital stay, pt has skills and frustration tolerance to manage stressors.

## 2022-10-06 NOTE — PROGRESS NOTE ADULT - ASSESSMENT
RECOMMEND  Continue PO Vancomycin for 2 weeks.  If/when diarrhea resolves, OK to decrease PO Vanco dose to 125 mg 4 times a day, especially if Vanco interferes with better eating    Continue Daptomycin for 2 weeks    Check CPK today    Check ESR and CRP tomorrow.  If remain high, regardless of resolving Cdiff and negative blood cultures, patient will need a whole body gallium scan and likely cardiac echo     Mobilize / PT    No ready to be discharged from my perspective           Discussed with Team 4 surgery PA    [phone]       Enterococcal bacteremia - I/4 bottles positive and negative another set of Bcxs collected before antibiotics.  Possibly the positive blood culture represents blood culture contamination not a true blood stream infection.  Wound infection / collection -  s/p debridement and wound VAC with wound appearing not infected on surgical inspection   C diff colitis - resolving  Leukocytosis resolved  N/V - possibly due to PO Vancomycin   Renal insufficiency - likely pre-renal   S/P Complex abdominal surgery including SBR for SBO  S/P Sepsis and peritonitis        RECOMMEND  Continue PO Vancomycin for 2 weeks.  If/when diarrhea resolves, OK to decrease PO Vanco dose to 125 mg 4 times a day, especially if Vanco interferes with better eating    Continue Daptomycin for 2 weeks    Check CPK today as Daptomycin can cause rhabdomyolysis     Check ESR and CRP tomorrow.  If remain high, regardless of resolving Cdiff and negative blood cultures, patient will need a whole body gallium scan and likely cardiac echo     Mobilize / PT    No ready to be discharged from my perspective           Discussed with Team 4 surgery PA    [phone]

## 2022-10-06 NOTE — PROGRESS NOTE ADULT - ASSESSMENT
72 F PMH HTN, DM and PSH  x 2, hysterectomy and lap dev ( w/ Dr. Clifford) admitted on  with 2 days of n/v, abd pain, imaging demonstrating LBO, which resolved prior to operative exploration. Recurrent bowel obstruction, now s/p Exlap JOSEFINA, SBR (100 cm) (9/15). now with wound vac, pending YOSEPH placement now w/ c dif    Regular, IVF, ISS  Pain/nausea prn  Vanco PO (10/4-), Daptomycin (10/4-)  Wound Vac  SQH/SCDs/OOBA/IS  AM labs  renal (gatito) consulted   Dispo: [facility]

## 2022-10-06 NOTE — BH CONSULTATION LIAISON ASSESSMENT NOTE - NSSUICPROTFACT_PSY_ALL_CORE
Identifies reasons for living/Supportive social network of family or friends/Fear of death or the actual act of killing self/Cultural, spiritual and/or moral attitudes against suicide/Ability to cope with stress

## 2022-10-06 NOTE — BH CONSULTATION LIAISON ASSESSMENT NOTE - NSBHCHARTREVIEWLAB_PSY_A_CORE FT
CBC Full  -  ( 06 Oct 2022 11:22 )  WBC Count : 9.72 K/uL  RBC Count : 3.22 M/uL  Hemoglobin : 8.8 g/dL  Hematocrit : 29.0 %  Platelet Count - Automated : 434 K/uL  Mean Cell Volume : 90.1 fl  Mean Cell Hemoglobin : 27.3 pg  Mean Cell Hemoglobin Concentration : 30.3 gm/dL  Auto Neutrophil # : x  Auto Lymphocyte # : x  Auto Monocyte # : x  Auto Eosinophil # : x  Auto Basophil # : x  Auto Neutrophil % : x  Auto Lymphocyte % : x  Auto Monocyte % : x  Auto Eosinophil % : x  Auto Basophil % : x  10-06    138  |  108  |  11  ----------------------------<  196<H>  3.6   |  19<L>  |  0.70    Ca    8.1<L>      06 Oct 2022 11:22  Phos  2.6     10-06  Mg     1.6     10-06

## 2022-10-06 NOTE — PROGRESS NOTE ADULT - SUBJECTIVE AND OBJECTIVE BOX
SUBJECTIVE:  Patient seen and examined at bedside. Feels a lot of pain today, in back and neck. States she is very uncomfortable in bed, just wants someone to rub her with bengay. Nausea/vomiting and diarrhea have all improved. Minimally tolerating PO at this time. Denies CP, palpitations, SOB, fevers, chills, dysuria etc.     Vital Signs Last 12 Hrs  T(F): 97.3 (10-06-22 @ 08:14), Max: 98 (10-06-22 @ 05:16)  HR: 86 (10-06-22 @ 08:14) (86 - 89)  BP: 147/85 (10-06-22 @ 08:14) (147/85 - 148/73)  BP(mean): --  RR: 18 (10-06-22 @ 08:14) (16 - 18)  SpO2: 100% (10-06-22 @ 08:14) (96% - 100%)  I&O's Summary    05 Oct 2022 07:01  -  06 Oct 2022 07:00  --------------------------------------------------------  IN: 5350 mL / OUT: 1046 mL / NET: 4304 mL    06 Oct 2022 07:01  -  06 Oct 2022 11:36  --------------------------------------------------------  IN: 340 mL / OUT: 500 mL / NET: -160 mL        PHYSICAL EXAM:  Constitutional: pleasant female looks anxious  HEENT: NC/AT, EOMI  Neck: Supple, no JVD  Respiratory: CTA B/L.   Cardiovascular: RRR, normal S1 and S2,   Gastrointestinal: +BS, minimal tenderness  MSK: tenderness to palpation on neck, some decreased ROM.   Extremities: no edema.    Neurological: AAOx3        LABS:                        8.8    9.72  )-----------( 434      ( 06 Oct 2022 11:22 )             29.0     10-05    133<L>  |  100  |  22  ----------------------------<  141<H>  3.7   |  23  |  1.38<H>    Ca    8.3<L>      05 Oct 2022 14:37  Phos  3.6     10-05  Mg     2.0     10-05              RADIOLOGY & ADDITIONAL TESTS:    MEDICATIONS  (STANDING):  atorvastatin 20 milliGRAM(s) Oral at bedtime  camphor 0.5%/menthol 0.5% Topical Lotion 2 Application(s) Topical once  chlorhexidine 2% Cloths 1 Application(s) Topical <User Schedule>  DAPTOmycin IVPB 750 milliGRAM(s) IV Intermittent every 24 hours  dextrose 5% + sodium chloride 0.45%. 1000 milliLiter(s) (150 mL/Hr) IV Continuous <Continuous>  dextrose 5%. 1000 milliLiter(s) (100 mL/Hr) IV Continuous <Continuous>  dextrose 5%. 1000 milliLiter(s) (50 mL/Hr) IV Continuous <Continuous>  dextrose 50% Injectable 25 Gram(s) IV Push once  dextrose 50% Injectable 12.5 Gram(s) IV Push once  dextrose 50% Injectable 25 Gram(s) IV Push once  glucagon  Injectable 1 milliGRAM(s) IntraMuscular once  heparin   Injectable 5000 Unit(s) SubCutaneous every 8 hours  influenza  Vaccine (HIGH DOSE) 0.7 milliLiter(s) IntraMuscular once  insulin lispro (ADMELOG) corrective regimen sliding scale   SubCutaneous three times a day before meals  insulin lispro (ADMELOG) corrective regimen sliding scale   SubCutaneous at bedtime  labetalol 200 milliGRAM(s) Oral every 12 hours  lidocaine   4% Patch 1 Patch Transdermal every 24 hours  losartan 50 milliGRAM(s) Oral daily  melatonin 5 milliGRAM(s) Oral at bedtime  NIFEdipine XL 60 milliGRAM(s) Oral every 24 hours  senna 2 Tablet(s) Oral at bedtime  vancomycin    Solution 250 milliGRAM(s) Oral every 6 hours    MEDICATIONS  (PRN):  dextrose Oral Gel 15 Gram(s) Oral once PRN Blood Glucose LESS THAN 70 milliGRAM(s)/deciliter  ondansetron Injectable 4 milliGRAM(s) IV Push every 8 hours PRN Nausea and/or Vomiting  sodium chloride 0.9% lock flush 10 milliLiter(s) IV Push every 1 hour PRN Pre/post blood products, medications, blood draw, and to maintain line patency

## 2022-10-06 NOTE — PROGRESS NOTE ADULT - SUBJECTIVE AND OBJECTIVE BOX
STATUS POST: ERIC Martinez   POST OPERATIVE DAY #: 21    SUBJECTIVE: Pt seen and examined by chief resident. Pt reporting no diarrhea overnight. Reports liquid diet makes her nauseous       Vital Signs Last 24 Hrs  T(C): 36.3 (06 Oct 2022 08:14), Max: 37 (05 Oct 2022 16:48)  T(F): 97.3 (06 Oct 2022 08:14), Max: 98.6 (05 Oct 2022 16:48)  HR: 86 (06 Oct 2022 08:14) (81 - 89)  BP: 147/85 (06 Oct 2022 08:14) (96/60 - 148/73)  BP(mean): --  RR: 18 (06 Oct 2022 08:14) (16 - 18)  SpO2: 100% (06 Oct 2022 08:14) (96% - 100%)    Parameters below as of 06 Oct 2022 08:14  Patient On (Oxygen Delivery Method): room air        I&O's Summary    05 Oct 2022 07:01  -  06 Oct 2022 07:00  --------------------------------------------------------  IN: 5350 mL / OUT: 1046 mL / NET: 4304 mL        Physical Exam:  General Appearance: Appears well, NAD  Pulmonary: Nonlabored breathing, no respiratory distress  Abdomen: Soft, nondisteded, nontender, wound vac in place  Extremities: WWP, SCD's in place     LABS:                        8.1    10.44 )-----------( 515      ( 05 Oct 2022 05:30 )             26.3     10-05    133<L>  |  100  |  22  ----------------------------<  141<H>  3.7   |  23  |  1.38<H>    Ca    8.3<L>      05 Oct 2022 14:37  Phos  3.6     10-05  Mg     2.0     10-05

## 2022-10-06 NOTE — BH CONSULTATION LIAISON ASSESSMENT NOTE - CURRENT MEDICATION
MEDICATIONS  (STANDING):  atorvastatin 20 milliGRAM(s) Oral at bedtime  camphor 0.5%/menthol 0.5% Topical Lotion 2 Application(s) Topical once  chlorhexidine 2% Cloths 1 Application(s) Topical <User Schedule>  DAPTOmycin IVPB 750 milliGRAM(s) IV Intermittent every 24 hours  dextrose 5% + sodium chloride 0.45%. 1000 milliLiter(s) (150 mL/Hr) IV Continuous <Continuous>  dextrose 5%. 1000 milliLiter(s) (100 mL/Hr) IV Continuous <Continuous>  dextrose 5%. 1000 milliLiter(s) (50 mL/Hr) IV Continuous <Continuous>  dextrose 50% Injectable 25 Gram(s) IV Push once  dextrose 50% Injectable 12.5 Gram(s) IV Push once  dextrose 50% Injectable 25 Gram(s) IV Push once  glucagon  Injectable 1 milliGRAM(s) IntraMuscular once  heparin   Injectable 5000 Unit(s) SubCutaneous every 8 hours  influenza  Vaccine (HIGH DOSE) 0.7 milliLiter(s) IntraMuscular once  insulin lispro (ADMELOG) corrective regimen sliding scale   SubCutaneous at bedtime  insulin lispro (ADMELOG) corrective regimen sliding scale   SubCutaneous three times a day before meals  labetalol 200 milliGRAM(s) Oral every 12 hours  lidocaine   4% Patch 1 Patch Transdermal every 24 hours  losartan 50 milliGRAM(s) Oral daily  magnesium sulfate  IVPB 2 Gram(s) IV Intermittent every 4 hours  melatonin 5 milliGRAM(s) Oral at bedtime  NIFEdipine XL 60 milliGRAM(s) Oral every 24 hours  senna 2 Tablet(s) Oral at bedtime  vancomycin    Solution 250 milliGRAM(s) Oral every 6 hours    MEDICATIONS  (PRN):  dextrose Oral Gel 15 Gram(s) Oral once PRN Blood Glucose LESS THAN 70 milliGRAM(s)/deciliter  ondansetron Injectable 4 milliGRAM(s) IV Push every 8 hours PRN Nausea and/or Vomiting  sodium chloride 0.9% lock flush 10 milliLiter(s) IV Push every 1 hour PRN Pre/post blood products, medications, blood draw, and to maintain line patency

## 2022-10-06 NOTE — BH CONSULTATION LIAISON ASSESSMENT NOTE - NSICDXBHSECONDARYDX_PSY_ALL_CORE
Large bowel obstruction   K56.609  Pre-operative examination   Z01.818  Hypertension   I10  Type 2 diabetes mellitus with hyperglycemia   E11.65  2019 novel coronavirus disease (COVID-19)   U07.1

## 2022-10-06 NOTE — BH CONSULTATION LIAISON ASSESSMENT NOTE - NSBHCONSULTFOLLOWAFTERCARE_PSY_A_CORE FT
recommended supportive counseling if pt interested:  •	[facility]  o	[email or web address]  o	[address]  [place], [place] [address]   [phone] ([phone] for intakes)  o	Medicare, Medicaid, most private insurance (including [employer])  •	[facility]  o	[email or web address]  o	[address] [id number] [phone]  o	Medicare, Medicaid, [employer]etna, [employer], [employer], BCBS, [facility] [employer], [employer], GHI, Healthfirst, HealthPlus, Metro[employer], Wellcare  •	[facility]  o	[email or web address]  o	[address], [place] [address] [phone]  Intake hours for new patients: Tuesdays and Thursdays at 8am  o	Medicare, Medicaid, most private insurance

## 2022-10-06 NOTE — BH CONSULTATION LIAISON ASSESSMENT NOTE - SUMMARY
72 F, no PPHx, PMHx HTN, DM2, past surgical history of  x 2, hysterectomy, and laparoscopic cholecystectomy (), admitted for large bowel obstruction; had spontaneous return of bowel function initially; however, bowel obstruction recurred, so taken to OR for ex lap with lysis of adhesions and bowel resection () . S/p SICU, pt with course complicated by infection, etc. Pt made comments like eg I want to kill myself, or This hospital stay will kill me to several staff members, prompting psychiatric consult.  Pt initially uses similar expression casually with writer, and explained that it is an expression of frustration, not a genuine statement of considering suicide.  Pt's assessment notable for lack of significant depressive sx (or psychosis, dina, delirium, anxiety), and she has a lack of SI or mental health care other than counseling as a child.  Pt appeared amused by the suicide concern and ended interview: “You tell them that Ms Kam is not going to kill herself.”    -no indication for psychiatric admission, medication, or 1:1  -will sign off, please call if questions or new/worsening sx

## 2022-10-06 NOTE — BH CONSULTATION LIAISON ASSESSMENT NOTE - HPI (INCLUDE ILLNESS QUALITY, SEVERITY, DURATION, TIMING, CONTEXT, MODIFYING FACTORS, ASSOCIATED SIGNS AND SYMPTOMS)
Per primary team: “72 year old woman with HTN, DM2, past surgical history of  x 2, hysterectomy, and laparoscopic cholecystectomy (), admitted for large bowel obstruction; had spontaneous return of bowel function initially; however, bowel obstruction recurred, so taken to OR for ex lap with lysis of adhesions and bowel resection () . Monitored in SICU post -op ; now on regional. Hospital course complicated by up-trending leukocytosis of unknown etiology.”    d/w PA, pt with hosp since 22 complicated by wound infection, bacteremia.  Psychiatry consulted as pt appears depressed, made statement, “I want to kill myself”.  No known PPHx.  d/w RN who reported that pt remarked that the long hospital stay will kill her, but no suicidal content specifically or worsening mood sx.    Pt assessed at bedside. Pt was alert, attentive, calm, well-groomed.  She immediately complained about the wait time for personal care from staff and said, “I should kill myself.”  Discussed this remark, pt became surprised that it would be interpreted literally.  She adamantly and categorically denied any genuine thoughts of ending her life.  She listed “1. I love me.  2. I’m not gonna kill myself”.  Pt with persistent future orientation in her TC, with plans to go to rehab, regain strength to walk, go back home.  Pt described concern for her health, attention to details of bowels and mobility, no themes of desperation or hopelessness. Sleep and PO is difficult in the hospital but adequate; no excessive guilt, worthlessness, impulsivity.  Denied AVH.  Denies substance use.  Explored possibility of worsening sx or trajectory and coping in such potential situation, pt described resilience and lack of ever having suicidal thoughts in her life, repeating "I love me!".  Discussed role of therapy for helping with coping; pt politely declined.    Denied any hx of SI/SA or psych hosp.  Pt reported difficult childhood having to attend to siblings and chores rather than participating in after school activities, and for “talking back” her parents had her see a .  No psychiatric dx.  Pt denies hx significant depression, anxiety, or other mental health issues.  Denied ever taking psychiatric Rx.    Chart reviewed, no CL notes, In  prior hosp, behavior charted regarding RRT “overheard discussion b/w pt and family member about "staging fall" so that pt's last night stay would be covered by insurance.”

## 2022-10-06 NOTE — CONSULT NOTE ADULT - ASSESSMENT
72 year old woman with HTN, DM2 admitted for large bowel obstruction s/p ex lap w/ lysis of adhesions and bowel resection on 9/14 for SBO. She is also s/p sepsis and peritonitis. Nephrology consulted for SO Cr 1.10-->1.62    Assessment/Plan:  #Pre-renal azotemia  Cr 1.10-->1.62 in the setting of severe diarrhea due to C.Diff infection  Improvement in Cr with IV fluids, 0.70 now  UA pending    Recommendations:  C/W IV fluid to maintain net even fluid balance  Maintain Strict I&Os  Avoid nephrotoxic agents      Nephrology will sign off at this time. Please feel free to reach out to us for any questions or concerns.     Sergio Sands M.D  PGY-4 Nephrology   [phone]

## 2022-10-06 NOTE — CONSULT NOTE ADULT - SUBJECTIVE AND OBJECTIVE BOX
HPI:  72 year old woman with HTN, DM2 admitted for large bowel obstruction s/p ex lap w/ lysis of adhesions and bowel resection on  for SBO. She is also s/p sepsis and peritonitis. Nephrology consulted for SO Cr 1.10-->1.62. Of note, pt recently turned out to have C.diff infection after multiple episodes of loose stools. Pt denies knowledge of having CKD.  Pt on vanc and daptomycin per ID.       PMH: HTN, DM  PSH:  x2, hysterectomy, lap dev  FHx: no cancer or IBD  Meds: Metformin 500 daily, has HTN meds that she doesn't take because they make her nauseous  Soc: No tobacco    MEDICATIONS  (STANDING):  atorvastatin 20 milliGRAM(s) Oral at bedtime  camphor 0.5%/menthol 0.5% Topical Lotion 2 Application(s) Topical once  chlorhexidine 2% Cloths 1 Application(s) Topical <User Schedule>  DAPTOmycin IVPB 750 milliGRAM(s) IV Intermittent every 24 hours  dextrose 5% + sodium chloride 0.45%. 1000 milliLiter(s) (150 mL/Hr) IV Continuous <Continuous>  dextrose 5%. 1000 milliLiter(s) (100 mL/Hr) IV Continuous <Continuous>  dextrose 5%. 1000 milliLiter(s) (50 mL/Hr) IV Continuous <Continuous>  dextrose 50% Injectable 25 Gram(s) IV Push once  dextrose 50% Injectable 12.5 Gram(s) IV Push once  dextrose 50% Injectable 25 Gram(s) IV Push once  glucagon  Injectable 1 milliGRAM(s) IntraMuscular once  heparin   Injectable 5000 Unit(s) SubCutaneous every 8 hours  influenza  Vaccine (HIGH DOSE) 0.7 milliLiter(s) IntraMuscular once  insulin lispro (ADMELOG) corrective regimen sliding scale   SubCutaneous three times a day before meals  insulin lispro (ADMELOG) corrective regimen sliding scale   SubCutaneous at bedtime  labetalol 200 milliGRAM(s) Oral every 12 hours  lidocaine   4% Patch 1 Patch Transdermal every 24 hours  losartan 50 milliGRAM(s) Oral daily  magnesium sulfate  IVPB 2 Gram(s) IV Intermittent every 4 hours  melatonin 5 milliGRAM(s) Oral at bedtime  NIFEdipine XL 60 milliGRAM(s) Oral every 24 hours  senna 2 Tablet(s) Oral at bedtime  vancomycin    Solution 250 milliGRAM(s) Oral every 6 hours    Vital Signs Last 12 Hrs  T(F): 97.3 (10-06-22 @ 08:14), Max: 98 (10-06-22 @ 05:16)  HR: 86 (10-06-22 @ 08:14) (86 - 89)  BP: 147/85 (10-06-22 @ 08:14) (147/85 - 148/73)  BP(mean): --  RR: 18 (10-06-22 @ 08:14) (16 - 18)  SpO2: 100% (10-06-22 @ 08:14) (96% - 100%)  I&O's Summary    05 Oct 2022 07:01  -  06 Oct 2022 07:00  --------------------------------------------------------  IN: 5350 mL / OUT: 1046 mL / NET: 4304 mL    06 Oct 2022 07:01  -  06 Oct 2022 11:36  --------------------------------------------------------  IN: 340 mL / OUT: 500 mL / NET: -160 mL        PHYSICAL EXAM:  Constitutional: pleasant female looks anxious, NAD  HEENT: NC/AT, EOMI  Neck: Supple, no JVD  Respiratory: CTA B/L, on RA  Cardiovascular: RRR, normal S1 and S2,   Gastrointestinal: +BS, minimal tenderness  Extremities: no edema, warm  Neurological: AAOx3    LABS:                        8.8    9.72  )-----------( 434      ( 06 Oct 2022 11:22 )             29.0     10-05    133<L>  |  100  |  22  ----------------------------<  141<H>  3.7   |  23  |  1.38<H>    Ca    8.3<L>      05 Oct 2022 14:37  Phos  3.6     10-05  Mg     2.0     10-05

## 2022-10-07 LAB
-  AMPICILLIN: SIGNIFICANT CHANGE UP
-  GENTAMICIN SYNERGY: SIGNIFICANT CHANGE UP
-  VANCOMYCIN: SIGNIFICANT CHANGE UP
-  VANCOMYCIN: SIGNIFICANT CHANGE UP
ALBUMIN SERPL ELPH-MCNC: 2.6 G/DL — LOW (ref 3.3–5)
ALP SERPL-CCNC: 111 U/L — SIGNIFICANT CHANGE UP (ref 40–120)
ALT FLD-CCNC: 194 U/L — HIGH (ref 10–45)
ANION GAP SERPL CALC-SCNC: 12 MMOL/L — SIGNIFICANT CHANGE UP (ref 5–17)
ANION GAP SERPL CALC-SCNC: 13 MMOL/L — SIGNIFICANT CHANGE UP (ref 5–17)
AST SERPL-CCNC: 828 U/L — HIGH (ref 10–40)
BASOPHILS # BLD AUTO: 0.04 K/UL — SIGNIFICANT CHANGE UP (ref 0–0.2)
BASOPHILS NFR BLD AUTO: 0.2 % — SIGNIFICANT CHANGE UP (ref 0–2)
BILIRUB SERPL-MCNC: 0.4 MG/DL — SIGNIFICANT CHANGE UP (ref 0.2–1.2)
BUN SERPL-MCNC: 10 MG/DL — SIGNIFICANT CHANGE UP (ref 7–23)
BUN SERPL-MCNC: 11 MG/DL — SIGNIFICANT CHANGE UP (ref 7–23)
CALCIUM SERPL-MCNC: 8 MG/DL — LOW (ref 8.4–10.5)
CALCIUM SERPL-MCNC: 8.2 MG/DL — LOW (ref 8.4–10.5)
CHLORIDE SERPL-SCNC: 100 MMOL/L — SIGNIFICANT CHANGE UP (ref 96–108)
CHLORIDE SERPL-SCNC: 100 MMOL/L — SIGNIFICANT CHANGE UP (ref 96–108)
CK SERPL-CCNC: CRITICAL HIGH U/L (ref 25–170)
CK SERPL-CCNC: CRITICAL HIGH U/L (ref 25–170)
CO2 SERPL-SCNC: 19 MMOL/L — LOW (ref 22–31)
CO2 SERPL-SCNC: 20 MMOL/L — LOW (ref 22–31)
CREAT SERPL-MCNC: 0.81 MG/DL — SIGNIFICANT CHANGE UP (ref 0.5–1.3)
CREAT SERPL-MCNC: 0.98 MG/DL — SIGNIFICANT CHANGE UP (ref 0.5–1.3)
CRP SERPL-MCNC: 119.3 MG/L — HIGH (ref 0–4)
EGFR: 62 ML/MIN/1.73M2 — SIGNIFICANT CHANGE UP
EGFR: 78 ML/MIN/1.73M2 — SIGNIFICANT CHANGE UP
EOSINOPHIL # BLD AUTO: 0.01 K/UL — SIGNIFICANT CHANGE UP (ref 0–0.5)
EOSINOPHIL NFR BLD AUTO: 0 % — SIGNIFICANT CHANGE UP (ref 0–6)
ERYTHROCYTE [SEDIMENTATION RATE] IN BLOOD: 71 MM/HR — HIGH
FUNGITELL: 69 PG/ML — SIGNIFICANT CHANGE UP
GLUCOSE BLDC GLUCOMTR-MCNC: 138 MG/DL — HIGH (ref 70–99)
GLUCOSE BLDC GLUCOMTR-MCNC: 159 MG/DL — HIGH (ref 70–99)
GLUCOSE BLDC GLUCOMTR-MCNC: 176 MG/DL — HIGH (ref 70–99)
GLUCOSE BLDC GLUCOMTR-MCNC: 217 MG/DL — HIGH (ref 70–99)
GLUCOSE SERPL-MCNC: 144 MG/DL — HIGH (ref 70–99)
GLUCOSE SERPL-MCNC: 162 MG/DL — HIGH (ref 70–99)
HCT VFR BLD CALC: 30.4 % — LOW (ref 34.5–45)
HCT VFR BLD CALC: 31.9 % — LOW (ref 34.5–45)
HGB BLD-MCNC: 10 G/DL — LOW (ref 11.5–15.5)
HGB BLD-MCNC: 9.7 G/DL — LOW (ref 11.5–15.5)
IMM GRANULOCYTES NFR BLD AUTO: 0.8 % — SIGNIFICANT CHANGE UP (ref 0–0.9)
LYMPHOCYTES # BLD AUTO: 1.26 K/UL — SIGNIFICANT CHANGE UP (ref 1–3.3)
LYMPHOCYTES # BLD AUTO: 5 % — LOW (ref 13–44)
MAGNESIUM SERPL-MCNC: 2.3 MG/DL — SIGNIFICANT CHANGE UP (ref 1.6–2.6)
MAGNESIUM SERPL-MCNC: 2.5 MG/DL — SIGNIFICANT CHANGE UP (ref 1.6–2.6)
MCHC RBC-ENTMCNC: 27 PG — SIGNIFICANT CHANGE UP (ref 27–34)
MCHC RBC-ENTMCNC: 27.5 PG — SIGNIFICANT CHANGE UP (ref 27–34)
MCHC RBC-ENTMCNC: 31.3 GM/DL — LOW (ref 32–36)
MCHC RBC-ENTMCNC: 31.9 GM/DL — LOW (ref 32–36)
MCV RBC AUTO: 86.1 FL — SIGNIFICANT CHANGE UP (ref 80–100)
MCV RBC AUTO: 86.2 FL — SIGNIFICANT CHANGE UP (ref 80–100)
METHOD TYPE: SIGNIFICANT CHANGE UP
METHOD TYPE: SIGNIFICANT CHANGE UP
MONOCYTES # BLD AUTO: 0.82 K/UL — SIGNIFICANT CHANGE UP (ref 0–0.9)
MONOCYTES NFR BLD AUTO: 3.3 % — SIGNIFICANT CHANGE UP (ref 2–14)
NEUTROPHILS # BLD AUTO: 22.82 K/UL — HIGH (ref 1.8–7.4)
NEUTROPHILS NFR BLD AUTO: 90.7 % — HIGH (ref 43–77)
NRBC # BLD: 0 /100 WBCS — SIGNIFICANT CHANGE UP (ref 0–0)
NRBC # BLD: 0 /100 WBCS — SIGNIFICANT CHANGE UP (ref 0–0)
PHOSPHATE SERPL-MCNC: 3.6 MG/DL — SIGNIFICANT CHANGE UP (ref 2.5–4.5)
PHOSPHATE SERPL-MCNC: 3.9 MG/DL — SIGNIFICANT CHANGE UP (ref 2.5–4.5)
PLATELET # BLD AUTO: 548 K/UL — HIGH (ref 150–400)
PLATELET # BLD AUTO: 577 K/UL — HIGH (ref 150–400)
POTASSIUM SERPL-MCNC: 4.3 MMOL/L — SIGNIFICANT CHANGE UP (ref 3.5–5.3)
POTASSIUM SERPL-MCNC: 4.4 MMOL/L — SIGNIFICANT CHANGE UP (ref 3.5–5.3)
POTASSIUM SERPL-SCNC: 4.3 MMOL/L — SIGNIFICANT CHANGE UP (ref 3.5–5.3)
POTASSIUM SERPL-SCNC: 4.4 MMOL/L — SIGNIFICANT CHANGE UP (ref 3.5–5.3)
PROT SERPL-MCNC: 6.2 G/DL — SIGNIFICANT CHANGE UP (ref 6–8.3)
RBC # BLD: 3.53 M/UL — LOW (ref 3.8–5.2)
RBC # BLD: 3.7 M/UL — LOW (ref 3.8–5.2)
RBC # FLD: 15.9 % — HIGH (ref 10.3–14.5)
RBC # FLD: 15.9 % — HIGH (ref 10.3–14.5)
SODIUM SERPL-SCNC: 132 MMOL/L — LOW (ref 135–145)
SODIUM SERPL-SCNC: 132 MMOL/L — LOW (ref 135–145)
WBC # BLD: 20.89 K/UL — HIGH (ref 3.8–10.5)
WBC # BLD: 25.62 K/UL — HIGH (ref 3.8–10.5)
WBC # FLD AUTO: 20.89 K/UL — HIGH (ref 3.8–10.5)
WBC # FLD AUTO: 25.62 K/UL — HIGH (ref 3.8–10.5)

## 2022-10-07 PROCEDURE — 74019 RADEX ABDOMEN 2 VIEWS: CPT | Mod: 26

## 2022-10-07 PROCEDURE — 99233 SBSQ HOSP IP/OBS HIGH 50: CPT

## 2022-10-07 PROCEDURE — 36573 INSJ PICC RS&I 5 YR+: CPT

## 2022-10-07 RX ORDER — DIATRIZOATE MEGLUMINE 180 MG/ML
30 INJECTION, SOLUTION INTRAVESICAL ONCE
Refills: 0 | Status: COMPLETED | OUTPATIENT
Start: 2022-10-07 | End: 2022-10-07

## 2022-10-07 RX ORDER — SODIUM CHLORIDE 9 MG/ML
1000 INJECTION, SOLUTION INTRAVENOUS ONCE
Refills: 0 | Status: COMPLETED | OUTPATIENT
Start: 2022-10-07 | End: 2022-10-07

## 2022-10-07 RX ORDER — SODIUM CHLORIDE 9 MG/ML
1000 INJECTION INTRAMUSCULAR; INTRAVENOUS; SUBCUTANEOUS ONCE
Refills: 0 | Status: COMPLETED | OUTPATIENT
Start: 2022-10-07 | End: 2022-10-07

## 2022-10-07 RX ORDER — SODIUM CHLORIDE 9 MG/ML
1000 INJECTION, SOLUTION INTRAVENOUS
Refills: 0 | Status: DISCONTINUED | OUTPATIENT
Start: 2022-10-07 | End: 2022-10-07

## 2022-10-07 RX ORDER — OXYCODONE HYDROCHLORIDE 5 MG/1
5 TABLET ORAL ONCE
Refills: 0 | Status: DISCONTINUED | OUTPATIENT
Start: 2022-10-07 | End: 2022-10-07

## 2022-10-07 RX ORDER — OXYCODONE HYDROCHLORIDE 5 MG/1
5 TABLET ORAL EVERY 6 HOURS
Refills: 0 | Status: DISCONTINUED | OUTPATIENT
Start: 2022-10-07 | End: 2022-10-14

## 2022-10-07 RX ORDER — VANCOMYCIN HCL 1 G
125 VIAL (EA) INTRAVENOUS
Refills: 0 | Status: DISCONTINUED | OUTPATIENT
Start: 2022-10-07 | End: 2022-10-07

## 2022-10-07 RX ORDER — SODIUM CHLORIDE 9 MG/ML
1000 INJECTION, SOLUTION INTRAVENOUS
Refills: 0 | Status: DISCONTINUED | OUTPATIENT
Start: 2022-10-07 | End: 2022-10-12

## 2022-10-07 RX ORDER — ENOXAPARIN SODIUM 100 MG/ML
40 INJECTION SUBCUTANEOUS EVERY 24 HOURS
Refills: 0 | Status: DISCONTINUED | OUTPATIENT
Start: 2022-10-07 | End: 2022-10-21

## 2022-10-07 RX ORDER — SODIUM CHLORIDE 9 MG/ML
1000 INJECTION INTRAMUSCULAR; INTRAVENOUS; SUBCUTANEOUS
Refills: 0 | Status: DISCONTINUED | OUTPATIENT
Start: 2022-10-07 | End: 2022-10-07

## 2022-10-07 RX ORDER — VANCOMYCIN HCL 1 G
125 VIAL (EA) INTRAVENOUS EVERY 6 HOURS
Refills: 0 | Status: DISCONTINUED | OUTPATIENT
Start: 2022-10-07 | End: 2022-10-18

## 2022-10-07 RX ADMIN — Medication 250 MILLIGRAM(S): at 12:34

## 2022-10-07 RX ADMIN — LOSARTAN POTASSIUM 50 MILLIGRAM(S): 100 TABLET, FILM COATED ORAL at 07:29

## 2022-10-07 RX ADMIN — Medication 60 MILLIGRAM(S): at 13:01

## 2022-10-07 RX ADMIN — SODIUM CHLORIDE 1000 MILLILITER(S): 9 INJECTION, SOLUTION INTRAVENOUS at 14:23

## 2022-10-07 RX ADMIN — Medication 4: at 09:09

## 2022-10-07 RX ADMIN — Medication 650 MILLIGRAM(S): at 01:57

## 2022-10-07 RX ADMIN — Medication 250 MILLIGRAM(S): at 07:29

## 2022-10-07 RX ADMIN — OXYCODONE HYDROCHLORIDE 5 MILLIGRAM(S): 5 TABLET ORAL at 00:30

## 2022-10-07 RX ADMIN — DIATRIZOATE MEGLUMINE 30 MILLILITER(S): 180 INJECTION, SOLUTION INTRAVESICAL at 14:23

## 2022-10-07 RX ADMIN — SODIUM CHLORIDE 1000 MILLILITER(S): 9 INJECTION INTRAMUSCULAR; INTRAVENOUS; SUBCUTANEOUS at 23:31

## 2022-10-07 RX ADMIN — ONDANSETRON 4 MILLIGRAM(S): 8 TABLET, FILM COATED ORAL at 03:13

## 2022-10-07 RX ADMIN — Medication 2: at 15:02

## 2022-10-07 RX ADMIN — CHLORHEXIDINE GLUCONATE 1 APPLICATION(S): 213 SOLUTION TOPICAL at 07:30

## 2022-10-07 RX ADMIN — OXYCODONE HYDROCHLORIDE 5 MILLIGRAM(S): 5 TABLET ORAL at 23:03

## 2022-10-07 RX ADMIN — Medication 200 MILLIGRAM(S): at 17:44

## 2022-10-07 RX ADMIN — Medication 125 MILLIGRAM(S): at 17:37

## 2022-10-07 RX ADMIN — Medication 2: at 17:37

## 2022-10-07 RX ADMIN — OXYCODONE HYDROCHLORIDE 5 MILLIGRAM(S): 5 TABLET ORAL at 01:34

## 2022-10-07 RX ADMIN — Medication 650 MILLIGRAM(S): at 03:35

## 2022-10-07 RX ADMIN — SODIUM CHLORIDE 1000 MILLILITER(S): 9 INJECTION INTRAMUSCULAR; INTRAVENOUS; SUBCUTANEOUS at 18:38

## 2022-10-07 RX ADMIN — HEPARIN SODIUM 5000 UNIT(S): 5000 INJECTION INTRAVENOUS; SUBCUTANEOUS at 07:29

## 2022-10-07 RX ADMIN — ENOXAPARIN SODIUM 40 MILLIGRAM(S): 100 INJECTION SUBCUTANEOUS at 14:59

## 2022-10-07 RX ADMIN — OXYCODONE HYDROCHLORIDE 5 MILLIGRAM(S): 5 TABLET ORAL at 23:33

## 2022-10-07 RX ADMIN — SODIUM CHLORIDE 200 MILLILITER(S): 9 INJECTION, SOLUTION INTRAVENOUS at 21:14

## 2022-10-07 RX ADMIN — Medication 200 MILLIGRAM(S): at 07:31

## 2022-10-07 RX ADMIN — Medication 125 MILLIGRAM(S): at 23:04

## 2022-10-07 RX ADMIN — ONDANSETRON 4 MILLIGRAM(S): 8 TABLET, FILM COATED ORAL at 16:09

## 2022-10-07 NOTE — PROGRESS NOTE ADULT - ASSESSMENT
RECOMMEND  Obtain comprehensive labs including CPK and ESR  Check CPK first before Daptomycin given today.  No more Daptomycin if CPK elevated  Blood cultures x 2 now  Whole body gallium scan   C/A/P CT  Continue PO Ochoa       Discussed with Surgery PA    [phone] R/O Rhabdomyolysis  Cdiff colitis clinically improved      RECOMMEND  Obtain comprehensive labs including CPK and ESR  Check CPK first before Daptomycin given today.  No more Daptomycin if CPK elevated  Blood cultures x 2 now  Continue DANIEL Packer     Discussed with Surgery PA    [phone]    --------------------------------------------------------------------------  Obtained labs further reveal:    Creatine Kinase, Serum (10.07.22 @ 12:30)    Creatine Kinase, Serum: 62713: TYPE:(C=Critical, N=Notification, A=Abnormal) C  TESTS: _CPK  DATE/TIME CALLED: _10/07/2022 14:22:32 EDT  CALLED TO: ROMINA ESPARZA  READ BACK (2 Patient Identifiers)(Y/N): _Y  READ BACK VALUES (Y/N): _Y  CALLED BY: _AK  FLOOR CALLED 10/07/2022 14:35:35 EDT MAYITO DUNHAM RN U/L      Sedimentation Rate, Erythrocyte (10.07.22 @ 12:30)    Sedimentation Rate, Erythrocyte: 71: [facility] performs Erythrocyte Sedimentation Rate assay  utilizing the Westergren method mm/Hr    Basic Metabolic Panel (10.07.22 @ 12:30)    Sodium, Serum: 132: Checked result, consistent with patient history mmol/L    Potassium, Serum: 4.3 mmol/L    Chloride, Serum: 100 mmol/L    Carbon Dioxide, Serum: 19 mmol/L    Anion Gap, Serum: 13 mmol/L    Blood Urea Nitrogen, Serum: 10 mg/dL    Creatinine, Serum: 0.81 mg/dL    Glucose, Serum: 144 mg/dL    Calcium, Total Serum: 8.0 mg/dL    eGFR: 78: The estimated glomerular filtration rate (eGFR) is calculated using the  2021 CKD-EPI creatinine equation, which does not have a coefficient for  race and is validated in individuals 18 years of age and older (N Engl J  Med 2021; 385:2740-2907). Creatinine-based eGFR may be inaccurate in  various situations including but not limited to extremes of muscle mass,  altered dietary protein intake, or medications that affect renal tubular  creatinine secretion. mL/min/1.73m2    =====================================    Patient has rhabdomyolysis  Suspect this is also the reason for a leukomoid reaction  Renal function preserved thus far      RECOMMEND  D/C Daptomycin  D/C Atorvastatin   IVF  Medicine service to assist in maximized treatment of rhabdomyolysis  No need for CT or gallium scan for now  Decrease PO Vancomycin to 125 mg 4 times a day      Discussed with Dr So and surgical house staff    [phone]

## 2022-10-07 NOTE — PROGRESS NOTE ADULT - ASSESSMENT
72 F PMH HTN, DM and PSH  x 2, hysterectomy and lap dev ( w/ Dr. Clifford) admitted on  with 2 days of n/v, abd pain, imaging demonstrating LBO, which resolved prior to operative exploration. Recurrent bowel obstruction, now s/p Exlap JOSEFINA, SBR (100 cm) (9/15). now with wound vac, pending YOSEPH placement now w/ c dif    Regular, IVF, ISS  Pain/nausea prn  Vanco PO (10/4-), Daptomycin (10/4-)  Wound Vac  lovenox/SCDs/OOBA/IS  AM labs  Dispo: Y[facility]

## 2022-10-07 NOTE — PROGRESS NOTE ADULT - ASSESSMENT
72 year old woman with HTN, DM2, past surgical history of  x 2, hysterectomy, and laparoscopic cholecystectomy (), admitted for large bowel obstruction; had spontaneous return of bowel function initially; however, bowel obstruction recurred, so taken to OR for ex lap with lysis of adhesions and bowel resection () . Monitored in SICU post -op ; now on regional. Hospital course complicated by rhabdomyolysis, likely daptomycin induced (CK elevated on 10/7/2022)    # Large bowel obstruction   # post operative state  s/p OR for ex lap with lysis of adhesions and bowel resection ()  - rest of care per primary team     # non-traumatic Rhabdomyolysis  CK elevated to 30K on 10/7/2022. Likely daptomycin associated.   Small uptrend in Cr from 0.7 -->0.98 between 10/6 and 10/7  Currently getting NS at 200ml/hr.   [ ] Recommend LR (lower risk of hyperchloremic acidosis with large volume fluid administration)   Purpose of fluids is to prevent renal injury; patient needs to receive enough fluids to clear myoglobin; however, have to balance fluid administration against risk of fluid overload, given age.   - Track strict I/Os. Goal urine output >1ml/kg/hr; reassess volume status BID; check CMP BID [ ] If Cr improves, consider decreasing fluid rate to 160ml/hr  - If oliguric, or patient develops ATN, consult nephrology for CVVHD,   [ ] Monitor for paresthesias (risk of compartment syndrome likely lower than with crush injury, however, should still check at least twice a day for numbness, tingling)       *********incomplete note      #C diff; non severe  -On PO vanc 10/4- ; No diarrhea in last 24 hours    #E. faecalis bacteremia  -ID consult. Afebrile at this time.   -On dapto 10/4-  -Repeat Cx's negative to date    #SO  -Likely prerenal due to diarrhea. IVF. Encourage PO. Continue to trend. Avoid nephrotoxic meds.   -Improved with some gentle IVF and PO intake. continue to monitor.    #Obesity  -Discussed lifestyle/dietary modications  -Not a candidate for bariatrics at this time    # Leukocytosis   Improving    #reactive thrombocytosis  will continue to monitor     #hypophosphatemia   improved    #HTN   takes HCTZ 25mg qd, losartan 100mg qd, labetalol 200mg BID, nifedipine 60mg ER qd at home.   - continue labetalol 200mg BID  - on losartan at 50mg qd   - on nifepidine 60m ER qd ( can hold if bp is low )   - Continue to hold HCTZ on discharge as pt's BP is well controlled on above 3 anti-htn medication     # Type 2 Diabetes complicated by hyperglycemia   cw insulin sliding scale while inpatient.   pt's a1c at goal for her age, resume metformin on discharge.     #Incidental finding of bilateral lung nodules  CT chest showing bilateral lung nodules, measuring up to 7 mm in the right   upper lobe  Recommend follow-up chest CT in three months to ensure stability.    # COVID 19 infection (present on admission)   Improved/resolved. Off isolation.     # Acute blood loss anemia   continue to trend daily CBC while inpatient;  age appropriate cancer screening outpatient     DVT ppx - HSQ 72 year old woman with HTN, DM2, past surgical history of  x 2, hysterectomy, and laparoscopic cholecystectomy (), admitted for large bowel obstruction; had spontaneous return of bowel function initially; however, bowel obstruction recurred, so taken to OR for ex lap with lysis of adhesions and bowel resection () . Monitored in SICU post -op ; now on regional. Hospital course complicated by rhabdomyolysis, likely daptomycin induced (CK elevated on 10/7/2022)    # Large bowel obstruction   # post operative state  s/p OR for ex lap with lysis of adhesions and bowel resection ()  - rest of care per primary team     # non-traumatic Rhabdomyolysis  CK elevated to 30K on 10/7/2022. Likely daptomycin associated. Stopped daptomycin and statin.   Small uptrend in Cr from 0.7 -->0.98 between 10/6 and 10/7  Currently IVF 200ml/hr. [ ] Recommend LR (lower risk of hyperchloremic acidosis with large volume fluid administration)   Purpose of fluids is to prevent renal injury; patient needs to receive enough fluids to clear myoglobin; however, have to balance fluid administration against risk of fluid overload, given age.   - Track strict I/Os. Goal urine output >1ml/kg/hr; reassess volume status BID; check CMP BID [ ] If Cr improves, consider decreasing fluid rate to 160ml/hr  - If oliguric, or patient develops ATN, consult nephrology for CVVHD,   [ ] Monitor for paresthesias (risk of compartment syndrome likely lower than with crush injury, however, should still check at least twice a day for numbness, tingling)     #C diff; non severe  -On PO vanc 10/4- ; No diarrhea in last 24 hours    #E. faecalis bacteremia  -Afebrile at this time. was On dapto 10/4-; Switched to vancomycin 10/7/2022  -Repeat Cx's negative to date    #Obesity - Dose medications by weight     #HTN   takes HCTZ 25mg qd, losartan 100mg qd, labetalol 200mg BID, nifedipine 60mg ER qd at home.   - continue labetalol 200mg BID  [ ] Hold losartan 50mg qd given rhabdo ; Can resume after serum CK normalizes   - on nifepidine 60mg ER qd; If patient becomes persistently hypertensive (SBMP>160mmHg) while holding losartan, can increase nifedipine to 90mg ERqd until rhabdo resolves and losartan is restarted.   - If SBP > 180mmHg, check bladder scan before giving IV labetalol or hydral.   - Continue to hold HCTZ for now and on discharge     # Type 2 Diabetes complicated by hyperglycemia   cw insulin sliding scale while inpatient.   pt's a1c at goal for her age, resume metformin on discharge.     #Incidental finding of bilateral lung nodules  CT chest showing bilateral lung nodules, measuring up to 7 mm in the ridght upper lobe; Counseled patient on finding.   Recommended follow-up chest CT in three months to ensure stability.; Copy of radiology report provided to patient for her records.     # COVID 19 infection (present on admission) - Improved/resolved. Off isolation.     # Acute blood loss anemia   continue to trend daily CBC while inpatient;  age appropriate cancer screening outpatient     DVT ppx - HSQ

## 2022-10-07 NOTE — PROGRESS NOTE ADULT - SUBJECTIVE AND OBJECTIVE BOX
INTERVAL HPI/OVERNIGHT EVENTS:    ANTIBIOTICS/RELEVANT:    MEDICATIONS  (STANDING):  atorvastatin 20 milliGRAM(s) Oral at bedtime  chlorhexidine 2% Cloths 1 Application(s) Topical <User Schedule>  DAPTOmycin IVPB 750 milliGRAM(s) IV Intermittent every 24 hours  dextrose 5%. 1000 milliLiter(s) (100 mL/Hr) IV Continuous <Continuous>  dextrose 5%. 1000 milliLiter(s) (50 mL/Hr) IV Continuous <Continuous>  dextrose 50% Injectable 25 Gram(s) IV Push once  dextrose 50% Injectable 12.5 Gram(s) IV Push once  dextrose 50% Injectable 25 Gram(s) IV Push once  enoxaparin Injectable 40 milliGRAM(s) SubCutaneous every 24 hours  glucagon  Injectable 1 milliGRAM(s) IntraMuscular once  influenza  Vaccine (HIGH DOSE) 0.7 milliLiter(s) IntraMuscular once  insulin lispro (ADMELOG) corrective regimen sliding scale   SubCutaneous three times a day before meals  insulin lispro (ADMELOG) corrective regimen sliding scale   SubCutaneous at bedtime  labetalol 200 milliGRAM(s) Oral every 12 hours  lidocaine   4% Patch 1 Patch Transdermal every 24 hours  losartan 50 milliGRAM(s) Oral daily  melatonin 5 milliGRAM(s) Oral at bedtime  NIFEdipine XL 60 milliGRAM(s) Oral every 24 hours  senna 2 Tablet(s) Oral at bedtime  vancomycin    Solution 250 milliGRAM(s) Oral every 6 hours    MEDICATIONS  (PRN):  acetaminophen     Tablet .. 650 milliGRAM(s) Oral every 6 hours PRN Mild Pain (1 - 3), Moderate Pain (4 - 6)  dextrose Oral Gel 15 Gram(s) Oral once PRN Blood Glucose LESS THAN 70 milliGRAM(s)/deciliter  ondansetron Injectable 4 milliGRAM(s) IV Push every 8 hours PRN Nausea and/or Vomiting  oxyCODONE    IR 5 milliGRAM(s) Oral every 6 hours PRN Severe Pain (7 - 10)  sodium chloride 0.9% lock flush 10 milliLiter(s) IV Push every 1 hour PRN Pre/post blood products, medications, blood draw, and to maintain line patency      Allergies    penicillin (Rash)    Intolerances        Vital Signs Last 24 Hrs  T(C): 36.9 (07 Oct 2022 09:15), Max: 37.1 (06 Oct 2022 13:10)  T(F): 98.4 (07 Oct 2022 09:15), Max: 98.8 (06 Oct 2022 13:10)  HR: 87 (07 Oct 2022 09:15) (83 - 87)  BP: 156/83 (07 Oct 2022 09:15) (146/83 - 156/83)  BP(mean): 105 (07 Oct 2022 05:38) (105 - 105)  RR: 16 (07 Oct 2022 09:15) (16 - 17)  SpO2: 95% (07 Oct 2022 09:15) (95% - 100%)    Parameters below as of 07 Oct 2022 09:15  Patient On (Oxygen Delivery Method): room air                  LABS:                        8.8    9.72  )-----------( 434      ( 06 Oct 2022 11:22 )             29.0     10-06    138  |  108  |  11  ----------------------------<  196<H>  3.6   |  19<L>  |  0.70    Ca    8.1<L>      06 Oct 2022 11:22  Phos  2.6     10-06  Mg     1.6     10-06            MICROBIOLOGY:    RADIOLOGY & ADDITIONAL STUDIES: INTERVAL HPI/OVERNIGHT EVENTS:    C/O aches and pain in the shoulder and neck and thighs without muscle pain    MEDICATIONS  (STANDING):  atorvastatin 20 milliGRAM(s) Oral at bedtime  chlorhexidine 2% Cloths 1 Application(s) Topical <User Schedule>  DAPTOmycin IVPB 750 milliGRAM(s) IV Intermittent every 24 hours  dextrose 5%. 1000 milliLiter(s) (100 mL/Hr) IV Continuous <Continuous>  dextrose 5%. 1000 milliLiter(s) (50 mL/Hr) IV Continuous <Continuous>  dextrose 50% Injectable 25 Gram(s) IV Push once  dextrose 50% Injectable 12.5 Gram(s) IV Push once  dextrose 50% Injectable 25 Gram(s) IV Push once  enoxaparin Injectable 40 milliGRAM(s) SubCutaneous every 24 hours  glucagon  Injectable 1 milliGRAM(s) IntraMuscular once  influenza  Vaccine (HIGH DOSE) 0.7 milliLiter(s) IntraMuscular once  insulin lispro (ADMELOG) corrective regimen sliding scale   SubCutaneous three times a day before meals  insulin lispro (ADMELOG) corrective regimen sliding scale   SubCutaneous at bedtime  labetalol 200 milliGRAM(s) Oral every 12 hours  lidocaine   4% Patch 1 Patch Transdermal every 24 hours  losartan 50 milliGRAM(s) Oral daily  melatonin 5 milliGRAM(s) Oral at bedtime  NIFEdipine XL 60 milliGRAM(s) Oral every 24 hours  senna 2 Tablet(s) Oral at bedtime  vancomycin    Solution 250 milliGRAM(s) Oral every 6 hours    MEDICATIONS  (PRN):  acetaminophen     Tablet .. 650 milliGRAM(s) Oral every 6 hours PRN Mild Pain (1 - 3), Moderate Pain (4 - 6)  dextrose Oral Gel 15 Gram(s) Oral once PRN Blood Glucose LESS THAN 70 milliGRAM(s)/deciliter  ondansetron Injectable 4 milliGRAM(s) IV Push every 8 hours PRN Nausea and/or Vomiting  oxyCODONE    IR 5 milliGRAM(s) Oral every 6 hours PRN Severe Pain (7 - 10)  sodium chloride 0.9% lock flush 10 milliLiter(s) IV Push every 1 hour PRN Pre/post blood products, medications, blood draw, and to maintain line patency      Allergies    penicillin (Rash)      EXAM  Vital Signs Last 24 Hrs  T(C): 36.9 (07 Oct 2022 09:15), Max: 37.1 (06 Oct 2022 13:10)  T(F): 98.4 (07 Oct 2022 09:15), Max: 98.8 (06 Oct 2022 13:10)  HR: 87 (07 Oct 2022 09:15) (83 - 87)  BP: 156/83 (07 Oct 2022 09:15) (146/83 - 156/83)  BP(mean): 105 (07 Oct 2022 05:38) (105 - 105)  RR: 16 (07 Oct 2022 09:15) (16 - 17)  SpO2: 95% (07 Oct 2022 09:15) (95% - 100%)    Parameters below as of 07 Oct 2022 09:15  Patient On (Oxygen Delivery Method): room air                  LABS:                        8.8    9.72  )-----------( 434      ( 06 Oct 2022 11:22 )             29.0         Complete Blood Count (10.07.22 @ 12:30)    Nucleated RBC: 0 /100 WBCs    WBC Count: 20.89: Specimen Integrity Verified. K/uL    RBC Count: 3.70 M/uL    Hemoglobin: 10.0 g/dL    Hematocrit: 31.9 %    Mean Cell Volume: 86.2 fl    Mean Cell Hemoglobin: 27.0 pg    Mean Cell Hemoglobin Conc: 31.3 gm/dL    Red Cell Distrib Width: 15.9 %    Platelet Count - Automated: 577 K/uL          10-06    138  |  108  |  11  ----------------------------<  196<H>  3.6   |  19<L>  |  0.70    Ca    8.1<L>      06 Oct 2022 11:22  Phos  2.6     10-06  Mg     1.6     10-06        MICROBIOLOGY:    Culture - Blood (10.04.22 @ 15:15)    Specimen Source: .Blood Blood    Culture Results:   No growth at 2 days.      Culture - Blood (10.04.22 @ 14:31)    Specimen Source: .Blood Blood    Culture Results:   No growth at 2 days.      RADIOLOGY & ADDITIONAL STUDIES: INTERVAL HPI/OVERNIGHT EVENTS:    C/O aches and pain in the shoulder and neck and thighs without muscle pain    MEDICATIONS  (STANDING):  atorvastatin 20 milliGRAM(s) Oral at bedtime  chlorhexidine 2% Cloths 1 Application(s) Topical <User Schedule>  DAPTOmycin IVPB 750 milliGRAM(s) IV Intermittent every 24 hours  dextrose 5%. 1000 milliLiter(s) (100 mL/Hr) IV Continuous <Continuous>  dextrose 5%. 1000 milliLiter(s) (50 mL/Hr) IV Continuous <Continuous>  dextrose 50% Injectable 25 Gram(s) IV Push once  dextrose 50% Injectable 12.5 Gram(s) IV Push once  dextrose 50% Injectable 25 Gram(s) IV Push once  enoxaparin Injectable 40 milliGRAM(s) SubCutaneous every 24 hours  glucagon  Injectable 1 milliGRAM(s) IntraMuscular once  influenza  Vaccine (HIGH DOSE) 0.7 milliLiter(s) IntraMuscular once  insulin lispro (ADMELOG) corrective regimen sliding scale   SubCutaneous three times a day before meals  insulin lispro (ADMELOG) corrective regimen sliding scale   SubCutaneous at bedtime  labetalol 200 milliGRAM(s) Oral every 12 hours  lidocaine   4% Patch 1 Patch Transdermal every 24 hours  losartan 50 milliGRAM(s) Oral daily  melatonin 5 milliGRAM(s) Oral at bedtime  NIFEdipine XL 60 milliGRAM(s) Oral every 24 hours  senna 2 Tablet(s) Oral at bedtime  vancomycin    Solution 250 milliGRAM(s) Oral every 6 hours    MEDICATIONS  (PRN):  acetaminophen     Tablet .. 650 milliGRAM(s) Oral every 6 hours PRN Mild Pain (1 - 3), Moderate Pain (4 - 6)  dextrose Oral Gel 15 Gram(s) Oral once PRN Blood Glucose LESS THAN 70 milliGRAM(s)/deciliter  ondansetron Injectable 4 milliGRAM(s) IV Push every 8 hours PRN Nausea and/or Vomiting  oxyCODONE    IR 5 milliGRAM(s) Oral every 6 hours PRN Severe Pain (7 - 10)  sodium chloride 0.9% lock flush 10 milliLiter(s) IV Push every 1 hour PRN Pre/post blood products, medications, blood draw, and to maintain line patency      Allergies    penicillin (Rash)      EXAM  Vital Signs Last 24 Hrs  T(C): 36.9 (07 Oct 2022 09:15), Max: 37.1 (06 Oct 2022 13:10)  T(F): 98.4 (07 Oct 2022 09:15), Max: 98.8 (06 Oct 2022 13:10)  HR: 87 (07 Oct 2022 09:15) (83 - 87)  BP: 156/83 (07 Oct 2022 09:15) (146/83 - 156/83)  BP(mean): 105 (07 Oct 2022 05:38) (105 - 105)  RR: 16 (07 Oct 2022 09:15) (16 - 17)  SpO2: 95% (07 Oct 2022 09:15) (95% - 100%)    Parameters below as of 07 Oct 2022 09:15  Patient On (Oxygen Delivery Method): room air    Awake and alert  Uncomfortable appearing  No rash  No thrush  RRR  Chest CTA  Abd softer and less tender  Uncomfortable to move shoulders        LABS:                        8.8    9.72  )-----------( 434      ( 06 Oct 2022 11:22 )             29.0         Complete Blood Count (10.07.22 @ 12:30)    Nucleated RBC: 0 /100 WBCs    WBC Count: 20.89: Specimen Integrity Verified. K/uL    RBC Count: 3.70 M/uL    Hemoglobin: 10.0 g/dL    Hematocrit: 31.9 %    Mean Cell Volume: 86.2 fl    Mean Cell Hemoglobin: 27.0 pg    Mean Cell Hemoglobin Conc: 31.3 gm/dL    Red Cell Distrib Width: 15.9 %    Platelet Count - Automated: 577 K/uL        10-06    138  |  108  |  11  ----------------------------<  196<H>  3.6   |  19<L>  |  0.70    Ca    8.1<L>      06 Oct 2022 11:22  Phos  2.6     10-06  Mg     1.6     10-06        MICROBIOLOGY:    Culture - Blood (10.04.22 @ 15:15)    Specimen Source: .Blood Blood    Culture Results:   No growth at 2 days.      Culture - Blood (10.04.22 @ 14:31)    Specimen Source: .Blood Blood    Culture Results:   No growth at 2 days.

## 2022-10-07 NOTE — PROVIDER CONTACT NOTE (CRITICAL VALUE NOTIFICATION) - ACTION/TREATMENT ORDERED:
recommendation accepted and endorsed to dayshift VILMA Montanez and VILMA Sterling during hand off report
bolus, fluids, velarde catheter, labs
Will continue to monitor and restart PRBC transfusion.

## 2022-10-07 NOTE — PROGRESS NOTE ADULT - SUBJECTIVE AND OBJECTIVE BOX
STATUS POST:  Exploratory laparotomy with lysis of adhesions, SBR  POST OPERATIVE DAY #: 23    SUBJECTIVE: Pt seen and examined by chief resident. Pt reporting body aches, upper back and hips. Voiding. +F/-BM. one episode of emesis overnight. No complaints at this time.    Vital Signs Last 24 Hrs  T(C): 36.8 (07 Oct 2022 05:38), Max: 37.1 (06 Oct 2022 13:10)  T(F): 98.2 (07 Oct 2022 05:38), Max: 98.8 (06 Oct 2022 13:10)  HR: 85 (07 Oct 2022 05:38) (83 - 86)  BP: 148/84 (07 Oct 2022 05:38) (146/83 - 155/82)  BP(mean): 105 (07 Oct 2022 05:38) (105 - 105)  RR: 17 (07 Oct 2022 05:38) (16 - 18)  SpO2: 96% (07 Oct 2022 05:38) (96% - 100%)    Parameters below as of 07 Oct 2022 05:38  Patient On (Oxygen Delivery Method): room air        I&O's Summary    06 Oct 2022 07:01  -  07 Oct 2022 07:00  --------------------------------------------------------  IN: 3990 mL / OUT: 2370 mL / NET: 1620 mL        Physical Exam:  General Appearance: Appears well, NAD  Pulmonary: Nonlabored breathing, no respiratory distress  Abdomen: Soft, nondisteded, nontender, wound vac holding suction.   Extremities: WWP, SCD's in place     LABS:                        8.8    9.72  )-----------( 434      ( 06 Oct 2022 11:22 )             29.0     10-06    138  |  108  |  11  ----------------------------<  196<H>  3.6   |  19<L>  |  0.70    Ca    8.1<L>      06 Oct 2022 11:22  Phos  2.6     10-06  Mg     1.6     10-06

## 2022-10-07 NOTE — PROGRESS NOTE ADULT - SUBJECTIVE AND OBJECTIVE BOX
Patient is a 72y old  Female who presents with a chief complaint of medical comanagement (06 Oct 2022 11:35)    INTERVAL EVENTS:  - WBCs uptrended, elevated CPK, consistent with daptomycin associated rhabdomyolysis   - Last bowel movement 2 days ago. No diarrhea in last 24 hours.     SUBJECTIVE:  Patient was seen and examined at bedside.  Review of systems: No fever, chills, CP, dyspnea, nausea or vomiting, dysuria, changes in bowel movements, LE edema. Rest of 12 point Review of systems negative unless otherwise documented elsewhere in note.     MEDICATIONS:  MEDICATIONS  (STANDING):  chlorhexidine 2% Cloths 1 Application(s) Topical <User Schedule>  dextrose 5%. 1000 milliLiter(s) (100 mL/Hr) IV Continuous <Continuous>  dextrose 5%. 1000 milliLiter(s) (50 mL/Hr) IV Continuous <Continuous>  dextrose 50% Injectable 25 Gram(s) IV Push once  dextrose 50% Injectable 12.5 Gram(s) IV Push once  dextrose 50% Injectable 25 Gram(s) IV Push once  enoxaparin Injectable 40 milliGRAM(s) SubCutaneous every 24 hours  glucagon  Injectable 1 milliGRAM(s) IntraMuscular once  influenza  Vaccine (HIGH DOSE) 0.7 milliLiter(s) IntraMuscular once  insulin lispro (ADMELOG) corrective regimen sliding scale   SubCutaneous three times a day before meals  insulin lispro (ADMELOG) corrective regimen sliding scale   SubCutaneous at bedtime  labetalol 200 milliGRAM(s) Oral every 12 hours  lactated ringers. 1000 milliLiter(s) (150 mL/Hr) IV Continuous <Continuous>  lidocaine   4% Patch 1 Patch Transdermal every 24 hours  losartan 50 milliGRAM(s) Oral daily  melatonin 5 milliGRAM(s) Oral at bedtime  NIFEdipine XL 60 milliGRAM(s) Oral every 24 hours  senna 2 Tablet(s) Oral at bedtime  vancomycin    Solution 125 milliGRAM(s) Oral every 6 hours    MEDICATIONS  (PRN):  acetaminophen     Tablet .. 650 milliGRAM(s) Oral every 6 hours PRN Mild Pain (1 - 3), Moderate Pain (4 - 6)  dextrose Oral Gel 15 Gram(s) Oral once PRN Blood Glucose LESS THAN 70 milliGRAM(s)/deciliter  ondansetron Injectable 4 milliGRAM(s) IV Push every 8 hours PRN Nausea and/or Vomiting  oxyCODONE    IR 5 milliGRAM(s) Oral every 6 hours PRN Severe Pain (7 - 10)  sodium chloride 0.9% lock flush 10 milliLiter(s) IV Push every 1 hour PRN Pre/post blood products, medications, blood draw, and to maintain line patency    Allergies    penicillin (Rash)    Intolerances    daptomycin (Muscle Pain)      OBJECTIVE:  Vital Signs Last 24 Hrs  T(C): 37.1 (07 Oct 2022 17:30), Max: 37.1 (07 Oct 2022 17:30)  T(F): 98.7 (07 Oct 2022 17:30), Max: 98.7 (07 Oct 2022 17:30)  HR: 67 (07 Oct 2022 17:30) (67 - 87)  BP: 149/85 (07 Oct 2022 17:30) (146/83 - 156/83)  BP(mean): 105 (07 Oct 2022 05:38) (105 - 105)  RR: 16 (07 Oct 2022 17:30) (16 - 17)  SpO2: 100% (07 Oct 2022 17:30) (95% - 100%)    Parameters below as of 07 Oct 2022 17:30  Patient On (Oxygen Delivery Method): room air      I&O's Summary    06 Oct 2022 07:01  -  07 Oct 2022 07:00  --------------------------------------------------------  IN: 3990 mL / OUT: 2370 mL / NET: 1620 mL    07 Oct 2022 07:01  -  07 Oct 2022 20:02  --------------------------------------------------------  IN: 300 mL / OUT: 250 mL / NET: 50 mL    PHYSICAL EXAM:  Gen: Reclining in bed at time of exam, appears stated age  HEENT: NCAT, MMM, clear OP  Neck: supple, trachea at midline  CV: RRR, +S1/S2  Pulm: adequate respiratory effort, no increase in work of breathing  Abd: soft, ND; midline incision with wound vac in place;    Skin: warm and dry,   Ext: WWP, no LE edema  Neuro: AOx3, no gross focal cranial nerve deficits  Psych: affect and behavior appropriate, pleasant at time of interview    LABS:                        9.7    25.62 )-----------( 548      ( 07 Oct 2022 17:32 )             30.4     10-07    132<L>  |  100  |  11  ----------------------------<  162<H>  4.4   |  20<L>  |  0.98    Ca    8.2<L>      07 Oct 2022 17:32  Phos  3.9     10-07  Mg     2.3     10-07    TPro  6.2  /  Alb  2.6<L>  /  TBili  0.4  /  DBili  x   /  AST  828<H>  /  ALT  194<H>  /  AlkPhos  111  10-07    LIVER FUNCTIONS - ( 07 Oct 2022 17:32 )  Alb: 2.6 g/dL / Pro: 6.2 g/dL / ALK PHOS: 111 U/L / ALT: 194 U/L / AST: 828 U/L / GGT: x             CAPILLARY BLOOD GLUCOSE    POCT Blood Glucose.: 176 mg/dL (07 Oct 2022 17:30)  POCT Blood Glucose.: 159 mg/dL (07 Oct 2022 14:44)  POCT Blood Glucose.: 217 mg/dL (07 Oct 2022 08:51)  POCT Blood Glucose.: 192 mg/dL (06 Oct 2022 22:01)    MICRODATA:    RADIOLOGY/OTHER STUDIES:

## 2022-10-07 NOTE — CHART NOTE - NSCHARTNOTEFT_GEN_A_CORE
Admitting Diagnosis:   Patient is a 72y old  Female who presents with a chief complaint of medical comanagement (06 Oct 2022 11:35)      PAST MEDICAL & SURGICAL HISTORY:  Diabetes      H/O thyroid disease      S/P total abdominal hysterectomy      History of laparoscopic cholecystectomy      History of           Current Nutrition Order:   Soft and bite sized diet, renal restricted    PO Intake: Good (%) [   ]  Fair (50-75%) [   ] Poor (<25%) [  x ]    GI Issues: Denies n/v/c. last BM 10/5, loose BMs for 3 days    Pain: Pt reports subjective symptoms of pain in arms and legs    Skin Integrity: Hany 20, surgical incision noted.  No PU or edema noted    Labs:   10-07    132<L>  |  100  |  10  ----------------------------<  144<H>  4.3   |  19<L>  |  0.81    Ca    8.0<L>      07 Oct 2022 12:30  Phos  3.6     10-07  Mg     2.5     10-07      CAPILLARY BLOOD GLUCOSE      POCT Blood Glucose.: 217 mg/dL (07 Oct 2022 08:51)  POCT Blood Glucose.: 192 mg/dL (06 Oct 2022 22:01)  POCT Blood Glucose.: 215 mg/dL (06 Oct 2022 17:21)      Medications:  MEDICATIONS  (STANDING):  atorvastatin 20 milliGRAM(s) Oral at bedtime  chlorhexidine 2% Cloths 1 Application(s) Topical <User Schedule>  DAPTOmycin IVPB 750 milliGRAM(s) IV Intermittent every 24 hours  dextrose 5%. 1000 milliLiter(s) (100 mL/Hr) IV Continuous <Continuous>  dextrose 5%. 1000 milliLiter(s) (50 mL/Hr) IV Continuous <Continuous>  dextrose 50% Injectable 25 Gram(s) IV Push once  dextrose 50% Injectable 12.5 Gram(s) IV Push once  dextrose 50% Injectable 25 Gram(s) IV Push once  enoxaparin Injectable 40 milliGRAM(s) SubCutaneous every 24 hours  glucagon  Injectable 1 milliGRAM(s) IntraMuscular once  influenza  Vaccine (HIGH DOSE) 0.7 milliLiter(s) IntraMuscular once  insulin lispro (ADMELOG) corrective regimen sliding scale   SubCutaneous three times a day before meals  insulin lispro (ADMELOG) corrective regimen sliding scale   SubCutaneous at bedtime  labetalol 200 milliGRAM(s) Oral every 12 hours  lactated ringers. 1000 milliLiter(s) (125 mL/Hr) IV Continuous <Continuous>  lidocaine   4% Patch 1 Patch Transdermal every 24 hours  losartan 50 milliGRAM(s) Oral daily  melatonin 5 milliGRAM(s) Oral at bedtime  NIFEdipine XL 60 milliGRAM(s) Oral every 24 hours  senna 2 Tablet(s) Oral at bedtime  vancomycin    Solution 250 milliGRAM(s) Oral every 6 hours    MEDICATIONS  (PRN):  acetaminophen     Tablet .. 650 milliGRAM(s) Oral every 6 hours PRN Mild Pain (1 - 3), Moderate Pain (4 - 6)  dextrose Oral Gel 15 Gram(s) Oral once PRN Blood Glucose LESS THAN 70 milliGRAM(s)/deciliter  ondansetron Injectable 4 milliGRAM(s) IV Push every 8 hours PRN Nausea and/or Vomiting  oxyCODONE    IR 5 milliGRAM(s) Oral every 6 hours PRN Severe Pain (7 - 10)  sodium chloride 0.9% lock flush 10 milliLiter(s) IV Push every 1 hour PRN Pre/post blood products, medications, blood draw, and to maintain line patency    Adm Anthropometrics:  Height for BMI (FEET)	5 Feet  Height for BMI (INCHES)	6 Inch(s)  Height for BMI (CENTIMETERS)	167.64 Centimeter(s)  Weight for BMI (lbs)	203 lb  Weight for BMI (kg)	92.1 kg  Body Mass Index	32.7    Weight Change: No new wts in EMR. Please obtain new wts weekly    Estimated energy needs:   7775-2153 kcals (30-35 kcals/kg, IBW)  70.68-82.46 g protein (1.2-1.4 g/kg, IBW)  9710-5933 mls (30-35 mls/kg, IBW)  IBW used for calculations as pt >120% of IBW (156%), adjusted for age, post-op    Nutrition Focused Physical Exam: Completed [   ]  Not Pertinent [   ]  Muscle Wasting- Temporal [   ]  Clavicle/Pectoral [   ]  Shoulder/Deltoid [   ]  Scapula [   ]  Interosseous [   ]  Quadriceps [   ]  Gastrocnemius [   ]  Fat Wasting- Orbital [   ]  Buccal [   ]  Triceps [   ]  Rib [   ]  Suspect [PCM] 2/2 to physical assessment, [poor intake], and [wt loss]; please see malnutrition chart note.    Estimated energy needs:   IBW used for calculations as pt >120% of IBW (156%), adjusted for age, post-op  6100-7263 kcals (30-35 kcals/kg, IBW)  70.68-82.46 g protein (1.2-1.4 g/kg, IBW)  5567-7517 mls (30-35 mls/kg, IBW)    Subjective:  73 yo female with PMH of HTN, DM and PSH of  x2, hysterectomy and lap dev ( w/ Dr. Clifford) presenting with 2 days of nausea/vomiting and abd pain w/ imaging findings consistent with large bowel obstruction. However, prior to operative exploration, the patient had return of bowel function. NGT removed however bowel obstruction recurred therefore pt taken to OR for Exlap JOSEFINA, SBR ( 100 cm)  EBL:200 IVF:2000 UO:250. Pt kept intubated post op and transferred to SICU team. Now extubated 9/15. stable for transfer to tele on POD#1. return to SICU POD#2 due to hypertension (BP  210/104), gotten labetalol 10, then 20, then 40 while in telemetry, asymptomatic, repeat BP down to 165/67. She was transfer back to SICU for close BP monitoring. pt baseline on labetalol 200 PO bid, losartan 100 qd, hctz 25 qd and nifedipine 60 qd at home. Her SBP remained below 160's all night, did not need any additional antihypertensives. transfer back to SDU next day (). Hospital course complicated by up-trending leukocytosis of unknown etiology, WBC trending down now . 10/4, wound w/ rare yeast, C DIFF positive.    Pt seen resting in bed. She complains of pain in arms and legs, states that she is being poked with needles so it makes her limbs feel heavy. No PO intake this morning or last     Previous Nutrition Diagnosis:    Active [   ]  Resolved [   ]    If resolved, new PES:     Goal:    Recommendations:    Education:     Risk Level: High [   ] Moderate [   ] Low [   ] Admitting Diagnosis:   Patient is a 72y old  Female who presents with a chief complaint of medical comanagement (06 Oct 2022 11:35)      PAST MEDICAL & SURGICAL HISTORY:  Diabetes      H/O thyroid disease      S/P total abdominal hysterectomy      History of laparoscopic cholecystectomy      History of           Current Nutrition Order:   Soft and bite sized diet, renal restricted    PO Intake: Good (%) [   ]  Fair (50-75%) [   ] Poor (<25%) [  x ]    GI Issues: Denies n/v/c. last BM 10/5, loose BMs for 3 days    Pain: Pt reports subjective symptoms of pain in arms and legs    Skin Integrity: Hany 20, surgical incision noted.  No PU or edema noted    Labs:   10-07    132<L>  |  100  |  10  ----------------------------<  144<H>  4.3   |  19<L>  |  0.81    Ca    8.0<L>      07 Oct 2022 12:30  Phos  3.6     10-07  Mg     2.5     10-07      CAPILLARY BLOOD GLUCOSE      POCT Blood Glucose.: 217 mg/dL (07 Oct 2022 08:51)  POCT Blood Glucose.: 192 mg/dL (06 Oct 2022 22:01)  POCT Blood Glucose.: 215 mg/dL (06 Oct 2022 17:21)      Medications:  MEDICATIONS  (STANDING):  atorvastatin 20 milliGRAM(s) Oral at bedtime  chlorhexidine 2% Cloths 1 Application(s) Topical <User Schedule>  DAPTOmycin IVPB 750 milliGRAM(s) IV Intermittent every 24 hours  dextrose 5%. 1000 milliLiter(s) (100 mL/Hr) IV Continuous <Continuous>  dextrose 5%. 1000 milliLiter(s) (50 mL/Hr) IV Continuous <Continuous>  dextrose 50% Injectable 25 Gram(s) IV Push once  dextrose 50% Injectable 12.5 Gram(s) IV Push once  dextrose 50% Injectable 25 Gram(s) IV Push once  enoxaparin Injectable 40 milliGRAM(s) SubCutaneous every 24 hours  glucagon  Injectable 1 milliGRAM(s) IntraMuscular once  influenza  Vaccine (HIGH DOSE) 0.7 milliLiter(s) IntraMuscular once  insulin lispro (ADMELOG) corrective regimen sliding scale   SubCutaneous three times a day before meals  insulin lispro (ADMELOG) corrective regimen sliding scale   SubCutaneous at bedtime  labetalol 200 milliGRAM(s) Oral every 12 hours  lactated ringers. 1000 milliLiter(s) (125 mL/Hr) IV Continuous <Continuous>  lidocaine   4% Patch 1 Patch Transdermal every 24 hours  losartan 50 milliGRAM(s) Oral daily  melatonin 5 milliGRAM(s) Oral at bedtime  NIFEdipine XL 60 milliGRAM(s) Oral every 24 hours  senna 2 Tablet(s) Oral at bedtime  vancomycin    Solution 250 milliGRAM(s) Oral every 6 hours    MEDICATIONS  (PRN):  acetaminophen     Tablet .. 650 milliGRAM(s) Oral every 6 hours PRN Mild Pain (1 - 3), Moderate Pain (4 - 6)  dextrose Oral Gel 15 Gram(s) Oral once PRN Blood Glucose LESS THAN 70 milliGRAM(s)/deciliter  ondansetron Injectable 4 milliGRAM(s) IV Push every 8 hours PRN Nausea and/or Vomiting  oxyCODONE    IR 5 milliGRAM(s) Oral every 6 hours PRN Severe Pain (7 - 10)  sodium chloride 0.9% lock flush 10 milliLiter(s) IV Push every 1 hour PRN Pre/post blood products, medications, blood draw, and to maintain line patency    Adm Anthropometrics:  Height for BMI (FEET)	5 Feet  Height for BMI (INCHES)	6 Inch(s)  Height for BMI (CENTIMETERS)	167.64 Centimeter(s)  Weight for BMI (lbs)	203 lb  Weight for BMI (kg)	92.1 kg  Body Mass Index	32.7    Weight Change: No new wts in EMR. Please obtain new wts weekly    Estimated energy needs:   6129-1720 kcals (30-35 kcals/kg, IBW)  70.68-82.46 g protein (1.2-1.4 g/kg, IBW)  4920-3814 mls (30-35 mls/kg, IBW)  IBW used for calculations as pt >120% of IBW (156%), adjusted for age, post-op    Nutrition Focused Physical Exam: Completed [   ]  Not Pertinent [   ]  Muscle Wasting- Temporal [   ]  Clavicle/Pectoral [   ]  Shoulder/Deltoid [   ]  Scapula [   ]  Interosseous [   ]  Quadriceps [   ]  Gastrocnemius [   ]  Fat Wasting- Orbital [   ]  Buccal [   ]  Triceps [   ]  Rib [   ]  Suspect [PCM] 2/2 to physical assessment, [poor intake], and [wt loss]; please see malnutrition chart note.    Estimated energy needs:   IBW used for calculations as pt >120% of IBW (156%), adjusted for age, post-op  9149-8526 kcals (30-35 kcals/kg, IBW)  70.68-82.46 g protein (1.2-1.4 g/kg, IBW)  5954-0882 mls (30-35 mls/kg, IBW)    Subjective:  73 yo female with PMH of HTN, DM and PSH of  x2, hysterectomy and lap dev ( w/ Dr. Clifford) presenting with 2 days of nausea/vomiting and abd pain w/ imaging findings consistent with large bowel obstruction. However, prior to operative exploration, the patient had return of bowel function. NGT removed however bowel obstruction recurred therefore pt taken to OR for Exlap JOSEFINA, SBR ( 100 cm)  EBL:200 IVF:2000 UO:250. Pt kept intubated post op and transferred to SICU team. Now extubated 9/15. stable for transfer to tele on POD#1. return to SICU POD#2 due to hypertension (BP  210/104), gotten labetalol 10, then 20, then 40 while in telemetry, asymptomatic, repeat BP down to 165/67. She was transfer back to SICU for close BP monitoring. pt baseline on labetalol 200 PO bid, losartan 100 qd, hctz 25 qd and nifedipine 60 qd at home. Her SBP remained below 160's all night, did not need any additional antihypertensives. transfer back to SDU next day (). Hospital course complicated by up-trending leukocytosis of unknown etiology, WBC trending down now . 10/4, wound w/ rare yeast, C DIFF positive.    Pt seen resting in bed. She complains of pain in arms and legs, states that she is being poked with needles so it makes her limbs feel heavy. No PO intake this morning or last night per pt report. States that she will order lunch soon. Encourage PO and lean protein intake as able. Reviewed lbas Diet edu not appropriate at this time as pt with complaints at this time, unable to be receptive to education. RD to follow up.    Previous Nutrition Diagnosis:    Active [   ]  Resolved [   ]    If resolved, new PES:     Goal:    Recommendations:    Education:     Risk Level: High [   ] Moderate [   ] Low [   ] Admitting Diagnosis:   Patient is a 72y old  Female who presents with a chief complaint of medical comanagement (06 Oct 2022 11:35)      PAST MEDICAL & SURGICAL HISTORY:  Diabetes      H/O thyroid disease      S/P total abdominal hysterectomy      History of laparoscopic cholecystectomy      History of           Current Nutrition Order:   Soft and bite sized diet, renal restricted    PO Intake: Good (%) [   ]  Fair (50-75%) [   ] Poor (<25%) [  x ]    GI Issues: Denies n/v/c. last BM 10/5, loose BMs for 3 days    Pain: Pt reports subjective symptoms of pain in arms and legs    Skin Integrity: Hany 20, surgical incision noted.  No PU or edema noted    Labs:   10-07    132<L>  |  100  |  10  ----------------------------<  144<H>  4.3   |  19<L>  |  0.81    Ca    8.0<L>      07 Oct 2022 12:30  Phos  3.6     10-07  Mg     2.5     10-07      CAPILLARY BLOOD GLUCOSE      POCT Blood Glucose.: 217 mg/dL (07 Oct 2022 08:51)  POCT Blood Glucose.: 192 mg/dL (06 Oct 2022 22:01)  POCT Blood Glucose.: 215 mg/dL (06 Oct 2022 17:21)      Medications:  MEDICATIONS  (STANDING):  atorvastatin 20 milliGRAM(s) Oral at bedtime  chlorhexidine 2% Cloths 1 Application(s) Topical <User Schedule>  DAPTOmycin IVPB 750 milliGRAM(s) IV Intermittent every 24 hours  dextrose 5%. 1000 milliLiter(s) (100 mL/Hr) IV Continuous <Continuous>  dextrose 5%. 1000 milliLiter(s) (50 mL/Hr) IV Continuous <Continuous>  dextrose 50% Injectable 25 Gram(s) IV Push once  dextrose 50% Injectable 12.5 Gram(s) IV Push once  dextrose 50% Injectable 25 Gram(s) IV Push once  enoxaparin Injectable 40 milliGRAM(s) SubCutaneous every 24 hours  glucagon  Injectable 1 milliGRAM(s) IntraMuscular once  influenza  Vaccine (HIGH DOSE) 0.7 milliLiter(s) IntraMuscular once  insulin lispro (ADMELOG) corrective regimen sliding scale   SubCutaneous three times a day before meals  insulin lispro (ADMELOG) corrective regimen sliding scale   SubCutaneous at bedtime  labetalol 200 milliGRAM(s) Oral every 12 hours  lactated ringers. 1000 milliLiter(s) (125 mL/Hr) IV Continuous <Continuous>  lidocaine   4% Patch 1 Patch Transdermal every 24 hours  losartan 50 milliGRAM(s) Oral daily  melatonin 5 milliGRAM(s) Oral at bedtime  NIFEdipine XL 60 milliGRAM(s) Oral every 24 hours  senna 2 Tablet(s) Oral at bedtime  vancomycin    Solution 250 milliGRAM(s) Oral every 6 hours    MEDICATIONS  (PRN):  acetaminophen     Tablet .. 650 milliGRAM(s) Oral every 6 hours PRN Mild Pain (1 - 3), Moderate Pain (4 - 6)  dextrose Oral Gel 15 Gram(s) Oral once PRN Blood Glucose LESS THAN 70 milliGRAM(s)/deciliter  ondansetron Injectable 4 milliGRAM(s) IV Push every 8 hours PRN Nausea and/or Vomiting  oxyCODONE    IR 5 milliGRAM(s) Oral every 6 hours PRN Severe Pain (7 - 10)  sodium chloride 0.9% lock flush 10 milliLiter(s) IV Push every 1 hour PRN Pre/post blood products, medications, blood draw, and to maintain line patency    Adm Anthropometrics:  Height for BMI (FEET)	5 Feet  Height for BMI (INCHES)	6 Inch(s)  Height for BMI (CENTIMETERS)	167.64 Centimeter(s)  Weight for BMI (lbs)	203 lb  Weight for BMI (kg)	92.1 kg  Body Mass Index	32.7    Weight Change: No new wts in EMR. Please obtain new wts weekly    Estimated energy needs:   7013-7024 kcals (30-35 kcals/kg, IBW)  70.68-82.46 g protein (1.2-1.4 g/kg, IBW)  4250-5588 mls (30-35 mls/kg, IBW)  IBW used for calculations as pt >120% of IBW (156%), adjusted for age, post-op    Nutrition Focused Physical Exam: Completed [   ]  Not Pertinent [   ]  Muscle Wasting- Temporal [   ]  Clavicle/Pectoral [   ]  Shoulder/Deltoid [   ]  Scapula [   ]  Interosseous [   ]  Quadriceps [   ]  Gastrocnemius [   ]  Fat Wasting- Orbital [   ]  Buccal [   ]  Triceps [   ]  Rib [   ]  Suspect [PCM] 2/2 to physical assessment, [poor intake], and [wt loss]; please see malnutrition chart note.    Estimated energy needs:   IBW used for calculations as pt >120% of IBW (156%), adjusted for age, post-op  4172-4203 kcals (30-35 kcals/kg, IBW)  70.68-82.46 g protein (1.2-1.4 g/kg, IBW)  3110-2090 mls (30-35 mls/kg, IBW)    Subjective:  73 yo female with PMH of HTN, DM and PSH of  x2, hysterectomy and lap dev ( w/ Dr. Clifford) presenting with 2 days of nausea/vomiting and abd pain w/ imaging findings consistent with large bowel obstruction. However, prior to operative exploration, the patient had return of bowel function. NGT removed however bowel obstruction recurred therefore pt taken to OR for Exlap JOSEFINA, SBR ( 100 cm)  EBL:200 IVF:2000 UO:250. Pt kept intubated post op and transferred to SICU team. Now extubated 9/15. stable for transfer to tele on POD#1. return to SICU POD#2 due to hypertension (BP  210/104), gotten labetalol 10, then 20, then 40 while in telemetry, asymptomatic, repeat BP down to 165/67. She was transfer back to SICU for close BP monitoring. pt baseline on labetalol 200 PO bid, losartan 100 qd, hctz 25 qd and nifedipine 60 qd at home. Her SBP remained below 160's all night, did not need any additional antihypertensives. transfer back to SDU next day (). Hospital course complicated by up-trending leukocytosis of unknown etiology, WBC trending down now . 10/4, wound w/ rare yeast, C DIFF positive.    Pt seen resting in bed. She complains of pain in arms and legs, states that she is being poked with needles so it makes her limbs feel heavy. No PO intake this morning or last night per pt report. States that she will order lunch soon. Encourage PO and lean protein intake as able. Reviewed lbas Diet edu not appropriate at this time as pt with complaints at this time, unable to be receptive to education. RD to follow up.    Previous Nutrition Diagnosis:  Nutrition/food knowledge deficit RT need to review diet edu/modification at this time AEB s/p Exlap JOSEFINA, SBR   Active [ x  ]  Resolved [   ]    If resolved, new PES:     Goal: Pt to recall 2 main concepts from edu    Recommendations:  1. soft and bite sized diet + renal, remove renal and include low fiber diet restriction  >> Consider adding ONS if pt remains with poor PO  >> Add CST CHO trending BG/POCT  2. BM and pain regimen per team  3. Monitor GI tolerance, s/s distress  4. Monitor BMP, BG, POCT, lytes, replete prn  5. diet edu as able    Education: Deferred at this time, to review at follow up    Risk Level: High [ x  ] Moderate [   ] Low [   ] Admitting Diagnosis:   Patient is a 72y old  Female who presents with a chief complaint of medical comanagement (06 Oct 2022 11:35)      PAST MEDICAL & SURGICAL HISTORY:  Diabetes      H/O thyroid disease      S/P total abdominal hysterectomy      History of laparoscopic cholecystectomy      History of           Current Nutrition Order:   Soft and bite sized diet, renal restricted    PO Intake: Good (%) [   ]  Fair (50-75%) [   ] Poor (<25%) [  x ]    GI Issues: Denies n/v/c. last BM 10/5, loose BMs for 3 days    Pain: Pt reports subjective symptoms of pain in arms and legs    Skin Integrity: Hany 20, surgical incision noted.  No PU or edema noted    Labs:   10-07    132<L>  |  100  |  10  ----------------------------<  144<H>  4.3   |  19<L>  |  0.81    Ca    8.0<L>      07 Oct 2022 12:30  Phos  3.6     10-07  Mg     2.5     10-07      CAPILLARY BLOOD GLUCOSE      POCT Blood Glucose.: 217 mg/dL (07 Oct 2022 08:51)  POCT Blood Glucose.: 192 mg/dL (06 Oct 2022 22:01)  POCT Blood Glucose.: 215 mg/dL (06 Oct 2022 17:21)      Medications:  MEDICATIONS  (STANDING):  atorvastatin 20 milliGRAM(s) Oral at bedtime  chlorhexidine 2% Cloths 1 Application(s) Topical <User Schedule>  DAPTOmycin IVPB 750 milliGRAM(s) IV Intermittent every 24 hours  dextrose 5%. 1000 milliLiter(s) (100 mL/Hr) IV Continuous <Continuous>  dextrose 5%. 1000 milliLiter(s) (50 mL/Hr) IV Continuous <Continuous>  dextrose 50% Injectable 25 Gram(s) IV Push once  dextrose 50% Injectable 12.5 Gram(s) IV Push once  dextrose 50% Injectable 25 Gram(s) IV Push once  enoxaparin Injectable 40 milliGRAM(s) SubCutaneous every 24 hours  glucagon  Injectable 1 milliGRAM(s) IntraMuscular once  influenza  Vaccine (HIGH DOSE) 0.7 milliLiter(s) IntraMuscular once  insulin lispro (ADMELOG) corrective regimen sliding scale   SubCutaneous three times a day before meals  insulin lispro (ADMELOG) corrective regimen sliding scale   SubCutaneous at bedtime  labetalol 200 milliGRAM(s) Oral every 12 hours  lactated ringers. 1000 milliLiter(s) (125 mL/Hr) IV Continuous <Continuous>  lidocaine   4% Patch 1 Patch Transdermal every 24 hours  losartan 50 milliGRAM(s) Oral daily  melatonin 5 milliGRAM(s) Oral at bedtime  NIFEdipine XL 60 milliGRAM(s) Oral every 24 hours  senna 2 Tablet(s) Oral at bedtime  vancomycin    Solution 250 milliGRAM(s) Oral every 6 hours    MEDICATIONS  (PRN):  acetaminophen     Tablet .. 650 milliGRAM(s) Oral every 6 hours PRN Mild Pain (1 - 3), Moderate Pain (4 - 6)  dextrose Oral Gel 15 Gram(s) Oral once PRN Blood Glucose LESS THAN 70 milliGRAM(s)/deciliter  ondansetron Injectable 4 milliGRAM(s) IV Push every 8 hours PRN Nausea and/or Vomiting  oxyCODONE    IR 5 milliGRAM(s) Oral every 6 hours PRN Severe Pain (7 - 10)  sodium chloride 0.9% lock flush 10 milliLiter(s) IV Push every 1 hour PRN Pre/post blood products, medications, blood draw, and to maintain line patency    Adm Anthropometrics:  Height for BMI (FEET)	5 Feet  Height for BMI (INCHES)	6 Inch(s)  Height for BMI (CENTIMETERS)	167.64 Centimeter(s)  Weight for BMI (lbs)	203 lb  Weight for BMI (kg)	92.1 kg  Body Mass Index	32.7    Weight Change: No new wts in EMR. Please obtain new wts weekly    Estimated energy needs:   8542-0715 kcals (30-35 kcals/kg, IBW)  70.68-82.46 g protein (1.2-1.4 g/kg, IBW)  7495-1077 mls (30-35 mls/kg, IBW)  IBW used for calculations as pt >120% of IBW (156%), adjusted for age, post-op    Nutrition Focused Physical Exam: Completed [   ]  Not Pertinent [   ]  Muscle Wasting- Temporal [   ]  Clavicle/Pectoral [   ]  Shoulder/Deltoid [   ]  Scapula [   ]  Interosseous [   ]  Quadriceps [   ]  Gastrocnemius [   ]  Fat Wasting- Orbital [   ]  Buccal [   ]  Triceps [   ]  Rib [   ]  Suspect [PCM] 2/2 to physical assessment, [poor intake], and [wt loss]; please see malnutrition chart note.    Estimated energy needs:   IBW used for calculations as pt >120% of IBW (156%), adjusted for age, post-op  3180-1792 kcals (30-35 kcals/kg, IBW)  70.68-82.46 g protein (1.2-1.4 g/kg, IBW)  7127-0106 mls (30-35 mls/kg, IBW)    Subjective:  71 yo female with PMH of HTN, DM and PSH of  x2, hysterectomy and lap dev ( w/ Dr. Clifford) presenting with 2 days of nausea/vomiting and abd pain w/ imaging findings consistent with large bowel obstruction. However, prior to operative exploration, the patient had return of bowel function. NGT removed however bowel obstruction recurred therefore pt taken to OR for Exlap JOSEFINA, SBR ( 100 cm)  EBL:200 IVF:2000 UO:250. Pt kept intubated post op and transferred to SICU team. Now extubated 9/15. stable for transfer to tele on POD#1. return to SICU POD#2 due to hypertension (BP  210/104), gotten labetalol 10, then 20, then 40 while in telemetry, asymptomatic, repeat BP down to 165/67. She was transfer back to SICU for close BP monitoring. pt baseline on labetalol 200 PO bid, losartan 100 qd, hctz 25 qd and nifedipine 60 qd at home. Her SBP remained below 160's all night, did not need any additional antihypertensives. transfer back to SDU next day (). Hospital course complicated by up-trending leukocytosis of unknown etiology, WBC trending down now . 10/4, wound w/ rare yeast, C DIFF positive.    Pt seen resting in bed. She complains of pain in arms and legs, states that she is being poked with needles so it makes her limbs feel heavy. No PO intake this morning or last night per pt report. States that she will order lunch soon. Encourage PO and lean protein intake as able. Reviewed labs, POCT 183, 215, 192 mg/dl, Glu 196 mg/dl. Discussed with pt to reduce sugar/and high CHO intake d/t high sugars. Diet edu not appropriate at this time as pt with complaints at this time, unable to be receptive to education. Discussed with team, pt reporting diarrhea but per team, states pt states none at this time. Will continue to follow for changes.  Previous Nutrition Diagnosis:  Nutrition/food knowledge deficit RT need to review diet edu/modification at this time AEB s/p Exlap JOSEFINA, SBR   Active [ x  ]  Resolved [   ]    If resolved, new PES:     Goal: Pt to recall 2 main concepts from edu    Recommendations:  1. soft and bite sized diet + renal, remove renal and include low fiber diet restriction  >> Consider adding ONS if pt remains with poor PO  >> Add CST CHO trending BG/POCT  >>If persistent diarrhea, rec add banatrol to bulk stools  2. BM and pain regimen per team  3. Monitor GI tolerance, s/s distress  4. Monitor BMP, BG, POCT, lytes, replete prn  5. diet edu as able    Education: Deferred at this time, to review at follow up    Risk Level: High [ x  ] Moderate [   ] Low [   ]

## 2022-10-08 LAB
ALBUMIN SERPL ELPH-MCNC: 2.2 G/DL — LOW (ref 3.3–5)
ALP SERPL-CCNC: 94 U/L — SIGNIFICANT CHANGE UP (ref 40–120)
ALT FLD-CCNC: 217 U/L — HIGH (ref 10–45)
ANION GAP SERPL CALC-SCNC: 9 MMOL/L — SIGNIFICANT CHANGE UP (ref 5–17)
AST SERPL-CCNC: 855 U/L — HIGH (ref 10–40)
BASOPHILS # BLD AUTO: 0.05 K/UL — SIGNIFICANT CHANGE UP (ref 0–0.2)
BASOPHILS NFR BLD AUTO: 0.2 % — SIGNIFICANT CHANGE UP (ref 0–2)
BILIRUB SERPL-MCNC: 0.3 MG/DL — SIGNIFICANT CHANGE UP (ref 0.2–1.2)
BUN SERPL-MCNC: 13 MG/DL — SIGNIFICANT CHANGE UP (ref 7–23)
CALCIUM SERPL-MCNC: 7.5 MG/DL — LOW (ref 8.4–10.5)
CHLORIDE SERPL-SCNC: 100 MMOL/L — SIGNIFICANT CHANGE UP (ref 96–108)
CK SERPL-CCNC: CRITICAL HIGH U/L (ref 25–170)
CO2 SERPL-SCNC: 21 MMOL/L — LOW (ref 22–31)
CREAT SERPL-MCNC: 1.3 MG/DL — SIGNIFICANT CHANGE UP (ref 0.5–1.3)
CULTURE RESULTS: SIGNIFICANT CHANGE UP
EGFR: 44 ML/MIN/1.73M2 — LOW
EOSINOPHIL # BLD AUTO: 0.01 K/UL — SIGNIFICANT CHANGE UP (ref 0–0.5)
EOSINOPHIL NFR BLD AUTO: 0 % — SIGNIFICANT CHANGE UP (ref 0–6)
FUNGITELL: 88 PG/ML — HIGH
GLUCOSE BLDC GLUCOMTR-MCNC: 122 MG/DL — HIGH (ref 70–99)
GLUCOSE BLDC GLUCOMTR-MCNC: 124 MG/DL — HIGH (ref 70–99)
GLUCOSE BLDC GLUCOMTR-MCNC: 126 MG/DL — HIGH (ref 70–99)
GLUCOSE BLDC GLUCOMTR-MCNC: 133 MG/DL — HIGH (ref 70–99)
GLUCOSE BLDC GLUCOMTR-MCNC: 137 MG/DL — HIGH (ref 70–99)
GLUCOSE SERPL-MCNC: 125 MG/DL — HIGH (ref 70–99)
HCT VFR BLD CALC: 25.5 % — LOW (ref 34.5–45)
HGB BLD-MCNC: 8.2 G/DL — LOW (ref 11.5–15.5)
IMM GRANULOCYTES NFR BLD AUTO: 1.2 % — HIGH (ref 0–0.9)
LYMPHOCYTES # BLD AUTO: 1.43 K/UL — SIGNIFICANT CHANGE UP (ref 1–3.3)
LYMPHOCYTES # BLD AUTO: 5.7 % — LOW (ref 13–44)
MAGNESIUM SERPL-MCNC: 1.9 MG/DL — SIGNIFICANT CHANGE UP (ref 1.6–2.6)
MCHC RBC-ENTMCNC: 27.4 PG — SIGNIFICANT CHANGE UP (ref 27–34)
MCHC RBC-ENTMCNC: 32.2 GM/DL — SIGNIFICANT CHANGE UP (ref 32–36)
MCV RBC AUTO: 85.3 FL — SIGNIFICANT CHANGE UP (ref 80–100)
MONOCYTES # BLD AUTO: 1.01 K/UL — HIGH (ref 0–0.9)
MONOCYTES NFR BLD AUTO: 4.1 % — SIGNIFICANT CHANGE UP (ref 2–14)
NEUTROPHILS # BLD AUTO: 22.14 K/UL — HIGH (ref 1.8–7.4)
NEUTROPHILS NFR BLD AUTO: 88.8 % — HIGH (ref 43–77)
NRBC # BLD: 0 /100 WBCS — SIGNIFICANT CHANGE UP (ref 0–0)
PHOSPHATE SERPL-MCNC: 4.4 MG/DL — SIGNIFICANT CHANGE UP (ref 2.5–4.5)
PLATELET # BLD AUTO: 444 K/UL — HIGH (ref 150–400)
POTASSIUM SERPL-MCNC: 4.7 MMOL/L — SIGNIFICANT CHANGE UP (ref 3.5–5.3)
POTASSIUM SERPL-SCNC: 4.7 MMOL/L — SIGNIFICANT CHANGE UP (ref 3.5–5.3)
PROT SERPL-MCNC: 5.2 G/DL — LOW (ref 6–8.3)
RBC # BLD: 2.99 M/UL — LOW (ref 3.8–5.2)
RBC # FLD: 16 % — HIGH (ref 10.3–14.5)
SODIUM SERPL-SCNC: 130 MMOL/L — LOW (ref 135–145)
SPECIMEN SOURCE: SIGNIFICANT CHANGE UP
WBC # BLD: 24.93 K/UL — HIGH (ref 3.8–10.5)
WBC # FLD AUTO: 24.93 K/UL — HIGH (ref 3.8–10.5)

## 2022-10-08 PROCEDURE — 99233 SBSQ HOSP IP/OBS HIGH 50: CPT

## 2022-10-08 PROCEDURE — 74018 RADEX ABDOMEN 1 VIEW: CPT | Mod: 26

## 2022-10-08 PROCEDURE — 74176 CT ABD & PELVIS W/O CONTRAST: CPT | Mod: 26

## 2022-10-08 RX ORDER — SODIUM CHLORIDE 9 MG/ML
1000 INJECTION INTRAMUSCULAR; INTRAVENOUS; SUBCUTANEOUS ONCE
Refills: 0 | Status: COMPLETED | OUTPATIENT
Start: 2022-10-08 | End: 2022-10-08

## 2022-10-08 RX ORDER — FUROSEMIDE 40 MG
20 TABLET ORAL ONCE
Refills: 0 | Status: COMPLETED | OUTPATIENT
Start: 2022-10-08 | End: 2022-10-08

## 2022-10-08 RX ORDER — OXYCODONE HYDROCHLORIDE 5 MG/1
5 TABLET ORAL ONCE
Refills: 0 | Status: DISCONTINUED | OUTPATIENT
Start: 2022-10-08 | End: 2022-10-08

## 2022-10-08 RX ORDER — DIATRIZOATE MEGLUMINE 180 MG/ML
30 INJECTION, SOLUTION INTRAVESICAL ONCE
Refills: 0 | Status: COMPLETED | OUTPATIENT
Start: 2022-10-08 | End: 2022-10-08

## 2022-10-08 RX ORDER — FUROSEMIDE 40 MG
20 TABLET ORAL DAILY
Refills: 0 | Status: DISCONTINUED | OUTPATIENT
Start: 2022-10-08 | End: 2022-10-09

## 2022-10-08 RX ADMIN — Medication 125 MILLIGRAM(S): at 06:27

## 2022-10-08 RX ADMIN — Medication 60 MILLIGRAM(S): at 16:31

## 2022-10-08 RX ADMIN — SODIUM CHLORIDE 1000 MILLILITER(S): 9 INJECTION INTRAMUSCULAR; INTRAVENOUS; SUBCUTANEOUS at 03:39

## 2022-10-08 RX ADMIN — OXYCODONE HYDROCHLORIDE 5 MILLIGRAM(S): 5 TABLET ORAL at 03:28

## 2022-10-08 RX ADMIN — Medication 650 MILLIGRAM(S): at 18:54

## 2022-10-08 RX ADMIN — Medication 650 MILLIGRAM(S): at 17:18

## 2022-10-08 RX ADMIN — SODIUM CHLORIDE 200 MILLILITER(S): 9 INJECTION, SOLUTION INTRAVENOUS at 03:00

## 2022-10-08 RX ADMIN — SODIUM CHLORIDE 200 MILLILITER(S): 9 INJECTION, SOLUTION INTRAVENOUS at 06:27

## 2022-10-08 RX ADMIN — Medication 200 MILLIGRAM(S): at 17:07

## 2022-10-08 RX ADMIN — LIDOCAINE 1 PATCH: 4 CREAM TOPICAL at 19:16

## 2022-10-08 RX ADMIN — CHLORHEXIDINE GLUCONATE 1 APPLICATION(S): 213 SOLUTION TOPICAL at 06:13

## 2022-10-08 RX ADMIN — LIDOCAINE 1 PATCH: 4 CREAM TOPICAL at 11:59

## 2022-10-08 RX ADMIN — Medication 20 MILLIGRAM(S): at 23:17

## 2022-10-08 RX ADMIN — Medication 125 MILLIGRAM(S): at 11:59

## 2022-10-08 RX ADMIN — OXYCODONE HYDROCHLORIDE 5 MILLIGRAM(S): 5 TABLET ORAL at 03:58

## 2022-10-08 RX ADMIN — LIDOCAINE 1 PATCH: 4 CREAM TOPICAL at 23:58

## 2022-10-08 RX ADMIN — Medication 5 MILLIGRAM(S): at 23:22

## 2022-10-08 RX ADMIN — DIATRIZOATE MEGLUMINE 30 MILLILITER(S): 180 INJECTION, SOLUTION INTRAVESICAL at 10:52

## 2022-10-08 RX ADMIN — Medication 200 MILLIGRAM(S): at 09:17

## 2022-10-08 RX ADMIN — SENNA PLUS 2 TABLET(S): 8.6 TABLET ORAL at 23:22

## 2022-10-08 RX ADMIN — Medication 20 MILLIGRAM(S): at 11:59

## 2022-10-08 RX ADMIN — ONDANSETRON 4 MILLIGRAM(S): 8 TABLET, FILM COATED ORAL at 02:03

## 2022-10-08 RX ADMIN — Medication 125 MILLIGRAM(S): at 17:07

## 2022-10-08 RX ADMIN — ENOXAPARIN SODIUM 40 MILLIGRAM(S): 100 INJECTION SUBCUTANEOUS at 15:27

## 2022-10-08 NOTE — PROVIDER CONTACT NOTE (CRITICAL VALUE NOTIFICATION) - BACKGROUND
Pt is s/p ERIC Gautam, stepped down to tele yesterday from ICU, presents with some chest tightness, and elevated blood pressures, but stable otherwise and denies any palpitations.
pt s/p ex lap, JOSEFINA, SBR on 9/15 c/b dehiscence +wound vac placement.
Pt admitted for s/p SBR

## 2022-10-08 NOTE — PROGRESS NOTE ADULT - ASSESSMENT
72 year old woman with HTN, DM2 admitted for large bowel obstruction s/p ex lap w/ lysis of adhesions and bowel resection on 9/14 for SBO. She is also s/p sepsis and peritonitis. Nephrology consulted for SO and rhabdomyolysis 2/2 daptomycin       SO- Resolved   Cr. 1.3      Rhabdomyolysis   likely due to daptomycin   CPK 47781    Plan:   Increase LR at 250cc/hr  Can give Lasix 20mg IVP once   Monitor Urine out put as it should be >200cc/hr   Trend CPK   Daily BMP   Strict ins and outs   Avoid nephrotoxic agents

## 2022-10-08 NOTE — PROGRESS NOTE ADULT - SUBJECTIVE AND OBJECTIVE BOX
Patient is a 72y Female seen and evaluated at bedside. No acute distress, does not offer any complaints. Elevated CPK 2/2 to daptomycin. Adequate urine output.       Meds:    acetaminophen     Tablet .. 650 every 6 hours PRN  chlorhexidine 2% Cloths 1 <User Schedule>  dextrose 5%. 1000 <Continuous>  dextrose 5%. 1000 <Continuous>  dextrose 50% Injectable 25 once  dextrose 50% Injectable 12.5 once  dextrose 50% Injectable 25 once  dextrose Oral Gel 15 once PRN  enoxaparin Injectable 40 every 24 hours  glucagon  Injectable 1 once  influenza  Vaccine (HIGH DOSE) 0.7 once  insulin lispro (ADMELOG) corrective regimen sliding scale  three times a day before meals  insulin lispro (ADMELOG) corrective regimen sliding scale  at bedtime  labetalol 200 every 12 hours  lactated ringers. 1000 <Continuous>  lidocaine   4% Patch 1 every 24 hours  melatonin 5 at bedtime  NIFEdipine XL 60 every 24 hours  ondansetron Injectable 4 every 8 hours PRN  oxyCODONE    IR 5 every 6 hours PRN  senna 2 at bedtime  sodium chloride 0.9% lock flush 10 every 1 hour PRN  vancomycin    Solution 125 every 6 hours      T(C): , Max: 37.1 (10-07-22 @ 17:30)  T(F): , Max: 98.7 (10-07-22 @ 17:30)  HR: 80 (10-08-22 @ 11:04)  BP: 163/79 (10-08-22 @ 11:04)  BP(mean): 114 (10-08-22 @ 11:04)  RR: 18 (10-08-22 @ 11:04)  SpO2: 100% (10-08-22 @ 11:04)  Wt(kg): --    10-07 @ 07:01  -  10-08 @ 07:00  --------------------------------------------------------  IN: 4250 mL / OUT: 765 mL / NET: 3485 mL    10-08 @ 07:01  -  10-08 @ 13:28  --------------------------------------------------------  IN: 1470 mL / OUT: 100 mL / NET: 1370 mL          Review of Systems:  ROS negative except as per HPI      PHYSICAL EXAM:  Constitutional: pleasant female NAD  HEENT: NC/AT, EOMI  Neck: Supple, no JVD  Respiratory: CTA B/L, on RA  Cardiovascular: RRR, normal S1 and S2,   Gastrointestinal: +BS, minimal tenderness  Extremities: no edema, warm  Neurological: AAOx3        LABS:                        8.2    24.93 )-----------( 444      ( 08 Oct 2022 05:30 )             25.5     10-08    130<L>  |  100  |  13  ----------------------------<  125<H>  4.7   |  21<L>  |  1.30    Ca    7.5<L>      08 Oct 2022 05:30  Phos  4.4     10-08  Mg     1.9     10-08    TPro  5.2<L>  /  Alb  2.2<L>  /  TBili  0.3  /  DBili  x   /  AST  855<H>  /  ALT  217<H>  /  AlkPhos  94  10-08                RADIOLOGY & ADDITIONAL STUDIES:

## 2022-10-08 NOTE — PROGRESS NOTE ADULT - SUBJECTIVE AND OBJECTIVE BOX
INTERVAL HPI/OVERNIGHT EVENTS:   SURGERY ATTENDING    STATUS POST:      Exploratory laparotomy with lysis of adhesions extensive one hour , Small bowel resection with primary anastomosis, Repair of recurrent Incisional hernia with myofascial flaps no mesh, washout, drainage, PREVENA        POST OPERATIVE DAY #: 23    SUBJECTIVE:  Flatus: [x ] YES [ ] NO             Bowel Movement: [x ] YES [ ] NO  Pain (0-10):        1    Pain Control Adequate: [x ] YES [ ] NO  Nausea: [ ] YES [x ] NO            Vomiting: [ ] YES [x ] NO  Diarrhea: [ ] YES [x ] NO         Constipation: [ ] YES [x ] NO     Chest Pain: [ ] YES [x ] NO    SOB:  [ ] YES [x ] NO    MEDICATIONS  (STANDING):  chlorhexidine 2% Cloths 1 Application(s) Topical <User Schedule>  dextrose 5%. 1000 milliLiter(s) (100 mL/Hr) IV Continuous <Continuous>  dextrose 5%. 1000 milliLiter(s) (50 mL/Hr) IV Continuous <Continuous>  dextrose 50% Injectable 25 Gram(s) IV Push once  dextrose 50% Injectable 12.5 Gram(s) IV Push once  dextrose 50% Injectable 25 Gram(s) IV Push once  enoxaparin Injectable 40 milliGRAM(s) SubCutaneous every 24 hours  glucagon  Injectable 1 milliGRAM(s) IntraMuscular once  influenza  Vaccine (HIGH DOSE) 0.7 milliLiter(s) IntraMuscular once  insulin lispro (ADMELOG) corrective regimen sliding scale   SubCutaneous three times a day before meals  insulin lispro (ADMELOG) corrective regimen sliding scale   SubCutaneous at bedtime  labetalol 200 milliGRAM(s) Oral every 12 hours  lactated ringers. 1000 milliLiter(s) (250 mL/Hr) IV Continuous <Continuous>  lidocaine   4% Patch 1 Patch Transdermal every 24 hours  melatonin 5 milliGRAM(s) Oral at bedtime  NIFEdipine XL 60 milliGRAM(s) Oral every 24 hours  senna 2 Tablet(s) Oral at bedtime  vancomycin    Solution 125 milliGRAM(s) Oral every 6 hours    MEDICATIONS  (PRN):  acetaminophen     Tablet .. 650 milliGRAM(s) Oral every 6 hours PRN Mild Pain (1 - 3), Moderate Pain (4 - 6)  dextrose Oral Gel 15 Gram(s) Oral once PRN Blood Glucose LESS THAN 70 milliGRAM(s)/deciliter  ondansetron Injectable 4 milliGRAM(s) IV Push every 8 hours PRN Nausea and/or Vomiting  oxyCODONE    IR 5 milliGRAM(s) Oral every 6 hours PRN Severe Pain (7 - 10)  sodium chloride 0.9% lock flush 10 milliLiter(s) IV Push every 1 hour PRN Pre/post blood products, medications, blood draw, and to maintain line patency      Vital Signs Last 24 Hrs  T(C): 36.4 (08 Oct 2022 09:05), Max: 37.1 (07 Oct 2022 17:30)  T(F): 97.5 (08 Oct 2022 09:05), Max: 98.7 (07 Oct 2022 17:30)  HR: 80 (08 Oct 2022 11:04) (67 - 94)  BP: 163/79 (08 Oct 2022 11:04) (130/86 - 177/90)  BP(mean): 114 (08 Oct 2022 11:04) (114 - 126)  RR: 18 (08 Oct 2022 11:04) (16 - 18)  SpO2: 100% (08 Oct 2022 11:04) (98% - 100%)    Parameters below as of 08 Oct 2022 11:04  Patient On (Oxygen Delivery Method): room air        PHYSICAL EXAM:      Constitutional:    Eyes:    ENMT:    Neck:    Breasts:    Back:    Respiratory:    Cardiovascular:    Gastrointestinal:    Genitourinary:    Rectal:    Extremities:    Vascular:    Neurological:    Skin:    Lymph Nodes:    Musculoskeletal:    Psychiatric:        I&O's Detail    07 Oct 2022 07:01  -  08 Oct 2022 07:00  --------------------------------------------------------  IN:    Lactated Ringers: 3800 mL    Lactated Ringers: 150 mL    Oral Fluid: 300 mL  Total IN: 4250 mL    OUT:    Indwelling Catheter - Urethral (mL): 765 mL    VAC (Vacuum Assisted Closure) System (mL): 0 mL  Total OUT: 765 mL    Total NET: 3485 mL      08 Oct 2022 07:01  -  08 Oct 2022 15:42  --------------------------------------------------------  IN:    Lactated Ringers: 1350 mL    Oral Fluid: 120 mL  Total IN: 1470 mL    OUT:    Indwelling Catheter - Urethral (mL): 100 mL    VAC (Vacuum Assisted Closure) System (mL): 0 mL  Total OUT: 100 mL    Total NET: 1370 mL          LABS:                        8.2    24.93 )-----------( 444      ( 08 Oct 2022 05:30 )             25.5     10-08    130<L>  |  100  |  13  ----------------------------<  125<H>  4.7   |  21<L>  |  1.30    Ca    7.5<L>      08 Oct 2022 05:30  Phos  4.4     10-08  Mg     1.9     10-08    TPro  5.2<L>  /  Alb  2.2<L>  /  TBili  0.3  /  DBili  x   /  AST  855<H>  /  ALT  217<H>  /  AlkPhos  94  10-08          RADIOLOGY & ADDITIONAL STUDIES:

## 2022-10-08 NOTE — PROGRESS NOTE ADULT - SUBJECTIVE AND OBJECTIVE BOX
INTERVAL HPI/OVERNIGHT EVENTS:    STATUS POST:      POST OPERATIVE DAY #:     SUBJECTIVE:  Flatus: [ ] YES [ ] NO             Bowel Movement: [ ] YES [ ] NO  Pain (0-10):            Pain Control Adequate: [ ] YES [ ] NO  Nausea: [ ] YES [ ] NO            Vomiting: [ ] YES [ ] NO  Diarrhea: [ ] YES [ ] NO         Constipation: [ ] YES [ ] NO     Chest Pain: [ ] YES [ ] NO    SOB:  [ ] YES [ ] NO    MEDICATIONS  (STANDING):  chlorhexidine 2% Cloths 1 Application(s) Topical <User Schedule>  dextrose 5%. 1000 milliLiter(s) (100 mL/Hr) IV Continuous <Continuous>  dextrose 5%. 1000 milliLiter(s) (50 mL/Hr) IV Continuous <Continuous>  dextrose 50% Injectable 25 Gram(s) IV Push once  dextrose 50% Injectable 12.5 Gram(s) IV Push once  dextrose 50% Injectable 25 Gram(s) IV Push once  enoxaparin Injectable 40 milliGRAM(s) SubCutaneous every 24 hours  glucagon  Injectable 1 milliGRAM(s) IntraMuscular once  influenza  Vaccine (HIGH DOSE) 0.7 milliLiter(s) IntraMuscular once  insulin lispro (ADMELOG) corrective regimen sliding scale   SubCutaneous three times a day before meals  insulin lispro (ADMELOG) corrective regimen sliding scale   SubCutaneous at bedtime  labetalol 200 milliGRAM(s) Oral every 12 hours  lactated ringers. 1000 milliLiter(s) (200 mL/Hr) IV Continuous <Continuous>  lidocaine   4% Patch 1 Patch Transdermal every 24 hours  melatonin 5 milliGRAM(s) Oral at bedtime  NIFEdipine XL 60 milliGRAM(s) Oral every 24 hours  senna 2 Tablet(s) Oral at bedtime  vancomycin    Solution 125 milliGRAM(s) Oral every 6 hours    MEDICATIONS  (PRN):  acetaminophen     Tablet .. 650 milliGRAM(s) Oral every 6 hours PRN Mild Pain (1 - 3), Moderate Pain (4 - 6)  dextrose Oral Gel 15 Gram(s) Oral once PRN Blood Glucose LESS THAN 70 milliGRAM(s)/deciliter  ondansetron Injectable 4 milliGRAM(s) IV Push every 8 hours PRN Nausea and/or Vomiting  oxyCODONE    IR 5 milliGRAM(s) Oral every 6 hours PRN Severe Pain (7 - 10)  sodium chloride 0.9% lock flush 10 milliLiter(s) IV Push every 1 hour PRN Pre/post blood products, medications, blood draw, and to maintain line patency      Vital Signs Last 24 Hrs  T(C): 36.8 (08 Oct 2022 05:04), Max: 37.1 (07 Oct 2022 17:30)  T(F): 98.2 (08 Oct 2022 05:04), Max: 98.7 (07 Oct 2022 17:30)  HR: 89 (07 Oct 2022 22:45) (67 - 89)  BP: 130/86 (07 Oct 2022 22:45) (130/86 - 156/83)  BP(mean): --  RR: 17 (07 Oct 2022 22:45) (16 - 17)  SpO2: 99% (07 Oct 2022 22:45) (95% - 100%)    Parameters below as of 07 Oct 2022 22:45  Patient On (Oxygen Delivery Method): room air        PHYSICAL EXAM:      Constitutional: A&Ox3    Breasts:    Respiratory: non labored breathing, no respiratory distress    Cardiovascular: NSR, RRR    Gastrointestinal:                 Incision:    Genitourinary:    Extremities: (-) edema                  I&O's Detail    06 Oct 2022 07:01  -  07 Oct 2022 07:00  --------------------------------------------------------  IN:    dextrose 5% + sodium chloride 0.45%: 3450 mL    Oral Fluid: 540 mL  Total IN: 3990 mL    OUT:    VAC (Vacuum Assisted Closure) System (mL): 20 mL    Voided (mL): 2350 mL  Total OUT: 2370 mL    Total NET: 1620 mL      07 Oct 2022 07:01  -  08 Oct 2022 06:58  --------------------------------------------------------  IN:    Lactated Ringers: 150 mL    Lactated Ringers: 2200 mL    Oral Fluid: 300 mL  Total IN: 2650 mL    OUT:    Indwelling Catheter - Urethral (mL): 725 mL    VAC (Vacuum Assisted Closure) System (mL): 0 mL  Total OUT: 725 mL    Total NET: 1925 mL          LABS:                        9.7    25.62 )-----------( 548      ( 07 Oct 2022 17:32 )             30.4     10-07    132<L>  |  100  |  11  ----------------------------<  162<H>  4.4   |  20<L>  |  0.98    Ca    8.2<L>      07 Oct 2022 17:32  Phos  3.9     10-07  Mg     2.3     10-07    TPro  6.2  /  Alb  2.6<L>  /  TBili  0.4  /  DBili  x   /  AST  828<H>  /  ALT  194<H>  /  AlkPhos  111  10-07          RADIOLOGY & ADDITIONAL STUDIES: INTERVAL HPI/OVERNIGHT EVENTS:  Overnight, fluids were titrated to optimize UOP. Patient seen and examined by chief resident and team on AM rounds. Patient is tolerating diet, Denies any pain. Reports only weakness of extremities 2/2 rhabdomyolysis. -n/-v/+f/-BM.     STATUS POST:  now s/p Exkathy ROCHA SBR (100 cm) (9/15)    SUBJECTIVE:    MEDICATIONS  (STANDING):  chlorhexidine 2% Cloths 1 Application(s) Topical <User Schedule>  dextrose 5%. 1000 milliLiter(s) (100 mL/Hr) IV Continuous <Continuous>  dextrose 5%. 1000 milliLiter(s) (50 mL/Hr) IV Continuous <Continuous>  dextrose 50% Injectable 25 Gram(s) IV Push once  dextrose 50% Injectable 12.5 Gram(s) IV Push once  dextrose 50% Injectable 25 Gram(s) IV Push once  enoxaparin Injectable 40 milliGRAM(s) SubCutaneous every 24 hours  glucagon  Injectable 1 milliGRAM(s) IntraMuscular once  influenza  Vaccine (HIGH DOSE) 0.7 milliLiter(s) IntraMuscular once  insulin lispro (ADMELOG) corrective regimen sliding scale   SubCutaneous three times a day before meals  insulin lispro (ADMELOG) corrective regimen sliding scale   SubCutaneous at bedtime  labetalol 200 milliGRAM(s) Oral every 12 hours  lactated ringers. 1000 milliLiter(s) (200 mL/Hr) IV Continuous <Continuous>  lidocaine   4% Patch 1 Patch Transdermal every 24 hours  melatonin 5 milliGRAM(s) Oral at bedtime  NIFEdipine XL 60 milliGRAM(s) Oral every 24 hours  senna 2 Tablet(s) Oral at bedtime  vancomycin    Solution 125 milliGRAM(s) Oral every 6 hours    MEDICATIONS  (PRN):  acetaminophen     Tablet .. 650 milliGRAM(s) Oral every 6 hours PRN Mild Pain (1 - 3), Moderate Pain (4 - 6)  dextrose Oral Gel 15 Gram(s) Oral once PRN Blood Glucose LESS THAN 70 milliGRAM(s)/deciliter  ondansetron Injectable 4 milliGRAM(s) IV Push every 8 hours PRN Nausea and/or Vomiting  oxyCODONE    IR 5 milliGRAM(s) Oral every 6 hours PRN Severe Pain (7 - 10)  sodium chloride 0.9% lock flush 10 milliLiter(s) IV Push every 1 hour PRN Pre/post blood products, medications, blood draw, and to maintain line patency      Vital Signs Last 24 Hrs  T(C): 36.8 (08 Oct 2022 05:04), Max: 37.1 (07 Oct 2022 17:30)  T(F): 98.2 (08 Oct 2022 05:04), Max: 98.7 (07 Oct 2022 17:30)  HR: 89 (07 Oct 2022 22:45) (67 - 89)  BP: 130/86 (07 Oct 2022 22:45) (130/86 - 156/83)  BP(mean): --  RR: 17 (07 Oct 2022 22:45) (16 - 17)  SpO2: 99% (07 Oct 2022 22:45) (95% - 100%)    Parameters below as of 07 Oct 2022 22:45  Patient On (Oxygen Delivery Method): room air        PHYSICAL EXAM:    Constitutional: A&Ox3  Respiratory: non labored breathing, no respiratory distress  Cardiovascular: NSR, RRR  Gastrointestinal: soft, NTND abdomen. Wound vac in place over midline wound. No rebound or guarding.   Extremities: mild nonpitting edema present in legs. Muscles weak 2/2 rhabdomyolysis.   : velarde with clear urine            I&O's Detail    06 Oct 2022 07:01  -  07 Oct 2022 07:00  --------------------------------------------------------  IN:    dextrose 5% + sodium chloride 0.45%: 3450 mL    Oral Fluid: 540 mL  Total IN: 3990 mL    OUT:    VAC (Vacuum Assisted Closure) System (mL): 20 mL    Voided (mL): 2350 mL  Total OUT: 2370 mL    Total NET: 1620 mL      07 Oct 2022 07:01  -  08 Oct 2022 06:58  --------------------------------------------------------  IN:    Lactated Ringers: 150 mL    Lactated Ringers: 2200 mL    Oral Fluid: 300 mL  Total IN: 2650 mL    OUT:    Indwelling Catheter - Urethral (mL): 725 mL    VAC (Vacuum Assisted Closure) System (mL): 0 mL  Total OUT: 725 mL    Total NET: 1925 mL          LABS:                        9.7    25.62 )-----------( 548      ( 07 Oct 2022 17:32 )             30.4     10-07    132<L>  |  100  |  11  ----------------------------<  162<H>  4.4   |  20<L>  |  0.98    Ca    8.2<L>      07 Oct 2022 17:32  Phos  3.9     10-07  Mg     2.3     10-07    TPro  6.2  /  Alb  2.6<L>  /  TBili  0.4  /  DBili  x   /  AST  828<H>  /  ALT  194<H>  /  AlkPhos  111  10-07          RADIOLOGY & ADDITIONAL STUDIES:

## 2022-10-08 NOTE — PROGRESS NOTE ADULT - ASSESSMENT
RECOMMEND  Maximize treatment of rhabdomyolysis - would consult Renal   Continue PO Vancomycin  No systemic antimicrobials for now  Consider C/A/P CT but no IV contrast        Discussed with Dr So    [phone]       Acute rhabdomyolysis after initiation of Daptomycin for Enterococcal BSI.  BCx possibly contaminated as BCx set collected before antimicrobial therapy remains negative  Cdiff colitis - improving  S/P Abdominal surgery for SBO.  S/P Treatment for Peritonitis       RECOMMEND  Maximize treatment of rhabdomyolysis - would consult Renal   Continue PO Vancomycin for 10-14 days   No systemic antimicrobials for now  Consider C/A/P CT but no IV contrast        Discussed with Dr So    [phone]

## 2022-10-08 NOTE — PROGRESS NOTE ADULT - SUBJECTIVE AND OBJECTIVE BOX
INTERVAL HPI/OVERNIGHT EVENTS: Patient with worsening urine output overnight despite aggressive IVF. This morning complaining of upper extremity and neck pain and weakness. Also experiencing lower extremity weakness. No paresthesias. ROS otherwise unchanged.     VITAL SIGNS:  T(F): 97.5 (10-08-22 @ 09:05)  HR: 80 (10-08-22 @ 11:04)  BP: 163/79 (10-08-22 @ 11:04)  RR: 18 (10-08-22 @ 11:04)  SpO2: 100% (10-08-22 @ 11:04)  Wt(kg): --    PHYSICAL EXAM:      Constitutional: NAD, laying in bed  HEENT: PERRLA, EOMI, Normal Hearing, MMM  Neck: No LAD, No JVD  Respiratory: bibasilar crackles/decreased breath sounds  Cardiovascular: S1 and S2, RRR, no M/G/R  Gastrointestinal: BS+, soft, NT/ND. midline incision w/ wound vac   Extremities: No peripheral edema  Vascular: 2+ peripheral pulses  Neurological: A/O x 3, 3-4/5 symmetric weakness in the upper and lower extremities. no sensory deficits   Psychiatric: Normal mood, normal affect  Skin: No rashes        MEDICATIONS  (STANDING):  chlorhexidine 2% Cloths 1 Application(s) Topical <User Schedule>  dextrose 5%. 1000 milliLiter(s) (100 mL/Hr) IV Continuous <Continuous>  dextrose 5%. 1000 milliLiter(s) (50 mL/Hr) IV Continuous <Continuous>  dextrose 50% Injectable 25 Gram(s) IV Push once  dextrose 50% Injectable 12.5 Gram(s) IV Push once  dextrose 50% Injectable 25 Gram(s) IV Push once  enoxaparin Injectable 40 milliGRAM(s) SubCutaneous every 24 hours  glucagon  Injectable 1 milliGRAM(s) IntraMuscular once  influenza  Vaccine (HIGH DOSE) 0.7 milliLiter(s) IntraMuscular once  insulin lispro (ADMELOG) corrective regimen sliding scale   SubCutaneous three times a day before meals  insulin lispro (ADMELOG) corrective regimen sliding scale   SubCutaneous at bedtime  labetalol 200 milliGRAM(s) Oral every 12 hours  lactated ringers. 1000 milliLiter(s) (250 mL/Hr) IV Continuous <Continuous>  lidocaine   4% Patch 1 Patch Transdermal every 24 hours  melatonin 5 milliGRAM(s) Oral at bedtime  NIFEdipine XL 60 milliGRAM(s) Oral every 24 hours  senna 2 Tablet(s) Oral at bedtime  vancomycin    Solution 125 milliGRAM(s) Oral every 6 hours    MEDICATIONS  (PRN):  acetaminophen     Tablet .. 650 milliGRAM(s) Oral every 6 hours PRN Mild Pain (1 - 3), Moderate Pain (4 - 6)  dextrose Oral Gel 15 Gram(s) Oral once PRN Blood Glucose LESS THAN 70 milliGRAM(s)/deciliter  ondansetron Injectable 4 milliGRAM(s) IV Push every 8 hours PRN Nausea and/or Vomiting  oxyCODONE    IR 5 milliGRAM(s) Oral every 6 hours PRN Severe Pain (7 - 10)  sodium chloride 0.9% lock flush 10 milliLiter(s) IV Push every 1 hour PRN Pre/post blood products, medications, blood draw, and to maintain line patency      Allergies    penicillin (Rash)    Intolerances    daptomycin (Muscle Pain)      LABS:                        8.2    24.93 )-----------( 444      ( 08 Oct 2022 05:30 )             25.5     10-08    130<L>  |  100  |  13  ----------------------------<  125<H>  4.7   |  21<L>  |  1.30    Ca    7.5<L>      08 Oct 2022 05:30  Phos  4.4     10-08  Mg     1.9     10-08    TPro  5.2<L>  /  Alb  2.2<L>  /  TBili  0.3  /  DBili  x   /  AST  855<H>  /  ALT  217<H>  /  AlkPhos  94  10-08          RADIOLOGY & ADDITIONAL TESTS:

## 2022-10-08 NOTE — PROGRESS NOTE ADULT - ASSESSMENT
72 year old woman with HTN, DM2, past surgical history of  x 2, hysterectomy, and laparoscopic cholecystectomy (), admitted for large bowel obstruction; had spontaneous return of bowel function initially; however, bowel obstruction recurred, so taken to OR for ex lap with lysis of adhesions and bowel resection () . Monitored in SICU post -op ; now on regional. Hospital course complicated by rhabdomyolysis, likely daptomycin induced (CK elevated on 10/7/2022)    # Large bowel obstruction   # post operative state  s/p OR for ex lap with lysis of adhesions and bowel resection ()  - rest of care per primary team     # non-traumatic Rhabdomyolysis  CK elevated to 30K on 10/7/2022. Likely daptomycin associated. Stopped daptomycin and statin.   - patient on aggressive IVF with LR. she is experiencing an uptrend in her creatinine and her urine output has fallen despite aggressive resuscitation   - would consult renal for comanagement of her non traumatic rhabdo  - no evidence of compartment syndrome currently. weakness is likely due to myopathy.     #C diff; non severe  -On PO vanc 10/4- ; No diarrhea in last 24 hours    #E. faecalis bacteremia  -Afebrile at this time. was On dapto 10/4-; Switched to vancomycin 10/7/2022  -Repeat Cx's negative to date    #Obesity - Dose medications by weight     #HTN   takes HCTZ 25mg qd, losartan 100mg qd, labetalol 200mg BID, nifedipine 60mg ER qd at home.   - continue labetalol 200mg BID  - on nifepidine 60mg ER qd; If patient becomes persistently hypertensive (SBMP>160mmHg) while holding losartan, can increase nifedipine to 90mg ERqd until rhabdo resolves and losartan is restarted.   - If SBP > 180mmHg, check bladder scan before giving IV labetalol or hydral.   - Continue to hold HCTZ for now and on discharge     # Type 2 Diabetes complicated by hyperglycemia   cw insulin sliding scale while inpatient.   pt's a1c at goal for her age, resume metformin on discharge.     #Incidental finding of bilateral lung nodules  CT chest showing bilateral lung nodules, measuring up to 7 mm in the ridght upper lobe; Counseled patient on finding.   Recommended follow-up chest CT in three months to ensure stability.; Copy of radiology report provided to patient for her records.     # COVID 19 infection (present on admission) - Improved/resolved. Off isolation.     # Acute blood loss anemia   continue to trend daily CBC while inpatient;  age appropriate cancer screening outpatient     DVT ppx - HSQ

## 2022-10-08 NOTE — PROGRESS NOTE ADULT - SUBJECTIVE AND OBJECTIVE BOX
INTERVAL HPI/OVERNIGHT EVENTS:      ANTIBIOTICS/RELEVANT:          MEDICATIONS  (STANDING):  chlorhexidine 2% Cloths 1 Application(s) Topical <User Schedule>  dextrose 5%. 1000 milliLiter(s) (100 mL/Hr) IV Continuous <Continuous>  dextrose 5%. 1000 milliLiter(s) (50 mL/Hr) IV Continuous <Continuous>  dextrose 50% Injectable 25 Gram(s) IV Push once  dextrose 50% Injectable 12.5 Gram(s) IV Push once  dextrose 50% Injectable 25 Gram(s) IV Push once  enoxaparin Injectable 40 milliGRAM(s) SubCutaneous every 24 hours  glucagon  Injectable 1 milliGRAM(s) IntraMuscular once  influenza  Vaccine (HIGH DOSE) 0.7 milliLiter(s) IntraMuscular once  insulin lispro (ADMELOG) corrective regimen sliding scale   SubCutaneous three times a day before meals  insulin lispro (ADMELOG) corrective regimen sliding scale   SubCutaneous at bedtime  labetalol 200 milliGRAM(s) Oral every 12 hours  lactated ringers. 1000 milliLiter(s) (200 mL/Hr) IV Continuous <Continuous>  lidocaine   4% Patch 1 Patch Transdermal every 24 hours  melatonin 5 milliGRAM(s) Oral at bedtime  NIFEdipine XL 60 milliGRAM(s) Oral every 24 hours  senna 2 Tablet(s) Oral at bedtime  vancomycin    Solution 125 milliGRAM(s) Oral every 6 hours    MEDICATIONS  (PRN):  acetaminophen     Tablet .. 650 milliGRAM(s) Oral every 6 hours PRN Mild Pain (1 - 3), Moderate Pain (4 - 6)  dextrose Oral Gel 15 Gram(s) Oral once PRN Blood Glucose LESS THAN 70 milliGRAM(s)/deciliter  ondansetron Injectable 4 milliGRAM(s) IV Push every 8 hours PRN Nausea and/or Vomiting  oxyCODONE    IR 5 milliGRAM(s) Oral every 6 hours PRN Severe Pain (7 - 10)  sodium chloride 0.9% lock flush 10 milliLiter(s) IV Push every 1 hour PRN Pre/post blood products, medications, blood draw, and to maintain line patency      Allergies    penicillin (Rash)    Intolerances    daptomycin (Muscle Pain)        EXAM  Vital Signs Last 24 Hrs  T(C): 36.4 (08 Oct 2022 09:05), Max: 37.1 (07 Oct 2022 17:30)  T(F): 97.5 (08 Oct 2022 09:05), Max: 98.7 (07 Oct 2022 17:30)  HR: 94 (08 Oct 2022 08:50) (67 - 94)  BP: 177/90 (08 Oct 2022 08:50) (130/86 - 177/90)  BP(mean): 126 (08 Oct 2022 08:50) (126 - 126)  RR: 17 (08 Oct 2022 08:50) (16 - 18)  SpO2: 98% (08 Oct 2022 08:50) (95% - 100%)    Parameters below as of 08 Oct 2022 08:50  Patient On (Oxygen Delivery Method): room air          LABS:                        8.2    24.93 )-----------( 444      ( 08 Oct 2022 05:30 )             25.5     10-08    130<L>  |  100  |  13  ----------------------------<  125<H>  4.7   |  21<L>  |  1.30    Ca    7.5<L>      08 Oct 2022 05:30  Phos  4.4     10-08  Mg     1.9     10-08    TPro  5.2<L>  /  Alb  2.2<L>  /  TBili  0.3  /  DBili  x   /  AST  855<H>  /  ALT  217<H>  /  AlkPhos  94  10-08        MICROBIOLOGY:    RADIOLOGY & ADDITIONAL STUDIES: INTERVAL HPI/OVERNIGHT EVENTS:    C/O profound fatigue and achiness   Tired  Feels hunger  No more diarrhea      MEDICATIONS  (STANDING):  chlorhexidine 2% Cloths 1 Application(s) Topical <User Schedule>  dextrose 5%. 1000 milliLiter(s) (100 mL/Hr) IV Continuous <Continuous>  dextrose 5%. 1000 milliLiter(s) (50 mL/Hr) IV Continuous <Continuous>  dextrose 50% Injectable 25 Gram(s) IV Push once  dextrose 50% Injectable 12.5 Gram(s) IV Push once  dextrose 50% Injectable 25 Gram(s) IV Push once  enoxaparin Injectable 40 milliGRAM(s) SubCutaneous every 24 hours  glucagon  Injectable 1 milliGRAM(s) IntraMuscular once  influenza  Vaccine (HIGH DOSE) 0.7 milliLiter(s) IntraMuscular once  insulin lispro (ADMELOG) corrective regimen sliding scale   SubCutaneous three times a day before meals  insulin lispro (ADMELOG) corrective regimen sliding scale   SubCutaneous at bedtime  labetalol 200 milliGRAM(s) Oral every 12 hours  lactated ringers. 1000 milliLiter(s) (200 mL/Hr) IV Continuous <Continuous>  lidocaine   4% Patch 1 Patch Transdermal every 24 hours  melatonin 5 milliGRAM(s) Oral at bedtime  NIFEdipine XL 60 milliGRAM(s) Oral every 24 hours  senna 2 Tablet(s) Oral at bedtime  vancomycin    Solution 125 milliGRAM(s) Oral every 6 hours    MEDICATIONS  (PRN):  acetaminophen     Tablet .. 650 milliGRAM(s) Oral every 6 hours PRN Mild Pain (1 - 3), Moderate Pain (4 - 6)  dextrose Oral Gel 15 Gram(s) Oral once PRN Blood Glucose LESS THAN 70 milliGRAM(s)/deciliter  ondansetron Injectable 4 milliGRAM(s) IV Push every 8 hours PRN Nausea and/or Vomiting  oxyCODONE    IR 5 milliGRAM(s) Oral every 6 hours PRN Severe Pain (7 - 10)  sodium chloride 0.9% lock flush 10 milliLiter(s) IV Push every 1 hour PRN Pre/post blood products, medications, blood draw, and to maintain line patency      Allergies    penicillin (Rash)    Intolerances    daptomycin (Rhabdomyolysis))      EXAM  Vital Signs Last 24 Hrs  T(C): 36.4 (08 Oct 2022 09:05), Max: 37.1 (07 Oct 2022 17:30)  T(F): 97.5 (08 Oct 2022 09:05), Max: 98.7 (07 Oct 2022 17:30)  HR: 94 (08 Oct 2022 08:50) (67 - 94)  BP: 177/90 (08 Oct 2022 08:50) (130/86 - 177/90)  BP(mean): 126 (08 Oct 2022 08:50) (126 - 126)  RR: 17 (08 Oct 2022 08:50) (16 - 18)  SpO2: 98% (08 Oct 2022 08:50) (95% - 100%)    Parameters below as of 08 Oct 2022 08:50  Patient On (Oxygen Delivery Method): room air  Awakens and responding appropriately  Follows commands  Oral mucosa moist  Neck supple  RRR  Chest CTA  Abd soft and mildly tender to palpation  States sore when muscle palpated / squeezed   LE no overt edema  Rios with pinkish urine output        LABS:                        8.2    24.93 )-----------( 444      ( 08 Oct 2022 05:30 )             25.5       Differential (10.07.22 @ 17:32)    Auto Eosinophil #: 0.01 K/uL    Auto Eosinophil %: 0.0 %    Auto Neutrophil #: 22.82 K/uL    Auto Neutrophil %: 90.7: Differential percentages must be correlated with absolute numbers for  clinical significance. %    Auto Monocyte #: 0.82 K/uL    Auto Monocyte %: 3.3 %    Auto Immature Granulocyte %: 0.8: (Includes meta, myelo and promyelocytes). Mild elevations in immature  granulocytes may be seen with many inflammatory processes and pregnancy;  clinical correlation suggested. %    Auto Lymphocyte #: 1.26 K/uL    Auto Lymphocyte %: 5.0 %    Auto Basophil #: 0.04 K/uL    Auto Basophil %: 0.2 %        10-08    130<L>  |  100  |  13  ----------------------------<  125<H>  4.7   |  21<L>  |  1.30    Ca    7.5<L>      08 Oct 2022 05:30  Phos  4.4     10-08  Mg     1.9     10-08    TPro  5.2<L>  /  Alb  2.2<L>  /  TBili  0.3  /  DBili  x   /  AST  855<H>  /  ALT  217<H>  /  AlkPhos  94  10-08    Creatine Kinase, Serum in AM (10.08.22 @ 05:30)    Creatine Kinase, Serum: 03602: TYPE:(C=Critical, N=Notification, A=Abnormal) C  TESTS: _CPK  DATE/TIME CALLED: _10/08/2022 08:37:49 EDT  CALLED TO: _DEANDRE RN  READ BACK (2 Patient Identifiers)(Y/N): _Y  READ BACK VALUES (Y/N): _Y  CALLED BY: _AK U/L        MICROBIOLOGY:    Culture - Blood (10.07.22 @ 17:33)    Specimen Source: .Blood Blood    Culture Results:   No growth at 12 hours    Culture - Blood (10.07.22 @ 17:33)    Specimen Source: .Blood Blood    Culture Results:   No growth at 12 hours      Culture - Blood (10.04.22 @ 15:15)    Specimen Source: .Blood Blood    Culture Results:   No growth at 3 days.    Culture - Blood (10.04.22 @ 14:31)    Specimen Source: .Blood Blood    Culture Results:   No growth at 3 days.

## 2022-10-09 LAB
ALBUMIN SERPL ELPH-MCNC: 2.1 G/DL — LOW (ref 3.3–5)
ALP SERPL-CCNC: 91 U/L — SIGNIFICANT CHANGE UP (ref 40–120)
ALT FLD-CCNC: 272 U/L — HIGH (ref 10–45)
ANION GAP SERPL CALC-SCNC: 10 MMOL/L — SIGNIFICANT CHANGE UP (ref 5–17)
APPEARANCE UR: CLEAR — SIGNIFICANT CHANGE UP
AST SERPL-CCNC: 924 U/L — HIGH (ref 10–40)
BACTERIA # UR AUTO: ABNORMAL /HPF
BILIRUB SERPL-MCNC: 0.3 MG/DL — SIGNIFICANT CHANGE UP (ref 0.2–1.2)
BILIRUB UR-MCNC: NEGATIVE — SIGNIFICANT CHANGE UP
BUN SERPL-MCNC: 22 MG/DL — SIGNIFICANT CHANGE UP (ref 7–23)
CALCIUM SERPL-MCNC: 7.7 MG/DL — LOW (ref 8.4–10.5)
CHLORIDE SERPL-SCNC: 100 MMOL/L — SIGNIFICANT CHANGE UP (ref 96–108)
CK SERPL-CCNC: CRITICAL HIGH U/L (ref 25–170)
CO2 SERPL-SCNC: 20 MMOL/L — LOW (ref 22–31)
COLOR SPEC: YELLOW — SIGNIFICANT CHANGE UP
CREAT ?TM UR-MCNC: 9 MG/DL — SIGNIFICANT CHANGE UP
CREAT SERPL-MCNC: 1.73 MG/DL — HIGH (ref 0.5–1.3)
CULTURE RESULTS: SIGNIFICANT CHANGE UP
CULTURE RESULTS: SIGNIFICANT CHANGE UP
DIFF PNL FLD: ABNORMAL
EGFR: 31 ML/MIN/1.73M2 — LOW
EPI CELLS # UR: SIGNIFICANT CHANGE UP /HPF (ref 0–5)
GLUCOSE BLDC GLUCOMTR-MCNC: 105 MG/DL — HIGH (ref 70–99)
GLUCOSE BLDC GLUCOMTR-MCNC: 106 MG/DL — HIGH (ref 70–99)
GLUCOSE BLDC GLUCOMTR-MCNC: 109 MG/DL — HIGH (ref 70–99)
GLUCOSE BLDC GLUCOMTR-MCNC: 112 MG/DL — HIGH (ref 70–99)
GLUCOSE SERPL-MCNC: 109 MG/DL — HIGH (ref 70–99)
GLUCOSE UR QL: NEGATIVE — SIGNIFICANT CHANGE UP
HCT VFR BLD CALC: 23.2 % — LOW (ref 34.5–45)
HGB BLD-MCNC: 7.6 G/DL — LOW (ref 11.5–15.5)
KETONES UR-MCNC: NEGATIVE — SIGNIFICANT CHANGE UP
LEUKOCYTE ESTERASE UR-ACNC: NEGATIVE — SIGNIFICANT CHANGE UP
MAGNESIUM SERPL-MCNC: 1.8 MG/DL — SIGNIFICANT CHANGE UP (ref 1.6–2.6)
MCHC RBC-ENTMCNC: 27.7 PG — SIGNIFICANT CHANGE UP (ref 27–34)
MCHC RBC-ENTMCNC: 32.8 GM/DL — SIGNIFICANT CHANGE UP (ref 32–36)
MCV RBC AUTO: 84.7 FL — SIGNIFICANT CHANGE UP (ref 80–100)
NITRITE UR-MCNC: NEGATIVE — SIGNIFICANT CHANGE UP
NRBC # BLD: 0 /100 WBCS — SIGNIFICANT CHANGE UP (ref 0–0)
OSMOLALITY UR: 190 MOSM/KG — LOW (ref 300–900)
PH UR: 6 — SIGNIFICANT CHANGE UP (ref 5–8)
PHOSPHATE SERPL-MCNC: 4.7 MG/DL — HIGH (ref 2.5–4.5)
PLATELET # BLD AUTO: 369 K/UL — SIGNIFICANT CHANGE UP (ref 150–400)
POTASSIUM SERPL-MCNC: 5 MMOL/L — SIGNIFICANT CHANGE UP (ref 3.5–5.3)
POTASSIUM SERPL-SCNC: 5 MMOL/L — SIGNIFICANT CHANGE UP (ref 3.5–5.3)
PROT SERPL-MCNC: 4.9 G/DL — LOW (ref 6–8.3)
PROT UR-MCNC: ABNORMAL MG/DL
RBC # BLD: 2.74 M/UL — LOW (ref 3.8–5.2)
RBC # FLD: 16.3 % — HIGH (ref 10.3–14.5)
RBC CASTS # UR COMP ASSIST: < 5 /HPF — SIGNIFICANT CHANGE UP
SODIUM SERPL-SCNC: 130 MMOL/L — LOW (ref 135–145)
SODIUM UR-SCNC: 74 MMOL/L — SIGNIFICANT CHANGE UP
SP GR SPEC: <=1.005 — SIGNIFICANT CHANGE UP (ref 1–1.03)
SPECIMEN SOURCE: SIGNIFICANT CHANGE UP
SPECIMEN SOURCE: SIGNIFICANT CHANGE UP
UROBILINOGEN FLD QL: 0.2 E.U./DL — SIGNIFICANT CHANGE UP
WBC # BLD: 18.36 K/UL — HIGH (ref 3.8–10.5)
WBC # FLD AUTO: 18.36 K/UL — HIGH (ref 3.8–10.5)
WBC UR QL: < 5 /HPF — SIGNIFICANT CHANGE UP

## 2022-10-09 PROCEDURE — 99232 SBSQ HOSP IP/OBS MODERATE 35: CPT | Mod: GC

## 2022-10-09 PROCEDURE — 99233 SBSQ HOSP IP/OBS HIGH 50: CPT

## 2022-10-09 RX ORDER — HYDROMORPHONE HYDROCHLORIDE 2 MG/ML
0.5 INJECTION INTRAMUSCULAR; INTRAVENOUS; SUBCUTANEOUS EVERY 4 HOURS
Refills: 0 | Status: DISCONTINUED | OUTPATIENT
Start: 2022-10-09 | End: 2022-10-16

## 2022-10-09 RX ORDER — ACETAMINOPHEN 500 MG
1000 TABLET ORAL EVERY 6 HOURS
Refills: 0 | Status: DISCONTINUED | OUTPATIENT
Start: 2022-10-09 | End: 2022-10-09

## 2022-10-09 RX ORDER — FUROSEMIDE 40 MG
20 TABLET ORAL EVERY 24 HOURS
Refills: 0 | Status: DISCONTINUED | OUTPATIENT
Start: 2022-10-09 | End: 2022-10-16

## 2022-10-09 RX ADMIN — Medication 60 MILLIGRAM(S): at 13:30

## 2022-10-09 RX ADMIN — ONDANSETRON 4 MILLIGRAM(S): 8 TABLET, FILM COATED ORAL at 06:20

## 2022-10-09 RX ADMIN — Medication 125 MILLIGRAM(S): at 12:07

## 2022-10-09 RX ADMIN — Medication 200 MILLIGRAM(S): at 06:24

## 2022-10-09 RX ADMIN — Medication 125 MILLIGRAM(S): at 00:00

## 2022-10-09 RX ADMIN — Medication 200 MILLIGRAM(S): at 17:50

## 2022-10-09 RX ADMIN — Medication 125 MILLIGRAM(S): at 06:25

## 2022-10-09 RX ADMIN — OXYCODONE HYDROCHLORIDE 5 MILLIGRAM(S): 5 TABLET ORAL at 01:28

## 2022-10-09 RX ADMIN — CHLORHEXIDINE GLUCONATE 1 APPLICATION(S): 213 SOLUTION TOPICAL at 05:43

## 2022-10-09 RX ADMIN — HYDROMORPHONE HYDROCHLORIDE 0.5 MILLIGRAM(S): 2 INJECTION INTRAMUSCULAR; INTRAVENOUS; SUBCUTANEOUS at 19:55

## 2022-10-09 RX ADMIN — ENOXAPARIN SODIUM 40 MILLIGRAM(S): 100 INJECTION SUBCUTANEOUS at 13:30

## 2022-10-09 RX ADMIN — Medication 125 MILLIGRAM(S): at 23:43

## 2022-10-09 RX ADMIN — Medication 5 MILLIGRAM(S): at 21:54

## 2022-10-09 RX ADMIN — HYDROMORPHONE HYDROCHLORIDE 0.5 MILLIGRAM(S): 2 INJECTION INTRAMUSCULAR; INTRAVENOUS; SUBCUTANEOUS at 19:02

## 2022-10-09 RX ADMIN — LIDOCAINE 1 PATCH: 4 CREAM TOPICAL at 12:07

## 2022-10-09 RX ADMIN — LIDOCAINE 1 PATCH: 4 CREAM TOPICAL at 23:41

## 2022-10-09 RX ADMIN — LIDOCAINE 1 PATCH: 4 CREAM TOPICAL at 19:23

## 2022-10-09 RX ADMIN — Medication 20 MILLIGRAM(S): at 07:51

## 2022-10-09 RX ADMIN — Medication 125 MILLIGRAM(S): at 17:50

## 2022-10-09 RX ADMIN — OXYCODONE HYDROCHLORIDE 5 MILLIGRAM(S): 5 TABLET ORAL at 02:05

## 2022-10-09 NOTE — PROGRESS NOTE ADULT - SUBJECTIVE AND OBJECTIVE BOX
INTERVAL HPI/OVERNIGHT EVENTS: Given additional lasix 20 IV for low UOP (450cc from 8am to 8pm). Patients clinical status was unchanged.     STATUS POST:  9/15:  Exlap JOSEFINA, SBR ( 100 cm)   EBL:200 IVF:2000 UO:250     POST OPERATIVE DAY #: 24    SUBJECTIVE: Pt seen and examined at bedside this am by surgery team. Pt complains of soreness and muscle aches. Rios is yellow. Has rectal tube. IVF @ 250. Lasix 20 daily. WBC 18 from 24. CPK downtrending [id number]      MEDICATIONS  (STANDING):  chlorhexidine 2% Cloths 1 Application(s) Topical <User Schedule>  dextrose 5%. 1000 milliLiter(s) (50 mL/Hr) IV Continuous <Continuous>  dextrose 5%. 1000 milliLiter(s) (100 mL/Hr) IV Continuous <Continuous>  dextrose 50% Injectable 25 Gram(s) IV Push once  dextrose 50% Injectable 12.5 Gram(s) IV Push once  dextrose 50% Injectable 25 Gram(s) IV Push once  enoxaparin Injectable 40 milliGRAM(s) SubCutaneous every 24 hours  furosemide   Injectable 20 milliGRAM(s) IV Push every 24 hours  glucagon  Injectable 1 milliGRAM(s) IntraMuscular once  influenza  Vaccine (HIGH DOSE) 0.7 milliLiter(s) IntraMuscular once  insulin lispro (ADMELOG) corrective regimen sliding scale   SubCutaneous at bedtime  insulin lispro (ADMELOG) corrective regimen sliding scale   SubCutaneous three times a day before meals  labetalol 200 milliGRAM(s) Oral every 12 hours  lactated ringers. 1000 milliLiter(s) (250 mL/Hr) IV Continuous <Continuous>  lidocaine   4% Patch 1 Patch Transdermal every 24 hours  melatonin 5 milliGRAM(s) Oral at bedtime  NIFEdipine XL 60 milliGRAM(s) Oral every 24 hours  senna 2 Tablet(s) Oral at bedtime  vancomycin    Solution 125 milliGRAM(s) Oral every 6 hours    MEDICATIONS  (PRN):  acetaminophen     Tablet .. 650 milliGRAM(s) Oral every 6 hours PRN Mild Pain (1 - 3), Moderate Pain (4 - 6)  dextrose Oral Gel 15 Gram(s) Oral once PRN Blood Glucose LESS THAN 70 milliGRAM(s)/deciliter  ondansetron Injectable 4 milliGRAM(s) IV Push every 8 hours PRN Nausea and/or Vomiting  oxyCODONE    IR 5 milliGRAM(s) Oral every 6 hours PRN Severe Pain (7 - 10)  sodium chloride 0.9% lock flush 10 milliLiter(s) IV Push every 1 hour PRN Pre/post blood products, medications, blood draw, and to maintain line patency      Vital Signs Last 24 Hrs  T(C): 37.1 (09 Oct 2022 09:00), Max: 37.1 (09 Oct 2022 09:00)  T(F): 98.7 (09 Oct 2022 09:00), Max: 98.7 (09 Oct 2022 09:00)  HR: 80 (09 Oct 2022 08:30) (80 - 94)  BP: 167/73 (09 Oct 2022 08:30) (152/68 - 169/74)  BP(mean): 105 (09 Oct 2022 08:30) (98 - 106)  RR: 18 (09 Oct 2022 08:30) (17 - 18)  SpO2: 96% (09 Oct 2022 08:30) (96% - 99%)    Parameters below as of 09 Oct 2022 08:30  Patient On (Oxygen Delivery Method): room air        PHYSICAL EXAM:   Gen: Awake, alert, NAD, resting comfortably   CV: NSR  Pulm: no respiratory distress on RA  Abd: soft, ND, appropriately tender, wound vac in place  Ext: WWP    I&O's Detail    08 Oct 2022 07:01  -  09 Oct 2022 07:00  --------------------------------------------------------  IN:    Lactated Ringers: 4850 mL    Oral Fluid: 120 mL  Total IN: 4970 mL    OUT:    Indwelling Catheter - Urethral (mL): 1350 mL    Rectal Tube (mL): 40 mL    VAC (Vacuum Assisted Closure) System (mL): 10 mL  Total OUT: 1400 mL    Total NET: 3570 mL      09 Oct 2022 07:01  -  09 Oct 2022 11:19  --------------------------------------------------------  IN:    Lactated Ringers: 500 mL  Total IN: 500 mL    OUT:    Indwelling Catheter - Urethral (mL): 350 mL  Total OUT: 350 mL    Total NET: 150 mL          LABS:                        7.6    18.36 )-----------( 369      ( 09 Oct 2022 06:23 )             23.2     10-08    130<L>  |  100  |  13  ----------------------------<  125<H>  4.7   |  21<L>  |  1.30    Ca    7.5<L>      08 Oct 2022 05:30  Phos  4.7     10-09  Mg     1.8     10-09    TPro  5.2<L>  /  Alb  2.2<L>  /  TBili  0.3  /  DBili  x   /  AST  855<H>  /  ALT  217<H>  /  AlkPhos  94  10-08    LIVER FUNCTIONS - ( 08 Oct 2022 05:30 )  Alb: 2.2 g/dL / Pro: 5.2 g/dL / ALK PHOS: 94 U/L / ALT: 217 U/L / AST: 855 U/L / GGT: x

## 2022-10-09 NOTE — PROGRESS NOTE ADULT - SUBJECTIVE AND OBJECTIVE BOX
INTERVAL HPI/OVERNIGHT EVENTS:   SURGERY ATTENDING    STATUS POST:      Exploratory laparotomy with lysis of adhesions extensive one hour , Small bowel resection with primary anastomosis, Repair of recurrent Incisional hernia with myofascial flaps no mesh, washout, drainage, PREVENA        POST OPERATIVE DAY #: 24    SUBJECTIVE:  Flatus: [x ] YES [ ] NO             Bowel Movement: [x ] YES [ ] NO  Pain (0-10):       2     Pain Control Adequate: [x ] YES [ ] NO  Nausea: [ ] YES [x ] NO            Vomiting: [ ] YES [x ] NO  Diarrhea: [ ] YES [ x] NO         Constipation: [ ] YES [x ] NO     Chest Pain: [ ] YES [x ] NO    SOB:  [ ] YES [x ] NO    MEDICATIONS  (STANDING):  chlorhexidine 2% Cloths 1 Application(s) Topical <User Schedule>  dextrose 5%. 1000 milliLiter(s) (100 mL/Hr) IV Continuous <Continuous>  dextrose 5%. 1000 milliLiter(s) (50 mL/Hr) IV Continuous <Continuous>  dextrose 50% Injectable 25 Gram(s) IV Push once  dextrose 50% Injectable 12.5 Gram(s) IV Push once  dextrose 50% Injectable 25 Gram(s) IV Push once  enoxaparin Injectable 40 milliGRAM(s) SubCutaneous every 24 hours  furosemide   Injectable 20 milliGRAM(s) IV Push every 24 hours  glucagon  Injectable 1 milliGRAM(s) IntraMuscular once  influenza  Vaccine (HIGH DOSE) 0.7 milliLiter(s) IntraMuscular once  insulin lispro (ADMELOG) corrective regimen sliding scale   SubCutaneous three times a day before meals  insulin lispro (ADMELOG) corrective regimen sliding scale   SubCutaneous at bedtime  labetalol 200 milliGRAM(s) Oral every 12 hours  lactated ringers. 1000 milliLiter(s) (250 mL/Hr) IV Continuous <Continuous>  lidocaine   4% Patch 1 Patch Transdermal every 24 hours  melatonin 5 milliGRAM(s) Oral at bedtime  NIFEdipine XL 60 milliGRAM(s) Oral every 24 hours  senna 2 Tablet(s) Oral at bedtime  vancomycin    Solution 125 milliGRAM(s) Oral every 6 hours    MEDICATIONS  (PRN):  acetaminophen     Tablet .. 650 milliGRAM(s) Oral every 6 hours PRN Mild Pain (1 - 3), Moderate Pain (4 - 6)  dextrose Oral Gel 15 Gram(s) Oral once PRN Blood Glucose LESS THAN 70 milliGRAM(s)/deciliter  ondansetron Injectable 4 milliGRAM(s) IV Push every 8 hours PRN Nausea and/or Vomiting  oxyCODONE    IR 5 milliGRAM(s) Oral every 6 hours PRN Severe Pain (7 - 10)  sodium chloride 0.9% lock flush 10 milliLiter(s) IV Push every 1 hour PRN Pre/post blood products, medications, blood draw, and to maintain line patency      Vital Signs Last 24 Hrs  T(C): 36.4 (09 Oct 2022 14:00), Max: 37.1 (09 Oct 2022 09:00)  T(F): 97.6 (09 Oct 2022 14:00), Max: 98.7 (09 Oct 2022 09:00)  HR: 94 (09 Oct 2022 15:55) (80 - 94)  BP: 163/72 (09 Oct 2022 15:55) (152/68 - 169/74)  BP(mean): 103 (09 Oct 2022 15:55) (95 - 106)  RR: 17 (09 Oct 2022 15:55) (17 - 18)  SpO2: 97% (09 Oct 2022 15:55) (96% - 99%)    Parameters below as of 09 Oct 2022 15:55  Patient On (Oxygen Delivery Method): room air        PHYSICAL EXAM:      Constitutional:    Eyes:    ENMT:    Neck:    Breasts:    Back:    Respiratory:    Cardiovascular:    Gastrointestinal:    Genitourinary:    Rectal:    Extremities:    Vascular:    Neurological:    Skin:    Lymph Nodes:    Musculoskeletal:    Psychiatric:        I&O's Detail    08 Oct 2022 07:01  -  09 Oct 2022 07:00  --------------------------------------------------------  IN:    Lactated Ringers: 4850 mL    Oral Fluid: 120 mL  Total IN: 4970 mL    OUT:    Indwelling Catheter - Urethral (mL): 1350 mL    Rectal Tube (mL): 40 mL    VAC (Vacuum Assisted Closure) System (mL): 10 mL  Total OUT: 1400 mL    Total NET: 3570 mL      09 Oct 2022 07:01  -  09 Oct 2022 16:49  --------------------------------------------------------  IN:    Lactated Ringers: 2500 mL  Total IN: 2500 mL    OUT:    Indwelling Catheter - Urethral (mL): 1650 mL    Rectal Tube (mL): 10 mL    VAC (Vacuum Assisted Closure) System (mL): 20 mL  Total OUT: 1680 mL    Total NET: 820 mL          LABS:                        7.6    18.36 )-----------( 369      ( 09 Oct 2022 06:23 )             23.2     10-09    130<L>  |  100  |  22  ----------------------------<  109<H>  5.0   |  20<L>  |  1.73<H>    Ca    7.7<L>      09 Oct 2022 06:23  Phos  4.7     10-09  Mg     1.8     10-09    TPro  4.9<L>  /  Alb  2.1<L>  /  TBili  0.3  /  DBili  x   /  AST  924<H>  /  ALT  272<H>  /  AlkPhos  91  10-09      Urinalysis Basic - ( 09 Oct 2022 16:15 )    Color: Yellow / Appearance: Clear / SG: <=1.005 / pH: x  Gluc: x / Ketone: NEGATIVE  / Bili: Negative / Urobili: 0.2 E.U./dL   Blood: x / Protein: Trace mg/dL / Nitrite: NEGATIVE   Leuk Esterase: NEGATIVE / RBC: < 5 /HPF / WBC < 5 /HPF   Sq Epi: x / Non Sq Epi: 0-5 /HPF / Bacteria: Many /HPF        RADIOLOGY & ADDITIONAL STUDIES:

## 2022-10-09 NOTE — PROGRESS NOTE ADULT - ASSESSMENT
72 year old woman with HTN, DM2, past surgical history of  x 2, hysterectomy, and laparoscopic cholecystectomy (), admitted for large bowel obstruction; had spontaneous return of bowel function initially; however, bowel obstruction recurred, so taken to OR for ex lap with lysis of adhesions and bowel resection () . Monitored in SICU post -op ; now on regional. Hospital course complicated by rhabdomyolysis, likely daptomycin induced (CK elevated on 10/7/2022)    # Large bowel obstruction   # post operative state  s/p OR for ex lap with lysis of adhesions and bowel resection ()  - rest of care per primary team     # non-traumatic Rhabdomyolysis  CK elevated to 30K on 10/7/2022. Likely daptomycin associated. Stopped daptomycin and statin.   - patient on aggressive IVF with LR. she is experiencing an uptrend in her creatinine and her urine output has fallen despite aggressive resuscitation.  - would give Lasix IV 20 BID   - f/u renal recs   - no evidence of compartment syndrome currently. weakness is likely due to myopathy.     #C diff; non severe  -On PO vanc 10/4-     #E. faecalis bacteremia  -Afebrile at this time. was On dapto 10/4-; Switched to vancomycin 10/7/2022  -Repeat Cx's negative to date    #Obesity - Dose medications by weight     #HTN   takes HCTZ 25mg qd, losartan 100mg qd, labetalol 200mg BID, nifedipine 60mg ER qd at home.   - continue labetalol 200mg BID  - would give nifedipine 90 mg ER   - If SBP > 180mmHg, check bladder scan before giving IV labetalol or hydral.   - Continue to hold HCTZ for now and on discharge     # Type 2 Diabetes complicated by hyperglycemia   cw insulin sliding scale while inpatient.   pt's a1c at goal for her age, resume metformin on discharge.     #Incidental finding of bilateral lung nodules  CT chest showing bilateral lung nodules, measuring up to 7 mm in the ridght upper lobe; Counseled patient on finding.   Recommended follow-up chest CT in three months to ensure stability.; Copy of radiology report provided to patient for her records.     # COVID 19 infection (present on admission) - Improved/resolved. Off isolation.     # Acute blood loss anemia   continue to trend daily CBC while inpatient;  age appropriate cancer screening outpatient     DVT ppx - HSQ

## 2022-10-09 NOTE — PROGRESS NOTE ADULT - ASSESSMENT
72 year old woman with HTN, DM2 admitted for large bowel obstruction s/p ex lap w/ lysis of adhesions and bowel resection on 9/14 for SBO. She is also s/p sepsis and peritonitis. Nephrology consulted for SO and rhabdomyolysis 2/2 daptomycin       SO  Likely etiology toxic ATN due to rhabdomyolysis secondary to daptomycin use    Cr. 1.73    Plan:  Please obtain UA, Urine Na, urine cr UPCR, Urine osm   Continue with IVF as below   Renal sono   Daily BMP   Please also obtain serum osm       Rhabdomyolysis   likely due to daptomycin   CPK 18247    Plan:   Increase LR at 250cc/hr  Can give Lasix 20mg IVP once   Monitor Urine out put as it should be >200cc/hr   Trend CPK   Daily BMP   Strict ins and outs   Avoid nephrotoxic agents

## 2022-10-09 NOTE — PROGRESS NOTE ADULT - SUBJECTIVE AND OBJECTIVE BOX
Patient is a 72y Female seen and evaluated at bedside. No acute distress and does not offer any complaints. Adequate urine output.       Meds:    acetaminophen     Tablet .. 650 every 6 hours PRN  chlorhexidine 2% Cloths 1 <User Schedule>  dextrose 5%. 1000 <Continuous>  dextrose 5%. 1000 <Continuous>  dextrose 50% Injectable 25 once  dextrose 50% Injectable 12.5 once  dextrose 50% Injectable 25 once  dextrose Oral Gel 15 once PRN  enoxaparin Injectable 40 every 24 hours  furosemide   Injectable 20 every 24 hours  glucagon  Injectable 1 once  influenza  Vaccine (HIGH DOSE) 0.7 once  insulin lispro (ADMELOG) corrective regimen sliding scale  three times a day before meals  insulin lispro (ADMELOG) corrective regimen sliding scale  at bedtime  labetalol 200 every 12 hours  lactated ringers. 1000 <Continuous>  lidocaine   4% Patch 1 every 24 hours  melatonin 5 at bedtime  NIFEdipine XL 60 every 24 hours  ondansetron Injectable 4 every 8 hours PRN  oxyCODONE    IR 5 every 6 hours PRN  senna 2 at bedtime  sodium chloride 0.9% lock flush 10 every 1 hour PRN  vancomycin    Solution 125 every 6 hours      T(C): , Max: 37.1 (10-09-22 @ 09:00)  T(F): , Max: 98.7 (10-09-22 @ 09:00)  HR: 80 (10-09-22 @ 08:30)  BP: 167/73 (10-09-22 @ 08:30)  BP(mean): 105 (10-09-22 @ 08:30)  RR: 18 (10-09-22 @ 08:30)  SpO2: 96% (10-09-22 @ 08:30)  Wt(kg): --    10-08 @ 07:01  -  10-09 @ 07:00  --------------------------------------------------------  IN: 4970 mL / OUT: 1400 mL / NET: 3570 mL    10-09 @ 07:01  -  10-09 @ 13:14  --------------------------------------------------------  IN: 500 mL / OUT: 350 mL / NET: 150 mL          Review of Systems:  ROS negative except as per HPI      PHYSICAL EXAM:  Constitutional: pleasant female NAD, laying in bed   HEENT: NC/AT, EOMI  Neck: Supple, no JVD  Respiratory: CTA B/L, on RA  Cardiovascular: RRR, normal S1 and S2,   Gastrointestinal: +BS, minimal tenderness  Extremities: no edema, warm  Neurological: AAOx3        LABS:                        7.6    18.36 )-----------( 369      ( 09 Oct 2022 06:23 )             23.2     10-09    130<L>  |  100  |  22  ----------------------------<  109<H>  5.0   |  20<L>  |  1.73<H>    Ca    7.7<L>      09 Oct 2022 06:23  Phos  4.7     10-09  Mg     1.8     10-09    TPro  4.9<L>  /  Alb  2.1<L>  /  TBili  0.3  /  DBili  x   /  AST  x   /  ALT  272<H>  /  AlkPhos  91  10-09                RADIOLOGY & ADDITIONAL STUDIES:

## 2022-10-09 NOTE — PROGRESS NOTE ADULT - SUBJECTIVE AND OBJECTIVE BOX
INTERVAL HPI/OVERNIGHT EVENTS: No acute events overnight. This morning continues to complain of muscle pain and weakness. No new changes.     VITAL SIGNS:  T(F): 98.7 (10-09-22 @ 09:00)  HR: 86 (10-09-22 @ 12:30)  BP: 162/58 (10-09-22 @ 12:30)  RR: 18 (10-09-22 @ 12:30)  SpO2: 96% (10-09-22 @ 12:30)  Wt(kg): --    PHYSICAL EXAM:      Constitutional: NAD, laying in bed  HEENT: PERRLA, EOMI, Normal Hearing, MMM  Neck: No LAD, No JVD  Respiratory: bibasilar crackles/decreased breath sounds  Cardiovascular: S1 and S2, RRR, no M/G/R  Gastrointestinal: BS+, soft, NT/ND. midline incision w/ wound vac   Extremities: No peripheral edema  Vascular: 2+ peripheral pulses  Neurological: A/O x 3, 3-4/5 symmetric weakness in the upper and lower extremities. no sensory deficits   Psychiatric: Normal mood, normal affect  Skin: No rashes      MEDICATIONS  (STANDING):  chlorhexidine 2% Cloths 1 Application(s) Topical <User Schedule>  dextrose 5%. 1000 milliLiter(s) (100 mL/Hr) IV Continuous <Continuous>  dextrose 5%. 1000 milliLiter(s) (50 mL/Hr) IV Continuous <Continuous>  dextrose 50% Injectable 25 Gram(s) IV Push once  dextrose 50% Injectable 12.5 Gram(s) IV Push once  dextrose 50% Injectable 25 Gram(s) IV Push once  enoxaparin Injectable 40 milliGRAM(s) SubCutaneous every 24 hours  furosemide   Injectable 20 milliGRAM(s) IV Push every 24 hours  glucagon  Injectable 1 milliGRAM(s) IntraMuscular once  influenza  Vaccine (HIGH DOSE) 0.7 milliLiter(s) IntraMuscular once  insulin lispro (ADMELOG) corrective regimen sliding scale   SubCutaneous three times a day before meals  insulin lispro (ADMELOG) corrective regimen sliding scale   SubCutaneous at bedtime  labetalol 200 milliGRAM(s) Oral every 12 hours  lactated ringers. 1000 milliLiter(s) (250 mL/Hr) IV Continuous <Continuous>  lidocaine   4% Patch 1 Patch Transdermal every 24 hours  melatonin 5 milliGRAM(s) Oral at bedtime  NIFEdipine XL 60 milliGRAM(s) Oral every 24 hours  senna 2 Tablet(s) Oral at bedtime  vancomycin    Solution 125 milliGRAM(s) Oral every 6 hours    MEDICATIONS  (PRN):  acetaminophen     Tablet .. 650 milliGRAM(s) Oral every 6 hours PRN Mild Pain (1 - 3), Moderate Pain (4 - 6)  dextrose Oral Gel 15 Gram(s) Oral once PRN Blood Glucose LESS THAN 70 milliGRAM(s)/deciliter  ondansetron Injectable 4 milliGRAM(s) IV Push every 8 hours PRN Nausea and/or Vomiting  oxyCODONE    IR 5 milliGRAM(s) Oral every 6 hours PRN Severe Pain (7 - 10)  sodium chloride 0.9% lock flush 10 milliLiter(s) IV Push every 1 hour PRN Pre/post blood products, medications, blood draw, and to maintain line patency      Allergies    penicillin (Rash)    Intolerances    daptomycin (Muscle Pain)      LABS:                        7.6    18.36 )-----------( 369      ( 09 Oct 2022 06:23 )             23.2     10-09    130<L>  |  100  |  22  ----------------------------<  109<H>  5.0   |  20<L>  |  1.73<H>    Ca    7.7<L>      09 Oct 2022 06:23  Phos  4.7     10-09  Mg     1.8     10-09    TPro  4.9<L>  /  Alb  2.1<L>  /  TBili  0.3  /  DBili  x   /  AST  924<H>  /  ALT  272<H>  /  AlkPhos  91  10-09          RADIOLOGY & ADDITIONAL TESTS:

## 2022-10-09 NOTE — PROGRESS NOTE ADULT - ASSESSMENT
72 F PMH HTN, DM and PSH  x 2, hysterectomy and lap dev ( w/ Dr. Clifford) admitted on  with 2 days of n/v, abd pain, imaging demonstrating LBO, which resolved prior to operative exploration. Recurrent bowel obstruction, now s/p Exlap JOSEFINA, SBR (100 cm) (9/15). now with wound vac, pending [facility] placement c/b c.diff and rhabdo 2/2 daptomycin    Regular, ISS, IVF @ 250  Lasix 20 daily   f/u UOP   Pain/nausea prn  Vanco PO (10/4-), s/p Daptomycin (10/4-10/7)  Wound Vac  SQH/SCDs/OOBA/IS  AM labs  Dispo: [facility]

## 2022-10-09 NOTE — PROGRESS NOTE ADULT - NS ATTEST RISK PROBLEM GEN_ALL_CORE FT
new non-traumatic rhabdomyolysis   close monitoring of renal function, fluid status
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
new non-traumatic rhabdomyolysis   close monitoring of renal function, fluid status

## 2022-10-10 LAB
ALBUMIN SERPL ELPH-MCNC: 2.2 G/DL — LOW (ref 3.3–5)
ALP SERPL-CCNC: 77 U/L — SIGNIFICANT CHANGE UP (ref 40–120)
ALT FLD-CCNC: 265 U/L — HIGH (ref 10–45)
ANION GAP SERPL CALC-SCNC: 8 MMOL/L — SIGNIFICANT CHANGE UP (ref 5–17)
AST SERPL-CCNC: 657 U/L — HIGH (ref 10–40)
BILIRUB SERPL-MCNC: 0.3 MG/DL — SIGNIFICANT CHANGE UP (ref 0.2–1.2)
BLD GP AB SCN SERPL QL: NEGATIVE — SIGNIFICANT CHANGE UP
BUN SERPL-MCNC: 25 MG/DL — HIGH (ref 7–23)
CALCIUM SERPL-MCNC: 8 MG/DL — LOW (ref 8.4–10.5)
CHLORIDE SERPL-SCNC: 101 MMOL/L — SIGNIFICANT CHANGE UP (ref 96–108)
CK SERPL-CCNC: CRITICAL HIGH U/L (ref 25–170)
CO2 SERPL-SCNC: 25 MMOL/L — SIGNIFICANT CHANGE UP (ref 22–31)
CREAT SERPL-MCNC: 1.83 MG/DL — HIGH (ref 0.5–1.3)
EGFR: 29 ML/MIN/1.73M2 — LOW
GLUCOSE BLDC GLUCOMTR-MCNC: 109 MG/DL — HIGH (ref 70–99)
GLUCOSE BLDC GLUCOMTR-MCNC: 113 MG/DL — HIGH (ref 70–99)
GLUCOSE BLDC GLUCOMTR-MCNC: 114 MG/DL — HIGH (ref 70–99)
GLUCOSE BLDC GLUCOMTR-MCNC: 82 MG/DL — SIGNIFICANT CHANGE UP (ref 70–99)
GLUCOSE SERPL-MCNC: 113 MG/DL — HIGH (ref 70–99)
HCT VFR BLD CALC: 21.4 % — LOW (ref 34.5–45)
HGB BLD-MCNC: 6.8 G/DL — CRITICAL LOW (ref 11.5–15.5)
MAGNESIUM SERPL-MCNC: 1.6 MG/DL — SIGNIFICANT CHANGE UP (ref 1.6–2.6)
MCHC RBC-ENTMCNC: 27.4 PG — SIGNIFICANT CHANGE UP (ref 27–34)
MCHC RBC-ENTMCNC: 31.8 GM/DL — LOW (ref 32–36)
MCV RBC AUTO: 86.3 FL — SIGNIFICANT CHANGE UP (ref 80–100)
NRBC # BLD: 0 /100 WBCS — SIGNIFICANT CHANGE UP (ref 0–0)
OSMOLALITY SERPL: 285 MOSM/KG — SIGNIFICANT CHANGE UP (ref 280–301)
PHOSPHATE SERPL-MCNC: 4.3 MG/DL — SIGNIFICANT CHANGE UP (ref 2.5–4.5)
PLATELET # BLD AUTO: 346 K/UL — SIGNIFICANT CHANGE UP (ref 150–400)
POTASSIUM SERPL-MCNC: 4.8 MMOL/L — SIGNIFICANT CHANGE UP (ref 3.5–5.3)
POTASSIUM SERPL-SCNC: 4.8 MMOL/L — SIGNIFICANT CHANGE UP (ref 3.5–5.3)
PROT SERPL-MCNC: 4.8 G/DL — LOW (ref 6–8.3)
RBC # BLD: 2.48 M/UL — LOW (ref 3.8–5.2)
RBC # FLD: 16.3 % — HIGH (ref 10.3–14.5)
RH IG SCN BLD-IMP: POSITIVE — SIGNIFICANT CHANGE UP
SODIUM SERPL-SCNC: 134 MMOL/L — LOW (ref 135–145)
WBC # BLD: 11.92 K/UL — HIGH (ref 3.8–10.5)
WBC # FLD AUTO: 11.92 K/UL — HIGH (ref 3.8–10.5)

## 2022-10-10 PROCEDURE — 99232 SBSQ HOSP IP/OBS MODERATE 35: CPT | Mod: GC

## 2022-10-10 PROCEDURE — 99232 SBSQ HOSP IP/OBS MODERATE 35: CPT

## 2022-10-10 RX ORDER — MAGNESIUM SULFATE 500 MG/ML
2 VIAL (ML) INJECTION EVERY 6 HOURS
Refills: 0 | Status: COMPLETED | OUTPATIENT
Start: 2022-10-10 | End: 2022-10-10

## 2022-10-10 RX ORDER — MAGNESIUM SULFATE 500 MG/ML
1 VIAL (ML) INJECTION ONCE
Refills: 0 | Status: DISCONTINUED | OUTPATIENT
Start: 2022-10-10 | End: 2022-10-10

## 2022-10-10 RX ADMIN — LIDOCAINE 1 PATCH: 4 CREAM TOPICAL at 23:00

## 2022-10-10 RX ADMIN — HYDROMORPHONE HYDROCHLORIDE 0.5 MILLIGRAM(S): 2 INJECTION INTRAMUSCULAR; INTRAVENOUS; SUBCUTANEOUS at 05:30

## 2022-10-10 RX ADMIN — SODIUM CHLORIDE 250 MILLILITER(S): 9 INJECTION, SOLUTION INTRAVENOUS at 02:45

## 2022-10-10 RX ADMIN — LIDOCAINE 1 PATCH: 4 CREAM TOPICAL at 11:36

## 2022-10-10 RX ADMIN — Medication 125 MILLIGRAM(S): at 11:36

## 2022-10-10 RX ADMIN — HYDROMORPHONE HYDROCHLORIDE 0.5 MILLIGRAM(S): 2 INJECTION INTRAMUSCULAR; INTRAVENOUS; SUBCUTANEOUS at 05:45

## 2022-10-10 RX ADMIN — Medication 125 MILLIGRAM(S): at 05:16

## 2022-10-10 RX ADMIN — Medication 25 GRAM(S): at 09:34

## 2022-10-10 RX ADMIN — CHLORHEXIDINE GLUCONATE 1 APPLICATION(S): 213 SOLUTION TOPICAL at 05:17

## 2022-10-10 RX ADMIN — Medication 25 GRAM(S): at 18:21

## 2022-10-10 RX ADMIN — LIDOCAINE 1 PATCH: 4 CREAM TOPICAL at 19:16

## 2022-10-10 RX ADMIN — Medication 125 MILLIGRAM(S): at 18:21

## 2022-10-10 RX ADMIN — SENNA PLUS 2 TABLET(S): 8.6 TABLET ORAL at 22:31

## 2022-10-10 RX ADMIN — Medication 200 MILLIGRAM(S): at 05:16

## 2022-10-10 RX ADMIN — Medication 60 MILLIGRAM(S): at 14:05

## 2022-10-10 RX ADMIN — Medication 20 MILLIGRAM(S): at 07:39

## 2022-10-10 RX ADMIN — ENOXAPARIN SODIUM 40 MILLIGRAM(S): 100 INJECTION SUBCUTANEOUS at 14:10

## 2022-10-10 RX ADMIN — SODIUM CHLORIDE 250 MILLILITER(S): 9 INJECTION, SOLUTION INTRAVENOUS at 07:39

## 2022-10-10 RX ADMIN — Medication 200 MILLIGRAM(S): at 18:21

## 2022-10-10 NOTE — PROGRESS NOTE ADULT - SUBJECTIVE AND OBJECTIVE BOX
SUBJECTIVE:  Patient seen and examined at bedside. Achiness improving. Rectal tube in place. Otherwise no acute complaints. No overnight events.     Vital Signs Last 12 Hrs  T(F): 98.3 (10-10-22 @ 09:25), Max: 98.3 (10-10-22 @ 05:16)  HR: 84 (10-10-22 @ 08:43) (84 - 100)  BP: 155/67 (10-10-22 @ 08:43) (155/67 - 169/73)  BP(mean): 97 (10-10-22 @ 08:43) (97 - 105)  RR: 18 (10-10-22 @ 08:43) (16 - 18)  SpO2: 96% (10-10-22 @ 08:43) (95% - 97%)  I&O's Summary    09 Oct 2022 07:01  -  10 Oct 2022 07:00  --------------------------------------------------------  IN: 6075 mL / OUT: 3880 mL / NET: 2195 mL    10 Oct 2022 07:01  -  10 Oct 2022 11:37  --------------------------------------------------------  IN: 1050 mL / OUT: 0 mL / NET: 1050 mL        PHYSICAL EXAM:  Constitutional: NAD, laying in bed  HEENT: PERRLA, EOMI,    Neck:   No JVD  Respiratory: CTA non labored  Cardiovascular: S1 and S2, RRR   Gastrointestinal: BS+, soft, NT/ND. midline incision w/ wound vac   Extremities: No peripheral edema  Neurological: A/O x 3,          LABS:                        6.8    11.92 )-----------( 346      ( 10 Oct 2022 08:54 )             21.4     10-10    134<L>  |  101  |  25<H>  ----------------------------<  113<H>  4.8   |  25  |  1.83<H>    Ca    8.0<L>      10 Oct 2022 08:54  Phos  4.3     10-10  Mg     1.6     10-10    TPro  4.8<L>  /  Alb  2.2<L>  /  TBili  0.3  /  DBili  x   /  AST  657<H>  /  ALT  265<H>  /  AlkPhos  77  10-10      Urinalysis Basic - ( 09 Oct 2022 16:15 )    Color: Yellow / Appearance: Clear / SG: <=1.005 / pH: x  Gluc: x / Ketone: NEGATIVE  / Bili: Negative / Urobili: 0.2 E.U./dL   Blood: x / Protein: Trace mg/dL / Nitrite: NEGATIVE   Leuk Esterase: NEGATIVE / RBC: < 5 /HPF / WBC < 5 /HPF   Sq Epi: x / Non Sq Epi: 0-5 /HPF / Bacteria: Many /HPF          RADIOLOGY & ADDITIONAL TESTS:    MEDICATIONS  (STANDING):  chlorhexidine 2% Cloths 1 Application(s) Topical <User Schedule>  dextrose 5%. 1000 milliLiter(s) (100 mL/Hr) IV Continuous <Continuous>  dextrose 5%. 1000 milliLiter(s) (50 mL/Hr) IV Continuous <Continuous>  dextrose 50% Injectable 25 Gram(s) IV Push once  dextrose 50% Injectable 12.5 Gram(s) IV Push once  dextrose 50% Injectable 25 Gram(s) IV Push once  enoxaparin Injectable 40 milliGRAM(s) SubCutaneous every 24 hours  furosemide   Injectable 20 milliGRAM(s) IV Push every 24 hours  glucagon  Injectable 1 milliGRAM(s) IntraMuscular once  influenza  Vaccine (HIGH DOSE) 0.7 milliLiter(s) IntraMuscular once  insulin lispro (ADMELOG) corrective regimen sliding scale   SubCutaneous three times a day before meals  insulin lispro (ADMELOG) corrective regimen sliding scale   SubCutaneous at bedtime  labetalol 200 milliGRAM(s) Oral every 12 hours  lactated ringers. 1000 milliLiter(s) (250 mL/Hr) IV Continuous <Continuous>  lidocaine   4% Patch 1 Patch Transdermal every 24 hours  magnesium sulfate  IVPB 2 Gram(s) IV Intermittent every 6 hours  melatonin 5 milliGRAM(s) Oral at bedtime  NIFEdipine XL 60 milliGRAM(s) Oral every 24 hours  senna 2 Tablet(s) Oral at bedtime  vancomycin    Solution 125 milliGRAM(s) Oral every 6 hours    MEDICATIONS  (PRN):  dextrose Oral Gel 15 Gram(s) Oral once PRN Blood Glucose LESS THAN 70 milliGRAM(s)/deciliter  HYDROmorphone  Injectable 0.5 milliGRAM(s) IV Push every 4 hours PRN Severe Pain (7 - 10)  ondansetron Injectable 4 milliGRAM(s) IV Push every 8 hours PRN Nausea and/or Vomiting  oxyCODONE    IR 5 milliGRAM(s) Oral every 6 hours PRN Severe Pain (7 - 10)  sodium chloride 0.9% lock flush 10 milliLiter(s) IV Push every 1 hour PRN Pre/post blood products, medications, blood draw, and to maintain line patency

## 2022-10-10 NOTE — PROVIDER CONTACT NOTE (CRITICAL VALUE NOTIFICATION) - ASSESSMENT
A/O x4, VS stable, denies pain or discomfort
Pt VSS. Endorses diffuse pain/discomfort
Pt's VSS except BP readings are elevated since admission to unit, labetelol 30mg given, BP down to 188/92, will recheck in 30 minutes.
Hemodynamically stable. VSS, NAD. No complaint of chest pain or SOB.
Pt a&ox4 VSS, NSR on RA.

## 2022-10-10 NOTE — PROVIDER CONTACT NOTE (CRITICAL VALUE NOTIFICATION) - SITUATION
Pt CPK [phone]. Pt reporting muscle aches and pains + urine OP decreased from yesterday
Pt's has a critical valuse lab CPK of [phone]
Patient Hgb 6.8
s/p Ex Lap JOSEFINA, SBR POD #  3
Pt's hemoglobin and hematocrit were critically low after repeat blood draw.

## 2022-10-10 NOTE — PROGRESS NOTE ADULT - SUBJECTIVE AND OBJECTIVE BOX
INTERVAL HPI/OVERNIGHT EVENTS:   SURGERY ATTENDING    STATUS POST:      Exploratory laparotomy with lysis of adhesions extensive one hour , Small bowel resection with primary anastomosis, Repair of recurrent Incisional hernia with myofascial flaps no mesh, washout, drainage, PREVENA      POST OPERATIVE DAY #: 25      SUBJECTIVE:  Flatus: [x ] YES [ ] NO             Bowel Movement: [x ] YES [ ] NO  Pain (0-10):       2     Pain Control Adequate: [x ] YES [ ] NO  Nausea: [ ] YES [x ] NO            Vomiting: [ ] YES [x ] NO  Diarrhea: [ ] YES [ x] NO         Constipation: [ ] YES [x ] NO     Chest Pain: [ ] YES [x ] NO    SOB:  [ ] YES [x ] NO        MEDICATIONS  (STANDING):  chlorhexidine 2% Cloths 1 Application(s) Topical <User Schedule>  dextrose 5%. 1000 milliLiter(s) (100 mL/Hr) IV Continuous <Continuous>  dextrose 5%. 1000 milliLiter(s) (50 mL/Hr) IV Continuous <Continuous>  dextrose 50% Injectable 25 Gram(s) IV Push once  dextrose 50% Injectable 12.5 Gram(s) IV Push once  dextrose 50% Injectable 25 Gram(s) IV Push once  enoxaparin Injectable 40 milliGRAM(s) SubCutaneous every 24 hours  furosemide   Injectable 20 milliGRAM(s) IV Push every 24 hours  glucagon  Injectable 1 milliGRAM(s) IntraMuscular once  influenza  Vaccine (HIGH DOSE) 0.7 milliLiter(s) IntraMuscular once  insulin lispro (ADMELOG) corrective regimen sliding scale   SubCutaneous three times a day before meals  insulin lispro (ADMELOG) corrective regimen sliding scale   SubCutaneous at bedtime  labetalol 200 milliGRAM(s) Oral every 12 hours  lactated ringers. 1000 milliLiter(s) (250 mL/Hr) IV Continuous <Continuous>  lidocaine   4% Patch 1 Patch Transdermal every 24 hours  magnesium sulfate  IVPB 2 Gram(s) IV Intermittent every 6 hours  melatonin 5 milliGRAM(s) Oral at bedtime  NIFEdipine XL 60 milliGRAM(s) Oral every 24 hours  senna 2 Tablet(s) Oral at bedtime  vancomycin    Solution 125 milliGRAM(s) Oral every 6 hours    MEDICATIONS  (PRN):  dextrose Oral Gel 15 Gram(s) Oral once PRN Blood Glucose LESS THAN 70 milliGRAM(s)/deciliter  HYDROmorphone  Injectable 0.5 milliGRAM(s) IV Push every 4 hours PRN Severe Pain (7 - 10)  ondansetron Injectable 4 milliGRAM(s) IV Push every 8 hours PRN Nausea and/or Vomiting  oxyCODONE    IR 5 milliGRAM(s) Oral every 6 hours PRN Severe Pain (7 - 10)  sodium chloride 0.9% lock flush 10 milliLiter(s) IV Push every 1 hour PRN Pre/post blood products, medications, blood draw, and to maintain line patency      Vital Signs Last 24 Hrs  T(C): 36.8 (10 Oct 2022 15:33), Max: 36.8 (10 Oct 2022 05:16)  T(F): 98.3 (10 Oct 2022 15:33), Max: 98.3 (10 Oct 2022 05:16)  HR: 94 (10 Oct 2022 15:20) (84 - 100)  BP: 169/72 (10 Oct 2022 15:20) (155/67 - 169/73)  BP(mean): 103 (10 Oct 2022 15:20) (97 - 105)  RR: 18 (10 Oct 2022 15:20) (16 - 18)  SpO2: 96% (10 Oct 2022 15:20) (95% - 99%)    Parameters below as of 10 Oct 2022 15:20  Patient On (Oxygen Delivery Method): room air        PHYSICAL EXAM:      Constitutional:    Eyes:    ENMT:    Neck:    Breasts:    Back:    Respiratory:    Cardiovascular:    Gastrointestinal:    Genitourinary:    Rectal:    Extremities:    Vascular:    Neurological:    Skin:    Lymph Nodes:    Musculoskeletal:    Psychiatric:        I&O's Detail    09 Oct 2022 07:01  -  10 Oct 2022 07:00  --------------------------------------------------------  IN:    Lactated Ringers: 5525 mL    Oral Fluid: 550 mL  Total IN: 6075 mL    OUT:    Indwelling Catheter - Urethral (mL): 3850 mL    Rectal Tube (mL): 10 mL    VAC (Vacuum Assisted Closure) System (mL): 20 mL  Total OUT: 3880 mL    Total NET: 2195 mL      10 Oct 2022 07:01  -  10 Oct 2022 16:07  --------------------------------------------------------  IN:    IV PiggyBack: 50 mL    Lactated Ringers: 2250 mL    PRBCs (Packed Red Blood Cells): 360 mL  Total IN: 2660 mL    OUT:    Indwelling Catheter - Urethral (mL): 500 mL    Rectal Tube (mL): 50 mL    VAC (Vacuum Assisted Closure) System (mL): 0 mL  Total OUT: 550 mL    Total NET: 2110 mL          LABS:                        6.8    11.92 )-----------( 346      ( 10 Oct 2022 08:54 )             21.4     10-10    134<L>  |  101  |  25<H>  ----------------------------<  113<H>  4.8   |  25  |  1.83<H>    Ca    8.0<L>      10 Oct 2022 08:54  Phos  4.3     10-10  Mg     1.6     10-10    TPro  4.8<L>  /  Alb  2.2<L>  /  TBili  0.3  /  DBili  x   /  AST  657<H>  /  ALT  265<H>  /  AlkPhos  77  10-10      Urinalysis Basic - ( 09 Oct 2022 16:15 )    Color: Yellow / Appearance: Clear / SG: <=1.005 / pH: x  Gluc: x / Ketone: NEGATIVE  / Bili: Negative / Urobili: 0.2 E.U./dL   Blood: x / Protein: Trace mg/dL / Nitrite: NEGATIVE   Leuk Esterase: NEGATIVE / RBC: < 5 /HPF / WBC < 5 /HPF   Sq Epi: x / Non Sq Epi: 0-5 /HPF / Bacteria: Many /HPF        RADIOLOGY & ADDITIONAL STUDIES:

## 2022-10-10 NOTE — PROGRESS NOTE ADULT - ASSESSMENT
72 year old woman with HTN, DM2, past surgical history of  x 2, hysterectomy, and laparoscopic cholecystectomy (), admitted for large bowel obstruction; had spontaneous return of bowel function initially; however, bowel obstruction recurred, so taken to OR for ex lap with lysis of adhesions and bowel resection () . Monitored in SICU post -op ; now on regional. Hospital course complicated by rhabdomyolysis, likely daptomycin induced (CK elevated on 10/7/2022)    # Large bowel obstruction   # post operative state  s/p OR for ex lap with lysis of adhesions and bowel resection ()  - rest of care per primary team     # non-traumatic Rhabdomyolysis  CK elevated to 30K on 10/7/2022. Likely daptomycin associated. Stopped daptomycin and statin.   - f/u with renal recs  -  CK dropping.     #SO  -Up a little today. Possible ATN picture. Continue monitoring. Avoid nephrotoxic meds. Renal following    #Elevated LFTs  -In setting of rhabdo. Continue monitoring. Improving todya.     #C diff; non severe  -On PO vanc 10/4-     #E. faecalis bacteremia  -Afebrile at this time. was On dapto 10/4-10/7; Switched to vancomycin 10/7/2022  -Repeat Cx's negative to date    #Obesity - Dose medications by weight     #HTN   takes HCTZ 25mg qd, losartan 100mg qd, labetalol 200mg BID, nifedipine 60mg ER qd at home.   - continue labetalol 200mg BID  - would give nifedipine 90 mg ER   - Continue to hold HCTZ for now and on discharge     # Type 2 Diabetes complicated by hyperglycemia   cw insulin sliding scale while inpatient.   pt's a1c at goal for her age, resume metformin on discharge.     #Incidental finding of bilateral lung nodules  CT chest showing bilateral lung nodules, measuring up to 7 mm in the ridght upper lobe; Counseled patient on finding.   Recommended follow-up chest CT in three months to ensure stability.; Copy of radiology report provided to patient for her records.     # COVID 19 infection (present on admission) - Improved/resolved. Off isolation.     # Acute blood loss anemia   Would transfuse <7.0  age appropriate cancer screening outpatient     DVT ppx - HSQ

## 2022-10-10 NOTE — PROGRESS NOTE ADULT - ASSESSMENT
72 year old woman with HTN, DM2 admitted for large bowel obstruction s/p ex lap w/ lysis of adhesions and bowel resection on 9/14 for SBO. She is also s/p sepsis and peritonitis. Nephrology initially consulted for prerenal azotemia which resolved. Nephrology re-consulted for SO and rhabdomyolysis 2/2 daptomycin.      #Non-oliguric SO due to tATN  No known underlying CKD  Cr. 1.73-->1.83  UA w/ trace proteinuria, large blood with <5 RBCs  CK trending down now   3.8 L of UO in last 24 hours  Likely etiology toxic ATN due to rhabdomyolysis secondary to daptomycin use        Plan:  Continue with IVF and Tx of rhabdo as below   Renal sono   Daily BMP    #Rhabdomyolysis   likely due to daptomycin   CPK trending down now; 34065 today after peaking at 30K, s/p IV fluids    Plan:   Continue LR at 250cc/hr  Can give Lasix 20mg IVP once   Monitor Urine out put as it should be >200cc/hr   Trend CPK   Daily BMP   Strict ins and outs   Avoid nephrotoxic agents       Serigo Sands M.D  PGY-4 Nephrology   [phone]

## 2022-10-10 NOTE — PROGRESS NOTE ADULT - SUBJECTIVE AND OBJECTIVE BOX
The patient is contacted regarding upcoming appointments for Blood and Marrow Transplant Anniversary visit (BAN).     For this visit the patient is scheduled for testing that requires (check all that apply): BAN Call Requiremets: Collect a 24 hour urine specimen    For 24hr urine collection:   Patient is instructed to obtain a 24 hour specimen collection container from their local laboratory and return the day of their testing appointments. If they are unable to obtain a specimen container they are informed to contact their BMT Nurse Coordinator to assist in order to ensure timely results for physician review visit    The patient was unavailable. I left a message with the information listed above.  Asked patient to call our office or hospital if he has any questions or concerns.       For anticoagulation hold:  Not Applicable            Patient is a 72y Female seen and evaluated at bedside. No acute events overnight. Pt laying in bed, reporting continued pain and weakness in arms, stating they feel heavy. Denies any nausea. Adequate UO. CK trending down.      Meds:    chlorhexidine 2% Cloths 1 <User Schedule>  dextrose 5%. 1000 <Continuous>  dextrose 5%. 1000 <Continuous>  dextrose 50% Injectable 25 once  dextrose 50% Injectable 12.5 once  dextrose 50% Injectable 25 once  dextrose Oral Gel 15 once PRN  enoxaparin Injectable 40 every 24 hours  furosemide   Injectable 20 every 24 hours  glucagon  Injectable 1 once  HYDROmorphone  Injectable 0.5 every 4 hours PRN  influenza  Vaccine (HIGH DOSE) 0.7 once  insulin lispro (ADMELOG) corrective regimen sliding scale  three times a day before meals  insulin lispro (ADMELOG) corrective regimen sliding scale  at bedtime  labetalol 200 every 12 hours  lactated ringers. 1000 <Continuous>  lidocaine   4% Patch 1 every 24 hours  magnesium sulfate  IVPB 2 every 6 hours  melatonin 5 at bedtime  NIFEdipine XL 60 every 24 hours  ondansetron Injectable 4 every 8 hours PRN  oxyCODONE    IR 5 every 6 hours PRN  senna 2 at bedtime  sodium chloride 0.9% lock flush 10 every 1 hour PRN  vancomycin    Solution 125 every 6 hours      T(C): , Max: 36.8 (10-10-22 @ 05:16)  T(F): , Max: 98.3 (10-10-22 @ 05:16)  HR: 86 (10-10-22 @ 12:20)  BP: 168/73 (10-10-22 @ 12:20)  BP(mean): 105 (10-10-22 @ 12:20)  RR: 18 (10-10-22 @ 12:20)  SpO2: 98% (10-10-22 @ 12:20)  Wt(kg): --    10-09 @ 07:01  -  10-10 @ 07:00  --------------------------------------------------------  IN: 6075 mL / OUT: 3880 mL / NET: 2195 mL    10-10 @ 07:01  -  10-10 @ 14:26  --------------------------------------------------------  IN: 1050 mL / OUT: 0 mL / NET: 1050 mL          Review of Systems:  ROS negative except as per HPI      PHYSICAL EXAM:  Constitutional: pleasant female NAD, laying in bed   Neck: Supple, no JVD  Respiratory: CTA B/L, on RA  Cardiovascular: RRR, normal S1 and S2,   Gastrointestinal: +BS, minimal tenderness  Extremities: no edema, warm  Neurological: AAOx3, moving all extremities spontaneously         LABS:                        6.8    11.92 )-----------( 346      ( 10 Oct 2022 08:54 )             21.4     10-10    134<L>  |  101  |  25<H>  ----------------------------<  113<H>  4.8   |  25  |  1.83<H>    Ca    8.0<L>      10 Oct 2022 08:54  Phos  4.3     10-10  Mg     1.6     10-10    TPro  4.8<L>  /  Alb  2.2<L>  /  TBili  0.3  /  DBili  x   /  AST  657<H>  /  ALT  265<H>  /  AlkPhos  77  10-10    Osmolality, Serum: 285 mosm/kg [280 - 301] (10-10 @ 10:00)      Urinalysis Basic - ( 09 Oct 2022 16:15 )    Color: Yellow / Appearance: Clear / SG: <=1.005 / pH: x  Gluc: x / Ketone: NEGATIVE  / Bili: Negative / Urobili: 0.2 E.U./dL   Blood: x / Protein: Trace mg/dL / Nitrite: NEGATIVE   Leuk Esterase: NEGATIVE / RBC: < 5 /HPF / WBC < 5 /HPF   Sq Epi: x / Non Sq Epi: 0-5 /HPF / Bacteria: Many /HPF      Osmolality, Random Urine: 190 mosm/kg (10-09 @ 16:15)  Sodium, Random Urine: 74 mmol/L (10-09 @ 16:15)  Creatinine, Random Urine: 9 mg/dL (10-09 @ 16:15)        RADIOLOGY & ADDITIONAL STUDIES:

## 2022-10-10 NOTE — PROGRESS NOTE ADULT - ASSESSMENT
72 F PMH HTN, DM and PSH  x 2, hysterectomy and lap dev ( w/ Dr. Clifford) admitted on  with 2 days of n/v, abd pain, imaging demonstrating LBO, which resolved prior to operative exploration. Recurrent bowel obstruction, now s/p Exlap JOSEFINA, SBR (100 cm) (9/15). now with wound vac, pending [facility] placement c/b c.diff and rhabdo 2/2 daptomycin    Regular, IVF, ISS  Lasix 20 daily   Pain/nausea prn  Vanco PO (10/4-), s/p Daptomycin (10/4-10/7)  change Wound Vac today  SQH/SCDs/OOBA/IS  AM labs  Dispo: [facility]

## 2022-10-10 NOTE — PROGRESS NOTE ADULT - SUBJECTIVE AND OBJECTIVE BOX
SUBJECTIVE: Patient seen and examined at bedside. She reports muscle aches and soreness improving. Rios is yellow. Rectal tube in place.     enoxaparin Injectable 40 milliGRAM(s) SubCutaneous every 24 hours  furosemide   Injectable 20 milliGRAM(s) IV Push every 24 hours  labetalol 200 milliGRAM(s) Oral every 12 hours  NIFEdipine XL 60 milliGRAM(s) Oral every 24 hours  vancomycin    Solution 125 milliGRAM(s) Oral every 6 hours    MEDICATIONS  (PRN):  dextrose Oral Gel 15 Gram(s) Oral once PRN Blood Glucose LESS THAN 70 milliGRAM(s)/deciliter  HYDROmorphone  Injectable 0.5 milliGRAM(s) IV Push every 4 hours PRN Severe Pain (7 - 10)  ondansetron Injectable 4 milliGRAM(s) IV Push every 8 hours PRN Nausea and/or Vomiting  oxyCODONE    IR 5 milliGRAM(s) Oral every 6 hours PRN Severe Pain (7 - 10)  sodium chloride 0.9% lock flush 10 milliLiter(s) IV Push every 1 hour PRN Pre/post blood products, medications, blood draw, and to maintain line patency      I&O's Detail    09 Oct 2022 07:01  -  10 Oct 2022 07:00  --------------------------------------------------------  IN:    Lactated Ringers: 5525 mL    Oral Fluid: 550 mL  Total IN: 6075 mL    OUT:    Indwelling Catheter - Urethral (mL): 3850 mL    Rectal Tube (mL): 10 mL    VAC (Vacuum Assisted Closure) System (mL): 20 mL  Total OUT: 3880 mL    Total NET: 2195 mL      10 Oct 2022 07:01  -  10 Oct 2022 08:10  --------------------------------------------------------  IN:    Lactated Ringers: 250 mL  Total IN: 250 mL    OUT:  Total OUT: 0 mL    Total NET: 250 mL          T(C): 36.8 (10-10-22 @ 05:16), Max: 37.1 (10-09-22 @ 09:00)  HR: 100 (10-10-22 @ 05:16) (80 - 100)  BP: 169/73 (10-10-22 @ 05:16) (155/67 - 169/73)  RR: 18 (10-10-22 @ 05:16) (16 - 18)  SpO2: 97% (10-10-22 @ 05:16) (95% - 99%)    GENERAL: NAD, Resting comfortably in bed, awake, opens eyes spontaneously  HEENT: NCAT, MMM, Normal conjunctiva, PERRL  RESP: Nonlabored breathing, No respiratory distress  CARD: Normal rate, Normal peripheral perfusion  GI: Soft, ND, appropriately soft, wound vac in place. Rectal tube in place  EXTREM: WWP, No edema, No gross deformity of extremities  SKIN: No rashes, no lesions  NEURO: AAOx3, No focal motor or sensory deficits  PSYCH: Affect and characteristics of appearance, verbalizations, and behaviors are appropriate    LABS:                        7.6    18.36 )-----------( 369      ( 09 Oct 2022 06:23 )             23.2     10-09    130<L>  |  100  |  22  ----------------------------<  109<H>  5.0   |  20<L>  |  1.73<H>    Ca    7.7<L>      09 Oct 2022 06:23  Phos  4.7     10-09  Mg     1.8     10-09    TPro  4.9<L>  /  Alb  2.1<L>  /  TBili  0.3  /  DBili  x   /  AST  924<H>  /  ALT  272<H>  /  AlkPhos  91  10-09      Urinalysis Basic - ( 09 Oct 2022 16:15 )    Color: Yellow / Appearance: Clear / SG: <=1.005 / pH: x  Gluc: x / Ketone: NEGATIVE  / Bili: Negative / Urobili: 0.2 E.U./dL   Blood: x / Protein: Trace mg/dL / Nitrite: NEGATIVE   Leuk Esterase: NEGATIVE / RBC: < 5 /HPF / WBC < 5 /HPF   Sq Epi: x / Non Sq Epi: 0-5 /HPF / Bacteria: Many /HPF        RADIOLOGY & ADDITIONAL STUDIES:      Culture - Blood (collected 10-07-22 @ 17:33)  Source: .Blood Blood  Preliminary Report (10-09-22 @ 18:00):    No growth at 2 days.    Culture - Blood (collected 10-07-22 @ 17:33)  Source: .Blood Blood  Preliminary Report (10-09-22 @ 18:00):    No growth at 2 days.

## 2022-10-10 NOTE — PROGRESS NOTE ADULT - SUBJECTIVE AND OBJECTIVE BOX
INTERVAL HPI/OVERNIGHT EVENTS:    ANTIBIOTICS/RELEVANT:    MEDICATIONS  (STANDING):  chlorhexidine 2% Cloths 1 Application(s) Topical <User Schedule>  dextrose 5%. 1000 milliLiter(s) (100 mL/Hr) IV Continuous <Continuous>  dextrose 5%. 1000 milliLiter(s) (50 mL/Hr) IV Continuous <Continuous>  dextrose 50% Injectable 25 Gram(s) IV Push once  dextrose 50% Injectable 12.5 Gram(s) IV Push once  dextrose 50% Injectable 25 Gram(s) IV Push once  enoxaparin Injectable 40 milliGRAM(s) SubCutaneous every 24 hours  furosemide   Injectable 20 milliGRAM(s) IV Push every 24 hours  glucagon  Injectable 1 milliGRAM(s) IntraMuscular once  influenza  Vaccine (HIGH DOSE) 0.7 milliLiter(s) IntraMuscular once  insulin lispro (ADMELOG) corrective regimen sliding scale   SubCutaneous three times a day before meals  insulin lispro (ADMELOG) corrective regimen sliding scale   SubCutaneous at bedtime  labetalol 200 milliGRAM(s) Oral every 12 hours  lactated ringers. 1000 milliLiter(s) (250 mL/Hr) IV Continuous <Continuous>  lidocaine   4% Patch 1 Patch Transdermal every 24 hours  magnesium sulfate  IVPB 2 Gram(s) IV Intermittent every 6 hours  melatonin 5 milliGRAM(s) Oral at bedtime  NIFEdipine XL 60 milliGRAM(s) Oral every 24 hours  senna 2 Tablet(s) Oral at bedtime  vancomycin    Solution 125 milliGRAM(s) Oral every 6 hours    MEDICATIONS  (PRN):  dextrose Oral Gel 15 Gram(s) Oral once PRN Blood Glucose LESS THAN 70 milliGRAM(s)/deciliter  HYDROmorphone  Injectable 0.5 milliGRAM(s) IV Push every 4 hours PRN Severe Pain (7 - 10)  ondansetron Injectable 4 milliGRAM(s) IV Push every 8 hours PRN Nausea and/or Vomiting  oxyCODONE    IR 5 milliGRAM(s) Oral every 6 hours PRN Severe Pain (7 - 10)  sodium chloride 0.9% lock flush 10 milliLiter(s) IV Push every 1 hour PRN Pre/post blood products, medications, blood draw, and to maintain line patency      Allergies    penicillin (Rash)    Intolerances    daptomycin (Muscle Pain)      Vital Signs Last 24 Hrs  T(C): 36.8 (10 Oct 2022 09:25), Max: 36.8 (10 Oct 2022 05:16)  T(F): 98.3 (10 Oct 2022 09:25), Max: 98.3 (10 Oct 2022 05:16)  HR: 86 (10 Oct 2022 12:20) (84 - 100)  BP: 168/73 (10 Oct 2022 12:20) (155/67 - 169/73)  BP(mean): 105 (10 Oct 2022 12:20) (95 - 105)  RR: 18 (10 Oct 2022 12:20) (16 - 18)  SpO2: 98% (10 Oct 2022 12:20) (95% - 99%)    Parameters below as of 10 Oct 2022 12:20  Patient On (Oxygen Delivery Method): room air          LABS:                        6.8    11.92 )-----------( 346      ( 10 Oct 2022 08:54 )             21.4     10-10    134<L>  |  101  |  25<H>  ----------------------------<  113<H>  4.8   |  25  |  1.83<H>    Ca    8.0<L>      10 Oct 2022 08:54  Phos  4.3     10-10  Mg     1.6     10-10    TPro  4.8<L>  /  Alb  2.2<L>  /  TBili  0.3  /  DBili  x   /  AST  657<H>  /  ALT  265<H>  /  AlkPhos  77  10-10      Urinalysis Basic - ( 09 Oct 2022 16:15 )    Color: Yellow / Appearance: Clear / SG: <=1.005 / pH: x  Gluc: x / Ketone: NEGATIVE  / Bili: Negative / Urobili: 0.2 E.U./dL   Blood: x / Protein: Trace mg/dL / Nitrite: NEGATIVE   Leuk Esterase: NEGATIVE / RBC: < 5 /HPF / WBC < 5 /HPF   Sq Epi: x / Non Sq Epi: 0-5 /HPF / Bacteria: Many /HPF    Creatine Kinase, Serum (10.10.22 @ 08:54)    Creatine Kinase, Serum: 86170: TYPE:(C=Critical, N=Notification, A=Abnormal) C  TESTS: _CPK  DATE/TIME CALLED: _10/10/2022 09:31:12 EDT  CALLED TO: _C.IWEN RN  READ BACK (2 Patient Identifiers)(Y/N): _Y  READ BACK VALUES (Y/N): _Y  CALLED BY: _AK U/L        MICROBIOLOGY:    Culture - Blood (10.07.22 @ 17:33)    Specimen Source: .Blood Blood    Culture Results:   No growth at 2 days.    Culture - Blood (10.07.22 @ 17:33)    Specimen Source: .Blood Blood    Culture Results:   No growth at 2 days.    Culture - Blood (10.04.22 @ 15:15)    Specimen Source: .Blood Blood    Culture Results:   No growth at 5 days.    Culture - Blood (10.04.22 @ 14:31)    Specimen Source: .Blood Blood    Culture Results:   No growth at 5 days.        RADIOLOGY & ADDITIONAL STUDIES: INTERVAL HPI/OVERNIGHT EVENTS:    States she has pain all over but is feeling hungry and would like to eat more than allowed.  More animated  Being transfused now    MEDICATIONS  (STANDING):  chlorhexidine 2% Cloths 1 Application(s) Topical <User Schedule>  dextrose 5%. 1000 milliLiter(s) (100 mL/Hr) IV Continuous <Continuous>  dextrose 5%. 1000 milliLiter(s) (50 mL/Hr) IV Continuous <Continuous>  dextrose 50% Injectable 25 Gram(s) IV Push once  dextrose 50% Injectable 12.5 Gram(s) IV Push once  dextrose 50% Injectable 25 Gram(s) IV Push once  enoxaparin Injectable 40 milliGRAM(s) SubCutaneous every 24 hours  furosemide   Injectable 20 milliGRAM(s) IV Push every 24 hours  glucagon  Injectable 1 milliGRAM(s) IntraMuscular once  influenza  Vaccine (HIGH DOSE) 0.7 milliLiter(s) IntraMuscular once  insulin lispro (ADMELOG) corrective regimen sliding scale   SubCutaneous three times a day before meals  insulin lispro (ADMELOG) corrective regimen sliding scale   SubCutaneous at bedtime  labetalol 200 milliGRAM(s) Oral every 12 hours  lactated ringers. 1000 milliLiter(s) (250 mL/Hr) IV Continuous <Continuous>  lidocaine   4% Patch 1 Patch Transdermal every 24 hours  magnesium sulfate  IVPB 2 Gram(s) IV Intermittent every 6 hours  melatonin 5 milliGRAM(s) Oral at bedtime  NIFEdipine XL 60 milliGRAM(s) Oral every 24 hours  senna 2 Tablet(s) Oral at bedtime  vancomycin    Solution 125 milliGRAM(s) Oral every 6 hours    MEDICATIONS  (PRN):  dextrose Oral Gel 15 Gram(s) Oral once PRN Blood Glucose LESS THAN 70 milliGRAM(s)/deciliter  HYDROmorphone  Injectable 0.5 milliGRAM(s) IV Push every 4 hours PRN Severe Pain (7 - 10)  ondansetron Injectable 4 milliGRAM(s) IV Push every 8 hours PRN Nausea and/or Vomiting  oxyCODONE    IR 5 milliGRAM(s) Oral every 6 hours PRN Severe Pain (7 - 10)  sodium chloride 0.9% lock flush 10 milliLiter(s) IV Push every 1 hour PRN Pre/post blood products, medications, blood draw, and to maintain line patency      Allergies    penicillin (Rash)    Intolerances    daptomycin (Rhabdomyolysis)       EXAM  Vital Signs Last 24 Hrs  T(C): 36.8 (10 Oct 2022 09:25), Max: 36.8 (10 Oct 2022 05:16)  T(F): 98.3 (10 Oct 2022 09:25), Max: 98.3 (10 Oct 2022 05:16)  HR: 86 (10 Oct 2022 12:20) (84 - 100)  BP: 168/73 (10 Oct 2022 12:20) (155/67 - 169/73)  BP(mean): 105 (10 Oct 2022 12:20) (95 - 105)  RR: 18 (10 Oct 2022 12:20) (16 - 18)  SpO2: 98% (10 Oct 2022 12:20) (95% - 99%)    Parameters below as of 10 Oct 2022 12:20  Patient On (Oxygen Delivery Method): room air  Awake alert   Neck supple  Oral mucosa moist   RRR  Chest CTA with decreased BSs at bases   Abd soft with markedly decreased tenderness   VAC in the lower part of the wound  LE with mild edema  Rios with good urine output        LABS:                        6.8    11.92 )-----------( 346      ( 10 Oct 2022 08:54 )             21.4     10-10    134<L>  |  101  |  25<H>  ----------------------------<  113<H>  4.8   |  25  |  1.83<H>    Ca    8.0<L>      10 Oct 2022 08:54  Phos  4.3     10-10  Mg     1.6     10-10    TPro  4.8<L>  /  Alb  2.2<L>  /  TBili  0.3  /  DBili  x   /  AST  657<H>  /  ALT  265<H>  /  AlkPhos  77  10-10      Urinalysis Basic - ( 09 Oct 2022 16:15 )    Color: Yellow / Appearance: Clear / SG: <=1.005 / pH: x  Gluc: x / Ketone: NEGATIVE  / Bili: Negative / Urobili: 0.2 E.U./dL   Blood: x / Protein: Trace mg/dL / Nitrite: NEGATIVE   Leuk Esterase: NEGATIVE / RBC: < 5 /HPF / WBC < 5 /HPF   Sq Epi: x / Non Sq Epi: 0-5 /HPF / Bacteria: Many /HPF    Creatine Kinase, Serum (10.10.22 @ 08:54)    Creatine Kinase, Serum: 29571: TYPE:(C=Critical, N=Notification, A=Abnormal) C  TESTS: _CPK  DATE/TIME CALLED: _10/10/2022 09:31:12 EDT  CALLED TO: _C.ELIZABETH ESPARZA  READ BACK (2 Patient Identifiers)(Y/N): _Y  READ BACK VALUES (Y/N): _Y  CALLED BY: _AK U/L        MICROBIOLOGY:    Culture - Blood (10.07.22 @ 17:33)    Specimen Source: .Blood Blood    Culture Results:   No growth at 2 days.    Culture - Blood (10.07.22 @ 17:33)    Specimen Source: .Blood Blood    Culture Results:   No growth at 2 days.    Culture - Blood (10.04.22 @ 15:15)    Specimen Source: .Blood Blood    Culture Results:   No growth at 5 days.    Culture - Blood (10.04.22 @ 14:31)    Specimen Source: .Blood Blood    Culture Results:   No growth at 5 days.        RADIOLOGY & ADDITIONAL STUDIES:    CT Abdomen and Pelvis w/ Oral Cont (10.08.22 @ 13:38) >  PROCEDURE DATE:  10/08/2022        INTERPRETATION:  CLINICAL HISTORY: 72-year-old with possible   extravasation of contrast on abdominal radiograph. History of recent   bowel obstruction.        FINDINGS: CT of the abdomen and pelvis was performed without the   administration of intravenous contrast. Reconstructions were performed in   the sagittal and coronal planes. Comparison is made to prior studies   dated 9/24/2022 and 9/11/2022.    Evaluation of the lower chest demonstrates persistent small bilateral   pleural effusions. Negative for pericardial effusion. Bibasilar   atelectasis.    Evaluation of the abdomen demonstrates that the liver, spleen, pancreas,   bilateral adrenal glands and bilateral kidneys are unremarkable.   Cholecystectomy.    Evaluation of the gastrointestinal tract demonstrates moderate dilatation   of bowel loops within the left lower quadrant, overall diminished since   9/2022 with transition decompressed loops more distally. There is oral   contrast within large bowel. There is no evidence of extraluminal   contrast.    Evaluation of the pelvis demonstrates hysterectomy. Bladder is collapsed   around Rios catheter balloon and contains a small volume of air.    There is no significant free fluid. There is loosely loculated fluid   collection within the right flank extending into the pelvis,   significantly intervally decreased since prior study measuring 2.9 x 5.5   cm, previously spanning 3.7 x 1.8 cm.    Anterior abdominal incision containing packing material. No significant   aortic vascular calcification. Evaluation of the osseous structures   demonstrates no destructive lesion.            IMPRESSION:    Overall significant interval improvement of previously demonstrated bowel   obstruction. Negative for extraluminal contrast to suggest viscus   perforation.    Interval decrease in size of small loculated collection within the   anterior right hemipelvis.    Persistent small bilateral pleural effusions.           INTERVAL HPI/OVERNIGHT EVENTS:    States she has pain all over but is feeling hungry and would like to eat more than allowed.  More animated  Being transfused now for decreased H&H    MEDICATIONS  (STANDING):  chlorhexidine 2% Cloths 1 Application(s) Topical <User Schedule>  dextrose 5%. 1000 milliLiter(s) (100 mL/Hr) IV Continuous <Continuous>  dextrose 5%. 1000 milliLiter(s) (50 mL/Hr) IV Continuous <Continuous>  dextrose 50% Injectable 25 Gram(s) IV Push once  dextrose 50% Injectable 12.5 Gram(s) IV Push once  dextrose 50% Injectable 25 Gram(s) IV Push once  enoxaparin Injectable 40 milliGRAM(s) SubCutaneous every 24 hours  furosemide   Injectable 20 milliGRAM(s) IV Push every 24 hours  glucagon  Injectable 1 milliGRAM(s) IntraMuscular once  influenza  Vaccine (HIGH DOSE) 0.7 milliLiter(s) IntraMuscular once  insulin lispro (ADMELOG) corrective regimen sliding scale   SubCutaneous three times a day before meals  insulin lispro (ADMELOG) corrective regimen sliding scale   SubCutaneous at bedtime  labetalol 200 milliGRAM(s) Oral every 12 hours  lactated ringers. 1000 milliLiter(s) (250 mL/Hr) IV Continuous <Continuous>  lidocaine   4% Patch 1 Patch Transdermal every 24 hours  magnesium sulfate  IVPB 2 Gram(s) IV Intermittent every 6 hours  melatonin 5 milliGRAM(s) Oral at bedtime  NIFEdipine XL 60 milliGRAM(s) Oral every 24 hours  senna 2 Tablet(s) Oral at bedtime  vancomycin    Solution 125 milliGRAM(s) Oral every 6 hours    MEDICATIONS  (PRN):  dextrose Oral Gel 15 Gram(s) Oral once PRN Blood Glucose LESS THAN 70 milliGRAM(s)/deciliter  HYDROmorphone  Injectable 0.5 milliGRAM(s) IV Push every 4 hours PRN Severe Pain (7 - 10)  ondansetron Injectable 4 milliGRAM(s) IV Push every 8 hours PRN Nausea and/or Vomiting  oxyCODONE    IR 5 milliGRAM(s) Oral every 6 hours PRN Severe Pain (7 - 10)  sodium chloride 0.9% lock flush 10 milliLiter(s) IV Push every 1 hour PRN Pre/post blood products, medications, blood draw, and to maintain line patency      Allergies    penicillin (Rash)    Intolerances    daptomycin (Rhabdomyolysis)       EXAM  Vital Signs Last 24 Hrs  T(C): 36.8 (10 Oct 2022 09:25), Max: 36.8 (10 Oct 2022 05:16)  T(F): 98.3 (10 Oct 2022 09:25), Max: 98.3 (10 Oct 2022 05:16)  HR: 86 (10 Oct 2022 12:20) (84 - 100)  BP: 168/73 (10 Oct 2022 12:20) (155/67 - 169/73)  BP(mean): 105 (10 Oct 2022 12:20) (95 - 105)  RR: 18 (10 Oct 2022 12:20) (16 - 18)  SpO2: 98% (10 Oct 2022 12:20) (95% - 99%)    Parameters below as of 10 Oct 2022 12:20  Patient On (Oxygen Delivery Method): room air  Awake alert   Neck supple  Oral mucosa moist   RRR  Chest CTA with decreased BSs at bases   Abd soft with markedly decreased tenderness   VAC in the lower part of the wound  LE with mild edema  Rios with good urine output        LABS:                        6.8    11.92 )-----------( 346      ( 10 Oct 2022 08:54 )             21.4     10-10    134<L>  |  101  |  25<H>  ----------------------------<  113<H>  4.8   |  25  |  1.83<H>    Ca    8.0<L>      10 Oct 2022 08:54  Phos  4.3     10-10  Mg     1.6     10-10    TPro  4.8<L>  /  Alb  2.2<L>  /  TBili  0.3  /  DBili  x   /  AST  657<H>  /  ALT  265<H>  /  AlkPhos  77  10-10      Urinalysis Basic - ( 09 Oct 2022 16:15 )    Color: Yellow / Appearance: Clear / SG: <=1.005 / pH: x  Gluc: x / Ketone: NEGATIVE  / Bili: Negative / Urobili: 0.2 E.U./dL   Blood: x / Protein: Trace mg/dL / Nitrite: NEGATIVE   Leuk Esterase: NEGATIVE / RBC: < 5 /HPF / WBC < 5 /HPF   Sq Epi: x / Non Sq Epi: 0-5 /HPF / Bacteria: Many /HPF    Creatine Kinase, Serum (10.10.22 @ 08:54)    Creatine Kinase, Serum: [id number]: TYPE:(C=Critical, N=Notification, A=Abnormal) C  TESTS: _CPK  DATE/TIME CALLED: _10/10/2022 09:31:12 EDT  CALLED TO: _ESTRELLA RN  READ BACK (2 Patient Identifiers)(Y/N): _Y  READ BACK VALUES (Y/N): _Y  CALLED BY: _AK U/L        MICROBIOLOGY:    Culture - Blood (10.07.22 @ 17:33)    Specimen Source: .Blood Blood    Culture Results:   No growth at 2 days.    Culture - Blood (10.07.22 @ 17:33)    Specimen Source: .Blood Blood    Culture Results:   No growth at 2 days.    Culture - Blood (10.04.22 @ 15:15)    Specimen Source: .Blood Blood    Culture Results:   No growth at 5 days.    Culture - Blood (10.04.22 @ 14:31)    Specimen Source: .Blood Blood    Culture Results:   No growth at 5 days.        RADIOLOGY & ADDITIONAL STUDIES:    CT Abdomen and Pelvis w/ Oral Cont (10.08.22 @ 13:38) >  PROCEDURE DATE:  10/08/2022        INTERPRETATION:  CLINICAL HISTORY: 72-year-old with possible   extravasation of contrast on abdominal radiograph. History of recent   bowel obstruction.        FINDINGS: CT of the abdomen and pelvis was performed without the   administration of intravenous contrast. Reconstructions were performed in   the sagittal and coronal planes. Comparison is made to prior studies   dated 9/24/2022 and 9/11/2022.    Evaluation of the lower chest demonstrates persistent small bilateral   pleural effusions. Negative for pericardial effusion. Bibasilar   atelectasis.    Evaluation of the abdomen demonstrates that the liver, spleen, pancreas,   bilateral adrenal glands and bilateral kidneys are unremarkable.   Cholecystectomy.    Evaluation of the gastrointestinal tract demonstrates moderate dilatation   of bowel loops within the left lower quadrant, overall diminished since   9/2022 with transition decompressed loops more distally. There is oral   contrast within large bowel. There is no evidence of extraluminal   contrast.    Evaluation of the pelvis demonstrates hysterectomy. Bladder is collapsed   around Rios catheter balloon and contains a small volume of air.    There is no significant free fluid. There is loosely loculated fluid   collection within the right flank extending into the pelvis,   significantly intervally decreased since prior study measuring 2.9 x 5.5   cm, previously spanning 3.7 x 1.8 cm.    Anterior abdominal incision containing packing material. No significant   aortic vascular calcification. Evaluation of the osseous structures   demonstrates no destructive lesion.            IMPRESSION:    Overall significant interval improvement of previously demonstrated bowel   obstruction. Negative for extraluminal contrast to suggest viscus   perforation.    Interval decrease in size of small loculated collection within the   anterior right hemipelvis.    Persistent small bilateral pleural effusions.

## 2022-10-10 NOTE — PROGRESS NOTE ADULT - ASSESSMENT
Rhabdomyolysis in the context of just 3 doses of Daptomycin  Now significant drop of H&H without apparent blood loss also in the context of significant hydration/IVF   Unclear whether another process in addition to dilution - immune mediated?  Cdiff colitis  S/P Peritonitis following SBR and extensive abdominal surgery  Enterococcus in the blood - possibility exists this represents a contaminated blood culture  Leukocytosis improved  CPK improved   SO      RECOMMEND  Continue PO Vancomycin for 10-14 days  Check LDH and haptoglobin  Recheck ESR and CRP  Would not initiate any new antimicrobial just yet      [phone]

## 2022-10-11 ENCOUNTER — APPOINTMENT (OUTPATIENT)
Dept: INTERNAL MEDICINE | Facility: CLINIC | Age: 72
End: 2022-10-11

## 2022-10-11 LAB
ALBUMIN SERPL ELPH-MCNC: 1.9 G/DL — LOW (ref 3.3–5)
ALP SERPL-CCNC: 80 U/L — SIGNIFICANT CHANGE UP (ref 40–120)
ALT FLD-CCNC: 250 U/L — HIGH (ref 10–45)
ANION GAP SERPL CALC-SCNC: 9 MMOL/L — SIGNIFICANT CHANGE UP (ref 5–17)
AST SERPL-CCNC: 427 U/L — HIGH (ref 10–40)
BILIRUB SERPL-MCNC: 0.3 MG/DL — SIGNIFICANT CHANGE UP (ref 0.2–1.2)
BUN SERPL-MCNC: 23 MG/DL — SIGNIFICANT CHANGE UP (ref 7–23)
CALCIUM SERPL-MCNC: 8.6 MG/DL — SIGNIFICANT CHANGE UP (ref 8.4–10.5)
CHLORIDE SERPL-SCNC: 104 MMOL/L — SIGNIFICANT CHANGE UP (ref 96–108)
CK SERPL-CCNC: 6326 U/L — HIGH (ref 25–170)
CO2 SERPL-SCNC: 24 MMOL/L — SIGNIFICANT CHANGE UP (ref 22–31)
CREAT SERPL-MCNC: 1.82 MG/DL — HIGH (ref 0.5–1.3)
CRP SERPL-MCNC: 80.7 MG/L — HIGH (ref 0–4)
EGFR: 30 ML/MIN/1.73M2 — LOW
GLUCOSE BLDC GLUCOMTR-MCNC: 107 MG/DL — HIGH (ref 70–99)
GLUCOSE BLDC GLUCOMTR-MCNC: 110 MG/DL — HIGH (ref 70–99)
GLUCOSE BLDC GLUCOMTR-MCNC: 110 MG/DL — HIGH (ref 70–99)
GLUCOSE BLDC GLUCOMTR-MCNC: 90 MG/DL — SIGNIFICANT CHANGE UP (ref 70–99)
GLUCOSE SERPL-MCNC: 95 MG/DL — SIGNIFICANT CHANGE UP (ref 70–99)
HAPTOGLOB SERPL-MCNC: 250 MG/DL — HIGH (ref 34–200)
HCT VFR BLD CALC: 24.1 % — LOW (ref 34.5–45)
HGB BLD-MCNC: 7.6 G/DL — LOW (ref 11.5–15.5)
LDH SERPL L TO P-CCNC: 899 U/L — HIGH (ref 50–242)
MAGNESIUM SERPL-MCNC: 2.4 MG/DL — SIGNIFICANT CHANGE UP (ref 1.6–2.6)
MCHC RBC-ENTMCNC: 27.2 PG — SIGNIFICANT CHANGE UP (ref 27–34)
MCHC RBC-ENTMCNC: 31.5 GM/DL — LOW (ref 32–36)
MCV RBC AUTO: 86.4 FL — SIGNIFICANT CHANGE UP (ref 80–100)
NRBC # BLD: 0 /100 WBCS — SIGNIFICANT CHANGE UP (ref 0–0)
PHOSPHATE SERPL-MCNC: 4.1 MG/DL — SIGNIFICANT CHANGE UP (ref 2.5–4.5)
PLATELET # BLD AUTO: 177 K/UL — SIGNIFICANT CHANGE UP (ref 150–400)
POTASSIUM SERPL-MCNC: 5 MMOL/L — SIGNIFICANT CHANGE UP (ref 3.5–5.3)
POTASSIUM SERPL-SCNC: 5 MMOL/L — SIGNIFICANT CHANGE UP (ref 3.5–5.3)
PROT SERPL-MCNC: 5.2 G/DL — LOW (ref 6–8.3)
RBC # BLD: 2.79 M/UL — LOW (ref 3.8–5.2)
RBC # FLD: 16.8 % — HIGH (ref 10.3–14.5)
SODIUM SERPL-SCNC: 137 MMOL/L — SIGNIFICANT CHANGE UP (ref 135–145)
WBC # BLD: 10.45 K/UL — SIGNIFICANT CHANGE UP (ref 3.8–10.5)
WBC # FLD AUTO: 10.45 K/UL — SIGNIFICANT CHANGE UP (ref 3.8–10.5)

## 2022-10-11 PROCEDURE — 99232 SBSQ HOSP IP/OBS MODERATE 35: CPT

## 2022-10-11 PROCEDURE — 99232 SBSQ HOSP IP/OBS MODERATE 35: CPT | Mod: GC

## 2022-10-11 PROCEDURE — 76775 US EXAM ABDO BACK WALL LIM: CPT | Mod: 26

## 2022-10-11 RX ADMIN — LIDOCAINE 1 PATCH: 4 CREAM TOPICAL at 18:30

## 2022-10-11 RX ADMIN — Medication 200 MILLIGRAM(S): at 17:55

## 2022-10-11 RX ADMIN — ENOXAPARIN SODIUM 40 MILLIGRAM(S): 100 INJECTION SUBCUTANEOUS at 14:08

## 2022-10-11 RX ADMIN — OXYCODONE HYDROCHLORIDE 5 MILLIGRAM(S): 5 TABLET ORAL at 10:04

## 2022-10-11 RX ADMIN — LIDOCAINE 1 PATCH: 4 CREAM TOPICAL at 14:07

## 2022-10-11 RX ADMIN — Medication 125 MILLIGRAM(S): at 06:08

## 2022-10-11 RX ADMIN — Medication 5 MILLIGRAM(S): at 00:53

## 2022-10-11 RX ADMIN — Medication 200 MILLIGRAM(S): at 06:08

## 2022-10-11 RX ADMIN — SODIUM CHLORIDE 150 MILLILITER(S): 9 INJECTION, SOLUTION INTRAVENOUS at 14:07

## 2022-10-11 RX ADMIN — ONDANSETRON 4 MILLIGRAM(S): 8 TABLET, FILM COATED ORAL at 22:54

## 2022-10-11 RX ADMIN — Medication 125 MILLIGRAM(S): at 22:54

## 2022-10-11 RX ADMIN — Medication 125 MILLIGRAM(S): at 14:08

## 2022-10-11 RX ADMIN — CHLORHEXIDINE GLUCONATE 1 APPLICATION(S): 213 SOLUTION TOPICAL at 06:53

## 2022-10-11 RX ADMIN — Medication 60 MILLIGRAM(S): at 14:28

## 2022-10-11 RX ADMIN — SENNA PLUS 2 TABLET(S): 8.6 TABLET ORAL at 23:05

## 2022-10-11 RX ADMIN — Medication 20 MILLIGRAM(S): at 07:22

## 2022-10-11 RX ADMIN — Medication 125 MILLIGRAM(S): at 00:53

## 2022-10-11 RX ADMIN — Medication 5 MILLIGRAM(S): at 22:55

## 2022-10-11 NOTE — PROGRESS NOTE ADULT - SUBJECTIVE AND OBJECTIVE BOX
SUBJECTIVE:  Patient seen and examined at bedside. Feels achey. Some abdominal pain/discomfort this AM. No fevers, chills, SOB, CP, palpitations.     Vital Signs Last 12 Hrs  T(F): 98.3 (10-11-22 @ 10:19), Max: 98.6 (10-11-22 @ 04:56)  HR: 83 (10-11-22 @ 10:35) (80 - 88)  BP: 169/73 (10-11-22 @ 10:35) (155/67 - 174/79)  BP(mean): 105 (10-11-22 @ 10:35) (97 - 119)  RR: 19 (10-11-22 @ 10:35) (16 - 19)  SpO2: 97% (10-11-22 @ 10:35) (94% - 97%)  I&O's Summary    10 Oct 2022 07:01  -  11 Oct 2022 07:00  --------------------------------------------------------  IN: 5780 mL / OUT: 3195 mL / NET: 2585 mL    11 Oct 2022 07:01  -  11 Oct 2022 11:26  --------------------------------------------------------  IN: 800 mL / OUT: 875 mL / NET: -75 mL        PHYSICAL EXAM:  Constitutional: NAD, laying in bed  HEENT: NC/AT, EOMI  Neck: Supple, no JVD  Respiratory: CTA B/L   Cardiovascular: RRR, normal S1 and S2   Gastrointestinal: +BS, soft minimal tenderness  Extremities: no LE edema  Neurological: AAOx3, upper extremity weakness similar to prior  Psych: anxious        LABS:                        7.6    10.45 )-----------( 177      ( 11 Oct 2022 06:56 )             24.1     10-11    137  |  104  |  23  ----------------------------<  95  5.0   |  24  |  1.82<H>    Ca    8.6      11 Oct 2022 06:56  Phos  4.1     10-11  Mg     2.4     10-11    TPro  5.2<L>  /  Alb  1.9<L>  /  TBili  0.3  /  DBili  x   /  AST  427<H>  /  ALT  250<H>  /  AlkPhos  80  10-11      Urinalysis Basic - ( 09 Oct 2022 16:15 )    Color: Yellow / Appearance: Clear / SG: <=1.005 / pH: x  Gluc: x / Ketone: NEGATIVE  / Bili: Negative / Urobili: 0.2 E.U./dL   Blood: x / Protein: Trace mg/dL / Nitrite: NEGATIVE   Leuk Esterase: NEGATIVE / RBC: < 5 /HPF / WBC < 5 /HPF   Sq Epi: x / Non Sq Epi: 0-5 /HPF / Bacteria: Many /HPF          RADIOLOGY & ADDITIONAL TESTS:    MEDICATIONS  (STANDING):  chlorhexidine 2% Cloths 1 Application(s) Topical <User Schedule>  dextrose 5%. 1000 milliLiter(s) (100 mL/Hr) IV Continuous <Continuous>  dextrose 5%. 1000 milliLiter(s) (50 mL/Hr) IV Continuous <Continuous>  dextrose 50% Injectable 25 Gram(s) IV Push once  dextrose 50% Injectable 12.5 Gram(s) IV Push once  dextrose 50% Injectable 25 Gram(s) IV Push once  enoxaparin Injectable 40 milliGRAM(s) SubCutaneous every 24 hours  furosemide   Injectable 20 milliGRAM(s) IV Push every 24 hours  glucagon  Injectable 1 milliGRAM(s) IntraMuscular once  influenza  Vaccine (HIGH DOSE) 0.7 milliLiter(s) IntraMuscular once  insulin lispro (ADMELOG) corrective regimen sliding scale   SubCutaneous three times a day before meals  insulin lispro (ADMELOG) corrective regimen sliding scale   SubCutaneous at bedtime  labetalol 200 milliGRAM(s) Oral every 12 hours  lactated ringers. 1000 milliLiter(s) (150 mL/Hr) IV Continuous <Continuous>  lidocaine   4% Patch 1 Patch Transdermal every 24 hours  melatonin 5 milliGRAM(s) Oral at bedtime  NIFEdipine XL 60 milliGRAM(s) Oral every 24 hours  senna 2 Tablet(s) Oral at bedtime  vancomycin    Solution 125 milliGRAM(s) Oral every 6 hours    MEDICATIONS  (PRN):  dextrose Oral Gel 15 Gram(s) Oral once PRN Blood Glucose LESS THAN 70 milliGRAM(s)/deciliter  HYDROmorphone  Injectable 0.5 milliGRAM(s) IV Push every 4 hours PRN Severe Pain (7 - 10)  ondansetron Injectable 4 milliGRAM(s) IV Push every 8 hours PRN Nausea and/or Vomiting  oxyCODONE    IR 5 milliGRAM(s) Oral every 6 hours PRN Severe Pain (7 - 10)  sodium chloride 0.9% lock flush 10 milliLiter(s) IV Push every 1 hour PRN Pre/post blood products, medications, blood draw, and to maintain line patency

## 2022-10-11 NOTE — PROGRESS NOTE ADULT - ASSESSMENT
72 F PMH HTN, DM and PSH  x 2, hysterectomy and lap dev ( w/ Dr. Clifford) admitted on  with 2 days of n/v, abd pain, imaging demonstrating LBO, which resolved prior to operative exploration. Recurrent bowel obstruction, now s/p Exlap JOSEFINA, SBR (100 cm) (9/15). now with wound vac, pending YOSEPH placement c/b c.diff and rhabdo 2/2 daptomycin    Regular, IVF, ISS  Lasix 20 daily   Pain/nausea prn  Vanco PO (10/4-), s/p Daptomycin (10/4-10/7)  Wound Vac  SQH/SCDs/OOBA/IS  AM labs  Renal recs  Dispo: Y[facility]

## 2022-10-11 NOTE — PROGRESS NOTE ADULT - SUBJECTIVE AND OBJECTIVE BOX
INTERVAL HPI/OVERNIGHT EVENTS:   SURGERY ATTENDING    STATUS POST:      Exploratory laparotomy with lysis of adhesions extensive one hour , Small bowel resection with primary anastomosis, Repair of recurrent Incisional hernia with myofascial flaps no mesh, washout, drainage, PREVENA        POST OPERATIVE DAY #: 26      SUBJECTIVE:  Flatus: [x ] YES [ ] NO             Bowel Movement: [x ] YES [ ] NO  Pain (0-10):       2     Pain Control Adequate: [x ] YES [ ] NO  Nausea: [ ] YES [x ] NO            Vomiting: [ ] YES [x ] NO  Diarrhea: [ ] YES [ x] NO         Constipation: [ ] YES [x ] NO     Chest Pain: [ ] YES [x ] NO    SOB:  [ ] YES [x ] NO      MEDICATIONS  (STANDING):  chlorhexidine 2% Cloths 1 Application(s) Topical <User Schedule>  dextrose 5%. 1000 milliLiter(s) (100 mL/Hr) IV Continuous <Continuous>  dextrose 5%. 1000 milliLiter(s) (50 mL/Hr) IV Continuous <Continuous>  dextrose 50% Injectable 25 Gram(s) IV Push once  dextrose 50% Injectable 12.5 Gram(s) IV Push once  dextrose 50% Injectable 25 Gram(s) IV Push once  enoxaparin Injectable 40 milliGRAM(s) SubCutaneous every 24 hours  furosemide   Injectable 20 milliGRAM(s) IV Push every 24 hours  glucagon  Injectable 1 milliGRAM(s) IntraMuscular once  influenza  Vaccine (HIGH DOSE) 0.7 milliLiter(s) IntraMuscular once  insulin lispro (ADMELOG) corrective regimen sliding scale   SubCutaneous three times a day before meals  insulin lispro (ADMELOG) corrective regimen sliding scale   SubCutaneous at bedtime  labetalol 200 milliGRAM(s) Oral every 12 hours  lactated ringers. 1000 milliLiter(s) (150 mL/Hr) IV Continuous <Continuous>  lidocaine   4% Patch 1 Patch Transdermal every 24 hours  melatonin 5 milliGRAM(s) Oral at bedtime  NIFEdipine XL 60 milliGRAM(s) Oral every 24 hours  senna 2 Tablet(s) Oral at bedtime  vancomycin    Solution 125 milliGRAM(s) Oral every 6 hours    MEDICATIONS  (PRN):  dextrose Oral Gel 15 Gram(s) Oral once PRN Blood Glucose LESS THAN 70 milliGRAM(s)/deciliter  HYDROmorphone  Injectable 0.5 milliGRAM(s) IV Push every 4 hours PRN Severe Pain (7 - 10)  ondansetron Injectable 4 milliGRAM(s) IV Push every 8 hours PRN Nausea and/or Vomiting  oxyCODONE    IR 5 milliGRAM(s) Oral every 6 hours PRN Severe Pain (7 - 10)  sodium chloride 0.9% lock flush 10 milliLiter(s) IV Push every 1 hour PRN Pre/post blood products, medications, blood draw, and to maintain line patency      Vital Signs Last 24 Hrs  T(C): 36.8 (11 Oct 2022 10:19), Max: 37.1 (10 Oct 2022 22:22)  T(F): 98.3 (11 Oct 2022 10:19), Max: 98.8 (10 Oct 2022 22:22)  HR: 83 (11 Oct 2022 10:35) (80 - 94)  BP: 169/73 (11 Oct 2022 10:35) (149/66 - 174/79)  BP(mean): 105 (11 Oct 2022 10:35) (95 - 121)  RR: 19 (11 Oct 2022 10:35) (15 - 19)  SpO2: 97% (11 Oct 2022 10:35) (94% - 98%)    Parameters below as of 11 Oct 2022 10:35  Patient On (Oxygen Delivery Method): room air        PHYSICAL EXAM:      Constitutional:    Eyes:    ENMT:    Neck:    Breasts:    Back:    Respiratory:    Cardiovascular:    Gastrointestinal:    Genitourinary:    Rectal:    Extremities:    Vascular:    Neurological:    Skin:    Lymph Nodes:    Musculoskeletal:    Psychiatric:        I&O's Detail    10 Oct 2022 07:01  -  11 Oct 2022 07:00  --------------------------------------------------------  IN:    IV PiggyBack: 50 mL    Lactated Ringers: 5250 mL    PRBCs (Packed Red Blood Cells): 480 mL  Total IN: 5780 mL    OUT:    Indwelling Catheter - Urethral (mL): 3110 mL    Rectal Tube (mL): 50 mL    VAC (Vacuum Assisted Closure) System (mL): 35 mL  Total OUT: 3195 mL    Total NET: 2585 mL      11 Oct 2022 07:01  -  11 Oct 2022 11:10  --------------------------------------------------------  IN:    Lactated Ringers: 800 mL  Total IN: 800 mL    OUT:    Indwelling Catheter - Urethral (mL): 875 mL  Total OUT: 875 mL    Total NET: -75 mL          LABS:                        7.6    10.45 )-----------( 177      ( 11 Oct 2022 06:56 )             24.1     10-11    137  |  104  |  23  ----------------------------<  95  5.0   |  24  |  1.82<H>    Ca    8.6      11 Oct 2022 06:56  Phos  4.1     10-11  Mg     2.4     10-11    TPro  5.2<L>  /  Alb  1.9<L>  /  TBili  0.3  /  DBili  x   /  AST  427<H>  /  ALT  250<H>  /  AlkPhos  80  10-11      Urinalysis Basic - ( 09 Oct 2022 16:15 )    Color: Yellow / Appearance: Clear / SG: <=1.005 / pH: x  Gluc: x / Ketone: NEGATIVE  / Bili: Negative / Urobili: 0.2 E.U./dL   Blood: x / Protein: Trace mg/dL / Nitrite: NEGATIVE   Leuk Esterase: NEGATIVE / RBC: < 5 /HPF / WBC < 5 /HPF   Sq Epi: x / Non Sq Epi: 0-5 /HPF / Bacteria: Many /HPF        RADIOLOGY & ADDITIONAL STUDIES:

## 2022-10-11 NOTE — PROGRESS NOTE ADULT - ASSESSMENT
72 year old woman with HTN, DM2 admitted for large bowel obstruction s/p ex lap w/ lysis of adhesions and bowel resection on 9/14 for SBO. She is also s/p sepsis and peritonitis. Nephrology initially consulted for prerenal azotemia which resolved. Nephrology re-consulted for SO Cr 1.73 and rhabdomyolysis 2/2 daptomycin.      #Non-oliguric pigment induced ATN  No known underlying CKD  Cr Stable 1.83-->1.82  UA w/ trace proteinuria, large blood with <5 RBCs  CK trending down now, currently 6K  3.1 L of UO in last 24 hours  Renal sono noted  Likely etiology toxic ATN due to rhabdomyolysis secondary to daptomycin use        Plan:  Continue with IVF and Tx of rhabdo as below   Renal sono   Daily BMP    #Rhabdomyolysis   likely due to daptomycin   CPK trending down now; 6K today after peaking at 30K, s/p IV fluids    Plan:   Continue LR at 150cc/hr. Once CK<5,000, can dc IV fluids  Trend CPK   Monitor Urine out put as it should be >200cc/hr   Daily BMP   Strict ins and outs   Avoid nephrotoxic agents       Sergio Sands M.D  PGY-4 Nephrology   [phone]

## 2022-10-11 NOTE — PROGRESS NOTE ADULT - SUBJECTIVE AND OBJECTIVE BOX
24 Hour Events:   SUBJECTIVE: Patient seen and examined at bedside. She reports muscle aches and soreness improving. Rios in place. Rectal tube dc'd. hgb 7.6(6.8),    PAST MEDICAL & SURGICAL HISTORY:  Diabetes      H/O thyroid disease      S/P total abdominal hysterectomy      History of laparoscopic cholecystectomy      History of         Allergies    penicillin (Rash)    Intolerances    daptomycin (Muscle Pain)    MEDICATIONS  (STANDING):  chlorhexidine 2% Cloths 1 Application(s) Topical <User Schedule>  dextrose 5%. 1000 milliLiter(s) (100 mL/Hr) IV Continuous <Continuous>  dextrose 5%. 1000 milliLiter(s) (50 mL/Hr) IV Continuous <Continuous>  dextrose 50% Injectable 25 Gram(s) IV Push once  dextrose 50% Injectable 12.5 Gram(s) IV Push once  dextrose 50% Injectable 25 Gram(s) IV Push once  enoxaparin Injectable 40 milliGRAM(s) SubCutaneous every 24 hours  furosemide   Injectable 20 milliGRAM(s) IV Push every 24 hours  glucagon  Injectable 1 milliGRAM(s) IntraMuscular once  influenza  Vaccine (HIGH DOSE) 0.7 milliLiter(s) IntraMuscular once  insulin lispro (ADMELOG) corrective regimen sliding scale   SubCutaneous three times a day before meals  insulin lispro (ADMELOG) corrective regimen sliding scale   SubCutaneous at bedtime  labetalol 200 milliGRAM(s) Oral every 12 hours  lactated ringers. 1000 milliLiter(s) (250 mL/Hr) IV Continuous <Continuous>  lidocaine   4% Patch 1 Patch Transdermal every 24 hours  melatonin 5 milliGRAM(s) Oral at bedtime  NIFEdipine XL 60 milliGRAM(s) Oral every 24 hours  senna 2 Tablet(s) Oral at bedtime  vancomycin    Solution 125 milliGRAM(s) Oral every 6 hours    MEDICATIONS  (PRN):  dextrose Oral Gel 15 Gram(s) Oral once PRN Blood Glucose LESS THAN 70 milliGRAM(s)/deciliter  HYDROmorphone  Injectable 0.5 milliGRAM(s) IV Push every 4 hours PRN Severe Pain (7 - 10)  ondansetron Injectable 4 milliGRAM(s) IV Push every 8 hours PRN Nausea and/or Vomiting  oxyCODONE    IR 5 milliGRAM(s) Oral every 6 hours PRN Severe Pain (7 - 10)  sodium chloride 0.9% lock flush 10 milliLiter(s) IV Push every 1 hour PRN Pre/post blood products, medications, blood draw, and to maintain line patency        Physical Exam:   General: Well apperaing, resting comfortably in bed in no acute distress   Neuro: Grossly intact bilaterally   HEENT: Normocephalic, atraumatic, trachea midline, no JVD   Chest:   Heart: Regular S1/S2, no murmurs rubs or gallops   Lungs: Unlabored breathing on ***; Clear to auscultation bilaterally, no adventitious sounds   Abdomen: Soft, ND, appropriately soft, wound vac in place.  Upper Extremities: No edema, freely mobile bilaterally   Lower Extremities: No edema, SCDs in place, feet warm bilaterally   Skin: Warm, non-diaphoretic throughout       Labs:                         7.6    10.45 )-----------( 177      ( 11 Oct 2022 06:56 )             24.1     10-11    137  |  104  |  23  ----------------------------<  95  5.0   |  24  |  1.82<H>    Ca    8.6      11 Oct 2022 06:56  Phos  4.1     10-11  Mg     2.4     10-11    TPro  5.2<L>  /  Alb  1.9<L>  /  TBili  0.3  /  DBili  x   /  AST  427<H>  /  ALT  250<H>  /  AlkPhos  80  10-11    CAPILLARY BLOOD GLUCOSE      POCT Blood Glucose.: 90 mg/dL (11 Oct 2022 06:17)  POCT Blood Glucose.: 109 mg/dL (10 Oct 2022 22:13)  POCT Blood Glucose.: 113 mg/dL (10 Oct 2022 16:35)  POCT Blood Glucose.: 114 mg/dL (10 Oct 2022 12:14)    CARDIAC MARKERS ( 11 Oct 2022 06:56 )  x     / x     / 6326 U/L / x     / x      CARDIAC MARKERS ( 10 Oct 2022 08:54 )  x     / x     / 53823 U/L / x     / x            COVID-19 PCR: NotDetec (27 Sep 2022 17:43)  COVID-19 PCR: Detected (23 Sep 2022 13:54)  COVID-19 PCR: Detected (14 Sep 2022 10:43)  COVID-19 PCR: Positive (11 Sep 2022 15:05)    Urinalysis Basic - ( 09 Oct 2022 16:15 )    Color: Yellow / Appearance: Clear / SG: <=1.005 / pH: x  Gluc: x / Ketone: NEGATIVE  / Bili: Negative / Urobili: 0.2 E.U./dL   Blood: x / Protein: Trace mg/dL / Nitrite: NEGATIVE   Leuk Esterase: NEGATIVE / RBC: < 5 /HPF / WBC < 5 /HPF   Sq Epi: x / Non Sq Epi: 0-5 /HPF / Bacteria: Many /HPF          Vital Signs:   Vital Signs Last 24 Hrs  T(C): 37 (11 Oct 2022 04:56), Max: 37.1 (10 Oct 2022 22:22)  T(F): 98.6 (11 Oct 2022 04:56), Max: 98.8 (10 Oct 2022 22:22)  HR: 88 (11 Oct 2022 04:40) (84 - 94)  BP: 164/71 (11 Oct 2022 04:40) (149/66 - 169/72)  BP(mean): 102 (11 Oct 2022 04:40) (95 - 121)  RR: 16 (11 Oct 2022 04:40) (15 - 18)  SpO2: 94% (11 Oct 2022 04:40) (94% - 98%)    Parameters below as of 11 Oct 2022 04:40  Patient On (Oxygen Delivery Method): room air          Input/Output:   I&O's Detail    10 Oct 2022 07:01  -  11 Oct 2022 07:00  --------------------------------------------------------  IN:    IV PiggyBack: 50 mL    Lactated Ringers: 5250 mL    PRBCs (Packed Red Blood Cells): 480 mL  Total IN: 5780 mL    OUT:    Indwelling Catheter - Urethral (mL): 3110 mL    Rectal Tube (mL): 50 mL    VAC (Vacuum Assisted Closure) System (mL): 35 mL  Total OUT: 3195 mL    Total NET: 2585 mL        Daily     Daily

## 2022-10-11 NOTE — PROGRESS NOTE ADULT - SUBJECTIVE AND OBJECTIVE BOX
Patient is a 72y Female seen and evaluated at bedside. Overnight events noted. Pt continues to have some soreness and muscle aches but improving. Denies any other symptoms.       Meds:    chlorhexidine 2% Cloths 1 <User Schedule>  dextrose 5%. 1000 <Continuous>  dextrose 5%. 1000 <Continuous>  dextrose 50% Injectable 25 once  dextrose 50% Injectable 12.5 once  dextrose 50% Injectable 25 once  dextrose Oral Gel 15 once PRN  enoxaparin Injectable 40 every 24 hours  furosemide   Injectable 20 every 24 hours  glucagon  Injectable 1 once  HYDROmorphone  Injectable 0.5 every 4 hours PRN  influenza  Vaccine (HIGH DOSE) 0.7 once  insulin lispro (ADMELOG) corrective regimen sliding scale  three times a day before meals  insulin lispro (ADMELOG) corrective regimen sliding scale  at bedtime  labetalol 200 every 12 hours  lactated ringers. 1000 <Continuous>  lidocaine   4% Patch 1 every 24 hours  melatonin 5 at bedtime  NIFEdipine XL 60 every 24 hours  ondansetron Injectable 4 every 8 hours PRN  oxyCODONE    IR 5 every 6 hours PRN  senna 2 at bedtime  sodium chloride 0.9% lock flush 10 every 1 hour PRN  vancomycin    Solution 125 every 6 hours      T(C): , Max: 37.1 (10-10-22 @ 22:22)  T(F): , Max: 98.8 (10-10-22 @ 22:22)  HR: 78 (10-11-22 @ 14:17)  BP: 141/82 (10-11-22 @ 14:17)  BP(mean): 105 (10-11-22 @ 10:35)  RR: 18 (10-11-22 @ 14:17)  SpO2: 100% (10-11-22 @ 14:17)  Wt(kg): --    10-10 @ 07:01  -  10-11 @ 07:00  --------------------------------------------------------  IN: 5780 mL / OUT: 3195 mL / NET: 2585 mL    10-11 @ 07:01  -  10-11 @ 15:02  --------------------------------------------------------  IN: 1520 mL / OUT: 2075 mL / NET: -555 mL          Review of Systems:  ROS negative except as per HPI      PHYSICAL EXAM:  Constitutional: pleasant female NAD, laying in bed   Neck: Supple, no JVD  Respiratory: CTA B/L, on RA  Cardiovascular: RRR, normal S1 and S2,   Gastrointestinal: +BS, minimal tenderness  Extremities: no edema, warm  Neurological: AAOx3, moving all extremities spontaneously     LABS:                        7.6    10.45 )-----------( 177      ( 11 Oct 2022 06:56 )             24.1     10-11    137  |  104  |  23  ----------------------------<  95  5.0   |  24  |  1.82<H>    Ca    8.6      11 Oct 2022 06:56  Phos  4.1     10-11  Mg     2.4     10-11    TPro  5.2<L>  /  Alb  1.9<L>  /  TBili  0.3  /  DBili  x   /  AST  427<H>  /  ALT  250<H>  /  AlkPhos  80  10-11        Urinalysis Basic - ( 09 Oct 2022 16:15 )    Color: Yellow / Appearance: Clear / SG: <=1.005 / pH: x  Gluc: x / Ketone: NEGATIVE  / Bili: Negative / Urobili: 0.2 E.U./dL   Blood: x / Protein: Trace mg/dL / Nitrite: NEGATIVE   Leuk Esterase: NEGATIVE / RBC: < 5 /HPF / WBC < 5 /HPF   Sq Epi: x / Non Sq Epi: 0-5 /HPF / Bacteria: Many /HPF      Osmolality, Random Urine: 190 mosm/kg (10-09 @ 16:15)  Sodium, Random Urine: 74 mmol/L (10-09 @ 16:15)  Creatinine, Random Urine: 9 mg/dL (10-09 @ 16:15)        RADIOLOGY & ADDITIONAL STUDIES:

## 2022-10-11 NOTE — PROGRESS NOTE ADULT - SUBJECTIVE AND OBJECTIVE BOX
INTERVAL HPI/OVERNIGHT EVENTS:        MEDICATIONS  (STANDING):  chlorhexidine 2% Cloths 1 Application(s) Topical <User Schedule>  dextrose 5%. 1000 milliLiter(s) (100 mL/Hr) IV Continuous <Continuous>  dextrose 5%. 1000 milliLiter(s) (50 mL/Hr) IV Continuous <Continuous>  dextrose 50% Injectable 25 Gram(s) IV Push once  dextrose 50% Injectable 12.5 Gram(s) IV Push once  dextrose 50% Injectable 25 Gram(s) IV Push once  enoxaparin Injectable 40 milliGRAM(s) SubCutaneous every 24 hours  furosemide   Injectable 20 milliGRAM(s) IV Push every 24 hours  glucagon  Injectable 1 milliGRAM(s) IntraMuscular once  influenza  Vaccine (HIGH DOSE) 0.7 milliLiter(s) IntraMuscular once  insulin lispro (ADMELOG) corrective regimen sliding scale   SubCutaneous three times a day before meals  insulin lispro (ADMELOG) corrective regimen sliding scale   SubCutaneous at bedtime  labetalol 200 milliGRAM(s) Oral every 12 hours  lactated ringers. 1000 milliLiter(s) (150 mL/Hr) IV Continuous <Continuous>  lidocaine   4% Patch 1 Patch Transdermal every 24 hours  melatonin 5 milliGRAM(s) Oral at bedtime  NIFEdipine XL 60 milliGRAM(s) Oral every 24 hours  senna 2 Tablet(s) Oral at bedtime  vancomycin    Solution 125 milliGRAM(s) Oral every 6 hours    MEDICATIONS  (PRN):  dextrose Oral Gel 15 Gram(s) Oral once PRN Blood Glucose LESS THAN 70 milliGRAM(s)/deciliter  HYDROmorphone  Injectable 0.5 milliGRAM(s) IV Push every 4 hours PRN Severe Pain (7 - 10)  ondansetron Injectable 4 milliGRAM(s) IV Push every 8 hours PRN Nausea and/or Vomiting  oxyCODONE    IR 5 milliGRAM(s) Oral every 6 hours PRN Severe Pain (7 - 10)  sodium chloride 0.9% lock flush 10 milliLiter(s) IV Push every 1 hour PRN Pre/post blood products, medications, blood draw, and to maintain line patency      Allergies    penicillin (Rash)    Intolerances    daptomycin (Muscle Pain)      EXAM  Vital Signs Last 24 Hrs  T(C): 37 (11 Oct 2022 04:56), Max: 37.1 (10 Oct 2022 22:22)  T(F): 98.6 (11 Oct 2022 04:56), Max: 98.8 (10 Oct 2022 22:22)  HR: 88 (11 Oct 2022 04:40) (84 - 94)  BP: 164/71 (11 Oct 2022 04:40) (149/66 - 169/72)  BP(mean): 102 (11 Oct 2022 04:40) (95 - 121)  RR: 16 (11 Oct 2022 04:40) (15 - 18)  SpO2: 94% (11 Oct 2022 04:40) (94% - 98%)    Parameters below as of 11 Oct 2022 04:40  Patient On (Oxygen Delivery Method): room air          LABS:                        7.6    10.45 )-----------( 177      ( 11 Oct 2022 06:56 )             24.1     10-11    137  |  104  |  23  ----------------------------<  95  5.0   |  24  |  1.82<H>    Ca    8.6      11 Oct 2022 06:56  Phos  4.1     10-11  Mg     2.4     10-11    TPro  5.2<L>  /  Alb  1.9<L>  /  TBili  0.3  /  DBili  x   /  AST  427<H>  /  ALT  250<H>  /  AlkPhos  80  10-11      Urinalysis Basic - ( 09 Oct 2022 16:15 )    Color: Yellow / Appearance: Clear / SG: <=1.005 / pH: x  Gluc: x / Ketone: NEGATIVE  / Bili: Negative / Urobili: 0.2 E.U./dL   Blood: x / Protein: Trace mg/dL / Nitrite: NEGATIVE   Leuk Esterase: NEGATIVE / RBC: < 5 /HPF / WBC < 5 /HPF   Sq Epi: x / Non Sq Epi: 0-5 /HPF / Bacteria: Many /HPF        MICROBIOLOGY:        RADIOLOGY & ADDITIONAL STUDIES: INTERVAL HPI/OVERNIGHT EVENTS:    Feels better albeit still quite weak  No diarrhea  Wants to eat solid food    MEDICATIONS  (STANDING):  chlorhexidine 2% Cloths 1 Application(s) Topical <User Schedule>  dextrose 5%. 1000 milliLiter(s) (100 mL/Hr) IV Continuous <Continuous>  dextrose 5%. 1000 milliLiter(s) (50 mL/Hr) IV Continuous <Continuous>  dextrose 50% Injectable 25 Gram(s) IV Push once  dextrose 50% Injectable 12.5 Gram(s) IV Push once  dextrose 50% Injectable 25 Gram(s) IV Push once  enoxaparin Injectable 40 milliGRAM(s) SubCutaneous every 24 hours  furosemide   Injectable 20 milliGRAM(s) IV Push every 24 hours  glucagon  Injectable 1 milliGRAM(s) IntraMuscular once  influenza  Vaccine (HIGH DOSE) 0.7 milliLiter(s) IntraMuscular once  insulin lispro (ADMELOG) corrective regimen sliding scale   SubCutaneous three times a day before meals  insulin lispro (ADMELOG) corrective regimen sliding scale   SubCutaneous at bedtime  labetalol 200 milliGRAM(s) Oral every 12 hours  lactated ringers. 1000 milliLiter(s) (150 mL/Hr) IV Continuous <Continuous>  lidocaine   4% Patch 1 Patch Transdermal every 24 hours  melatonin 5 milliGRAM(s) Oral at bedtime  NIFEdipine XL 60 milliGRAM(s) Oral every 24 hours  senna 2 Tablet(s) Oral at bedtime  vancomycin    Solution 125 milliGRAM(s) Oral every 6 hours    MEDICATIONS  (PRN):  dextrose Oral Gel 15 Gram(s) Oral once PRN Blood Glucose LESS THAN 70 milliGRAM(s)/deciliter  HYDROmorphone  Injectable 0.5 milliGRAM(s) IV Push every 4 hours PRN Severe Pain (7 - 10)  ondansetron Injectable 4 milliGRAM(s) IV Push every 8 hours PRN Nausea and/or Vomiting  oxyCODONE    IR 5 milliGRAM(s) Oral every 6 hours PRN Severe Pain (7 - 10)  sodium chloride 0.9% lock flush 10 milliLiter(s) IV Push every 1 hour PRN Pre/post blood products, medications, blood draw, and to maintain line patency      Allergies    penicillin (Rash)    Intolerances    daptomycin (Muscle Pain)      EXAM  Vital Signs Last 24 Hrs  T(C): 37 (11 Oct 2022 04:56), Max: 37.1 (10 Oct 2022 22:22)  T(F): 98.6 (11 Oct 2022 04:56), Max: 98.8 (10 Oct 2022 22:22)  HR: 88 (11 Oct 2022 04:40) (84 - 94)  BP: 164/71 (11 Oct 2022 04:40) (149/66 - 169/72)  BP(mean): 102 (11 Oct 2022 04:40) (95 - 121)  RR: 16 (11 Oct 2022 04:40) (15 - 18)  SpO2: 94% (11 Oct 2022 04:40) (94% - 98%)    Parameters below as of 11 Oct 2022 04:40  Patient On (Oxygen Delivery Method): room air  Awake and alert  No rash  Some edema especially upper extremities   RR  Chest with decreased BSs at bases  Abd soft and NT  VAC in the wound  Rios with urine output  PICC exit clean  No phlebitis        LABS:                        7.6    10.45 )-----------( 177      ( 11 Oct 2022 06:56 )             24.1     10-11    137  |  104  |  23  ----------------------------<  95  5.0   |  24  |  1.82<H>    Ca    8.6      11 Oct 2022 06:56  Phos  4.1     10-11  Mg     2.4     10-11    TPro  5.2<L>  /  Alb  1.9<L>  /  TBili  0.3  /  DBili  x   /  AST  427<H>  /  ALT  250<H>  /  AlkPhos  80  10-11      Urinalysis Basic - ( 09 Oct 2022 16:15 )    Color: Yellow / Appearance: Clear / SG: <=1.005 / pH: x  Gluc: x / Ketone: NEGATIVE  / Bili: Negative / Urobili: 0.2 E.U./dL   Blood: x / Protein: Trace mg/dL / Nitrite: NEGATIVE   Leuk Esterase: NEGATIVE / RBC: < 5 /HPF / WBC < 5 /HPF   Sq Epi: x / Non Sq Epi: 0-5 /HPF / Bacteria: Many /HPF    Lactate Dehydrogenase, Serum (10.11.22 @ 06:56)    Lactate Dehydrogenase, Serum: 899 U/L    Haptoglobin, Serum in AM (10.11.22 @ 06:56)    Haptoglobin, Serum: 250 mg/dL        MICROBIOLOGY:    Culture - Blood (10.07.22 @ 17:33)    Specimen Source: .Blood Blood    Culture Results:   No growth at 4 days.    Culture - Blood (10.07.22 @ 17:33)    Specimen Source: .Blood Blood    Culture Results:   No growth at 4 days.    Culture - Blood (10.04.22 @ 15:15)    Specimen Source: .Blood Blood    Culture Results:   No growth at 5 days.    Culture - Blood (10.04.22 @ 14:31)    Specimen Source: .Blood Blood    Culture Results:   No growth at 5 days.

## 2022-10-11 NOTE — PROGRESS NOTE ADULT - ASSESSMENT
72 year old woman with HTN, DM2, past surgical history of  x 2, hysterectomy, and laparoscopic cholecystectomy (), admitted for large bowel obstruction; had spontaneous return of bowel function initially; however, bowel obstruction recurred, so taken to OR for ex lap with lysis of adhesions and bowel resection () . Monitored in SICU post -op ; now on regional. Hospital course complicated by rhabdomyolysis, likely daptomycin induced (CK elevated on 10/7/2022)    # Large bowel obstruction   # post operative state  s/p OR for ex lap with lysis of adhesions and bowel resection ()  - rest of care per primary team     #Upper extremity weakness  -in setting of rhabdo. Improving slightly. Continue monitoring.     # non-traumatic Rhabdomyolysis  CK elevated to 30K on 10/7/2022. Likely daptomycin associated. Stopped daptomycin and statin.   - f/u with renal recs  -  CK dropping.     #SO  -Remains stable  -Diuresis a/p nephrology    #Elevated LFTs  -In setting of rhabdo. Continue monitoring. Improving    #C diff; non severe  -On PO vanc 10/4-     #E. faecalis bacteremia  -Afebrile at this time. was On dapto 10/4-10/7; Switched to vancomycin 10/7-10/10  -Repeat Cx's negative to date  -Management a/p ID    #Obesity - Dose medications by weight     #HTN   takes HCTZ 25mg qd, losartan 100mg qd, labetalol 200mg BID, nifedipine 60mg ER qd at home.   - continue labetalol 200mg BID  - would give nifedipine 90 mg ER   - Continue to hold HCTZ for now and on discharge     # Type 2 Diabetes complicated by hyperglycemia   cw insulin sliding scale while inpatient.   pt's a1c at goal for her age, resume metformin on discharge.     #Incidental finding of bilateral lung nodules  CT chest showing bilateral lung nodules, measuring up to 7 mm in the ridght upper lobe; Counseled patient on finding.   Recommended follow-up chest CT in three months to ensure stability.; Copy of radiology report provided to patient for her records.     # COVID 19 infection (present on admission) - Improved/resolved. Off isolation.     # Acute blood loss anemia   Would transfuse <7.0  age appropriate cancer screening outpatient     DVT ppx - HSQ

## 2022-10-11 NOTE — PROGRESS NOTE ADULT - ASSESSMENT
Rhabdomyolysis in the context of just 3 doses of Daptomycin  Cdiff colitis - resolving   S/P Peritonitis following SBR and extensive abdominal surgery  Enterococcus in the blood -  contaminated blood culture  Leukocytosis improved  CPK improved   SO - creatinine stabilized      RECOMMEND  Continue PO Vancomycin for 10-14 days  Would not initiate any new antimicrobial given all complications due to antimicrobials use and high likelihood that blood culture collection was contaminated   Check ESR and CRP  PT evaluation.  Mobilize       Discussed with team 4 surgery    [phone]

## 2022-10-12 LAB
ALBUMIN SERPL ELPH-MCNC: 2 G/DL — LOW (ref 3.3–5)
ALP SERPL-CCNC: 81 U/L — SIGNIFICANT CHANGE UP (ref 40–120)
ALT FLD-CCNC: 211 U/L — HIGH (ref 10–45)
ANION GAP SERPL CALC-SCNC: 8 MMOL/L — SIGNIFICANT CHANGE UP (ref 5–17)
AST SERPL-CCNC: 267 U/L — HIGH (ref 10–40)
BILIRUB DIRECT SERPL-MCNC: 0.2 MG/DL — SIGNIFICANT CHANGE UP (ref 0–0.3)
BILIRUB INDIRECT FLD-MCNC: 0.1 MG/DL — LOW (ref 0.2–1)
BILIRUB SERPL-MCNC: 0.3 MG/DL — SIGNIFICANT CHANGE UP (ref 0.2–1.2)
BUN SERPL-MCNC: 23 MG/DL — SIGNIFICANT CHANGE UP (ref 7–23)
CALCIUM SERPL-MCNC: 8.8 MG/DL — SIGNIFICANT CHANGE UP (ref 8.4–10.5)
CHLORIDE SERPL-SCNC: 100 MMOL/L — SIGNIFICANT CHANGE UP (ref 96–108)
CK SERPL-CCNC: 2684 U/L — HIGH (ref 25–170)
CO2 SERPL-SCNC: 28 MMOL/L — SIGNIFICANT CHANGE UP (ref 22–31)
CREAT SERPL-MCNC: 1.68 MG/DL — HIGH (ref 0.5–1.3)
CULTURE RESULTS: SIGNIFICANT CHANGE UP
CULTURE RESULTS: SIGNIFICANT CHANGE UP
EGFR: 33 ML/MIN/1.73M2 — LOW
ERYTHROCYTE [SEDIMENTATION RATE] IN BLOOD: 66 MM/HR — HIGH
GLUCOSE BLDC GLUCOMTR-MCNC: 118 MG/DL — HIGH (ref 70–99)
GLUCOSE BLDC GLUCOMTR-MCNC: 120 MG/DL — HIGH (ref 70–99)
GLUCOSE BLDC GLUCOMTR-MCNC: 128 MG/DL — HIGH (ref 70–99)
GLUCOSE BLDC GLUCOMTR-MCNC: 141 MG/DL — HIGH (ref 70–99)
GLUCOSE SERPL-MCNC: 144 MG/DL — HIGH (ref 70–99)
HCT VFR BLD CALC: 22.5 % — LOW (ref 34.5–45)
HGB BLD-MCNC: 7.2 G/DL — LOW (ref 11.5–15.5)
MAGNESIUM SERPL-MCNC: 1.8 MG/DL — SIGNIFICANT CHANGE UP (ref 1.6–2.6)
MCHC RBC-ENTMCNC: 27.2 PG — SIGNIFICANT CHANGE UP (ref 27–34)
MCHC RBC-ENTMCNC: 32 GM/DL — SIGNIFICANT CHANGE UP (ref 32–36)
MCV RBC AUTO: 84.9 FL — SIGNIFICANT CHANGE UP (ref 80–100)
NRBC # BLD: 0 /100 WBCS — SIGNIFICANT CHANGE UP (ref 0–0)
PHOSPHATE SERPL-MCNC: 4.2 MG/DL — SIGNIFICANT CHANGE UP (ref 2.5–4.5)
PLATELET # BLD AUTO: 258 K/UL — SIGNIFICANT CHANGE UP (ref 150–400)
POTASSIUM SERPL-MCNC: 4.5 MMOL/L — SIGNIFICANT CHANGE UP (ref 3.5–5.3)
POTASSIUM SERPL-SCNC: 4.5 MMOL/L — SIGNIFICANT CHANGE UP (ref 3.5–5.3)
PROT SERPL-MCNC: 5 G/DL — LOW (ref 6–8.3)
RBC # BLD: 2.65 M/UL — LOW (ref 3.8–5.2)
RBC # FLD: 16.2 % — HIGH (ref 10.3–14.5)
SARS-COV-2 RNA SPEC QL NAA+PROBE: SIGNIFICANT CHANGE UP
SODIUM SERPL-SCNC: 136 MMOL/L — SIGNIFICANT CHANGE UP (ref 135–145)
SPECIMEN SOURCE: SIGNIFICANT CHANGE UP
SPECIMEN SOURCE: SIGNIFICANT CHANGE UP
WBC # BLD: 10.69 K/UL — HIGH (ref 3.8–10.5)
WBC # FLD AUTO: 10.69 K/UL — HIGH (ref 3.8–10.5)

## 2022-10-12 PROCEDURE — 99232 SBSQ HOSP IP/OBS MODERATE 35: CPT

## 2022-10-12 PROCEDURE — 99231 SBSQ HOSP IP/OBS SF/LOW 25: CPT | Mod: GC

## 2022-10-12 RX ORDER — METOCLOPRAMIDE HCL 10 MG
10 TABLET ORAL ONCE
Refills: 0 | Status: COMPLETED | OUTPATIENT
Start: 2022-10-12 | End: 2022-10-12

## 2022-10-12 RX ORDER — SODIUM CHLORIDE 9 MG/ML
1000 INJECTION, SOLUTION INTRAVENOUS
Refills: 0 | Status: DISCONTINUED | OUTPATIENT
Start: 2022-10-12 | End: 2022-10-16

## 2022-10-12 RX ADMIN — ONDANSETRON 4 MILLIGRAM(S): 8 TABLET, FILM COATED ORAL at 19:30

## 2022-10-12 RX ADMIN — Medication 10 MILLIGRAM(S): at 00:32

## 2022-10-12 RX ADMIN — Medication 60 MILLIGRAM(S): at 14:23

## 2022-10-12 RX ADMIN — ENOXAPARIN SODIUM 40 MILLIGRAM(S): 100 INJECTION SUBCUTANEOUS at 14:24

## 2022-10-12 RX ADMIN — SODIUM CHLORIDE 150 MILLILITER(S): 9 INJECTION, SOLUTION INTRAVENOUS at 07:19

## 2022-10-12 RX ADMIN — Medication 125 MILLIGRAM(S): at 21:52

## 2022-10-12 RX ADMIN — Medication 200 MILLIGRAM(S): at 07:19

## 2022-10-12 RX ADMIN — LIDOCAINE 1 PATCH: 4 CREAM TOPICAL at 18:58

## 2022-10-12 RX ADMIN — Medication 20 MILLIGRAM(S): at 07:19

## 2022-10-12 RX ADMIN — LIDOCAINE 1 PATCH: 4 CREAM TOPICAL at 13:24

## 2022-10-12 RX ADMIN — SODIUM CHLORIDE 150 MILLILITER(S): 9 INJECTION, SOLUTION INTRAVENOUS at 02:07

## 2022-10-12 RX ADMIN — OXYCODONE HYDROCHLORIDE 5 MILLIGRAM(S): 5 TABLET ORAL at 17:01

## 2022-10-12 RX ADMIN — OXYCODONE HYDROCHLORIDE 5 MILLIGRAM(S): 5 TABLET ORAL at 18:00

## 2022-10-12 RX ADMIN — Medication 5 MILLIGRAM(S): at 21:51

## 2022-10-12 RX ADMIN — Medication 125 MILLIGRAM(S): at 07:21

## 2022-10-12 RX ADMIN — Medication 125 MILLIGRAM(S): at 13:24

## 2022-10-12 RX ADMIN — Medication 200 MILLIGRAM(S): at 17:01

## 2022-10-12 RX ADMIN — LIDOCAINE 1 PATCH: 4 CREAM TOPICAL at 01:51

## 2022-10-12 NOTE — PROGRESS NOTE ADULT - SUBJECTIVE AND OBJECTIVE BOX
Patient is a 72y Female seen and evaluated at bedside. Overnight events noted. Pt laying in bed. States her arms still feel weak and heavy although there might be some improvement. She reported some nausea and 1x vomiting last night after she got all her medications. Denies any other new symptoms       Meds:    chlorhexidine 2% Cloths 1 <User Schedule>  dextrose 5%. 1000 <Continuous>  dextrose 5%. 1000 <Continuous>  dextrose 50% Injectable 25 once  dextrose 50% Injectable 12.5 once  dextrose 50% Injectable 25 once  dextrose Oral Gel 15 once PRN  enoxaparin Injectable 40 every 24 hours  furosemide   Injectable 20 every 24 hours  glucagon  Injectable 1 once  HYDROmorphone  Injectable 0.5 every 4 hours PRN  influenza  Vaccine (HIGH DOSE) 0.7 once  insulin lispro (ADMELOG) corrective regimen sliding scale  three times a day before meals  insulin lispro (ADMELOG) corrective regimen sliding scale  at bedtime  labetalol 200 every 12 hours  lactated ringers. 1000 <Continuous>  lidocaine   4% Patch 1 every 24 hours  melatonin 5 at bedtime  NIFEdipine XL 60 every 24 hours  ondansetron Injectable 4 every 8 hours PRN  oxyCODONE    IR 5 every 6 hours PRN  senna 2 at bedtime  sodium chloride 0.9% lock flush 10 every 1 hour PRN  vancomycin    Solution 125 every 6 hours      T(C): , Max: 37.2 (10-12-22 @ 06:52)  T(F): , Max: 99 (10-12-22 @ 06:52)  HR: 80 (10-12-22 @ 08:35)  BP: 154/73 (10-12-22 @ 08:35)  BP(mean): --  RR: 17 (10-12-22 @ 08:35)  SpO2: 97% (10-12-22 @ 08:35)  Wt(kg): --    10-11 @ 07:01  -  10-12 @ 07:00  --------------------------------------------------------  IN: 1910 mL / OUT: 4100 mL / NET: -2190 mL    10-12 @ 07:01  -  10-12 @ 14:05  --------------------------------------------------------  IN: 0 mL / OUT: 0 mL / NET: 0 mL          Review of Systems:  ROS negative except as per HPI      PHYSICAL EXAM:  Constitutional: pleasant female NAD, laying in bed   Neck: Supple, no JVD  Respiratory: CTA B/L, on RA  Cardiovascular: RRR, normal S1 and S2,   Gastrointestinal: +BS, minimal tenderness  Extremities: no edema, warm  Neurological: AAOx3, moving all extremities spontaneously     LABS:                        7.2    10.69 )-----------( 258      ( 12 Oct 2022 10:25 )             22.5     10-12    136  |  100  |  23  ----------------------------<  144<H>  4.5   |  28  |  1.68<H>    Ca    8.8      12 Oct 2022 10:25  Phos  4.2     10-12  Mg     1.8     10-12    TPro  5.0<L>  /  Alb  2.0<L>  /  TBili  0.3  /  DBili  0.2  /  AST  267<H>  /  ALT  211<H>  /  AlkPhos  81  10-12                RADIOLOGY & ADDITIONAL STUDIES:

## 2022-10-12 NOTE — PROGRESS NOTE ADULT - SUBJECTIVE AND OBJECTIVE BOX
STATUS POST:  Exploratory laparotomy with lysis of adhesions and SBR      POST OPERATIVE DAY #: 27    SUBJECTIVE: Pt seen and examined by chief resident. Pt is doing well, resting comfortably on bed. Pain controlled. Diet tolerated. + diarrhea overnight. some nausea with a pill she took last night, felt the medication get stuck in her throat.       Vital Signs Last 24 Hrs  T(C): 37.1 (11 Oct 2022 16:30), Max: 37.1 (11 Oct 2022 16:30)  T(F): 98.7 (11 Oct 2022 16:30), Max: 98.7 (11 Oct 2022 16:30)  HR: 81 (11 Oct 2022 16:30) (78 - 83)  BP: 149/73 (11 Oct 2022 16:30) (141/82 - 174/79)  BP(mean): 105 (11 Oct 2022 10:35) (105 - 119)  RR: 17 (11 Oct 2022 16:30) (17 - 19)  SpO2: 96% (11 Oct 2022 16:30) (96% - 100%)    Parameters below as of 11 Oct 2022 16:30  Patient On (Oxygen Delivery Method): room air        I&O's Summary    10 Oct 2022 07:01  -  11 Oct 2022 07:00  --------------------------------------------------------  IN: 5780 mL / OUT: 3195 mL / NET: 2585 mL    11 Oct 2022 07:01  -  12 Oct 2022 06:51  --------------------------------------------------------  IN: 1910 mL / OUT: 4100 mL / NET: -2190 mL        Physical Exam:  General Appearance: Appears well, NAD  Pulmonary: Nonlabored breathing, no respiratory distress  Abdomen: Soft, nondisteded, nontender no rebound or guarding  Extremities: WWP, SCD's in place     LABS:                        7.6    10.45 )-----------( 177      ( 11 Oct 2022 06:56 )             24.1     10-11    137  |  104  |  23  ----------------------------<  95  5.0   |  24  |  1.82<H>    Ca    8.6      11 Oct 2022 06:56  Phos  4.1     10-11  Mg     2.4     10-11    TPro  5.2<L>  /  Alb  1.9<L>  /  TBili  0.3  /  DBili  x   /  AST  427<H>  /  ALT  250<H>  /  AlkPhos  80  10-11

## 2022-10-12 NOTE — PROGRESS NOTE ADULT - SUBJECTIVE AND OBJECTIVE BOX
INTERVAL HPI/OVERNIGHT EVENTS:    ANTIBIOTICS/RELEVANT:    MEDICATIONS  (STANDING):  chlorhexidine 2% Cloths 1 Application(s) Topical <User Schedule>  dextrose 5%. 1000 milliLiter(s) (50 mL/Hr) IV Continuous <Continuous>  dextrose 5%. 1000 milliLiter(s) (100 mL/Hr) IV Continuous <Continuous>  dextrose 50% Injectable 25 Gram(s) IV Push once  dextrose 50% Injectable 12.5 Gram(s) IV Push once  dextrose 50% Injectable 25 Gram(s) IV Push once  enoxaparin Injectable 40 milliGRAM(s) SubCutaneous every 24 hours  furosemide   Injectable 20 milliGRAM(s) IV Push every 24 hours  glucagon  Injectable 1 milliGRAM(s) IntraMuscular once  influenza  Vaccine (HIGH DOSE) 0.7 milliLiter(s) IntraMuscular once  insulin lispro (ADMELOG) corrective regimen sliding scale   SubCutaneous three times a day before meals  insulin lispro (ADMELOG) corrective regimen sliding scale   SubCutaneous at bedtime  labetalol 200 milliGRAM(s) Oral every 12 hours  lactated ringers. 1000 milliLiter(s) (150 mL/Hr) IV Continuous <Continuous>  lidocaine   4% Patch 1 Patch Transdermal every 24 hours  melatonin 5 milliGRAM(s) Oral at bedtime  NIFEdipine XL 60 milliGRAM(s) Oral every 24 hours  senna 2 Tablet(s) Oral at bedtime  vancomycin    Solution 125 milliGRAM(s) Oral every 6 hours    MEDICATIONS  (PRN):  dextrose Oral Gel 15 Gram(s) Oral once PRN Blood Glucose LESS THAN 70 milliGRAM(s)/deciliter  HYDROmorphone  Injectable 0.5 milliGRAM(s) IV Push every 4 hours PRN Severe Pain (7 - 10)  ondansetron Injectable 4 milliGRAM(s) IV Push every 8 hours PRN Nausea and/or Vomiting  oxyCODONE    IR 5 milliGRAM(s) Oral every 6 hours PRN Severe Pain (7 - 10)  sodium chloride 0.9% lock flush 10 milliLiter(s) IV Push every 1 hour PRN Pre/post blood products, medications, blood draw, and to maintain line patency      Allergies    penicillin (Rash)    Intolerances    daptomycin (Muscle Pain)      Vital Signs Last 24 Hrs  T(C): 36.4 (12 Oct 2022 14:10), Max: 37.2 (12 Oct 2022 06:52)  T(F): 97.6 (12 Oct 2022 14:10), Max: 99 (12 Oct 2022 06:52)  HR: 81 (12 Oct 2022 14:10) (80 - 93)  BP: 166/78 (12 Oct 2022 14:10) (149/73 - 166/78)  BP(mean): --  RR: 19 (12 Oct 2022 14:10) (17 - 19)  SpO2: 100% (12 Oct 2022 14:10) (94% - 100%)    Parameters below as of 12 Oct 2022 14:10  Patient On (Oxygen Delivery Method): room air        PHYSICAL EXAM:      Constitutional:    Eyes:    ENMT:    Neck:    Breasts:    Back:    Respiratory:    Cardiovascular:    Gastrointestinal:    Genitourinary:    Rectal:    Extremities:    Vascular:    Neurological:    Skin:    Lymph Nodes:    Musculoskeletal:    Psychiatric:        LABS:                        7.2    10.69 )-----------( 258      ( 12 Oct 2022 10:25 )             22.5     10-12    136  |  100  |  23  ----------------------------<  144<H>  4.5   |  28  |  1.68<H>    Ca    8.8      12 Oct 2022 10:25  Phos  4.2     10-12  Mg     1.8     10-12    TPro  5.0<L>  /  Alb  2.0<L>  /  TBili  0.3  /  DBili  0.2  /  AST  267<H>  /  ALT  211<H>  /  AlkPhos  81  10-12          MICROBIOLOGY:    RADIOLOGY & ADDITIONAL STUDIES: INTERVAL HPI/OVERNIGHT EVENTS:    Slowly improving overall  Eating solid food  Had diarrhea once today  Body aches improving     MEDICATIONS  (STANDING):  chlorhexidine 2% Cloths 1 Application(s) Topical <User Schedule>  dextrose 5%. 1000 milliLiter(s) (50 mL/Hr) IV Continuous <Continuous>  dextrose 5%. 1000 milliLiter(s) (100 mL/Hr) IV Continuous <Continuous>  dextrose 50% Injectable 25 Gram(s) IV Push once  dextrose 50% Injectable 12.5 Gram(s) IV Push once  dextrose 50% Injectable 25 Gram(s) IV Push once  enoxaparin Injectable 40 milliGRAM(s) SubCutaneous every 24 hours  furosemide   Injectable 20 milliGRAM(s) IV Push every 24 hours  glucagon  Injectable 1 milliGRAM(s) IntraMuscular once  influenza  Vaccine (HIGH DOSE) 0.7 milliLiter(s) IntraMuscular once  insulin lispro (ADMELOG) corrective regimen sliding scale   SubCutaneous three times a day before meals  insulin lispro (ADMELOG) corrective regimen sliding scale   SubCutaneous at bedtime  labetalol 200 milliGRAM(s) Oral every 12 hours  lactated ringers. 1000 milliLiter(s) (150 mL/Hr) IV Continuous <Continuous>  lidocaine   4% Patch 1 Patch Transdermal every 24 hours  melatonin 5 milliGRAM(s) Oral at bedtime  NIFEdipine XL 60 milliGRAM(s) Oral every 24 hours  senna 2 Tablet(s) Oral at bedtime  vancomycin    Solution 125 milliGRAM(s) Oral every 6 hours    MEDICATIONS  (PRN):  dextrose Oral Gel 15 Gram(s) Oral once PRN Blood Glucose LESS THAN 70 milliGRAM(s)/deciliter  HYDROmorphone  Injectable 0.5 milliGRAM(s) IV Push every 4 hours PRN Severe Pain (7 - 10)  ondansetron Injectable 4 milliGRAM(s) IV Push every 8 hours PRN Nausea and/or Vomiting  oxyCODONE    IR 5 milliGRAM(s) Oral every 6 hours PRN Severe Pain (7 - 10)  sodium chloride 0.9% lock flush 10 milliLiter(s) IV Push every 1 hour PRN Pre/post blood products, medications, blood draw, and to maintain line patency      Allergies    penicillin (Rash)    Intolerances    daptomycin       EXAM  Vital Signs Last 24 Hrs  T(C): 36.4 (12 Oct 2022 14:10), Max: 37.2 (12 Oct 2022 06:52)  T(F): 97.6 (12 Oct 2022 14:10), Max: 99 (12 Oct 2022 06:52)  HR: 81 (12 Oct 2022 14:10) (80 - 93)  BP: 166/78 (12 Oct 2022 14:10) (149/73 - 166/78)  BP(mean): --  RR: 19 (12 Oct 2022 14:10) (17 - 19)  SpO2: 100% (12 Oct 2022 14:10) (94% - 100%)    Parameters below as of 12 Oct 2022 14:10  Patient On (Oxygen Delivery Method): room air  Awake and alert  More animated and conversational  No rash  No icterus  RRR  Chest with decreased BSs at bases  Abd soft.  Tender around VAC  LEs with small pedal edema      LABS:                        7.2    10.69 )-----------( 258      ( 12 Oct 2022 10:25 )             22.5     10-12    136  |  100  |  23  ----------------------------<  144<H>  4.5   |  28  |  1.68<H>    Ca    8.8      12 Oct 2022 10:25  Phos  4.2     10-12  Mg     1.8     10-12    TPro  5.0<L>  /  Alb  2.0<L>  /  TBili  0.3  /  DBili  0.2  /  AST  267<H>  /  ALT  211<H>  /  AlkPhos  81  10-12      Sedimentation Rate, Erythrocyte (10.12.22 @ 10:25)    Sedimentation Rate, Erythrocyte: 66: [facility] performs Erythrocyte Sedimentation Rate assay  utilizing the Westergren method mm/Hr    C-Reactive Protein, Serum (10.11.22 @ 06:56)    C-Reactive Protein, Serum: 80.7 mg/L      MICROBIOLOGY:    Culture - Blood (10.07.22 @ 17:33)    Specimen Source: .Blood Blood    Culture Results:   No growth at 5 days.    Culture - Blood (10.07.22 @ 17:33)    Specimen Source: .Blood Blood    Culture Results:   No growth at 5 days.    Culture - Blood (10.04.22 @ 15:15)    Specimen Source: .Blood Blood    Culture Results:   No growth at 5 days.

## 2022-10-12 NOTE — PROGRESS NOTE ADULT - ASSESSMENT
72 year old woman with HTN, DM2, past surgical history of  x 2, hysterectomy, and laparoscopic cholecystectomy (), admitted for large bowel obstruction; had spontaneous return of bowel function initially; however, bowel obstruction recurred, so taken to OR for ex lap with lysis of adhesions and bowel resection () . Monitored in SICU post -op ; now on regional. Hospital course complicated by rhabdomyolysis, likely daptomycin induced (CK elevated on 10/7/2022)    # Large bowel obstruction   # post operative state  s/p OR for ex lap with lysis of adhesions and bowel resection ()  - rest of care per primary team     #Upper extremity weakness  -in setting of rhabdo. Improving. Continue monitoring.     # non-traumatic Rhabdomyolysis  CK elevated to 30K on 10/7/2022. Likely daptomycin associated. Stopped daptomycin and statin.   - f/u with renal recs  -  CK dropping.     #SO  -Improving  -Diuresis a/p nephrology    #Elevated LFTs  -In setting of rhabdo. Continue monitoring. Continues to improve    #C diff; non severe  -On PO vanc 10/4-     #E. faecalis bacteremia  -Afebrile at this time. was On dapto 10/4-10/7; Switched to vancomycin 10/7-10/10  -Repeat Cx's negative to date  -Management a/p ID. Believe this was contaminant and is holding abx.     #Obesity - Dose medications by weight. Lifestyle modifications discussed, especially once she's resolved    #HTN   takes HCTZ 25mg qd, losartan 100mg qd, labetalol 200mg BID, nifedipine 60mg ER qd at home.   - continue labetalol 200mg BID  - would give nifedipine 90 mg ER   - Continue to hold HCTZ for now and on discharge     # Type 2 Diabetes complicated by hyperglycemia   cw insulin sliding scale while inpatient.   pt's a1c at goal for her age, resume metformin on discharge.     #Incidental finding of bilateral lung nodules  CT chest showing bilateral lung nodules, measuring up to 7 mm in the ridght upper lobe; Counseled patient on finding.   Recommended follow-up chest CT in three months to ensure stability.; Copy of radiology report provided to patient for her records.     # COVID 19 infection (present on admission) - Improved/resolved. Off isolation.     # Acute blood loss anemia   Would transfuse <7.0  age appropriate cancer screening outpatient     DVT ppx - HSQ

## 2022-10-12 NOTE — CHART NOTE - NSCHARTNOTEFT_GEN_A_CORE
Admitting Diagnosis:   Patient is a 72y old  Female who presents with a chief complaint of LBO (12 Oct 2022 12:27)      PAST MEDICAL & SURGICAL HISTORY:  Diabetes      H/O thyroid disease      S/P total abdominal hysterectomy      History of laparoscopic cholecystectomy      History of           Current Nutrition Order:   renal diet    PO Intake: Good (%) [   ]  Fair (50-75%) [   ] Poor (<25%) [ x  ]    GI Issues: Denies n/v/d/c. Last BM 10/10    Pain: reported mild abd pain    Skin Integrity: Hany 14, surgical incision noted  Edema 1+ R ankle and L ankle    Labs:   10-12    136  |  100  |  23  ----------------------------<  144<H>  4.5   |  28  |  1.68<H>    Ca    8.8      12 Oct 2022 10:25  Phos  4.2     10-12  Mg     1.8     10-12    TPro  5.0<L>  /  Alb  2.0<L>  /  TBili  0.3  /  DBili  0.2  /  AST  267<H>  /  ALT  211<H>  /  AlkPhos  81  10-12    CAPILLARY BLOOD GLUCOSE      POCT Blood Glucose.: 118 mg/dL (12 Oct 2022 13:23)  POCT Blood Glucose.: 120 mg/dL (12 Oct 2022 08:24)  POCT Blood Glucose.: 110 mg/dL (11 Oct 2022 21:54)  POCT Blood Glucose.: 107 mg/dL (11 Oct 2022 16:34)      Medications:  MEDICATIONS  (STANDING):  chlorhexidine 2% Cloths 1 Application(s) Topical <User Schedule>  dextrose 5%. 1000 milliLiter(s) (50 mL/Hr) IV Continuous <Continuous>  dextrose 5%. 1000 milliLiter(s) (100 mL/Hr) IV Continuous <Continuous>  dextrose 50% Injectable 25 Gram(s) IV Push once  dextrose 50% Injectable 12.5 Gram(s) IV Push once  dextrose 50% Injectable 25 Gram(s) IV Push once  enoxaparin Injectable 40 milliGRAM(s) SubCutaneous every 24 hours  furosemide   Injectable 20 milliGRAM(s) IV Push every 24 hours  glucagon  Injectable 1 milliGRAM(s) IntraMuscular once  influenza  Vaccine (HIGH DOSE) 0.7 milliLiter(s) IntraMuscular once  insulin lispro (ADMELOG) corrective regimen sliding scale   SubCutaneous three times a day before meals  insulin lispro (ADMELOG) corrective regimen sliding scale   SubCutaneous at bedtime  labetalol 200 milliGRAM(s) Oral every 12 hours  lactated ringers. 1000 milliLiter(s) (150 mL/Hr) IV Continuous <Continuous>  lidocaine   4% Patch 1 Patch Transdermal every 24 hours  melatonin 5 milliGRAM(s) Oral at bedtime  NIFEdipine XL 60 milliGRAM(s) Oral every 24 hours  senna 2 Tablet(s) Oral at bedtime  vancomycin    Solution 125 milliGRAM(s) Oral every 6 hours    MEDICATIONS  (PRN):  dextrose Oral Gel 15 Gram(s) Oral once PRN Blood Glucose LESS THAN 70 milliGRAM(s)/deciliter  HYDROmorphone  Injectable 0.5 milliGRAM(s) IV Push every 4 hours PRN Severe Pain (7 - 10)  ondansetron Injectable 4 milliGRAM(s) IV Push every 8 hours PRN Nausea and/or Vomiting  oxyCODONE    IR 5 milliGRAM(s) Oral every 6 hours PRN Severe Pain (7 - 10)  sodium chloride 0.9% lock flush 10 milliLiter(s) IV Push every 1 hour PRN Pre/post blood products, medications, blood draw, and to maintain line patency    Adm Anthropometrics:  Height for BMI (FEET)	5 Feet  Height for BMI (INCHES)	6 Inch(s)  Height for BMI (CENTIMETERS)	167.64 Centimeter(s)  Weight for BMI (lbs)	203 lb  Weight for BMI (kg)	92.1 kg  Body Mass Index	32.7    Weight Change: No new wts since adm. Please continue to obtain new wts to assess for change/trend wts    Estimated energy needs:   IBW used for calculations as pt >120% of IBW (156%), adjusted for age, post-op  4943-1215 kcals (30-35 kcals/kg, IBW)  70.68-82.46 g protein (1.2-1.4 g/kg, IBW)  8681-8896 mls (30-35 mls/kg, IBW)    Subjective:  73 yo female with PMH of HTN, DM and PSH of  x2, hysterectomy and lap dev ( w/ Dr. Clifford) presenting with 2 days of nausea/vomiting and abd pain w/ imaging findings consistent with large bowel obstruction. However, prior to operative exploration, the patient had return of bowel function. NGT removed however bowel obstruction recurred therefore pt taken to OR for Exlap JOSEFINA, SBR ( 100 cm)  EBL:200 IVF:2000 UO:250. Pt kept intubated post op and transferred to SICU team. Now extubated 9/15. stable for transfer to tele on POD#1. return to SICU POD#2 due to hypertension (BP  210/104), gotten labetalol 10, then 20, then 40 while in telemetry, asymptomatic, repeat BP down to 165/67. She was transfer back to SICU for close BP monitoring. pt baseline on labetalol 200 PO bid, losartan 100 qd, hctz 25 qd and nifedipine 60 qd at home. Her SBP remained below 160's all night, did not need any additional antihypertensives. transfer back to SDU next day (). Hospital course complicated by up-trending leukocytosis of unknown etiology, WBC trending down now . 10/4, wound w/ rare yeast, C DIFF positive. Hospital course further complicated by rhabdomyolysis, likely daptomycin induced (CK elevated on 10/7/2022)    Pt seen resting in bed. Reports nothing feeling heaviness in left arm. Continues with poor PO intake, ?pt's personal preferences for food. States food does not have flavor. Reports eating mainly fruits this morning. Previous days, had cereal with milk and jello. States able to find a chicken dish that has "flavor". Already ordered meal for lunch and dinner. Does not want to trial ONS, d/t dislike of flavor. Continue to encourage PO intake as able, encourage to continue to review menu to meet preferences. Reviewed diet with pt, discussed diet for s/p ex lap JOSEFINA, SBR, discussed purpose and reviewed. Pt verbalized understanding. RD to follow.    Previous Nutrition Diagnosis:  Nutrition/food knowledge deficit RT need to review diet edu/modification at this time AEB s/p Exlap JOSEFINA, SBR   Active [ x  ]  Resolved [   ]    If resolved, new PES:     Goal: Pt to recall 2 main concepts from edu    Recommendations:  1. Renal, can continue, d/c pending renal functions, trend renal labs  >> Consider adding ONS if pt remains with poor PO  >> consider addition of CST CHO trending BG/POCT  >>low fiber diet if pt with poor GI tolerance  2. BM and pain regimen per team  3. Monitor GI tolerance, s/s distress  4. Monitor BMP, BG, POCT, lytes, replete prn  5. diet edu as able    Education: Continue encourage PO as able, discussed diet for s/p ex lap JOSEFINA, SBR    Risk Level: High [ x  ] Moderate [   ] Low [   ]

## 2022-10-12 NOTE — PROGRESS NOTE ADULT - ASSESSMENT
Rhabdomyolysis in the context of just 3 doses of Daptomycin  Cdiff colitis - resolving   S/P Peritonitis following SBR and extensive abdominal surgery  Enterococcus in the blood -  likely contaminated blood culture  Leukocytosis improved  CPK improved   SO - creatinine improving      RECOMMEND  Continue PO Vancomycin for 14 days  NO additional antimicrobials for now  Mobilize  Incentive Spirometry'  PT consult        [phone]

## 2022-10-12 NOTE — PROGRESS NOTE ADULT - SUBJECTIVE AND OBJECTIVE BOX
SUBJECTIVE:  Patient seen and examined at bedside. Still having some aches overall improved. Some diarrhea last night. Had some abd pain that improved.     Vital Signs Last 12 Hrs  T(F): 98.4 (10-12-22 @ 08:35), Max: 99 (10-12-22 @ 06:52)  HR: 80 (10-12-22 @ 08:35) (80 - 93)  BP: 154/73 (10-12-22 @ 08:35) (153/69 - 154/73)  BP(mean): --  RR: 17 (10-12-22 @ 08:35) (17 - 18)  SpO2: 97% (10-12-22 @ 08:35) (94% - 97%)  I&O's Summary    11 Oct 2022 07:01  -  12 Oct 2022 07:00  --------------------------------------------------------  IN: 1910 mL / OUT: 4100 mL / NET: -2190 mL    12 Oct 2022 07:01  -  12 Oct 2022 12:30  --------------------------------------------------------  IN: 0 mL / OUT: 0 mL / NET: 0 mL        PHYSICAL EXAM:  Constitutional: NAD, laying in bed  HEENT: NC/AT, EOMI  Neck: Supple, no JVD  Respiratory: CTA B/L   Cardiovascular: RRR, normal S1 and S2   Gastrointestinal: +BS, soft minimal tenderness  Extremities: no LE edema  Neurological: AAOx3, upper extremity weakness similar to prior  Psych: anxious          LABS:                        7.2    10.69 )-----------( 258      ( 12 Oct 2022 10:25 )             22.5     10-12    136  |  100  |  23  ----------------------------<  144<H>  4.5   |  28  |  1.68<H>    Ca    8.8      12 Oct 2022 10:25  Phos  4.2     10-12  Mg     1.8     10-12    TPro  5.0<L>  /  Alb  2.0<L>  /  TBili  0.3  /  DBili  0.2  /  AST  267<H>  /  ALT  211<H>  /  AlkPhos  81  10-12            RADIOLOGY & ADDITIONAL TESTS:    MEDICATIONS  (STANDING):  chlorhexidine 2% Cloths 1 Application(s) Topical <User Schedule>  dextrose 5%. 1000 milliLiter(s) (50 mL/Hr) IV Continuous <Continuous>  dextrose 5%. 1000 milliLiter(s) (100 mL/Hr) IV Continuous <Continuous>  dextrose 50% Injectable 25 Gram(s) IV Push once  dextrose 50% Injectable 12.5 Gram(s) IV Push once  dextrose 50% Injectable 25 Gram(s) IV Push once  enoxaparin Injectable 40 milliGRAM(s) SubCutaneous every 24 hours  furosemide   Injectable 20 milliGRAM(s) IV Push every 24 hours  glucagon  Injectable 1 milliGRAM(s) IntraMuscular once  influenza  Vaccine (HIGH DOSE) 0.7 milliLiter(s) IntraMuscular once  insulin lispro (ADMELOG) corrective regimen sliding scale   SubCutaneous three times a day before meals  insulin lispro (ADMELOG) corrective regimen sliding scale   SubCutaneous at bedtime  labetalol 200 milliGRAM(s) Oral every 12 hours  lactated ringers. 1000 milliLiter(s) (150 mL/Hr) IV Continuous <Continuous>  lidocaine   4% Patch 1 Patch Transdermal every 24 hours  melatonin 5 milliGRAM(s) Oral at bedtime  NIFEdipine XL 60 milliGRAM(s) Oral every 24 hours  senna 2 Tablet(s) Oral at bedtime  vancomycin    Solution 125 milliGRAM(s) Oral every 6 hours    MEDICATIONS  (PRN):  dextrose Oral Gel 15 Gram(s) Oral once PRN Blood Glucose LESS THAN 70 milliGRAM(s)/deciliter  HYDROmorphone  Injectable 0.5 milliGRAM(s) IV Push every 4 hours PRN Severe Pain (7 - 10)  ondansetron Injectable 4 milliGRAM(s) IV Push every 8 hours PRN Nausea and/or Vomiting  oxyCODONE    IR 5 milliGRAM(s) Oral every 6 hours PRN Severe Pain (7 - 10)  sodium chloride 0.9% lock flush 10 milliLiter(s) IV Push every 1 hour PRN Pre/post blood products, medications, blood draw, and to maintain line patency

## 2022-10-12 NOTE — PROGRESS NOTE ADULT - ASSESSMENT
72 F PMH HTN, DM and PSH  x 2, hysterectomy and lap dev ( w/ Dr. Clifford) admitted on  with 2 days of n/v, abd pain, imaging demonstrating LBO, which resolved prior to operative exploration. Recurrent bowel obstruction, now s/p Exlap JOSEFINA, SBR (100 cm) (9/15). now with wound vac, pending YOSEPH placement c/b c.diff and rhabdo 2/2 daptomycin    Renal diet, IVF, ISS  Lasix 20 daily   Pain/nausea prn  Vanco PO (10/4-), s/p Daptomycin (10/4-10/7)  Wound Vac  SQH/SCDs/OOBA/IS  AM labs

## 2022-10-12 NOTE — PROGRESS NOTE ADULT - ASSESSMENT
72 year old woman with HTN, DM2 admitted for large bowel obstruction s/p ex lap w/ lysis of adhesions and bowel resection on 9/14 for SBO. She is also s/p sepsis and peritonitis. Nephrology initially consulted for prerenal azotemia which resolved. Nephrology re-consulted for SO Cr 1.73 and rhabdomyolysis 2/2 daptomycin.      #Non-oliguric pigment induced ATN  No known underlying CKD  Cr downtrending now 1.82-->1.68  UA w/ trace proteinuria, large blood with <5 RBCs  CK trending down now, currently 6K  4 L of UO in last 24 hours  Renal sono noted  Likely etiology toxic ATN due to rhabdomyolysis secondary to daptomycin use        Plan:  OK to stop IV fluids now since CPK<5k and risk of further kidney insult minimum  Daily BMP    #Rhabdomyolysis   likely due to daptomycin   CPK trending down now; 2,684K today after peaking at 30K, s/p IV fluids    Plan:   OK to stop IV fluids now since CPK<5k and risk of further kidney insult minimum  Daily BMP   Strict ins and outs   Avoid nephrotoxic agents       Sergio Sands M.D  PGY-4 Nephrology   [phone]

## 2022-10-13 LAB
ANION GAP SERPL CALC-SCNC: 5 MMOL/L — SIGNIFICANT CHANGE UP (ref 5–17)
BUN SERPL-MCNC: 22 MG/DL — SIGNIFICANT CHANGE UP (ref 7–23)
CALCIUM SERPL-MCNC: 8.5 MG/DL — SIGNIFICANT CHANGE UP (ref 8.4–10.5)
CHLORIDE SERPL-SCNC: 97 MMOL/L — SIGNIFICANT CHANGE UP (ref 96–108)
CO2 SERPL-SCNC: 32 MMOL/L — HIGH (ref 22–31)
CREAT SERPL-MCNC: 1.69 MG/DL — HIGH (ref 0.5–1.3)
EGFR: 32 ML/MIN/1.73M2 — LOW
GLUCOSE BLDC GLUCOMTR-MCNC: 113 MG/DL — HIGH (ref 70–99)
GLUCOSE BLDC GLUCOMTR-MCNC: 115 MG/DL — HIGH (ref 70–99)
GLUCOSE BLDC GLUCOMTR-MCNC: 122 MG/DL — HIGH (ref 70–99)
GLUCOSE BLDC GLUCOMTR-MCNC: 161 MG/DL — HIGH (ref 70–99)
GLUCOSE SERPL-MCNC: 111 MG/DL — HIGH (ref 70–99)
HCT VFR BLD CALC: 23.4 % — LOW (ref 34.5–45)
HGB BLD-MCNC: 7.4 G/DL — LOW (ref 11.5–15.5)
MAGNESIUM SERPL-MCNC: 1.6 MG/DL — SIGNIFICANT CHANGE UP (ref 1.6–2.6)
MCHC RBC-ENTMCNC: 27.2 PG — SIGNIFICANT CHANGE UP (ref 27–34)
MCHC RBC-ENTMCNC: 31.6 GM/DL — LOW (ref 32–36)
MCV RBC AUTO: 86 FL — SIGNIFICANT CHANGE UP (ref 80–100)
NRBC # BLD: 0 /100 WBCS — SIGNIFICANT CHANGE UP (ref 0–0)
PHOSPHATE SERPL-MCNC: 4.2 MG/DL — SIGNIFICANT CHANGE UP (ref 2.5–4.5)
PLATELET # BLD AUTO: 296 K/UL — SIGNIFICANT CHANGE UP (ref 150–400)
POTASSIUM SERPL-MCNC: 4.6 MMOL/L — SIGNIFICANT CHANGE UP (ref 3.5–5.3)
POTASSIUM SERPL-SCNC: 4.6 MMOL/L — SIGNIFICANT CHANGE UP (ref 3.5–5.3)
RBC # BLD: 2.72 M/UL — LOW (ref 3.8–5.2)
RBC # FLD: 15.9 % — HIGH (ref 10.3–14.5)
SODIUM SERPL-SCNC: 134 MMOL/L — LOW (ref 135–145)
WBC # BLD: 11.96 K/UL — HIGH (ref 3.8–10.5)
WBC # FLD AUTO: 11.96 K/UL — HIGH (ref 3.8–10.5)

## 2022-10-13 PROCEDURE — 99232 SBSQ HOSP IP/OBS MODERATE 35: CPT

## 2022-10-13 RX ORDER — HYDROMORPHONE HYDROCHLORIDE 2 MG/ML
0.5 INJECTION INTRAMUSCULAR; INTRAVENOUS; SUBCUTANEOUS ONCE
Refills: 0 | Status: DISCONTINUED | OUTPATIENT
Start: 2022-10-13 | End: 2022-10-13

## 2022-10-13 RX ORDER — ACETAMINOPHEN 500 MG
1000 TABLET ORAL ONCE
Refills: 0 | Status: COMPLETED | OUTPATIENT
Start: 2022-10-13 | End: 2022-10-13

## 2022-10-13 RX ORDER — MAGNESIUM SULFATE 500 MG/ML
1 VIAL (ML) INJECTION ONCE
Refills: 0 | Status: COMPLETED | OUTPATIENT
Start: 2022-10-13 | End: 2022-10-13

## 2022-10-13 RX ADMIN — LIDOCAINE 1 PATCH: 4 CREAM TOPICAL at 18:25

## 2022-10-13 RX ADMIN — Medication 100 GRAM(S): at 07:54

## 2022-10-13 RX ADMIN — Medication 1000 MILLIGRAM(S): at 17:20

## 2022-10-13 RX ADMIN — Medication 200 MILLIGRAM(S): at 17:00

## 2022-10-13 RX ADMIN — Medication 1000 MILLIGRAM(S): at 11:47

## 2022-10-13 RX ADMIN — Medication 400 MILLIGRAM(S): at 17:00

## 2022-10-13 RX ADMIN — HYDROMORPHONE HYDROCHLORIDE 0.5 MILLIGRAM(S): 2 INJECTION INTRAMUSCULAR; INTRAVENOUS; SUBCUTANEOUS at 17:30

## 2022-10-13 RX ADMIN — Medication 400 MILLIGRAM(S): at 11:22

## 2022-10-13 RX ADMIN — SENNA PLUS 2 TABLET(S): 8.6 TABLET ORAL at 23:18

## 2022-10-13 RX ADMIN — Medication 125 MILLIGRAM(S): at 19:12

## 2022-10-13 RX ADMIN — Medication 125 MILLIGRAM(S): at 02:32

## 2022-10-13 RX ADMIN — LIDOCAINE 1 PATCH: 4 CREAM TOPICAL at 13:05

## 2022-10-13 RX ADMIN — ONDANSETRON 4 MILLIGRAM(S): 8 TABLET, FILM COATED ORAL at 22:36

## 2022-10-13 RX ADMIN — ENOXAPARIN SODIUM 40 MILLIGRAM(S): 100 INJECTION SUBCUTANEOUS at 13:04

## 2022-10-13 RX ADMIN — SODIUM CHLORIDE 100 MILLILITER(S): 9 INJECTION, SOLUTION INTRAVENOUS at 19:12

## 2022-10-13 RX ADMIN — LIDOCAINE 1 PATCH: 4 CREAM TOPICAL at 01:30

## 2022-10-13 RX ADMIN — Medication 20 MILLIGRAM(S): at 07:16

## 2022-10-13 RX ADMIN — CHLORHEXIDINE GLUCONATE 1 APPLICATION(S): 213 SOLUTION TOPICAL at 05:17

## 2022-10-13 RX ADMIN — Medication 125 MILLIGRAM(S): at 13:04

## 2022-10-13 RX ADMIN — Medication 60 MILLIGRAM(S): at 14:25

## 2022-10-13 RX ADMIN — Medication 125 MILLIGRAM(S): at 07:12

## 2022-10-13 RX ADMIN — Medication 5 MILLIGRAM(S): at 23:18

## 2022-10-13 RX ADMIN — Medication 200 MILLIGRAM(S): at 05:15

## 2022-10-13 NOTE — PROGRESS NOTE ADULT - ASSESSMENT
72 year old woman with HTN, DM2, past surgical history of  x 2, hysterectomy, and laparoscopic cholecystectomy (), admitted for large bowel obstruction; had spontaneous return of bowel function initially; however, bowel obstruction recurred, so taken to OR for ex lap with lysis of adhesions and bowel resection () . Monitored in SICU post -op ; now on regional. Hospital course complicated by rhabdomyolysis, likely daptomycin induced (CK elevated on 10/7/2022)    # Large bowel obstruction   # post operative state  s/p OR for ex lap with lysis of adhesions and bowel resection ()  - rest of care per primary team     #Upper extremity weakness  -in setting of rhabdo. Improving. Continue monitoring.     # non-traumatic Rhabdomyolysis  CK elevated to 30K on 10/7/2022. Likely daptomycin associated. Stopped daptomycin and statin.   - f/u with renal recs  -  CK dropping.     #SO/ATN  -Improving. in setting of rhabdo  -Diuresis a/p nephrology    #Elevated LFTs  -In setting of rhabdo. Continue monitoring. Continues to improve    #C diff; non severe  -On PO vanc 10/4-     #E. faecalis bacteremia  -Afebrile at this time. was On dapto 10/4-10/7; Switched to vancomycin 10/7-10/10  -Repeat Cx's negative to date  -Management a/p ID. Believe this was contaminant and is holding abx.     #Obesity - Dose medications by weight. Lifestyle modifications discussed, especially once she's resolved    #HTN   takes HCTZ 25mg qd, losartan 100mg qd, labetalol 200mg BID, nifedipine 60mg ER qd at home.   - continue labetalol 200mg BID  - would give nifedipine 90 mg ER   - Continue to hold HCTZ for now and on discharge     # Type 2 Diabetes complicated by hyperglycemia   cw insulin sliding scale while inpatient.   pt's a1c at goal for her age, resume metformin on discharge.     #Incidental finding of bilateral lung nodules  CT chest showing bilateral lung nodules, measuring up to 7 mm in the ridght upper lobe; Counseled patient on finding.   Recommended follow-up chest CT in three months to ensure stability.; Copy of radiology report provided to patient for her records.     # COVID 19 infection (present on admission) - Improved/resolved. Off isolation.     # Acute blood loss anemia   Would transfuse <7.0  age appropriate cancer screening outpatient     DVT ppx - HSQ

## 2022-10-13 NOTE — PROGRESS NOTE ADULT - SUBJECTIVE AND OBJECTIVE BOX
STATUS POST:  Exploratory laparotomy with lysis of adhesions    Incisional hernia repair    Small bowel resection    Insertion of intravenous catheter with ultrasound guidance        POST OPERATIVE DAY #: 28    SUBJECTIVE: Pt seen and examined by chief resident. Pt is doing well, resting comfortably on bed. Complaining of velarde discomfort. Had normal BM this AM.    Vital Signs Last 24 Hrs  T(C): 37.6 (13 Oct 2022 05:26), Max: 37.6 (13 Oct 2022 05:26)  T(F): 99.7 (13 Oct 2022 05:26), Max: 99.7 (13 Oct 2022 05:26)  HR: 98 (13 Oct 2022 05:26) (80 - 98)  BP: 160/74 (13 Oct 2022 05:26) (130/69 - 166/78)  BP(mean): 102 (13 Oct 2022 05:26) (90 - 102)  RR: 18 (13 Oct 2022 05:26) (17 - 19)  SpO2: 95% (13 Oct 2022 05:26) (95% - 100%)    Parameters below as of 13 Oct 2022 05:26  Patient On (Oxygen Delivery Method): room air        I&O's Summary    12 Oct 2022 07:01  -  13 Oct 2022 07:00  --------------------------------------------------------  IN: 1920 mL / OUT: 4200 mL / NET: -2280 mL        Physical Exam:  General Appearance: Appears well, NAD  Pulmonary: Nonlabored breathing, no respiratory distress  Abdomen: Soft, nondisteded, nontender, wound vac in place holding suction  Extremities: WWP, SCD's in place     LABS:                        7.4    11.96 )-----------( 296      ( 13 Oct 2022 06:20 )             23.4     10-13    134<L>  |  97  |  22  ----------------------------<  111<H>  4.6   |  32<H>  |  1.69<H>    Ca    8.5      13 Oct 2022 06:20  Phos  4.2     10-13  Mg     1.6     10-13    TPro  5.0<L>  /  Alb  2.0<L>  /  TBili  0.3  /  DBili  0.2  /  AST  267<H>  /  ALT  211<H>  /  AlkPhos  81  10-12

## 2022-10-13 NOTE — PROGRESS NOTE ADULT - ASSESSMENT
72 F PMH HTN, DM and PSH  x 2, hysterectomy and lap dev ( w/ Dr. Clifford) admitted on  with 2 days of n/v, abd pain, imaging demonstrating LBO, which resolved prior to operative exploration. Recurrent bowel obstruction, now s/p Exlap JOSEFINA, SBR (100 cm) (9/15). now with wound vac, pending YOSEPH placement c/b c.diff and rhabdo 2/2 daptomycin    Renal diet, IVF, ISS  Lasix 20 daily   Pain/nausea prn  Vanco PO (10/4-), s/p Daptomycin (10/4-10/7)  Wound Vac  SQH/SCDs/OOBA/IS  AM labs  Renal recs  Dispo: YOSEPH velarde today

## 2022-10-13 NOTE — PROGRESS NOTE ADULT - SUBJECTIVE AND OBJECTIVE BOX
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 Coli bacteremia.     Patient and his wife describe that he was at his baseline until two days ago when they hooked him up to TPN (via the port) and he shortly developed chills, rigors, malaise, and diffuse back pain. He finished much of the bag, and symptoms resolved around 2 hours after discontinuing the infusion. Yesterday he had the same experience when starting TPN via the port, but the rigors and back pain was much more severe \"the worst pain I've probably ever had\" and he had his wife call 911. During that ED evaluation blood cultures were obtained, and the patient was eventually discharged to home (though he states he knew the cultures would be positive since he's felt this way before). His BCx grew E.coli, and he was called to return to the hospital.   At bedside, he describes feeling better overall, back pain is back to baseline and he denies any new weakness/numbness/tingling from baseline (does use a cane during this time of the year given his weakness). Denies cough, URI symptoms, shortness of breath. Abdominal pain is at baseline, mostly localized to LUQ, and G-tube redness is unchanged from baseline for which he uses topical creams. Uses G-tube to vent, and otherwise NPO. Has been intermittently using IVF, between 0-5 bags but has had low urine output intermittently for the past week with dark urine and mild " SUBJECTIVE:  Patient seen and examined at bedside. No acute complaints no overnight events.     Vital Signs Last 12 Hrs  T(F): 98.2 (10-13-22 @ 08:55), Max: 99.7 (10-13-22 @ 05:26)  HR: 86 (10-13-22 @ 08:55) (86 - 98)  BP: 155/79 (10-13-22 @ 08:55) (140/70 - 160/74)  BP(mean): 102 (10-13-22 @ 05:26) (102 - 102)  RR: 15 (10-13-22 @ 08:55) (15 - 18)  SpO2: 95% (10-13-22 @ 08:55) (95% - 98%)  I&O's Summary    12 Oct 2022 07:01  -  13 Oct 2022 07:00  --------------------------------------------------------  IN: 1920 mL / OUT: 4200 mL / NET: -2280 mL    13 Oct 2022 07:01  -  13 Oct 2022 11:44  --------------------------------------------------------  IN: 720 mL / OUT: 750 mL / NET: -30 mL        PHYSICAL EXAM:  Constitutional: NAD, laying in bed  HEENT: NC/AT, EOMI  Neck: Supple, no JVD  Respiratory: CTA B/L   Cardiovascular: RRR, normal S1 and S2   Gastrointestinal: +BS, soft minimal tenderness  Extremities: no LE edema  Neurological: AAOx3  Psych: calm          LABS:                        7.4    11.96 )-----------( 296      ( 13 Oct 2022 06:20 )             23.4     10-13    134<L>  |  97  |  22  ----------------------------<  111<H>  4.6   |  32<H>  |  1.69<H>    Ca    8.5      13 Oct 2022 06:20  Phos  4.2     10-13  Mg     1.6     10-13    TPro  5.0<L>  /  Alb  2.0<L>  /  TBili  0.3  /  DBili  0.2  /  AST  267<H>  /  ALT  211<H>  /  AlkPhos  81  10-12            RADIOLOGY & ADDITIONAL TESTS:    MEDICATIONS  (STANDING):  chlorhexidine 2% Cloths 1 Application(s) Topical <User Schedule>  dextrose 5%. 1000 milliLiter(s) (50 mL/Hr) IV Continuous <Continuous>  dextrose 5%. 1000 milliLiter(s) (100 mL/Hr) IV Continuous <Continuous>  dextrose 50% Injectable 25 Gram(s) IV Push once  dextrose 50% Injectable 25 Gram(s) IV Push once  dextrose 50% Injectable 12.5 Gram(s) IV Push once  enoxaparin Injectable 40 milliGRAM(s) SubCutaneous every 24 hours  furosemide   Injectable 20 milliGRAM(s) IV Push every 24 hours  glucagon  Injectable 1 milliGRAM(s) IntraMuscular once  influenza  Vaccine (HIGH DOSE) 0.7 milliLiter(s) IntraMuscular once  insulin lispro (ADMELOG) corrective regimen sliding scale   SubCutaneous three times a day before meals  insulin lispro (ADMELOG) corrective regimen sliding scale   SubCutaneous at bedtime  labetalol 200 milliGRAM(s) Oral every 12 hours  lactated ringers. 1000 milliLiter(s) (100 mL/Hr) IV Continuous <Continuous>  lidocaine   4% Patch 1 Patch Transdermal every 24 hours  melatonin 5 milliGRAM(s) Oral at bedtime  NIFEdipine XL 60 milliGRAM(s) Oral every 24 hours  senna 2 Tablet(s) Oral at bedtime  vancomycin    Solution 125 milliGRAM(s) Oral every 6 hours    MEDICATIONS  (PRN):  dextrose Oral Gel 15 Gram(s) Oral once PRN Blood Glucose LESS THAN 70 milliGRAM(s)/deciliter  HYDROmorphone  Injectable 0.5 milliGRAM(s) IV Push every 4 hours PRN Severe Pain (7 - 10)  ondansetron Injectable 4 milliGRAM(s) IV Push every 8 hours PRN Nausea and/or Vomiting  oxyCODONE    IR 5 milliGRAM(s) Oral every 6 hours PRN Severe Pain (7 - 10)  sodium chloride 0.9% lock flush 10 milliLiter(s) IV Push every 1 hour PRN Pre/post blood products, medications, blood draw, and to maintain line patency   discomfort when urinating.   They are in the middle of moving homes at present. Tried to discuss their approach to port access (since they do much of this independently), and they assure me they clean the area and sterilize it as directed.     Review of Systems   The 10 point Review of Systems is negative other than noted in the HPI or here.     Past Medical History    I have reviewed this patient's medical history and updated it with pertinent information if needed.   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  Past Surgical History:   Procedure Laterality Date     APPENDECTOMY       BACK SURGERY  11/3/2014    curve in the spine     BIOPSY LYMPH NODE CERVICAL N/A 2/20/2015    Procedure: BIOPSY LYMPH NODE CERVICAL;  Surgeon: Baron Scanlon MD;  Location:  OR     C GASTRIC BYPASS,OBESE<100CM SHAYLEE-EN-Y  2002    lost 300 pounds     CHOLECYSTECTOMY       DISCECTOMY, FUSION CERVICAL ANTERIOR ONE LEVEL, COMBINED N/A 2/15/2017    Procedure: COMBINED DISCECTOMY, FUSION CERVICAL ANTERIOR ONE LEVEL;  Surgeon: Darren Campos MD;  Location:  OR     ENDOSCOPIC INSERTION TUBE GASTROSTOMY  9/9/2013    Procedure: ENDOSCOPIC INSERTION TUBE GASTROSTOMY;;  Surgeon: Francis Vyas MD;  [facility]      ENDOSCOPIC ULTRASOUND UPPER GASTROINTESTINAL TRACT (GI)  4/29/2011    Procedure:ENDOSCOPIC ULTRASOUND UPPER GASTROINTESTINAL TRACT (GI); Both Procedures done Conjointly; Surgeon:NEREIDA HOUSER; Location:[facility] OR     ENDOSCOPIC ULTRASOUND UPPER GASTROINTESTINAL TRACT (GI)  9/9/2013    Procedure: ENDOSCOPIC ULTRASOUND UPPER GASTROINTESTINAL TRACT (GI);  Endoscopic Ultrasound Guide Gastrostomy Tube Placement  C-arm;  Surgeon: Noe Lizarraga MD;  [facility]     ENDOSCOPY  03/25/11    EGD, [facility]     ENDOSCOPY  08/04/09    Upper Endoscopy, [facility]     ENDOSCOPY  01/05/09    Upper Endoscopy, [facility]     ESOPHAGOSCOPY, GASTROSCOPY, DUODENOSCOPY (EGD), COMBINED  4/20/2011    Procedure:COMBINED ESOPHAGOSCOPY, GASTROSCOPY, DUODENOSCOPY (EGD); Surgeon:FRANCIS VYAS; [facility]     ESOPHAGOSCOPY, GASTROSCOPY, DUODENOSCOPY (EGD), COMBINED  6/15/2011    Procedure:COMBINED ESOPHAGOSCOPY, GASTROSCOPY, DUODENOSCOPY (EGD); Surgeon:FRANCIS VYAS; [facility]     ESOPHAGOSCOPY, GASTROSCOPY, DUODENOSCOPY (EGD), COMBINED  6/12/2013    Procedure: COMBINED ESOPHAGOSCOPY, GASTROSCOPY, DUODENOSCOPY (EGD);;  Surgeon: Francis Vyas MD;  [facility]     ESOPHAGOSCOPY, GASTROSCOPY, DUODENOSCOPY (EGD), COMBINED  11/22/2013    Procedure: COMBINED ESOPHAGOSCOPY, GASTROSCOPY, DUODENOSCOPY (EGD);;  Surgeon: Francis Vyas MD;  [facility]     ESOPHAGOSCOPY, GASTROSCOPY, DUODENOSCOPY (EGD), COMBINED  4/30/2014    Procedure: COMBINED ESOPHAGOSCOPY, GASTROSCOPY, DUODENOSCOPY (EGD);  Surgeon: Francis Vyas MD;  [facility]     ESOPHAGOSCOPY, GASTROSCOPY, DUODENOSCOPY (EGD), COMBINED N/A 2/20/2015    Procedure: COMBINED ESOPHAGOSCOPY, GASTROSCOPY, DUODENOSCOPY (EGD), BIOPSY SINGLE OR MULTIPLE;  Surgeon: Baron Scanlon MD;  Location:  OR     ESOPHAGOSCOPY, GASTROSCOPY, DUODENOSCOPY (EGD), COMBINED N/A 9/30/2015    Procedure: COMBINED ESOPHAGOSCOPY,  GASTROSCOPY, DUODENOSCOPY (EGD);  Surgeon: Francis Vyas MD;  Location:  GI     GASTRECTOMY  6/22/2012    Procedure: GASTRECTOMY;  Open Approach, Excise Ulcers,Partial Gastrectomy, Esophagojejunostomy, Hiatal Hernia Repair, Extensive Lysis of Adhesions and Esaphagogastrodudenoscopy.;  Surgeon: Francis Vyas MD;  [facility]     GASTROJEJUNOSTOMY  08/26/09    Extensice enterolysis, partial resect. jejunum, part. resect gastric pouch, gastrojejunostomy anastomosis     HC ESOPH/GAS REFLUX TEST W NASAL IMPED ELECTRODE  8/5/2013    Procedure: ESOPHAGEAL IMPEDENCE FUNCTION TEST 1 HOUR OR LESS;  Surgeon: Halie Lang MD;  Location:  GI     HEAD & NECK SURGERY  2/15/2017    C5-C6     HERNIA REPAIR  2006    Umbilical hernia     HERNIORRHAPHY HIATAL  6/22/2012    Procedure: HERNIORRHAPHY HIATAL;;  Surgeon: Francis Vyas MD;  [facility]     HERNIORRHAPHY INGUINAL  11/22/2013    Procedure: HERNIORRHAPHY INGUINAL;;  Surgeon: Francis Vyas MD;  [facility]     INSERT PORT VASCULAR ACCESS Right 12/19/2017    Procedure: INSERT PORT VASCULAR ACCESS;  Right Chest Port Placement ;  Surgeon: Lisandro Alejandro PA-C;  [facility]     LAPAROTOMY EXPLORATORY  11/22/2013    Procedure: LAPAROTOMY EXPLORATORY;  Exploratory Laparotomy, Upper Endoscopy, Left Inguinal Hernia Repair;  Surgeon: Francis Vyas MD;  [facility]     PICC INSERTION Right 03/16/2017    5fr DL BioFlo PICC, 42cm (3cm external) in the R medial brachial vein w/ tip in the SVC RA junction.     PICC INSERTION Left 09/23/2017    5fr DL BioFlo PICC, 45cm (1cm external) in the L basilic vein w/ tip in the SVC RA junction.     SHAYLEE EN Y BOWEL  2003     SOFT TISSUE SURGERY       SOFT TISSUE SURGERY       TONSILLECTOMY          Social History   Social History   Substance Use Topics     Smoking status: Current Some Day Smoker     Packs/day: 0.10     Years: 3.00     Types: Cigarettes     Smokeless tobacco:  "Current User      Comment: I use an e cig every now and than     Alcohol use No      Comment: quit        Family History   I have reviewed this patient's family history and updated it with pertinent information if needed.   Family History   Problem Relation Age of Onset     GASTROINTESTINAL DISEASE Mother      Crohns disease     Anxiety Disorder Mother      Thyroid Disease Mother      Grave's disease     CANCER Father      ear cancer-skin cancer/melanoma     Breast Cancer Maternal Grandmother      DIABETES Maternal Uncle      Breast Cancer Other      Hypertension No family hx of      Hyperlipidemia No family hx of      CEREBROVASCULAR DISEASE No family hx of      Prostate Cancer No family hx of      Depression No family hx of      Anesthesia Reaction No family hx of      Asthma No family hx of      OSTEOPOROSIS No family hx of      Genetic Disorder No family hx of      Obesity No family hx of      MENTAL ILLNESS No family hx of      Substance Abuse No family hx of        Prior to Admission Medications   Prior to Admission Medications   Prescriptions Last Dose Informant Patient Reported? Taking?   Carvedilol (COREG PO)   Yes No   Sig: Take 12.5 mg by mouth 2 times daily   [place] [facility] INFUSION MANAGED PATIENT   No No   Sig: Contact [facility] for patient specific medication information at [phone] on admission and discharge from the hospital.  Phones are answered 24 hours a day 7 days a week 365 days a year.    Providers - Choose \"CONTINUE HOME MED (no script)\" at discharge if patient treatment with home infusion will continue.   [place] [facility]FUSION MANAGED PATIENT   No No   Sig: Contact [facility] for patient specific medication information at [phone] on admission and discharge from the hospital.  Phones are answered 24 hours a day 7 days a week 365 days a year.    Providers - Choose \"CONTINUE HOME MED (no script)\" at discharge if patient treatment with home infusion will " "continue.   Multiple Vitamin (MULTI-VITAMINS) TABS   Yes No   Sig: Take 1 tablet by mouth   Needle, Disp, (BD DISP NEEDLES) 27G X 1/2\" MISC   No No   Si Device every 30 days Use for cyanocobalamin injection once q 30 days.   albuterol (PROAIR HFA/PROVENTIL HFA/VENTOLIN HFA) 108 (90 BASE) MCG/ACT Inhaler   No No   Sig: Inhale 2 puffs into the lungs every 4 hours as needed for shortness of breath / dyspnea or wheezing   amphetamine-dextroamphetamine (ADDERALL) 20 MG per tablet   No No   Sig: Take 1 tablet (20 mg) by mouth 2 times daily   amphetamine-dextroamphetamine (ADDERALL) 20 MG per tablet   No No   Sig: Take 1 tablet (20 mg) by mouth 2 times daily   amphetamine-dextroamphetamine (ADDERALL) 20 MG per tablet   No No   Sig: Take 1 tablet (20 mg) by mouth 2 times daily   cyanocobalamin (VITAMIN B12) 1000 MCG/ML injection   Yes No   Sig: Inject 1 mL into the muscle every 30 days   cyanocobalamin (VITAMIN B12) 1000 MCG/ML injection   No No   Sig: Inject 1 mL (1,000 mcg) into the muscle every 30 days   fentaNYL (DURAGESIC) 50 mcg/hr 72 hr patch   No No   Sig: Place 1 patch onto the skin every 48 hours MYLAN BRAND ONLY. Fill on/after 18 to start on/after 1/15/18   lidocaine (LIDODERM) 5 % Patch   No No   Sig: Place 1-2 patches onto the skin every 24 hours Wear for 12 hours, remove for 12 hours.  OK to cut to better fit to size.   lidocaine-prilocaine (EMLA) cream   No No   Sig: Apply topically as needed for moderate pain   morphine 0.1% in intrasite topical gel   No No   Sig: Apply as needed prior to accessing the port site.   mupirocin (BACTROBAN) 2 % ointment   No No   Sig: Apply topically 3 times daily   naloxone (NARCAN) nasal spray   No No   Sig: Spray 1 spray (4 mg) into one nostril alternating nostrils as needed for opioid reversal (every 2-3 minutes until assistance arrives.)   Patient not taking: Reported on 1/3/2018   nystatin-triamcinolone (MYCOLOG II) cream   No No   Sig: Apply topically 2 times " daily as needed   ondansetron (ZOFRAN-ODT) 8 MG ODT tab   No No   Sig: Take 1 tablet (8 mg) by mouth every 8 hours as needed for nausea   order for DME   No No   Sig: Injection Supplies for Vitamin B12: 3cc syringes w/ 27 gauge needles, 1/2 inch length   order for DME   No No   Sig: Equipment being ordered: Bilateral knee high chronic venous insufficiency stockings--  mild-moderate pressures.   oxyCODONE (ROXICODONE) 5 MG/5ML solution   No No   Sig: Take 10-15 mLs (10-15 mg) by mouth every 4 hours as needed for moderate to severe pain Max of 45 mg per day. Fill on/after 1/12/18 not to start untill 1/15/18.   potassium bicarbonate (K-LYTE) 25 MEQ solu-tab   No No   Sig: Take 1 tablet (25 mEq) by mouth 2 times daily for 2 days   sucralfate (CARAFATE) 1 GM/10ML suspension   No No   Sig: Take 10 mLs (1 g) by mouth 4 times daily   vitamin D (ERGOCALCIFEROL) 59628 UNIT capsule   No No   Sig: Take 1 capsule (50,000 Units) by mouth every 7 days      Facility-Administered Medications: None     Allergies   Allergies   Allergen Reactions     Bactrim [Sulfamethoxazole W/Trimethoprim] Rash     Penicillins Anaphylaxis     Doxycycline Rash     Vancomycin Rash     Rash after receiving vancomycin 3/28/16 (red man's?). Tolerated with slower infusion and diphenhydramine premed.       Physical Exam   Vital Signs: Temp: 97.8  F (36.6  C) Temp src: Oral BP: 104/59 Pulse: 68   Resp: 18 SpO2: 98 %      Weight: 186 lbs 0 oz    General Appearance: alert, interactive, wearing sunglasses sitting at edge of the bed   Eyes: sclera clear, PERRLA   HEENT: NC/AT, temporal wasting, nares patent, OP clear with dry MM   Respiratory: diminished throughout but fair air movement, easy WOB on RA, no wheezing   Cardiovascular: RRR, no murmurs noted   GI: multiple well-healed surgical scars, excess skin. G-tube site with some surrounding erythema at reported baseline, mild LUQ tenderness. Otherwise soft, non-distended  Genitourinary: deferred    Musculoskeletal: chest wall port non-tender and c/d/i (accessed), PIV, trace LE edema bilaterally at reported baseline   Neurologic: AAOx4, PERRLA, face symmetric, MAEE, strength and sensation symmetric and intact in bilateral LE/UE     Assessment & Plan   Parker Acevedo Sr is a 47 year old male with PMHx of GERD, possible nutritional CM with recovered EF, chronic abdominal pain, and multiple abdominal surgeries including gastric bypass complicated by severe malnutrition andshort gut syndrome now on chronic TPN presenting with fever for one day found to have E. Coli bacteremia.     # E.coli bacteremia   # Likely port-a-cath infection   Patient with a history of multiple line infections necessitating line replacement/removal, currently port used for TPN. History highly suggestive of port as primary site. At this point he does not meet sepsis criteria, does have leukocytosis but no fever/SIRS at admission. Given his multiple drug allergies, ID was consulted by the ED and recommended starting ceftriaxone for the E.coli isolate pending susceptibilities and further culture results. CRP added on, 110.   - ID consult, appreciate involvement   - will hold off placing surgery consult regarding port removal pending ID consultation   - repeat cultures drawn in the ED, will follow up    - continue ceftriaxone   - consider exploring hygiene/port sterilization techniques in further detail in the am given recurrence of port infections (very low suspicion for malingering).    # JASON  # IVF dependence   Baseline Cr ~0.6-0.7 with bump to 1.05, given his complete dependence on IVF and intermittent use with low UOP/concentrated urine, likely prerenal physiology.   - Will continue IVF overnight (1L bolus + 1 L over 8 hrs), avoid nephrotoxins and check BMP in am     # Malnutrition   # TPN dependence    Primarily using port for nutrition, additionally receives varying amount of IVF on a regular basis. Likely that recent albumin  drop reflecting acute inflammation (negative acute phase reactant) from bacteremia rather than inadequate nutrition regimen.   - pharmacy consulted re:TPN will likely need to be PPN vs placement of new central line - will have day team address further  - continue vitamin supplementations per home regimen   - with IVF issues as above, consider nutrition re-evaluation of TPN and IVF needs    # Pulmonary infiltrates   Unclear etiology, bilateral infiltrates noted on CXR but patient without cough, tachypnea, or hypoxia to suggest bacterial pneumonia, seeding from bacteremia, or less likely CHF.   - will continue to monitor, abx as above     # Thrombocytopenia   Likely in the setting of bacteremia, baseline plts 130-150 now ~70. Lower suspicion for HIT at this point.   - No acute bleeding or indication to transfuse, will continue to monitor.      Chronic issues:   # Hypokalemia: electrolyte replacement protocol ordered     # History of possible CM with recovered EF:   Lowest EF ~45%, last 55% (9/2017); at this point without signs of sepsis and HDS, will continue carvedilol    # Chronic pain: continue home narcotic regimen. At this point back pain noted to be back to baseline, without new numbness/tingling/neuro deficit from baseline do not feel further imaging is warranted at this point but low threshold to pursue if persistently bacteremic     # COPD/asthma: continue home inhaler regimen     # GERD/PUD: continue sucralfate     # ADHD: Continue adderall     Diet:  NPO, TPN as above    Fluids: None   DVT Prophylaxis: Pneumatic Compression Devices  Code Status: Full Code    Disposition Plan   Expected discharge: 2 - 3 days; recommended to prior living arrangement once antibiotic plan established and infectious disease consult completed.     Entered: Pat Mitchell 01/13/2018, 12:50 AM   Information in the above section will display in the discharge planner report.    Patient to be formally staffed in the Pat wals[facility]  MD Paula   [facility]   Please see sticky note for cross cover information

## 2022-10-13 NOTE — PROGRESS NOTE ADULT - SUBJECTIVE AND OBJECTIVE BOX
Patient is a 72y Female seen and evaluated at bedside. Overnight events noted.       Meds:    chlorhexidine 2% Cloths 1 <User Schedule>  dextrose 5%. 1000 <Continuous>  dextrose 5%. 1000 <Continuous>  dextrose 50% Injectable 25 once  dextrose 50% Injectable 25 once  dextrose 50% Injectable 12.5 once  dextrose Oral Gel 15 once PRN  enoxaparin Injectable 40 every 24 hours  furosemide   Injectable 20 every 24 hours  glucagon  Injectable 1 once  HYDROmorphone  Injectable 0.5 every 4 hours PRN  influenza  Vaccine (HIGH DOSE) 0.7 once  insulin lispro (ADMELOG) corrective regimen sliding scale  three times a day before meals  insulin lispro (ADMELOG) corrective regimen sliding scale  at bedtime  labetalol 200 every 12 hours  lactated ringers. 1000 <Continuous>  lidocaine   4% Patch 1 every 24 hours  melatonin 5 at bedtime  NIFEdipine XL 60 every 24 hours  ondansetron Injectable 4 every 8 hours PRN  oxyCODONE    IR 5 every 6 hours PRN  senna 2 at bedtime  sodium chloride 0.9% lock flush 10 every 1 hour PRN  vancomycin    Solution 125 every 6 hours      T(C): , Max: 37.6 (10-13-22 @ 05:26)  T(F): , Max: 99.7 (10-13-22 @ 05:26)  HR: 86 (10-13-22 @ 08:55)  BP: 155/79 (10-13-22 @ 08:55)  BP(mean): 102 (10-13-22 @ 05:26)  RR: 15 (10-13-22 @ 08:55)  SpO2: 95% (10-13-22 @ 08:55)  Wt(kg): --    10-12 @ 07:01  -  10-13 @ 07:00  --------------------------------------------------------  IN: 1920 mL / OUT: 4200 mL / NET: -2280 mL    10-13 @ 07:01  -  10-13 @ 09:19  --------------------------------------------------------  IN: 0 mL / OUT: 400 mL / NET: -400 mL          Review of Systems:  ROS negative except as per HPI      PHYSICAL EXAM:  Constitutional: pleasant female NAD, laying in bed   Neck: Supple, no JVD  Respiratory: CTA B/L, on RA  Cardiovascular: RRR, normal S1 and S2,   Gastrointestinal: +BS, minimal tenderness  Extremities: no edema, warm  Neurological: AAOx3, moving all extremities spontaneously         LABS:                        7.4    11.96 )-----------( 296      ( 13 Oct 2022 06:20 )             23.4     10-13    134<L>  |  97  |  22  ----------------------------<  111<H>  4.6   |  32<H>  |  1.69<H>    Ca    8.5      13 Oct 2022 06:20  Phos  4.2     10-13  Mg     1.6     10-13    TPro  5.0<L>  /  Alb  2.0<L>  /  TBili  0.3  /  DBili  0.2  /  AST  267<H>  /  ALT  211<H>  /  AlkPhos  81  10-12                RADIOLOGY & ADDITIONAL STUDIES:           Patient is a 72y Female seen and evaluated at bedside. Overnight events noted. Pt's arms and legs pain and weakness improving. States still feel heavy but better mobility now.      Meds:    chlorhexidine 2% Cloths 1 <User Schedule>  dextrose 5%. 1000 <Continuous>  dextrose 5%. 1000 <Continuous>  dextrose 50% Injectable 25 once  dextrose 50% Injectable 25 once  dextrose 50% Injectable 12.5 once  dextrose Oral Gel 15 once PRN  enoxaparin Injectable 40 every 24 hours  furosemide   Injectable 20 every 24 hours  glucagon  Injectable 1 once  HYDROmorphone  Injectable 0.5 every 4 hours PRN  influenza  Vaccine (HIGH DOSE) 0.7 once  insulin lispro (ADMELOG) corrective regimen sliding scale  three times a day before meals  insulin lispro (ADMELOG) corrective regimen sliding scale  at bedtime  labetalol 200 every 12 hours  lactated ringers. 1000 <Continuous>  lidocaine   4% Patch 1 every 24 hours  melatonin 5 at bedtime  NIFEdipine XL 60 every 24 hours  ondansetron Injectable 4 every 8 hours PRN  oxyCODONE    IR 5 every 6 hours PRN  senna 2 at bedtime  sodium chloride 0.9% lock flush 10 every 1 hour PRN  vancomycin    Solution 125 every 6 hours      T(C): , Max: 37.6 (10-13-22 @ 05:26)  T(F): , Max: 99.7 (10-13-22 @ 05:26)  HR: 86 (10-13-22 @ 08:55)  BP: 155/79 (10-13-22 @ 08:55)  BP(mean): 102 (10-13-22 @ 05:26)  RR: 15 (10-13-22 @ 08:55)  SpO2: 95% (10-13-22 @ 08:55)  Wt(kg): --    10-12 @ 07:01  -  10-13 @ 07:00  --------------------------------------------------------  IN: 1920 mL / OUT: 4200 mL / NET: -2280 mL    10-13 @ 07:01  -  10-13 @ 09:19  --------------------------------------------------------  IN: 0 mL / OUT: 400 mL / NET: -400 mL          Review of Systems:  ROS negative except as per HPI      PHYSICAL EXAM:  Constitutional: pleasant female NAD, laying in bed   Neck: Supple, no JVD  Respiratory: CTA B/L, on RA  Cardiovascular: RRR, normal S1 and S2,   Gastrointestinal: +BS, minimal tenderness  Extremities: no edema, warm  Neurological: AAOx3, moving all extremities spontaneously         LABS:                        7.4    11.96 )-----------( 296      ( 13 Oct 2022 06:20 )             23.4     10-13    134<L>  |  97  |  22  ----------------------------<  111<H>  4.6   |  32<H>  |  1.69<H>    Ca    8.5      13 Oct 2022 06:20  Phos  4.2     10-13  Mg     1.6     10-13    TPro  5.0<L>  /  Alb  2.0<L>  /  TBili  0.3  /  DBili  0.2  /  AST  267<H>  /  ALT  211<H>  /  AlkPhos  81  10-12                RADIOLOGY & ADDITIONAL STUDIES:

## 2022-10-13 NOTE — PROGRESS NOTE ADULT - ASSESSMENT
72 year old woman with HTN, DM2 admitted for large bowel obstruction s/p ex lap w/ lysis of adhesions and bowel resection on 9/14 for SBO. She is also s/p sepsis and peritonitis. Nephrology initially consulted for prerenal azotemia which resolved. Nephrology re-consulted for SO Cr 1.73 and rhabdomyolysis 2/2 daptomycin.      #Non-oliguric pigment induced ATN  No known underlying CKD  Cr stable 1.68-->1.69  UA w/ trace proteinuria, large blood with <5 RBCs  CK trending down now, currently 6K  4.1 L of UO in last 24 hours  Renal sono noted  Likely etiology toxic ATN due to rhabdomyolysis secondary to daptomycin use        Plan:  OK to stop IV fluids now since CPK<5k and risk of further kidney insult minimum  Daily BMP. ATN usually takes 10-21 days to recover. Will keep monitoring in this time    #Rhabdomyolysis   likely due to daptomycin   CPK trended down to 2,684 after peaking at 30K, s/p IV fluids    Plan:   OK to stop IV fluids now since CPK<5k and risk of further kidney insult minimum  Daily BMP   Strict ins and outs   Avoid nephrotoxic agents       Sergio Sands M.D  PGY-4 Nephrology   [phone]

## 2022-10-13 NOTE — PROGRESS NOTE ADULT - SUBJECTIVE AND OBJECTIVE BOX
INTERVAL HPI/OVERNIGHT EVENTS:   SURGERY ATTENDING    STATUS POST:  Exploratory laparotomy with lysis of adhesions extensive one hour , Small bowel resection with primary anastomosis, Repair of recurrent Incisional hernia with myofascial flaps no mesh, washout, drainage, PREVENA        POST OPERATIVE DAY #: 28    SUBJECTIVE:  Flatus: [x ] YES [ ] NO             Bowel Movement: [x ] YES [ ] NO  Pain (0-10):       2     Pain Control Adequate: [x ] YES [ ] NO  Nausea: [ ] YES [x ] NO            Vomiting: [ ] YES [x ] NO  Diarrhea: [ ] YES [ x] NO         Constipation: [ ] YES [x ] NO     Chest Pain: [ ] YES [x ] NO    SOB:  [ ] YES [x ] NO    MEDICATIONS  (STANDING):  acetaminophen   IVPB .. 1000 milliGRAM(s) IV Intermittent once  chlorhexidine 2% Cloths 1 Application(s) Topical <User Schedule>  dextrose 5%. 1000 milliLiter(s) (50 mL/Hr) IV Continuous <Continuous>  dextrose 5%. 1000 milliLiter(s) (100 mL/Hr) IV Continuous <Continuous>  dextrose 50% Injectable 25 Gram(s) IV Push once  dextrose 50% Injectable 25 Gram(s) IV Push once  dextrose 50% Injectable 12.5 Gram(s) IV Push once  enoxaparin Injectable 40 milliGRAM(s) SubCutaneous every 24 hours  furosemide   Injectable 20 milliGRAM(s) IV Push every 24 hours  glucagon  Injectable 1 milliGRAM(s) IntraMuscular once  influenza  Vaccine (HIGH DOSE) 0.7 milliLiter(s) IntraMuscular once  insulin lispro (ADMELOG) corrective regimen sliding scale   SubCutaneous three times a day before meals  insulin lispro (ADMELOG) corrective regimen sliding scale   SubCutaneous at bedtime  labetalol 200 milliGRAM(s) Oral every 12 hours  lactated ringers. 1000 milliLiter(s) (100 mL/Hr) IV Continuous <Continuous>  lidocaine   4% Patch 1 Patch Transdermal every 24 hours  melatonin 5 milliGRAM(s) Oral at bedtime  NIFEdipine XL 60 milliGRAM(s) Oral every 24 hours  senna 2 Tablet(s) Oral at bedtime  vancomycin    Solution 125 milliGRAM(s) Oral every 6 hours    MEDICATIONS  (PRN):  dextrose Oral Gel 15 Gram(s) Oral once PRN Blood Glucose LESS THAN 70 milliGRAM(s)/deciliter  HYDROmorphone  Injectable 0.5 milliGRAM(s) IV Push every 4 hours PRN Severe Pain (7 - 10)  ondansetron Injectable 4 milliGRAM(s) IV Push every 8 hours PRN Nausea and/or Vomiting  oxyCODONE    IR 5 milliGRAM(s) Oral every 6 hours PRN Severe Pain (7 - 10)  sodium chloride 0.9% lock flush 10 milliLiter(s) IV Push every 1 hour PRN Pre/post blood products, medications, blood draw, and to maintain line patency      Vital Signs Last 24 Hrs  T(C): 36.9 (13 Oct 2022 14:02), Max: 37.6 (13 Oct 2022 05:26)  T(F): 98.4 (13 Oct 2022 14:02), Max: 99.7 (13 Oct 2022 05:26)  HR: 74 (13 Oct 2022 14:02) (74 - 98)  BP: 163/79 (13 Oct 2022 14:02) (130/69 - 163/79)  BP(mean): 102 (13 Oct 2022 05:26) (90 - 102)  RR: 17 (13 Oct 2022 14:02) (15 - 19)  SpO2: 94% (13 Oct 2022 14:02) (94% - 100%)    Parameters below as of 13 Oct 2022 14:02  Patient On (Oxygen Delivery Method): room air        PHYSICAL EXAM:      Constitutional:    Eyes:    ENMT:    Neck:    Breasts:    Back:    Respiratory:    Cardiovascular:    Gastrointestinal:    Genitourinary:    Rectal:    Extremities:    Vascular:    Neurological:    Skin:    Lymph Nodes:    Musculoskeletal:    Psychiatric:        I&O's Detail    12 Oct 2022 07:01  -  13 Oct 2022 07:00  --------------------------------------------------------  IN:    Lactated Ringers: 1500 mL    Oral Fluid: 420 mL  Total IN: 1920 mL    OUT:    Indwelling Catheter - Urethral (mL): 4150 mL    VAC (Vacuum Assisted Closure) System (mL): 50 mL  Total OUT: 4200 mL    Total NET: -2280 mL      13 Oct 2022 07:01  -  13 Oct 2022 16:40  --------------------------------------------------------  IN:    IV PiggyBack: 100 mL    Lactated Ringers: 700 mL    Oral Fluid: 560 mL  Total IN: 1360 mL    OUT:    Emesis (mL): 0 mL    Indwelling Catheter - Urethral (mL): 400 mL    VAC (Vacuum Assisted Closure) System (mL): 25 mL    Voided (mL): 1500 mL  Total OUT: 1925 mL    Total NET: -565 mL          LABS:                        7.4    11.96 )-----------( 296      ( 13 Oct 2022 06:20 )             23.4     10-13    134<L>  |  97  |  22  ----------------------------<  111<H>  4.6   |  32<H>  |  1.69<H>    Ca    8.5      13 Oct 2022 06:20  Phos  4.2     10-13  Mg     1.6     10-13    TPro  5.0<L>  /  Alb  2.0<L>  /  TBili  0.3  /  DBili  0.2  /  AST  267<H>  /  ALT  211<H>  /  AlkPhos  81  10-12          RADIOLOGY & ADDITIONAL STUDIES:

## 2022-10-13 NOTE — PROGRESS NOTE ADULT - ASSESSMENT
RECOMMEND  Continue PO Vancomycin  Continue to trend CPK until it returns to normal range  We will likely resume treatment of Enterococcus especially if WBC increases.  Favor PCN skin test before.  Please collect surveillance blood cultures  Formal evaluation by PT      [phone] Rhabdomyolysis in the context of just 3 doses of Daptomycin  Cdiff colitis - resolving   S/P Peritonitis following SBR and extensive abdominal surgery  Enterococcus in the blood -  possibly contaminated blood culture  Leukocytosis improved but now with a slight bump   CPK improved   SO - creatinine improving      RECOMMEND  Continue PO Vancomycin  Continue to trend CPK until it returns to normal range  We will likely resume treatment of Enterococcus especially if WBC increases.  Favor PCN skin test before.  Please collect surveillance blood cultures  Formal evaluation by PT  Evaluate and treat HA - primary team        Discussed with the Surgery Resident    [phone]

## 2022-10-14 LAB
ANION GAP SERPL CALC-SCNC: 9 MMOL/L — SIGNIFICANT CHANGE UP (ref 5–17)
BUN SERPL-MCNC: 20 MG/DL — SIGNIFICANT CHANGE UP (ref 7–23)
CALCIUM SERPL-MCNC: 8.6 MG/DL — SIGNIFICANT CHANGE UP (ref 8.4–10.5)
CHLORIDE SERPL-SCNC: 98 MMOL/L — SIGNIFICANT CHANGE UP (ref 96–108)
CO2 SERPL-SCNC: 29 MMOL/L — SIGNIFICANT CHANGE UP (ref 22–31)
CREAT SERPL-MCNC: 1.62 MG/DL — HIGH (ref 0.5–1.3)
EGFR: 34 ML/MIN/1.73M2 — LOW
GLUCOSE BLDC GLUCOMTR-MCNC: 100 MG/DL — HIGH (ref 70–99)
GLUCOSE BLDC GLUCOMTR-MCNC: 100 MG/DL — HIGH (ref 70–99)
GLUCOSE BLDC GLUCOMTR-MCNC: 102 MG/DL — HIGH (ref 70–99)
GLUCOSE BLDC GLUCOMTR-MCNC: 97 MG/DL — SIGNIFICANT CHANGE UP (ref 70–99)
GLUCOSE SERPL-MCNC: 96 MG/DL — SIGNIFICANT CHANGE UP (ref 70–99)
HCT VFR BLD CALC: 26.5 % — LOW (ref 34.5–45)
HGB BLD-MCNC: 7.9 G/DL — LOW (ref 11.5–15.5)
MAGNESIUM SERPL-MCNC: 1.8 MG/DL — SIGNIFICANT CHANGE UP (ref 1.6–2.6)
MCHC RBC-ENTMCNC: 27.2 PG — SIGNIFICANT CHANGE UP (ref 27–34)
MCHC RBC-ENTMCNC: 29.8 GM/DL — LOW (ref 32–36)
MCV RBC AUTO: 91.4 FL — SIGNIFICANT CHANGE UP (ref 80–100)
NRBC # BLD: 0 /100 WBCS — SIGNIFICANT CHANGE UP (ref 0–0)
PHOSPHATE SERPL-MCNC: 4.4 MG/DL — SIGNIFICANT CHANGE UP (ref 2.5–4.5)
PLATELET # BLD AUTO: 171 K/UL — SIGNIFICANT CHANGE UP (ref 150–400)
POTASSIUM SERPL-MCNC: 4.5 MMOL/L — SIGNIFICANT CHANGE UP (ref 3.5–5.3)
POTASSIUM SERPL-SCNC: 4.5 MMOL/L — SIGNIFICANT CHANGE UP (ref 3.5–5.3)
RBC # BLD: 2.9 M/UL — LOW (ref 3.8–5.2)
RBC # FLD: 16.2 % — HIGH (ref 10.3–14.5)
SODIUM SERPL-SCNC: 136 MMOL/L — SIGNIFICANT CHANGE UP (ref 135–145)
WBC # BLD: 9.86 K/UL — SIGNIFICANT CHANGE UP (ref 3.8–10.5)
WBC # FLD AUTO: 9.86 K/UL — SIGNIFICANT CHANGE UP (ref 3.8–10.5)

## 2022-10-14 PROCEDURE — 99232 SBSQ HOSP IP/OBS MODERATE 35: CPT

## 2022-10-14 RX ORDER — OXYCODONE HYDROCHLORIDE 5 MG/1
5 TABLET ORAL EVERY 6 HOURS
Refills: 0 | Status: DISCONTINUED | OUTPATIENT
Start: 2022-10-14 | End: 2022-10-16

## 2022-10-14 RX ORDER — MAGNESIUM SULFATE 500 MG/ML
1 VIAL (ML) INJECTION ONCE
Refills: 0 | Status: COMPLETED | OUTPATIENT
Start: 2022-10-14 | End: 2022-10-14

## 2022-10-14 RX ORDER — ACETAMINOPHEN 500 MG
650 TABLET ORAL ONCE
Refills: 0 | Status: COMPLETED | OUTPATIENT
Start: 2022-10-14 | End: 2022-10-14

## 2022-10-14 RX ADMIN — ENOXAPARIN SODIUM 40 MILLIGRAM(S): 100 INJECTION SUBCUTANEOUS at 13:02

## 2022-10-14 RX ADMIN — LIDOCAINE 1 PATCH: 4 CREAM TOPICAL at 01:56

## 2022-10-14 RX ADMIN — OXYCODONE HYDROCHLORIDE 5 MILLIGRAM(S): 5 TABLET ORAL at 17:00

## 2022-10-14 RX ADMIN — OXYCODONE HYDROCHLORIDE 5 MILLIGRAM(S): 5 TABLET ORAL at 02:13

## 2022-10-14 RX ADMIN — Medication 60 MILLIGRAM(S): at 13:02

## 2022-10-14 RX ADMIN — SENNA PLUS 2 TABLET(S): 8.6 TABLET ORAL at 21:31

## 2022-10-14 RX ADMIN — LIDOCAINE 1 PATCH: 4 CREAM TOPICAL at 23:51

## 2022-10-14 RX ADMIN — Medication 200 MILLIGRAM(S): at 05:42

## 2022-10-14 RX ADMIN — Medication 650 MILLIGRAM(S): at 23:40

## 2022-10-14 RX ADMIN — OXYCODONE HYDROCHLORIDE 5 MILLIGRAM(S): 5 TABLET ORAL at 01:13

## 2022-10-14 RX ADMIN — Medication 20 MILLIGRAM(S): at 07:37

## 2022-10-14 RX ADMIN — SODIUM CHLORIDE 100 MILLILITER(S): 9 INJECTION, SOLUTION INTRAVENOUS at 21:31

## 2022-10-14 RX ADMIN — Medication 125 MILLIGRAM(S): at 13:02

## 2022-10-14 RX ADMIN — Medication 200 MILLIGRAM(S): at 17:00

## 2022-10-14 RX ADMIN — LIDOCAINE 1 PATCH: 4 CREAM TOPICAL at 18:34

## 2022-10-14 RX ADMIN — OXYCODONE HYDROCHLORIDE 5 MILLIGRAM(S): 5 TABLET ORAL at 18:00

## 2022-10-14 RX ADMIN — LIDOCAINE 1 PATCH: 4 CREAM TOPICAL at 12:01

## 2022-10-14 RX ADMIN — Medication 5 MILLIGRAM(S): at 23:40

## 2022-10-14 RX ADMIN — Medication 100 GRAM(S): at 13:02

## 2022-10-14 RX ADMIN — CHLORHEXIDINE GLUCONATE 1 APPLICATION(S): 213 SOLUTION TOPICAL at 05:42

## 2022-10-14 NOTE — PROGRESS NOTE ADULT - SUBJECTIVE AND OBJECTIVE BOX
INTERVAL HPI/OVERNIGHT EVENTS:    ANTIBIOTICS/RELEVANT:    MEDICATIONS  (STANDING):  chlorhexidine 2% Cloths 1 Application(s) Topical <User Schedule>  dextrose 5%. 1000 milliLiter(s) (50 mL/Hr) IV Continuous <Continuous>  dextrose 5%. 1000 milliLiter(s) (100 mL/Hr) IV Continuous <Continuous>  dextrose 50% Injectable 12.5 Gram(s) IV Push once  dextrose 50% Injectable 25 Gram(s) IV Push once  dextrose 50% Injectable 25 Gram(s) IV Push once  enoxaparin Injectable 40 milliGRAM(s) SubCutaneous every 24 hours  furosemide   Injectable 20 milliGRAM(s) IV Push every 24 hours  glucagon  Injectable 1 milliGRAM(s) IntraMuscular once  influenza  Vaccine (HIGH DOSE) 0.7 milliLiter(s) IntraMuscular once  insulin lispro (ADMELOG) corrective regimen sliding scale   SubCutaneous three times a day before meals  insulin lispro (ADMELOG) corrective regimen sliding scale   SubCutaneous at bedtime  labetalol 200 milliGRAM(s) Oral every 12 hours  lactated ringers. 1000 milliLiter(s) (100 mL/Hr) IV Continuous <Continuous>  lidocaine   4% Patch 1 Patch Transdermal every 24 hours  melatonin 5 milliGRAM(s) Oral at bedtime  NIFEdipine XL 60 milliGRAM(s) Oral every 24 hours  senna 2 Tablet(s) Oral at bedtime  vancomycin    Solution 125 milliGRAM(s) Oral every 6 hours    MEDICATIONS  (PRN):  dextrose Oral Gel 15 Gram(s) Oral once PRN Blood Glucose LESS THAN 70 milliGRAM(s)/deciliter  HYDROmorphone  Injectable 0.5 milliGRAM(s) IV Push every 4 hours PRN Severe Pain (7 - 10)  ondansetron Injectable 4 milliGRAM(s) IV Push every 8 hours PRN Nausea and/or Vomiting  oxyCODONE    IR 5 milliGRAM(s) Oral every 6 hours PRN Severe Pain (7 - 10)  sodium chloride 0.9% lock flush 10 milliLiter(s) IV Push every 1 hour PRN Pre/post blood products, medications, blood draw, and to maintain line patency      Allergies    penicillin (Rash)    Intolerances    daptomycin (Muscle Pain)      Vital Signs Last 24 Hrs  T(C): 36.9 (14 Oct 2022 08:34), Max: 37.2 (13 Oct 2022 22:30)  T(F): 98.4 (14 Oct 2022 08:34), Max: 98.9 (13 Oct 2022 22:30)  HR: 79 (14 Oct 2022 08:34) (74 - 83)  BP: 164/78 (14 Oct 2022 08:34) (145/70 - 170/69)  BP(mean): --  RR: 18 (14 Oct 2022 08:34) (17 - 18)  SpO2: 96% (14 Oct 2022 08:34) (94% - 98%)    Parameters below as of 14 Oct 2022 08:34  Patient On (Oxygen Delivery Method): room air            LABS:                        7.9    9.86  )-----------( 171      ( 14 Oct 2022 07:36 )             26.5     10-14    136  |  98  |  20  ----------------------------<  96  4.5   |  29  |  1.62<H>    Ca    8.6      14 Oct 2022 07:36  Phos  4.4     10-14  Mg     1.8     10-14      Creatine Kinase, Serum (10.12.22 @ 10:25)    Creatine Kinase, Serum: 2684 U/L          MICROBIOLOGY:        RADIOLOGY & ADDITIONAL STUDIES: INTERVAL HPI/OVERNIGHT EVENTS:        MEDICATIONS  (STANDING):  chlorhexidine 2% Cloths 1 Application(s) Topical <User Schedule>  dextrose 5%. 1000 milliLiter(s) (50 mL/Hr) IV Continuous <Continuous>  dextrose 5%. 1000 milliLiter(s) (100 mL/Hr) IV Continuous <Continuous>  dextrose 50% Injectable 12.5 Gram(s) IV Push once  dextrose 50% Injectable 25 Gram(s) IV Push once  dextrose 50% Injectable 25 Gram(s) IV Push once  enoxaparin Injectable 40 milliGRAM(s) SubCutaneous every 24 hours  furosemide   Injectable 20 milliGRAM(s) IV Push every 24 hours  glucagon  Injectable 1 milliGRAM(s) IntraMuscular once  influenza  Vaccine (HIGH DOSE) 0.7 milliLiter(s) IntraMuscular once  insulin lispro (ADMELOG) corrective regimen sliding scale   SubCutaneous three times a day before meals  insulin lispro (ADMELOG) corrective regimen sliding scale   SubCutaneous at bedtime  labetalol 200 milliGRAM(s) Oral every 12 hours  lactated ringers. 1000 milliLiter(s) (100 mL/Hr) IV Continuous <Continuous>  lidocaine   4% Patch 1 Patch Transdermal every 24 hours  melatonin 5 milliGRAM(s) Oral at bedtime  NIFEdipine XL 60 milliGRAM(s) Oral every 24 hours  senna 2 Tablet(s) Oral at bedtime  vancomycin    Solution 125 milliGRAM(s) Oral every 6 hours    MEDICATIONS  (PRN):  dextrose Oral Gel 15 Gram(s) Oral once PRN Blood Glucose LESS THAN 70 milliGRAM(s)/deciliter  HYDROmorphone  Injectable 0.5 milliGRAM(s) IV Push every 4 hours PRN Severe Pain (7 - 10)  ondansetron Injectable 4 milliGRAM(s) IV Push every 8 hours PRN Nausea and/or Vomiting  oxyCODONE    IR 5 milliGRAM(s) Oral every 6 hours PRN Severe Pain (7 - 10)  sodium chloride 0.9% lock flush 10 milliLiter(s) IV Push every 1 hour PRN Pre/post blood products, medications, blood draw, and to maintain line patency      Allergies    penicillin (Rash)    Intolerances    daptomycin (Muscle Pain)      Vital Signs Last 24 Hrs  T(C): 36.9 (14 Oct 2022 08:34), Max: 37.2 (13 Oct 2022 22:30)  T(F): 98.4 (14 Oct 2022 08:34), Max: 98.9 (13 Oct 2022 22:30)  HR: 79 (14 Oct 2022 08:34) (74 - 83)  BP: 164/78 (14 Oct 2022 08:34) (145/70 - 170/69)  BP(mean): --  RR: 18 (14 Oct 2022 08:34) (17 - 18)  SpO2: 96% (14 Oct 2022 08:34) (94% - 98%)    Parameters below as of 14 Oct 2022 08:34  Patient On (Oxygen Delivery Method): room air            LABS:                        7.9    9.86  )-----------( 171      ( 14 Oct 2022 07:36 )             26.5     10-14    136  |  98  |  20  ----------------------------<  96  4.5   |  29  |  1.62<H>    Ca    8.6      14 Oct 2022 07:36  Phos  4.4     10-14  Mg     1.8     10-14      Creatine Kinase, Serum (10.12.22 @ 10:25)    Creatine Kinase, Serum: 2684 U/L          MICROBIOLOGY:        RADIOLOGY & ADDITIONAL STUDIES: INTERVAL HPI/OVERNIGHT EVENTS:    Better and stronger  No diarrhea  No F/C    MEDICATIONS  (STANDING):  chlorhexidine 2% Cloths 1 Application(s) Topical <User Schedule>  dextrose 5%. 1000 milliLiter(s) (50 mL/Hr) IV Continuous <Continuous>  dextrose 5%. 1000 milliLiter(s) (100 mL/Hr) IV Continuous <Continuous>  dextrose 50% Injectable 12.5 Gram(s) IV Push once  dextrose 50% Injectable 25 Gram(s) IV Push once  dextrose 50% Injectable 25 Gram(s) IV Push once  enoxaparin Injectable 40 milliGRAM(s) SubCutaneous every 24 hours  furosemide   Injectable 20 milliGRAM(s) IV Push every 24 hours  glucagon  Injectable 1 milliGRAM(s) IntraMuscular once  influenza  Vaccine (HIGH DOSE) 0.7 milliLiter(s) IntraMuscular once  insulin lispro (ADMELOG) corrective regimen sliding scale   SubCutaneous three times a day before meals  insulin lispro (ADMELOG) corrective regimen sliding scale   SubCutaneous at bedtime  labetalol 200 milliGRAM(s) Oral every 12 hours  lactated ringers. 1000 milliLiter(s) (100 mL/Hr) IV Continuous <Continuous>  lidocaine   4% Patch 1 Patch Transdermal every 24 hours  melatonin 5 milliGRAM(s) Oral at bedtime  NIFEdipine XL 60 milliGRAM(s) Oral every 24 hours  senna 2 Tablet(s) Oral at bedtime  vancomycin    Solution 125 milliGRAM(s) Oral every 6 hours    MEDICATIONS  (PRN):  dextrose Oral Gel 15 Gram(s) Oral once PRN Blood Glucose LESS THAN 70 milliGRAM(s)/deciliter  HYDROmorphone  Injectable 0.5 milliGRAM(s) IV Push every 4 hours PRN Severe Pain (7 - 10)  ondansetron Injectable 4 milliGRAM(s) IV Push every 8 hours PRN Nausea and/or Vomiting  oxyCODONE    IR 5 milliGRAM(s) Oral every 6 hours PRN Severe Pain (7 - 10)  sodium chloride 0.9% lock flush 10 milliLiter(s) IV Push every 1 hour PRN Pre/post blood products, medications, blood draw, and to maintain line patency      Allergies    penicillin (Rash)    Intolerances    daptomycin (Muscle Pain)      EXAM  Vital Signs Last 24 Hrs  T(C): 36.9 (14 Oct 2022 08:34), Max: 37.2 (13 Oct 2022 22:30)  T(F): 98.4 (14 Oct 2022 08:34), Max: 98.9 (13 Oct 2022 22:30)  HR: 79 (14 Oct 2022 08:34) (74 - 83)  BP: 164/78 (14 Oct 2022 08:34) (145/70 - 170/69)  BP(mean): --  RR: 18 (14 Oct 2022 08:34) (17 - 18)  SpO2: 96% (14 Oct 2022 08:34) (94% - 98%)    Parameters below as of 14 Oct 2022 08:34  Patient On (Oxygen Delivery Method): room air  Awake and alert  Calm in no distress  No rash'  RRR  Chest dicreased BSs at bases   Abd soft NT  Edema improved  PICC exit OK        LABS:                        7.9    9.86  )-----------( 171      ( 14 Oct 2022 07:36 )             26.5     10-14    136  |  98  |  20  ----------------------------<  96  4.5   |  29  |  1.62<H>    Ca    8.6      14 Oct 2022 07:36  Phos  4.4     10-14  Mg     1.8     10-14      Creatine Kinase, Serum (10.12.22 @ 10:25)    Creatine Kinase, Serum: 2684 U/L

## 2022-10-14 NOTE — PROGRESS NOTE ADULT - SUBJECTIVE AND OBJECTIVE BOX
Patient is returning your call and is asking for you and he also was stating that this is the number that he usually calls but it came to me in IM. Best number to reach patient at is [phone].    SUBJECTIVE:  Patient seen and examined at bedside. NO acute complaints no overnight events. Feels tired today. Otherwise denies fevers, chills, NVD, CP, SOB etc.     Vital Signs Last 12 Hrs  T(F): 98.4 (10-14-22 @ 08:34), Max: 98.6 (10-14-22 @ 05:22)  HR: 79 (10-14-22 @ 08:34) (79 - 83)  BP: 164/78 (10-14-22 @ 08:34) (164/78 - 170/69)  BP(mean): --  RR: 18 (10-14-22 @ 08:34) (18 - 18)  SpO2: 96% (10-14-22 @ 08:34) (95% - 96%)  I&O's Summary    13 Oct 2022 07:01  -  14 Oct 2022 07:00  --------------------------------------------------------  IN: 2160 mL / OUT: 3225 mL / NET: -1065 mL    14 Oct 2022 07:01  -  14 Oct 2022 12:24  --------------------------------------------------------  IN: 400 mL / OUT: 1150 mL / NET: -750 mL        PHYSICAL EXAM:  Constitutional: NAD, age appropriate female laying in bed  HEENT: NC/AT, EOMI  Neck: Supple, no JVD  Respiratory: CTA B/L   Cardiovascular: RRR, normal S1 and S2   Gastrointestinal: +BS, soft minimal tenderness  Extremities: no LE edema  Neurological: AAOx3  Psych: calm cooperative        LABS:                        7.9    9.86  )-----------( 171      ( 14 Oct 2022 07:36 )             26.5     10-14    136  |  98  |  20  ----------------------------<  96  4.5   |  29  |  1.62<H>    Ca    8.6      14 Oct 2022 07:36  Phos  4.4     10-14  Mg     1.8     10-14              RADIOLOGY & ADDITIONAL TESTS:    MEDICATIONS  (STANDING):  chlorhexidine 2% Cloths 1 Application(s) Topical <User Schedule>  dextrose 5%. 1000 milliLiter(s) (50 mL/Hr) IV Continuous <Continuous>  dextrose 5%. 1000 milliLiter(s) (100 mL/Hr) IV Continuous <Continuous>  dextrose 50% Injectable 12.5 Gram(s) IV Push once  dextrose 50% Injectable 25 Gram(s) IV Push once  dextrose 50% Injectable 25 Gram(s) IV Push once  enoxaparin Injectable 40 milliGRAM(s) SubCutaneous every 24 hours  furosemide   Injectable 20 milliGRAM(s) IV Push every 24 hours  glucagon  Injectable 1 milliGRAM(s) IntraMuscular once  influenza  Vaccine (HIGH DOSE) 0.7 milliLiter(s) IntraMuscular once  insulin lispro (ADMELOG) corrective regimen sliding scale   SubCutaneous three times a day before meals  insulin lispro (ADMELOG) corrective regimen sliding scale   SubCutaneous at bedtime  labetalol 200 milliGRAM(s) Oral every 12 hours  lactated ringers. 1000 milliLiter(s) (100 mL/Hr) IV Continuous <Continuous>  lidocaine   4% Patch 1 Patch Transdermal every 24 hours  melatonin 5 milliGRAM(s) Oral at bedtime  NIFEdipine XL 60 milliGRAM(s) Oral every 24 hours  senna 2 Tablet(s) Oral at bedtime  vancomycin    Solution 125 milliGRAM(s) Oral every 6 hours    MEDICATIONS  (PRN):  dextrose Oral Gel 15 Gram(s) Oral once PRN Blood Glucose LESS THAN 70 milliGRAM(s)/deciliter  HYDROmorphone  Injectable 0.5 milliGRAM(s) IV Push every 4 hours PRN Severe Pain (7 - 10)  ondansetron Injectable 4 milliGRAM(s) IV Push every 8 hours PRN Nausea and/or Vomiting  oxyCODONE    IR 5 milliGRAM(s) Oral every 6 hours PRN Severe Pain (7 - 10)  sodium chloride 0.9% lock flush 10 milliLiter(s) IV Push every 1 hour PRN Pre/post blood products, medications, blood draw, and to maintain line patency

## 2022-10-14 NOTE — PROGRESS NOTE ADULT - SUBJECTIVE AND OBJECTIVE BOX
INTERVAL HPI/OVERNIGHT EVENTS:   SURGERY ATTENDING    STATUS POST:      Exploratory laparotomy with lysis of adhesions extensive one hour , Small bowel resection with primary anastomosis, Repair of recurrent Incisional hernia with myofascial flaps no mesh, washout, drainage, PREVENA        POST OPERATIVE DAY #: 29    SUBJECTIVE:  Flatus: [x ] YES [ ] NO             Bowel Movement: [x ] YES [ ] NO  Pain (0-10):       2     Pain Control Adequate: [x ] YES [ ] NO  Nausea: [ ] YES [x ] NO            Vomiting: [ ] YES [x ] NO  Diarrhea: [ ] YES [ x] NO         Constipation: [ ] YES [x ] NO     Chest Pain: [ ] YES [x ] NO    SOB:  [ ] YES [x ] NO    MEDICATIONS  (STANDING):  chlorhexidine 2% Cloths 1 Application(s) Topical <User Schedule>  dextrose 5%. 1000 milliLiter(s) (50 mL/Hr) IV Continuous <Continuous>  dextrose 5%. 1000 milliLiter(s) (100 mL/Hr) IV Continuous <Continuous>  dextrose 50% Injectable 12.5 Gram(s) IV Push once  dextrose 50% Injectable 25 Gram(s) IV Push once  dextrose 50% Injectable 25 Gram(s) IV Push once  enoxaparin Injectable 40 milliGRAM(s) SubCutaneous every 24 hours  furosemide   Injectable 20 milliGRAM(s) IV Push every 24 hours  glucagon  Injectable 1 milliGRAM(s) IntraMuscular once  influenza  Vaccine (HIGH DOSE) 0.7 milliLiter(s) IntraMuscular once  insulin lispro (ADMELOG) corrective regimen sliding scale   SubCutaneous three times a day before meals  insulin lispro (ADMELOG) corrective regimen sliding scale   SubCutaneous at bedtime  labetalol 200 milliGRAM(s) Oral every 12 hours  lactated ringers. 1000 milliLiter(s) (100 mL/Hr) IV Continuous <Continuous>  lidocaine   4% Patch 1 Patch Transdermal every 24 hours  melatonin 5 milliGRAM(s) Oral at bedtime  NIFEdipine XL 60 milliGRAM(s) Oral every 24 hours  senna 2 Tablet(s) Oral at bedtime  vancomycin    Solution 125 milliGRAM(s) Oral every 6 hours    MEDICATIONS  (PRN):  dextrose Oral Gel 15 Gram(s) Oral once PRN Blood Glucose LESS THAN 70 milliGRAM(s)/deciliter  HYDROmorphone  Injectable 0.5 milliGRAM(s) IV Push every 4 hours PRN Severe Pain (7 - 10)  ondansetron Injectable 4 milliGRAM(s) IV Push every 8 hours PRN Nausea and/or Vomiting  oxyCODONE    IR 5 milliGRAM(s) Oral every 6 hours PRN Severe Pain (7 - 10)  sodium chloride 0.9% lock flush 10 milliLiter(s) IV Push every 1 hour PRN Pre/post blood products, medications, blood draw, and to maintain line patency      Vital Signs Last 24 Hrs  T(C): 36.9 (14 Oct 2022 14:02), Max: 37.2 (13 Oct 2022 22:30)  T(F): 98.4 (14 Oct 2022 14:02), Max: 98.9 (13 Oct 2022 22:30)  HR: 73 (14 Oct 2022 14:02) (73 - 83)  BP: 152/79 (14 Oct 2022 14:02) (145/70 - 170/69)  BP(mean): --  RR: 18 (14 Oct 2022 14:02) (17 - 18)  SpO2: 99% (14 Oct 2022 14:02) (95% - 99%)    Parameters below as of 14 Oct 2022 14:02  Patient On (Oxygen Delivery Method): room air        PHYSICAL EXAM:      Constitutional:    Eyes:    ENMT:    Neck:    Breasts:    Back:    Respiratory:    Cardiovascular:    Gastrointestinal:    Genitourinary:    Rectal:    Extremities:    Vascular:    Neurological:    Skin:    Lymph Nodes:    Musculoskeletal:    Psychiatric:        I&O's Detail    13 Oct 2022 07:01  -  14 Oct 2022 07:00  --------------------------------------------------------  IN:    IV PiggyBack: 200 mL    Lactated Ringers: 1200 mL    Oral Fluid: 760 mL  Total IN: 2160 mL    OUT:    Emesis (mL): 0 mL    Indwelling Catheter - Urethral (mL): 400 mL    VAC (Vacuum Assisted Closure) System (mL): 25 mL    Voided (mL): 2800 mL  Total OUT: 3225 mL    Total NET: -1065 mL      14 Oct 2022 07:01  -  14 Oct 2022 15:43  --------------------------------------------------------  IN:    Lactated Ringers: 400 mL    Oral Fluid: 400 mL  Total IN: 800 mL    OUT:    VAC (Vacuum Assisted Closure) System (mL): 0 mL    Voided (mL): 1800 mL  Total OUT: 1800 mL    Total NET: -1000 mL          LABS:                        7.9    9.86  )-----------( 171      ( 14 Oct 2022 07:36 )             26.5     10-14    136  |  98  |  20  ----------------------------<  96  4.5   |  29  |  1.62<H>    Ca    8.6      14 Oct 2022 07:36  Phos  4.4     10-14  Mg     1.8     10-14            RADIOLOGY & ADDITIONAL STUDIES:

## 2022-10-14 NOTE — PROGRESS NOTE ADULT - ASSESSMENT
RECOMMEND  Continue PO Vancomycin for 14 days  Check CPK  Surveillance blood cultures      Discussed with surgical house staff    [phone] Rhabdomyolysis in the context of just 3 doses of Daptomycin - recovering  Cdiff colitis - resolving   S/P Peritonitis following SBR and extensive abdominal surgery  Enterococcus in the blood -  possibly contaminated blood culture  Leukocytosis resolved  SO - creatinine improving        RECOMMEND  Continue PO Vancomycin for 14 days  Check CPK  Collect surveillance blood cultures      Discussed with surgical house staff    [phone]

## 2022-10-15 DIAGNOSIS — N17.9 ACUTE KIDNEY FAILURE, UNSPECIFIED: ICD-10-CM

## 2022-10-15 LAB
ANION GAP SERPL CALC-SCNC: 7 MMOL/L — SIGNIFICANT CHANGE UP (ref 5–17)
BUN SERPL-MCNC: 16 MG/DL — SIGNIFICANT CHANGE UP (ref 7–23)
CALCIUM SERPL-MCNC: 8.3 MG/DL — LOW (ref 8.4–10.5)
CHLORIDE SERPL-SCNC: 98 MMOL/L — SIGNIFICANT CHANGE UP (ref 96–108)
CK SERPL-CCNC: 564 U/L — HIGH (ref 25–170)
CO2 SERPL-SCNC: 32 MMOL/L — HIGH (ref 22–31)
CREAT SERPL-MCNC: 1.63 MG/DL — HIGH (ref 0.5–1.3)
EGFR: 34 ML/MIN/1.73M2 — LOW
GLUCOSE BLDC GLUCOMTR-MCNC: 100 MG/DL — HIGH (ref 70–99)
GLUCOSE BLDC GLUCOMTR-MCNC: 84 MG/DL — SIGNIFICANT CHANGE UP (ref 70–99)
GLUCOSE BLDC GLUCOMTR-MCNC: 90 MG/DL — SIGNIFICANT CHANGE UP (ref 70–99)
GLUCOSE BLDC GLUCOMTR-MCNC: 94 MG/DL — SIGNIFICANT CHANGE UP (ref 70–99)
GLUCOSE SERPL-MCNC: 86 MG/DL — SIGNIFICANT CHANGE UP (ref 70–99)
HCT VFR BLD CALC: 23.8 % — LOW (ref 34.5–45)
HGB BLD-MCNC: 7.4 G/DL — LOW (ref 11.5–15.5)
MAGNESIUM SERPL-MCNC: 1.8 MG/DL — SIGNIFICANT CHANGE UP (ref 1.6–2.6)
MCHC RBC-ENTMCNC: 27.1 PG — SIGNIFICANT CHANGE UP (ref 27–34)
MCHC RBC-ENTMCNC: 31.1 GM/DL — LOW (ref 32–36)
MCV RBC AUTO: 87.2 FL — SIGNIFICANT CHANGE UP (ref 80–100)
NRBC # BLD: 0 /100 WBCS — SIGNIFICANT CHANGE UP (ref 0–0)
PHOSPHATE SERPL-MCNC: 4.6 MG/DL — HIGH (ref 2.5–4.5)
PLATELET # BLD AUTO: 287 K/UL — SIGNIFICANT CHANGE UP (ref 150–400)
POTASSIUM SERPL-MCNC: 4.1 MMOL/L — SIGNIFICANT CHANGE UP (ref 3.5–5.3)
POTASSIUM SERPL-SCNC: 4.1 MMOL/L — SIGNIFICANT CHANGE UP (ref 3.5–5.3)
RBC # BLD: 2.73 M/UL — LOW (ref 3.8–5.2)
RBC # FLD: 15.9 % — HIGH (ref 10.3–14.5)
SODIUM SERPL-SCNC: 137 MMOL/L — SIGNIFICANT CHANGE UP (ref 135–145)
WBC # BLD: 7.68 K/UL — SIGNIFICANT CHANGE UP (ref 3.8–10.5)
WBC # FLD AUTO: 7.68 K/UL — SIGNIFICANT CHANGE UP (ref 3.8–10.5)

## 2022-10-15 PROCEDURE — 99233 SBSQ HOSP IP/OBS HIGH 50: CPT

## 2022-10-15 RX ORDER — MAGNESIUM SULFATE 500 MG/ML
1 VIAL (ML) INJECTION ONCE
Refills: 0 | Status: COMPLETED | OUTPATIENT
Start: 2022-10-15 | End: 2022-10-15

## 2022-10-15 RX ORDER — ACETAMINOPHEN 500 MG
325 TABLET ORAL EVERY 4 HOURS
Refills: 0 | Status: DISCONTINUED | OUTPATIENT
Start: 2022-10-15 | End: 2022-10-21

## 2022-10-15 RX ORDER — ACETAMINOPHEN 500 MG
650 TABLET ORAL ONCE
Refills: 0 | Status: COMPLETED | OUTPATIENT
Start: 2022-10-15 | End: 2022-10-15

## 2022-10-15 RX ADMIN — Medication 325 MILLIGRAM(S): at 22:27

## 2022-10-15 RX ADMIN — Medication 125 MILLIGRAM(S): at 07:04

## 2022-10-15 RX ADMIN — SODIUM CHLORIDE 100 MILLILITER(S): 9 INJECTION, SOLUTION INTRAVENOUS at 22:35

## 2022-10-15 RX ADMIN — LIDOCAINE 1 PATCH: 4 CREAM TOPICAL at 18:25

## 2022-10-15 RX ADMIN — Medication 125 MILLIGRAM(S): at 19:02

## 2022-10-15 RX ADMIN — Medication 200 MILLIGRAM(S): at 05:01

## 2022-10-15 RX ADMIN — Medication 650 MILLIGRAM(S): at 13:10

## 2022-10-15 RX ADMIN — ENOXAPARIN SODIUM 40 MILLIGRAM(S): 100 INJECTION SUBCUTANEOUS at 13:24

## 2022-10-15 RX ADMIN — LIDOCAINE 1 PATCH: 4 CREAM TOPICAL at 13:02

## 2022-10-15 RX ADMIN — Medication 60 MILLIGRAM(S): at 13:03

## 2022-10-15 RX ADMIN — Medication 650 MILLIGRAM(S): at 12:09

## 2022-10-15 RX ADMIN — SODIUM CHLORIDE 100 MILLILITER(S): 9 INJECTION, SOLUTION INTRAVENOUS at 13:02

## 2022-10-15 RX ADMIN — Medication 20 MILLIGRAM(S): at 07:04

## 2022-10-15 RX ADMIN — Medication 650 MILLIGRAM(S): at 00:55

## 2022-10-15 RX ADMIN — Medication 125 MILLIGRAM(S): at 13:02

## 2022-10-15 RX ADMIN — ONDANSETRON 4 MILLIGRAM(S): 8 TABLET, FILM COATED ORAL at 13:25

## 2022-10-15 RX ADMIN — Medication 100 GRAM(S): at 13:03

## 2022-10-15 RX ADMIN — Medication 125 MILLIGRAM(S): at 01:01

## 2022-10-15 RX ADMIN — CHLORHEXIDINE GLUCONATE 1 APPLICATION(S): 213 SOLUTION TOPICAL at 05:01

## 2022-10-15 RX ADMIN — Medication 200 MILLIGRAM(S): at 17:18

## 2022-10-15 RX ADMIN — Medication 325 MILLIGRAM(S): at 23:27

## 2022-10-15 NOTE — PROGRESS NOTE ADULT - ASSESSMENT
Rhabdomyolysis in the context of just 3 doses of Daptomycin - recovering  Cdiff colitis - resolving   S/P Peritonitis following SBR and extensive abdominal surgery  Enterococcus in the blood -  suspect contaminated blood culture  Leukocytosis resolved  SO - creatinine improving      RECOMMEND  Complete 2 week treatment with PO Vancomycin  Collect surveillance blood cultures  Incentive spirometry  Out of bed for meals  Mobilize        Discussed with the surgery resident    [phone]

## 2022-10-15 NOTE — PROGRESS NOTE ADULT - SUBJECTIVE AND OBJECTIVE BOX
SUBJECTIVE:  Patient seen and examined at bedside. NO acute complaints no overnight events. Feels tired today. Otherwise denies fevers, chills, NVD, CP, SOB etc.     Vital Signs Last 12 Hrs  T(F): 98.4 (10-14-22 @ 08:34), Max: 98.6 (10-14-22 @ 05:22)  HR: 79 (10-14-22 @ 08:34) (79 - 83)  BP: 164/78 (10-14-22 @ 08:34) (164/78 - 170/69)  BP(mean): --  RR: 18 (10-14-22 @ 08:34) (18 - 18)  SpO2: 96% (10-14-22 @ 08:34) (95% - 96%)  I&O's Summary    13 Oct 2022 07:01  -  14 Oct 2022 07:00  --------------------------------------------------------  IN: 2160 mL / OUT: 3225 mL / NET: -1065 mL    14 Oct 2022 07:01  -  14 Oct 2022 12:24  --------------------------------------------------------  IN: 400 mL / OUT: 1150 mL / NET: -750 mL        PHYSICAL EXAM:  Constitutional: NAD, age appropriate female laying in bed  HEENT: NC/AT, EOMI  Neck: Supple, no JVD  Respiratory: CTA B/L   Cardiovascular: RRR, normal S1 and S2   Gastrointestinal: +BS, soft minimal tenderness  Extremities: no LE edema  Neurological: AAOx3  Psych: calm cooperative        LABS:                        7.9    9.86  )-----------( 171      ( 14 Oct 2022 07:36 )             26.5     10-14    136  |  98  |  20  ----------------------------<  96  4.5   |  29  |  1.62<H>    Ca    8.6      14 Oct 2022 07:36  Phos  4.4     10-14  Mg     1.8     10-14              RADIOLOGY & ADDITIONAL TESTS:    MEDICATIONS  (STANDING):  chlorhexidine 2% Cloths 1 Application(s) Topical <User Schedule>  dextrose 5%. 1000 milliLiter(s) (50 mL/Hr) IV Continuous <Continuous>  dextrose 5%. 1000 milliLiter(s) (100 mL/Hr) IV Continuous <Continuous>  dextrose 50% Injectable 12.5 Gram(s) IV Push once  dextrose 50% Injectable 25 Gram(s) IV Push once  dextrose 50% Injectable 25 Gram(s) IV Push once  enoxaparin Injectable 40 milliGRAM(s) SubCutaneous every 24 hours  furosemide   Injectable 20 milliGRAM(s) IV Push every 24 hours  glucagon  Injectable 1 milliGRAM(s) IntraMuscular once  influenza  Vaccine (HIGH DOSE) 0.7 milliLiter(s) IntraMuscular once  insulin lispro (ADMELOG) corrective regimen sliding scale   SubCutaneous three times a day before meals  insulin lispro (ADMELOG) corrective regimen sliding scale   SubCutaneous at bedtime  labetalol 200 milliGRAM(s) Oral every 12 hours  lactated ringers. 1000 milliLiter(s) (100 mL/Hr) IV Continuous <Continuous>  lidocaine   4% Patch 1 Patch Transdermal every 24 hours  melatonin 5 milliGRAM(s) Oral at bedtime  NIFEdipine XL 60 milliGRAM(s) Oral every 24 hours  senna 2 Tablet(s) Oral at bedtime  vancomycin    Solution 125 milliGRAM(s) Oral every 6 hours    MEDICATIONS  (PRN):  dextrose Oral Gel 15 Gram(s) Oral once PRN Blood Glucose LESS THAN 70 milliGRAM(s)/deciliter  HYDROmorphone  Injectable 0.5 milliGRAM(s) IV Push every 4 hours PRN Severe Pain (7 - 10)  ondansetron Injectable 4 milliGRAM(s) IV Push every 8 hours PRN Nausea and/or Vomiting  oxyCODONE    IR 5 milliGRAM(s) Oral every 6 hours PRN Severe Pain (7 - 10)  sodium chloride 0.9% lock flush 10 milliLiter(s) IV Push every 1 hour PRN Pre/post blood products, medications, blood draw, and to maintain line patency   very teary upon entering the room, complaining of headaches  denies abdominal pain and no formed stool in the last 2 days.        PHYSICAL EXAM:  Constitutional: NAD, age appropriate female laying in bed  HEENT: NC/AT, EOMI  Neck: Supple, no JVD  Respiratory: CTA B/L   Cardiovascular: RRR, normal S1 and S2   Gastrointestinal: +BS, soft minimal tenderness, abdominal binder in place, wound vac in place  Extremities: no LE edema  Neurological: AAOx3  Psych: calm cooperative

## 2022-10-15 NOTE — PROGRESS NOTE ADULT - SUBJECTIVE AND OBJECTIVE BOX
INTERVAL HPI/OVERNIGHT EVENTS:   SURGERY ATTENDING    STATUS POST:      Exploratory laparotomy with lysis of adhesions extensive one hour , Small bowel resection with primary anastomosis, Repair of recurrent Incisional hernia with myofascial flaps no mesh, washout, drainage, PREVENA        POST OPERATIVE DAY #: 30    SUBJECTIVE:  Flatus: [x ] YES [ ] NO             Bowel Movement: [x ] YES [ ] NO  Pain (0-10):      0      Pain Control Adequate: [x ] YES [ ] NO  Nausea: [ ] YES [x ] NO            Vomiting: [ ] YES [x ] NO  Diarrhea: [ ] YES [x ] NO         Constipation: [ ] YES [x ] NO     Chest Pain: [ ] YES [x ] NO    SOB:  [ ] YES [x ] NO    MEDICATIONS  (STANDING):  chlorhexidine 2% Cloths 1 Application(s) Topical <User Schedule>  dextrose 5%. 1000 milliLiter(s) (50 mL/Hr) IV Continuous <Continuous>  dextrose 5%. 1000 milliLiter(s) (100 mL/Hr) IV Continuous <Continuous>  dextrose 50% Injectable 25 Gram(s) IV Push once  dextrose 50% Injectable 12.5 Gram(s) IV Push once  dextrose 50% Injectable 25 Gram(s) IV Push once  enoxaparin Injectable 40 milliGRAM(s) SubCutaneous every 24 hours  furosemide   Injectable 20 milliGRAM(s) IV Push every 24 hours  glucagon  Injectable 1 milliGRAM(s) IntraMuscular once  influenza  Vaccine (HIGH DOSE) 0.7 milliLiter(s) IntraMuscular once  insulin lispro (ADMELOG) corrective regimen sliding scale   SubCutaneous three times a day before meals  insulin lispro (ADMELOG) corrective regimen sliding scale   SubCutaneous at bedtime  labetalol 200 milliGRAM(s) Oral every 12 hours  lactated ringers. 1000 milliLiter(s) (100 mL/Hr) IV Continuous <Continuous>  lidocaine   4% Patch 1 Patch Transdermal every 24 hours  melatonin 5 milliGRAM(s) Oral at bedtime  NIFEdipine XL 60 milliGRAM(s) Oral every 24 hours  senna 2 Tablet(s) Oral at bedtime  vancomycin    Solution 125 milliGRAM(s) Oral every 6 hours    MEDICATIONS  (PRN):  acetaminophen     Tablet .. 325 milliGRAM(s) Oral every 4 hours PRN Mild Pain (1 - 3)  dextrose Oral Gel 15 Gram(s) Oral once PRN Blood Glucose LESS THAN 70 milliGRAM(s)/deciliter  HYDROmorphone  Injectable 0.5 milliGRAM(s) IV Push every 4 hours PRN Severe Pain (7 - 10)  ondansetron Injectable 4 milliGRAM(s) IV Push every 8 hours PRN Nausea and/or Vomiting  oxyCODONE    IR 5 milliGRAM(s) Oral every 6 hours PRN Severe Pain (7 - 10)  sodium chloride 0.9% lock flush 10 milliLiter(s) IV Push every 1 hour PRN Pre/post blood products, medications, blood draw, and to maintain line patency      Vital Signs Last 24 Hrs  T(C): 36.9 (15 Oct 2022 12:36), Max: 37.3 (14 Oct 2022 20:09)  T(F): 98.5 (15 Oct 2022 12:36), Max: 99.2 (14 Oct 2022 20:09)  HR: 74 (15 Oct 2022 12:36) (72 - 88)  BP: 156/79 (15 Oct 2022 12:36) (125/67 - 163/80)  BP(mean): 97 (15 Oct 2022 05:22) (97 - 97)  RR: 17 (15 Oct 2022 12:36) (17 - 18)  SpO2: 98% (15 Oct 2022 12:36) (93% - 99%)    Parameters below as of 15 Oct 2022 12:36  Patient On (Oxygen Delivery Method): room air        PHYSICAL EXAM:      Constitutional:    Eyes:    ENMT:    Neck:    Breasts:    Back:    Respiratory:    Cardiovascular:    Gastrointestinal:    Genitourinary:    Rectal:    Extremities:    Vascular:    Neurological:    Skin:    Lymph Nodes:    Musculoskeletal:    Psychiatric:        I&O's Detail    14 Oct 2022 07:01  -  15 Oct 2022 07:00  --------------------------------------------------------  IN:    Lactated Ringers: 2300 mL    Oral Fluid: 400 mL  Total IN: 2700 mL    OUT:    VAC (Vacuum Assisted Closure) System (mL): 25 mL    Voided (mL): 4300 mL  Total OUT: 4325 mL    Total NET: -1625 mL      15 Oct 2022 07:01  -  15 Oct 2022 13:56  --------------------------------------------------------  IN:    Lactated Ringers: 500 mL    Oral Fluid: 720 mL  Total IN: 1220 mL    OUT:    VAC (Vacuum Assisted Closure) System (mL): 0 mL    Voided (mL): 1100 mL  Total OUT: 1100 mL    Total NET: 120 mL          LABS:                        7.4    7.68  )-----------( 287      ( 15 Oct 2022 05:30 )             23.8     10-15    137  |  98  |  16  ----------------------------<  86  4.1   |  32<H>  |  1.63<H>    Ca    8.3<L>      15 Oct 2022 05:30  Phos  4.6     10-15  Mg     1.8     10-15            RADIOLOGY & ADDITIONAL STUDIES:

## 2022-10-15 NOTE — PROGRESS NOTE ADULT - ASSESSMENT
72 year old woman with HTN, DM2, past surgical history of  x 2, hysterectomy, and laparoscopic cholecystectomy (), admitted for large bowel obstruction; had spontaneous return of bowel function initially; however, bowel obstruction recurred, so taken to OR for ex lap with lysis of adhesions and bowel resection () . Monitored in SICU post -op ; now on regional. Hospital course complicated by rhabdomyolysis, likely daptomycin induced (CK elevated on 10/7/2022)

## 2022-10-15 NOTE — PROGRESS NOTE ADULT - SUBJECTIVE AND OBJECTIVE BOX
INTERVAL HPI/OVERNIGHT EVENTS:    ANTIBIOTICS/RELEVANT:    MEDICATIONS  (STANDING):  acetaminophen     Tablet .. 650 milliGRAM(s) Oral once  chlorhexidine 2% Cloths 1 Application(s) Topical <User Schedule>  dextrose 5%. 1000 milliLiter(s) (50 mL/Hr) IV Continuous <Continuous>  dextrose 5%. 1000 milliLiter(s) (100 mL/Hr) IV Continuous <Continuous>  dextrose 50% Injectable 12.5 Gram(s) IV Push once  dextrose 50% Injectable 25 Gram(s) IV Push once  dextrose 50% Injectable 25 Gram(s) IV Push once  enoxaparin Injectable 40 milliGRAM(s) SubCutaneous every 24 hours  furosemide   Injectable 20 milliGRAM(s) IV Push every 24 hours  glucagon  Injectable 1 milliGRAM(s) IntraMuscular once  influenza  Vaccine (HIGH DOSE) 0.7 milliLiter(s) IntraMuscular once  insulin lispro (ADMELOG) corrective regimen sliding scale   SubCutaneous three times a day before meals  insulin lispro (ADMELOG) corrective regimen sliding scale   SubCutaneous at bedtime  labetalol 200 milliGRAM(s) Oral every 12 hours  lactated ringers. 1000 milliLiter(s) (100 mL/Hr) IV Continuous <Continuous>  lidocaine   4% Patch 1 Patch Transdermal every 24 hours  melatonin 5 milliGRAM(s) Oral at bedtime  NIFEdipine XL 60 milliGRAM(s) Oral every 24 hours  senna 2 Tablet(s) Oral at bedtime  vancomycin    Solution 125 milliGRAM(s) Oral every 6 hours    MEDICATIONS  (PRN):  acetaminophen     Tablet .. 325 milliGRAM(s) Oral every 4 hours PRN Mild Pain (1 - 3)  dextrose Oral Gel 15 Gram(s) Oral once PRN Blood Glucose LESS THAN 70 milliGRAM(s)/deciliter  HYDROmorphone  Injectable 0.5 milliGRAM(s) IV Push every 4 hours PRN Severe Pain (7 - 10)  ondansetron Injectable 4 milliGRAM(s) IV Push every 8 hours PRN Nausea and/or Vomiting  oxyCODONE    IR 5 milliGRAM(s) Oral every 6 hours PRN Severe Pain (7 - 10)  sodium chloride 0.9% lock flush 10 milliLiter(s) IV Push every 1 hour PRN Pre/post blood products, medications, blood draw, and to maintain line patency      Allergies    penicillin (Rash)    Intolerances    daptomycin (Muscle Pain)      Vital Signs Last 24 Hrs  T(C): 36.9 (15 Oct 2022 08:10), Max: 37.3 (14 Oct 2022 20:09)  T(F): 98.5 (15 Oct 2022 08:10), Max: 99.2 (14 Oct 2022 20:09)  HR: 72 (15 Oct 2022 08:10) (72 - 88)  BP: 153/73 (15 Oct 2022 08:10) (125/67 - 163/80)  BP(mean): 97 (15 Oct 2022 05:22) (97 - 97)  RR: 17 (15 Oct 2022 08:10) (17 - 18)  SpO2: 99% (15 Oct 2022 08:10) (93% - 99%)    Parameters below as of 15 Oct 2022 08:10  Patient On (Oxygen Delivery Method): room air            LABS:                        7.4    7.68  )-----------( 287      ( 15 Oct 2022 05:30 )             23.8     10-15    137  |  98  |  16  ----------------------------<  86  4.1   |  32<H>  |  1.63<H>    Ca    8.3<L>      15 Oct 2022 05:30  Phos  4.6     10-15  Mg     1.8     10-15      Creatine Kinase, Serum in AM (10.15.22 @ 05:30)    Creatine Kinase, Serum: 564 U/L          MICROBIOLOGY:        RADIOLOGY & ADDITIONAL STUDIES: INTERVAL HPI/OVERNIGHT EVENTS:    C/O neck stiffness  Stays largely in bed  No BMs    MEDICATIONS  (STANDING):  acetaminophen     Tablet .. 650 milliGRAM(s) Oral once  chlorhexidine 2% Cloths 1 Application(s) Topical <User Schedule>  dextrose 5%. 1000 milliLiter(s) (50 mL/Hr) IV Continuous <Continuous>  dextrose 5%. 1000 milliLiter(s) (100 mL/Hr) IV Continuous <Continuous>  dextrose 50% Injectable 12.5 Gram(s) IV Push once  dextrose 50% Injectable 25 Gram(s) IV Push once  dextrose 50% Injectable 25 Gram(s) IV Push once  enoxaparin Injectable 40 milliGRAM(s) SubCutaneous every 24 hours  furosemide   Injectable 20 milliGRAM(s) IV Push every 24 hours  glucagon  Injectable 1 milliGRAM(s) IntraMuscular once  influenza  Vaccine (HIGH DOSE) 0.7 milliLiter(s) IntraMuscular once  insulin lispro (ADMELOG) corrective regimen sliding scale   SubCutaneous three times a day before meals  insulin lispro (ADMELOG) corrective regimen sliding scale   SubCutaneous at bedtime  labetalol 200 milliGRAM(s) Oral every 12 hours  lactated ringers. 1000 milliLiter(s) (100 mL/Hr) IV Continuous <Continuous>  lidocaine   4% Patch 1 Patch Transdermal every 24 hours  melatonin 5 milliGRAM(s) Oral at bedtime  NIFEdipine XL 60 milliGRAM(s) Oral every 24 hours  senna 2 Tablet(s) Oral at bedtime  vancomycin    Solution 125 milliGRAM(s) Oral every 6 hours    MEDICATIONS  (PRN):  acetaminophen     Tablet .. 325 milliGRAM(s) Oral every 4 hours PRN Mild Pain (1 - 3)  dextrose Oral Gel 15 Gram(s) Oral once PRN Blood Glucose LESS THAN 70 milliGRAM(s)/deciliter  HYDROmorphone  Injectable 0.5 milliGRAM(s) IV Push every 4 hours PRN Severe Pain (7 - 10)  ondansetron Injectable 4 milliGRAM(s) IV Push every 8 hours PRN Nausea and/or Vomiting  oxyCODONE    IR 5 milliGRAM(s) Oral every 6 hours PRN Severe Pain (7 - 10)  sodium chloride 0.9% lock flush 10 milliLiter(s) IV Push every 1 hour PRN Pre/post blood products, medications, blood draw, and to maintain line patency      Allergies    penicillin (Rash)    Intolerances    daptomycin (Muscle Pain)      EXAM  Vital Signs Last 24 Hrs  T(C): 36.9 (15 Oct 2022 08:10), Max: 37.3 (14 Oct 2022 20:09)  T(F): 98.5 (15 Oct 2022 08:10), Max: 99.2 (14 Oct 2022 20:09)  HR: 72 (15 Oct 2022 08:10) (72 - 88)  BP: 153/73 (15 Oct 2022 08:10) (125/67 - 163/80)  BP(mean): 97 (15 Oct 2022 05:22) (97 - 97)  RR: 17 (15 Oct 2022 08:10) (17 - 18)  SpO2: 99% (15 Oct 2022 08:10) (93% - 99%)    Parameters below as of 15 Oct 2022 08:10  Patient On (Oxygen Delivery Method): room air  Awake and alert  No rash or icterus  Diffuse muscle tenderness resolving  RRR  Chest decreased BSs at bases  Abd soft NT  VAC in lower abdominal wound  PICC in right arm  Peripheral IV in the left arm        LABS:                        7.4    7.68  )-----------( 287      ( 15 Oct 2022 05:30 )             23.8     10-15    137  |  98  |  16  ----------------------------<  86  4.1   |  32<H>  |  1.63<H>    Ca    8.3<L>      15 Oct 2022 05:30  Phos  4.6     10-15  Mg     1.8     10-15      Creatine Kinase, Serum in AM (10.15.22 @ 05:30)    Creatine Kinase, Serum: 564 U/L

## 2022-10-15 NOTE — PROGRESS NOTE ADULT - ASSESSMENT
72 F PMH HTN, DM and PSH  x 2, hysterectomy and lap dev ( w/ Dr. Clifford) admitted on  with 2 days of n/v, abd pain, imaging demonstrating LBO, which resolved prior to operative exploration. Recurrent bowel obstruction, now s/p Exlap JOSEFINA, SBR (100 cm) (9/15). now with wound vac, pending [facility] placement c/b c.diff and rhabdo 2/2 daptomycin    Renal diet, IVF, ISS  Lasix 20 daily   Pain/nausea prn  Vanco PO (10/4-), s/p Daptomycin (10/4-10/7)  Wound Vac  SQH/SCDs/OOBA/IS  AM labs  Renal recs  Dispo: [facility]

## 2022-10-15 NOTE — PROGRESS NOTE ADULT - SUBJECTIVE AND OBJECTIVE BOX
24 Hour Events: complains of wet bad pan today. Had mild headache No abdominal pain, vomiting, f/c, sob      ON: renewed dilaudid, dc'd fluids, dc'd lasix (as per renal rec). Not mobilizing often, IS  10/15: zofran for nausea, -BM. CK trend down, 540      PAST MEDICAL & SURGICAL HISTORY:  Diabetes      H/O thyroid disease      S/P total abdominal hysterectomy      History of laparoscopic cholecystectomy      History of         Allergies    penicillin (Rash)    Intolerances    daptomycin (Muscle Pain)    MEDICATIONS  (STANDING):  chlorhexidine 2% Cloths 1 Application(s) Topical <User Schedule>  dextrose 5%. 1000 milliLiter(s) (100 mL/Hr) IV Continuous <Continuous>  dextrose 5%. 1000 milliLiter(s) (50 mL/Hr) IV Continuous <Continuous>  dextrose 50% Injectable 25 Gram(s) IV Push once  dextrose 50% Injectable 12.5 Gram(s) IV Push once  dextrose 50% Injectable 25 Gram(s) IV Push once  enoxaparin Injectable 40 milliGRAM(s) SubCutaneous every 24 hours  glucagon  Injectable 1 milliGRAM(s) IntraMuscular once  influenza  Vaccine (HIGH DOSE) 0.7 milliLiter(s) IntraMuscular once  insulin lispro (ADMELOG) corrective regimen sliding scale   SubCutaneous three times a day before meals  insulin lispro (ADMELOG) corrective regimen sliding scale   SubCutaneous at bedtime  labetalol 200 milliGRAM(s) Oral every 12 hours  lidocaine   4% Patch 1 Patch Transdermal every 24 hours  melatonin 5 milliGRAM(s) Oral at bedtime  NIFEdipine XL 60 milliGRAM(s) Oral every 24 hours  senna 2 Tablet(s) Oral at bedtime  vancomycin    Solution 125 milliGRAM(s) Oral every 6 hours    MEDICATIONS  (PRN):  acetaminophen     Tablet .. 325 milliGRAM(s) Oral every 4 hours PRN Mild Pain (1 - 3)  dextrose Oral Gel 15 Gram(s) Oral once PRN Blood Glucose LESS THAN 70 milliGRAM(s)/deciliter  HYDROmorphone  Injectable 0.5 milliGRAM(s) IV Push every 4 hours PRN Severe Pain (7 - 10)  ondansetron Injectable 4 milliGRAM(s) IV Push every 8 hours PRN Nausea and/or Vomiting  oxyCODONE    IR 5 milliGRAM(s) Oral every 6 hours PRN Moderate Pain (4 - 6)  sodium chloride 0.9% lock flush 10 milliLiter(s) IV Push every 1 hour PRN Pre/post blood products, medications, blood draw, and to maintain line patency        Physical Exam:   General: Well apperaing, resting comfortably in bed in no acute distress   Neuro: Grossly intact bilaterally   HEENT: Normocephalic, atraumatic, trachea midline, no JVD   Chest:   Heart: Regular S1/S2, no murmurs rubs or gallops   Lungs: Unlabored breathing on ***; Clear to auscultation bilaterally, no adventitious sounds   Abdomen: Soft, non-distended, normoactive bowel sounds throughout, no tenderness to palpation in all 4 quadrants   Upper Extremities: No edema, freely mobile bilaterally   Lower Extremities: No edema, SCDs in place, feet warm bilaterally   Skin: Warm, non-diaphoretic throughout       Labs:                         7.4    7.68  )-----------( 287      ( 15 Oct 2022 05:30 )             23.8     10-15    137  |  98  |  16  ----------------------------<  86  4.1   |  32<H>  |  1.63<H>    Ca    8.3<L>      15 Oct 2022 05:30  Phos  4.6     10-15  Mg     1.8     10-15      CAPILLARY BLOOD GLUCOSE      POCT Blood Glucose.: 94 mg/dL (15 Oct 2022 22:04)  POCT Blood Glucose.: 100 mg/dL (15 Oct 2022 16:54)  POCT Blood Glucose.: 90 mg/dL (15 Oct 2022 12:29)  POCT Blood Glucose.: 84 mg/dL (15 Oct 2022 08:00)    CARDIAC MARKERS ( 15 Oct 2022 05:30 )  x     / x     / 564 U/L / x     / x            COVID-19 PCR: NotDetec (12 Oct 2022 11:08)  COVID-19 PCR: NotDetec (27 Sep 2022 17:43)  COVID-19 PCR: Detected (23 Sep 2022 13:54)  COVID-19 PCR: Detected (14 Sep 2022 10:43)  COVID-19 PCR: Positive (11 Sep 2022 15:05)          Vital Signs:   Vital Signs Last 24 Hrs  T(C): 36.9 (15 Oct 2022 20:48), Max: 37.1 (15 Oct 2022 05:22)  T(F): 98.5 (15 Oct 2022 20:48), Max: 98.8 (15 Oct 2022 05:22)  HR: 79 (15 Oct 2022 20:48) (72 - 79)  BP: 128/71 (15 Oct 2022 20:48) (128/71 - 156/79)  BP(mean): 97 (15 Oct 2022 05:22) (97 - 97)  RR: 18 (15 Oct 2022 20:48) (16 - 18)  SpO2: 96% (15 Oct 2022 20:48) (96% - 100%)    Parameters below as of 15 Oct 2022 20:48  Patient On (Oxygen Delivery Method): room air          Input/Output:   I&O's Detail    14 Oct 2022 07:01  -  15 Oct 2022 07:00  --------------------------------------------------------  IN:    Lactated Ringers: 2300 mL    Oral Fluid: 400 mL  Total IN: 2700 mL    OUT:    VAC (Vacuum Assisted Closure) System (mL): 25 mL    Voided (mL): 4300 mL  Total OUT: 4325 mL    Total NET: -1625 mL      15 Oct 2022 07:01  -  16 Oct 2022 04:56  --------------------------------------------------------  IN:    IV PiggyBack: 100 mL    Lactated Ringers: 1800 mL    Oral Fluid: 1000 mL  Total IN: 2900 mL    OUT:    VAC (Vacuum Assisted Closure) System (mL): 0 mL    Voided (mL): 3575 mL  Total OUT: 3575 mL    Total NET: -675 mL        Daily     Daily

## 2022-10-15 NOTE — PROGRESS NOTE ADULT - SUBJECTIVE AND OBJECTIVE BOX
CC: ABD PAIN        INTERVAL HISTORY: lying in bed  no complaints  feels well      ROS: No chest pain, no sob, no abd pain. No n/v/d    PAST MEDICAL & SURGICAL HISTORY:  Diabetes    H/O thyroid disease    S/P total abdominal hysterectomy    History of laparoscopic cholecystectomy    History of         PHYSICAL EXAM:  T(C): 37.1 (10-15-22 @ 05:22), Max: 37.3 (10-14-22 @ 20:09)  HR: 77 (10-15-22 @ 05:22)  BP: 150/72 (10-15-22 @ 05:22) (125/67 - 164/78)  RR: 18 (10-15-22 @ 05:22)  SpO2: 96% (10-15-22 @ 05:22)  Wt(kg): --  I&O's Summary    13 Oct 2022 07:  -  14 Oct 2022 07:00  --------------------------------------------------------  IN: 2160 mL / OUT: 3225 mL / NET: -1065 mL    14 Oct 2022 07:01  -  15 Oct 2022 06:58  --------------------------------------------------------  IN: 2700 mL / OUT: 3975 mL / NET: -1275 mL      Weight   General: AAO x 3,  NAD.  HEENT: moist mucous membranes, no pallor/cyanosis.  Neck: no JVD visible.  Cardiac: S1, S2. RRR. No murmurs   Respratory: CTA b/l, no access muscle use.   Abdomen: soft. nontender. nondistended  Skin: no rashes.  Extremities: no LE edema b/l  Access:       DATA:                        7.4<L>  7.68  )-----------( 287      ( 15 Oct 2022 05:30 )             23.8<L>    Ferritin, Serum: 442 ng/mL *H* ( @ 07:40)      137    |  98     |  16     ----------------------------<  86     Ca:x     (15 Oct 2022 05:30)  4.1     |  x      |  x            TPro  5.0<L>  /  Alb  2.0<L>  /  TBili  0.3    /  DBili  0.2    /  AST  267<H>  /  ALT  211<H>  /  AlkPhos  81     12 Oct 2022 10:25            Protein/Creatinine Ratio Calculation: 0.4 Ratio *H* (09-15 @ 19:44)          MEDICATIONS  (STANDING):  chlorhexidine 2% Cloths 1 Application(s) Topical <User Schedule>  dextrose 5%. 1000 milliLiter(s) (50 mL/Hr) IV Continuous <Continuous>  dextrose 5%. 1000 milliLiter(s) (100 mL/Hr) IV Continuous <Continuous>  dextrose 50% Injectable 12.5 Gram(s) IV Push once  dextrose 50% Injectable 25 Gram(s) IV Push once  dextrose 50% Injectable 25 Gram(s) IV Push once  enoxaparin Injectable 40 milliGRAM(s) SubCutaneous every 24 hours  furosemide   Injectable 20 milliGRAM(s) IV Push every 24 hours  glucagon  Injectable 1 milliGRAM(s) IntraMuscular once  influenza  Vaccine (HIGH DOSE) 0.7 milliLiter(s) IntraMuscular once  insulin lispro (ADMELOG) corrective regimen sliding scale   SubCutaneous three times a day before meals  insulin lispro (ADMELOG) corrective regimen sliding scale   SubCutaneous at bedtime  labetalol 200 milliGRAM(s) Oral every 12 hours  lactated ringers. 1000 milliLiter(s) (100 mL/Hr) IV Continuous <Continuous>  lidocaine   4% Patch 1 Patch Transdermal every 24 hours  melatonin 5 milliGRAM(s) Oral at bedtime  NIFEdipine XL 60 milliGRAM(s) Oral every 24 hours  senna 2 Tablet(s) Oral at bedtime  vancomycin    Solution 125 milliGRAM(s) Oral every 6 hours    MEDICATIONS  (PRN):  dextrose Oral Gel 15 Gram(s) Oral once PRN Blood Glucose LESS THAN 70 milliGRAM(s)/deciliter  HYDROmorphone  Injectable 0.5 milliGRAM(s) IV Push every 4 hours PRN Severe Pain (7 - 10)  ondansetron Injectable 4 milliGRAM(s) IV Push every 8 hours PRN Nausea and/or Vomiting  oxyCODONE    IR 5 milliGRAM(s) Oral every 6 hours PRN Severe Pain (7 - 10)  sodium chloride 0.9% lock flush 10 milliLiter(s) IV Push every 1 hour PRN Pre/post blood products, medications, blood draw, and to maintain line patency

## 2022-10-15 NOTE — PROGRESS NOTE ADULT - ASSESSMENT
72 year old woman with HTN, DM2, past surgical history of  x 2, hysterectomy, and laparoscopic cholecystectomy (), admitted for large bowel obstruction; had spontaneous return of bowel function initially; however, bowel obstruction recurred, so taken to OR for ex lap with lysis of adhesions and bowel resection () . Monitored in SICU post -op ; now on regional. Hospital course complicated by rhabdomyolysis, likely daptomycin induced (CK elevated on 10/7/2022)    # Large bowel obstruction   # post operative state  s/p OR for ex lap with lysis of adhesions and bowel resection ()  - rest of care per primary team     #Upper extremity weakness  -in setting of rhabdo. Improving. Continue monitoring.     # non-traumatic Rhabdomyolysis  CK elevated to 30K on 10/7/2022. Likely daptomycin associated. Stopped daptomycin and statin.   - f/u with renal recs  -  CK dropping.     #SO/ATN  -Improving. in setting of rhabdo  -Diuresis a/p nephrology    #Elevated LFTs  -In setting of rhabdo. Continue monitoring. Continues to improve    #C diff; non severe  -On PO vanc 10/4-     #E. faecalis bacteremia  -Afebrile at this time. was On dapto 10/4-10/7; Switched to vancomycin 10/7-10/10  -Repeat Cx's negative to date  -Management a/p ID. Believe this was contaminant and is holding abx.     #Obesity - Dose medications by weight. Lifestyle modifications discussed, especially once she's resolved    #HTN   takes HCTZ 25mg qd, losartan 100mg qd, labetalol 200mg BID, nifedipine 60mg ER qd at home.   - continue labetalol 200mg BID  - would give nifedipine 90 mg ER   - Continue to hold HCTZ for now and on discharge     # Type 2 Diabetes complicated by hyperglycemia   cw insulin sliding scale while inpatient.   pt's a1c at goal for her age, resume metformin on discharge.     #Incidental finding of bilateral lung nodules  CT chest showing bilateral lung nodules, measuring up to 7 mm in the ridght upper lobe; Counseled patient on finding.   Recommended follow-up chest CT in three months to ensure stability.; Copy of radiology report provided to patient for her records.     # COVID 19 infection (present on admission) - Improved/resolved. Off isolation.     # Acute blood loss anemia   Would transfuse <7.0  age appropriate cancer screening outpatient     DVT ppx - HSQ 72 year old woman with HTN, DM2, past surgical history of  x 2, hysterectomy, and laparoscopic cholecystectomy (), admitted for large bowel obstruction; had spontaneous return of bowel function initially; however, bowel obstruction recurred, so taken to OR for ex lap with lysis of adhesions and bowel resection () . Monitored in SICU post -op ; now on regional. Hospital course complicated by rhabdomyolysis, likely daptomycin induced (CK elevated on 10/7/2022)    # Large bowel obstruction   # post operative state  s/p OR for ex lap with lysis of adhesions and bowel resection ()  - rest of care per primary team     #Upper extremity weakness  -in setting of rhabdo. Improving. Continue monitoring.     # non-traumatic Rhabdomyolysis  CK elevated to 30K on 10/7/2022, now downtrending. Likely daptomycin associated. Stopped daptomycin and statin.   - f/u with renal recs  -  CK dropping.     #SO/ATN  -Improving. in setting of rhabdo  -Diuresis a/p nephrology    #Elevated LFTs  -In setting of rhabdo. Continue monitoring. Continues to improve    #C diff; non severe  -On PO vanc 10/4-     #E. faecalis bacteremia  -Afebrile at this time. was On dapto 10/4-10/7; Switched to vancomycin 10/7-10/10  -Repeat Cx's negative to date  -Management a/p ID. Believe this was contaminant and is holding abx.     #Obesity - Dose medications by weight. Lifestyle modifications discussed, especially once she's resolved    #HTN   takes HCTZ 25mg qd, losartan 100mg qd, labetalol 200mg BID, nifedipine 60mg ER qd at home.   - continue labetalol 200mg BID  - would give nifedipine 90 mg ER   - Continue to hold HCTZ for now and on discharge     # Type 2 Diabetes complicated by hyperglycemia   cw insulin sliding scale while inpatient.   pt's a1c at goal for her age, resume metformin on discharge.     #Incidental finding of bilateral lung nodules  CT chest showing bilateral lung nodules, measuring up to 7 mm in the ridght upper lobe; Counseled patient on finding.   Recommended follow-up chest CT in three months to ensure stability.; Copy of radiology report provided to patient for her records.     # COVID 19 infection (present on admission) - Improved/resolved. Off isolation.     # Acute blood loss anemia   Would transfuse <7.0  age appropriate cancer screening outpatient     DVT ppx - HSQ

## 2022-10-16 LAB
ANION GAP SERPL CALC-SCNC: 10 MMOL/L — SIGNIFICANT CHANGE UP (ref 5–17)
BUN SERPL-MCNC: 13 MG/DL — SIGNIFICANT CHANGE UP (ref 7–23)
CALCIUM SERPL-MCNC: 8.3 MG/DL — LOW (ref 8.4–10.5)
CHLORIDE SERPL-SCNC: 100 MMOL/L — SIGNIFICANT CHANGE UP (ref 96–108)
CO2 SERPL-SCNC: 28 MMOL/L — SIGNIFICANT CHANGE UP (ref 22–31)
CREAT SERPL-MCNC: 1.51 MG/DL — HIGH (ref 0.5–1.3)
EGFR: 37 ML/MIN/1.73M2 — LOW
GLUCOSE BLDC GLUCOMTR-MCNC: 100 MG/DL — HIGH (ref 70–99)
GLUCOSE BLDC GLUCOMTR-MCNC: 107 MG/DL — HIGH (ref 70–99)
GLUCOSE BLDC GLUCOMTR-MCNC: 88 MG/DL — SIGNIFICANT CHANGE UP (ref 70–99)
GLUCOSE BLDC GLUCOMTR-MCNC: 93 MG/DL — SIGNIFICANT CHANGE UP (ref 70–99)
GLUCOSE SERPL-MCNC: 84 MG/DL — SIGNIFICANT CHANGE UP (ref 70–99)
HCT VFR BLD CALC: 24.8 % — LOW (ref 34.5–45)
HGB BLD-MCNC: 7.7 G/DL — LOW (ref 11.5–15.5)
MAGNESIUM SERPL-MCNC: 1.9 MG/DL — SIGNIFICANT CHANGE UP (ref 1.6–2.6)
MCHC RBC-ENTMCNC: 27.1 PG — SIGNIFICANT CHANGE UP (ref 27–34)
MCHC RBC-ENTMCNC: 31 GM/DL — LOW (ref 32–36)
MCV RBC AUTO: 87.3 FL — SIGNIFICANT CHANGE UP (ref 80–100)
NRBC # BLD: 0 /100 WBCS — SIGNIFICANT CHANGE UP (ref 0–0)
PHOSPHATE SERPL-MCNC: 4.5 MG/DL — SIGNIFICANT CHANGE UP (ref 2.5–4.5)
PLATELET # BLD AUTO: 292 K/UL — SIGNIFICANT CHANGE UP (ref 150–400)
POTASSIUM SERPL-MCNC: 4 MMOL/L — SIGNIFICANT CHANGE UP (ref 3.5–5.3)
POTASSIUM SERPL-SCNC: 4 MMOL/L — SIGNIFICANT CHANGE UP (ref 3.5–5.3)
RBC # BLD: 2.84 M/UL — LOW (ref 3.8–5.2)
RBC # FLD: 16 % — HIGH (ref 10.3–14.5)
SODIUM SERPL-SCNC: 138 MMOL/L — SIGNIFICANT CHANGE UP (ref 135–145)
WBC # BLD: 7.39 K/UL — SIGNIFICANT CHANGE UP (ref 3.8–10.5)
WBC # FLD AUTO: 7.39 K/UL — SIGNIFICANT CHANGE UP (ref 3.8–10.5)

## 2022-10-16 PROCEDURE — 99233 SBSQ HOSP IP/OBS HIGH 50: CPT

## 2022-10-16 RX ORDER — HYDROMORPHONE HYDROCHLORIDE 2 MG/ML
0.5 INJECTION INTRAMUSCULAR; INTRAVENOUS; SUBCUTANEOUS EVERY 4 HOURS
Refills: 0 | Status: DISCONTINUED | OUTPATIENT
Start: 2022-10-16 | End: 2022-10-18

## 2022-10-16 RX ORDER — DIPHENHYDRAMINE HCL 50 MG
25 CAPSULE ORAL ONCE
Refills: 0 | Status: COMPLETED | OUTPATIENT
Start: 2022-10-16 | End: 2022-10-16

## 2022-10-16 RX ORDER — HYDROMORPHONE HYDROCHLORIDE 2 MG/ML
0.5 INJECTION INTRAMUSCULAR; INTRAVENOUS; SUBCUTANEOUS EVERY 4 HOURS
Refills: 0 | Status: DISCONTINUED | OUTPATIENT
Start: 2022-10-16 | End: 2022-10-16

## 2022-10-16 RX ORDER — OXYCODONE HYDROCHLORIDE 5 MG/1
5 TABLET ORAL EVERY 6 HOURS
Refills: 0 | Status: DISCONTINUED | OUTPATIENT
Start: 2022-10-16 | End: 2022-10-21

## 2022-10-16 RX ADMIN — Medication 125 MILLIGRAM(S): at 01:07

## 2022-10-16 RX ADMIN — SENNA PLUS 2 TABLET(S): 8.6 TABLET ORAL at 22:09

## 2022-10-16 RX ADMIN — Medication 325 MILLIGRAM(S): at 18:44

## 2022-10-16 RX ADMIN — Medication 125 MILLIGRAM(S): at 19:38

## 2022-10-16 RX ADMIN — Medication 60 MILLIGRAM(S): at 13:23

## 2022-10-16 RX ADMIN — LIDOCAINE 1 PATCH: 4 CREAM TOPICAL at 01:07

## 2022-10-16 RX ADMIN — Medication 200 MILLIGRAM(S): at 18:44

## 2022-10-16 RX ADMIN — Medication 125 MILLIGRAM(S): at 07:40

## 2022-10-16 RX ADMIN — Medication 125 MILLIGRAM(S): at 13:23

## 2022-10-16 RX ADMIN — LIDOCAINE 1 PATCH: 4 CREAM TOPICAL at 13:22

## 2022-10-16 RX ADMIN — ENOXAPARIN SODIUM 40 MILLIGRAM(S): 100 INJECTION SUBCUTANEOUS at 13:23

## 2022-10-16 RX ADMIN — Medication 200 MILLIGRAM(S): at 06:32

## 2022-10-16 RX ADMIN — LIDOCAINE 1 PATCH: 4 CREAM TOPICAL at 18:41

## 2022-10-16 RX ADMIN — CHLORHEXIDINE GLUCONATE 1 APPLICATION(S): 213 SOLUTION TOPICAL at 06:31

## 2022-10-16 NOTE — PROGRESS NOTE ADULT - ASSESSMENT
72 F PMH HTN, DM and PSH  x 2, hysterectomy and lap dev ( w/ Dr. Clifford) admitted on  with 2 days of n/v, abd pain, imaging demonstrating LBO, which resolved prior to operative exploration. Recurrent bowel obstruction, now s/p Exlap JOSEFINA, SBR (100 cm) (9/15). now with wound vac, pending [facility] placement c/b c.diff and rhabdo 2/2 daptomycin    Renal diet, IVF, ISS  Lasix 20 daily   Pain/nausea prn  Vanco PO (10/4-), s/p Daptomycin (10/4-10/7)  Wound Vac  SQH/SCDs/OOBA/IS  AM labs  Renal recs  Dispo: [facility]   72 F PMH HTN, DM and PSH  x 2, hysterectomy and lap dev ( w/ Dr. Clifford) admitted on  with 2 days of n/v, abd pain, imaging demonstrating LBO, which resolved prior to operative exploration. Recurrent bowel obstruction, now s/p Exlap JOSEFINA, SBR (100 cm) (9/15). now with wound vac, pending [facility] placement c/b c.diff and rhabdo 2/2 daptomycin which has resolved.     Plan:   Renal diet, IVF, ISS  Pain/nausea prn  Vanco PO (10/4-), s/p Daptomycin (10/4-10/7)  Wound Vac care  Lovenox/SCDs/OOBA/IS  AM labs  Renal recs  When to d/c picc line?  Dispo: [facility] (wednesday?)

## 2022-10-16 NOTE — PROGRESS NOTE ADULT - SUBJECTIVE AND OBJECTIVE BOX
CC: ABD PAIN        INTERVAL HISTORY: lying in bed  claims she didn't sleep well      ROS: No chest pain, no sob, no abd pain. No n/v/d    PAST MEDICAL & SURGICAL HISTORY:  Diabetes    H/O thyroid disease    S/P total abdominal hysterectomy    History of laparoscopic cholecystectomy    History of         PHYSICAL EXAM:  T(C): 36.8 (10-16-22 @ 05:07), Max: 36.9 (10-15-22 @ 08:10)  HR: 70 (10-16-22 @ 05:07)  BP: 167/74 (10-16-22 @ 05:07) (128/71 - 167/74)  RR: 18 (10-16-22 @ 05:07)  SpO2: 96% (10-16-22 @ 05:07)  Wt(kg): --  I&O's Summary    14 Oct 2022 07:  -  15 Oct 2022 07:00  --------------------------------------------------------  IN: 2700 mL / OUT: 4325 mL / NET: -1625 mL    15 Oct 2022 07:  -  16 Oct 2022 06:45  --------------------------------------------------------  IN: 3000 mL / OUT: 3575 mL / NET: -575 mL      Weight   General: AAO x 3,  NAD.  HEENT: moist mucous membranes, no pallor/cyanosis.  Neck: no JVD visible.  Cardiac: S1, S2. RRR. No murmurs   Respratory: CTA b/l, no access muscle use.   Abdomen: soft. nontender. nondistended  Skin: no rashes.  Extremities: no LE edema b/l  Access:       DATA:                        7.4<L>  7.68  )-----------( 287      ( 15 Oct 2022 05:30 )             23.8<L>    Ferritin, Serum: 442 ng/mL *H* ( @ 07:40)      137    |  98     |  16     ----------------------------<  86     Ca:8.3<L> (15 Oct 2022 05:30)  4.1     |  32<H>  |  1.63<H>                            MEDICATIONS  (STANDING):  chlorhexidine 2% Cloths 1 Application(s) Topical <User Schedule>  dextrose 5%. 1000 milliLiter(s) (50 mL/Hr) IV Continuous <Continuous>  dextrose 5%. 1000 milliLiter(s) (100 mL/Hr) IV Continuous <Continuous>  dextrose 50% Injectable 25 Gram(s) IV Push once  dextrose 50% Injectable 12.5 Gram(s) IV Push once  dextrose 50% Injectable 25 Gram(s) IV Push once  enoxaparin Injectable 40 milliGRAM(s) SubCutaneous every 24 hours  glucagon  Injectable 1 milliGRAM(s) IntraMuscular once  influenza  Vaccine (HIGH DOSE) 0.7 milliLiter(s) IntraMuscular once  insulin lispro (ADMELOG) corrective regimen sliding scale   SubCutaneous three times a day before meals  insulin lispro (ADMELOG) corrective regimen sliding scale   SubCutaneous at bedtime  labetalol 200 milliGRAM(s) Oral every 12 hours  lidocaine   4% Patch 1 Patch Transdermal every 24 hours  melatonin 5 milliGRAM(s) Oral at bedtime  NIFEdipine XL 60 milliGRAM(s) Oral every 24 hours  senna 2 Tablet(s) Oral at bedtime  vancomycin    Solution 125 milliGRAM(s) Oral every 6 hours    MEDICATIONS  (PRN):  acetaminophen     Tablet .. 325 milliGRAM(s) Oral every 4 hours PRN Mild Pain (1 - 3)  dextrose Oral Gel 15 Gram(s) Oral once PRN Blood Glucose LESS THAN 70 milliGRAM(s)/deciliter  HYDROmorphone  Injectable 0.5 milliGRAM(s) IV Push every 4 hours PRN Severe Pain (7 - 10)  ondansetron Injectable 4 milliGRAM(s) IV Push every 8 hours PRN Nausea and/or Vomiting  oxyCODONE    IR 5 milliGRAM(s) Oral every 6 hours PRN Moderate Pain (4 - 6)  sodium chloride 0.9% lock flush 10 milliLiter(s) IV Push every 1 hour PRN Pre/post blood products, medications, blood draw, and to maintain line patency

## 2022-10-16 NOTE — PROGRESS NOTE ADULT - SUBJECTIVE AND OBJECTIVE BOX
INTERVAL HPI/OVERNIGHT EVENTS:    ANTIBIOTICS/RELEVANT:    MEDICATIONS  (STANDING):  chlorhexidine 2% Cloths 1 Application(s) Topical <User Schedule>  dextrose 5%. 1000 milliLiter(s) (50 mL/Hr) IV Continuous <Continuous>  dextrose 5%. 1000 milliLiter(s) (100 mL/Hr) IV Continuous <Continuous>  dextrose 50% Injectable 25 Gram(s) IV Push once  dextrose 50% Injectable 12.5 Gram(s) IV Push once  dextrose 50% Injectable 25 Gram(s) IV Push once  enoxaparin Injectable 40 milliGRAM(s) SubCutaneous every 24 hours  glucagon  Injectable 1 milliGRAM(s) IntraMuscular once  influenza  Vaccine (HIGH DOSE) 0.7 milliLiter(s) IntraMuscular once  insulin lispro (ADMELOG) corrective regimen sliding scale   SubCutaneous three times a day before meals  insulin lispro (ADMELOG) corrective regimen sliding scale   SubCutaneous at bedtime  labetalol 200 milliGRAM(s) Oral every 12 hours  lidocaine   4% Patch 1 Patch Transdermal every 24 hours  melatonin 5 milliGRAM(s) Oral at bedtime  NIFEdipine XL 60 milliGRAM(s) Oral every 24 hours  senna 2 Tablet(s) Oral at bedtime  vancomycin    Solution 125 milliGRAM(s) Oral every 6 hours    MEDICATIONS  (PRN):  acetaminophen     Tablet .. 325 milliGRAM(s) Oral every 4 hours PRN Mild Pain (1 - 3)  dextrose Oral Gel 15 Gram(s) Oral once PRN Blood Glucose LESS THAN 70 milliGRAM(s)/deciliter  HYDROmorphone  Injectable 0.5 milliGRAM(s) IV Push every 4 hours PRN Severe Pain (7 - 10)  ondansetron Injectable 4 milliGRAM(s) IV Push every 8 hours PRN Nausea and/or Vomiting  oxyCODONE    IR 5 milliGRAM(s) Oral every 6 hours PRN Moderate Pain (4 - 6)  sodium chloride 0.9% lock flush 10 milliLiter(s) IV Push every 1 hour PRN Pre/post blood products, medications, blood draw, and to maintain line patency      Allergies    penicillin (Rash)    Intolerances    daptomycin (Muscle Pain)      Vital Signs Last 24 Hrs  T(C): 37 (16 Oct 2022 09:00), Max: 37 (16 Oct 2022 09:00)  T(F): 98.6 (16 Oct 2022 09:00), Max: 98.6 (16 Oct 2022 09:00)  HR: 75 (16 Oct 2022 09:00) (70 - 79)  BP: 150/68 (16 Oct 2022 09:00) (128/71 - 167/74)  BP(mean): --  RR: 19 (16 Oct 2022 09:00) (16 - 19)  SpO2: 99% (16 Oct 2022 09:00) (96% - 100%)    Parameters below as of 16 Oct 2022 09:00  Patient On (Oxygen Delivery Method): room air            LABS:                        7.7    7.39  )-----------( 292      ( 16 Oct 2022 07:11 )             24.8     10-16    138  |  100  |  13  ----------------------------<  84  4.0   |  28  |  1.51<H>    Ca    8.3<L>      16 Oct 2022 07:11  Phos  4.5     10-16  Mg     1.9     10-16            MICROBIOLOGY:    RADIOLOGY & ADDITIONAL STUDIES: INTERVAL HPI/OVERNIGHT EVENTS:    Improving   Had a formed BM  Wants different food    MEDICATIONS  (STANDING):  chlorhexidine 2% Cloths 1 Application(s) Topical <User Schedule>  dextrose 5%. 1000 milliLiter(s) (50 mL/Hr) IV Continuous <Continuous>  dextrose 5%. 1000 milliLiter(s) (100 mL/Hr) IV Continuous <Continuous>  dextrose 50% Injectable 25 Gram(s) IV Push once  dextrose 50% Injectable 12.5 Gram(s) IV Push once  dextrose 50% Injectable 25 Gram(s) IV Push once  enoxaparin Injectable 40 milliGRAM(s) SubCutaneous every 24 hours  glucagon  Injectable 1 milliGRAM(s) IntraMuscular once  influenza  Vaccine (HIGH DOSE) 0.7 milliLiter(s) IntraMuscular once  insulin lispro (ADMELOG) corrective regimen sliding scale   SubCutaneous three times a day before meals  insulin lispro (ADMELOG) corrective regimen sliding scale   SubCutaneous at bedtime  labetalol 200 milliGRAM(s) Oral every 12 hours  lidocaine   4% Patch 1 Patch Transdermal every 24 hours  melatonin 5 milliGRAM(s) Oral at bedtime  NIFEdipine XL 60 milliGRAM(s) Oral every 24 hours  senna 2 Tablet(s) Oral at bedtime  vancomycin    Solution 125 milliGRAM(s) Oral every 6 hours    MEDICATIONS  (PRN):  acetaminophen     Tablet .. 325 milliGRAM(s) Oral every 4 hours PRN Mild Pain (1 - 3)  dextrose Oral Gel 15 Gram(s) Oral once PRN Blood Glucose LESS THAN 70 milliGRAM(s)/deciliter  HYDROmorphone  Injectable 0.5 milliGRAM(s) IV Push every 4 hours PRN Severe Pain (7 - 10)  ondansetron Injectable 4 milliGRAM(s) IV Push every 8 hours PRN Nausea and/or Vomiting  oxyCODONE    IR 5 milliGRAM(s) Oral every 6 hours PRN Moderate Pain (4 - 6)  sodium chloride 0.9% lock flush 10 milliLiter(s) IV Push every 1 hour PRN Pre/post blood products, medications, blood draw, and to maintain line patency      Allergies    penicillin (Rash)    Intolerances    daptomycin (Muscle Pain)      EXAM  Vital Signs Last 24 Hrs  T(C): 37 (16 Oct 2022 09:00), Max: 37 (16 Oct 2022 09:00)  T(F): 98.6 (16 Oct 2022 09:00), Max: 98.6 (16 Oct 2022 09:00)  HR: 75 (16 Oct 2022 09:00) (70 - 79)  BP: 150/68 (16 Oct 2022 09:00) (128/71 - 167/74)  BP(mean): --  RR: 19 (16 Oct 2022 09:00) (16 - 19)  SpO2: 99% (16 Oct 2022 09:00) (96% - 100%)    Parameters below as of 16 Oct 2022 09:00  Patient On (Oxygen Delivery Method): room air  Awake and alert  No rash  RRR  Abd soft ND NT  VAC in place in the abdominal wound  LEs no edema  No overt muscle tenderness        LABS:                        7.7    7.39  )-----------( 292      ( 16 Oct 2022 07:11 )             24.8     10-16    138  |  100  |  13  ----------------------------<  84  4.0   |  28  |  1.51<H>    Ca    8.3<L>      16 Oct 2022 07:11  Phos  4.5     10-16  Mg     1.9     10-16      Creatine Kinase, Serum in AM (10.15.22 @ 05:30)    Creatine Kinase, Serum: 564 U/L

## 2022-10-16 NOTE — PROGRESS NOTE ADULT - ASSESSMENT
72 year old woman with HTN, DM2, past surgical history of  x 2, hysterectomy, and laparoscopic cholecystectomy (), admitted for large bowel obstruction; had spontaneous return of bowel function initially; however, bowel obstruction recurred, so taken to OR for ex lap with lysis of adhesions and bowel resection () . Monitored in SICU post -op ; now on regional. Hospital course complicated by rhabdomyolysis, likely daptomycin induced (CK elevated on 10/7/2022) pacu, home

## 2022-10-16 NOTE — PROGRESS NOTE ADULT - ASSESSMENT
Rhabdomyolysis - recovering  Cdiff colitis - resolving   S/P Peritonitis following SBR and extensive abdominal surgery  Enterococcus in the blood -  suspect contaminated blood culture  Leukocytosis resolved  SO - creatinine improving      RECOMMEND  Complete 2 week treatment with PO Vancomycin  Collect surveillance blood cultures    Incentive spirometry  Out of bed for meals  Mobilize        [phone]

## 2022-10-16 NOTE — PROGRESS NOTE ADULT - SUBJECTIVE AND OBJECTIVE BOX
INTERVAL HPI/OVERNIGHT EVENTS:   SURGERY ATTENDING    STATUS POST:      Exploratory laparotomy with lysis of adhesions extensive one hour , Small bowel resection with primary anastomosis, Repair of recurrent Incisional hernia with myofascial flaps no mesh, washout, drainage, PREVENA      POST OPERATIVE DAY #: 31    SUBJECTIVE:  Flatus: [x ] YES [ ] NO             Bowel Movement: [x ] YES [ ] NO  Pain (0-10):     1       Pain Control Adequate: [x ] YES [ ] NO  Nausea: [ ] YES [x ] NO            Vomiting: [ ] YES [x ] NO  Diarrhea: [ ] YES [x ] NO         Constipation: [ ] YES [x ] NO     Chest Pain: [ ] YES [x ] NO    SOB:  [ ] YES [x ] NO    MEDICATIONS  (STANDING):  chlorhexidine 2% Cloths 1 Application(s) Topical <User Schedule>  dextrose 5%. 1000 milliLiter(s) (50 mL/Hr) IV Continuous <Continuous>  dextrose 5%. 1000 milliLiter(s) (100 mL/Hr) IV Continuous <Continuous>  dextrose 50% Injectable 25 Gram(s) IV Push once  dextrose 50% Injectable 12.5 Gram(s) IV Push once  dextrose 50% Injectable 25 Gram(s) IV Push once  enoxaparin Injectable 40 milliGRAM(s) SubCutaneous every 24 hours  glucagon  Injectable 1 milliGRAM(s) IntraMuscular once  influenza  Vaccine (HIGH DOSE) 0.7 milliLiter(s) IntraMuscular once  insulin lispro (ADMELOG) corrective regimen sliding scale   SubCutaneous three times a day before meals  insulin lispro (ADMELOG) corrective regimen sliding scale   SubCutaneous at bedtime  labetalol 200 milliGRAM(s) Oral every 12 hours  lidocaine   4% Patch 1 Patch Transdermal every 24 hours  melatonin 5 milliGRAM(s) Oral at bedtime  NIFEdipine XL 60 milliGRAM(s) Oral every 24 hours  senna 2 Tablet(s) Oral at bedtime  vancomycin    Solution 125 milliGRAM(s) Oral every 6 hours    MEDICATIONS  (PRN):  acetaminophen     Tablet .. 325 milliGRAM(s) Oral every 4 hours PRN Mild Pain (1 - 3)  dextrose Oral Gel 15 Gram(s) Oral once PRN Blood Glucose LESS THAN 70 milliGRAM(s)/deciliter  HYDROmorphone  Injectable 0.5 milliGRAM(s) IV Push every 4 hours PRN Severe Pain (7 - 10)  ondansetron Injectable 4 milliGRAM(s) IV Push every 8 hours PRN Nausea and/or Vomiting  oxyCODONE    IR 5 milliGRAM(s) Oral every 6 hours PRN Moderate Pain (4 - 6)  sodium chloride 0.9% lock flush 10 milliLiter(s) IV Push every 1 hour PRN Pre/post blood products, medications, blood draw, and to maintain line patency      Vital Signs Last 24 Hrs  T(C): 37 (16 Oct 2022 09:00), Max: 37 (16 Oct 2022 09:00)  T(F): 98.6 (16 Oct 2022 09:00), Max: 98.6 (16 Oct 2022 09:00)  HR: 75 (16 Oct 2022 09:00) (70 - 79)  BP: 150/68 (16 Oct 2022 09:00) (128/71 - 167/74)  BP(mean): --  RR: 19 (16 Oct 2022 09:00) (16 - 19)  SpO2: 99% (16 Oct 2022 09:00) (96% - 100%)    Parameters below as of 16 Oct 2022 09:00  Patient On (Oxygen Delivery Method): room air        PHYSICAL EXAM:      Constitutional:    Eyes:    ENMT:    Neck:    Breasts:    Back:    Respiratory:    Cardiovascular:    Gastrointestinal:    Genitourinary:    Rectal:    Extremities:    Vascular:    Neurological:    Skin:    Lymph Nodes:    Musculoskeletal:    Psychiatric:        I&O's Detail    15 Oct 2022 07:01  -  16 Oct 2022 07:00  --------------------------------------------------------  IN:    IV PiggyBack: 100 mL    Lactated Ringers: 1900 mL    Oral Fluid: 1000 mL  Total IN: 3000 mL    OUT:    VAC (Vacuum Assisted Closure) System (mL): 0 mL    Voided (mL): 3575 mL  Total OUT: 3575 mL    Total NET: -575 mL      16 Oct 2022 07:01  -  16 Oct 2022 15:03  --------------------------------------------------------  IN:    Oral Fluid: 480 mL  Total IN: 480 mL    OUT:    VAC (Vacuum Assisted Closure) System (mL): 25 mL    Voided (mL): 1000 mL  Total OUT: 1025 mL    Total NET: -545 mL          LABS:                        7.7    7.39  )-----------( 292      ( 16 Oct 2022 07:11 )             24.8     10-16    138  |  100  |  13  ----------------------------<  84  4.0   |  28  |  1.51<H>    Ca    8.3<L>      16 Oct 2022 07:11  Phos  4.5     10-16  Mg     1.9     10-16            RADIOLOGY & ADDITIONAL STUDIES:

## 2022-10-16 NOTE — PROGRESS NOTE ADULT - SUBJECTIVE AND OBJECTIVE BOX
INTERVAL HPI/OVERNIGHT EVENTS:    STATUS POST:      POST OPERATIVE DAY #:     SUBJECTIVE:  Flatus: [ ] YES [ ] NO             Bowel Movement: [ ] YES [ ] NO  Pain (0-10):            Pain Control Adequate: [ ] YES [ ] NO  Nausea: [ ] YES [ ] NO            Vomiting: [ ] YES [ ] NO  Diarrhea: [ ] YES [ ] NO         Constipation: [ ] YES [ ] NO     Chest Pain: [ ] YES [ ] NO    SOB:  [ ] YES [ ] NO    MEDICATIONS  (STANDING):  chlorhexidine 2% Cloths 1 Application(s) Topical <User Schedule>  dextrose 5%. 1000 milliLiter(s) (50 mL/Hr) IV Continuous <Continuous>  dextrose 5%. 1000 milliLiter(s) (100 mL/Hr) IV Continuous <Continuous>  dextrose 50% Injectable 25 Gram(s) IV Push once  dextrose 50% Injectable 12.5 Gram(s) IV Push once  dextrose 50% Injectable 25 Gram(s) IV Push once  enoxaparin Injectable 40 milliGRAM(s) SubCutaneous every 24 hours  glucagon  Injectable 1 milliGRAM(s) IntraMuscular once  influenza  Vaccine (HIGH DOSE) 0.7 milliLiter(s) IntraMuscular once  insulin lispro (ADMELOG) corrective regimen sliding scale   SubCutaneous three times a day before meals  insulin lispro (ADMELOG) corrective regimen sliding scale   SubCutaneous at bedtime  labetalol 200 milliGRAM(s) Oral every 12 hours  lidocaine   4% Patch 1 Patch Transdermal every 24 hours  melatonin 5 milliGRAM(s) Oral at bedtime  NIFEdipine XL 60 milliGRAM(s) Oral every 24 hours  senna 2 Tablet(s) Oral at bedtime  vancomycin    Solution 125 milliGRAM(s) Oral every 6 hours    MEDICATIONS  (PRN):  acetaminophen     Tablet .. 325 milliGRAM(s) Oral every 4 hours PRN Mild Pain (1 - 3)  dextrose Oral Gel 15 Gram(s) Oral once PRN Blood Glucose LESS THAN 70 milliGRAM(s)/deciliter  HYDROmorphone  Injectable 0.5 milliGRAM(s) IV Push every 4 hours PRN Severe Pain (7 - 10)  ondansetron Injectable 4 milliGRAM(s) IV Push every 8 hours PRN Nausea and/or Vomiting  oxyCODONE    IR 5 milliGRAM(s) Oral every 6 hours PRN Moderate Pain (4 - 6)  sodium chloride 0.9% lock flush 10 milliLiter(s) IV Push every 1 hour PRN Pre/post blood products, medications, blood draw, and to maintain line patency      Vital Signs Last 24 Hrs  T(C): 36.8 (16 Oct 2022 05:07), Max: 36.9 (15 Oct 2022 08:10)  T(F): 98.3 (16 Oct 2022 05:07), Max: 98.5 (15 Oct 2022 08:10)  HR: 70 (16 Oct 2022 05:07) (70 - 79)  BP: 167/74 (16 Oct 2022 05:07) (128/71 - 167/74)  BP(mean): --  RR: 18 (16 Oct 2022 05:07) (16 - 18)  SpO2: 96% (16 Oct 2022 05:07) (96% - 100%)    Parameters below as of 16 Oct 2022 05:07  Patient On (Oxygen Delivery Method): room air        PHYSICAL EXAM:      Constitutional: A&Ox3    Breasts:    Respiratory: non labored breathing, no respiratory distress    Cardiovascular: NSR, RRR    Gastrointestinal:                 Incision:    Genitourinary:    Extremities: (-) edema                  I&O's Detail    15 Oct 2022 07:01  -  16 Oct 2022 07:00  --------------------------------------------------------  IN:    IV PiggyBack: 100 mL    Lactated Ringers: 1900 mL    Oral Fluid: 1000 mL  Total IN: 3000 mL    OUT:    VAC (Vacuum Assisted Closure) System (mL): 0 mL    Voided (mL): 3575 mL  Total OUT: 3575 mL    Total NET: -575 mL          LABS:                        7.4    7.68  )-----------( 287      ( 15 Oct 2022 05:30 )             23.8     10-15    137  |  98  |  16  ----------------------------<  86  4.1   |  32<H>  |  1.63<H>    Ca    8.3<L>      15 Oct 2022 05:30  Phos  4.6     10-15  Mg     1.8     10-15            RADIOLOGY & ADDITIONAL STUDIES: INTERVAL HPI/OVERNIGHT EVENTS:  AZUCENA overnight. Patient seen and examined by chief resident and team on AM rounds. Patient denies -n/-v/+f/-bm.     STATUS POST: Exploratory laparotomy with lysis of adhesions and SBR    SUBJECTIVE:  MEDICATIONS  (STANDING):  chlorhexidine 2% Cloths 1 Application(s) Topical <User Schedule>  dextrose 5%. 1000 milliLiter(s) (50 mL/Hr) IV Continuous <Continuous>  dextrose 5%. 1000 milliLiter(s) (100 mL/Hr) IV Continuous <Continuous>  dextrose 50% Injectable 25 Gram(s) IV Push once  dextrose 50% Injectable 12.5 Gram(s) IV Push once  dextrose 50% Injectable 25 Gram(s) IV Push once  enoxaparin Injectable 40 milliGRAM(s) SubCutaneous every 24 hours  glucagon  Injectable 1 milliGRAM(s) IntraMuscular once  influenza  Vaccine (HIGH DOSE) 0.7 milliLiter(s) IntraMuscular once  insulin lispro (ADMELOG) corrective regimen sliding scale   SubCutaneous three times a day before meals  insulin lispro (ADMELOG) corrective regimen sliding scale   SubCutaneous at bedtime  labetalol 200 milliGRAM(s) Oral every 12 hours  lidocaine   4% Patch 1 Patch Transdermal every 24 hours  melatonin 5 milliGRAM(s) Oral at bedtime  NIFEdipine XL 60 milliGRAM(s) Oral every 24 hours  senna 2 Tablet(s) Oral at bedtime  vancomycin    Solution 125 milliGRAM(s) Oral every 6 hours    MEDICATIONS  (PRN):  acetaminophen     Tablet .. 325 milliGRAM(s) Oral every 4 hours PRN Mild Pain (1 - 3)  dextrose Oral Gel 15 Gram(s) Oral once PRN Blood Glucose LESS THAN 70 milliGRAM(s)/deciliter  HYDROmorphone  Injectable 0.5 milliGRAM(s) IV Push every 4 hours PRN Severe Pain (7 - 10)  ondansetron Injectable 4 milliGRAM(s) IV Push every 8 hours PRN Nausea and/or Vomiting  oxyCODONE    IR 5 milliGRAM(s) Oral every 6 hours PRN Moderate Pain (4 - 6)  sodium chloride 0.9% lock flush 10 milliLiter(s) IV Push every 1 hour PRN Pre/post blood products, medications, blood draw, and to maintain line patency      Vital Signs Last 24 Hrs  T(C): 36.8 (16 Oct 2022 05:07), Max: 36.9 (15 Oct 2022 08:10)  T(F): 98.3 (16 Oct 2022 05:07), Max: 98.5 (15 Oct 2022 08:10)  HR: 70 (16 Oct 2022 05:07) (70 - 79)  BP: 167/74 (16 Oct 2022 05:07) (128/71 - 167/74)  BP(mean): --  RR: 18 (16 Oct 2022 05:07) (16 - 18)  SpO2: 96% (16 Oct 2022 05:07) (96% - 100%)    Parameters below as of 16 Oct 2022 05:07  Patient On (Oxygen Delivery Method): room air        PHYSICAL EXAM:      Constitutional: A&Ox3  Respiratory: non labored breathing, no respiratory distress  Cardiovascular: NSR, RRR  Gastrointestinal: soft, NTND abdomen. Wound vac in place.   Extremities: (-) edema                  I&O's Detail    15 Oct 2022 07:01  -  16 Oct 2022 07:00  --------------------------------------------------------  IN:    IV PiggyBack: 100 mL    Lactated Ringers: 1900 mL    Oral Fluid: 1000 mL  Total IN: 3000 mL    OUT:    VAC (Vacuum Assisted Closure) System (mL): 0 mL    Voided (mL): 3575 mL  Total OUT: 3575 mL    Total NET: -575 mL          LABS:                        7.4    7.68  )-----------( 287      ( 15 Oct 2022 05:30 )             23.8     10-15    137  |  98  |  16  ----------------------------<  86  4.1   |  32<H>  |  1.63<H>    Ca    8.3<L>      15 Oct 2022 05:30  Phos  4.6     10-15  Mg     1.8     10-15            RADIOLOGY & ADDITIONAL STUDIES:

## 2022-10-16 NOTE — PROGRESS NOTE ADULT - PROBLEM SELECTOR PLAN 1
stable renal function- resolving SO  awaiting labs from today to make further recommendations  IVF and Lasix discontinued yesterday  good UO  encourage PO intake
resolving non-oliguric SO  Rios removed with good UO  probable rhabdomyolysis induced ATN  IVF discontinued - can discontinue IV Lasix as well

## 2022-10-17 LAB
ANION GAP SERPL CALC-SCNC: 9 MMOL/L — SIGNIFICANT CHANGE UP (ref 5–17)
BUN SERPL-MCNC: 12 MG/DL — SIGNIFICANT CHANGE UP (ref 7–23)
CALCIUM SERPL-MCNC: 8.5 MG/DL — SIGNIFICANT CHANGE UP (ref 8.4–10.5)
CHLORIDE SERPL-SCNC: 102 MMOL/L — SIGNIFICANT CHANGE UP (ref 96–108)
CK SERPL-CCNC: 155 U/L — SIGNIFICANT CHANGE UP (ref 25–170)
CO2 SERPL-SCNC: 30 MMOL/L — SIGNIFICANT CHANGE UP (ref 22–31)
CREAT SERPL-MCNC: 1.48 MG/DL — HIGH (ref 0.5–1.3)
EGFR: 38 ML/MIN/1.73M2 — LOW
GLUCOSE BLDC GLUCOMTR-MCNC: 100 MG/DL — HIGH (ref 70–99)
GLUCOSE BLDC GLUCOMTR-MCNC: 111 MG/DL — HIGH (ref 70–99)
GLUCOSE BLDC GLUCOMTR-MCNC: 111 MG/DL — HIGH (ref 70–99)
GLUCOSE BLDC GLUCOMTR-MCNC: 112 MG/DL — HIGH (ref 70–99)
GLUCOSE SERPL-MCNC: 106 MG/DL — HIGH (ref 70–99)
HCT VFR BLD CALC: 26.1 % — LOW (ref 34.5–45)
HGB BLD-MCNC: 8.1 G/DL — LOW (ref 11.5–15.5)
MAGNESIUM SERPL-MCNC: 1.7 MG/DL — SIGNIFICANT CHANGE UP (ref 1.6–2.6)
MCHC RBC-ENTMCNC: 27.1 PG — SIGNIFICANT CHANGE UP (ref 27–34)
MCHC RBC-ENTMCNC: 31 GM/DL — LOW (ref 32–36)
MCV RBC AUTO: 87.3 FL — SIGNIFICANT CHANGE UP (ref 80–100)
NRBC # BLD: 0 /100 WBCS — SIGNIFICANT CHANGE UP (ref 0–0)
PHOSPHATE SERPL-MCNC: 4.7 MG/DL — HIGH (ref 2.5–4.5)
PLATELET # BLD AUTO: 320 K/UL — SIGNIFICANT CHANGE UP (ref 150–400)
POTASSIUM SERPL-MCNC: 4 MMOL/L — SIGNIFICANT CHANGE UP (ref 3.5–5.3)
POTASSIUM SERPL-SCNC: 4 MMOL/L — SIGNIFICANT CHANGE UP (ref 3.5–5.3)
RBC # BLD: 2.99 M/UL — LOW (ref 3.8–5.2)
RBC # FLD: 16.1 % — HIGH (ref 10.3–14.5)
SODIUM SERPL-SCNC: 141 MMOL/L — SIGNIFICANT CHANGE UP (ref 135–145)
WBC # BLD: 9.76 K/UL — SIGNIFICANT CHANGE UP (ref 3.8–10.5)
WBC # FLD AUTO: 9.76 K/UL — SIGNIFICANT CHANGE UP (ref 3.8–10.5)

## 2022-10-17 PROCEDURE — 99232 SBSQ HOSP IP/OBS MODERATE 35: CPT

## 2022-10-17 RX ORDER — MAGNESIUM SULFATE 500 MG/ML
1 VIAL (ML) INJECTION ONCE
Refills: 0 | Status: COMPLETED | OUTPATIENT
Start: 2022-10-17 | End: 2022-10-17

## 2022-10-17 RX ADMIN — Medication 125 MILLIGRAM(S): at 01:04

## 2022-10-17 RX ADMIN — Medication 125 MILLIGRAM(S): at 06:19

## 2022-10-17 RX ADMIN — Medication 60 MILLIGRAM(S): at 13:14

## 2022-10-17 RX ADMIN — Medication 200 MILLIGRAM(S): at 18:07

## 2022-10-17 RX ADMIN — LIDOCAINE 1 PATCH: 4 CREAM TOPICAL at 01:08

## 2022-10-17 RX ADMIN — LIDOCAINE 1 PATCH: 4 CREAM TOPICAL at 13:15

## 2022-10-17 RX ADMIN — CHLORHEXIDINE GLUCONATE 1 APPLICATION(S): 213 SOLUTION TOPICAL at 06:19

## 2022-10-17 RX ADMIN — Medication 200 MILLIGRAM(S): at 06:19

## 2022-10-17 RX ADMIN — LIDOCAINE 1 PATCH: 4 CREAM TOPICAL at 18:01

## 2022-10-17 RX ADMIN — Medication 100 GRAM(S): at 18:07

## 2022-10-17 RX ADMIN — Medication 125 MILLIGRAM(S): at 18:07

## 2022-10-17 RX ADMIN — Medication 5 MILLIGRAM(S): at 01:04

## 2022-10-17 RX ADMIN — ENOXAPARIN SODIUM 40 MILLIGRAM(S): 100 INJECTION SUBCUTANEOUS at 13:12

## 2022-10-17 RX ADMIN — Medication 125 MILLIGRAM(S): at 13:14

## 2022-10-17 NOTE — PROGRESS NOTE ADULT - TIME BILLING
As above; Coordination of care with primary team, discussed SO, followup  This excludes any time spent on separate procedures or teaching.

## 2022-10-17 NOTE — PROGRESS NOTE ADULT - ASSESSMENT
72 year old woman with HTN, DM2, past surgical history of  x 2, hysterectomy, and laparoscopic cholecystectomy (), admitted for large bowel obstruction; had spontaneous return of bowel function initially; however, bowel obstruction recurred, so taken to OR for ex lap with lysis of adhesions and bowel resection () . Monitored in SICU post -op ; now on regional. Hospital course complicated by rhabdomyolysis, likely daptomycin induced (CK elevated on 10/7/2022)    # Large bowel obstruction   # post operative state  s/p OR for ex lap with lysis of adhesions and bowel resection ()  - rest of care per primary team     #Upper extremity weakness  -in setting of rhabdo. Improving. Continue monitoring.     # non-traumatic Rhabdomyolysis  CK elevated to 30K on 10/7/2022, now downtrending. Likely daptomycin associated. Stopped daptomycin and statin.   - f/u with renal recs  -  CK dropping.     #SO/ATN  -Improving. in setting of rhabdo  -Diuresis a/p nephrology    #Elevated LFTs  -In setting of rhabdo. Continue monitoring. Continues to improve    #C diff; non severe  -On PO vanc 10/4-   -f/u with ID regarding when to sotp as 2 weeks should suffice    #E. faecalis bacteremia  -Afebrile at this time. was On dapto 10/4-10/7; Switched to vancomycin 10/7-10/10  -Repeat Cx's negative to date  -Management a/p ID. Believe this was contaminant and is holding abx.     #Obesity - Dose medications by weight. Lifestyle modifications discussed, especially once she's resolved    #HTN   takes HCTZ 25mg qd, losartan 100mg qd, labetalol 200mg BID, nifedipine 60mg ER qd at home.   - continue labetalol 200mg BID  - would give nifedipine 90 mg ER   - Continue to hold HCTZ for now and on discharge     # Type 2 Diabetes complicated by hyperglycemia   cw insulin sliding scale while inpatient.   pt's a1c at goal for her age, resume metformin on discharge.     #Incidental finding of bilateral lung nodules  CT chest showing bilateral lung nodules, measuring up to 7 mm in the ridght upper lobe; Counseled patient on finding.   Recommended follow-up chest CT in three months to ensure stability.; Copy of radiology report provided to patient for her records.     # COVID 19 infection (present on admission) - Improved/resolved. Off isolation.     # Acute blood loss anemia   Would transfuse <7.0  age appropriate cancer screening outpatient     DVT ppx - HSQ

## 2022-10-17 NOTE — OCCUPATIONAL THERAPY INITIAL EVALUATION ADULT - DIAGNOSIS, OT EVAL
Pt presenting with impaired strength, postural control, balance, and functional activity tolerance, impacting ability to perform functional mobility/ADLs.

## 2022-10-17 NOTE — OCCUPATIONAL THERAPY INITIAL EVALUATION ADULT - GENERAL OBSERVATIONS, REHAB EVAL
OT IE completed. Orders received, chart reviewed, pt cleared for OT by RN Anna. Pt received semi supine in bed, NAD, +heplock, +wound vac. Pt A&Ox4, agreeable to OT, and tolerated session fairly well. Pt seen with PT Dominique.

## 2022-10-17 NOTE — PROGRESS NOTE ADULT - SUBJECTIVE AND OBJECTIVE BOX
Subjective: patient seen bedside. No acute complaints no novernight events. Sleeping when entered room.         PHYSICAL EXAM:  Constitutional: NAD, age appropriate female laying in bed  HEENT: NC/AT, EOMI  Neck: Supple, no JVD  Respiratory: CTA B/L   Cardiovascular: RRR, normal S1 and S2   Gastrointestinal: +BS, soft minimal tenderness, abdominal binder in place, wound vac in place  Extremities: no LE edema  Neurological: AAOx3  Psych: calm cooperative

## 2022-10-17 NOTE — PROGRESS NOTE ADULT - ASSESSMENT
72 F PMH HTN, DM and PSH  x 2, hysterectomy and lap dev ( w/ Dr. Clifford) admitted on  with 2 days of n/v, abd pain, imaging demonstrating LBO, which resolved prior to operative exploration. Recurrent bowel obstruction, now s/p Exlap JOSEFINA, SBR (100 cm) (9/15), now with wound vac, c/b c.d iff and rhabdo 2/2 daptomycin, now pending [facility] placement.     Renal diet, ISS  Pain/nausea prn  Vanco PO (10/4-), s/p Daptomycin (10/4-10/7)  Wound Vac  SQL/SCDs/OOBA/IS  AM labs  PICC  Dispo: [facility]

## 2022-10-17 NOTE — CHART NOTE - NSCHARTNOTESELECT_GEN_ALL_CORE
Follow up/Nutrition Services
Follow up/Nutrition Services
Event Note
Follow up/Nutrition Services

## 2022-10-17 NOTE — OCCUPATIONAL THERAPY INITIAL EVALUATION ADULT - ADDITIONAL COMMENTS
Pt lives alone in apartment with no AVA. Pt reporting that prior to admission, she was performing all ADLs/functional mobility independently and w/o use of AD/AE.

## 2022-10-17 NOTE — PROGRESS NOTE ADULT - SUBJECTIVE AND OBJECTIVE BOX
Patient is a 72y Female seen and evaluated at bedside. Overnight events noted. Pt denies any new symptoms. SCr stable.      Meds:    acetaminophen     Tablet .. 325 every 4 hours PRN  chlorhexidine 2% Cloths 1 <User Schedule>  dextrose 5%. 1000 <Continuous>  dextrose 5%. 1000 <Continuous>  dextrose 50% Injectable 25 once  dextrose 50% Injectable 12.5 once  dextrose 50% Injectable 25 once  dextrose Oral Gel 15 once PRN  enoxaparin Injectable 40 every 24 hours  glucagon  Injectable 1 once  HYDROmorphone  Injectable 0.5 every 4 hours PRN  influenza  Vaccine (HIGH DOSE) 0.7 once  insulin lispro (ADMELOG) corrective regimen sliding scale  three times a day before meals  insulin lispro (ADMELOG) corrective regimen sliding scale  at bedtime  labetalol 200 every 12 hours  lidocaine   4% Patch 1 every 24 hours  melatonin 5 at bedtime  NIFEdipine XL 60 every 24 hours  ondansetron Injectable 4 every 8 hours PRN  oxyCODONE    IR 5 every 6 hours PRN  senna 2 at bedtime  sodium chloride 0.9% lock flush 10 every 1 hour PRN  vancomycin    Solution 125 every 6 hours      T(C): , Max: 37.3 (10-17-22 @ 05:25)  T(F): , Max: 99.2 (10-17-22 @ 09:16)  HR: 71 (10-17-22 @ 13:21)  BP: 147/77 (10-17-22 @ 13:21)  BP(mean): 94 (10-17-22 @ 05:25)  RR: 18 (10-17-22 @ 13:21)  SpO2: 99% (10-17-22 @ 13:21)  Wt(kg): --    10-16 @ 07:01  -  10-17 @ 07:00  --------------------------------------------------------  IN: 960 mL / OUT: 3075 mL / NET: -2115 mL    10-17 @ 07:01  -  10-17 @ 14:46  --------------------------------------------------------  IN: 300 mL / OUT: 0 mL / NET: 300 mL          Review of Systems:  ROS negative except as per HPI      PHYSICAL EXAM:  Constitutional: pleasant female NAD, laying in bed   Neck: Supple, no JVD  Respiratory: CTA B/L, on RA  Cardiovascular: RRR, normal S1 and S2,   Gastrointestinal: +BS, minimal tenderness  Extremities: no edema, warm  Neurological: AAOx3, moving all extremities spontaneously       LABS:                        7.7    7.39  )-----------( 292      ( 16 Oct 2022 07:11 )             24.8     10-16    138  |  100  |  13  ----------------------------<  84  4.0   |  28  |  1.51<H>    Ca    8.3<L>      16 Oct 2022 07:11  Phos  4.5     10-16  Mg     1.9     10-16                  RADIOLOGY & ADDITIONAL STUDIES:           Patient is a 72y Female seen and evaluated at bedside. Overnight events noted. Pt denies any new symptoms. SCr stable.      Meds:    acetaminophen     Tablet .. 325 every 4 hours PRN  chlorhexidine 2% Cloths 1 <User Schedule>  dextrose 5%. 1000 <Continuous>  dextrose 5%. 1000 <Continuous>  dextrose 50% Injectable 25 once  dextrose 50% Injectable 12.5 once  dextrose 50% Injectable 25 once  dextrose Oral Gel 15 once PRN  enoxaparin Injectable 40 every 24 hours  glucagon  Injectable 1 once  HYDROmorphone  Injectable 0.5 every 4 hours PRN  influenza  Vaccine (HIGH DOSE) 0.7 once  insulin lispro (ADMELOG) corrective regimen sliding scale  three times a day before meals  insulin lispro (ADMELOG) corrective regimen sliding scale  at bedtime  labetalol 200 every 12 hours  lidocaine   4% Patch 1 every 24 hours  melatonin 5 at bedtime  NIFEdipine XL 60 every 24 hours  ondansetron Injectable 4 every 8 hours PRN  oxyCODONE    IR 5 every 6 hours PRN  senna 2 at bedtime  sodium chloride 0.9% lock flush 10 every 1 hour PRN  vancomycin    Solution 125 every 6 hours      T(C): , Max: 37.3 (10-17-22 @ 05:25)  T(F): , Max: 99.2 (10-17-22 @ 09:16)  HR: 71 (10-17-22 @ 13:21)  BP: 147/77 (10-17-22 @ 13:21)  BP(mean): 94 (10-17-22 @ 05:25)  RR: 18 (10-17-22 @ 13:21)  SpO2: 99% (10-17-22 @ 13:21)  Wt(kg): --    10-16 @ 07:01  -  10-17 @ 07:00  --------------------------------------------------------  IN: 960 mL / OUT: 3075 mL / NET: -2115 mL    10-17 @ 07:01  -  10-17 @ 14:46  --------------------------------------------------------  IN: 300 mL / OUT: 0 mL / NET: 300 mL          Review of Systems:  ROS negative except as per HPI      PHYSICAL EXAM:  Constitutional: pleasant female NAD, laying in bed   Neck: Supple, no JVD  Respiratory: CTA B/L, on RA  Cardiovascular: RRR, normal S1 and S2,   Gastrointestinal: +BS, minimal tenderness  Extremities: no edema, warm  Neurological: awake, answers questions appropriately, moving all extremities spontaneously   Skin: Warm, dry no visible rashes    LABS:                        7.7    7.39  )-----------( 292      ( 16 Oct 2022 07:11 )             24.8     10-16    138  |  100  |  13  ----------------------------<  84  4.0   |  28  |  1.51<H>    Ca    8.3<L>      16 Oct 2022 07:11  Phos  4.5     10-16  Mg     1.9     10-16                  RADIOLOGY & ADDITIONAL STUDIES:    Creatinine, Serum: 1.48 mg/dL (10-17-22 @ 16:53)  Creatinine, Serum: 1.51 mg/dL (10-16-22 @ 07:11)  Creatinine, Serum: 1.63 mg/dL (10-15-22 @ 05:30)  Creatinine, Serum: 1.62 mg/dL (10-14-22 @ 07:36)  Creatinine, Serum: 1.69 mg/dL (10-13-22 @ 06:20)  Creatinine, Serum: 1.68 mg/dL (10-12-22 @ 10:25)  Creatinine, Serum: 1.82 mg/dL (10-11-22 @ 06:56)  Creatinine, Serum: 1.83 mg/dL (10-10-22 @ 08:54)  Creatinine, Serum: 1.73 mg/dL (10-09-22 @ 06:23)  Creatinine, Serum: 1.30 mg/dL (10-08-22 @ 05:30)

## 2022-10-17 NOTE — PROGRESS NOTE ADULT - ASSESSMENT
72 year old woman with HTN, DM2 admitted for large bowel obstruction s/p ex lap w/ lysis of adhesions and bowel resection on 9/14 for SBO. She is also s/p sepsis and peritonitis. Nephrology initially consulted for prerenal azotemia which resolved. Nephrology re-consulted for SO Cr 1.73 and rhabdomyolysis 2/2 daptomycin.      #Non-oliguric pigment induced ATN  No known underlying CKD  Cr stable 1.63-->1.51  UA w/ trace proteinuria, large blood with <5 RBCs  CK trending down now, currently 6K  4.1 L of UO in last 24 hours  Renal sono noted  Likely etiology toxic ATN due to rhabdomyolysis secondary to daptomycin use        Plan:  Maintain net event fluid balance  Encourage PO hydration  Daily BMP. ATN usually takes 10-21 days to recover    Nephrology will sign off at this time. Please feel free to reach us for any questions. Please make sure patient is to follow up with nephrology outpatient after her discharge.          Sergio Sands M.D  PGY-4 Nephrology   [phone]   72 year old woman with HTN, DM2 admitted for large bowel obstruction s/p ex lap w/ lysis of adhesions and bowel resection on 9/14 for SBO. She is also s/p sepsis and peritonitis. Nephrology initially consulted for prerenal azotemia which resolved. Nephrology re-consulted for SO Cr 1.73 and rhabdomyolysis 2/2 daptomycin.      #Non-oliguric pigment induced ATN  No known underlying CKD  Cr stable 1.63-->1.51  UA w/ trace proteinuria, large blood with <5 RBCs  CK trending down now, currently 6K  4.1 L of UO in last 24 hours  Renal sono noted  Likely etiology toxic ATN due to rhabdomyolysis secondary to daptomycin use        Plan:  Maintain net event fluid balancec  Encourage PO hydration  Daily BMP. ATN usually takes 10-21 days to recover    Nephrology will sign off at this time. Please feel free to reach us for any questions. Please make sure patient is to follow up with nephrology outpatient after her discharge.          Sergio Sands M.D  PGY-4 Nephrology   [phone]

## 2022-10-17 NOTE — PROGRESS NOTE ADULT - SUBJECTIVE AND OBJECTIVE BOX
INTERVAL HPI/OVERNIGHT EVENTS:   SURGERY ATTENDING    STATUS POST:  Exploratory laparotomy with lysis of adhesions extensive one hour , Small bowel resection with primary anastomosis, Repair of recurrent Incisional hernia with myofascial flaps no mesh, washout, drainage, PREVENA        POST OPERATIVE DAY #: 32    SUBJECTIVE:  Flatus: [x ] YES [ ] NO             Bowel Movement: [x ] YES [ ] NO  Pain (0-10):     1       Pain Control Adequate: [x ] YES [ ] NO  Nausea: [ ] YES [x ] NO            Vomiting: [ ] YES [x ] NO  Diarrhea: [ ] YES [x ] NO         Constipation: [ ] YES [x ] NO     Chest Pain: [ ] YES [x ] NO    SOB:  [ ] YES [x ] NO      MEDICATIONS  (STANDING):  chlorhexidine 2% Cloths 1 Application(s) Topical <User Schedule>  dextrose 5%. 1000 milliLiter(s) (50 mL/Hr) IV Continuous <Continuous>  dextrose 5%. 1000 milliLiter(s) (100 mL/Hr) IV Continuous <Continuous>  dextrose 50% Injectable 25 Gram(s) IV Push once  dextrose 50% Injectable 12.5 Gram(s) IV Push once  dextrose 50% Injectable 25 Gram(s) IV Push once  enoxaparin Injectable 40 milliGRAM(s) SubCutaneous every 24 hours  glucagon  Injectable 1 milliGRAM(s) IntraMuscular once  influenza  Vaccine (HIGH DOSE) 0.7 milliLiter(s) IntraMuscular once  insulin lispro (ADMELOG) corrective regimen sliding scale   SubCutaneous three times a day before meals  insulin lispro (ADMELOG) corrective regimen sliding scale   SubCutaneous at bedtime  labetalol 200 milliGRAM(s) Oral every 12 hours  lidocaine   4% Patch 1 Patch Transdermal every 24 hours  melatonin 5 milliGRAM(s) Oral at bedtime  NIFEdipine XL 60 milliGRAM(s) Oral every 24 hours  senna 2 Tablet(s) Oral at bedtime  vancomycin    Solution 125 milliGRAM(s) Oral every 6 hours    MEDICATIONS  (PRN):  acetaminophen     Tablet .. 325 milliGRAM(s) Oral every 4 hours PRN Mild Pain (1 - 3)  dextrose Oral Gel 15 Gram(s) Oral once PRN Blood Glucose LESS THAN 70 milliGRAM(s)/deciliter  HYDROmorphone  Injectable 0.5 milliGRAM(s) IV Push every 4 hours PRN Severe Pain (7 - 10)  ondansetron Injectable 4 milliGRAM(s) IV Push every 8 hours PRN Nausea and/or Vomiting  oxyCODONE    IR 5 milliGRAM(s) Oral every 6 hours PRN Moderate Pain (4 - 6)  sodium chloride 0.9% lock flush 10 milliLiter(s) IV Push every 1 hour PRN Pre/post blood products, medications, blood draw, and to maintain line patency      Vital Signs Last 24 Hrs  T(C): 37.3 (17 Oct 2022 13:21), Max: 37.3 (17 Oct 2022 05:25)  T(F): 99.1 (17 Oct 2022 13:21), Max: 99.2 (17 Oct 2022 09:16)  HR: 71 (17 Oct 2022 13:21) (71 - 80)  BP: 147/77 (17 Oct 2022 13:21) (135/71 - 150/66)  BP(mean): 94 (17 Oct 2022 05:25) (94 - 94)  RR: 18 (17 Oct 2022 13:21) (17 - 18)  SpO2: 99% (17 Oct 2022 13:21) (97% - 99%)    Parameters below as of 17 Oct 2022 13:21  Patient On (Oxygen Delivery Method): room air        PHYSICAL EXAM:      Constitutional:    Eyes:    ENMT:    Neck:    Breasts:    Back:    Respiratory:    Cardiovascular:    Gastrointestinal:    Genitourinary:    Rectal:    Extremities:    Vascular:    Neurological:    Skin:    Lymph Nodes:    Musculoskeletal:    Psychiatric:        I&O's Detail    16 Oct 2022 07:01  -  17 Oct 2022 07:00  --------------------------------------------------------  IN:    Oral Fluid: 960 mL  Total IN: 960 mL    OUT:    VAC (Vacuum Assisted Closure) System (mL): 25 mL    Voided (mL): 3050 mL  Total OUT: 3075 mL    Total NET: -2115 mL      17 Oct 2022 07:01  -  17 Oct 2022 16:23  --------------------------------------------------------  IN:    Oral Fluid: 640 mL  Total IN: 640 mL    OUT:    Voided (mL): 400 mL  Total OUT: 400 mL    Total NET: 240 mL          LABS:                        7.7    7.39  )-----------( 292      ( 16 Oct 2022 07:11 )             24.8     10-16    138  |  100  |  13  ----------------------------<  84  4.0   |  28  |  1.51<H>    Ca    8.3<L>      16 Oct 2022 07:11  Phos  4.5     10-16  Mg     1.9     10-16            RADIOLOGY & ADDITIONAL STUDIES:

## 2022-10-17 NOTE — CHART NOTE - NSCHARTNOTEFT_GEN_A_CORE
Admitting Diagnosis:   Patient is a 72y old  Female who presents with a chief complaint of LBO (17 Oct 2022 10:24)      PAST MEDICAL & SURGICAL HISTORY:  Diabetes      H/O thyroid disease      S/P total abdominal hysterectomy      History of laparoscopic cholecystectomy      History of           Current Nutrition Order:   Renal diet     PO Intake: Good (%) [   ]  Fair (50-75%) [   ] Poor (<25%) [ x  ]    GI Issues: No n/v/d/c    Pain:    Skin Integrity:    Labs:   10-16    138  |  100  |  13  ----------------------------<  84  4.0   |  28  |  1.51<H>    Ca    8.3<L>      16 Oct 2022 07:11  Phos  4.5     10-16  Mg     1.9     10-16      CAPILLARY BLOOD GLUCOSE      POCT Blood Glucose.: 100 mg/dL (17 Oct 2022 08:15)  POCT Blood Glucose.: 107 mg/dL (16 Oct 2022 21:40)  POCT Blood Glucose.: 100 mg/dL (16 Oct 2022 17:14)  POCT Blood Glucose.: 88 mg/dL (16 Oct 2022 12:58)      Medications:  MEDICATIONS  (STANDING):  chlorhexidine 2% Cloths 1 Application(s) Topical <User Schedule>  dextrose 5%. 1000 milliLiter(s) (50 mL/Hr) IV Continuous <Continuous>  dextrose 5%. 1000 milliLiter(s) (100 mL/Hr) IV Continuous <Continuous>  dextrose 50% Injectable 25 Gram(s) IV Push once  dextrose 50% Injectable 12.5 Gram(s) IV Push once  dextrose 50% Injectable 25 Gram(s) IV Push once  enoxaparin Injectable 40 milliGRAM(s) SubCutaneous every 24 hours  glucagon  Injectable 1 milliGRAM(s) IntraMuscular once  influenza  Vaccine (HIGH DOSE) 0.7 milliLiter(s) IntraMuscular once  insulin lispro (ADMELOG) corrective regimen sliding scale   SubCutaneous three times a day before meals  insulin lispro (ADMELOG) corrective regimen sliding scale   SubCutaneous at bedtime  labetalol 200 milliGRAM(s) Oral every 12 hours  lidocaine   4% Patch 1 Patch Transdermal every 24 hours  melatonin 5 milliGRAM(s) Oral at bedtime  NIFEdipine XL 60 milliGRAM(s) Oral every 24 hours  senna 2 Tablet(s) Oral at bedtime  vancomycin    Solution 125 milliGRAM(s) Oral every 6 hours    MEDICATIONS  (PRN):  acetaminophen     Tablet .. 325 milliGRAM(s) Oral every 4 hours PRN Mild Pain (1 - 3)  dextrose Oral Gel 15 Gram(s) Oral once PRN Blood Glucose LESS THAN 70 milliGRAM(s)/deciliter  HYDROmorphone  Injectable 0.5 milliGRAM(s) IV Push every 4 hours PRN Severe Pain (7 - 10)  ondansetron Injectable 4 milliGRAM(s) IV Push every 8 hours PRN Nausea and/or Vomiting  oxyCODONE    IR 5 milliGRAM(s) Oral every 6 hours PRN Moderate Pain (4 - 6)  sodium chloride 0.9% lock flush 10 milliLiter(s) IV Push every 1 hour PRN Pre/post blood products, medications, blood draw, and to maintain line patency      Weight:  Daily     Daily     Weight Change:     Nutrition Focused Physical Exam: Completed [   ]  Not Pertinent [   ]  Muscle Wasting- Temporal [   ]  Clavicle/Pectoral [   ]  Shoulder/Deltoid [   ]  Scapula [   ]  Interosseous [   ]  Quadriceps [   ]  Gastrocnemius [   ]  Fat Wasting- Orbital [   ]  Buccal [   ]  Triceps [   ]  Rib [   ]  Suspect [PCM] 2/2 to physical assessment, [poor intake], and [wt loss]; please see malnutrition chart note.    Estimated energy needs:     Subjective:     Previous Nutrition Diagnosis:    Active [   ]  Resolved [   ]    If resolved, new PES:     Goal:    Recommendations:    Education:     Risk Level: High [   ] Moderate [   ] Low [   ] Admitting Diagnosis:   Patient is a 72y old  Female who presents with a chief complaint of LBO (17 Oct 2022 10:24)      PAST MEDICAL & SURGICAL HISTORY:  Diabetes      H/O thyroid disease      S/P total abdominal hysterectomy      History of laparoscopic cholecystectomy      History of           Current Nutrition Order:   Renal diet     PO Intake: Good (%) [   ]  Fair (50-75%) [   ] Poor (<25%) [ x  ]    GI Issues: No n/v/d/c. Last BM 10/16  No abd distention. Reported abd discomfort    Pain: No reported pain    Skin Integrity: Hany 17, surgical incision noted. Wound vac  No PU or edema noted     Labs:   10-16    138  |  100  |  13  ----------------------------<  84  4.0   |  28  |  1.51<H>    Ca    8.3<L>      16 Oct 2022 07:11  Phos  4.5     10-  Mg     1.9     10-      CAPILLARY BLOOD GLUCOSE      POCT Blood Glucose.: 100 mg/dL (17 Oct 2022 08:15)  POCT Blood Glucose.: 107 mg/dL (16 Oct 2022 21:40)  POCT Blood Glucose.: 100 mg/dL (16 Oct 2022 17:14)  POCT Blood Glucose.: 88 mg/dL (16 Oct 2022 12:58)      Medications:  MEDICATIONS  (STANDING):  chlorhexidine 2% Cloths 1 Application(s) Topical <User Schedule>  dextrose 5%. 1000 milliLiter(s) (50 mL/Hr) IV Continuous <Continuous>  dextrose 5%. 1000 milliLiter(s) (100 mL/Hr) IV Continuous <Continuous>  dextrose 50% Injectable 25 Gram(s) IV Push once  dextrose 50% Injectable 12.5 Gram(s) IV Push once  dextrose 50% Injectable 25 Gram(s) IV Push once  enoxaparin Injectable 40 milliGRAM(s) SubCutaneous every 24 hours  glucagon  Injectable 1 milliGRAM(s) IntraMuscular once  influenza  Vaccine (HIGH DOSE) 0.7 milliLiter(s) IntraMuscular once  insulin lispro (ADMELOG) corrective regimen sliding scale   SubCutaneous three times a day before meals  insulin lispro (ADMELOG) corrective regimen sliding scale   SubCutaneous at bedtime  labetalol 200 milliGRAM(s) Oral every 12 hours  lidocaine   4% Patch 1 Patch Transdermal every 24 hours  melatonin 5 milliGRAM(s) Oral at bedtime  NIFEdipine XL 60 milliGRAM(s) Oral every 24 hours  senna 2 Tablet(s) Oral at bedtime  vancomycin    Solution 125 milliGRAM(s) Oral every 6 hours    MEDICATIONS  (PRN):  acetaminophen     Tablet .. 325 milliGRAM(s) Oral every 4 hours PRN Mild Pain (1 - 3)  dextrose Oral Gel 15 Gram(s) Oral once PRN Blood Glucose LESS THAN 70 milliGRAM(s)/deciliter  HYDROmorphone  Injectable 0.5 milliGRAM(s) IV Push every 4 hours PRN Severe Pain (7 - 10)  ondansetron Injectable 4 milliGRAM(s) IV Push every 8 hours PRN Nausea and/or Vomiting  oxyCODONE    IR 5 milliGRAM(s) Oral every 6 hours PRN Moderate Pain (4 - 6)  sodium chloride 0.9% lock flush 10 milliLiter(s) IV Push every 1 hour PRN Pre/post blood products, medications, blood draw, and to maintain line patency    Adm Anthropometrics:  Height for BMI (FEET)	5 Feet  Height for BMI (INCHES)	6 Inch(s)  Height for BMI (CENTIMETERS)	167.64 Centimeter(s)  Weight for BMI (lbs)	203 lb  Weight for BMI (kg)	92.1 kg  Body Mass Index	32.7    Weight Change:   No new wts since adm. Please continue to obtain new wts to assess for change/trend wts    Estimated energy needs:   IBW used for calculations as pt >120% of IBW (156%), adjusted for age, post-op  3146-9888 kcals (30-35 kcals/kg, IBW)  70.68-82.46 g protein (1.2-1.4 g/kg, IBW)  7905-4216 mls (30-35 mls/kg, IBW)    Subjective:  73 yo female with PMH of HTN, DM and PSH of  x2, hysterectomy and lap dev ( w/ Dr. Clifford) presenting with 2 days of nausea/vomiting and abd pain w/ imaging findings consistent with large bowel obstruction. However, prior to operative exploration, the patient had return of bowel function. NGT removed however bowel obstruction recurred therefore pt taken to OR for Exlap JOSEFINA, SBR ( 100 cm)  EBL:200 IVF:2000 UO:250. Pt kept intubated post op and transferred to SICU team. Now extubated /15. stable for transfer to tele on POD#1. return to SICU POD#2 due to hypertension (BP  210/104), gotten labetalol 10, then 20, then 40 while in telemetry, asymptomatic, repeat BP down to 165/67. She was transfer back to SICU for close BP monitoring. pt baseline on labetalol 200 PO bid, losartan 100 qd, hctz 25 qd and nifedipine 60 qd at home. Her SBP remained below 160's all night, did not need any additional antihypertensives. transfer back to SDU next day (). Hospital course complicated by up-trending leukocytosis of unknown etiology, WBC trending down now . 10/4, wound w/ rare yeast, C DIFF positive. Hospital course further complicated by rhabdomyolysis, likely daptomycin induced (CK elevated on 10/7/2022). Pending YOSEPH placement now.     Pt seen resting in bed. Noted in emotional depressed state, 2/2 was told her kidneys are damaged. Reviewed labs: cr 1.51, GFR 37, POCT 107, 100, 111 mg/dl. Renal indices improving. Has meal tray on bedside table, limited PO, <25% consumed, ate mostly fruits, 1-2 bites eggs. Limited PO 2/2 pt personal preference for food. States dislike of flavor and taste here. Discussed with pt about options to help season food, attempted to obtain food preferences, reviewed food options available. Pt did not provide preference despite RD encouragement. Will honor pt preferences as able. Continue to encourage PO as able with trial of variety of foods on menu. Pt still not amenable to ONS, dislike of taste. RD to follow.     Previous Nutrition Diagnosis:  Nutrition/food knowledge deficit RT need to review diet edu/modification at this time AEB s/p Exlap JOSEFINA, SBR   Active [   ]  Resolved [  x ]    If resolved, new PES:  Inadequate energy intake RT food dislike 2/2 pts personal preference for taste AEB observed <50% PO intake    Goal: Consistently meet >75% est needs during hospital stay     Recommendations:  1. Renal, rec change to CST CHO diet  >>[place] pt food preference as able  2. BM and pain regimen per team  3. Monitor GI tolerance, s/s distress  4. Monitor BMP, BG, POCT, lytes, replete prn  5. diet edu as able    Education: Encouraged PO as able    Risk Level: High [ x  ] Moderate [   ] Low [   ]

## 2022-10-17 NOTE — OCCUPATIONAL THERAPY INITIAL EVALUATION ADULT - PERTINENT HX OF CURRENT PROBLEM, REHAB EVAL
72 year old woman with HTN, DM2, past surgical history of  x 2, hysterectomy, and laparoscopic cholecystectomy (), admitted for large bowel obstruction; had spontaneous return of bowel function initially; however, bowel obstruction recurred, so taken to OR for ex lap with lysis of adhesions and bowel resection () . Monitored in SICU post -op ; now on regional. Hospital course complicated by rhabdomyolysis, likely daptomycin induced (CK elevated on 10/7/2022).

## 2022-10-18 LAB
ANION GAP SERPL CALC-SCNC: 11 MMOL/L — SIGNIFICANT CHANGE UP (ref 5–17)
BUN SERPL-MCNC: 11 MG/DL — SIGNIFICANT CHANGE UP (ref 7–23)
CALCIUM SERPL-MCNC: 8.8 MG/DL — SIGNIFICANT CHANGE UP (ref 8.4–10.5)
CHLORIDE SERPL-SCNC: 102 MMOL/L — SIGNIFICANT CHANGE UP (ref 96–108)
CO2 SERPL-SCNC: 27 MMOL/L — SIGNIFICANT CHANGE UP (ref 22–31)
CREAT SERPL-MCNC: 1.48 MG/DL — HIGH (ref 0.5–1.3)
EGFR: 38 ML/MIN/1.73M2 — LOW
GLUCOSE BLDC GLUCOMTR-MCNC: 104 MG/DL — HIGH (ref 70–99)
GLUCOSE BLDC GLUCOMTR-MCNC: 90 MG/DL — SIGNIFICANT CHANGE UP (ref 70–99)
GLUCOSE BLDC GLUCOMTR-MCNC: 91 MG/DL — SIGNIFICANT CHANGE UP (ref 70–99)
GLUCOSE BLDC GLUCOMTR-MCNC: 97 MG/DL — SIGNIFICANT CHANGE UP (ref 70–99)
GLUCOSE SERPL-MCNC: 97 MG/DL — SIGNIFICANT CHANGE UP (ref 70–99)
HCT VFR BLD CALC: 26.8 % — LOW (ref 34.5–45)
HGB BLD-MCNC: 8.3 G/DL — LOW (ref 11.5–15.5)
MAGNESIUM SERPL-MCNC: 2.1 MG/DL — SIGNIFICANT CHANGE UP (ref 1.6–2.6)
MCHC RBC-ENTMCNC: 27.2 PG — SIGNIFICANT CHANGE UP (ref 27–34)
MCHC RBC-ENTMCNC: 31 GM/DL — LOW (ref 32–36)
MCV RBC AUTO: 87.9 FL — SIGNIFICANT CHANGE UP (ref 80–100)
NRBC # BLD: 0 /100 WBCS — SIGNIFICANT CHANGE UP (ref 0–0)
PHOSPHATE SERPL-MCNC: 5.1 MG/DL — HIGH (ref 2.5–4.5)
PLATELET # BLD AUTO: 297 K/UL — SIGNIFICANT CHANGE UP (ref 150–400)
POTASSIUM SERPL-MCNC: 4.2 MMOL/L — SIGNIFICANT CHANGE UP (ref 3.5–5.3)
POTASSIUM SERPL-SCNC: 4.2 MMOL/L — SIGNIFICANT CHANGE UP (ref 3.5–5.3)
RBC # BLD: 3.05 M/UL — LOW (ref 3.8–5.2)
RBC # FLD: 16.2 % — HIGH (ref 10.3–14.5)
SODIUM SERPL-SCNC: 140 MMOL/L — SIGNIFICANT CHANGE UP (ref 135–145)
WBC # BLD: 9.09 K/UL — SIGNIFICANT CHANGE UP (ref 3.8–10.5)
WBC # FLD AUTO: 9.09 K/UL — SIGNIFICANT CHANGE UP (ref 3.8–10.5)

## 2022-10-18 PROCEDURE — 99232 SBSQ HOSP IP/OBS MODERATE 35: CPT

## 2022-10-18 RX ADMIN — Medication 200 MILLIGRAM(S): at 05:31

## 2022-10-18 RX ADMIN — Medication 125 MILLIGRAM(S): at 05:31

## 2022-10-18 RX ADMIN — LIDOCAINE 1 PATCH: 4 CREAM TOPICAL at 13:19

## 2022-10-18 RX ADMIN — ENOXAPARIN SODIUM 40 MILLIGRAM(S): 100 INJECTION SUBCUTANEOUS at 13:17

## 2022-10-18 RX ADMIN — Medication 125 MILLIGRAM(S): at 18:29

## 2022-10-18 RX ADMIN — Medication 60 MILLIGRAM(S): at 13:18

## 2022-10-18 RX ADMIN — CHLORHEXIDINE GLUCONATE 1 APPLICATION(S): 213 SOLUTION TOPICAL at 05:31

## 2022-10-18 RX ADMIN — LIDOCAINE 1 PATCH: 4 CREAM TOPICAL at 01:15

## 2022-10-18 RX ADMIN — Medication 125 MILLIGRAM(S): at 13:18

## 2022-10-18 RX ADMIN — Medication 5 MILLIGRAM(S): at 00:15

## 2022-10-18 RX ADMIN — Medication 125 MILLIGRAM(S): at 00:16

## 2022-10-18 RX ADMIN — Medication 200 MILLIGRAM(S): at 18:29

## 2022-10-18 RX ADMIN — SENNA PLUS 2 TABLET(S): 8.6 TABLET ORAL at 00:15

## 2022-10-18 NOTE — PROGRESS NOTE ADULT - ASSESSMENT
72 year old woman with HTN, DM2, past surgical history of  x 2, hysterectomy, and laparoscopic cholecystectomy (), admitted for large bowel obstruction; had spontaneous return of bowel function initially; however, bowel obstruction recurred, so taken to OR for ex lap with lysis of adhesions and bowel resection () . Monitored in SICU post -op ; now on regional. Hospital course complicated by rhabdomyolysis, likely daptomycin induced (CK elevated on 10/7/2022)    # Large bowel obstruction   # post operative state  s/p OR for ex lap with lysis of adhesions and bowel resection ()  - rest of care per primary team     #Upper extremity weakness  -in setting of rhabdo. Improving. Continue monitoring.     # non-traumatic Rhabdomyolysis  CK elevated to 30K on 10/7/2022, now downtrending. Likely daptomycin associated. Stopped daptomycin and statin.   - f/u with renal recs  -  CK dropping.     #SO/ATN  -Improving. in setting of rhabdo  -Diuresis a/p nephrology    #Elevated LFTs  -In setting of rhabdo. Continue monitoring. Continues to improve    #C diff; non severe  -On PO vanc 10/4- 10/18      #E. faecalis bacteremia  -Afebrile at this time. was On dapto 10/4-10/7; Switched to vancomycin 10/7-10/10  -Repeat Cx's negative to date  -Management a/p ID. Believe this was contaminant and is holding abx.     #Obesity - Dose medications by weight. Lifestyle modifications discussed, especially once she's resolved    #HTN   takes HCTZ 25mg qd, losartan 100mg qd, labetalol 200mg BID, nifedipine 60mg ER qd at home.   - continue labetalol 200mg BID  - would give nifedipine 90 mg ER   - Continue to hold HCTZ for now and on discharge     # Type 2 Diabetes complicated by hyperglycemia   cw insulin sliding scale while inpatient.   pt's a1c at goal for her age, resume metformin on discharge.     #Incidental finding of bilateral lung nodules  CT chest showing bilateral lung nodules, measuring up to 7 mm in the ridght upper lobe; Counseled patient on finding.   Recommended follow-up chest CT in three months to ensure stability.; Copy of radiology report provided to patient for her records.     # COVID 19 infection (present on admission) - Improved/resolved. Off isolation.     # Acute blood loss anemia   Would transfuse <7.0  age appropriate cancer screening outpatient     DVT ppx - HSQ

## 2022-10-18 NOTE — PROGRESS NOTE ADULT - SUBJECTIVE AND OBJECTIVE BOX
Subjective: patient seen bedside. No acute complaints no novernight events. no n/v/d        PHYSICAL EXAM:  Constitutional: NAD, age appropriate female laying in bed  HEENT: NC/AT, EOMI  Neck: Supple, no JVD  Respiratory: CTA B/L   Cardiovascular: RRR, normal S1 and S2   Gastrointestinal: +BS, soft minimal tenderness, abdominal binder in place, wound vac in place  Extremities: no LE edema  Neurological: AAOx3  Psych: calm cooperative

## 2022-10-18 NOTE — PROGRESS NOTE ADULT - SUBJECTIVE AND OBJECTIVE BOX
INTERVAL HPI/OVERNIGHT EVENTS:   SURGERY ATTENDING    STATUS POST:      Exploratory laparotomy with lysis of adhesions extensive one hour , Small bowel resection with primary anastomosis, Repair of recurrent Incisional hernia with myofascial flaps no mesh, washout, drainage, PREVENA      POST OPERATIVE DAY #: 33      SUBJECTIVE:  Flatus: [x ] YES [ ] NO             Bowel Movement: [x ] YES [ ] NO  Pain (0-10):     1       Pain Control Adequate: [x ] YES [ ] NO  Nausea: [ ] YES [x ] NO            Vomiting: [ ] YES [x ] NO  Diarrhea: [ ] YES [x ] NO         Constipation: [ ] YES [x ] NO     Chest Pain: [ ] YES [x ] NO    SOB:  [ ] YES [x ] NO      MEDICATIONS  (STANDING):  chlorhexidine 2% Cloths 1 Application(s) Topical <User Schedule>  dextrose 5%. 1000 milliLiter(s) (50 mL/Hr) IV Continuous <Continuous>  dextrose 5%. 1000 milliLiter(s) (100 mL/Hr) IV Continuous <Continuous>  dextrose 50% Injectable 25 Gram(s) IV Push once  dextrose 50% Injectable 12.5 Gram(s) IV Push once  dextrose 50% Injectable 25 Gram(s) IV Push once  enoxaparin Injectable 40 milliGRAM(s) SubCutaneous every 24 hours  glucagon  Injectable 1 milliGRAM(s) IntraMuscular once  influenza  Vaccine (HIGH DOSE) 0.7 milliLiter(s) IntraMuscular once  insulin lispro (ADMELOG) corrective regimen sliding scale   SubCutaneous three times a day before meals  insulin lispro (ADMELOG) corrective regimen sliding scale   SubCutaneous at bedtime  labetalol 200 milliGRAM(s) Oral every 12 hours  lidocaine   4% Patch 1 Patch Transdermal every 24 hours  melatonin 5 milliGRAM(s) Oral at bedtime  NIFEdipine XL 60 milliGRAM(s) Oral every 24 hours  senna 2 Tablet(s) Oral at bedtime  vancomycin    Solution 125 milliGRAM(s) Oral every 6 hours    MEDICATIONS  (PRN):  acetaminophen     Tablet .. 325 milliGRAM(s) Oral every 4 hours PRN Mild Pain (1 - 3)  dextrose Oral Gel 15 Gram(s) Oral once PRN Blood Glucose LESS THAN 70 milliGRAM(s)/deciliter  ondansetron Injectable 4 milliGRAM(s) IV Push every 8 hours PRN Nausea and/or Vomiting  oxyCODONE    IR 5 milliGRAM(s) Oral every 6 hours PRN Moderate Pain (4 - 6)  sodium chloride 0.9% lock flush 10 milliLiter(s) IV Push every 1 hour PRN Pre/post blood products, medications, blood draw, and to maintain line patency      Vital Signs Last 24 Hrs  T(C): 36.9 (18 Oct 2022 08:53), Max: 37.2 (17 Oct 2022 16:47)  T(F): 98.5 (18 Oct 2022 08:53), Max: 99 (17 Oct 2022 16:47)  HR: 73 (18 Oct 2022 08:53) (73 - 97)  BP: 147/78 (18 Oct 2022 08:53) (141/63 - 154/74)  BP(mean): --  RR: 17 (18 Oct 2022 08:53) (17 - 18)  SpO2: 100% (18 Oct 2022 08:53) (95% - 100%)    Parameters below as of 18 Oct 2022 08:53  Patient On (Oxygen Delivery Method): room air        PHYSICAL EXAM:      Constitutional:    Eyes:    ENMT:    Neck:    Breasts:    Back:    Respiratory:    Cardiovascular:    Gastrointestinal:    Genitourinary:    Rectal:    Extremities:    Vascular:    Neurological:    Skin:    Lymph Nodes:    Musculoskeletal:    Psychiatric:        I&O's Detail    17 Oct 2022 07:01  -  18 Oct 2022 07:00  --------------------------------------------------------  IN:    IV PiggyBack: 100 mL    Oral Fluid: 1360 mL  Total IN: 1460 mL    OUT:    VAC (Vacuum Assisted Closure) System (mL): 0 mL    Voided (mL): 1125 mL  Total OUT: 1125 mL    Total NET: 335 mL      18 Oct 2022 07:01  -  18 Oct 2022 13:34  --------------------------------------------------------  IN:    Oral Fluid: 260 mL  Total IN: 260 mL    OUT:    Voided (mL): 400 mL  Total OUT: 400 mL    Total NET: -140 mL          LABS:                        8.3    9.09  )-----------( 297      ( 18 Oct 2022 05:30 )             26.8     10-18    140  |  102  |  11  ----------------------------<  97  4.2   |  27  |  1.48<H>    Ca    8.8      18 Oct 2022 05:30  Phos  5.1     10-18  Mg     2.1     10-18            RADIOLOGY & ADDITIONAL STUDIES: Statement Selected

## 2022-10-18 NOTE — PROGRESS NOTE ADULT - SUBJECTIVE AND OBJECTIVE BOX
SUBJECTIVE: Patient seen and examined at bedside by chief resident and team. Patient sleeping comfortably. She denies N/V.    enoxaparin Injectable 40 milliGRAM(s) SubCutaneous every 24 hours  labetalol 200 milliGRAM(s) Oral every 12 hours  NIFEdipine XL 60 milliGRAM(s) Oral every 24 hours  vancomycin    Solution 125 milliGRAM(s) Oral every 6 hours    MEDICATIONS  (PRN):  acetaminophen     Tablet .. 325 milliGRAM(s) Oral every 4 hours PRN Mild Pain (1 - 3)  dextrose Oral Gel 15 Gram(s) Oral once PRN Blood Glucose LESS THAN 70 milliGRAM(s)/deciliter  ondansetron Injectable 4 milliGRAM(s) IV Push every 8 hours PRN Nausea and/or Vomiting  oxyCODONE    IR 5 milliGRAM(s) Oral every 6 hours PRN Moderate Pain (4 - 6)  sodium chloride 0.9% lock flush 10 milliLiter(s) IV Push every 1 hour PRN Pre/post blood products, medications, blood draw, and to maintain line patency      I&O's Detail    17 Oct 2022 07:01  -  18 Oct 2022 07:00  --------------------------------------------------------  IN:    IV PiggyBack: 100 mL    Oral Fluid: 1360 mL  Total IN: 1460 mL    OUT:    VAC (Vacuum Assisted Closure) System (mL): 0 mL    Voided (mL): 1125 mL  Total OUT: 1125 mL    Total NET: 335 mL          T(C): 37 (10-18-22 @ 05:29), Max: 37.3 (10-17-22 @ 09:16)  HR: 80 (10-18-22 @ 05:29) (71 - 97)  BP: 141/63 (10-18-22 @ 05:29) (135/71 - 154/74)  RR: 18 (10-18-22 @ 05:29) (17 - 18)  SpO2: 97% (10-18-22 @ 05:29) (95% - 99%)    GENERAL: NAD, Resting comfortably in bed, awake, opens eyes spontaneously  HEENT: NCAT, MMM, Normal conjunctiva, PERRL  RESP: Nonlabored breathing, No respiratory distress  CARD: Normal rate, Normal peripheral perfusion  GI: Soft, ND, NT, No guarding, No rebound tenderness, wound vac in place  EXTREM: WWP, No edema, No gross deformity of extremities  SKIN: No rashes, no lesions  NEURO: AAOx3, No focal motor or sensory deficits  PSYCH: Affect and characteristics of appearance, verbalizations, and behaviors are appropriate    LABS:                        8.3    9.09  )-----------( 297      ( 18 Oct 2022 05:30 )             26.8     10-17    141  |  102  |  12  ----------------------------<  106<H>  4.0   |  30  |  1.48<H>    Ca    8.5      17 Oct 2022 16:53  Phos  4.7     10-17  Mg     1.7     10-17            RADIOLOGY & ADDITIONAL STUDIES:

## 2022-10-18 NOTE — PROGRESS NOTE ADULT - ASSESSMENT
72 F PMH HTN, DM and PSH  x 2, hysterectomy and lap dev ( w/ Dr. Clifford) admitted on  with 2 days of n/v, abd pain, imaging demonstrating LBO, which resolved prior to operative exploration. Recurrent bowel obstruction, now s/p Exlap JOSEFINA, SBR (100 cm) (9/15). now with wound vac, pending YOSEPH placement c/b c.diff and rhabdo 2/2 daptomycin.    Renal diet, ISS  Pain/nausea prn  Vanco PO (10/4-10/18), s/p Daptomycin (10/4-10/7)  Wound Vac  SQL/SCDs/OOBA/IS  AM labs  PICC  Dispo: YOSEPH   72 F PMH HTN, DM and PSH  x 2, hysterectomy and lap dev ( w/ Dr. Clifford) admitted on  with 2 days of n/v, abd pain, imaging demonstrating LBO, which resolved prior to operative exploration. Recurrent bowel obstruction, now s/p Exlap JOSEFINA, SBR (100 cm) (9/15). now with wound vac, pending YOSEPH placement c/b c.diff and rhabdo 2/2 daptomycin.    Renal diet, ISS  Pain/nausea prn  Vanco PO (10/4-10/18), s/p Daptomycin (10/4-10/7)  Wound Vac  SQL/SCDs/OOBA/IS  AM labs  PICC  Dispo: YOSEPH - wound vac (5cm x 4cm, pressure 125 mmhg), no velarde, no iv antibiotics

## 2022-10-19 LAB
GLUCOSE BLDC GLUCOMTR-MCNC: 104 MG/DL — HIGH (ref 70–99)
GLUCOSE BLDC GLUCOMTR-MCNC: 94 MG/DL — SIGNIFICANT CHANGE UP (ref 70–99)
GLUCOSE BLDC GLUCOMTR-MCNC: 94 MG/DL — SIGNIFICANT CHANGE UP (ref 70–99)
GLUCOSE BLDC GLUCOMTR-MCNC: 99 MG/DL — SIGNIFICANT CHANGE UP (ref 70–99)

## 2022-10-19 PROCEDURE — 99232 SBSQ HOSP IP/OBS MODERATE 35: CPT

## 2022-10-19 RX ADMIN — Medication 60 MILLIGRAM(S): at 13:19

## 2022-10-19 RX ADMIN — LIDOCAINE 1 PATCH: 4 CREAM TOPICAL at 01:34

## 2022-10-19 RX ADMIN — Medication 200 MILLIGRAM(S): at 18:38

## 2022-10-19 RX ADMIN — Medication 5 MILLIGRAM(S): at 00:43

## 2022-10-19 RX ADMIN — CHLORHEXIDINE GLUCONATE 1 APPLICATION(S): 213 SOLUTION TOPICAL at 05:33

## 2022-10-19 RX ADMIN — Medication 200 MILLIGRAM(S): at 05:33

## 2022-10-19 RX ADMIN — LIDOCAINE 1 PATCH: 4 CREAM TOPICAL at 13:20

## 2022-10-19 RX ADMIN — ENOXAPARIN SODIUM 40 MILLIGRAM(S): 100 INJECTION SUBCUTANEOUS at 13:19

## 2022-10-19 RX ADMIN — SENNA PLUS 2 TABLET(S): 8.6 TABLET ORAL at 00:42

## 2022-10-19 RX ADMIN — LIDOCAINE 1 PATCH: 4 CREAM TOPICAL at 19:32

## 2022-10-19 NOTE — PROGRESS NOTE ADULT - SUBJECTIVE AND OBJECTIVE BOX
Subjective: patient seen bedside. No acute complaints no novernight events. no n/v/d. Complaining of food being unpalatable         PHYSICAL EXAM:  Constitutional: NAD, age appropriate female laying in bed  HEENT: NC/AT, EOMI  Neck: Supple, no JVD  Respiratory: CTA B/L   Cardiovascular: RRR, normal S1 and S2   Gastrointestinal: +BS, soft minimal tenderness, abdominal binder in place, wound vac in place  Extremities: no LE edema  Neurological: AAOx3  Psych: calm cooperative

## 2022-10-19 NOTE — PROGRESS NOTE ADULT - ASSESSMENT
72 F PMH HTN, DM and PSH  x 2, hysterectomy and lap dev ( w/ Dr. Clifford) admitted on  with 2 days of n/v, abd pain, imaging demonstrating LBO, which resolved prior to operative exploration. Recurrent bowel obstruction, now s/p Exlap JOSEFINA, SBR (100 cm) (9/15). now with wound vac, pending YOSEPH placement c/b c.diff and rhabdo 2/2 daptomycin    Regular, ISS  Pain/nausea prn  Vanco PO (10/4-), s/p Daptomycin (10/4-10/7)  Wound Vac  SQL/SCDs/OOBA/IS  AM labs  PICC  Dispo: YOSEPH

## 2022-10-19 NOTE — PROGRESS NOTE ADULT - SUBJECTIVE AND OBJECTIVE BOX
INTERVAL HPI/OVERNIGHT EVENTS:   SURGERY ATTENDING    STATUS POST:  Exploratory laparotomy with lysis of adhesions extensive one hour , Small bowel resection with primary anastomosis, Repair of recurrent Incisional hernia with myofascial flaps no mesh, washout, drainage, PREVENA        POST OPERATIVE DAY #: 34    SUBJECTIVE:  Flatus: [x ] YES [ ] NO             Bowel Movement: [x ] YES [ ] NO  Pain (0-10):     1       Pain Control Adequate: [x ] YES [ ] NO  Nausea: [ ] YES [x ] NO            Vomiting: [ ] YES [x ] NO  Diarrhea: [ ] YES [x ] NO         Constipation: [ ] YES [x ] NO     Chest Pain: [ ] YES [x ] NO    SOB:  [ ] YES [x ] NO    MEDICATIONS  (STANDING):  chlorhexidine 2% Cloths 1 Application(s) Topical <User Schedule>  dextrose 5%. 1000 milliLiter(s) (50 mL/Hr) IV Continuous <Continuous>  dextrose 5%. 1000 milliLiter(s) (100 mL/Hr) IV Continuous <Continuous>  dextrose 50% Injectable 25 Gram(s) IV Push once  dextrose 50% Injectable 12.5 Gram(s) IV Push once  dextrose 50% Injectable 25 Gram(s) IV Push once  enoxaparin Injectable 40 milliGRAM(s) SubCutaneous every 24 hours  glucagon  Injectable 1 milliGRAM(s) IntraMuscular once  influenza  Vaccine (HIGH DOSE) 0.7 milliLiter(s) IntraMuscular once  insulin lispro (ADMELOG) corrective regimen sliding scale   SubCutaneous three times a day before meals  insulin lispro (ADMELOG) corrective regimen sliding scale   SubCutaneous at bedtime  labetalol 200 milliGRAM(s) Oral every 12 hours  lidocaine   4% Patch 1 Patch Transdermal every 24 hours  melatonin 5 milliGRAM(s) Oral at bedtime  NIFEdipine XL 60 milliGRAM(s) Oral every 24 hours  senna 2 Tablet(s) Oral at bedtime    MEDICATIONS  (PRN):  acetaminophen     Tablet .. 325 milliGRAM(s) Oral every 4 hours PRN Mild Pain (1 - 3)  dextrose Oral Gel 15 Gram(s) Oral once PRN Blood Glucose LESS THAN 70 milliGRAM(s)/deciliter  ondansetron Injectable 4 milliGRAM(s) IV Push every 8 hours PRN Nausea and/or Vomiting  oxyCODONE    IR 5 milliGRAM(s) Oral every 6 hours PRN Moderate Pain (4 - 6)  sodium chloride 0.9% lock flush 10 milliLiter(s) IV Push every 1 hour PRN Pre/post blood products, medications, blood draw, and to maintain line patency      Vital Signs Last 24 Hrs  T(C): 37 (19 Oct 2022 09:53), Max: 37.1 (18 Oct 2022 16:33)  T(F): 98.6 (19 Oct 2022 09:53), Max: 98.7 (18 Oct 2022 16:33)  HR: 80 (19 Oct 2022 09:53) (78 - 82)  BP: 127/- (19 Oct 2022 09:53) (127/- - 148/75)  BP(mean): --  RR: 17 (19 Oct 2022 09:53) (17 - 19)  SpO2: 96% (19 Oct 2022 09:53) (95% - 98%)    Parameters below as of 19 Oct 2022 09:53  Patient On (Oxygen Delivery Method): room air        PHYSICAL EXAM:      Constitutional:    Eyes:    ENMT:    Neck:    Breasts:    Back:    Respiratory:    Cardiovascular:    Gastrointestinal:    Genitourinary:    Rectal:    Extremities:    Vascular:    Neurological:    Skin:    Lymph Nodes:    Musculoskeletal:    Psychiatric:        I&O's Detail    18 Oct 2022 07:01  -  19 Oct 2022 07:00  --------------------------------------------------------  IN:    Oral Fluid: 920 mL  Total IN: 920 mL    OUT:    VAC (Vacuum Assisted Closure) System (mL): 30 mL    Voided (mL): 700 mL  Total OUT: 730 mL    Total NET: 190 mL      19 Oct 2022 07:01  -  19 Oct 2022 12:51  --------------------------------------------------------  IN:  Total IN: 0 mL    OUT:    Voided (mL): 400 mL  Total OUT: 400 mL    Total NET: -400 mL          LABS:                        8.3    9.09  )-----------( 297      ( 18 Oct 2022 05:30 )             26.8     10-18    140  |  102  |  11  ----------------------------<  97  4.2   |  27  |  1.48<H>    Ca    8.8      18 Oct 2022 05:30  Phos  5.1     10-18  Mg     2.1     10-18            RADIOLOGY & ADDITIONAL STUDIES:

## 2022-10-19 NOTE — PROGRESS NOTE ADULT - SUBJECTIVE AND OBJECTIVE BOX
SUBJECTIVE: Pt seen and examined by chief resident. Pt is doing well, resting comfortably on bed. Pain controlled. Diet tolerated. No diarrhea. No nausea or vomiting. No complaints at this time.    Vital Signs Last 24 Hrs  T(C): 36.8 (19 Oct 2022 04:48), Max: 37.1 (18 Oct 2022 16:33)  T(F): 98.3 (19 Oct 2022 04:48), Max: 98.7 (18 Oct 2022 16:33)  HR: 82 (19 Oct 2022 04:48) (73 - 82)  BP: 148/72 (19 Oct 2022 04:48) (143/79 - 148/75)  BP(mean): --  RR: 18 (19 Oct 2022 04:48) (17 - 19)  SpO2: 95% (19 Oct 2022 04:48) (95% - 100%)    Parameters below as of 19 Oct 2022 04:48  Patient On (Oxygen Delivery Method): room air        I&O's Summary    18 Oct 2022 07:01  -  19 Oct 2022 07:00  --------------------------------------------------------  IN: 920 mL / OUT: 730 mL / NET: 190 mL        Physical Exam:  General Appearance: Appears well, NAD  Pulmonary: Nonlabored breathing, no respiratory distress  Abdomen: Soft, nondisteded, nontender, wound vac holding suction  Extremities: WWP, SCD's in place     LABS:                        8.3    9.09  )-----------( 297      ( 18 Oct 2022 05:30 )             26.8     10-18    140  |  102  |  11  ----------------------------<  97  4.2   |  27  |  1.48<H>    Ca    8.8      18 Oct 2022 05:30  Phos  5.1     10-18  Mg     2.1     10-18

## 2022-10-20 ENCOUNTER — TRANSCRIPTION ENCOUNTER (OUTPATIENT)
Age: 72
End: 2022-10-20

## 2022-10-20 LAB
GLUCOSE BLDC GLUCOMTR-MCNC: 101 MG/DL — HIGH (ref 70–99)
GLUCOSE BLDC GLUCOMTR-MCNC: 111 MG/DL — HIGH (ref 70–99)
GLUCOSE BLDC GLUCOMTR-MCNC: 97 MG/DL — SIGNIFICANT CHANGE UP (ref 70–99)
GLUCOSE BLDC GLUCOMTR-MCNC: 98 MG/DL — SIGNIFICANT CHANGE UP (ref 70–99)
SARS-COV-2 RNA SPEC QL NAA+PROBE: SIGNIFICANT CHANGE UP

## 2022-10-20 PROCEDURE — 99232 SBSQ HOSP IP/OBS MODERATE 35: CPT

## 2022-10-20 RX ORDER — DAPTOMYCIN 500 MG/10ML
750 INJECTION, POWDER, LYOPHILIZED, FOR SOLUTION INTRAVENOUS
Qty: 0 | Refills: 0 | DISCHARGE

## 2022-10-20 RX ORDER — VANCOMYCIN HCL 1 G
5 VIAL (EA) INTRAVENOUS
Qty: 0 | Refills: 0 | DISCHARGE

## 2022-10-20 RX ORDER — ACETAMINOPHEN 500 MG
1 TABLET ORAL
Qty: 0 | Refills: 0 | DISCHARGE
Start: 2022-10-20

## 2022-10-20 RX ADMIN — LIDOCAINE 1 PATCH: 4 CREAM TOPICAL at 18:28

## 2022-10-20 RX ADMIN — ENOXAPARIN SODIUM 40 MILLIGRAM(S): 100 INJECTION SUBCUTANEOUS at 13:58

## 2022-10-20 RX ADMIN — Medication 200 MILLIGRAM(S): at 07:18

## 2022-10-20 RX ADMIN — Medication 5 MILLIGRAM(S): at 21:35

## 2022-10-20 RX ADMIN — LIDOCAINE 1 PATCH: 4 CREAM TOPICAL at 01:52

## 2022-10-20 RX ADMIN — Medication 60 MILLIGRAM(S): at 13:58

## 2022-10-20 RX ADMIN — Medication 5 MILLIGRAM(S): at 00:09

## 2022-10-20 RX ADMIN — SENNA PLUS 2 TABLET(S): 8.6 TABLET ORAL at 21:35

## 2022-10-20 RX ADMIN — LIDOCAINE 1 PATCH: 4 CREAM TOPICAL at 13:58

## 2022-10-20 RX ADMIN — Medication 200 MILLIGRAM(S): at 18:29

## 2022-10-20 RX ADMIN — SENNA PLUS 2 TABLET(S): 8.6 TABLET ORAL at 00:09

## 2022-10-20 RX ADMIN — CHLORHEXIDINE GLUCONATE 1 APPLICATION(S): 213 SOLUTION TOPICAL at 06:37

## 2022-10-20 NOTE — PROGRESS NOTE ADULT - ASSESSMENT
72 F PMH HTN, DM and PSH  x 2, hysterectomy and lap dev ( w/ Dr. Clifford) admitted on  with 2 days of n/v, abd pain, imaging demonstrating LBO, which resolved prior to operative exploration. Recurrent bowel obstruction, now s/p Exlap JOSEFINA, SBR (100 cm) (9/15). now with wound vac, pending YOSEPH placement c/b c.diff and rhabdo 2/2 daptomycin    Regular, ISS  Pain/nausea prn  Vanco PO (10/4-10/18), s/p Daptomycin (10/4-10/7)  Wound Vac  SQL/SCDs/OOBA/IS  no AM labs  dc PICC before discharge  Dispo: YOSEPH

## 2022-10-20 NOTE — PROGRESS NOTE ADULT - SUBJECTIVE AND OBJECTIVE BOX
INTERVAL HPI/OVERNIGHT EVENTS:   SURGERY ATTENDING    STATUS POST:    Exploratory laparotomy with lysis of adhesions extensive one hour , Small bowel resection with primary anastomosis, Repair of recurrent Incisional hernia with myofascial flaps no mesh, washout, drainage, PREVENA        POST OPERATIVE DAY #: 35    SUBJECTIVE:  Flatus: [x ] YES [ ] NO             Bowel Movement: [x ] YES [ ] NO  Pain (0-10):       1     Pain Control Adequate: [x ] YES [ ] NO  Nausea: [ ] YES [x ] NO            Vomiting: [ ] YES [x ] NO  Diarrhea: [ ] YES [x ] NO         Constipation: [ ] YES [x ] NO     Chest Pain: [ ] YES [x ] NO    SOB:  [ ] YES [x ] NO    MEDICATIONS  (STANDING):  chlorhexidine 2% Cloths 1 Application(s) Topical <User Schedule>  dextrose 5%. 1000 milliLiter(s) (100 mL/Hr) IV Continuous <Continuous>  dextrose 5%. 1000 milliLiter(s) (50 mL/Hr) IV Continuous <Continuous>  dextrose 50% Injectable 25 Gram(s) IV Push once  dextrose 50% Injectable 12.5 Gram(s) IV Push once  dextrose 50% Injectable 25 Gram(s) IV Push once  enoxaparin Injectable 40 milliGRAM(s) SubCutaneous every 24 hours  glucagon  Injectable 1 milliGRAM(s) IntraMuscular once  influenza  Vaccine (HIGH DOSE) 0.7 milliLiter(s) IntraMuscular once  insulin lispro (ADMELOG) corrective regimen sliding scale   SubCutaneous three times a day before meals  insulin lispro (ADMELOG) corrective regimen sliding scale   SubCutaneous at bedtime  labetalol 200 milliGRAM(s) Oral every 12 hours  lidocaine   4% Patch 1 Patch Transdermal every 24 hours  melatonin 5 milliGRAM(s) Oral at bedtime  NIFEdipine XL 60 milliGRAM(s) Oral every 24 hours  senna 2 Tablet(s) Oral at bedtime    MEDICATIONS  (PRN):  acetaminophen     Tablet .. 325 milliGRAM(s) Oral every 4 hours PRN Mild Pain (1 - 3)  dextrose Oral Gel 15 Gram(s) Oral once PRN Blood Glucose LESS THAN 70 milliGRAM(s)/deciliter  ondansetron Injectable 4 milliGRAM(s) IV Push every 8 hours PRN Nausea and/or Vomiting  oxyCODONE    IR 5 milliGRAM(s) Oral every 6 hours PRN Moderate Pain (4 - 6)  sodium chloride 0.9% lock flush 10 milliLiter(s) IV Push every 1 hour PRN Pre/post blood products, medications, blood draw, and to maintain line patency      Vital Signs Last 24 Hrs  T(C): 37.3 (20 Oct 2022 13:58), Max: 37.4 (19 Oct 2022 21:14)  T(F): 99.1 (20 Oct 2022 13:58), Max: 99.4 (19 Oct 2022 21:14)  HR: 75 (20 Oct 2022 13:58) (75 - 81)  BP: 149/79 (20 Oct 2022 13:58) (133/58 - 149/79)  BP(mean): --  RR: 18 (20 Oct 2022 13:58) (16 - 18)  SpO2: 98% (20 Oct 2022 13:58) (94% - 100%)    Parameters below as of 20 Oct 2022 13:58  Patient On (Oxygen Delivery Method): room air        PHYSICAL EXAM:      Constitutional:    Eyes:    ENMT:    Neck:    Breasts:    Back:    Respiratory:    Cardiovascular:    Gastrointestinal:    Genitourinary:    Rectal:    Extremities:    Vascular:    Neurological:    Skin:    Lymph Nodes:    Musculoskeletal:    Psychiatric:        I&O's Detail    19 Oct 2022 07:01  -  20 Oct 2022 07:00  --------------------------------------------------------  IN:    Oral Fluid: 1020 mL  Total IN: 1020 mL    OUT:    VAC (Vacuum Assisted Closure) System (mL): 50 mL    Voided (mL): 1050 mL  Total OUT: 1100 mL    Total NET: -80 mL      20 Oct 2022 07:01  -  20 Oct 2022 16:14  --------------------------------------------------------  IN:    Oral Fluid: 560 mL  Total IN: 560 mL    OUT:    Voided (mL): 500 mL  Total OUT: 500 mL    Total NET: 60 mL          LABS:                RADIOLOGY & ADDITIONAL STUDIES:

## 2022-10-20 NOTE — PROGRESS NOTE ADULT - SUBJECTIVE AND OBJECTIVE BOX
Subjective: patient seen bedside. No acute complaints no novernight events. no n/v/d.         PHYSICAL EXAM:  Constitutional: NAD, age appropriate female laying in bed  HEENT: NC/AT, EOMI  Neck: Supple, no JVD  Respiratory: CTA B/L   Cardiovascular: RRR, normal S1 and S2   Gastrointestinal: +BS, soft minimal tenderness, abdominal binder in place, wound vac in place  Extremities: no LE edema  Neurological: AAOx3  Psych: calm cooperative

## 2022-10-20 NOTE — PROGRESS NOTE ADULT - SUBJECTIVE AND OBJECTIVE BOX
SUBJECTIVE: Patient seen and examined at bedside. Patient sleeping this morning. Reports that pain is well controlled.     enoxaparin Injectable 40 milliGRAM(s) SubCutaneous every 24 hours  labetalol 200 milliGRAM(s) Oral every 12 hours  NIFEdipine XL 60 milliGRAM(s) Oral every 24 hours    MEDICATIONS  (PRN):  acetaminophen     Tablet .. 325 milliGRAM(s) Oral every 4 hours PRN Mild Pain (1 - 3)  dextrose Oral Gel 15 Gram(s) Oral once PRN Blood Glucose LESS THAN 70 milliGRAM(s)/deciliter  ondansetron Injectable 4 milliGRAM(s) IV Push every 8 hours PRN Nausea and/or Vomiting  oxyCODONE    IR 5 milliGRAM(s) Oral every 6 hours PRN Moderate Pain (4 - 6)  sodium chloride 0.9% lock flush 10 milliLiter(s) IV Push every 1 hour PRN Pre/post blood products, medications, blood draw, and to maintain line patency      I&O's Detail    19 Oct 2022 07:01  -  20 Oct 2022 07:00  --------------------------------------------------------  IN:    Oral Fluid: 1020 mL  Total IN: 1020 mL    OUT:    VAC (Vacuum Assisted Closure) System (mL): 50 mL    Voided (mL): 1050 mL  Total OUT: 1100 mL    Total NET: -80 mL          T(C): 36.5 (10-20-22 @ 04:52), Max: 37.4 (10-19-22 @ 21:14)  HR: 81 (10-20-22 @ 04:52) (77 - 81)  BP: 148/82 (10-20-22 @ 04:52) (127/- - 149/75)  RR: 17 (10-20-22 @ 04:52) (16 - 18)  SpO2: 94% (10-20-22 @ 04:52) (94% - 100%)    GENERAL: NAD, Resting comfortably in bed, awake, opens eyes spontaneously  HEENT: NCAT, MMM, Normal conjunctiva, PERRL  RESP: Nonlabored breathing, No respiratory distress  CARD: Normal rate, Normal peripheral perfusion  GI: Soft, ND, NT, No guarding, No rebound tenderness. Wound Vac in place  EXTREM: WWP, No edema, No gross deformity of extremities  SKIN: No rashes, no lesions  NEURO: AAOx3, No focal motor or sensory deficits  PSYCH: Affect and characteristics of appearance, verbalizations, and behaviors are appropriate    LABS:                RADIOLOGY & ADDITIONAL STUDIES:

## 2022-10-20 NOTE — DISCHARGE NOTE NURSING/CASE MANAGEMENT/SOCIAL WORK - NSDCPEFALRISK_GEN_ALL_CORE
For information on Fall & Injury Prevention, visit: [email or web address] OR  [email or web address] OR  https://www.cdc.gov/steadi/patient.html

## 2022-10-20 NOTE — DISCHARGE NOTE NURSING/CASE MANAGEMENT/SOCIAL WORK - PATIENT PORTAL LINK FT
You can access the FollowMyHealth Patient Portal offered by [facility] by registering at the following website: [email or web address]. By joining [employer]’s FollowMyHealth portal, you will also be able to view your health information using other applications (apps) compatible with our system.

## 2022-10-20 NOTE — DISCHARGE NOTE NURSING/CASE MANAGEMENT/SOCIAL WORK - NSDCVIVACCINE_GEN_ALL_CORE_FT
influenza, high-dose, quadrivalent; 15-Sep-2020 15:47; Rosalia Martin (RN); [employer]; [id number] (Exp. Date: 30-Jun-2021); IntraMuscular; Deltoid Right.; 0.7 milliLiter(s); VIS (VIS Published: 15-Aug-2019, VIS Presented: 15-Sep-2020);

## 2022-10-20 NOTE — PROGRESS NOTE ADULT - ATTENDING COMMENTS
Abdomen soft, BS+, Flatus+, BM+ multiple, now C.Diff +,  tolerating diet, VAC in place, working well, waiting for REHAB.
Abdomen soft, BS+, Flatus+, BM+, tolerating diet,  wound is much smaller, clean, VAC in place, working well,  hydration, F/U LABS , VS, REHAB placement.
Abdomen soft, BS+, Flatus+, BM+, tolerating diet, VAC in place, working well,  hydration, F/U LABS , VS,
Abdomen soft, BS+, Flatus+, BM+, tolerating diet, VAC in place, working well,  hydration, F/U LABS , VS,
Abdomen soft, BS+, Flatus+, BM+, tolerating diet, VAC in place, working well,  hydration, F/U LABS , VS, REHAB placement.
Abdomen soft, BS+, Flatus+, BM+, tolerating diet, VAC in place, working well,  hydration, F/U LABS , VS, renal diet.
Abdomen soft, BS+, Flatus+, BM+, tolerating diet, VAC in place, working well, waiting for REHAB.
Abdomen soft, BS+, Flatus+, BM+, tolerating diet, VAC in place, working well, RENAL and ID consultation, hydration, F/U LABS , VS, CT ABD/ PLV improve since CT one week ago, renal diet.
Abdomen soft, BS+, Flatus+, BM+, tolerating diet, VAC in place, working well, waiting for REHAB
Abdomen soft, BS+, Flatus+, BM+, tolerating diet, VAC in place, working well,  hydration, F/U LABS , VS, REHAB placement.
Abdomen soft, BS+, Flatus+, BM+, tolerating diet, VAC in place, working well,  hydration, F/U LABS , VS, renal diet.
Continues with decrease in H&H.  WBC stably elevated.  Afebrile.  Episode of tachycardia overnight.  no desaturation, SOB.  Appears dehydrated.  Will get CTA.  Transfuse PRBCs and IVF  Hold heparin.  Will follow.
POD 1, Out of SICU, Abdomen distended, soft, IV ABX, DVT prophylaxis, Spirometry, F/U LABS, VS, PREVENA, ON Q pump.
Abdomen soft, BS+, Flatus+, BM+, tolerating diet, VAC in place, working well,  hydration, F/U LABS , VS, REHAB placement
Abdomen soft, BS+, Flatus+, BM+, tolerating diet, VAC in place, working well, waiting for REHAB
Exam deferred due to isolation.    I:   Creatinine 1.68.   A: Improved SO.   P: Follow SCr. Continue IVF until CPK < 5000.
POD 13, AF, VSS, Abdomen soft, BS+, Flatus+, BM+, tolerating diet, dressing change, IV ABX, DVT prophylaxis, F/U LABS, VS, OOB to ambulate .
POD 4, Abdomen soft, BS+, Flatus+, BM+, tolerating full liquid diet, PRENA in place, On Q in place, working well, DVT prophylaxis, Spirometry, F/U LABS, VS, OOB.
(Please note exam was deferred due to isolation.)     I: Creatinine up to 1.73.   A: Stable SO.  P: No indications for dialysis. Continue LR, aim 200 - 300 per hour.
Abdomen soft, BS+, Flatus+, BM+, tolerating diet, VAC in place working well, REHAB placement
Abdomen soft, BS+, Flatus+, BM-, tolerating diet, Blood cultures, stop ABX, IVF, F/U LABS, VS
Exam deferred due to isolation. I:   Cr 1.82.   A: Stable SO.  P: Follow SCr. Cont IVF.
Exam deferred given isolation. I:   Cr 1.83. CPK down.  A: Stable SO. Rhabdo due to dapto.   P: No indications for dialysis. Follow SCr. IVF.
H&H is ok after transfusion.  Hemodynamically ok.  Tolerating clears.  D/C Rios.  Transfer out of ICU.  Will follow.
I agree with the fellow's findings and plans as written above with the following additions/amendments:    Seen and examined at bedside. Long discussion regarding SO and need for followup outpatient given the severity of SO and to see if goes back to baseline. Otherwise no acute issues although anxious about outlook and need for home care when discharged.     Please call when patient is close to discharge for setup of outpatient appt - [phone]
OD 6, Abd soft, BS+, Flatus+, BM+, liquid , multiple, SHOSHANA serous, wound dry, clean , intact, tolerating soft diet.
POD 12, AF, VSS, Abdomen soft, BS+, Flatus+, BM+, tolerating diet, dressing change, IV ABX, DVT prophylaxis, F/U LABS, VS, OOB to ambulate
POD 7, Abdomen soft, BS+, Flatus+, BM+, tolerating diet, IV ABX, DVT prophylaxis.
PREOP for tomorrow
Abdomen soft, BS+, Flatus+, BM+, SHOSHANA in place, working well, PREVENA and On Q pump D/C, IV ABX, DVT prophylaxis, OOB to ambulate.
Cardiology workup, PREOP for incisional hernia repair with myofascial flaps and mesh

## 2022-10-20 NOTE — PROGRESS NOTE ADULT - ATTENDING SUPERVISION STATEMENT
Resident
Fellow
Resident
Fellow
Resident
Fellow

## 2022-10-21 VITALS
HEART RATE: 67 BPM | RESPIRATION RATE: 17 BRPM | TEMPERATURE: 98 F | SYSTOLIC BLOOD PRESSURE: 143 MMHG | OXYGEN SATURATION: 97 % | DIASTOLIC BLOOD PRESSURE: 73 MMHG

## 2022-10-21 LAB
GLUCOSE BLDC GLUCOMTR-MCNC: 104 MG/DL — HIGH (ref 70–99)
GLUCOSE BLDC GLUCOMTR-MCNC: 116 MG/DL — HIGH (ref 70–99)

## 2022-10-21 PROCEDURE — 99232 SBSQ HOSP IP/OBS MODERATE 35: CPT

## 2022-10-21 RX ORDER — POLYETHYLENE GLYCOL 3350 17 G/17G
17 POWDER, FOR SOLUTION ORAL ONCE
Refills: 0 | Status: COMPLETED | OUTPATIENT
Start: 2022-10-21 | End: 2022-10-21

## 2022-10-21 RX ADMIN — Medication 325 MILLIGRAM(S): at 04:15

## 2022-10-21 RX ADMIN — LIDOCAINE 1 PATCH: 4 CREAM TOPICAL at 02:14

## 2022-10-21 RX ADMIN — Medication 200 MILLIGRAM(S): at 06:31

## 2022-10-21 RX ADMIN — Medication 325 MILLIGRAM(S): at 03:18

## 2022-10-21 NOTE — PROGRESS NOTE ADULT - PROVIDER SPECIALTY LIST ADULT
Cardiology
Hospitalist
Infectious Disease
Internal Medicine
Nephrology
Surgery
Hospitalist
Infectious Disease
Internal Medicine
Nephrology
SICU
Surgery
Hospitalist
Infectious Disease
Internal Medicine
Nephrology
Nephrology
SICU
Surgery
Hospitalist
SICU
Surgery
Hospitalist
Nephrology
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
## 2022-10-21 NOTE — PROGRESS NOTE ADULT - ASSESSMENT
72 F PMH HTN, DM and PSH  x 2, hysterectomy and lap dev ( w/ Dr. Clifford) admitted on  with 2 days of n/v, abd pain, imaging demonstrating LBO, which resolved prior to operative exploration. Recurrent bowel obstruction, now s/p Exlap JOSEFINA, SBR (100 cm) (9/15). now with wound vac, pending YOSEPH placement c/b c.diff and rhabdo 2/2 daptomycin    Regular, ISS  Pain/nausea prn  Vanco PO (10/4-10/18), s/p Daptomycin (10/4-10/7)  Wound Vac  SQL/SCDs/OOBA/IS  PICC  Dispo: YOSEPH

## 2022-10-21 NOTE — PROGRESS NOTE ADULT - SUBJECTIVE AND OBJECTIVE BOX
SUBJECTIVE: Pt seen and examined by chief resident. Pt is doing well, resting comfortably on bed. Reporting some abdominal discomfort 2/2 constipation. Hesitant to take any medications to help with BMs. No nausea or vomiting. No complaints at this time.    Vital Signs Last 24 Hrs  T(C): 36.7 (21 Oct 2022 05:33), Max: 37.3 (20 Oct 2022 13:58)  T(F): 98 (21 Oct 2022 05:33), Max: 99.1 (20 Oct 2022 13:58)  HR: 78 (21 Oct 2022 05:33) (75 - 90)  BP: 131/86 (21 Oct 2022 05:33) (131/86 - 149/79)  BP(mean): 93 (20 Oct 2022 21:04) (93 - 93)  RR: 18 (21 Oct 2022 05:33) (18 - 18)  SpO2: 97% (21 Oct 2022 05:33) (97% - 98%)    Parameters below as of 21 Oct 2022 05:33  Patient On (Oxygen Delivery Method): room air        I&O's Summary    20 Oct 2022 07:01  -  21 Oct 2022 07:00  --------------------------------------------------------  IN: 960 mL / OUT: 501 mL / NET: 459 mL        Physical Exam:  General Appearance: Appears well, NAD  Pulmonary: Nonlabored breathing, no respiratory distress  Abdomen: Soft, nondisteded, nontender, wound vac holding suction  Extremities: WWP, SCD's in place     LABS:

## 2022-10-21 NOTE — PROGRESS NOTE ADULT - SUBJECTIVE AND OBJECTIVE BOX
Subjective: patient seen bedside. No acute complaints no novernight events. no n/v/d.         PHYSICAL EXAM:  Constitutional: NAD, age appropriate female laying in bed  HEENT: NC/AT, EOMI  Neck: Supple, no JVD  Respiratory: CTA B/L   Cardiovascular: RRR, normal S1 and S2   Gastrointestinal: +BS, soft minimal tenderness, abdominal binder in place, wound vac in place  Extremities: no LE edema  Neurological: AAOx3  Psych: calm cooperative      VS reviewed

## 2022-10-25 ENCOUNTER — EMERGENCY (EMERGENCY)
Facility: HOSPITAL | Age: 72
LOS: 1 days | Discharge: HOME | End: 2022-10-25
Attending: EMERGENCY MEDICINE | Admitting: EMERGENCY MEDICINE
Payer: MEDICARE

## 2022-10-25 VITALS
TEMPERATURE: 98 F | RESPIRATION RATE: 17 BRPM | SYSTOLIC BLOOD PRESSURE: 138 MMHG | OXYGEN SATURATION: 96 % | DIASTOLIC BLOOD PRESSURE: 62 MMHG | HEART RATE: 100 BPM

## 2022-10-25 VITALS
DIASTOLIC BLOOD PRESSURE: 66 MMHG | OXYGEN SATURATION: 99 % | WEIGHT: 190.92 LBS | HEART RATE: 89 BPM | RESPIRATION RATE: 18 BRPM | HEIGHT: 66 IN | SYSTOLIC BLOOD PRESSURE: 125 MMHG | TEMPERATURE: 98 F

## 2022-10-25 DIAGNOSIS — Z98.891 HISTORY OF UTERINE SCAR FROM PREVIOUS SURGERY: Chronic | ICD-10-CM

## 2022-10-25 DIAGNOSIS — Z90.49 ACQUIRED ABSENCE OF OTHER SPECIFIED PARTS OF DIGESTIVE TRACT: Chronic | ICD-10-CM

## 2022-10-25 DIAGNOSIS — Z90.710 ACQUIRED ABSENCE OF BOTH CERVIX AND UTERUS: Chronic | ICD-10-CM

## 2022-10-25 LAB
ALBUMIN SERPL ELPH-MCNC: 3.5 G/DL — SIGNIFICANT CHANGE UP (ref 3.3–5)
ALP SERPL-CCNC: 78 U/L — SIGNIFICANT CHANGE UP (ref 40–120)
ALT FLD-CCNC: 21 U/L — SIGNIFICANT CHANGE UP (ref 10–45)
ANION GAP SERPL CALC-SCNC: 12 MMOL/L — SIGNIFICANT CHANGE UP (ref 5–17)
ANION GAP SERPL CALC-SCNC: 14 MMOL/L — SIGNIFICANT CHANGE UP (ref 5–17)
APTT BLD: 30.3 SEC — SIGNIFICANT CHANGE UP (ref 27.5–35.5)
AST SERPL-CCNC: 16 U/L — SIGNIFICANT CHANGE UP (ref 10–40)
BASOPHILS # BLD AUTO: 0.03 K/UL — SIGNIFICANT CHANGE UP (ref 0–0.2)
BASOPHILS NFR BLD AUTO: 0.3 % — SIGNIFICANT CHANGE UP (ref 0–2)
BILIRUB SERPL-MCNC: 0.5 MG/DL — SIGNIFICANT CHANGE UP (ref 0.2–1.2)
BLD GP AB SCN SERPL QL: NEGATIVE — SIGNIFICANT CHANGE UP
BUN SERPL-MCNC: 20 MG/DL — SIGNIFICANT CHANGE UP (ref 7–23)
BUN SERPL-MCNC: 21 MG/DL — SIGNIFICANT CHANGE UP (ref 7–23)
C DIFF GDH STL QL: SIGNIFICANT CHANGE UP
C DIFF GDH STL QL: SIGNIFICANT CHANGE UP
CALCIUM SERPL-MCNC: 8.5 MG/DL — SIGNIFICANT CHANGE UP (ref 8.4–10.5)
CALCIUM SERPL-MCNC: 9 MG/DL — SIGNIFICANT CHANGE UP (ref 8.4–10.5)
CHLORIDE SERPL-SCNC: 101 MMOL/L — SIGNIFICANT CHANGE UP (ref 96–108)
CHLORIDE SERPL-SCNC: 103 MMOL/L — SIGNIFICANT CHANGE UP (ref 96–108)
CO2 SERPL-SCNC: 24 MMOL/L — SIGNIFICANT CHANGE UP (ref 22–31)
CO2 SERPL-SCNC: 24 MMOL/L — SIGNIFICANT CHANGE UP (ref 22–31)
CREAT SERPL-MCNC: 1.45 MG/DL — HIGH (ref 0.5–1.3)
CREAT SERPL-MCNC: 1.62 MG/DL — HIGH (ref 0.5–1.3)
EGFR: 34 ML/MIN/1.73M2 — LOW
EGFR: 39 ML/MIN/1.73M2 — LOW
EOSINOPHIL # BLD AUTO: 0.06 K/UL — SIGNIFICANT CHANGE UP (ref 0–0.5)
EOSINOPHIL NFR BLD AUTO: 0.6 % — SIGNIFICANT CHANGE UP (ref 0–6)
GI PCR PANEL: SIGNIFICANT CHANGE UP
GLUCOSE SERPL-MCNC: 114 MG/DL — HIGH (ref 70–99)
GLUCOSE SERPL-MCNC: 94 MG/DL — SIGNIFICANT CHANGE UP (ref 70–99)
HCT VFR BLD CALC: 26.2 % — LOW (ref 34.5–45)
HGB BLD-MCNC: 8.1 G/DL — LOW (ref 11.5–15.5)
IMM GRANULOCYTES NFR BLD AUTO: 0.5 % — SIGNIFICANT CHANGE UP (ref 0–0.9)
INR BLD: 1.17 — HIGH (ref 0.88–1.16)
LIDOCAIN IGE QN: 47 U/L — SIGNIFICANT CHANGE UP (ref 7–60)
LYMPHOCYTES # BLD AUTO: 1.03 K/UL — SIGNIFICANT CHANGE UP (ref 1–3.3)
LYMPHOCYTES # BLD AUTO: 9.7 % — LOW (ref 13–44)
MCHC RBC-ENTMCNC: 27.1 PG — SIGNIFICANT CHANGE UP (ref 27–34)
MCHC RBC-ENTMCNC: 30.9 GM/DL — LOW (ref 32–36)
MCV RBC AUTO: 87.6 FL — SIGNIFICANT CHANGE UP (ref 80–100)
MONOCYTES # BLD AUTO: 0.7 K/UL — SIGNIFICANT CHANGE UP (ref 0–0.9)
MONOCYTES NFR BLD AUTO: 6.6 % — SIGNIFICANT CHANGE UP (ref 2–14)
NEUTROPHILS # BLD AUTO: 8.71 K/UL — HIGH (ref 1.8–7.4)
NEUTROPHILS NFR BLD AUTO: 82.3 % — HIGH (ref 43–77)
NRBC # BLD: 0 /100 WBCS — SIGNIFICANT CHANGE UP (ref 0–0)
PLATELET # BLD AUTO: 351 K/UL — SIGNIFICANT CHANGE UP (ref 150–400)
POTASSIUM SERPL-MCNC: 3.8 MMOL/L — SIGNIFICANT CHANGE UP (ref 3.5–5.3)
POTASSIUM SERPL-MCNC: 4.1 MMOL/L — SIGNIFICANT CHANGE UP (ref 3.5–5.3)
POTASSIUM SERPL-SCNC: 3.8 MMOL/L — SIGNIFICANT CHANGE UP (ref 3.5–5.3)
POTASSIUM SERPL-SCNC: 4.1 MMOL/L — SIGNIFICANT CHANGE UP (ref 3.5–5.3)
PROT SERPL-MCNC: 7.2 G/DL — SIGNIFICANT CHANGE UP (ref 6–8.3)
PROTHROM AB SERPL-ACNC: 14 SEC — HIGH (ref 10.5–13.4)
RBC # BLD: 2.99 M/UL — LOW (ref 3.8–5.2)
RBC # FLD: 15.6 % — HIGH (ref 10.3–14.5)
RH IG SCN BLD-IMP: POSITIVE — SIGNIFICANT CHANGE UP
SARS-COV-2 RNA SPEC QL NAA+PROBE: NEGATIVE — SIGNIFICANT CHANGE UP
SODIUM SERPL-SCNC: 139 MMOL/L — SIGNIFICANT CHANGE UP (ref 135–145)
SODIUM SERPL-SCNC: 139 MMOL/L — SIGNIFICANT CHANGE UP (ref 135–145)
WBC # BLD: 10.58 K/UL — HIGH (ref 3.8–10.5)
WBC # FLD AUTO: 10.58 K/UL — HIGH (ref 3.8–10.5)

## 2022-10-25 PROCEDURE — 87635 SARS-COV-2 COVID-19 AMP PRB: CPT

## 2022-10-25 PROCEDURE — 87507 IADNA-DNA/RNA PROBE TQ 12-25: CPT

## 2022-10-25 PROCEDURE — 85025 COMPLETE CBC W/AUTO DIFF WBC: CPT

## 2022-10-25 PROCEDURE — 36415 COLL VENOUS BLD VENIPUNCTURE: CPT

## 2022-10-25 PROCEDURE — 83690 ASSAY OF LIPASE: CPT

## 2022-10-25 PROCEDURE — 99284 EMERGENCY DEPT VISIT MOD MDM: CPT

## 2022-10-25 PROCEDURE — 86900 BLOOD TYPING SEROLOGIC ABO: CPT

## 2022-10-25 PROCEDURE — 87449 NOS EACH ORGANISM AG IA: CPT

## 2022-10-25 PROCEDURE — 87040 BLOOD CULTURE FOR BACTERIA: CPT

## 2022-10-25 PROCEDURE — 85610 PROTHROMBIN TIME: CPT

## 2022-10-25 PROCEDURE — 86850 RBC ANTIBODY SCREEN: CPT

## 2022-10-25 PROCEDURE — 85730 THROMBOPLASTIN TIME PARTIAL: CPT

## 2022-10-25 PROCEDURE — 86901 BLOOD TYPING SEROLOGIC RH(D): CPT

## 2022-10-25 PROCEDURE — 87324 CLOSTRIDIUM AG IA: CPT

## 2022-10-25 PROCEDURE — 80048 BASIC METABOLIC PNL TOTAL CA: CPT

## 2022-10-25 PROCEDURE — 87493 C DIFF AMPLIFIED PROBE: CPT

## 2022-10-25 PROCEDURE — 80053 COMPREHEN METABOLIC PANEL: CPT

## 2022-10-25 RX ORDER — SODIUM CHLORIDE 9 MG/ML
1000 INJECTION INTRAMUSCULAR; INTRAVENOUS; SUBCUTANEOUS ONCE
Refills: 0 | Status: COMPLETED | OUTPATIENT
Start: 2022-10-25 | End: 2022-10-25

## 2022-10-25 RX ADMIN — SODIUM CHLORIDE 1000 MILLILITER(S): 9 INJECTION INTRAMUSCULAR; INTRAVENOUS; SUBCUTANEOUS at 17:54

## 2022-10-25 NOTE — ED PROVIDER NOTE - CARE PROVIDER_API CALL
Herminia So (MD)  Surgery  [address], [address]  [place], [place] [address]  Phone: [phone]  Fax: [phone]  Follow Up Time:

## 2022-10-25 NOTE — ED PROVIDER NOTE - PATIENT PORTAL LINK FT
You can access the FollowMyHealth Patient Portal offered by [facility] by registering at the following website: [email or web address]. By joining [employer]’s FollowMyHealth portal, you will also be able to view your health information using other applications (apps) compatible with our system.

## 2022-10-25 NOTE — ED ADULT NURSE NOTE - INTERVENTIONS DEFINITIONS
Call bell, personal items and telephone within reach/Non-slip footwear when patient is off stretcher/Physically safe environment: no spills, clutter or unnecessary equipment

## 2022-10-25 NOTE — ED ADULT NURSE NOTE - NS ED NURSE LEVEL OF CONSCIOUSNESS ORIENTATION
Regarding: [place]  knee swelling and sore and stiff left side   ----- Message from Chio Cowan sent at 11/27/2020  8:30 PM CST -----  Patient Name: Vic Oliveirakori    Full Name of Provider seen for current symptoms:  Dr Venkata Diaz     Pregnant (If Yes, how long?):  NO     Symptoms: left   Knee swelling and sore and stiff, unable to bend  Had a procedure 11/19, stem cell injection   Pain level of 9     Do you or any of your household members have the following symptoms:  Fever >100.0#F or >38.0#C: No    New or worsening cough, shortness of breath, sore throat, congestion, or runny nose: No    New onset of nausea, vomiting or diarrhea: Yes    New onset of loss of taste or smell, chills, repeated shaking with chills, muscle pain, or headache: No    Have you, a household member, or another person you have been in contact with tested positive for COVID-19 in the last 14 days?: No    Call Back #:  [phone]    Call Center Account #:  [id number]    Which State are you currently located in? (enter State name in Summary field):   [place]     Please update the Demographics section with the patients permanent resident address     Emergent COVID-19 Symptoms requiring Nurse Triage:  Trouble breathing, Persistent pain or pressure in the chest, New confusion, Inability to wake or stay awake, Bluish lips or face      Oriented - self; Oriented - place; Oriented - time
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
## 2022-10-25 NOTE — ED ADULT NURSE NOTE - NSIMPLEMENTINTERV_GEN_ALL_ED
Implemented All Universal Safety Interventions:  Walls to call system. Call bell, personal items and telephone within reach. Instruct patient to call for assistance. Room bathroom lighting operational. Non-slip footwear when patient is off stretcher. Physically safe environment: no spills, clutter or unnecessary equipment. Stretcher in lowest position, wheels locked, appropriate side rails in place. Specific interventions were implemented:

## 2022-10-25 NOTE — ED ADULT NURSE REASSESSMENT NOTE - NS ED NURSE REASSESS COMMENT FT1
per ed , pt. very tearful, stating she's not going back to [facility] facility, attempting to ascertain d/c address for pt., provider aware

## 2022-10-25 NOTE — ED ADULT TRIAGE NOTE - CHIEF COMPLAINT QUOTE
Pt s/p bowel resection 9/13 presents reporting purulent drainage from surgical site, diarrhea, pain. Pt denies fevers/chills.

## 2022-10-25 NOTE — ED PROVIDER NOTE - NSFOLLOWUPINSTRUCTIONS_ED_ALL_ED_FT
Reviewed x-ray findings with the patient over the phone. She does have increased vascular markings making fibrosis versus pulmonary edema versus infection all possibilities. Given lack of symptoms, will wait for blood work to come back. If BNP not elevated, will order noncontrasted CT to further evaluate lung parenchyma. She will let us know in the meantime if symptoms progress or change. Please perform wet-to-dry dressings daily. Please wet clean gauze w/ saline, pack gently into wound, place dry gauze over top and use paper tape to secure it.    Please see your surgeon for followup.  Call for appointment.  If you have any problems with followup, please call the ED Referral Coordinator at [phone].  Return to the ER if symptoms worsen or other concerns.      How to Change Your Wound Dressing      A dressing is a material that is placed in and over a wound to help the wound heal. It protects the wound from bacteria, which could cause the wound to become infected. A dressing also protects the wound from further injury and keeps it from becoming too dry or too wet. There are several types of wound dressings. Some examples are:  •Bandages.      •Gauze.      •Antimicrobial dressings. These dressings prevent or treat infection. They may contain an antiseptic, such as silver or iodine.      •Absorptive dressings, such as calcium alginate or foam. These help with wound drainage.    •Impregnated dressings that help your wound tissue to grow and heal, such as:  •Hydrogel impregnated gauze.      •Medical-grade honey-infused dressings.      •Petroleum impregnated gauze.      •Collagen.        These are general guidelines. Follow your health care provider's instructions about what dressing supplies to use and how to change your wound dressing.      What are the risks?    It is usually safe to change a dressing. The most common problem is skin irritation or a rash from the adhesive tape that is used to secure the dressing. However, more serious problems can develop, such as:  •Bleeding.      •Infection.        Supplies needed:    Set up a clean area for wound care. You will need:  •A disposable garbage bag that is open and ready to use.      •Hand .    •Recommended cleaning solution as told by your health care provider, such as:  •Germ-free (sterile) water.      •Sterile salt water (saline).      •Wound cleanser.        •New wound dressing material. Make sure to open the dressing package so the dressing remains on the inside of the package.      •Medical adhesive, roll gauze, or tape.      You may also need the following in your clean area:  •A box of vinyl gloves.      •Adhesive remover.      •Skin protectant. This may be a wipe, film, or spray.      •Clean or sterile scissors.      •A cotton-tipped applicator.        How to change your dressing    How often you change your dressing will depend on your wound. Change the dressing as often as told by your health care provider. Your health care provider may change your dressing, or a family member, friend, or caregiver may be shown how to change the dressing. It is important to:  •Wash your hands before and after each dressing change for at least 20 seconds. If soap and water are not available, use hand .      •Wear gloves and protective clothing while changing a dressing. This may include eye protection.      •Never let anyone change your dressing if he or she has an infection, a skin condition, or a skin wound or cut of any size.      Preparing to change your dressing    •Take a shower before you do the first dressing change of the day. If your health care provider does not want your wound to get wet and your dressing is not waterproof, you may need to apply plastic leak-proof sealing wrap over your dressing for protection.      •If needed, take pain medicine 30 minutes before the dressing change as told by your health care provider.        Removing your old dressing  Washing hands with soap and water.   •Wash your hands with soap and water for at least 20 seconds. Dry your hands with a clean towel. If soap and water are not available, use hand .      •Go to the clean area that you have set up with all the supplies you need.      •If you are using gloves, put the gloves on before you remove the dressing.    •Gently remove any adhesive or tape by pulling it off in the direction of your hair growth. Only touch the outside edges of the dressing.  •If you are told to use an adhesive remover to loosen the edges of the dressing, make sure to avoid the wound area.        •Take off the dressing. If the dressing sticks to your skin, use the recommended cleaning solution to wet the dressing. This helps it come off more easily.      •Remove any gauze or packing in your wound.      •Inspect the dressing and wound for any changes in drainage, such as color, amount, and odor.      •Throw the old dressing supplies into the garbage bag.      •Remove each glove by grabbing the cuff with the opposite hand and turning the glove inside out. Place the gloves in the trash immediately.      •Wash your hands with soap and water for at least 20 seconds. Dry your hands with a clean towel. If soap and water are not available, use hand .      Cleaning your wound    •Follow instructions from your health care provider about how to clean your wound. This may include using the recommended cleaning solution.      •Do not use over-the-counter medicated or antiseptic creams, sprays, liquids, or dressings unless told to do so by your health care provider.      •Use a clean gauze pad to clean the area thoroughly with the recommended cleaning solution.      •Throw the gauze pad into the garbage bag.      •Wash your hands with soap and water for at least 20 seconds. Dry your hands with a clean towel. If soap and water are not available, use hand .      Applying the wound dressing    •If your health care provider recommended a skin protectant, apply it to the skin around the wound.      •Gently pack, if needed, and cover the wound with the recommended dressing. Make sure to touch only the outside edges of the dressing. Do not touch the inside of the dressing.      •Secure the dressing so all sides stay in place. You may do this with medical adhesive, roll gauze, or tape. If you use tape, do not wrap the tape all the way around your arm or leg.      •Take off your gloves. Put them into the garbage bag with the old dressing. Tie the bag shut and throw it away.      •Wash your hands with soap and water for at least 20 seconds. Dry your hands with a clean towel. If soap and water are not available, use hand .        Follow these instructions at home:      Wound care   Two stitched wounds. One is normal. The other is red with pus and infected. •Check your wound every day for signs of infection, or as often as told by your health care provider. Check for:  •More redness, swelling, or pain.      •More fluid or blood.      •Warmth.      •Pus or a bad smell.        General instructions     •Take over-the-counter and prescription medicines only as told by your health care provider.      •If you were prescribed an antibiotic medicine or ointment, take or apply it as told by your health care provider. Do not stop using it even if your condition improves.      •Keep all follow-up visits. This is important.        Contact a health care provider if:    •You have new pain.      •You develop irritation, a rash, or itching around the wound or dressing.      •Changing your dressing causes pain or a lot of bleeding.    •You have signs of infection, such as:  •More redness, swelling, or pain.      •More fluid or blood.      •Warmth.      •Pus or a bad smell.      •Red streaks leading from the wound.      •A fever.          Get help right away if:    •You have severe pain.      •Your wound is bleeding, and the bleeding does not stop with pressure.        Summary    •A dressing is a material that is placed in and over a wound. A dressing helps your wound heal.      •Wash your hands with soap and water for at least 20 seconds before and after each dressing change. If soap and water are not available, use hand .      •Follow your health care provider's instructions about what dressing supplies to use and how to change your wound dressing.      •Check your wound every day for signs of infection, or as often as told by your health care provider.      This information is not intended to replace advice given to you by your health care provider. Make sure you discuss any questions you have with your health care provider. Please have a visiting nurse perform wet-to-dry dressings three times per week. You will be taught how to do additional dressing changes daily on days that the nurse is not visiting. Please wet clean gauze w/ saline, pack gently into wound, place dry gauze over top and use paper tape to secure it.    Please see your surgeon for followup.  Call for appointment.  If you have any problems with followup, please call the ED Referral Coordinator at [phone].  Return to the ER if symptoms worsen or other concerns.      How to Change Your Wound Dressing      A dressing is a material that is placed in and over a wound to help the wound heal. It protects the wound from bacteria, which could cause the wound to become infected. A dressing also protects the wound from further injury and keeps it from becoming too dry or too wet. There are several types of wound dressings. Some examples are:  •Bandages.      •Gauze.      •Antimicrobial dressings. These dressings prevent or treat infection. They may contain an antiseptic, such as silver or iodine.      •Absorptive dressings, such as calcium alginate or foam. These help with wound drainage.    •Impregnated dressings that help your wound tissue to grow and heal, such as:  •Hydrogel impregnated gauze.      •Medical-grade honey-infused dressings.      •Petroleum impregnated gauze.      •Collagen.        These are general guidelines. Follow your health care provider's instructions about what dressing supplies to use and how to change your wound dressing.      What are the risks?    It is usually safe to change a dressing. The most common problem is skin irritation or a rash from the adhesive tape that is used to secure the dressing. However, more serious problems can develop, such as:  •Bleeding.      •Infection.        Supplies needed:    Set up a clean area for wound care. You will need:  •A disposable garbage bag that is open and ready to use.      •Hand .    •Recommended cleaning solution as told by your health care provider, such as:  •Germ-free (sterile) water.      •Sterile salt water (saline).      •Wound cleanser.        •New wound dressing material. Make sure to open the dressing package so the dressing remains on the inside of the package.      •Medical adhesive, roll gauze, or tape.      You may also need the following in your clean area:  •A box of vinyl gloves.      •Adhesive remover.      •Skin protectant. This may be a wipe, film, or spray.      •Clean or sterile scissors.      •A cotton-tipped applicator.        How to change your dressing    How often you change your dressing will depend on your wound. Change the dressing as often as told by your health care provider. Your health care provider may change your dressing, or a family member, friend, or caregiver may be shown how to change the dressing. It is important to:  •Wash your hands before and after each dressing change for at least 20 seconds. If soap and water are not available, use hand .      •Wear gloves and protective clothing while changing a dressing. This may include eye protection.      •Never let anyone change your dressing if he or she has an infection, a skin condition, or a skin wound or cut of any size.      Preparing to change your dressing    •Take a shower before you do the first dressing change of the day. If your health care provider does not want your wound to get wet and your dressing is not waterproof, you may need to apply plastic leak-proof sealing wrap over your dressing for protection.      •If needed, take pain medicine 30 minutes before the dressing change as told by your health care provider.        Removing your old dressing  Washing hands with soap and water.   •Wash your hands with soap and water for at least 20 seconds. Dry your hands with a clean towel. If soap and water are not available, use hand .      •Go to the clean area that you have set up with all the supplies you need.      •If you are using gloves, put the gloves on before you remove the dressing.    •Gently remove any adhesive or tape by pulling it off in the direction of your hair growth. Only touch the outside edges of the dressing.  •If you are told to use an adhesive remover to loosen the edges of the dressing, make sure to avoid the wound area.        •Take off the dressing. If the dressing sticks to your skin, use the recommended cleaning solution to wet the dressing. This helps it come off more easily.      •Remove any gauze or packing in your wound.      •Inspect the dressing and wound for any changes in drainage, such as color, amount, and odor.      •Throw the old dressing supplies into the garbage bag.      •Remove each glove by grabbing the cuff with the opposite hand and turning the glove inside out. Place the gloves in the trash immediately.      •Wash your hands with soap and water for at least 20 seconds. Dry your hands with a clean towel. If soap and water are not available, use hand .      Cleaning your wound    •Follow instructions from your health care provider about how to clean your wound. This may include using the recommended cleaning solution.      •Do not use over-the-counter medicated or antiseptic creams, sprays, liquids, or dressings unless told to do so by your health care provider.      •Use a clean gauze pad to clean the area thoroughly with the recommended cleaning solution.      •Throw the gauze pad into the garbage bag.      •Wash your hands with soap and water for at least 20 seconds. Dry your hands with a clean towel. If soap and water are not available, use hand .      Applying the wound dressing    •If your health care provider recommended a skin protectant, apply it to the skin around the wound.      •Gently pack, if needed, and cover the wound with the recommended dressing. Make sure to touch only the outside edges of the dressing. Do not touch the inside of the dressing.      •Secure the dressing so all sides stay in place. You may do this with medical adhesive, roll gauze, or tape. If you use tape, do not wrap the tape all the way around your arm or leg.      •Take off your gloves. Put them into the garbage bag with the old dressing. Tie the bag shut and throw it away.      •Wash your hands with soap and water for at least 20 seconds. Dry your hands with a clean towel. If soap and water are not available, use hand .        Follow these instructions at home:      Wound care   Two stitched wounds. One is normal. The other is red with pus and infected. •Check your wound every day for signs of infection, or as often as told by your health care provider. Check for:  •More redness, swelling, or pain.      •More fluid or blood.      •Warmth.      •Pus or a bad smell.        General instructions     •Take over-the-counter and prescription medicines only as told by your health care provider.      •If you were prescribed an antibiotic medicine or ointment, take or apply it as told by your health care provider. Do not stop using it even if your condition improves.      •Keep all follow-up visits. This is important.        Contact a health care provider if:    •You have new pain.      •You develop irritation, a rash, or itching around the wound or dressing.      •Changing your dressing causes pain or a lot of bleeding.    •You have signs of infection, such as:  •More redness, swelling, or pain.      •More fluid or blood.      •Warmth.      •Pus or a bad smell.      •Red streaks leading from the wound.      •A fever.          Get help right away if:    •You have severe pain.      •Your wound is bleeding, and the bleeding does not stop with pressure.        Summary    •A dressing is a material that is placed in and over a wound. A dressing helps your wound heal.      •Wash your hands with soap and water for at least 20 seconds before and after each dressing change. If soap and water are not available, use hand .      •Follow your health care provider's instructions about what dressing supplies to use and how to change your wound dressing.      •Check your wound every day for signs of infection, or as often as told by your health care provider.      This information is not intended to replace advice given to you by your health care provider. Make sure you discuss any questions you have with your health care provider.

## 2022-10-25 NOTE — ED PROVIDER NOTE - OBJECTIVE STATEMENT
HTN, DM2, past surgical history of  x 2, hysterectomy, and laparoscopic cholecystectomy (), admitted for large bowel obstruction; had spontaneous return of bowel function initially; however, bowel obstruction recurred, so taken to OR for ex lap with lysis of adhesions and bowel resection () HTN, DM2, past surgical history of  x 2, hysterectomy, and laparoscopic cholecystectomy (), admitted for large bowel obstruction; had spontaneous return of bowel function initially; however, bowel obstruction recurred, so taken to OR for ex lap with lysis of adhesions and bowel resection (), here with loose stool/ diarrhea for past 3 days and reports she is unhappy with facility that she was sent to. Has been having problems with other patients  trying to get into her bed, staff being unresponsive to her needs and rude. Also says they refused to change her dressing after she bathed. No fever, chills.

## 2022-10-25 NOTE — ED ADULT NURSE REASSESSMENT NOTE - NS ED NURSE REASSESS COMMENT FT1
pt. informed of eta for pickup, states "If I go home and something happens to me, I'm gonna call [facility], Because I don't like that Rehab."

## 2022-10-25 NOTE — ED ADULT NURSE NOTE - OBJECTIVE STATEMENT
Pt AOX4. Pt BIBEMS from rehab. Pt c/o diarrhea and n/v x3days. S/p bowel resection d/t LBO 9/13. Yellow drainage noted from abdominal incision site. Denies fevers, chills, chest pain, SOB, urinary symptoms. No current antibiotic use. Pt speaking in full complete sentences. Respirations even and unlabored.

## 2022-10-26 DIAGNOSIS — T36.8X5A ADVERSE EFFECT OF OTHER SYSTEMIC ANTIBIOTICS, INITIAL ENCOUNTER: ICD-10-CM

## 2022-10-26 DIAGNOSIS — R52 PAIN, UNSPECIFIED: ICD-10-CM

## 2022-10-26 DIAGNOSIS — F32.A DEPRESSION, UNSPECIFIED: ICD-10-CM

## 2022-10-26 DIAGNOSIS — A41.9 SEPSIS, UNSPECIFIED ORGANISM: ICD-10-CM

## 2022-10-26 DIAGNOSIS — E11.65 TYPE 2 DIABETES MELLITUS WITH HYPERGLYCEMIA: ICD-10-CM

## 2022-10-26 DIAGNOSIS — Z79.84 LONG TERM (CURRENT) USE OF ORAL HYPOGLYCEMIC DRUGS: ICD-10-CM

## 2022-10-26 DIAGNOSIS — Z90.710 ACQUIRED ABSENCE OF BOTH CERVIX AND UTERUS: ICD-10-CM

## 2022-10-26 DIAGNOSIS — K56.51 INTESTINAL ADHESIONS [BANDS], WITH PARTIAL OBSTRUCTION: ICD-10-CM
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
## 2022-10-26 DIAGNOSIS — T81.40XA INFECTION FOLLOWING A PROCEDURE, UNSPECIFIED, INITIAL ENCOUNTER: ICD-10-CM

## 2022-10-26 DIAGNOSIS — D75.838 OTHER THROMBOCYTOSIS: ICD-10-CM

## 2022-10-26 DIAGNOSIS — Z88.0 ALLERGY STATUS TO PENICILLIN: ICD-10-CM

## 2022-10-26 DIAGNOSIS — E83.39 OTHER DISORDERS OF PHOSPHORUS METABOLISM: ICD-10-CM

## 2022-10-26 DIAGNOSIS — N18.30 CHRONIC KIDNEY DISEASE, STAGE 3 UNSPECIFIED: ICD-10-CM

## 2022-10-26 DIAGNOSIS — E66.9 OBESITY, UNSPECIFIED: ICD-10-CM

## 2022-10-26 DIAGNOSIS — U07.1 COVID-19: ICD-10-CM

## 2022-10-26 DIAGNOSIS — T81.44XA SEPSIS FOLLOWING A PROCEDURE, INITIAL ENCOUNTER: ICD-10-CM

## 2022-10-26 DIAGNOSIS — A04.72 ENTEROCOLITIS DUE TO CLOSTRIDIUM DIFFICILE, NOT SPECIFIED AS RECURRENT: ICD-10-CM

## 2022-10-26 DIAGNOSIS — Z90.49 ACQUIRED ABSENCE OF OTHER SPECIFIED PARTS OF DIGESTIVE TRACT: ICD-10-CM

## 2022-10-26 DIAGNOSIS — R91.8 OTHER NONSPECIFIC ABNORMAL FINDING OF LUNG FIELD: ICD-10-CM

## 2022-10-26 DIAGNOSIS — K91.871 POSTPROCEDURAL HEMATOMA OF A DIGESTIVE SYSTEM ORGAN OR STRUCTURE FOLLOWING OTHER PROCEDURE: ICD-10-CM

## 2022-10-26 DIAGNOSIS — I12.9 HYPERTENSIVE CHRONIC KIDNEY DISEASE WITH STAGE 1 THROUGH STAGE 4 CHRONIC KIDNEY DISEASE, OR UNSPECIFIED CHRONIC KIDNEY DISEASE: ICD-10-CM

## 2022-10-26 DIAGNOSIS — K42.0 UMBILICAL HERNIA WITH OBSTRUCTION, WITHOUT GANGRENE: ICD-10-CM

## 2022-10-26 DIAGNOSIS — M62.82 RHABDOMYOLYSIS: ICD-10-CM

## 2022-10-26 DIAGNOSIS — K65.8 OTHER PERITONITIS: ICD-10-CM

## 2022-10-26 DIAGNOSIS — E11.22 TYPE 2 DIABETES MELLITUS WITH DIABETIC CHRONIC KIDNEY DISEASE: ICD-10-CM

## 2022-10-26 DIAGNOSIS — D62 ACUTE POSTHEMORRHAGIC ANEMIA: ICD-10-CM

## 2022-10-26 DIAGNOSIS — N17.0 ACUTE KIDNEY FAILURE WITH TUBULAR NECROSIS: ICD-10-CM

## 2022-10-26 DIAGNOSIS — K43.0 INCISIONAL HERNIA WITH OBSTRUCTION, WITHOUT GANGRENE: ICD-10-CM

## 2022-10-27 VITALS
TEMPERATURE: 98 F | OXYGEN SATURATION: 99 % | HEART RATE: 107 BPM | WEIGHT: 190.04 LBS | RESPIRATION RATE: 19 BRPM | DIASTOLIC BLOOD PRESSURE: 82 MMHG | SYSTOLIC BLOOD PRESSURE: 144 MMHG | HEIGHT: 66 IN

## 2022-10-27 LAB
ALBUMIN SERPL ELPH-MCNC: 2.5 G/DL — LOW (ref 3.4–5)
ALP SERPL-CCNC: 72 U/L — SIGNIFICANT CHANGE UP (ref 40–120)
ALT FLD-CCNC: 24 U/L — SIGNIFICANT CHANGE UP (ref 12–42)
ANION GAP SERPL CALC-SCNC: 8 MMOL/L — LOW (ref 9–16)
AST SERPL-CCNC: 16 U/L — SIGNIFICANT CHANGE UP (ref 15–37)
BASOPHILS # BLD AUTO: 0.01 K/UL — SIGNIFICANT CHANGE UP (ref 0–0.2)
BASOPHILS NFR BLD AUTO: 0.2 % — SIGNIFICANT CHANGE UP (ref 0–2)
BILIRUB SERPL-MCNC: 0.7 MG/DL — SIGNIFICANT CHANGE UP (ref 0.2–1.2)
BUN SERPL-MCNC: 24 MG/DL — HIGH (ref 7–23)
CALCIUM SERPL-MCNC: 8.7 MG/DL — SIGNIFICANT CHANGE UP (ref 8.5–10.5)
CHLORIDE SERPL-SCNC: 103 MMOL/L — SIGNIFICANT CHANGE UP (ref 96–108)
CO2 SERPL-SCNC: 29 MMOL/L — SIGNIFICANT CHANGE UP (ref 22–31)
CREAT SERPL-MCNC: 1.43 MG/DL — HIGH (ref 0.5–1.3)
EGFR: 39 ML/MIN/1.73M2 — LOW
EOSINOPHIL # BLD AUTO: 0.13 K/UL — SIGNIFICANT CHANGE UP (ref 0–0.5)
EOSINOPHIL NFR BLD AUTO: 2 % — SIGNIFICANT CHANGE UP (ref 0–6)
GLUCOSE SERPL-MCNC: 112 MG/DL — HIGH (ref 70–99)
HCT VFR BLD CALC: 24.4 % — LOW (ref 34.5–45)
HGB BLD-MCNC: 7.4 G/DL — LOW (ref 11.5–15.5)
IMM GRANULOCYTES NFR BLD AUTO: 0.9 % — SIGNIFICANT CHANGE UP (ref 0–0.9)
LACTATE SERPL-SCNC: 0.7 MMOL/L — SIGNIFICANT CHANGE UP (ref 0.4–2)
LIDOCAIN IGE QN: 140 U/L — SIGNIFICANT CHANGE UP (ref 73–393)
LYMPHOCYTES # BLD AUTO: 1.23 K/UL — SIGNIFICANT CHANGE UP (ref 1–3.3)
LYMPHOCYTES # BLD AUTO: 18.6 % — SIGNIFICANT CHANGE UP (ref 13–44)
MCHC RBC-ENTMCNC: 26.7 PG — LOW (ref 27–34)
MCHC RBC-ENTMCNC: 30.3 GM/DL — LOW (ref 32–36)
MCV RBC AUTO: 88.1 FL — SIGNIFICANT CHANGE UP (ref 80–100)
MONOCYTES # BLD AUTO: 0.78 K/UL — SIGNIFICANT CHANGE UP (ref 0–0.9)
MONOCYTES NFR BLD AUTO: 11.8 % — SIGNIFICANT CHANGE UP (ref 2–14)
NEUTROPHILS # BLD AUTO: 4.41 K/UL — SIGNIFICANT CHANGE UP (ref 1.8–7.4)
NEUTROPHILS NFR BLD AUTO: 66.5 % — SIGNIFICANT CHANGE UP (ref 43–77)
NRBC # BLD: 0 /100 WBCS — SIGNIFICANT CHANGE UP (ref 0–0)
PLATELET # BLD AUTO: 307 K/UL — SIGNIFICANT CHANGE UP (ref 150–400)
POTASSIUM SERPL-MCNC: 3.5 MMOL/L — SIGNIFICANT CHANGE UP (ref 3.5–5.3)
POTASSIUM SERPL-SCNC: 3.5 MMOL/L — SIGNIFICANT CHANGE UP (ref 3.5–5.3)
PROT SERPL-MCNC: 7.1 G/DL — SIGNIFICANT CHANGE UP (ref 6.4–8.2)
RBC # BLD: 2.77 M/UL — LOW (ref 3.8–5.2)
RBC # FLD: 15.5 % — HIGH (ref 10.3–14.5)
SARS-COV-2 RNA SPEC QL NAA+PROBE: SIGNIFICANT CHANGE UP
SODIUM SERPL-SCNC: 140 MMOL/L — SIGNIFICANT CHANGE UP (ref 132–145)
WBC # BLD: 6.62 K/UL — SIGNIFICANT CHANGE UP (ref 3.8–10.5)
WBC # FLD AUTO: 6.62 K/UL — SIGNIFICANT CHANGE UP (ref 3.8–10.5)

## 2022-10-27 PROCEDURE — 74176 CT ABD & PELVIS W/O CONTRAST: CPT | Mod: 26

## 2022-10-27 PROCEDURE — 71045 X-RAY EXAM CHEST 1 VIEW: CPT | Mod: 26

## 2022-10-27 PROCEDURE — 99285 EMERGENCY DEPT VISIT HI MDM: CPT

## 2022-10-27 RX ORDER — CEFTRIAXONE 500 MG/1
1000 INJECTION, POWDER, FOR SOLUTION INTRAMUSCULAR; INTRAVENOUS ONCE
Refills: 0 | Status: DISCONTINUED | OUTPATIENT
Start: 2022-10-27 | End: 2022-10-27

## 2022-10-27 RX ORDER — SODIUM CHLORIDE 9 MG/ML
1000 INJECTION INTRAMUSCULAR; INTRAVENOUS; SUBCUTANEOUS ONCE
Refills: 0 | Status: COMPLETED | OUTPATIENT
Start: 2022-10-27 | End: 2022-10-27

## 2022-10-27 RX ORDER — ONDANSETRON 8 MG/1
4 TABLET, FILM COATED ORAL ONCE
Refills: 0 | Status: COMPLETED | OUTPATIENT
Start: 2022-10-27 | End: 2022-10-27

## 2022-10-27 RX ORDER — METRONIDAZOLE 500 MG
500 TABLET ORAL ONCE
Refills: 0 | Status: DISCONTINUED | OUTPATIENT
Start: 2022-10-27 | End: 2022-10-27

## 2022-10-27 RX ORDER — KETOROLAC TROMETHAMINE 30 MG/ML
15 SYRINGE (ML) INJECTION ONCE
Refills: 0 | Status: DISCONTINUED | OUTPATIENT
Start: 2022-10-27 | End: 2022-10-27

## 2022-10-27 RX ORDER — DIATRIZOATE MEGLUMINE 180 MG/ML
30 INJECTION, SOLUTION INTRAVESICAL ONCE
Refills: 0 | Status: COMPLETED | OUTPATIENT
Start: 2022-10-27 | End: 2022-10-27

## 2022-10-27 RX ADMIN — DIATRIZOATE MEGLUMINE 30 MILLILITER(S): 180 INJECTION, SOLUTION INTRAVESICAL at 16:11

## 2022-10-27 RX ADMIN — Medication 15 MILLIGRAM(S): at 15:53

## 2022-10-27 RX ADMIN — SODIUM CHLORIDE 1000 MILLILITER(S): 9 INJECTION INTRAMUSCULAR; INTRAVENOUS; SUBCUTANEOUS at 15:53

## 2022-10-27 RX ADMIN — ONDANSETRON 4 MILLIGRAM(S): 8 TABLET, FILM COATED ORAL at 15:54

## 2022-10-27 NOTE — ED PROVIDER NOTE - CLINICAL SUMMARY MEDICAL DECISION MAKING FREE TEXT BOX
d Patient s/p lysis of adhesions earlier in September presenting for abd pain, nausea, vomiting and reporting that her home is "hazardous" and that she is having difficulty managing at home.  Will need labs, CT.    20:45 Surgical resident calls on behalf of Dr. So, the surgeon who knows this patient best.  He has reviewed the scans and does not think there is an acute or new process and advises that the patient does not require admission from a surgical standpoint, that should she need admission, medicine should be consulted.  Case was then discussed in detail with Dr. Lagunas.  In light of social concerns and difficulty managing her care at home, anemia, patient will be admitted. Patient informed of plan. Patient s/p lysis of adhesions earlier in September presenting for abd pain, nausea, vomiting and reporting that her home is "hazardous" and that she is having difficulty managing at home.  Will need labs, CT.    20:45 Surgical resident calls on behalf of Dr. So, the surgeon who knows this patient best.  He has reviewed the scans and does not think there is an acute or new process and advises that the patient does not require admission from a surgical standpoint, that should she need admission, medicine should be consulted.  Case was then discussed in detail with Dr. Lagunas.  In light of social concerns and difficulty managing her care at home, anemia, patient will be admitted. Patient informed of plan.    2145:  Tech Patient s/p lysis of adhesions earlier in September presenting for abd pain, nausea, vomiting and reporting that her home is "hazardous" and that she is having difficulty managing at home.  Will need labs, CT.    19:45:  Case discussed in detail with Dr. Odom of surgery who reviews CT and history.  Agrees with plan for admit, rec cyrus and flagyl.    20:45 Surgical resident calls on behalf of Dr. So, the surgeon who knows this patient best.  He has reviewed the scans and does not think there is an acute or new process and advises that the patient does not require admission from a surgical standpoint, that should she need admission, medicine should be consulted.  Case was then discussed in detail with Dr. Lagunas.  In light of social concerns and difficulty managing her care at home, anemia, patient will be admitted. Patient informed of plan.    **2145:  Tech reports that patient is unable to ambulate without assistance that she was not able to get up off the toilet by herself and reports that she needs an assist device.  She was generally weak; was not weak on one side or the other.

## 2022-10-27 NOTE — ED PROVIDER NOTE - OBJECTIVE STATEMENT
Patient is a vague historian, does not know much about her recent surgery.  Old chart reviewed for additional information  Patient reports that she has been having pain in the area of her midline incision since last night as well as nausea and vomiting  Vomited x 2 today  Having difficulty moving her bowels  No fever, but "felt hot"  No cough, congestion  No dysuria    Prior chart notes that patient had LBO Patient is a vague historian, does not know much about her recent surgery.  Old chart reviewed for additional information  Patient reports that she has been having pain in the area of her midline incision since last night as well as nausea and vomiting  Vomited x 2 today  Having difficulty moving her bowels but moved bowels yesterday  Had a little bit of blood   No fever, but "felt hot"  No cough, congestion  Reports that she is not comfortable at home, that she is having difficulty getting out of her reclining chair and almost fell.  Feels generally weak.  Reports that her apartment is "hazardous."  No dysuria  No numbness or unilateral weakness, no change in vision or speech     Prior chart notes that patient had LBO

## 2022-10-27 NOTE — ED ADULT NURSE NOTE - OBJECTIVE STATEMENT
pt. recently discharged from [facility] s/p hernia repair. Pt. c/o abdominal pain and nausea. Pt. also c/o feeling unsafe where she is living states her home is unsafe for her to live alone.

## 2022-10-27 NOTE — ED PROVIDER NOTE - NS ED ROS FT
Constitutional:  No fever, generally weak  HEENT:  No change in vision, no discharge, no congestion  Cardiac:  No chest pain, palpitations  Pulm:  No cough, no sob  GI:  + abd pain, +nausea and vomiting, no diarrhea  : No dysuria  Neuro:  No ha, no neck pain, no numbness, no unilateral weakness, no change in speech, vision  MSK:  No joint pain or swelling  Skin:  No rash  Psychiatric: No suicidal or homicidal ideation, no hallucinations

## 2022-10-27 NOTE — ED PROVIDER NOTE - NOTES
Case discussed in detail.  He reviewed prior history, current history and looked at scan.  Agrees with plan for transfer.  Recommends antibiotics,

## 2022-10-28 ENCOUNTER — TRANSCRIPTION ENCOUNTER (OUTPATIENT)
Age: 72
End: 2022-10-28

## 2022-10-28 ENCOUNTER — INPATIENT (INPATIENT)
Facility: HOSPITAL | Age: 72
LOS: 3 days | Discharge: NURSING, REHAB OR LONG-TERM CARE | DRG: 920 | End: 2022-11-01
Attending: HOSPITALIST | Admitting: HOSPITALIST
Payer: MEDICARE

## 2022-10-28 DIAGNOSIS — Z88.1 ALLERGY STATUS TO OTHER ANTIBIOTIC AGENTS STATUS: ICD-10-CM

## 2022-10-28 DIAGNOSIS — Z20.822 CONTACT WITH AND (SUSPECTED) EXPOSURE TO COVID-19: ICD-10-CM

## 2022-10-28 DIAGNOSIS — D64.9 ANEMIA, UNSPECIFIED: ICD-10-CM

## 2022-10-28 DIAGNOSIS — R19.7 DIARRHEA, UNSPECIFIED: ICD-10-CM

## 2022-10-28 DIAGNOSIS — G43.909 MIGRAINE, UNSPECIFIED, NOT INTRACTABLE, WITHOUT STATUS MIGRAINOSUS: ICD-10-CM

## 2022-10-28 DIAGNOSIS — R11.2 NAUSEA WITH VOMITING, UNSPECIFIED: ICD-10-CM

## 2022-10-28 DIAGNOSIS — S31.109A UNSPECIFIED OPEN WOUND OF ABDOMINAL WALL, UNSPECIFIED QUADRANT WITHOUT PENETRATION INTO PERITONEAL CAVITY, INITIAL ENCOUNTER: ICD-10-CM

## 2022-10-28 DIAGNOSIS — Z87.59 PERSONAL HISTORY OF OTHER COMPLICATIONS OF PREGNANCY, CHILDBIRTH AND THE PUERPERIUM: ICD-10-CM

## 2022-10-28 DIAGNOSIS — Z79.84 LONG TERM (CURRENT) USE OF ORAL HYPOGLYCEMIC DRUGS: ICD-10-CM

## 2022-10-28 DIAGNOSIS — E11.9 TYPE 2 DIABETES MELLITUS WITHOUT COMPLICATIONS: ICD-10-CM

## 2022-10-28 DIAGNOSIS — Z90.710 ACQUIRED ABSENCE OF BOTH CERVIX AND UTERUS: ICD-10-CM

## 2022-10-28 DIAGNOSIS — I10 ESSENTIAL (PRIMARY) HYPERTENSION: ICD-10-CM

## 2022-10-28 DIAGNOSIS — Z90.49 ACQUIRED ABSENCE OF OTHER SPECIFIED PARTS OF DIGESTIVE TRACT: ICD-10-CM

## 2022-10-28 DIAGNOSIS — R53.1 WEAKNESS: ICD-10-CM

## 2022-10-28 DIAGNOSIS — E78.5 HYPERLIPIDEMIA, UNSPECIFIED: ICD-10-CM

## 2022-10-28 DIAGNOSIS — Z98.891 HISTORY OF UTERINE SCAR FROM PREVIOUS SURGERY: Chronic | ICD-10-CM

## 2022-10-28 DIAGNOSIS — Z88.0 ALLERGY STATUS TO PENICILLIN: ICD-10-CM

## 2022-10-28 DIAGNOSIS — N17.9 ACUTE KIDNEY FAILURE, UNSPECIFIED: ICD-10-CM

## 2022-10-28 DIAGNOSIS — Z90.710 ACQUIRED ABSENCE OF BOTH CERVIX AND UTERUS: Chronic | ICD-10-CM

## 2022-10-28 DIAGNOSIS — Z29.9 ENCOUNTER FOR PROPHYLACTIC MEASURES, UNSPECIFIED: ICD-10-CM

## 2022-10-28 DIAGNOSIS — Z90.49 ACQUIRED ABSENCE OF OTHER SPECIFIED PARTS OF DIGESTIVE TRACT: Chronic | ICD-10-CM

## 2022-10-28 LAB
ALBUMIN SERPL ELPH-MCNC: 3.1 G/DL — LOW (ref 3.3–5)
ALP SERPL-CCNC: 66 U/L — SIGNIFICANT CHANGE UP (ref 40–120)
ALT FLD-CCNC: 18 U/L — SIGNIFICANT CHANGE UP (ref 10–45)
ANION GAP SERPL CALC-SCNC: 14 MMOL/L — SIGNIFICANT CHANGE UP (ref 5–17)
APPEARANCE UR: ABNORMAL
APTT BLD: 37.4 SEC — HIGH (ref 27.5–35.5)
AST SERPL-CCNC: 24 U/L — SIGNIFICANT CHANGE UP (ref 10–40)
BACTERIA # UR AUTO: ABNORMAL /HPF
BASOPHILS # BLD AUTO: 0.03 K/UL — SIGNIFICANT CHANGE UP (ref 0–0.2)
BASOPHILS NFR BLD AUTO: 0.5 % — SIGNIFICANT CHANGE UP (ref 0–2)
BILIRUB SERPL-MCNC: 0.6 MG/DL — SIGNIFICANT CHANGE UP (ref 0.2–1.2)
BILIRUB UR-MCNC: NEGATIVE — SIGNIFICANT CHANGE UP
BLD GP AB SCN SERPL QL: NEGATIVE — SIGNIFICANT CHANGE UP
BUN SERPL-MCNC: 17 MG/DL — SIGNIFICANT CHANGE UP (ref 7–23)
CALCIUM SERPL-MCNC: 8.5 MG/DL — SIGNIFICANT CHANGE UP (ref 8.4–10.5)
CHLORIDE SERPL-SCNC: 107 MMOL/L — SIGNIFICANT CHANGE UP (ref 96–108)
CO2 SERPL-SCNC: 22 MMOL/L — SIGNIFICANT CHANGE UP (ref 22–31)
COLOR SPEC: YELLOW — SIGNIFICANT CHANGE UP
CREAT SERPL-MCNC: 1.12 MG/DL — SIGNIFICANT CHANGE UP (ref 0.5–1.3)
DIFF PNL FLD: ABNORMAL
EGFR: 53 ML/MIN/1.73M2 — LOW
EOSINOPHIL # BLD AUTO: 0.25 K/UL — SIGNIFICANT CHANGE UP (ref 0–0.5)
EOSINOPHIL NFR BLD AUTO: 4.2 % — SIGNIFICANT CHANGE UP (ref 0–6)
EPI CELLS # UR: ABNORMAL /HPF (ref 0–5)
GLUCOSE BLDC GLUCOMTR-MCNC: 101 MG/DL — HIGH (ref 70–99)
GLUCOSE BLDC GLUCOMTR-MCNC: 121 MG/DL — HIGH (ref 70–99)
GLUCOSE BLDC GLUCOMTR-MCNC: 92 MG/DL — SIGNIFICANT CHANGE UP (ref 70–99)
GLUCOSE BLDC GLUCOMTR-MCNC: 97 MG/DL — SIGNIFICANT CHANGE UP (ref 70–99)
GLUCOSE SERPL-MCNC: 100 MG/DL — HIGH (ref 70–99)
GLUCOSE UR QL: NEGATIVE — SIGNIFICANT CHANGE UP
HCT VFR BLD CALC: 23.3 % — LOW (ref 34.5–45)
HGB BLD-MCNC: 7.1 G/DL — LOW (ref 11.5–15.5)
IMM GRANULOCYTES NFR BLD AUTO: 0.7 % — SIGNIFICANT CHANGE UP (ref 0–0.9)
INR BLD: 1.22 — HIGH (ref 0.88–1.16)
KETONES UR-MCNC: NEGATIVE — SIGNIFICANT CHANGE UP
LEUKOCYTE ESTERASE UR-ACNC: ABNORMAL
LYMPHOCYTES # BLD AUTO: 1.65 K/UL — SIGNIFICANT CHANGE UP (ref 1–3.3)
LYMPHOCYTES # BLD AUTO: 27.6 % — SIGNIFICANT CHANGE UP (ref 13–44)
MAGNESIUM SERPL-MCNC: 1.6 MG/DL — SIGNIFICANT CHANGE UP (ref 1.6–2.6)
MCHC RBC-ENTMCNC: 27 PG — SIGNIFICANT CHANGE UP (ref 27–34)
MCHC RBC-ENTMCNC: 30.5 GM/DL — LOW (ref 32–36)
MCV RBC AUTO: 88.6 FL — SIGNIFICANT CHANGE UP (ref 80–100)
MONOCYTES # BLD AUTO: 0.78 K/UL — SIGNIFICANT CHANGE UP (ref 0–0.9)
MONOCYTES NFR BLD AUTO: 13 % — SIGNIFICANT CHANGE UP (ref 2–14)
NEUTROPHILS # BLD AUTO: 3.23 K/UL — SIGNIFICANT CHANGE UP (ref 1.8–7.4)
NEUTROPHILS NFR BLD AUTO: 54 % — SIGNIFICANT CHANGE UP (ref 43–77)
NITRITE UR-MCNC: POSITIVE
NRBC # BLD: 0 /100 WBCS — SIGNIFICANT CHANGE UP (ref 0–0)
PH UR: 6 — SIGNIFICANT CHANGE UP (ref 5–8)
PHOSPHATE SERPL-MCNC: 4.1 MG/DL — SIGNIFICANT CHANGE UP (ref 2.5–4.5)
PLATELET # BLD AUTO: 329 K/UL — SIGNIFICANT CHANGE UP (ref 150–400)
POTASSIUM SERPL-MCNC: 3.6 MMOL/L — SIGNIFICANT CHANGE UP (ref 3.5–5.3)
POTASSIUM SERPL-SCNC: 3.6 MMOL/L — SIGNIFICANT CHANGE UP (ref 3.5–5.3)
PROT SERPL-MCNC: 6.4 G/DL — SIGNIFICANT CHANGE UP (ref 6–8.3)
PROT UR-MCNC: ABNORMAL MG/DL
PROTHROM AB SERPL-ACNC: 14.6 SEC — HIGH (ref 10.5–13.4)
RBC # BLD: 2.63 M/UL — LOW (ref 3.8–5.2)
RBC # FLD: 15.6 % — HIGH (ref 10.3–14.5)
RBC CASTS # UR COMP ASSIST: < 5 /HPF — SIGNIFICANT CHANGE UP
RH IG SCN BLD-IMP: POSITIVE — SIGNIFICANT CHANGE UP
SODIUM SERPL-SCNC: 143 MMOL/L — SIGNIFICANT CHANGE UP (ref 135–145)
SP GR SPEC: 1.01 — SIGNIFICANT CHANGE UP (ref 1–1.03)
UROBILINOGEN FLD QL: 0.2 E.U./DL — SIGNIFICANT CHANGE UP
WBC # BLD: 5.98 K/UL — SIGNIFICANT CHANGE UP (ref 3.8–10.5)
WBC # FLD AUTO: 5.98 K/UL — SIGNIFICANT CHANGE UP (ref 3.8–10.5)
WBC UR QL: ABNORMAL /HPF

## 2022-10-28 PROCEDURE — 99222 1ST HOSP IP/OBS MODERATE 55: CPT | Mod: GC

## 2022-10-28 RX ORDER — ATORVASTATIN CALCIUM 80 MG/1
1 TABLET, FILM COATED ORAL
Qty: 0 | Refills: 0 | DISCHARGE

## 2022-10-28 RX ORDER — DEXTROSE 50 % IN WATER 50 %
15 SYRINGE (ML) INTRAVENOUS ONCE
Refills: 0 | Status: DISCONTINUED | OUTPATIENT
Start: 2022-10-28 | End: 2022-11-01

## 2022-10-28 RX ORDER — FERROUS SULFATE 325(65) MG
325 TABLET ORAL DAILY
Refills: 0 | Status: DISCONTINUED | OUTPATIENT
Start: 2022-10-28 | End: 2022-10-29

## 2022-10-28 RX ORDER — ACETAMINOPHEN 500 MG
650 TABLET ORAL EVERY 6 HOURS
Refills: 0 | Status: DISCONTINUED | OUTPATIENT
Start: 2022-10-28 | End: 2022-11-01

## 2022-10-28 RX ORDER — DEXTROSE 50 % IN WATER 50 %
12.5 SYRINGE (ML) INTRAVENOUS ONCE
Refills: 0 | Status: DISCONTINUED | OUTPATIENT
Start: 2022-10-28 | End: 2022-11-01

## 2022-10-28 RX ORDER — ONDANSETRON 8 MG/1
8 TABLET, FILM COATED ORAL EVERY 8 HOURS
Refills: 0 | Status: DISCONTINUED | OUTPATIENT
Start: 2022-10-28 | End: 2022-11-01

## 2022-10-28 RX ORDER — GLUCAGON INJECTION, SOLUTION 0.5 MG/.1ML
1 INJECTION, SOLUTION SUBCUTANEOUS ONCE
Refills: 0 | Status: DISCONTINUED | OUTPATIENT
Start: 2022-10-28 | End: 2022-11-01

## 2022-10-28 RX ORDER — SUMATRIPTAN SUCCINATE 4 MG/.5ML
1 INJECTION, SOLUTION SUBCUTANEOUS
Qty: 0 | Refills: 0 | DISCHARGE

## 2022-10-28 RX ORDER — LOSARTAN POTASSIUM 100 MG/1
50 TABLET, FILM COATED ORAL DAILY
Refills: 0 | Status: DISCONTINUED | OUTPATIENT
Start: 2022-10-28 | End: 2022-11-01

## 2022-10-28 RX ORDER — DEXTROSE 50 % IN WATER 50 %
25 SYRINGE (ML) INTRAVENOUS ONCE
Refills: 0 | Status: DISCONTINUED | OUTPATIENT
Start: 2022-10-28 | End: 2022-11-01

## 2022-10-28 RX ORDER — INSULIN LISPRO 100/ML
VIAL (ML) SUBCUTANEOUS
Refills: 0 | Status: DISCONTINUED | OUTPATIENT
Start: 2022-10-28 | End: 2022-11-01

## 2022-10-28 RX ORDER — SODIUM CHLORIDE 9 MG/ML
1000 INJECTION, SOLUTION INTRAVENOUS
Refills: 0 | Status: DISCONTINUED | OUTPATIENT
Start: 2022-10-28 | End: 2022-11-01

## 2022-10-28 RX ORDER — MAGNESIUM SULFATE 500 MG/ML
2 VIAL (ML) INJECTION ONCE
Refills: 0 | Status: COMPLETED | OUTPATIENT
Start: 2022-10-28 | End: 2022-10-28

## 2022-10-28 RX ORDER — POTASSIUM CHLORIDE 20 MEQ
40 PACKET (EA) ORAL ONCE
Refills: 0 | Status: COMPLETED | OUTPATIENT
Start: 2022-10-28 | End: 2022-10-28

## 2022-10-28 RX ORDER — NIFEDIPINE 30 MG
1 TABLET, EXTENDED RELEASE 24 HR ORAL
Qty: 0 | Refills: 0 | DISCHARGE

## 2022-10-28 RX ORDER — ENOXAPARIN SODIUM 100 MG/ML
40 INJECTION SUBCUTANEOUS EVERY 24 HOURS
Refills: 0 | Status: DISCONTINUED | OUTPATIENT
Start: 2022-10-28 | End: 2022-11-01

## 2022-10-28 RX ADMIN — Medication 325 MILLIGRAM(S): at 13:52

## 2022-10-28 RX ADMIN — Medication 25 GRAM(S): at 16:58

## 2022-10-28 RX ADMIN — LOSARTAN POTASSIUM 50 MILLIGRAM(S): 100 TABLET, FILM COATED ORAL at 06:12

## 2022-10-28 RX ADMIN — ENOXAPARIN SODIUM 40 MILLIGRAM(S): 100 INJECTION SUBCUTANEOUS at 07:33

## 2022-10-28 RX ADMIN — Medication 650 MILLIGRAM(S): at 21:42

## 2022-10-28 RX ADMIN — Medication 650 MILLIGRAM(S): at 22:34

## 2022-10-28 RX ADMIN — Medication 40 MILLIEQUIVALENT(S): at 16:58

## 2022-10-28 NOTE — H&P ADULT - PROBLEM SELECTOR PLAN 3
Pt recently admitted for SBO, s/p adhesion release and SBR, complicated by wound dehiscence and bacteremia. Incision noted to be pooling serosanguinous fluid. tenderness on exam and pt complains of pain. Pt afebrile on presentation    Plan:  - F/U surgery recs Hgb 7.4 on presentation, pt noted to have Hgb b/w 7-8 at time if discharge. Pt denies signs of active bleeding, syncope, palpitations but does endorse intermittent HUNG. Iron studies 9/20/2022 consistent with mixed picture likely ASAEL + chronic inflammation     Plan:  - Monitor for bleeding  - Maintain active T&S  - Goal transfusion <7

## 2022-10-28 NOTE — PROGRESS NOTE ADULT - PROBLEM SELECTOR PLAN 7
Home meds: atorvastatin 20mg    Plan:  - Cont atorvastatin 20mg Pt unclear on meds and pharmacy stating she hasn't picked up meds in a while. Possibly on losartan 50 mg qd  Plan:  - Cont losartan 50mg  - obtain collateral if possible Home meds: metformin 500mg qd  Plan:  - ISS  - Consistent Carb diet

## 2022-10-28 NOTE — H&P ADULT - PROBLEM SELECTOR PLAN 5
Home meds: losartan 50 mg qd    Plan:  - Cont losartan 50mg  - Confirm med rec in AM -- discharge note and surescripts do not coincide Home meds: metformin 500mg qd    Plan:  - ISS  - Consistent Carb diet

## 2022-10-28 NOTE — DISCHARGE NOTE PROVIDER - NSDCMRMEDTOKEN_GEN_ALL_CORE_FT
acetaminophen 325 mg oral tablet: 1 tab(s) orally every 4 hours, As needed, Mild Pain (1 - 3)  atorvastatin 20 mg oral tablet: 1 tab(s) orally once a day  Cozaar 50 mg oral tablet: 1 tab(s) orally once a day  hydroCHLOROthiazide 25 mg oral tablet: 1 tab(s) orally once a day  labetalol 200 mg oral tablet: 1 tab(s) orally 2 times a day  metFORMIN 500 mg oral tablet, extended release: 1 tab(s) orally once a day  NIFEdipine 60 mg oral tablet, extended release: 1 tab(s) orally once a day  SUMAtriptan 100 mg oral tablet: 1 tab(s) orally once   acetaminophen 325 mg oral tablet: 1 tab(s) orally every 4 hours, As needed, Mild Pain (1 - 3)  atorvastatin 40 mg oral tablet: 1 tab(s) orally once a day  Cozaar 50 mg oral tablet: 1 tab(s) orally once a day  hydroCHLOROthiazide 25 mg oral tablet: 1 tab(s) orally once a day  labetalol 200 mg oral tablet: 1 tab(s) orally 2 times a day  metFORMIN 500 mg oral tablet, extended release: 1 tab(s) orally once a day   atorvastatin 40 mg oral tablet: 1 tab(s) orally once a day  Bactrim  mg-160 mg oral tablet: 1 tab(s) orally every 12 hours     Take one tonight at 11PM then every 12 hours after  Cozaar 50 mg oral tablet: 1 tab(s) orally once a day  hydroCHLOROthiazide 25 mg oral tablet: 1 tab(s) orally once a day  labetalol 200 mg oral tablet: 1 tab(s) orally 2 times a day  metFORMIN 500 mg oral tablet, extended release: 1 tab(s) orally once a day

## 2022-10-28 NOTE — DISCHARGE NOTE PROVIDER - ATTENDING DISCHARGE PHYSICAL EXAMINATION:
Patient was seen and examined at bedside on 11/1/2022 at 930 am. Patient reports feeling well, complaining of intermittent abdominal pain unchanged from prior. ROS is otherwise negative. Vitals, labwork and pertinent imaging reviewed. Physical exam - NAD, AAO x 4, PERRLA, EOMI, MMM, supple neck, chest - CTA b/l, CV - rrr, s1s2, no m/r/g, abd - soft, + BS, mild TTP at surgical site, ext - wwp, psych - normal affect, skin - no rash    Plan  -Surgery consulted - rec no further interventions  -PT reconsulted - rec home PT; pt is medically ready for d/c  -SW will need to get in touch with [facility]  -If continues to decline choice will be given d/c notice

## 2022-10-28 NOTE — H&P ADULT - PROBLEM SELECTOR PLAN 2
Hgb 7.4 on presentation, pt noted to have Hgb b/w 7-8 at time if discharge. Pt denies signs of active bleeding, syncope, palpitations but does endorse intermittent HUNG. Iron studies 9/20/2022 consistent with mixed picture likely ASAEL + chronic inflammation     Plan:  - Monitor for bleeding  - Maintain active T&S  - Goal transfusion <7 Pt complains of generalized weakness and trouble ambulating, requests new YOSEPH. No focal neurological deficits    Plan:  - F/U PT consult  - Fall precautions  - F/U SW as pt would like to be sent to a new [facility] on D/C

## 2022-10-28 NOTE — H&P ADULT - ASSESSMENT
71 y/o female with PMH of HTN, DM and PSH of  x2, hysterectomy and lap dev ( w/ Dr. Clifford) recently discharged (-10/21) from surgery service for ex-lap w/ lysis of adhesions and small bowel resection, presenting w/ 3 days of N/V and diarrhea with pain to wound incision and requesting new YOSEPH placement.

## 2022-10-28 NOTE — PHYSICAL THERAPY INITIAL EVALUATION ADULT - ADDITIONAL COMMENTS
Pt is a poor historian due to agitation and frustration with current medical status and placement status. Per chart pt admitted from rehab, pt does not want to return to the rehab she was in.

## 2022-10-28 NOTE — PROGRESS NOTE ADULT - PROBLEM SELECTOR PLAN 1
Pt presented to [facility] with Diarrhea, N/V for the past couple of days and pain to her abdominal incision s/p SBR  CT AP 10/25: Since 10/8/2022, increased inflammatory changes and foci of extraluminal air surrounding left lower quadrant small bowel anastomosis. Although no extravasated oral contrast to suggest active leak, occult anastomotic leak/infection should be considered. Inflammatory changes extend to the urinary bladder and abdominal wall incision, follow-up clinically for evolving fistula.    Plan:  - Cont zofran PRN  - Cont Maalox PRN  - Consult Surgery in AM to assess incision possibly 2/2 gastroenteritis vs gastritis. 3 day hx non biliary non bloody emesis with decreased PO intake. Reports occasional diarrhea as well. Presents afebrile with no WBC, neg lactate, non septic in appearance. Symptom management  Plan:  - Cont zofran PRN  - Cont Maalox PRN

## 2022-10-28 NOTE — H&P ADULT - NSHPPHYSICALEXAM_GEN_ALL_CORE
VITALS: T(C): 37.1 (10-28-22 @ 02:14), Max: 37.1 (10-28-22 @ 02:14)  T(F): 98.7 (10-28-22 @ 02:14), Max: 98.7 (10-28-22 @ 02:14)  HR: 102 (10-28-22 @ 02:14) (102 - 111)  BP: 145/68 (10-28-22 @ 02:14) (134/74 - 158/77)  ABP: --  ABP(mean): --  RR: 18 (10-28-22 @ 02:14) (18 - 19)  SpO2: 100% (10-28-22 @ 02:14) (96% - 100%)    GENERAL: NAD, lying in bed comfortably  HEAD:  Atraumatic, Normocephalic  EYES: EOMI, PERRLA, conjunctiva and sclera clear  ENT: Moist mucous membranes  NECK: Supple, No JVD  CHEST/LUNG: Clear to auscultation bilaterally; No rales, rhonchi, wheezing, or rubs. Unlabored respirations  HEART: Regular rate and rhythm; No murmurs, rubs, or gallops  ABDOMEN: Bowel sounds present; Soft, Nontender, Nondistended. No hepatomegally  EXTREMITIES:  2+ Peripheral Pulses, brisk capillary refill. No clubbing, cyanosis, or edema  NERVOUS SYSTEM:  Alert & Oriented X3, speech clear. No deficits   MSK: FROM all 4 extremities, full and equal strength  SKIN: No rashes or lesions VITALS: T(C): 37.1 (10-28-22 @ 02:14), Max: 37.1 (10-28-22 @ 02:14)  T(F): 98.7 (10-28-22 @ 02:14), Max: 98.7 (10-28-22 @ 02:14)  HR: 102 (10-28-22 @ 02:14) (102 - 111)  BP: 145/68 (10-28-22 @ 02:14) (134/74 - 158/77)  ABP: --  ABP(mean): --  RR: 18 (10-28-22 @ 02:14) (18 - 19)  SpO2: 100% (10-28-22 @ 02:14) (96% - 100%)    GENERAL: NAD, lying in bed comfortably  HEAD:  Atraumatic, Normocephalic  EYES: EOMI, PERRLA, conjunctiva and sclera clear  ENT: Moist mucous membranes  NECK: Supple, No JVD  CHEST/LUNG: Clear to auscultation bilaterally; No rales, rhonchi, wheezing, or rubs. Unlabored respirations  HEART: Regular rate and rhythm; No murmurs, rubs, or gallops  ABDOMEN: Soft, tender along incision, with open area near umbilicus with serosanguinous fluid pooling. Nondistended. No hepatomegally  EXTREMITIES:  2+ Peripheral Pulses, brisk capillary refill. No clubbing, cyanosis, or edema  NERVOUS SYSTEM:  AOx3, speech clear. No deficits   MSK: FROM all 4 extremities, full and equal strength  SKIN: No rashes or lesions

## 2022-10-28 NOTE — CONSULT NOTE ADULT - ASSESSMENT
per Internal Medicine    72 y o female with PMH of HTN, DM and PSH of  x2, hysterectomy and lap dev ( w/ Dr. Clifford) recently discharged (-10/21) from surgery service for ex-lap w/ lysis of adhesions and small bowel resection, presenting w/ 3 days of N/V and diarrhea with pain to wound incision and requesting new YOSEPH placement.    Problem/Plan - 1:  ·  Problem: Nausea and vomiting.   ·  Plan: Pt presented to [facility] with Diarrhea, N/V for the past couple of days and pain to her abdominal incision s/p SBR  CT AP 10/25: Since 10/8/2022, increased inflammatory changes and foci of extraluminal air surrounding left lower quadrant small bowel anastomosis. Although no extravasated oral contrast to suggest active leak, occult anastomotic leak/infection should be considered. Inflammatory changes extend to the urinary bladder and abdominal wall incision, follow-up clinically for evolving fistula.    Plan:  - Cont zofran PRN  - Cont Maalox PRN  - Consult Surgery in AM to assess incision.    Problem/Plan - 2:  ·  Problem: General weakness.   ·  Plan: Pt complains of generalized weakness and trouble ambulating, requests new YOSEPH. No focal neurological deficits    Plan:  - F/U PT consult  - Fall precautions  - F/U SW as pt would like to be sent to a new [facility] on D/C.    Problem/Plan - 3:  ·  Problem: Anemia.   ·  Plan: Hgb 7.4 on presentation, pt noted to have Hgb b/w 7-8 at time if discharge. Pt denies signs of active bleeding, syncope, palpitations but does endorse intermittent HUNG. Iron studies 2022 consistent with mixed picture likely ASAEL + chronic inflammation     Plan:  - Monitor for bleeding  - Maintain active T&S  - Goal transfusion <7.    Problem/Plan - 4:  ·  Problem: S/P small bowel resection.   ·  Plan: Pt recently admitted for SBO, s/p adhesion release and SBR, complicated by wound dehiscence and bacteremia. Incision noted to be pooling serosanguinous fluid. tenderness on exam and pt complains of pain. Pt afebrile on presentation    Plan:  - F/U surgery recs.    Problem/Plan - 5:  ·  Problem: DM (diabetes mellitus).   ·  Plan: Home meds: metformin 500mg qd    Plan:  - ISS  - Consistent Carb diet.    Problem/Plan - 6:  ·  Problem: HTN (hypertension).   ·  Plan: Home meds: losartan 50 mg qd    Plan:  - Cont losartan 50mg  - Confirm med rec in AM -- discharge note and surescripts do not coincide.    Problem/Plan - 7:  ·  Problem: HLD (hyperlipidemia).   ·  Plan: Home meds: atorvastatin 20mg    Plan:  - Cont atorvastatin 20mg.    Problem/Plan - 8:  ·  Problem: Prophylactic measure.   ·  Plan: F: s/p 1L NS  E: replete K<4, Mg<2  N: DASH/TLC  D: Lovenox    Dispo: [facility]  Code: FULL.
72F w/ HTN, DM, CSxn, hysterectomy, lap dev, ex lap w/ JOSEFINA and 100cm SBR comes back in for wound care and general concerns about her home situation vs new YOSEPH placement. Wound w/ good granulation tissue and no signs of infection at this time. CT scan findings concerning for possible evolving fistula. Pt also has worsening urinary sx in setting of UA indicative of UTI.    No acute surgical intervention at this time  Recommend empiric UTI coverage  Daily wet-to-dry dressing changes  Surgery Team 4C will continue to follow. Please page Team 4 with questions/clinical changes. [phone]

## 2022-10-28 NOTE — PROGRESS NOTE ADULT - PROBLEM SELECTOR PLAN 5
Home meds: metformin 500mg qd    Plan:  - ISS  - Consistent Carb diet likely mixed picture likely ASAEL + chronic inflammation.  Hgb 7.4 on presentation, pt noted to have Hgb b/w 7-8 on last discharge. No sings of active bleeding. Iron studies 9/20/2022 showing decreased iron with increased ferritin  and decreased iron binding capacity. Given iron on 10/28  Plan:  - Monitor for bleeding  - Maintain active T&S  - Goal transfusion <7 possibly 2/2 poor po intake with ongoing abdominal pain. Pt complains of generalized weakness and trouble ambulating, requests new YOSEPH. No focal neurological deficits. PT consulted    Plan:  - encourage PO intake + replete electrolytes   - Fall precautions  - F/u SW as pt would like to be sent to a new Y[facility] on D/C

## 2022-10-28 NOTE — DISCHARGE NOTE PROVIDER - NSDCFUADDAPPT_GEN_ALL_CORE_FT
Appt on 11/04/22 is at 11AM Appt on 11/04/22 is at 11AM - [address]. This is an internal medicine apt, it is important for your health that you attend this apt.

## 2022-10-28 NOTE — PATIENT PROFILE ADULT - FOOD INSECURITY
Airway  Performed by: Fermin Kemp  Authorized by: Ibis Conde MD     Final Airway Type:  Endotracheal airway  Final Endotracheal Airway*:  ETT  ETT Size (mm)*:  7.0  Cuff*:  Regular  Technique Used for Successful ETT Placement:  Direct laryngoscopy  Devices/Methods Used in Placement*:  Mask  Intubation Procedure*:  Preoxygenation, ETCO2, Atraumatic, Dentition Unchanged and Phaynx Clear  Insertion Site:  Oral  Blade Type*:  MAC  Blade Size*:  3  Placement Verified by: auscultation and capnometry    Glottic View*:  1 - full view of glottis  Attempts*:  1  Location:  OR  Urgency:  Elective  Difficult Airway: No    Indications for Airway Management:  Anesthesia  Mask Difficulty Assessment:  1 - vent by mask        
no

## 2022-10-28 NOTE — PATIENT PROFILE ADULT - FALL HARM RISK - HARM RISK INTERVENTIONS
Assistance with ambulation/Assistance OOB with selected safe patient handling equipment/Communicate Risk of Fall with Harm to all staff/Discuss with provider need for PT consult/Monitor gait and stability/Provide patient with walking aids - walker, cane, crutches/Reinforce activity limits and safety measures with patient and family/Review medications for side effects contributing to fall risk/Sit up slowly, dangle for a short time, stand at bedside before walking/Tailored Fall Risk Interventions/Toileting schedule using arm’s reach rule for commode and bathroom/Visual Cue: Yellow wristband and red socks/Bed in lowest position, wheels locked, appropriate side rails in place/Call bell, personal items and telephone in reach/Instruct patient to call for assistance before getting out of bed or chair/Non-slip footwear when patient is out of bed/[place] to call system/Physically safe environment - no spills, clutter or unnecessary equipment/Purposeful Proactive Rounding/Room/bathroom lighting operational, light cord in reach

## 2022-10-28 NOTE — PROGRESS NOTE ADULT - PROBLEM SELECTOR PLAN 8
F: s/p 1L NS  E: replete K<4, Mg<2  N: DASH/TLC  D: Lovenox    Dispo: [facility]  Code: FULL Home meds: atorvastatin 20mg    Plan:  - Cont atorvastatin 20mg Pt unclear on meds and pharmacy stating she hasn't picked up meds in a while. Possibly on losartan 50 mg qd  Plan:  - Cont losartan 50mg  - obtain collateral if possible

## 2022-10-28 NOTE — DISCHARGE NOTE PROVIDER - CARE PROVIDER_API CALL
Elvia Coy)  Internal Medicine  [address]  [place], [place] [address]  Phone: [phone]  Fax: ()-  Follow Up Time: 1 week

## 2022-10-28 NOTE — PROGRESS NOTE ADULT - SUBJECTIVE AND OBJECTIVE BOX
CARYN WHELAN, 72y, Female  MRN-[id number]  Patient is a 72y old  Female who presents with a chief complaint of     OVERNIGHT EVENTS:     SUBJECTIVE:    12 Point ROS Negative unless noted otherwise above.  -------------------------------------------------------------------------------  VITAL SIGNS:  Vital Signs Last 24 Hrs  T(C): 36.9 (28 Oct 2022 09:04), Max: 37.1 (28 Oct 2022 02:14)  T(F): 98.4 (28 Oct 2022 09:04), Max: 98.7 (28 Oct 2022 02:14)  HR: 100 (28 Oct 2022 09:04) (93 - 111)  BP: 132/73 (28 Oct 2022 09:04) (132/73 - 158/77)  BP(mean): --  RR: 18 (28 Oct 2022 09:04) (18 - 19)  SpO2: 100% (28 Oct 2022 09:04) (96% - 100%)    Parameters below as of 28 Oct 2022 09:04  Patient On (Oxygen Delivery Method): room air      I&O's Summary      PHYSICAL EXAM:    General: NAD, well developed  HEENT: NC/AT; EOMI, PERRLA, anicteric sclera; moist mucosal membranes.  Neck: supple, trachea midline  Cardiovascular: RRR, +S1/S2; NO M/R/G  Respiratory: CTA B/L; no W/R/R  Gastrointestinal: soft, NT/ND; +BSx4  Extremities: WWP; no edema or cyanosis  Vascular: 2+ radial, DP/PT pulses B/L  Neurological: AAOx3; no focal deficits    ALLERGIES:  Allergies    penicillin (Rash)    Intolerances    daptomycin (Muscle Pain)      MEDICATIONS:  MEDICATIONS  (STANDING):  dextrose 5%. 1000 milliLiter(s) (50 mL/Hr) IV Continuous <Continuous>  dextrose 5%. 1000 milliLiter(s) (100 mL/Hr) IV Continuous <Continuous>  dextrose 50% Injectable 25 Gram(s) IV Push once  dextrose 50% Injectable 12.5 Gram(s) IV Push once  dextrose 50% Injectable 25 Gram(s) IV Push once  enoxaparin Injectable 40 milliGRAM(s) SubCutaneous every 24 hours  glucagon  Injectable 1 milliGRAM(s) IntraMuscular once  insulin lispro (ADMELOG) corrective regimen sliding scale   SubCutaneous three times a day before meals  losartan 50 milliGRAM(s) Oral daily    MEDICATIONS  (PRN):  acetaminophen     Tablet .. 650 milliGRAM(s) Oral every 6 hours PRN Temp greater or equal to 38C (100.4F), Mild Pain (1 - 3)  aluminum hydroxide/magnesium hydroxide/simethicone Suspension 30 milliLiter(s) Oral every 4 hours PRN Dyspepsia  dextrose Oral Gel 15 Gram(s) Oral once PRN Blood Glucose LESS THAN 70 milliGRAM(s)/deciliter  ondansetron    Tablet 8 milliGRAM(s) Oral every 8 hours PRN Nausea and/or Vomiting      -------------------------------------------------------------------------------  LABS:                        7.1    5.98  )-----------( 329      ( 28 Oct 2022 08:18 )             23.3     10-28    143  |  107  |  17  ----------------------------<  100<H>  3.6   |  22  |  1.12    Ca    8.5      28 Oct 2022 08:18  Phos  4.1     10-28  Mg     1.6     10-28    TPro  6.4  /  Alb  3.1<L>  /  TBili  0.6  /  DBili  x   /  AST  24  /  ALT  18  /  AlkPhos  66  10-28    LIVER FUNCTIONS - ( 28 Oct 2022 08:18 )  Alb: 3.1 g/dL / Pro: 6.4 g/dL / ALK PHOS: 66 U/L / ALT: 18 U/L / AST: 24 U/L / GGT: x             Urinalysis Basic - ( 28 Oct 2022 08:46 )    Color: Yellow / Appearance: SL Cloudy / S.015 / pH: x  Gluc: x / Ketone: NEGATIVE  / Bili: Negative / Urobili: 0.2 E.U./dL   Blood: x / Protein: Trace mg/dL / Nitrite: POSITIVE   Leuk Esterase: Large / RBC: < 5 /HPF / WBC Many /HPF   Sq Epi: x / Non Sq Epi: 5-10 /HPF / Bacteria: Many /HPF      CAPILLARY BLOOD GLUCOSE      POCT Blood Glucose.: 92 mg/dL (28 Oct 2022 08:38)      Urinalysis with Rflx Culture (collected 28 Oct 2022 08:46)    Culture - Blood (collected 25 Oct 2022 14:30)  Source: .Blood Blood  Preliminary Report (27 Oct 2022 19:00):    No growth at 2 days.    Culture - Blood (collected 25 Oct 2022 14:15)  Source: .Blood Blood  Preliminary Report (27 Oct 2022 19:00):    No growth at 2 days.      COVID-19 PCR: NotDetec (27 Oct 2022 15:36)  COVID-19 PCR: Negative (25 Oct 2022 14:53)  COVID-19 PCR: NotDetec (19 Oct 2022 19:50)  COVID-19 PCR: NotDetec (12 Oct 2022 11:08)  COVID-19 PCR: NotDetec (27 Sep 2022 17:43)  COVID-19 PCR: Detected (23 Sep 2022 13:54)  COVID-19 PCR: Detected (14 Sep 2022 10:43)  COVID-19 PCR: Positive (11 Sep 2022 15:05)      RADIOLOGY & ADDITIONAL TESTS: Reviewed.       CARYN WHELAN, 72y, Female  MRN-[id number]  Patient is a 72y old  Female who presents with a chief complaint of     OVERNIGHT EVENTS: no acute events over night    SUBJECTIVE: Pt assessed at bedside, states she continues to have abdominal pain. States pain is mainly located in umbilical area where her ex lap incision is. Also has pain in LLQ, more so with deep palpation. Describes pain as pressure sensation Otherwise denies any other issues. Patient denies fever/chills, nausea, vomiting, chest pain, palpitations, dyspnea, cough, diarrhea, abdominal pain, dysuria    12 Point ROS Negative unless noted otherwise above.  -------------------------------------------------------------------------------  VITAL SIGNS:  Vital Signs Last 24 Hrs  T(C): 36.9 (28 Oct 2022 09:04), Max: 37.1 (28 Oct 2022 02:14)  T(F): 98.4 (28 Oct 2022 09:04), Max: 98.7 (28 Oct 2022 02:14)  HR: 100 (28 Oct 2022 09:04) (93 - 111)  BP: 132/73 (28 Oct 2022 09:04) (132/73 - 158/77)  BP(mean): --  RR: 18 (28 Oct 2022 09:04) (18 - 19)  SpO2: 100% (28 Oct 2022 09:04) (96% - 100%)    Parameters below as of 28 Oct 2022 09:04  Patient On (Oxygen Delivery Method): room air      I&O's Summary      PHYSICAL EXAM:    GENERAL: NAD, lying in bed comfortably  HEAD:  Atraumatic, Normocephalic  EYES: EOMI, PERRLA, conjunctiva and sclera clear  ENT: Moist mucous membranes  NECK: Supple, No JVD  CHEST/LUNG: Clear to auscultation bilaterally; No rales, rhonchi, wheezing, or rubs. Unlabored respirations  HEART: Regular rate and rhythm; No murmurs, rubs, or gallops  ABDOMEN: Soft, tender along incision, with open area near umbilicus with serosanguinous fluid pooling. LLQ +TTP, BS+4, Nondistended. No hepatomegally  EXTREMITIES:  2+ Peripheral Pulses, brisk capillary refill. No clubbing, cyanosis, or edema  NERVOUS SYSTEM:  AOx3, speech clear. No deficits   MSK: FROM all 4 extremities, full and equal strength  SKIN: No rashes or lesions      ALLERGIES:  Allergies    penicillin (Rash)    Intolerances    daptomycin (Muscle Pain)      MEDICATIONS:  MEDICATIONS  (STANDING):  dextrose 5%. 1000 milliLiter(s) (50 mL/Hr) IV Continuous <Continuous>  dextrose 5%. 1000 milliLiter(s) (100 mL/Hr) IV Continuous <Continuous>  dextrose 50% Injectable 25 Gram(s) IV Push once  dextrose 50% Injectable 12.5 Gram(s) IV Push once  dextrose 50% Injectable 25 Gram(s) IV Push once  enoxaparin Injectable 40 milliGRAM(s) SubCutaneous every 24 hours  glucagon  Injectable 1 milliGRAM(s) IntraMuscular once  insulin lispro (ADMELOG) corrective regimen sliding scale   SubCutaneous three times a day before meals  losartan 50 milliGRAM(s) Oral daily    MEDICATIONS  (PRN):  acetaminophen     Tablet .. 650 milliGRAM(s) Oral every 6 hours PRN Temp greater or equal to 38C (100.4F), Mild Pain (1 - 3)  aluminum hydroxide/magnesium hydroxide/simethicone Suspension 30 milliLiter(s) Oral every 4 hours PRN Dyspepsia  dextrose Oral Gel 15 Gram(s) Oral once PRN Blood Glucose LESS THAN 70 milliGRAM(s)/deciliter  ondansetron    Tablet 8 milliGRAM(s) Oral every 8 hours PRN Nausea and/or Vomiting      -------------------------------------------------------------------------------  LABS:                        7.1    5.98  )-----------( 329      ( 28 Oct 2022 08:18 )             23.3     10-    143  |  107  |  17  ----------------------------<  100<H>  3.6   |  22  |  1.12    Ca    8.5      28 Oct 2022 08:18  Phos  4.1     10-28  Mg     1.6     10-28    TPro  6.4  /  Alb  3.1<L>  /  TBili  0.6  /  DBili  x   /  AST  24  /  ALT  18  /  AlkPhos  66  10-28    LIVER FUNCTIONS - ( 28 Oct 2022 08:18 )  Alb: 3.1 g/dL / Pro: 6.4 g/dL / ALK PHOS: 66 U/L / ALT: 18 U/L / AST: 24 U/L / GGT: x             Urinalysis Basic - ( 28 Oct 2022 08:46 )    Color: Yellow / Appearance: SL Cloudy / S.015 / pH: x  Gluc: x / Ketone: NEGATIVE  / Bili: Negative / Urobili: 0.2 E.U./dL   Blood: x / Protein: Trace mg/dL / Nitrite: POSITIVE   Leuk Esterase: Large / RBC: < 5 /HPF / WBC Many /HPF   Sq Epi: x / Non Sq Epi: 5-10 /HPF / Bacteria: Many /HPF      CAPILLARY BLOOD GLUCOSE      POCT Blood Glucose.: 92 mg/dL (28 Oct 2022 08:38)      Urinalysis with Rflx Culture (collected 28 Oct 2022 08:46)    Culture - Blood (collected 25 Oct 2022 14:30)  Source: .Blood Blood  Preliminary Report (27 Oct 2022 19:00):    No growth at 2 days.    Culture - Blood (collected 25 Oct 2022 14:15)  Source: .Blood Blood  Preliminary Report (27 Oct 2022 19:00):    No growth at 2 days.      COVID-19 PCR: NotDetec (27 Oct 2022 15:36)  COVID-19 PCR: Negative (25 Oct 2022 14:53)  COVID-19 PCR: NotDetec (19 Oct 2022 19:50)  COVID-19 PCR: NotDetec (12 Oct 2022 11:08)  COVID-19 PCR: NotDetec (27 Sep 2022 17:43)  COVID-19 PCR: Detected (23 Sep 2022 13:54)  COVID-19 PCR: Detected (14 Sep 2022 10:43)  COVID-19 PCR: Positive (11 Sep 2022 15:05)      RADIOLOGY & ADDITIONAL TESTS: Reviewed.

## 2022-10-28 NOTE — DISCHARGE NOTE PROVIDER - NSDCFUSCHEDAPPT_GEN_ALL_CORE_FT
[facility]  INTMED [address]  Scheduled Appointment: 11/04/2022

## 2022-10-28 NOTE — H&P ADULT - NSHPREVIEWOFSYSTEMS_GEN_ALL_CORE
CONSTITUTIONAL: No weakness, fevers or chills  EYES/ENT: No visual changes;  No vertigo or throat pain   NECK: No pain or stiffness  RESPIRATORY: No cough, wheezing, hemoptysis; No shortness of breath  CARDIOVASCULAR: No chest pain or palpitations  GASTROINTESTINAL: No abdominal or epigastric pain. No nausea, vomiting, or hematemesis; No diarrhea or constipation. No melena or hematochezia.  GENITOURINARY: No dysuria, frequency or hematuria  NEUROLOGICAL: No numbness or weakness  SKIN: No itching, burning, rashes, or lesions   All other review of systems is negative unless indicated above. CONSTITUTIONAL: No weakness, fevers or chills  EYES/ENT: No visual changes;  No vertigo or throat pain   NECK: No pain or stiffness  RESPIRATORY: No cough, wheezing, hemoptysis; No shortness of breath  CARDIOVASCULAR: No chest pain or palpitations  GASTROINTESTINAL: + abdominal or epigastric pain. + nausea, vomiting, - hematemesis; +diarrhea, - constipation. - melena or hematochezia.  GENITOURINARY: No dysuria, frequency or hematuria  NEUROLOGICAL: No numbness or weakness  SKIN: No itching, burning, rashes, or lesions   All other review of systems is negative unless indicated above.

## 2022-10-28 NOTE — PROGRESS NOTE ADULT - PROBLEM SELECTOR PLAN 2
Pt complains of generalized weakness and trouble ambulating, requests new [facility]. No focal neurological deficits    Plan:  - F/U PT consult  - Fall precautions  - F/U SW as pt would like to be sent to a new [facility] on D/C Pt presented with abdominal pain near umbilical wound from ex lap for adhesions in september. Incision noted to be pooling serosanguinous fluid. Tenderness on exam. Pt afebrile on presentation. Surgery consulted and stated no surgical intervention needed now, continue with dressing changes. c/w tylenol for pain contr

## 2022-10-28 NOTE — CONSULT NOTE ADULT - SUBJECTIVE AND OBJECTIVE BOX
73 yo female with PMH of HTN, DM and PSH of  x2, hysterectomy and lap dev ( w/ Dr. Clifford), ex-lap w/ JOSEFINA, 100cm SBR on 9/15 w/ Dr. So that was c/b SICU stay for hypertensive urgency, wound infection requiring meropenem, C diff w/ PO vanc, and Enterococcus bacteremia w/ dapto that was d/gregorio after pt developed rhabdomyolysis. Pt came in yesterday w/ complaints of persistent mild nausea and abdominal pain and a request to go to a different [facility]. She says her home is a "rat trap" and she cannot stay there. She expressed concerns over how her wound is healing, though denies purulence, dehiscence, erythema, fluctuance, or other drainage from the wound. Of note, pt received CT scan in the ED that showed "inflammatory changes and foci of extraluminal air surrounding left lower quadrant small bowel anastomosis. Although no extravasated oral contrast to suggest active leak, occult anastomotic leak/infection should be considered. Inflammatory changes extend to the urinary bladder and abdominal wall incision, follow-up clinically for evolving fistula."      Surgery consulted for wound check and CT scan findings. Pt is afebrile w/ VS wnl. WBC 6, Hgb 7.1 from 7.4. Cr 1.1 improved from previous ED visit.    PMH: Diabetes, thyroid disease    PSHx: S/P total abdominal hysterectomy, laparoscopic cholecystectomy,  x2    All: daptomycin (Muscle Pain)  penicillin (Rash)      Meds: acetaminophen 325 mg oral tablet: 1 tab(s) orally every 4 hours, As needed, Mild Pain (1 - 3)  atorvastatin 20 mg oral tablet: 1 tab(s) orally once a day  Cozaar 50 mg oral tablet: 1 tab(s) orally once a day  hydroCHLOROthiazide 25 mg oral tablet: 1 tab(s) orally once a day  labetalol 200 mg oral tablet: 1 tab(s) orally 2 times a day  metFORMIN 500 mg oral tablet, extended release: 1 tab(s) orally once a day  NIFEdipine 60 mg oral tablet, extended release: 1 tab(s) orally once a day  SUMAtriptan 100 mg oral tablet: 1 tab(s) orally once      Vitals past 24h:  T(F): 98.4 (09:04), Max: 98.7 (02:14)  HR: 100 (09:04) (93 - 111)  BP: 132/73 (09:04) (132/73 - 158/77)  ABP: --  RR: 18 (09:04) (18 - 19)  SpO2: 100% (09:04) (96% - 100%)    Physical Exam  General: NAD, laying comfortably in bed  Cardio: S1,S2, No MRG, RRR  Pulm: Lungs bilaterally clear to auscultation  Abdomen: soft, NT, ND, wound bed w/ scant ss drainage, no erythema, odor, purulence, or fluctuance  Extremities: WWP, peripheral pulses appreciated  Neuro: CNII-XII grossly intact, no gross motor deficits  Psych: AAOx3, pleasant        LABS:                        7.1    5.98  )-----------( 329      ( 28 Oct 2022 08:18 )             23.3     10    143  |  107  |  17  ----------------------------<  100<H>  3.6   |  22  |  1.12    Ca    8.5      28 Oct 2022 08:18  Phos  4.1     10-28  Mg     1.6     10-28    TPro  6.4  /  Alb  3.1<L>  /  TBili  0.6  /  DBili  x   /  AST  24  /  ALT  18  /  AlkPhos  66  10    PT/INR - ( 28 Oct 2022 10:52 )   PT: 14.6 sec;   INR: 1.22          PTT - ( 28 Oct 2022 10:52 )  PTT:37.4 sec      
    Patient is a 72y old  Female who presents with a chief complaint of       HPI:  71 y/o female with PMH of HTN, DM and PSH of  x2, hysterectomy and lap dev ( w/ Dr. Clifford) recently discharged (-10/21) from surgery service for ex-lap w/ lysis of adhesions and small bowel resection, course complicated by dehiscence enterococcus bacteremia treated w/ daptomycin and PO vanc, further complicated by rhabdo 2/2 to daptomycin. Pt was discharged to [facility] where she left early due to poor conditions. Pt presented to [facility] 10/25 w/ complaints of diarrhea for 3 days w/ negative stool studies. Denies melena, hematochezia, abdominal distension.   Pt re-presented today w/ complaints of  pain in the area of her midline incision x1 day as well as nausea and vomiting. She reports a bowel movement in which she noted blood in the stool and subjective fevers. Pt requesting placement in a new [facility] as her home is "hazardous" and she did not like her previous [facility] but continues to have generalized weakness and difficulty ambulating.     In the ED:  Initial vital signs: T: 97.9 F, HR: 107, BP: 144/82, R: 19, SpO2: 99% on RA  Labs: significant for Hgb/Ht 7.4/24.4, Cr 1.43 (1.48 on discharge 10/21), albumin 2.5, GFR 39  Imaging:  CXR: No acute cardiopulmonary disease process.  CT A/P 10/25: Since 10/8/2022, increased inflammatory changes and foci of extraluminal air surrounding left lower quadrant small bowel anastomosis. Although no extravasated oral contrast to suggest active leak, occult anastomotic leak/infection should be considered. Inflammatory changes extend to the urinary bladder and abdominal wall incision, follow-up clinically for evolving fistula.  EKG: NSR | HR 91, QTc 460  Medications: Toradol 15 mg, odansentron 4mg IV, NS 1L  Consults: none   (28 Oct 2022 03:29)      PAST MEDICAL & SURGICAL HISTORY:  Diabetes      H/O thyroid disease      S/P total abdominal hysterectomy      History of laparoscopic cholecystectomy      History of           MEDICATIONS  (STANDING):  dextrose 5%. 1000 milliLiter(s) (50 mL/Hr) IV Continuous <Continuous>  dextrose 5%. 1000 milliLiter(s) (100 mL/Hr) IV Continuous <Continuous>  dextrose 50% Injectable 25 Gram(s) IV Push once  dextrose 50% Injectable 12.5 Gram(s) IV Push once  dextrose 50% Injectable 25 Gram(s) IV Push once  enoxaparin Injectable 40 milliGRAM(s) SubCutaneous every 24 hours  glucagon  Injectable 1 milliGRAM(s) IntraMuscular once  insulin lispro (ADMELOG) corrective regimen sliding scale   SubCutaneous three times a day before meals  losartan 50 milliGRAM(s) Oral daily    MEDICATIONS  (PRN):  acetaminophen     Tablet .. 650 milliGRAM(s) Oral every 6 hours PRN Temp greater or equal to 38C (100.4F), Mild Pain (1 - 3)  aluminum hydroxide/magnesium hydroxide/simethicone Suspension 30 milliLiter(s) Oral every 4 hours PRN Dyspepsia  dextrose Oral Gel 15 Gram(s) Oral once PRN Blood Glucose LESS THAN 70 milliGRAM(s)/deciliter  ondansetron    Tablet 8 milliGRAM(s) Oral every 8 hours PRN Nausea and/or Vomiting           FAMILY HISTORY:    CBC Full  -  ( 28 Oct 2022 08:18 )  WBC Count : 5.98 K/uL  RBC Count : 2.63 M/uL  Hemoglobin : 7.1 g/dL  Hematocrit : 23.3 %  Platelet Count - Automated : 329 K/uL  Mean Cell Volume : 88.6 fl  Mean Cell Hemoglobin : 27.0 pg  Mean Cell Hemoglobin Concentration : 30.5 gm/dL  Auto Neutrophil # : 3.23 K/uL  Auto Lymphocyte # : 1.65 K/uL  Auto Monocyte # : 0.78 K/uL  Auto Eosinophil # : 0.25 K/uL  Auto Basophil # : 0.03 K/uL  Auto Neutrophil % : 54.0 %  Auto Lymphocyte % : 27.6 %  Auto Monocyte % : 13.0 %  Auto Eosinophil % : 4.2 %  Auto Basophil % : 0.5 %      10-28    143  |  107  |  17  ----------------------------<  100<H>  3.6   |  22  |  1.12    Ca    8.5      28 Oct 2022 08:18  Phos  4.1     10-28  Mg     1.6     10-28    TPro  6.4  /  Alb  3.1<L>  /  TBili  0.6  /  DBili  x   /  AST  24  /  ALT  18  /  AlkPhos  66  10-28      Urinalysis Basic - ( 28 Oct 2022 08:46 )    Color: Yellow / Appearance: SL Cloudy / S.015 / pH: x  Gluc: x / Ketone: NEGATIVE  / Bili: Negative / Urobili: 0.2 E.U./dL   Blood: x / Protein: Trace mg/dL / Nitrite: POSITIVE   Leuk Esterase: Large / RBC: < 5 /HPF / WBC Many /HPF   Sq Epi: x / Non Sq Epi: 5-10 /HPF / Bacteria: Many /HPF          Radiology :     < from: CT Abdomen and Pelvis w/ Oral Cont (10.27.22 @ 18:05) >    ACC: [id number] EXAM:  CT ABDOMEN AND PELVIS OC                          PROCEDURE DATE:  10/27/2022          INTERPRETATION:  CLINICAL INFORMATION: recent surgery, n/v    COMPARISON: 10/8    CONTRAST/COMPLICATIONS:  IV Contrast: No  Oral Contrast: Yes  Complications: No    PROCEDURE:  CT of the Abdomen and Pelvis was performed.  Sagittal and coronal reformats were performed.    FINDINGS:  LOWER CHEST: Trace anterior pericardial effusion.    LIVER: Within normal limits.  BILE DUCTS: Normal caliber.  GALLBLADDER: Cholecystectomy.  SPLEEN: Within normal limits.  PANCREAS: Within normal limits.  ADRENALS: Within normal limits.  KIDNEYS/URETERS: Within normal limits.    BLADDER: Within normal limits.  REPRODUCTIVE ORGANS: Hysterectomy.    BOWEL: No bowel obstruction. Appendix is normal. Patulous small bowel   anastomosis in the left lower quadrant adjacent to the sigmoid colon. No   extravasated oral contrast to suggest active leak. There are however,   increased from prior, inflammatory changes surrounding anastomosis with   foci of extraluminal air in the mesentery. Inflammatory changes extend to   the bladder dome and abdominal wall surgical incision? evolving fistula.   No drainable fluid density collection to suggest an abscess.  PERITONEUM: No ascites.  VESSELS: Within normal limits.  RETROPERITONEUM/LYMPH NODES: No lymphadenopathy.  ABDOMINAL WALL: Midline surgical incision with ill-defined soft tissue   density in the deep subfascial layers underneath the incision, unclear if   granulation tissue or phlegmon. Evaluation for abscess limited without   intravenous contrast.  BONES: Within normal limits.    IMPRESSION:  Since 10/8/2022, increased inflammatory changes and foci of extraluminal   air surrounding left lower quadrant small bowel anastomosis. Although no   extravasated oral contrast to suggest active leak, occult anastomotic   leak/infection should be considered. Inflammatory changes extend to the   urinary bladder and abdominal wall incision, follow-up clinically for   evolving fistula.                  Vital Signs Last 24 Hrs  T(C): 36.9 (28 Oct 2022 09:04), Max: 37.1 (28 Oct 2022 02:14)  T(F): 98.4 (28 Oct 2022 09:04), Max: 98.7 (28 Oct 2022 02:14)  HR: 100 (28 Oct 2022 09:04) (93 - 111)  BP: 132/73 (28 Oct 2022 09:04) (132/73 - 158/77)  BP(mean): --  RR: 18 (28 Oct 2022 09:04) (18 - 19)  SpO2: 100% (28 Oct 2022 09:04) (96% - 100%)    Parameters below as of 28 Oct 2022 09:04  Patient On (Oxygen Delivery Method): room air            REVIEW OF SYSTEMS:  abdominal pain         Physical Exam:  72 y o woman lying comfortably in semi Lawson's position , awake , alert , no acute complaints     Head : normocephalic , atraumatic    Eyes : PERRLA , EOMI , no nystagmus , sclera anicteric    ENT : nasal discharge , uvula midline , no oropharyngeal erythema / exudate    Neck : supple , negative JVD , negative carotid bruits , no thyromegaly    Chest : CTA bilaterally , neg wheeze / rhonchi / rales / crackles / egophany    Cardiovascular: regular rate and rhythm , neg murmurs / rubs / gallops    Abdomen : soft , non distended , dressing in place     Extremities : WWP , neg cyanosis /clubbing / edema     Neurologic Exam:    Alert and oriented x 3     Motor Exam:          Right UE:               no focal weakness ,  > 3+/5 throughout                                Left UE:                 no focal weakness,  > 3+/5 throughout          Right LE:                no focal weakness,  > 3+/5 throughout      Left LE:                  no focal weakness,  > 3+/5 throughout           Sensation:         intact to light touch x 4 extremities                        DTR :                     biceps/brachioradialis : equal                                              patella/ankle : equal      Gait :  not tested        PM&R Impression :     1) admitted for  N/V and diarrhea with pain to wound incision     Recommendations / Plan :     1) Physical / Occupational therapy focusing on therapeutic exercises , equipment evaluation , bed mobility/transfer out of bed evaluation , progressive ambulation with assistive devices prn .    2) Disposition Plan / Recs  :  patient is requesting alternate subacute rehab facility

## 2022-10-28 NOTE — DISCHARGE NOTE PROVIDER - HOSPITAL COURSE
#Discharge: do not delete    Patient is __ yo M/F with past medical history of _____ presented with _____, found to have _____ (one liner)    Hospital course (by problem):     Patient was discharged to: (home/[facility]/acute rehab/hospice, etc, and with what services – home health PT/RN? Home O2?)    New medications:   Changes to old medications:  Medications that were stopped:    Items to follow up as outpatient:    Physical exam at the time of discharge:       #Discharge: do not delete    Patient is 72y F with past medical history of HTN, HLD, DM, and PSH of  x2, hysterectomy, lap dev , bowel resection , and ex-lap with lysis of adhesions and small bowel resection 1 month ago presented with pain at the incision site, decreased balance, nausea, and vomiting, found to have wound dehiscence     Hospital course (by problem):      Problem/Plan - 1:  ·  Problem: S/P small bowel resection.  ·  Plan: Pt recently admitted for SBO, s/p adhesion release and SBR, complicated by wound dehiscence and bacteremia. Incision noted to be pooling serosanguinous fluid. tenderness on exam and pt complains of pain. Pt afebrile on presentation  Plan:  - F/U surgery recs.  - c/w tylenol for pain control      Problem/Plan - 2:  ·  Problem: General weakness and loss of balance  ·  Plan: Pt complains of generalized weakness and trouble ambulating, requests new [facility]. No focal neurological deficits    Plan:  - F/U PT consult  - Fall precautions  - F/U SW as pt would like to be sent to a new [facility] on D/C.     Problem/Plan - 3:  ·  Problem: Anemia.  ·  Plan: Hgb 7.4 on presentation, pt noted to have Hgb b/w 7-8 at time if discharge. Pt denies signs of active bleeding, syncope, palpitations but does endorse intermittent HUNG. Iron studies 2022 consistent with mixed picture likely ASAEL + chronic inflammation     Plan:  - Monitor for bleeding  - Maintain active T&S  - IV iron before discharge   - Goal transfusion <7.     Problem/Plan - 4:  ·  Problem: Nausea and vomiting.   ·  Plan: Pt presented to [facility] with Diarrhea, N/V for the past couple of days and pain to her abdominal incision s/p SBR  CT AP 10/25: Since 10/8/2022, increased inflammatory changes and foci of extraluminal air surrounding left lower quadrant small bowel anastomosis. Although no extravasated oral contrast to suggest active leak, occult anastomotic leak/infection should be considered. Inflammatory changes extend to the urinary bladder and abdominal wall incision, follow-up clinically for evolving fistula.    Plan:  - Cont zofran PRN  - Cont Maalox PRN  - Consult Surgery in AM to assess incision.       Problem/Plan - 5:  ·  Problem: DM (diabetes mellitus).  ·  Plan: Home meds: metformin 500mg qd    Plan:  - ISS  - Consistent Carb diet.       Problem/Plan - 6:  ·  Problem: HTN (hypertension).  ·  Plan: Home meds: losartan 50 mg qd    Plan:  - Cont losartan 50mg  - Confirm med rec in AM -- discharge note and surescripts do not coincide.       Problem/Plan - 7:  ·  Problem: HLD (hyperlipidemia).  ·  Plan: Home meds: atorvastatin 20mg    Plan:  - Cont atorvastatin 20mg.       Problem/Plan - 8:  ·  Problem: Prophylactic measure.   ·  Plan: F: s/p 1L NS  E: replete K<4, Mg<2  N: DASH/TLC  D: Lovenox    Patient was discharged to: [facility]    New medications:   Changes to old medications:  Medications that were stopped:  Items to follow up as outpatient:  Physical exam at the time of discharge:  GENERAL: NAD, lying in bed comfortably  HEAD:  Atraumatic, Normocephalic  EYES: EOMI, PERRLA, conjunctiva and sclera clear  ENT: Moist mucous membranes  NECK: Supple, No JVD  CHEST/LUNG: Clear to auscultation bilaterally; No rales, rhonchi, wheezing, or rubs. Unlabored respirations  HEART: Regular rate and rhythm; No murmurs, rubs, or gallops  ABDOMEN: Soft, tender along incision, with open area near umbilicus with serosanguinous fluid pooling. LLQ +TTP, BS+4, Nondistended. No hepatomegally  EXTREMITIES:  2+ Peripheral Pulses, brisk capillary refill. No clubbing, cyanosis, or edema  NERVOUS SYSTEM:  AOx3, speech clear. No deficits   MSK: FROM all 4 extremities, full and equal strength  SKIN: No rashes or lesions   #Discharge: do not delete    Patient is 72y F with past medical history of HTN, HLD, DM, and PSH of  x2, hysterectomy, lap dev , bowel resection , and ex-lap with lysis of adhesions and small bowel resection 1 month ago presented with pain at the incision site, decreased balance, nausea, and vomiting, found to have wound dehiscence     Hospital course (by problem):      Problem 1:  Abdominal pain  Pt presented with abdominal pain near umbilical wound from ex lap for adhesions in september. Pt also S/P small bowel resection with pain in LLQ. Pt recently admitted for SBO, s/p adhesion release and SBR, complicated by wound dehiscence and bacteremia. Incision noted to be pooling serosanguinous fluid. Tenderness on exam. Pt afebrile on presentation. Surgery consulted and stated no surgical intervention needed now, continue with dressing changes. c/w tylenol for pain control      Problem 2: General weakness and loss of balance  Pt complains of generalized weakness and trouble ambulating, requests new [facility]. No focal neurological deficits. Possibly secondary to poor PO intake in setting of recent bouts of vomiting and diarrhea as well as ongoing abdominal pain.      Problem 3: Anemia.  Hgb 7.4 on presentation, pt noted to have Hgb b/w 7-8 at time if discharge. Pt denies signs of active bleeding, syncope, palpitations but does endorse intermittent HUNG. Iron studies 2022 consistent with mixed picture likely ASAEL + chronic inflammation. IV iron given      Problem 4: Nausea and vomiting.   possibly in setting of gastroenteritis vs gastritis. Pt presented to [facility] with Diarrhea, N/V for the past couple of days and pain to her abdominal incision s/p SBR. Afebrile. Lipase neg. Normal liver enzymes.  CT AP 10/25: Since 10/8/2022, increased inflammatory changes and foci of extraluminal air surrounding left lower quadrant small bowel anastomosis. Although no extravasated oral contrast to suggest active leak, occult anastomotic leak/infection should be considered. Inflammatory changes extend to the urinary bladder and abdominal wall incision, follow-up clinically for evolving fistula. Surgery consulted with no acute intervention recommended.   Continued on Maalox and zofran.     Problem 5: SO  Likely pre-renal in nature in setting of vomiting and diarrhea with poor PO intake. Presented with Cr of 1.6, unknown baseline. s/p fluid resuscitation with improvement in Cr to 1.12       Problem 5: DM (diabetes mellitus).  ·  Plan: Home meds: metformin 500mg qd. Insulin sliding scale inpt       Problem 6: HTN (hypertension).  ·  Plan: Home meds: losartan 50 mg qd. Cont losartan 50mg       Problem 7: HLD (hyperlipidemia).  ·  Plan: Home meds: atorvastatin 20mg. Cont atorvastatin 20mg.      Patient was discharged to: [facility]    New medications:   Changes to old medications:  Medications that were stopped:  Items to follow up as outpatient:  Physical exam at the time of discharge:  GENERAL: NAD, lying in bed comfortably  HEAD:  Atraumatic, Normocephalic  EYES: EOMI, PERRLA, conjunctiva and sclera clear  ENT: Moist mucous membranes  NECK: Supple, No JVD  CHEST/LUNG: Clear to auscultation bilaterally; No rales, rhonchi, wheezing, or rubs. Unlabored respirations  HEART: Regular rate and rhythm; No murmurs, rubs, or gallops  ABDOMEN: Soft, tender along incision, with open area near umbilicus with serosanguinous fluid pooling. LLQ +TTP, BS+4, Nondistended. No hepatomegally  EXTREMITIES:  2+ Peripheral Pulses, brisk capillary refill. No clubbing, cyanosis, or edema  NERVOUS SYSTEM:  AOx3, speech clear. No deficits   MSK: FROM all 4 extremities, full and equal strength  SKIN: No rashes or lesions   #Discharge: do not delete    Patient is 72y F with past medical history of HTN, HLD, DM, and PSH of  x2, hysterectomy, lap dev , bowel resection , and ex-lap with lysis of adhesions and small bowel resection 1 month ago presented with pain at the incision site, decreased balance, nausea, and vomiting, found to have wound dehiscence     Hospital course (by problem):      Problem 1:  Abdominal pain  Pt presented with abdominal pain near umbilical wound from ex lap for adhesions in september. Pt also S/P small bowel resection with pain in LLQ. Pt recently admitted for SBO, s/p adhesion release and SBR, complicated by wound dehiscence and bacteremia. Incision noted to be pooling serosanguinous fluid. Tenderness on exam. Pt afebrile on presentation. Surgery consulted and stated no surgical intervention needed now, continue with dressing changes. c/w tylenol for pain control      Problem 2: General weakness and loss of balance  Pt complains of generalized weakness and trouble ambulating, requests new [facility]. No focal neurological deficits. Possibly secondary to poor PO intake in setting of recent bouts of vomiting and diarrhea as well as ongoing abdominal pain.      Problem 3: Anemia.  Hgb 7.4 on presentation, pt noted to have Hgb b/w 7-8 at time if discharge. Pt denies signs of active bleeding, syncope, palpitations but does endorse intermittent HUNG. Iron studies 2022 consistent with mixed picture likely ASAEL + chronic inflammation. IV iron given      Problem 4: Nausea and vomiting.   possibly in setting of gastroenteritis vs gastritis. Pt presented to [facility] with Diarrhea, N/V for the past couple of days and pain to her abdominal incision s/p SBR. Afebrile. Lipase neg. Normal liver enzymes.  CT AP 10/25: Since 10/8/2022, increased inflammatory changes and foci of extraluminal air surrounding left lower quadrant small bowel anastomosis. Although no extravasated oral contrast to suggest active leak, occult anastomotic leak/infection should be considered. Inflammatory changes extend to the urinary bladder and abdominal wall incision, follow-up clinically for evolving fistula. Surgery consulted with no acute intervention recommended.   Continued on Maalox and zofran.     Problem 5: SO  Likely pre-renal in nature in setting of vomiting and diarrhea with poor PO intake. Presented with Cr of 1.6, unknown baseline. s/p fluid resuscitation with improvement in Cr to 1.12       Problem 6: DM (diabetes mellitus).  ·  Plan: Home meds: metformin 500mg qd. Insulin sliding scale inpt       Problem 7: HTN (hypertension).  ·  Plan: Home meds: losartan 50 mg qd. Cont losartan 50mg       Problem 8: HLD (hyperlipidemia).  ·  Plan: Home meds: atorvastatin 20mg. Cont atorvastatin 20mg.      Patient was discharged to: [facility]    New medications: ____  Changes to old medications: none  Medications that were stopped: none  Items to follow up as outpatient:    Physical exam at the time of discharge:    GENERAL: NAD, lying in bed comfortably  HEAD:  Atraumatic, Normocephalic  EYES: EOMI, PERRLA, conjunctiva and sclera clear  ENT: Moist mucous membranes  NECK: Supple, No JVD  CHEST/LUNG: Clear to auscultation bilaterally; No rales, rhonchi, wheezing, or rubs. Unlabored respirations  HEART: Regular rate and rhythm; No murmurs, rubs, or gallops  ABDOMEN: Soft, tender along incision, with open area near umbilicus with serosanguinous fluid pooling. LLQ +TTP, BS+4, Nondistended. No hepatomegally  EXTREMITIES:  2+ Peripheral Pulses, brisk capillary refill. No clubbing, cyanosis, or edema  NERVOUS SYSTEM:  AOx3, speech clear. No deficits   MSK: FROM all 4 extremities, full and equal strength  SKIN: No rashes or lesions   #Discharge: do not delete    Patient is 72y F with past medical history of HTN, HLD, DM, and PSH of  x2, hysterectomy, lap dev , bowel resection , and ex-lap with lysis of adhesions and small bowel resection 1 month ago presented with pain at the incision site, decreased balance, nausea, and vomiting, found to have wound dehiscence     Hospital course (by problem):      Problem 1:  Abdominal pain  Pt presented with abdominal pain near umbilical wound from ex lap for adhesions in september. Pt also S/P small bowel resection with pain in LLQ. Pt recently admitted for SBO, s/p adhesion release and SBR, complicated by wound dehiscence and bacteremia. Incision noted to be pooling serosanguinous fluid. Tenderness on exam. Pt afebrile on presentation. Surgery consulted and stated no surgical intervention needed now, continue with dressing changes. c/w tylenol for pain control      Problem 2: General weakness and loss of balance  Pt complains of generalized weakness and trouble ambulating, requests new [facility]. No focal neurological deficits. Possibly secondary to poor PO intake in setting of recent bouts of vomiting and diarrhea as well as ongoing abdominal pain.      Problem 3: Anemia.  Hgb 7.4 on presentation, pt noted to have Hgb b/w 7-8 at time if discharge. Pt denies signs of active bleeding, syncope, palpitations but does endorse intermittent HUNG. Iron studies 2022 consistent with mixed picture likely ASAEL + chronic inflammation. IV iron given      Problem 4: Nausea and vomiting.   possibly in setting of gastroenteritis vs gastritis. Pt presented to [facility] with Diarrhea, N/V for the past couple of days and pain to her abdominal incision s/p SBR. Afebrile. Lipase neg. Normal liver enzymes.  CT AP 10/25: Since 10/8/2022, increased inflammatory changes and foci of extraluminal air surrounding left lower quadrant small bowel anastomosis. Although no extravasated oral contrast to suggest active leak, occult anastomotic leak/infection should be considered. Inflammatory changes extend to the urinary bladder and abdominal wall incision, follow-up clinically for evolving fistula. Surgery consulted with no acute intervention recommended.   Continued on Maalox and zofran.       Problem 5: SO  Likely pre-renal in nature in setting of vomiting and diarrhea with poor PO intake. Presented with Cr of 1.6, unknown baseline. s/p fluid resuscitation with improvement in Cr to 1.12    #UTI  Pt found to have UA pos for leuk est and nitrites, culture growing Klebsiella. Per GI pt was reporting urinary symptoms. Was started on CTX for 2 days and will receive   one dose of bactrim for discharge      Problem 6: DM (diabetes mellitus).  Plan: Home meds: metformin 500mg qd. Insulin sliding scale inpt       Problem 7: HTN (hypertension).  Plan: Home meds: losartan 50 mg qd. Cont losartan 50mg       Problem 8: HLD (hyperlipidemia).  Plan: Home meds: atorvastatin 20mg. Cont atorvastatin 20mg.      Patient was discharged to: [facility]    New medications: Bactrim for 1 day  Changes to old medications: none  Medications that were stopped: none      Physical exam at the time of discharge:    GENERAL: NAD, lying in bed comfortably  HEAD:  Atraumatic, Normocephalic  EYES: EOMI, PERRLA, conjunctiva and sclera clear  ENT: Moist mucous membranes  NECK: Supple, No JVD  CHEST/LUNG: Clear to auscultation bilaterally; No rales, rhonchi, wheezing, or rubs. Unlabored respirations  HEART: Regular rate and rhythm; No murmurs, rubs, or gallops  ABDOMEN: Soft, tender along incision, with open area near umbilicus with serosanguinous fluid pooling. LLQ +TTP, BS+4, Nondistended. No hepatomegally  EXTREMITIES:  2+ Peripheral Pulses, brisk capillary refill. No clubbing, cyanosis, or edema  NERVOUS SYSTEM:  AOx3, speech clear. No deficits   MSK: FROM all 4 extremities, full and equal strength  SKIN: No rashes or lesions

## 2022-10-28 NOTE — H&P ADULT - PROBLEM SELECTOR PLAN 4
Home meds: metformin 500mg qd    Plan:  - ISS  - Consistent Carb diet Pt recently admitted for SBO, s/p adhesion release and SBR, complicated by wound dehiscence and bacteremia. Incision noted to be pooling serosanguinous fluid. tenderness on exam and pt complains of pain. Pt afebrile on presentation    Plan:  - F/U surgery recs

## 2022-10-28 NOTE — PROGRESS NOTE ADULT - PROBLEM SELECTOR PLAN 3
Hgb 7.4 on presentation, pt noted to have Hgb b/w 7-8 at time if discharge. Pt denies signs of active bleeding, syncope, palpitations but does endorse intermittent HUNG. Iron studies 9/20/2022 consistent with mixed picture likely ASAEL + chronic inflammation     Plan:  - Monitor for bleeding  - Maintain active T&S  - Goal transfusion <7 small bowel resection in past year with current pain in LLQ. CT AP 10/25: Since 10/8/2022, increased inflammatory changes and foci of extraluminal air surrounding left lower quadrant small bowel anastomosis. Although no extravasated oral contrast to suggest active leak, occult anastomotic leak/infection should be considered. Inflammatory changes extend to the urinary bladder and abdominal wall incision, follow-up clinically for evolving fistula.  Plan:  - f/u on surgery recs

## 2022-10-28 NOTE — PROGRESS NOTE ADULT - PROBLEM SELECTOR PLAN 9
F: s/p 1L NS  E: replete K<4, Mg<2  N: DASH/TLC  D: Lovenox    Dispo: [facility]  Code: FULL Home meds: atorvastatin 20mg    Plan:  - Cont atorvastatin 20mg

## 2022-10-28 NOTE — PROGRESS NOTE ADULT - PROBLEM SELECTOR PLAN 4
Pt recently admitted for SBO, s/p adhesion release and SBR, complicated by wound dehiscence and bacteremia. Incision noted to be pooling serosanguinous fluid. tenderness on exam and pt complains of pain. Pt afebrile on presentation    Plan:  - F/U surgery recs possibly 2/2 poor po intake with ongoing abdominal pain. Pt complains of generalized weakness and trouble ambulating, requests new [facility]. No focal neurological deficits. PT consulted    Plan:  - encourage PO intake + replete electrolytes   - Fall precautions  - F/u SW as pt would like to be sent to a new [facility] on D/C Resolved. Likely pre renal in setting of poor PO intake with vomiting and diarrhea. Presented w/ Cr of 1.6, unknown baseline. S/ p fluid resuscitation ,Cr down to 1.12.

## 2022-10-28 NOTE — H&P ADULT - PROBLEM SELECTOR PLAN 1
Pt presented to [facility] with Diarrhea, N/V for the past couple of days and pain to her abdominal incision s/p SBR  CT AP 10/25: Since 10/8/2022, increased inflammatory changes and foci of extraluminal air surrounding left lower quadrant small bowel anastomosis. Although no extravasated oral contrast to suggest active leak, occult anastomotic leak/infection should be considered. Inflammatory changes extend to the urinary bladder and abdominal wall incision, follow-up clinically for evolving fistula.    Plan:  - Cont zofran PRN  - Cont Maalox PRN Pt presented to [facility] with Diarrhea, N/V for the past couple of days and pain to her abdominal incision s/p SBR  CT AP 10/25: Since 10/8/2022, increased inflammatory changes and foci of extraluminal air surrounding left lower quadrant small bowel anastomosis. Although no extravasated oral contrast to suggest active leak, occult anastomotic leak/infection should be considered. Inflammatory changes extend to the urinary bladder and abdominal wall incision, follow-up clinically for evolving fistula.    Plan:  - Cont zofran PRN  - Cont Maalox PRN  - Consult Surgery in AM to assess incision

## 2022-10-28 NOTE — H&P ADULT - PROBLEM SELECTOR PLAN 8
F:   E:   N:  D:    Dispo:  Code: F: s/p 1L NS  E: replete K<4, Mg<2  N: DASH/TLC  D: Lovenox    Dispo: [facility]  Code: FULL

## 2022-10-28 NOTE — DISCHARGE NOTE PROVIDER - NSDCCPCAREPLAN_GEN_ALL_CORE_FT
PRINCIPAL DISCHARGE DIAGNOSIS  Diagnosis: Abdominal pain  Assessment and Plan of Treatment: You were examined for abdominal pain and found to have no serious cause of the abdominal pain at this time.   Please increase your diet as tolerated. Please increase the activity as tolerated. Please take all your medications as prescribed. Please follow up with the appropriate doctors.   It is important that you carefully watch for changes in the abdominal pain that might suggest a serious condition. See your doctor or return to the emergency department immediately if your condition gets worse. You can also call us if you are not sure what to do.  See your doctor tomorrow or as soon as possible if you are not getting better.  These symptoms suggest serious causes of abdominal pain. Call your doctor or return to the emergency  department if you are feeling worse or if:  1. You are unable to walk easily or are walking in a bent-over position.  2. Stepping or jumping results in severe pain.  3. You are experiencing pain in the right lower part of the abdomen.  4. The abdomen is hard and painful when you press on it.  5. There is severe abdominal pain when coughing.  6. You are vomiting or gagging.  7. Vomiting is bloody or green or looks like chocolate or coffee.  8. The belly looks very full or big.  9. You are experiencing severe pain every 3 to 20 minutes.  10. Stool (poop) is bloody or black.  11. You are drowsy, weak, fussy, or pale  *****************************************************  Per the surgery team, it is important to change the dressing over your wound each day to ensure that it remains clean and dry.

## 2022-10-28 NOTE — PROGRESS NOTE ADULT - PROBLEM SELECTOR PLAN 6
Home meds: losartan 50 mg qd    Plan:  - Cont losartan 50mg  - Confirm med rec in AM -- discharge note and surescripts do not coincide Home meds: metformin 500mg qd  Plan:  - ISS  - Consistent Carb diet likely mixed picture likely ASAEL + chronic inflammation.  Hgb 7.4 on presentation, pt noted to have Hgb b/w 7-8 on last discharge. No sings of active bleeding. Iron studies 9/20/2022 showing decreased iron with increased ferritin  and decreased iron binding capacity. Given iron on 10/28  Plan:  - Monitor for bleeding  - Maintain active T&S  - Goal transfusion <7

## 2022-10-28 NOTE — H&P ADULT - PROBLEM SELECTOR PLAN 7
F: s/p 1L NS  E: replete K<4, Mg<2  N: DASH/TLC  D: Lovenox    Dispo: [facility]  Code: FULL Home meds: atorvastatin 20mg    Plan:  - Cont atorvastatin 20mg

## 2022-10-28 NOTE — H&P ADULT - PROBLEM SELECTOR PLAN 6
Home meds: atorvastatin 20mg    Plan:  - Cont atorvastatin 20mg Home meds: losartan 50 mg qd    Plan:  - Cont losartan 50mg  - Confirm med rec in AM -- discharge note and surescripts do not coincide

## 2022-10-28 NOTE — H&P ADULT - ATTENDING COMMENTS
Patient was seen and examined at bedside on 10/28/2022 at 1230 pm. Patient reports feeling unwell, complaining of abdominal pain. ROS is otherwise negative. Vitals, labwork and pertinent imaging reviewed - CBC and BMP are grossly unremarkable. Physical exam - NAD, AAO x 4, PERRLA, EOMI, MMM, supple neck, chest - CTA b/l, CV - rrr, s1s2, no m/r/g, abd - soft, + BS, mild TTP at surgical site, ext - wwp, psych - normal affect, skin - no rash    Plan  -Surgery consulted  -PT consulted - unclear if home vs [facility] - will need reassessment as pt is medically ready for d/c  -If continues to decline choice will be given d/c notice

## 2022-10-28 NOTE — H&P ADULT - NSHPLABSRESULTS_GEN_ALL_CORE
.  LABS:                         7.4    6.62  )-----------( 307      ( 27 Oct 2022 15:36 )             24.4     10-27    140  |  103  |  24<H>  ----------------------------<  112<H>  3.5   |  29  |  1.43<H>    Ca    8.7      27 Oct 2022 15:36    TPro  7.1  /  Alb  2.5<L>  /  TBili  0.7  /  DBili  x   /  AST  16  /  ALT  24  /  AlkPhos  72  10-27          Lactate, Blood: 0.7 mmoL/L (10-27 @ 15:36)      RADIOLOGY, EKG & ADDITIONAL TESTS: Reviewed.

## 2022-10-28 NOTE — H&P ADULT - HISTORY OF PRESENT ILLNESS
In the ED:  Initial vital signs: T: XX F, HR: XX, BP: XX, R: XX, SpO2: XX% on RA  Labs: significant for  Imaging:  CXR:   EKG:   Medications:   Consults: none  73 y/o female with PMH of HTN, DM and PSH of  x2, hysterectomy and lap dev ( w/ Dr. Clifford) recently discharged (-10/21) from surgery service for ex-lap w/ lysis of adhesions and small bowel resection, course complicated by dehiscence enterococcus bacteremia treated w/ daptomycin and PO vanc, further complicated by rhabdo 2/2 to daptomycin. Pt was discharged to [facility] at that time but presented to [facility] 10/25 w/ complaints of diarrhea for 3 days w/ negative stool studies.  Pt re-presented today w/ complaints of  pain in the area of her midline incision x1 day as well as nausea and vomiting. She reports a bowel movement in which she noted blood in the stool and subjective fevers.     In the ED:  Initial vital signs: T: 97.9 F, HR: 107, BP: 144/82, R: 19, SpO2: 99% on RA  Labs: significant for Hgb/Ht 7.4/24.4, Cr 1.43 (1.48 on discharge 10/21), albumin 2.5, GFR 39  Imaging:  CXR: No acute cardiopulmonary disease process.  EKG: pending  Medications: Toradol 15 mg, odansentron 4mg IV, NS 1L  Consults: none 71 y/o female with PMH of HTN, DM and PSH of  x2, hysterectomy and lap dev ( w/ Dr. Clifford) recently discharged (-10/21) from surgery service for ex-lap w/ lysis of adhesions and small bowel resection, course complicated by dehiscence enterococcus bacteremia treated w/ daptomycin and PO vanc, further complicated by rhabdo 2/2 to daptomycin. Pt was discharged to [facility] where she left early due to poor conditions. Pt presented to [facility] 10/25 w/ complaints of diarrhea for 3 days w/ negative stool studies. Denies melena, hematochezia, abdominal distension.   Pt re-presented today w/ complaints of  pain in the area of her midline incision x1 day as well as nausea and vomiting. She reports a bowel movement in which she noted blood in the stool and subjective fevers.     In the ED:  Initial vital signs: T: 97.9 F, HR: 107, BP: 144/82, R: 19, SpO2: 99% on RA  Labs: significant for Hgb/Ht 7.4/24.4, Cr 1.43 (1.48 on discharge 10/21), albumin 2.5, GFR 39  Imaging:  CXR: No acute cardiopulmonary disease process.  CT A/P 10/25: Since 10/8/2022, increased inflammatory changes and foci of extraluminal air surrounding left lower quadrant small bowel anastomosis. Although no extravasated oral contrast to suggest active leak, occult anastomotic leak/infection should be considered. Inflammatory changes extend to the urinary bladder and abdominal wall incision, follow-up clinically for evolving fistula.  EKG: pending  Medications: Toradol 15 mg, odansentron 4mg IV, NS 1L  Consults: none 71 y/o female with PMH of HTN, DM and PSH of  x2, hysterectomy and lap dev ( w/ Dr. Clifford) recently discharged (-10/21) from surgery service for ex-lap w/ lysis of adhesions and small bowel resection, course complicated by dehiscence enterococcus bacteremia treated w/ daptomycin and PO vanc, further complicated by rhabdo 2/2 to daptomycin. Pt was discharged to [facility] where she left early due to poor conditions. Pt presented to [facility] 10/25 w/ complaints of diarrhea for 3 days w/ negative stool studies. Denies melena, hematochezia, abdominal distension.   Pt re-presented today w/ complaints of  pain in the area of her midline incision x1 day as well as nausea and vomiting. She reports a bowel movement in which she noted blood in the stool and subjective fevers.     In the ED:  Initial vital signs: T: 97.9 F, HR: 107, BP: 144/82, R: 19, SpO2: 99% on RA  Labs: significant for Hgb/Ht 7.4/24.4, Cr 1.43 (1.48 on discharge 10/21), albumin 2.5, GFR 39  Imaging:  CXR: No acute cardiopulmonary disease process.  CT A/P 10/25: Since 10/8/2022, increased inflammatory changes and foci of extraluminal air surrounding left lower quadrant small bowel anastomosis. Although no extravasated oral contrast to suggest active leak, occult anastomotic leak/infection should be considered. Inflammatory changes extend to the urinary bladder and abdominal wall incision, follow-up clinically for evolving fistula.  EKG: NSR | HR 91, QTc 460  Medications: Toradol 15 mg, odansentron 4mg IV, NS 1L  Consults: none 73 y/o female with PMH of HTN, DM and PSH of  x2, hysterectomy and lap dev ( w/ Dr. Clifford) recently discharged (-10/21) from surgery service for ex-lap w/ lysis of adhesions and small bowel resection, course complicated by dehiscence enterococcus bacteremia treated w/ daptomycin and PO vanc, further complicated by rhabdo 2/2 to daptomycin. Pt was discharged to [facility] where she left early due to poor conditions. Pt presented to [facility] 10/25 w/ complaints of diarrhea for 3 days w/ negative stool studies. Denies melena, hematochezia, abdominal distension.   Pt re-presented today w/ complaints of  pain in the area of her midline incision x1 day as well as nausea and vomiting. She reports a bowel movement in which she noted blood in the stool and subjective fevers. Pt requesting placement in a new [facility] as her home is "hazardous" and she did not like her previous [facility] but continues to have generalized weakness and difficulty ambulating.     In the ED:  Initial vital signs: T: 97.9 F, HR: 107, BP: 144/82, R: 19, SpO2: 99% on RA  Labs: significant for Hgb/Ht 7.4/24.4, Cr 1.43 (1.48 on discharge 10/21), albumin 2.5, GFR 39  Imaging:  CXR: No acute cardiopulmonary disease process.  CT A/P 10/25: Since 10/8/2022, increased inflammatory changes and foci of extraluminal air surrounding left lower quadrant small bowel anastomosis. Although no extravasated oral contrast to suggest active leak, occult anastomotic leak/infection should be considered. Inflammatory changes extend to the urinary bladder and abdominal wall incision, follow-up clinically for evolving fistula.  EKG: NSR | HR 91, QTc 460  Medications: Toradol 15 mg, odansentron 4mg IV, NS 1L  Consults: none

## 2022-10-29 DIAGNOSIS — R11.0 NAUSEA: ICD-10-CM

## 2022-10-29 LAB
GLUCOSE BLDC GLUCOMTR-MCNC: 107 MG/DL — HIGH (ref 70–99)
GLUCOSE BLDC GLUCOMTR-MCNC: 114 MG/DL — HIGH (ref 70–99)
GLUCOSE BLDC GLUCOMTR-MCNC: 88 MG/DL — SIGNIFICANT CHANGE UP (ref 70–99)
GLUCOSE BLDC GLUCOMTR-MCNC: 96 MG/DL — SIGNIFICANT CHANGE UP (ref 70–99)

## 2022-10-29 PROCEDURE — 99232 SBSQ HOSP IP/OBS MODERATE 35: CPT | Mod: GC

## 2022-10-29 RX ORDER — IRON SUCROSE 20 MG/ML
200 INJECTION, SOLUTION INTRAVENOUS EVERY 24 HOURS
Refills: 0 | Status: DISCONTINUED | OUTPATIENT
Start: 2022-10-29 | End: 2022-11-01

## 2022-10-29 RX ORDER — POLYETHYLENE GLYCOL 3350 17 G/17G
17 POWDER, FOR SOLUTION ORAL DAILY
Refills: 0 | Status: DISCONTINUED | OUTPATIENT
Start: 2022-10-29 | End: 2022-11-01

## 2022-10-29 RX ORDER — SENNA PLUS 8.6 MG/1
2 TABLET ORAL AT BEDTIME
Refills: 0 | Status: DISCONTINUED | OUTPATIENT
Start: 2022-10-29 | End: 2022-11-01

## 2022-10-29 RX ADMIN — LOSARTAN POTASSIUM 50 MILLIGRAM(S): 100 TABLET, FILM COATED ORAL at 06:31

## 2022-10-29 RX ADMIN — SENNA PLUS 2 TABLET(S): 8.6 TABLET ORAL at 21:27

## 2022-10-29 RX ADMIN — ONDANSETRON 8 MILLIGRAM(S): 8 TABLET, FILM COATED ORAL at 17:46

## 2022-10-29 RX ADMIN — IRON SUCROSE 110 MILLIGRAM(S): 20 INJECTION, SOLUTION INTRAVENOUS at 13:16

## 2022-10-29 RX ADMIN — ENOXAPARIN SODIUM 40 MILLIGRAM(S): 100 INJECTION SUBCUTANEOUS at 06:38

## 2022-10-29 NOTE — PROGRESS NOTE ADULT - SUBJECTIVE AND OBJECTIVE BOX
Physical Medicine and Rehabilitation Progress Note :       Patient is a 72y old  Female who presents with a chief complaint of Abdominal pain (29 Oct 2022 14:38)      HPI:  71 y/o female with PMH of HTN, DM and PSH of  x2, hysterectomy and lap dev ( w/ Dr. Clifford) recently discharged (-10/21) from surgery service for ex-lap w/ lysis of adhesions and small bowel resection, course complicated by dehiscence enterococcus bacteremia treated w/ daptomycin and PO vanc, further complicated by rhabdo 2/2 to daptomycin. Pt was discharged to [facility] where she left early due to poor conditions. Pt presented to [facility] 10/25 w/ complaints of diarrhea for 3 days w/ negative stool studies. Denies melena, hematochezia, abdominal distension.   Pt re-presented today w/ complaints of  pain in the area of her midline incision x1 day as well as nausea and vomiting. She reports a bowel movement in which she noted blood in the stool and subjective fevers. Pt requesting placement in a new [facility] as her home is "hazardous" and she did not like her previous [facility] but continues to have generalized weakness and difficulty ambulating.     In the ED:  Initial vital signs: T: 97.9 F, HR: 107, BP: 144/82, R: 19, SpO2: 99% on RA  Labs: significant for Hgb/Ht 7.4/24.4, Cr 1.43 (1.48 on discharge 10/21), albumin 2.5, GFR 39  Imaging:  CXR: No acute cardiopulmonary disease process.  CT A/P 10/25: Since 10/8/2022, increased inflammatory changes and foci of extraluminal air surrounding left lower quadrant small bowel anastomosis. Although no extravasated oral contrast to suggest active leak, occult anastomotic leak/infection should be considered. Inflammatory changes extend to the urinary bladder and abdominal wall incision, follow-up clinically for evolving fistula.  EKG: NSR | HR 91, QTc 460  Medications: Toradol 15 mg, odansentron 4mg IV, NS 1L  Consults: none   (28 Oct 2022 03:29)                            7.1    5.98  )-----------( 329      ( 28 Oct 2022 08:18 )             23.3       10-28    143  |  107  |  17  ----------------------------<  100<H>  3.6   |  22  |  1.12    Ca    8.5      28 Oct 2022 08:18  Phos  4.1     10-  Mg     1.6     10-28    TPro  6.4  /  Alb  3.1<L>  /  TBili  0.6  /  DBili  x   /  AST  24  /  ALT  18  /  AlkPhos  66  10-28    Vital Signs Last 24 Hrs  T(C): 36.8 (29 Oct 2022 09:14), Max: 36.8 (29 Oct 2022 09:14)  T(F): 98.3 (29 Oct 2022 09:14), Max: 98.3 (29 Oct 2022 09:14)  HR: 90 (29 Oct 2022 09:14) (86 - 90)  BP: 146/78 (29 Oct 2022 09:14) (145/77 - 146/78)  BP(mean): --  RR: 16 (29 Oct 2022 09:14) (16 - 18)  SpO2: 98% (29 Oct 2022 09:14) (98% - 100%)    Parameters below as of 29 Oct 2022 09:14  Patient On (Oxygen Delivery Method): room air        MEDICATIONS  (STANDING):  dextrose 5%. 1000 milliLiter(s) (50 mL/Hr) IV Continuous <Continuous>  dextrose 5%. 1000 milliLiter(s) (100 mL/Hr) IV Continuous <Continuous>  dextrose 50% Injectable 25 Gram(s) IV Push once  dextrose 50% Injectable 12.5 Gram(s) IV Push once  dextrose 50% Injectable 25 Gram(s) IV Push once  enoxaparin Injectable 40 milliGRAM(s) SubCutaneous every 24 hours  glucagon  Injectable 1 milliGRAM(s) IntraMuscular once  insulin lispro (ADMELOG) corrective regimen sliding scale   SubCutaneous three times a day before meals  iron sucrose IVPB 200 milliGRAM(s) IV Intermittent every 24 hours  losartan 50 milliGRAM(s) Oral daily  polyethylene glycol 3350 17 Gram(s) Oral daily  senna 2 Tablet(s) Oral at bedtime    MEDICATIONS  (PRN):  acetaminophen     Tablet .. 650 milliGRAM(s) Oral every 6 hours PRN Temp greater or equal to 38C (100.4F), Mild Pain (1 - 3)  aluminum hydroxide/magnesium hydroxide/simethicone Suspension 30 milliLiter(s) Oral every 4 hours PRN Dyspepsia  dextrose Oral Gel 15 Gram(s) Oral once PRN Blood Glucose LESS THAN 70 milliGRAM(s)/deciliter  ondansetron    Tablet 8 milliGRAM(s) Oral every 8 hours PRN Nausea and/or Vomiting      Initial PhysicalTherapy Functional Status Assessment :       Previous Level of Function:     · Additional Comments	Pt is a poor historian due to agitation and frustration with current medical status and placement status. Per chart pt admitted from rehab, pt does not want to return to the rehab she was in.    Cognitive Status Examination:   · Orientation	oriented to person, place, time and situation  · Level of Consciousness	alert; agitated; combative  · Follows Commands and Answers Questions	50% of the time; pt distracted by agitation and frustration    Range of Motion Exam:   · Range of Motion Examination	bilateral upper extremity ROM was WFL (within functional limits); bilateral lower extremity ROM was WFL (within functional limits)    Manual Muscle Testing:   · Manual Muscle Testing Results	>3+/5 MMT BUE and BLE    Bed Mobility: Supine to Sit:     · Level of Trinity	contact guard  · Physical Assist/Nonphysical Assist	1 person assist    Bed Mobility Analysis:     · Impairments Contributing to Impaired Bed Mobility	impaired balance; decreased strength    Transfer: Sit to Stand:     · Level of Trinity	contact guard  · Physical Assist/Nonphysical Assist	1 person assist  · Weight-Bearing Restrictions	weight-bearing as tolerated    Transfer: Stand to Sit:     · Level of Trinity	contact guard  · Physical Assist/Nonphysical Assist	1 person assist    Sit/Stand Transfer Safety Analysis:     · Impairments Contributing to Impaired Transfers	impaired balance; decreased strength    Gait Skills:     · Level of Trinity	contact guard  · Physical Assist/Nonphysical Assist	1 person assist  · Weight-Bearing Restrictions	weight-bearing as tolerated  · Gait Distance	2 steps bedside    Gait Analysis:     · Gait Pattern Used	2-point gait  · Impairments Contributing to Gait Deviations	impaired balance; decreased strength    Balance Skills Assessment:     · Sitting Balance: Static	fair balance  · Sitting Balance: Dynamic	fair balance  · Sit-to-Stand Balance	fair balance  · Standing Balance: Static	fair minus  · Standing Balance: Dynamic	fair minus  · Systems Impairment Contributing to Balance Disturbance	musculoskeletal  · Identified Impairments Contributing to Balance Disturbance	decreased strength    Clinical Impressions:   · Criteria for Skilled Therapeutic Interventions	impairments found; functional limitations in following categories; risk reduction/prevention; predicted duration of therapy intervention; anticipated discharge recommendation; therapy frequency; anticipated equipment needs at discharge; rehab potential  · Impairments Found (describe specific impairments)	aerobic capacity/endurance; ergonomics and body mechanics; fine motor; gait, locomotion, and balance; gross motor; joint integrity and mobility; integumentary integrity; muscle strength; poor safety awareness; posture; ROM  · Functional Limitations in Following Categories (describe specific limitations)	self-care; home management; work; community/leisure  · Risk Reduction/Prevention (Describe Specific Areas of risk reduction/prevention)	risk factors  · Risk Areas	fall  · Rehab Potential	good, to achieve stated therapy goals  · Therapy Frequency	2-3x/week        PM&R Impression : as above    Current Disposition Plan Recommendations :    subacute rehab placement

## 2022-10-29 NOTE — PROGRESS NOTE ADULT - SUBJECTIVE AND OBJECTIVE BOX
SUBJECTIVE: Pt seen and examined at bedside this am by surgery team. Tolerated breakfast this morning. Continues to complain of abdominal "pressure". Had mild nausea overnight, no vomiting. States she's passing less gas since coming to the hospital. Had small bowel movements yesterday. Denies f/n/v/cp/sob.    Vital Signs Last 24 Hrs  T(C): 36.8 (29 Oct 2022 09:14), Max: 36.9 (28 Oct 2022 15:21)  T(F): 98.3 (29 Oct 2022 09:14), Max: 98.5 (28 Oct 2022 15:21)  HR: 90 (29 Oct 2022 09:14) (86 - 94)  BP: 146/78 (29 Oct 2022 09:14) (141/69 - 147/80)  BP(mean): --  RR: 16 (29 Oct 2022 09:14) (16 - 18)  SpO2: 98% (29 Oct 2022 09:14) (98% - 100%)    Parameters below as of 29 Oct 2022 09:14  Patient On (Oxygen Delivery Method): room air        PHYSICAL EXAM:   Gen: Awake, alert, NAD  CV: RRR  Pulm: no respiratory distress on RA  Abd: soft, ND, mild epigastric tenderness, no rebound or guarding, dressing c/d/i   Ext: WWP, no edema    I&O's Detail      LABS:                        7.1    5.98  )-----------( 329      ( 28 Oct 2022 08:18 )             23.3     10-28    143  |  107  |  17  ----------------------------<  100<H>  3.6   |  22  |  1.12    Ca    8.5      28 Oct 2022 08:18  Phos  4.1     10-28  Mg     1.6     10-28    TPro  6.4  /  Alb  3.1<L>  /  TBili  0.6  /  DBili  x   /  AST  24  /  ALT  18  /  AlkPhos  66  10-28    LIVER FUNCTIONS - ( 28 Oct 2022 08:18 )  Alb: 3.1 g/dL / Pro: 6.4 g/dL / ALK PHOS: 66 U/L / ALT: 18 U/L / AST: 24 U/L / GGT: x           PT/INR - ( 28 Oct 2022 10:52 )   PT: 14.6 sec;   INR: 1.22          PTT - ( 28 Oct 2022 10:52 )  PTT:37.4 sec  CAPILLARY BLOOD GLUCOSE      POCT Blood Glucose.: 88 mg/dL (29 Oct 2022 08:41)  POCT Blood Glucose.: 121 mg/dL (28 Oct 2022 22:18)  POCT Blood Glucose.: 97 mg/dL (28 Oct 2022 17:17)  POCT Blood Glucose.: 101 mg/dL (28 Oct 2022 13:42)      RADIOLOGY & ADDITIONAL STUDIES:

## 2022-10-29 NOTE — PROGRESS NOTE ADULT - PROBLEM SELECTOR PLAN 1
Resolved   possibly 2/2 gastroenteritis vs gastritis. 3 day hx non biliary non bloody emesis with decreased PO intake. Reports occasional diarrhea as well. Presents afebrile with no WBC, neg lactate, non septic in appearance. Symptom management  Plan:  - Cont zofran PRN  - Cont Maalox PRN

## 2022-10-29 NOTE — PROGRESS NOTE ADULT - PROBLEM SELECTOR PLAN 6
likely mixed picture likely ASAEL + chronic inflammation.  Hgb 7.4 on presentation, pt noted to have Hgb b/w 7-8 on last discharge. No sings of active bleeding. Iron studies 9/20/2022 showing decreased iron with increased ferritin  and decreased iron binding capacity. Given iron on 10/28  Plan:  - Monitor for bleeding  - Maintain active T&S  - Goal transfusion <7

## 2022-10-29 NOTE — PROGRESS NOTE ADULT - PROBLEM SELECTOR PLAN 8
Pt unclear on meds and pharmacy stating she hasn't picked up meds in a while. Possibly on losartan 50 mg qd  Plan:  - Cont losartan 50mg  - obtain collateral if possible

## 2022-10-29 NOTE — PROGRESS NOTE ADULT - PROBLEM SELECTOR PLAN 5
possibly 2/2 poor po intake with ongoing abdominal pain. Pt complains of generalized weakness and trouble ambulating, requests new [facility]. No focal neurological deficits. PT consulted    Plan:  - encourage PO intake + replete electrolytes   - Fall precautions  - F/u SW as pt would like to be sent to a new [facility] on D/C

## 2022-10-29 NOTE — PROGRESS NOTE ADULT - PROBLEM SELECTOR PLAN 4
Resolved. Likely pre renal in setting of poor PO intake with vomiting and diarrhea. Presented w/ Cr of 1.6, unknown baseline. S/ p fluid resuscitation ,Cr down to 1.12.

## 2022-10-29 NOTE — PROGRESS NOTE ADULT - PROBLEM SELECTOR PLAN 2
Pt presented with abdominal pain near umbilical wound from ex lap for adhesions in september. Incision noted to be pooling serosanguinous fluid. Tenderness on exam. Pt afebrile on presentation. Surgery consulted and stated no surgical intervention needed now, continue with dressing changes. c/w tylenol for pain contr

## 2022-10-29 NOTE — PROGRESS NOTE ADULT - PROBLEM SELECTOR PLAN 3
small bowel resection in past year with current pain in LLQ. CT AP 10/25: Since 10/8/2022, increased inflammatory changes and foci of extraluminal air surrounding left lower quadrant small bowel anastomosis. Although no extravasated oral contrast to suggest active leak, occult anastomotic leak/infection should be considered. Inflammatory changes extend to the urinary bladder and abdominal wall incision, follow-up clinically for evolving fistula.  Plan:  - f/u on surgery recs

## 2022-10-29 NOTE — PROGRESS NOTE ADULT - SUBJECTIVE AND OBJECTIVE BOX
CARYN WHELAN, 72y, Female  MRN-[id number]  Patient is a 72y old  Female who presents with a chief complaint of     OVERNIGHT EVENTS: no acute events over night    SUBJECTIVE: Pt assessed at bedside, states she feels well but is frustrated by her previous [facility] experience.  Patient denies fever/chills, nausea, vomiting, chest pain, palpitations, dyspnea, cough, diarrhea, abdominal pain, dysuria    12 Point ROS Negative unless noted otherwise above.  -------------------------------------------------------------------------------    Vital Signs Last 24 Hrs  T(C): 36.8 (29 Oct 2022 09:14), Max: 36.9 (28 Oct 2022 15:21)  T(F): 98.3 (29 Oct 2022 09:14), Max: 98.5 (28 Oct 2022 15:21)  HR: 90 (29 Oct 2022 09:14) (86 - 94)  BP: 146/78 (29 Oct 2022 09:14) (145/77 - 147/80)  BP(mean): --  RR: 16 (29 Oct 2022 09:14) (16 - 18)  SpO2: 98% (29 Oct 2022 09:14) (98% - 100%)    Parameters below as of 29 Oct 2022 09:14  Patient On (Oxygen Delivery Method): room air          I&O's Summary      PHYSICAL EXAM:    GENERAL: NAD, lying in bed comfortably  HEAD:  Atraumatic, Normocephalic  EYES: EOMI, PERRLA, conjunctiva and sclera clear  ENT: Moist mucous membranes  NECK: Supple, No JVD  CHEST/LUNG: Clear to auscultation bilaterally; No rales, rhonchi, wheezing, or rubs. Unlabored respirations  HEART: Regular rate and rhythm; No murmurs, rubs, or gallops  ABDOMEN: Soft, tender along incision, with open area near umbilicus with serosanguinous fluid pooling. LLQ +TTP, BS+4, Nondistended. No hepatomegally  EXTREMITIES:  2+ Peripheral Pulses, brisk capillary refill. No clubbing, cyanosis, or edema  NERVOUS SYSTEM:  AOx3, speech clear. No deficits   MSK: FROM all 4 extremities, full and equal strength  SKIN: No rashes or lesions      ALLERGIES:  Allergies    penicillin (Rash)    Intolerances    daptomycin (Muscle Pain)      MEDICATIONS  (STANDING):  dextrose 5%. 1000 milliLiter(s) (50 mL/Hr) IV Continuous <Continuous>  dextrose 5%. 1000 milliLiter(s) (100 mL/Hr) IV Continuous <Continuous>  dextrose 50% Injectable 25 Gram(s) IV Push once  dextrose 50% Injectable 12.5 Gram(s) IV Push once  dextrose 50% Injectable 25 Gram(s) IV Push once  enoxaparin Injectable 40 milliGRAM(s) SubCutaneous every 24 hours  glucagon  Injectable 1 milliGRAM(s) IntraMuscular once  insulin lispro (ADMELOG) corrective regimen sliding scale   SubCutaneous three times a day before meals  iron sucrose IVPB 200 milliGRAM(s) IV Intermittent every 24 hours  losartan 50 milliGRAM(s) Oral daily  polyethylene glycol 3350 17 Gram(s) Oral daily  senna 2 Tablet(s) Oral at bedtime    MEDICATIONS  (PRN):  acetaminophen     Tablet .. 650 milliGRAM(s) Oral every 6 hours PRN Temp greater or equal to 38C (100.4F), Mild Pain (1 - 3)  aluminum hydroxide/magnesium hydroxide/simethicone Suspension 30 milliLiter(s) Oral every 4 hours PRN Dyspepsia  dextrose Oral Gel 15 Gram(s) Oral once PRN Blood Glucose LESS THAN 70 milliGRAM(s)/deciliter  ondansetron    Tablet 8 milliGRAM(s) Oral every 8 hours PRN Nausea and/or Vomiting        -------------------------------------------------------------------------------  LABS:                                   7.1    5.98  )-----------( 329      ( 28 Oct 2022 08:18 )             23.3   10-28    143  |  107  |  17  ----------------------------<  100<H>  3.6   |  22  |  1.12    Ca    8.5      28 Oct 2022 08:18  Phos  4.1     10-28  Mg     1.6     10-28    TPro  6.4  /  Alb  3.1<L>  /  TBili  0.6  /  DBili  x   /  AST  24  /  ALT  18  /  AlkPhos  66  10-28        LIVER FUNCTIONS - ( 28 Oct 2022 08:18 )  Alb: 3.1 g/dL / Pro: 6.4 g/dL / ALK PHOS: 66 U/L / ALT: 18 U/L / AST: 24 U/L / GGT: x             Urinalysis Basic - ( 28 Oct 2022 08:46 )    Color: Yellow / Appearance: SL Cloudy / S.015 / pH: x  Gluc: x / Ketone: NEGATIVE  / Bili: Negative / Urobili: 0.2 E.U./dL   Blood: x / Protein: Trace mg/dL / Nitrite: POSITIVE   Leuk Esterase: Large / RBC: < 5 /HPF / WBC Many /HPF   Sq Epi: x / Non Sq Epi: 5-10 /HPF / Bacteria: Many /HPF      CAPILLARY BLOOD GLUCOSE      POCT Blood Glucose.: 92 mg/dL (28 Oct 2022 08:38)      Urinalysis with Rflx Culture (collected 28 Oct 2022 08:46)    Culture - Blood (collected 25 Oct 2022 14:30)  Source: .Blood Blood  Preliminary Report (27 Oct 2022 19:00):    No growth at 2 days.    Culture - Blood (collected 25 Oct 2022 14:15)  Source: .Blood Blood  Preliminary Report (27 Oct 2022 19:00):    No growth at 2 days.      COVID-19 PCR: NotDetec (27 Oct 2022 15:36)  COVID-19 PCR: Negative (25 Oct 2022 14:53)  COVID-19 PCR: NotDetec (19 Oct 2022 19:50)  COVID-19 PCR: NotDetec (12 Oct 2022 11:08)  COVID-19 PCR: NotDetec (27 Sep 2022 17:43)  COVID-19 PCR: Detected (23 Sep 2022 13:54)  COVID-19 PCR: Detected (14 Sep 2022 10:43)  COVID-19 PCR: Positive (11 Sep 2022 15:05)      RADIOLOGY & ADDITIONAL TESTS: Reviewed.

## 2022-10-30 LAB
-  AMPICILLIN/SULBACTAM: SIGNIFICANT CHANGE UP
-  AMPICILLIN: SIGNIFICANT CHANGE UP
-  CEFAZOLIN: SIGNIFICANT CHANGE UP
-  CEFTRIAXONE: SIGNIFICANT CHANGE UP
-  CIPROFLOXACIN: SIGNIFICANT CHANGE UP
-  ERTAPENEM: SIGNIFICANT CHANGE UP
-  GENTAMICIN: SIGNIFICANT CHANGE UP
-  NITROFURANTOIN: SIGNIFICANT CHANGE UP
-  PIPERACILLIN/TAZOBACTAM: SIGNIFICANT CHANGE UP
-  TOBRAMYCIN: SIGNIFICANT CHANGE UP
-  TRIMETHOPRIM/SULFAMETHOXAZOLE: SIGNIFICANT CHANGE UP
CULTURE RESULTS: SIGNIFICANT CHANGE UP
GLUCOSE BLDC GLUCOMTR-MCNC: 100 MG/DL — HIGH (ref 70–99)
GLUCOSE BLDC GLUCOMTR-MCNC: 109 MG/DL — HIGH (ref 70–99)
GLUCOSE BLDC GLUCOMTR-MCNC: 94 MG/DL — SIGNIFICANT CHANGE UP (ref 70–99)
GLUCOSE BLDC GLUCOMTR-MCNC: 99 MG/DL — SIGNIFICANT CHANGE UP (ref 70–99)
METHOD TYPE: SIGNIFICANT CHANGE UP
ORGANISM # SPEC MICROSCOPIC CNT: SIGNIFICANT CHANGE UP
ORGANISM # SPEC MICROSCOPIC CNT: SIGNIFICANT CHANGE UP
SPECIMEN SOURCE: SIGNIFICANT CHANGE UP

## 2022-10-30 PROCEDURE — 99232 SBSQ HOSP IP/OBS MODERATE 35: CPT | Mod: GC

## 2022-10-30 RX ORDER — CEFTRIAXONE 500 MG/1
1000 INJECTION, POWDER, FOR SOLUTION INTRAMUSCULAR; INTRAVENOUS EVERY 24 HOURS
Refills: 0 | Status: COMPLETED | OUTPATIENT
Start: 2022-10-30 | End: 2022-11-01

## 2022-10-30 RX ADMIN — LOSARTAN POTASSIUM 50 MILLIGRAM(S): 100 TABLET, FILM COATED ORAL at 06:29

## 2022-10-30 RX ADMIN — CEFTRIAXONE 100 MILLIGRAM(S): 500 INJECTION, POWDER, FOR SOLUTION INTRAMUSCULAR; INTRAVENOUS at 10:39

## 2022-10-30 RX ADMIN — IRON SUCROSE 110 MILLIGRAM(S): 20 INJECTION, SOLUTION INTRAVENOUS at 12:28

## 2022-10-30 RX ADMIN — Medication 650 MILLIGRAM(S): at 01:10

## 2022-10-30 RX ADMIN — ENOXAPARIN SODIUM 40 MILLIGRAM(S): 100 INJECTION SUBCUTANEOUS at 06:57

## 2022-10-30 RX ADMIN — Medication 650 MILLIGRAM(S): at 00:12

## 2022-10-30 RX ADMIN — SENNA PLUS 2 TABLET(S): 8.6 TABLET ORAL at 22:26

## 2022-10-30 NOTE — PROGRESS NOTE ADULT - SUBJECTIVE AND OBJECTIVE BOX
General Surgery Progress Note    SUBJECTIVE:   Patient seen and examined at bedside. No acute events noted overnight. Patient states that she has been nauseous today and has been unable to tolerate most of PO intake secondary to nausea. Patient reports she feels like she may have emesis, but only spits up sputum. States she is passing flatus and having soft, nonbloody BMs. Denies fevers or chills. States she has lower abdominal pain at this time.    Vital Signs Last 24 Hrs  T(C): 36.8 (30 Oct 2022 10:44), Max: 37.2 (29 Oct 2022 14:55)  T(F): 98.3 (30 Oct 2022 10:44), Max: 99 (29 Oct 2022 14:55)  HR: 95 (30 Oct 2022 10:44) (86 - 98)  BP: 125/80 (30 Oct 2022 10:44) (125/80 - 146/78)  BP(mean): --  RR: 18 (30 Oct 2022 10:44) (17 - 19)  SpO2: 98% (30 Oct 2022 10:44) (98% - 99%)    Parameters below as of 30 Oct 2022 10:44  Patient On (Oxygen Delivery Method): room air          Physical Exam:  General: Resting comfortably in bed, NAD  HEENT: ATNC  Pulmonary: Equal chest wall expansion b/l, no respiratory distress, no accessory muscle use noted  Cardiovascular: NSR  Abdomen: Soft, nondistended, tenderness around midline wound - wound healing well with scant drainage - wound dressing changed at bedside  Extremities: WWP, SCDs in place, No significant edema appreciated    LABS:

## 2022-10-30 NOTE — PROGRESS NOTE ADULT - SUBJECTIVE AND OBJECTIVE BOX
INTERVAL HPI/OVERNIGHT EVENTS: NAOE     SUBJECTIVE: Patient seen and evaluated at bedside.     VITAL SIGNS:  T(F): 98 (10-30-22 @ 05:30)  HR: 86 (10-30-22 @ 05:30)  BP: 146/78 (10-30-22 @ 05:30)  RR: 17 (10-30-22 @ 05:30)  SpO2: 99% (10-30-22 @ 05:30)  Wt(kg): --    PHYSICAL EXAM:    GENERAL: NAD, lying in bed comfortably  HEAD:  Atraumatic, Normocephalic  EYES: EOMI, PERRLA, conjunctiva and sclera clear  ENT: Moist mucous membranes  NECK: Supple, No JVD  CHEST/LUNG: Clear to auscultation bilaterally; No rales, rhonchi, wheezing, or rubs. Unlabored respirations  HEART: Regular rate and rhythm; No murmurs, rubs, or gallops  ABDOMEN: Soft, tender along incision, with open area near umbilicus with serosanguinous fluid pooling. LLQ +TTP, BS+4, Nondistended. No hepatomegally  EXTREMITIES:  2+ Peripheral Pulses, brisk capillary refill. No clubbing, cyanosis, or edema  NERVOUS SYSTEM:  AOx3, speech clear. No deficits   MSK: FROM all 4 extremities, full and equal strength  SKIN: No rashes or lesions      MEDICATIONS  (STANDING):  dextrose 5%. 1000 milliLiter(s) (50 mL/Hr) IV Continuous <Continuous>  dextrose 5%. 1000 milliLiter(s) (100 mL/Hr) IV Continuous <Continuous>  dextrose 50% Injectable 25 Gram(s) IV Push once  dextrose 50% Injectable 12.5 Gram(s) IV Push once  dextrose 50% Injectable 25 Gram(s) IV Push once  enoxaparin Injectable 40 milliGRAM(s) SubCutaneous every 24 hours  glucagon  Injectable 1 milliGRAM(s) IntraMuscular once  insulin lispro (ADMELOG) corrective regimen sliding scale   SubCutaneous three times a day before meals  iron sucrose IVPB 200 milliGRAM(s) IV Intermittent every 24 hours  losartan 50 milliGRAM(s) Oral daily  polyethylene glycol 3350 17 Gram(s) Oral daily  senna 2 Tablet(s) Oral at bedtime    MEDICATIONS  (PRN):  acetaminophen     Tablet .. 650 milliGRAM(s) Oral every 6 hours PRN Temp greater or equal to 38C (100.4F), Mild Pain (1 - 3)  aluminum hydroxide/magnesium hydroxide/simethicone Suspension 30 milliLiter(s) Oral every 4 hours PRN Dyspepsia  dextrose Oral Gel 15 Gram(s) Oral once PRN Blood Glucose LESS THAN 70 milliGRAM(s)/deciliter  ondansetron    Tablet 8 milliGRAM(s) Oral every 8 hours PRN Nausea and/or Vomiting      Allergies    penicillin (Rash)    Intolerances    daptomycin (Muscle Pain)      LABS:                        7.1    5.98  )-----------( 329      ( 28 Oct 2022 08:18 )             23.3     10    143  |  107  |  17  ----------------------------<  100<H>  3.6   |  22  |  1.12    Ca    8.5      28 Oct 2022 08:18  Phos  4.1     10-28  Mg     1.6     10-28    TPro  6.4  /  Alb  3.1<L>  /  TBili  0.6  /  DBili  x   /  AST  24  /  ALT  18  /  AlkPhos  66  10-    PT/INR - ( 28 Oct 2022 10:52 )   PT: 14.6 sec;   INR: 1.22          PTT - ( 28 Oct 2022 10:52 )  PTT:37.4 sec  Urinalysis Basic - ( 28 Oct 2022 08:46 )    Color: Yellow / Appearance: SL Cloudy / S.015 / pH: x  Gluc: x / Ketone: NEGATIVE  / Bili: Negative / Urobili: 0.2 E.U./dL   Blood: x / Protein: Trace mg/dL / Nitrite: POSITIVE   Leuk Esterase: Large / RBC: < 5 /HPF / WBC Many /HPF   Sq Epi: x / Non Sq Epi: 5-10 /HPF / Bacteria: Many /HPF        RADIOLOGY & ADDITIONAL TESTS:       INTERVAL HPI/OVERNIGHT EVENTS: NAOE     SUBJECTIVE: Patient seen and evaluated at bedside. States she had mild headache last night that was relieved with Tylenol. Otherwise denies any new complaints. Patient denies fever/chills, nausea, vomiting, chest pain, palpitations, dyspnea, constipation, diarrhea, abdominal pain, dysuria.     VITAL SIGNS:  T(F): 98 (10-30-22 @ 05:30)  HR: 86 (10-30-22 @ 05:30)  BP: 146/78 (10-30-22 @ 05:30)  RR: 17 (10-30-22 @ 05:30)  SpO2: 99% (10-30-22 @ 05:30)  Wt(kg): --    PHYSICAL EXAM:    GENERAL: NAD, lying in bed comfortably  HEAD:  Atraumatic, Normocephalic  EYES: EOMI, PERRLA, conjunctiva and sclera clear  ENT: Moist mucous membranes  NECK: Supple, No JVD  CHEST/LUNG: Clear to auscultation bilaterally; No rales, rhonchi, wheezing, or rubs. Unlabored respirations  HEART: Regular rate and rhythm; No murmurs, rubs, or gallops  ABDOMEN: Soft, tender along incision, with open area near umbilicus with serosanguinous fluid pooling. LLQ +TTP, BS+4, Nondistended. No hepatomegally  EXTREMITIES:  2+ Peripheral Pulses, brisk capillary refill. No clubbing, cyanosis, or edema  NERVOUS SYSTEM:  AOx3, speech clear. No deficits   MSK: FROM all 4 extremities, full and equal strength  SKIN: No rashes or lesions      MEDICATIONS  (STANDING):  dextrose 5%. 1000 milliLiter(s) (50 mL/Hr) IV Continuous <Continuous>  dextrose 5%. 1000 milliLiter(s) (100 mL/Hr) IV Continuous <Continuous>  dextrose 50% Injectable 25 Gram(s) IV Push once  dextrose 50% Injectable 12.5 Gram(s) IV Push once  dextrose 50% Injectable 25 Gram(s) IV Push once  enoxaparin Injectable 40 milliGRAM(s) SubCutaneous every 24 hours  glucagon  Injectable 1 milliGRAM(s) IntraMuscular once  insulin lispro (ADMELOG) corrective regimen sliding scale   SubCutaneous three times a day before meals  iron sucrose IVPB 200 milliGRAM(s) IV Intermittent every 24 hours  losartan 50 milliGRAM(s) Oral daily  polyethylene glycol 3350 17 Gram(s) Oral daily  senna 2 Tablet(s) Oral at bedtime    MEDICATIONS  (PRN):  acetaminophen     Tablet .. 650 milliGRAM(s) Oral every 6 hours PRN Temp greater or equal to 38C (100.4F), Mild Pain (1 - 3)  aluminum hydroxide/magnesium hydroxide/simethicone Suspension 30 milliLiter(s) Oral every 4 hours PRN Dyspepsia  dextrose Oral Gel 15 Gram(s) Oral once PRN Blood Glucose LESS THAN 70 milliGRAM(s)/deciliter  ondansetron    Tablet 8 milliGRAM(s) Oral every 8 hours PRN Nausea and/or Vomiting      Allergies    penicillin (Rash)    Intolerances    daptomycin (Muscle Pain)      LABS:                        7.1    5.98  )-----------( 329      ( 28 Oct 2022 08:18 )             23.3     10-    143  |  107  |  17  ----------------------------<  100<H>  3.6   |  22  |  1.12    Ca    8.5      28 Oct 2022 08:18  Phos  4.1     10-28  Mg     1.6     10-28    TPro  6.4  /  Alb  3.1<L>  /  TBili  0.6  /  DBili  x   /  AST  24  /  ALT  18  /  AlkPhos  66  10    PT/INR - ( 28 Oct 2022 10:52 )   PT: 14.6 sec;   INR: 1.22          PTT - ( 28 Oct 2022 10:52 )  PTT:37.4 sec  Urinalysis Basic - ( 28 Oct 2022 08:46 )    Color: Yellow / Appearance: SL Cloudy / S.015 / pH: x  Gluc: x / Ketone: NEGATIVE  / Bili: Negative / Urobili: 0.2 E.U./dL   Blood: x / Protein: Trace mg/dL / Nitrite: POSITIVE   Leuk Esterase: Large / RBC: < 5 /HPF / WBC Many /HPF   Sq Epi: x / Non Sq Epi: 5-10 /HPF / Bacteria: Many /HPF        RADIOLOGY & ADDITIONAL TESTS:       by PA/Alert and oriented to person, place and time

## 2022-10-30 NOTE — PROGRESS NOTE ADULT - PROBLEM SELECTOR PLAN 6
likely mixed picture likely ASAEL + chronic inflammation.  Hgb 7.4 on presentation, pt noted to have Hgb b/w 7-8 on last discharge. No sings of active bleeding. Iron studies 9/20/2022 showing decreased iron with increased ferritin  and decreased iron binding capacity. Receiving 200mg IV iron daily  Plan:  - c/w iron  - Monitor for bleeding  - Maintain active T&S  - Goal transfusion <7

## 2022-10-30 NOTE — PROGRESS NOTE ADULT - PROBLEM SELECTOR PLAN 1
RESOLVED. likely 2/2 gastroenteritis vs gastritis. 3 day hx non biliary non bloody emesis with decreased PO intake. Reports occasional diarrhea as well. Presents afebrile with no WBC, neg lactate, non septic in appearance. Symptom management

## 2022-10-31 ENCOUNTER — TRANSCRIPTION ENCOUNTER (OUTPATIENT)
Age: 72
End: 2022-10-31

## 2022-10-31 LAB
ALBUMIN SERPL ELPH-MCNC: 3 G/DL — LOW (ref 3.3–5)
ALP SERPL-CCNC: 72 U/L — SIGNIFICANT CHANGE UP (ref 40–120)
ALT FLD-CCNC: 14 U/L — SIGNIFICANT CHANGE UP (ref 10–45)
ANION GAP SERPL CALC-SCNC: 10 MMOL/L — SIGNIFICANT CHANGE UP (ref 5–17)
AST SERPL-CCNC: 16 U/L — SIGNIFICANT CHANGE UP (ref 10–40)
BILIRUB SERPL-MCNC: 0.4 MG/DL — SIGNIFICANT CHANGE UP (ref 0.2–1.2)
BUN SERPL-MCNC: 11 MG/DL — SIGNIFICANT CHANGE UP (ref 7–23)
CALCIUM SERPL-MCNC: 8.9 MG/DL — SIGNIFICANT CHANGE UP (ref 8.4–10.5)
CHLORIDE SERPL-SCNC: 104 MMOL/L — SIGNIFICANT CHANGE UP (ref 96–108)
CO2 SERPL-SCNC: 25 MMOL/L — SIGNIFICANT CHANGE UP (ref 22–31)
CREAT SERPL-MCNC: 1.03 MG/DL — SIGNIFICANT CHANGE UP (ref 0.5–1.3)
EGFR: 58 ML/MIN/1.73M2 — LOW
GLUCOSE BLDC GLUCOMTR-MCNC: 113 MG/DL — HIGH (ref 70–99)
GLUCOSE BLDC GLUCOMTR-MCNC: 124 MG/DL — HIGH (ref 70–99)
GLUCOSE BLDC GLUCOMTR-MCNC: 129 MG/DL — HIGH (ref 70–99)
GLUCOSE SERPL-MCNC: 124 MG/DL — HIGH (ref 70–99)
HCT VFR BLD CALC: 25 % — LOW (ref 34.5–45)
HGB BLD-MCNC: 7.5 G/DL — LOW (ref 11.5–15.5)
MAGNESIUM SERPL-MCNC: 1.8 MG/DL — SIGNIFICANT CHANGE UP (ref 1.6–2.6)
MCHC RBC-ENTMCNC: 26.9 PG — LOW (ref 27–34)
MCHC RBC-ENTMCNC: 30 GM/DL — LOW (ref 32–36)
MCV RBC AUTO: 89.6 FL — SIGNIFICANT CHANGE UP (ref 80–100)
NRBC # BLD: 0 /100 WBCS — SIGNIFICANT CHANGE UP (ref 0–0)
PHOSPHATE SERPL-MCNC: 3.6 MG/DL — SIGNIFICANT CHANGE UP (ref 2.5–4.5)
PLATELET # BLD AUTO: 323 K/UL — SIGNIFICANT CHANGE UP (ref 150–400)
POTASSIUM SERPL-MCNC: 3.9 MMOL/L — SIGNIFICANT CHANGE UP (ref 3.5–5.3)
POTASSIUM SERPL-SCNC: 3.9 MMOL/L — SIGNIFICANT CHANGE UP (ref 3.5–5.3)
PROT SERPL-MCNC: 6.2 G/DL — SIGNIFICANT CHANGE UP (ref 6–8.3)
RBC # BLD: 2.79 M/UL — LOW (ref 3.8–5.2)
RBC # FLD: 15.9 % — HIGH (ref 10.3–14.5)
SODIUM SERPL-SCNC: 139 MMOL/L — SIGNIFICANT CHANGE UP (ref 135–145)
WBC # BLD: 5.71 K/UL — SIGNIFICANT CHANGE UP (ref 3.8–10.5)
WBC # FLD AUTO: 5.71 K/UL — SIGNIFICANT CHANGE UP (ref 3.8–10.5)

## 2022-10-31 PROCEDURE — 99232 SBSQ HOSP IP/OBS MODERATE 35: CPT | Mod: GC

## 2022-10-31 RX ADMIN — Medication 650 MILLIGRAM(S): at 10:25

## 2022-10-31 RX ADMIN — Medication 650 MILLIGRAM(S): at 22:19

## 2022-10-31 RX ADMIN — Medication 650 MILLIGRAM(S): at 11:25

## 2022-10-31 RX ADMIN — ONDANSETRON 8 MILLIGRAM(S): 8 TABLET, FILM COATED ORAL at 22:25

## 2022-10-31 RX ADMIN — ENOXAPARIN SODIUM 40 MILLIGRAM(S): 100 INJECTION SUBCUTANEOUS at 06:31

## 2022-10-31 RX ADMIN — LOSARTAN POTASSIUM 50 MILLIGRAM(S): 100 TABLET, FILM COATED ORAL at 06:29

## 2022-10-31 RX ADMIN — Medication 1 TABLET(S): at 15:19

## 2022-10-31 RX ADMIN — SENNA PLUS 2 TABLET(S): 8.6 TABLET ORAL at 22:19

## 2022-10-31 NOTE — DISCHARGE NOTE NURSING/CASE MANAGEMENT/SOCIAL WORK - NSDCVIVACCINE_GEN_ALL_CORE_FT
influenza, high-dose, quadrivalent; 15-Sep-2020 15:47; Rosalia Martin (RN); [employer]; [id number] (Exp. Date: 30-Jun-2021); IntraMuscular; Deltoid Right.; 0.7 milliLiter(s); VIS (VIS Published: 15-Aug-2019, VIS Presented: 15-Sep-2020);

## 2022-10-31 NOTE — DISCHARGE NOTE NURSING/CASE MANAGEMENT/SOCIAL WORK - PATIENT PORTAL LINK FT
You can access the FollowMyHealth Patient Portal offered by [facility] by registering at the following website: [email or web address]. By joining [facility]’s FollowMyHealth portal, you will also be able to view your health information using other applications (apps) compatible with our system.

## 2022-10-31 NOTE — PROGRESS NOTE ADULT - SUBJECTIVE AND OBJECTIVE BOX
INTERVAL HPI/OVERNIGHT EVENTS:  Patient was seen and examined at bedside. As per patient, no o/n events, patient resting comfortably. No complaints at this time. Patient denies: fever, chills, lightheadedness, weakness, CP, palpitations, SOB, cough, N/V. ROS otherwise negative.    VITAL SIGNS:  T(F): 98.7 (10-31-22 @ 05:18)  HR: 91 (10-31-22 @ 05:18)  BP: 158/73 (10-31-22 @ 05:18)  RR: 19 (10-31-22 @ 05:18)  SpO2: 97% (10-31-22 @ 05:18)  Wt(kg): --      PHYSICAL EXAM:    GENERAL: NAD, lying in bed comfortably  HEAD:  Atraumatic, Normocephalic  EYES: EOMI, PERRLA, conjunctiva and sclera clear  ENT: Moist mucous membranes  NECK: Supple, No JVD  CHEST/LUNG: Clear to auscultation bilaterally; No rales, rhonchi, wheezing, or rubs. Unlabored respirations  HEART: Regular rate and rhythm; No murmurs, rubs, or gallops  ABDOMEN: Soft, tender along incision, with open area near umbilicus with serosanguinous fluid pooling. LLQ +TTP, BS+4, Nondistended. No hepatomegally  EXTREMITIES:  2+ Peripheral Pulses, brisk capillary refill. No clubbing, cyanosis, or edema  NERVOUS SYSTEM:  AOx3, speech clear. No deficits   MSK: FROM all 4 extremities, full and equal strength  SKIN: No rashes or lesions    MEDICATIONS  (STANDING):  cefTRIAXone   IVPB 1000 milliGRAM(s) IV Intermittent every 24 hours  dextrose 5%. 1000 milliLiter(s) (50 mL/Hr) IV Continuous <Continuous>  dextrose 5%. 1000 milliLiter(s) (100 mL/Hr) IV Continuous <Continuous>  dextrose 50% Injectable 25 Gram(s) IV Push once  dextrose 50% Injectable 12.5 Gram(s) IV Push once  dextrose 50% Injectable 25 Gram(s) IV Push once  enoxaparin Injectable 40 milliGRAM(s) SubCutaneous every 24 hours  glucagon  Injectable 1 milliGRAM(s) IntraMuscular once  insulin lispro (ADMELOG) corrective regimen sliding scale   SubCutaneous Before meals and at bedtime  iron sucrose IVPB 200 milliGRAM(s) IV Intermittent every 24 hours  losartan 50 milliGRAM(s) Oral daily  polyethylene glycol 3350 17 Gram(s) Oral daily  senna 2 Tablet(s) Oral at bedtime    MEDICATIONS  (PRN):  acetaminophen     Tablet .. 650 milliGRAM(s) Oral every 6 hours PRN Temp greater or equal to 38C (100.4F), Mild Pain (1 - 3)  aluminum hydroxide/magnesium hydroxide/simethicone Suspension 30 milliLiter(s) Oral every 4 hours PRN Dyspepsia  dextrose Oral Gel 15 Gram(s) Oral once PRN Blood Glucose LESS THAN 70 milliGRAM(s)/deciliter  ondansetron    Tablet 8 milliGRAM(s) Oral every 8 hours PRN Nausea and/or Vomiting      Allergies    penicillin (Rash)    Intolerances    daptomycin (Muscle Pain)      LABS:                        7.5    5.71  )-----------( 323      ( 31 Oct 2022 05:30 )             25.0     10-31    139  |  104  |  11  ----------------------------<  124<H>  3.9   |  25  |  1.03    Ca    8.9      31 Oct 2022 05:30  Phos  3.6     10-31  Mg     1.8     10-31    TPro  6.2  /  Alb  3.0<L>  /  TBili  0.4  /  DBili  x   /  AST  16  /  ALT  14  /  AlkPhos  72  10-31          RADIOLOGY & ADDITIONAL TESTS:  Reviewed

## 2022-10-31 NOTE — PROGRESS NOTE ADULT - ATTENDING COMMENTS
Patient was seen and examined at bedside on 10/30/2022 at 930 am. Patient reports feeling well, complaining of intermittent abdominal pain unchanged from prior. ROS is otherwise negative. Vitals, labwork and pertinent imaging reviewed. Physical exam - NAD, AAO x 4, PERRLA, EOMI, MMM, supple neck, chest - CTA b/l, CV - rrr, s1s2, no m/r/g, abd - soft, + BS, mild TTP at surgical site, ext - wwp, psych - normal affect, skin - no rash    Plan  -Surgery consulted - rec no further interventions  -PT reconsulted - rec home PT; pt is medically ready for d/c  -SW will need to get in touch with [facility]  -If continues to decline choice will be given d/c notice
Patient was seen and examined at bedside on 10/31/2022 at 930 am. Patient reports feeling well, complaining of intermittent abdominal pain unchanged from prior. ROS is otherwise negative. Vitals, labwork and pertinent imaging reviewed. Physical exam - NAD, AAO x 4, PERRLA, EOMI, MMM, supple neck, chest - CTA b/l, CV - rrr, s1s2, no m/r/g, abd - soft, + BS, mild TTP at surgical site, ext - wwp, psych - normal affect, skin - no rash    Plan  -Surgery consulted - rec no further interventions  -PT reconsulted - rec home PT; pt is medically ready for d/c  -SW will need to get in touch with [facility]  -If continues to decline choice will be given d/c notice

## 2022-10-31 NOTE — DISCHARGE NOTE NURSING/CASE MANAGEMENT/SOCIAL WORK - NSDCFUADDAPPT_GEN_ALL_CORE_FT
Appt on 11/04/22 is at 11AM - [address]. This is an internal medicine apt, it is important for your health that you attend this apt.

## 2022-10-31 NOTE — PROGRESS NOTE ADULT - PROBLEM SELECTOR PLAN 10
F: s/p 1L NS  E: replete K<4, Mg<2  N: DASH/TLC  D: Lovenox    Dispo: [facility]  Code: FULL
F: s/p 1L NS  E: replete K<4, Mg<2  N: DASH/TLC  D: Lovenox    Dispo: [facility]  Code: FULL
F: s/p 1L NS  E: replete K<4, Mg<2  N: DASH/TLC  D: Lovenox    Dispo: [facility]  Code: FULL
F: s/p 1L NS  E: replete K<4, Mg<2  N: DASH/TLC  D: Lovenox    Dispo: [facility]  Code: FULL

## 2022-11-01 VITALS
RESPIRATION RATE: 18 BRPM | DIASTOLIC BLOOD PRESSURE: 79 MMHG | TEMPERATURE: 98 F | HEART RATE: 100 BPM | OXYGEN SATURATION: 99 % | SYSTOLIC BLOOD PRESSURE: 141 MMHG

## 2022-11-01 LAB
GLUCOSE BLDC GLUCOMTR-MCNC: 104 MG/DL — HIGH (ref 70–99)
GLUCOSE BLDC GLUCOMTR-MCNC: 120 MG/DL — HIGH (ref 70–99)

## 2022-11-01 PROCEDURE — 85610 PROTHROMBIN TIME: CPT

## 2022-11-01 PROCEDURE — G0378: CPT

## 2022-11-01 PROCEDURE — 74176 CT ABD & PELVIS W/O CONTRAST: CPT

## 2022-11-01 PROCEDURE — 84100 ASSAY OF PHOSPHORUS: CPT

## 2022-11-01 PROCEDURE — 81001 URINALYSIS AUTO W/SCOPE: CPT

## 2022-11-01 PROCEDURE — 80053 COMPREHEN METABOLIC PANEL: CPT

## 2022-11-01 PROCEDURE — 99239 HOSP IP/OBS DSCHRG MGMT >30: CPT | Mod: GC

## 2022-11-01 PROCEDURE — 87186 SC STD MICRODIL/AGAR DIL: CPT

## 2022-11-01 PROCEDURE — 82962 GLUCOSE BLOOD TEST: CPT

## 2022-11-01 PROCEDURE — 83605 ASSAY OF LACTIC ACID: CPT

## 2022-11-01 PROCEDURE — 86901 BLOOD TYPING SEROLOGIC RH(D): CPT

## 2022-11-01 PROCEDURE — 86850 RBC ANTIBODY SCREEN: CPT

## 2022-11-01 PROCEDURE — 97161 PT EVAL LOW COMPLEX 20 MIN: CPT

## 2022-11-01 PROCEDURE — 85027 COMPLETE CBC AUTOMATED: CPT

## 2022-11-01 PROCEDURE — 71045 X-RAY EXAM CHEST 1 VIEW: CPT

## 2022-11-01 PROCEDURE — 86900 BLOOD TYPING SEROLOGIC ABO: CPT

## 2022-11-01 PROCEDURE — 85025 COMPLETE CBC W/AUTO DIFF WBC: CPT

## 2022-11-01 PROCEDURE — 36415 COLL VENOUS BLD VENIPUNCTURE: CPT

## 2022-11-01 PROCEDURE — 85730 THROMBOPLASTIN TIME PARTIAL: CPT

## 2022-11-01 PROCEDURE — 87635 SARS-COV-2 COVID-19 AMP PRB: CPT

## 2022-11-01 PROCEDURE — 87086 URINE CULTURE/COLONY COUNT: CPT

## 2022-11-01 PROCEDURE — 83690 ASSAY OF LIPASE: CPT

## 2022-11-01 PROCEDURE — 99285 EMERGENCY DEPT VISIT HI MDM: CPT

## 2022-11-01 PROCEDURE — 96374 THER/PROPH/DIAG INJ IV PUSH: CPT

## 2022-11-01 PROCEDURE — 83735 ASSAY OF MAGNESIUM: CPT

## 2022-11-01 PROCEDURE — 97116 GAIT TRAINING THERAPY: CPT

## 2022-11-01 RX ORDER — AZTREONAM 2 G
1 VIAL (EA) INJECTION
Qty: 3 | Refills: 0
Start: 2022-11-01 | End: 2022-11-01

## 2022-11-01 RX ORDER — AZTREONAM 2 G
1 VIAL (EA) INJECTION
Qty: 2 | Refills: 0
Start: 2022-11-01 | End: 2022-11-01

## 2022-11-01 RX ADMIN — ENOXAPARIN SODIUM 40 MILLIGRAM(S): 100 INJECTION SUBCUTANEOUS at 08:34

## 2022-11-01 RX ADMIN — Medication 1 TABLET(S): at 11:18

## 2022-11-01 RX ADMIN — LOSARTAN POTASSIUM 50 MILLIGRAM(S): 100 TABLET, FILM COATED ORAL at 06:22

## 2022-11-01 RX ADMIN — POLYETHYLENE GLYCOL 3350 17 GRAM(S): 17 POWDER, FOR SOLUTION ORAL at 11:18

## 2022-11-01 NOTE — PROGRESS NOTE ADULT - SUBJECTIVE AND OBJECTIVE BOX
IDENTIFICATION: This is a 72F w/ HTN, DM, CSxn, hysterectomy, lap dev, ex lap w/ JOSEFINA admitted to medical service for whom surgery was consulted for ongoing wound management of laparotomy wound    EVENTS:   - Did not go home yesterday as planned for mild discomfort in abdomen    SUBJECTIVE:   - Tolerating diet  - Pain minimal this morning  - Denies N/V  - Passing flatus  - Last BM - not certain    MEDICATIONS  (STANDING):  cefTRIAXone   IVPB 1000 milliGRAM(s) IV Intermittent every 24 hours  dextrose 5%. 1000 milliLiter(s) (50 mL/Hr) IV Continuous <Continuous>  dextrose 5%. 1000 milliLiter(s) (100 mL/Hr) IV Continuous <Continuous>  dextrose 50% Injectable 25 Gram(s) IV Push once  dextrose 50% Injectable 12.5 Gram(s) IV Push once  dextrose 50% Injectable 25 Gram(s) IV Push once  enoxaparin Injectable 40 milliGRAM(s) SubCutaneous every 24 hours  glucagon  Injectable 1 milliGRAM(s) IntraMuscular once  insulin lispro (ADMELOG) corrective regimen sliding scale   SubCutaneous Before meals and at bedtime  iron sucrose IVPB 200 milliGRAM(s) IV Intermittent every 24 hours  losartan 50 milliGRAM(s) Oral daily  polyethylene glycol 3350 17 Gram(s) Oral daily  senna 2 Tablet(s) Oral at bedtime    MEDICATIONS  (PRN):  acetaminophen     Tablet .. 650 milliGRAM(s) Oral every 6 hours PRN Temp greater or equal to 38C (100.4F), Mild Pain (1 - 3)  aluminum hydroxide/magnesium hydroxide/simethicone Suspension 30 milliLiter(s) Oral every 4 hours PRN Dyspepsia  dextrose Oral Gel 15 Gram(s) Oral once PRN Blood Glucose LESS THAN 70 milliGRAM(s)/deciliter  ondansetron    Tablet 8 milliGRAM(s) Oral every 8 hours PRN Nausea and/or Vomiting      Vital Signs Last 24 Hrs  T(C): 36.8 (01 Nov 2022 05:30), Max: 37.1 (31 Oct 2022 21:10)  T(F): 98.2 (01 Nov 2022 05:30), Max: 98.8 (31 Oct 2022 21:10)  HR: 89 (01 Nov 2022 05:30) (89 - 89)  BP: 150/76 (01 Nov 2022 05:30) (147/76 - 150/76)  BP(mean): --  RR: 18 (01 Nov 2022 05:30) (18 - 18)  SpO2: 100% (01 Nov 2022 05:30) (99% - 100%)    Parameters below as of 01 Nov 2022 05:30  Patient On (Oxygen Delivery Method): room air        General: NAD, laying comfortably in bed  Cardio: S1,S2, No MRG, RRR  Pulm: Lungs bilaterally clear to auscultation  Abdomen: soft, NT, ND, wound bed w/ scant ss drainage, no erythema, odor, purulence, or fluctuance  Extremities: WWP  Neuro: moves all extremities equally.  Psych: AAOx3, pleasant      I&O's Detail      LABS:                        7.5    5.71  )-----------( 323      ( 31 Oct 2022 05:30 )             25.0     10-31    139  |  104  |  11  ----------------------------<  124<H>  3.9   |  25  |  1.03    Ca    8.9      31 Oct 2022 05:30  Phos  3.6     10-31  Mg     1.8     10-31    TPro  6.2  /  Alb  3.0<L>  /  TBili  0.4  /  DBili  x   /  AST  16  /  ALT  14  /  AlkPhos  72  10-31

## 2022-11-01 NOTE — PROGRESS NOTE ADULT - PROVIDER SPECIALTY LIST ADULT
Internal Medicine
Rehab Medicine
Surgery
Hospitalist
Internal Medicine
Internal Medicine

## 2022-11-01 NOTE — PROGRESS NOTE ADULT - ASSESSMENT
This is a 72F w/ HTN, DM, CSxn, hysterectomy, lap dev, ex lap w/ JOSEFINA admitted to medical service for whom surgery was consulted for ongoing wound management of laparotomy wound    - Disposition per primary team. Surgery aware of patient and available for further comment as needed. From our perspective - continue daily dressing changes.   - Surgery 4C following.  
72F w/ HTN, DM, CSxn, hysterectomy, lap dev, ex lap w/ JOSEFINA and 100cm SBR comes back in for wound care and general concerns about her home situation vs new [facility] placement. Wound w/ good granulation tissue and no signs of infection at this time. CT scan findings concerning for possible evolving fistula. Pt also has worsening urinary sx in setting of UA indicative of UTI.    No acute surgical intervention at this time  Recommend continued IV ceftriaxone for UTI  Daily wet-to-dry dressing changes  Surgery Team 4C will continue to follow. Please page Team 4 with questions/clinical changes. [phone]  
72F w/ HTN, DM, CSxn, hysterectomy, lap dev, ex lap w/ JOSEFINA and 100cm SBR comes back in for wound care and general concerns about her home situation vs new [facility] placement. Wound w/ good granulation tissue and no signs of infection at this time. CT scan findings concerning for possible evolving fistula. Pt also has worsening urinary sx in setting of UA indicative of UTI.    No acute surgical intervention at this time  Recommend empiric UTI coverage  Daily wet-to-dry dressing changes  Surgery Team 4C will continue to follow. Please page Team 4 with questions/clinical changes. [phone]
  per Internal Medicine    72 y o female with PMH of HTN, DM and PSH of  x2, hysterectomy and lap dev ( w/ Dr. Clifford) recently discharged (-10/21) from surgery service for ex-lap w/ lysis of adhesions and small bowel resection, presenting w/ 3 days of N/V and diarrhea with pain to wound incision and requesting new [facility] placement.    Problem/Plan - 1:  ·  Problem: Nausea and vomiting.   ·  Plan: Resolved   possibly 2/2 gastroenteritis vs gastritis. 3 day hx non biliary non bloody emesis with decreased PO intake. Reports occasional diarrhea as well. Presents afebrile with no WBC, neg lactate, non septic in appearance. Symptom management  Plan:  - Cont zofran PRN  - Cont Maalox PRN.    Problem/Plan - 2:  ·  Problem: Open abdominal wall wound.   ·  Plan: Pt presented with abdominal pain near umbilical wound from ex lap for adhesions in september. Incision noted to be pooling serosanguinous fluid. Tenderness on exam. Pt afebrile on presentation. Surgery consulted and stated no surgical intervention needed now, continue with dressing changes. c/w tylenol for pain contr.    Problem/Plan - 3:  ·  Problem: S/P small bowel resection.   ·  Plan: small bowel resection in past year with current pain in LLQ. CT AP 10/25: Since 10/8/2022, increased inflammatory changes and foci of extraluminal air surrounding left lower quadrant small bowel anastomosis. Although no extravasated oral contrast to suggest active leak, occult anastomotic leak/infection should be considered. Inflammatory changes extend to the urinary bladder and abdominal wall incision, follow-up clinically for evolving fistula.  Plan:  - f/u on surgery recs.    Problem/Plan - 4:  ·  Problem: SO (acute kidney injury).   ·  Plan: Resolved. Likely pre renal in setting of poor PO intake with vomiting and diarrhea. Presented w/ Cr of 1.6, unknown baseline. S/ p fluid resuscitation ,Cr down to 1.12.    Problem/Plan - 5:  ·  Problem: General weakness.   ·  Plan: possibly 2/2 poor po intake with ongoing abdominal pain. Pt complains of generalized weakness and trouble ambulating, requests new [facility]. No focal neurological deficits. PT consulted    Plan:  - encourage PO intake + replete electrolytes   - Fall precautions  - F/u SW as pt would like to be sent to a new [facility] on D/C.    Problem/Plan - 6:  ·  Problem: Anemia.   ·  Plan: likely mixed picture likely ASAEL + chronic inflammation.  Hgb 7.4 on presentation, pt noted to have Hgb b/w 7-8 on last discharge. No sings of active bleeding. Iron studies 2022 showing decreased iron with increased ferritin  and decreased iron binding capacity. Given iron on 10/28  Plan:  - Monitor for bleeding  - Maintain active T&S  - Goal transfusion <7.    Problem/Plan - 7:  ·  Problem: DM (diabetes mellitus).   ·  Plan: Home meds: metformin 500mg qd  Plan:  - ISS  - Consistent Carb diet.    Problem/Plan - 8:  ·  Problem: HTN (hypertension).   ·  Plan: Pt unclear on meds and pharmacy stating she hasn't picked up meds in a while. Possibly on losartan 50 mg qd  Plan:  - Cont losartan 50mg  - obtain collateral if possible.    Problem/Plan - 9:  ·  Problem: HLD (hyperlipidemia).   ·  Plan: Home meds: atorvastatin 20mg    Plan:  - Cont atorvastatin 20mg.    Problem/Plan - 10:  ·  Problem: Prophylactic measure.   ·  Plan; F: s/p 1L NS  E: replete K<4, Mg<2  N: DASH/TLC  D: Lovenox    Dispo: [facility]  Code: FULL.    
71 y/o female with PMH of HTN, DM and PSH of  x2, hysterectomy and lap dev ( w/ Dr. Clifford) recently discharged (-10/21) from surgery service for ex-lap w/ lysis of adhesions and small bowel resection, presenting w/ 3 days of N/V and diarrhea with pain to wound incision and requesting new [facility] placement.

## 2022-11-04 ENCOUNTER — APPOINTMENT (OUTPATIENT)
Dept: INTERNAL MEDICINE | Facility: CLINIC | Age: 72
End: 2022-11-04

## 2022-11-04 PROCEDURE — PCNS1: CPT

## 2022-11-08 ENCOUNTER — APPOINTMENT (OUTPATIENT)
Dept: INTERNAL MEDICINE | Facility: CLINIC | Age: 72
End: 2022-11-08

## 2022-11-08 VITALS
WEIGHT: 154 LBS | TEMPERATURE: 97.2 F | OXYGEN SATURATION: 100 % | HEART RATE: 97 BPM | DIASTOLIC BLOOD PRESSURE: 81 MMHG | SYSTOLIC BLOOD PRESSURE: 142 MMHG | BODY MASS INDEX: 24.17 KG/M2 | HEIGHT: 67 IN

## 2022-11-08 DIAGNOSIS — Z23 ENCOUNTER FOR IMMUNIZATION: ICD-10-CM

## 2022-11-08 DIAGNOSIS — S31.109A UNSPECIFIED OPEN WOUND OF ABDOMINAL WALL, UNSPECIFIED QUADRANT W/OUT PENETRATION INTO PERITONEAL CAVITY, INITIAL ENCOUNTER: ICD-10-CM

## 2022-11-08 DIAGNOSIS — R73.03 PREDIABETES.: ICD-10-CM

## 2022-11-08 PROCEDURE — 87075 CULTR BACTERIA EXCEPT BLOOD: CPT

## 2022-11-08 PROCEDURE — 87205 SMEAR GRAM STAIN: CPT

## 2022-11-08 PROCEDURE — 71045 X-RAY EXAM CHEST 1 VIEW: CPT

## 2022-11-08 PROCEDURE — 83010 ASSAY OF HAPTOGLOBIN QUANT: CPT

## 2022-11-08 PROCEDURE — 88307 TISSUE EXAM BY PATHOLOGIST: CPT

## 2022-11-08 PROCEDURE — 97110 THERAPEUTIC EXERCISES: CPT

## 2022-11-08 PROCEDURE — U0003: CPT

## 2022-11-08 PROCEDURE — 87070 CULTURE OTHR SPECIMN AEROBIC: CPT

## 2022-11-08 PROCEDURE — 87449 NOS EACH ORGANISM AG IA: CPT

## 2022-11-08 PROCEDURE — 96375 TX/PRO/DX INJ NEW DRUG ADDON: CPT

## 2022-11-08 PROCEDURE — 97535 SELF CARE MNGMENT TRAINING: CPT

## 2022-11-08 PROCEDURE — 82803 BLOOD GASES ANY COMBINATION: CPT

## 2022-11-08 PROCEDURE — 36415 COLL VENOUS BLD VENIPUNCTURE: CPT

## 2022-11-08 PROCEDURE — 87635 SARS-COV-2 COVID-19 AMP PRB: CPT

## 2022-11-08 PROCEDURE — 88302 TISSUE EXAM BY PATHOLOGIST: CPT

## 2022-11-08 PROCEDURE — 83036 HEMOGLOBIN GLYCOSYLATED A1C: CPT

## 2022-11-08 PROCEDURE — 86900 BLOOD TYPING SEROLOGIC ABO: CPT

## 2022-11-08 PROCEDURE — 80053 COMPREHEN METABOLIC PANEL: CPT

## 2022-11-08 PROCEDURE — 87389 HIV-1 AG W/HIV-1&-2 AB AG IA: CPT

## 2022-11-08 PROCEDURE — 74176 CT ABD & PELVIS W/O CONTRAST: CPT

## 2022-11-08 PROCEDURE — 90662 IIV NO PRSV INCREASED AG IM: CPT

## 2022-11-08 PROCEDURE — 86850 RBC ANTIBODY SCREEN: CPT

## 2022-11-08 PROCEDURE — 88300 SURGICAL PATH GROSS: CPT

## 2022-11-08 PROCEDURE — G0433: CPT | Mod: QW

## 2022-11-08 PROCEDURE — 97164 PT RE-EVAL EST PLAN CARE: CPT

## 2022-11-08 PROCEDURE — 86901 BLOOD TYPING SEROLOGIC RH(D): CPT

## 2022-11-08 PROCEDURE — U0005: CPT

## 2022-11-08 PROCEDURE — 84100 ASSAY OF PHOSPHORUS: CPT

## 2022-11-08 PROCEDURE — C1751: CPT

## 2022-11-08 PROCEDURE — 36573 INSJ PICC RS&I 5 YR+: CPT

## 2022-11-08 PROCEDURE — 85730 THROMBOPLASTIN TIME PARTIAL: CPT

## 2022-11-08 PROCEDURE — 99285 EMERGENCY DEPT VISIT HI MDM: CPT

## 2022-11-08 PROCEDURE — 74018 RADEX ABDOMEN 1 VIEW: CPT

## 2022-11-08 PROCEDURE — 93000 ELECTROCARDIOGRAM COMPLETE: CPT

## 2022-11-08 PROCEDURE — 96374 THER/PROPH/DIAG INJ IV PUSH: CPT

## 2022-11-08 PROCEDURE — 86140 C-REACTIVE PROTEIN: CPT

## 2022-11-08 PROCEDURE — 83930 ASSAY OF BLOOD OSMOLALITY: CPT

## 2022-11-08 PROCEDURE — 76937 US GUIDE VASCULAR ACCESS: CPT

## 2022-11-08 PROCEDURE — 82607 VITAMIN B-12: CPT

## 2022-11-08 PROCEDURE — 82728 ASSAY OF FERRITIN: CPT

## 2022-11-08 PROCEDURE — 84156 ASSAY OF PROTEIN URINE: CPT

## 2022-11-08 PROCEDURE — 87186 SC STD MICRODIL/AGAR DIL: CPT

## 2022-11-08 PROCEDURE — 71260 CT THORAX DX C+: CPT

## 2022-11-08 PROCEDURE — 82570 ASSAY OF URINE CREATININE: CPT

## 2022-11-08 PROCEDURE — 80076 HEPATIC FUNCTION PANEL: CPT

## 2022-11-08 PROCEDURE — 87040 BLOOD CULTURE FOR BACTERIA: CPT

## 2022-11-08 PROCEDURE — 87181 SC STD AGAR DILUTION PER AGT: CPT

## 2022-11-08 PROCEDURE — 82550 ASSAY OF CK (CPK): CPT

## 2022-11-08 PROCEDURE — 74177 CT ABD & PELVIS W/CONTRAST: CPT

## 2022-11-08 PROCEDURE — 82746 ASSAY OF FOLIC ACID SERUM: CPT

## 2022-11-08 PROCEDURE — 74019 RADEX ABDOMEN 2 VIEWS: CPT

## 2022-11-08 PROCEDURE — 36410 VNPNXR 3YR/> PHY/QHP DX/THER: CPT

## 2022-11-08 PROCEDURE — 84540 ASSAY OF URINE/UREA-N: CPT

## 2022-11-08 PROCEDURE — 83935 ASSAY OF URINE OSMOLALITY: CPT

## 2022-11-08 PROCEDURE — 83540 ASSAY OF IRON: CPT

## 2022-11-08 PROCEDURE — 85652 RBC SED RATE AUTOMATED: CPT

## 2022-11-08 PROCEDURE — 36430 TRANSFUSION BLD/BLD COMPNT: CPT

## 2022-11-08 PROCEDURE — C1889: CPT

## 2022-11-08 PROCEDURE — 85025 COMPLETE CBC W/AUTO DIFF WBC: CPT

## 2022-11-08 PROCEDURE — 86923 COMPATIBILITY TEST ELECTRIC: CPT

## 2022-11-08 PROCEDURE — 82962 GLUCOSE BLOOD TEST: CPT

## 2022-11-08 PROCEDURE — 80048 BASIC METABOLIC PNL TOTAL CA: CPT

## 2022-11-08 PROCEDURE — 97116 GAIT TRAINING THERAPY: CPT

## 2022-11-08 PROCEDURE — 85027 COMPLETE CBC AUTOMATED: CPT

## 2022-11-08 PROCEDURE — 76775 US EXAM ABDO BACK WALL LIM: CPT

## 2022-11-08 PROCEDURE — 84133 ASSAY OF URINE POTASSIUM: CPT

## 2022-11-08 PROCEDURE — 97161 PT EVAL LOW COMPLEX 20 MIN: CPT

## 2022-11-08 PROCEDURE — 83735 ASSAY OF MAGNESIUM: CPT

## 2022-11-08 PROCEDURE — 83690 ASSAY OF LIPASE: CPT

## 2022-11-08 PROCEDURE — 99496 TRANSJ CARE MGMT HIGH F2F 7D: CPT | Mod: GC,25

## 2022-11-08 PROCEDURE — G0008: CPT

## 2022-11-08 PROCEDURE — 81001 URINALYSIS AUTO W/SCOPE: CPT

## 2022-11-08 PROCEDURE — 83550 IRON BINDING TEST: CPT

## 2022-11-08 PROCEDURE — 87324 CLOSTRIDIUM AG IA: CPT

## 2022-11-08 PROCEDURE — 87150 DNA/RNA AMPLIFIED PROBE: CPT

## 2022-11-08 PROCEDURE — P9016: CPT

## 2022-11-08 PROCEDURE — 85610 PROTHROMBIN TIME: CPT

## 2022-11-08 PROCEDURE — 83615 LACTATE (LD) (LDH) ENZYME: CPT

## 2022-11-08 PROCEDURE — 94002 VENT MGMT INPAT INIT DAY: CPT

## 2022-11-08 PROCEDURE — 84300 ASSAY OF URINE SODIUM: CPT

## 2022-11-08 PROCEDURE — 83605 ASSAY OF LACTIC ACID: CPT

## 2022-11-08 RX ORDER — LABETALOL HYDROCHLORIDE 1 MG/ML
200-0.72 INJECTION INTRAVENOUS
Refills: 0 | Status: DISCONTINUED | COMMUNITY
Start: 2022-05-12 | End: 2022-11-08

## 2022-11-09 NOTE — REVIEW OF SYSTEMS
[Palpitations] : palpitations [Constipation] : constipation [Anxiety] : anxiety [Fever] : no fever [Chills] : no chills [Night Sweats] : no night sweats [Discharge] : no discharge [Pain] : no pain [Vision Problems] : no vision problems [Hearing Loss] : no hearing loss [Nasal Discharge] : no nasal discharge [Sore Throat] : no sore throat [Postnasal Drip] : no postnasal drip [Chest Pain] : no chest pain [Leg Claudication] : no leg claudication [Lower Ext Edema] : no lower extremity edema [Orthopnea] : no orthopnea [Paroxysmal Nocturnal Dyspnea] : no paroxysmal nocturnal dyspnea [Shortness Of Breath] : no shortness of breath [Wheezing] : no wheezing [Cough] : no cough [Dyspnea on Exertion] : no dyspnea on exertion [Abdominal Pain] : no abdominal pain [Diarrhea] : diarrhea [Vomiting] : no vomiting [Heartburn] : no heartburn [Melena] : no melena [Dysuria] : no dysuria [Incontinence] : no incontinence [Hematuria] : no hematuria [Frequency] : no frequency [Joint Pain] : no joint pain [Muscle Weakness] : no muscle weakness [Back Pain] : no back pain [Headache] : no headache [Dizziness] : no dizziness [Memory Loss] : no memory loss [Suicidal] : not suicidal [Insomnia] : no insomnia [Depression] : no depression [Easy Bleeding] : no easy bleeding [Easy Bruising] : no easy bruising [de-identified] : +midline abdominal wound

## 2022-11-09 NOTE — END OF VISIT
[FreeTextEntry3] : I saw and evaluated the patient.  The findings and assessment were discussed with the resident and I agree with resident’s plan as documented in the above, in the resident’s note.\par \par Pertinent exam findings: Well-appearing, NAD. \par \par Bowel obstruction s/p exlap: Had loose gauze on wound, replaced w/ clean dressing in office.  Surgery referral for follow up.  Discussed precautions re: ED return.  Monitor for BMs\par Palpitations: Likely 2/2 anemia.  CBC and EKG today (no ischemic changes). \par Anemia: likely blood loss w/ ASAEL / chronic dx.  monitor, consider Fe once obstruction issues resolve.\par HTN monitor, c/w current meds.\par Flu shot.\par \par Patient had dizziness in office but HD stable, FSG good, safe to go home w/ transportation, discussed return precautions.\par \par RTC 1 week for monitoring.\par \par Task sent to SW and NP team to assist w/ potential sevices but options are limited as [place] lives in [place]

## 2022-11-09 NOTE — PHYSICAL EXAM
[Normal Sclera/Conjunctiva] : normal sclera/conjunctiva [PERRL] : pupils equal round and reactive to light [Normal Outer Ear/Nose] : the outer ears and nose were normal in appearance [Normal Oropharynx] : the oropharynx was normal [No JVD] : no jugular venous distention [Supple] : supple [No Respiratory Distress] : no respiratory distress  [No Accessory Muscle Use] : no accessory muscle use [Clear to Auscultation] : lungs were clear to auscultation bilaterally [Normal Rate] : normal rate  [Regular Rhythm] : with a regular rhythm [Normal S1, S2] : normal S1 and S2 [No Carotid Bruits] : no carotid bruits [No Edema] : there was no peripheral edema [No Extremity Clubbing/Cyanosis] : no extremity clubbing/cyanosis [Soft] : abdomen soft [Non Tender] : non-tender [Non-distended] : non-distended [Normal Bowel Sounds] : normal bowel sounds [Normal Posterior Cervical Nodes] : no posterior cervical lymphadenopathy [Normal Anterior Cervical Nodes] : no anterior cervical lymphadenopathy [No CVA Tenderness] : no CVA  tenderness [No Spinal Tenderness] : no spinal tenderness [No Joint Swelling] : no joint swelling [Grossly Normal Strength/Tone] : grossly normal strength/tone [No Rash] : no rash [Coordination Grossly Intact] : coordination grossly intact [No Focal Deficits] : no focal deficits [Normal Affect] : the affect was normal [Normal Insight/Judgement] : insight and judgment were intact [de-identified] : Disheveled, chronically ill appearing  [de-identified] : Midline abdominal wound with minimal drainage. No surrounding erythema or TTP [de-identified] : W

## 2022-11-09 NOTE — HISTORY OF PRESENT ILLNESS
[Post-hospitalization from ___ Hospital] : Post-hospitalization from [unfilled] Hospital [Admitted on: ___] : The patient was admitted on [unfilled] [Discharged on ___] : discharged on [unfilled] [Discharge Summary] : discharge summary [Pertinent Labs] : pertinent labs [Radiology Findings] : radiology findings [Discharge Med List] : discharge medication list [Med Reconciliation] : medication reconciliation has been completed [Patient Contacted By: ____] : and contacted by [unfilled] [FreeTextEntry2] : 73 y/o female with PMH of HTN, DM with recent ex-lap w/ JOSEFINA, 100cm SBR on 9/15/22 w/ Dr. So at [facility] that was c/b SICU stay for hypertensive urgency, wound infection requiring meropenem, C diff w/ PO vanc, and Enterococcus bacteremia discharged to [facility] 10/21. Left [facility] as she was unhappy with her treatment there and returned to [facility] on 10/28/22 with abdominal pain and request for Y[facility]PH placement. During most recent hospitalization, she was seen by Sx who said no intervention necessary at this time. Patient was offered Y[facility], however was discharged back to her [facility] since wasn't happy with YOSEPH choice. Since being home, patient reports feeling unsafe at home and isn't able to take care of herself. She states her room is a "mess" and there are cockroaches everywhere. She is very nervous/anxious about taking care of herself and has noticed she's been having palpitations. She's been without medications since she's been discharged. Patient hasn't had a BM in 2 days and finds it difficult to have a BM since she tries not to strain to protect her abdominal wound. She had gauze over the abdominal wound, however admits to having difficulty with home wound care. Patient does not have any support/family. ROS negative for fevers/chills, chest pain, SOB, abdominal pain, N/V/D, dysuria, abdominal erythema/swelling, melena, bloodly stools, LE edema.

## 2022-11-09 NOTE — ASSESSMENT
[FreeTextEntry1] : 73 y/o female with PMH of HTN, DM with recent ex-lap w/ JOSEFINA, 100cm SBR on 9/15/22 w/ Dr. So at [facility] that was c/b SICU stay for hypertensive urgency, wound infection requiring meropenem, C diff w/ PO vanc, and Enterococcus bacteremia discharged to [facility] 10/21 and readmitted to [facility] on 10/28 for abdominal pain and new YOSEPH placement, presents today for post discharge follow up.\par \par #Bowel obstruction s/p ex lap\par #Midline abdominal wound\par Initially admitted to [facility] under surgery service with many complications during stay. Discharged to [facility] 10/21. Readmitted to [facility] 10/28 for abdominal pain and wound care. Abdominal wound still present on exam today with mild drainage. Concern for healing properly given size of wound and lack of wound care. Hasn't followed Surgery (Dr. Rutledge) since discharge. \par - Wound dressing changed in clinic today. Educated on importance of wound care. \par - Referral given for general surgery to follow up with Dr. Rutledge as soon as possible \par - RTC in 1 week for close follow up\par \par #Palpitations\par Patient admits to feeling anxious and nervous since being home from hospital which has been causing palpitations. No associated SOB or chest pain. Episodes are related to anxiety \par - EKG checked in office with no ischemic changes\par - Advised pt to seek medical emergency if symptoms worsen or develops chest pain/SOB/dizziness etc. \par \par #Anemia \par Hgb in 7s upon discharge from [facility]. Previous iron studies show mix of ASAEL and ACD. Denies any melena or bloody stools. \par - Repeat CBC in office today\par - Start iron tabs \par \par #HTN\par Home meds include Losartan 50 mg daily, HCTZ 25 mg and Labetalol 200 mg BID\par - Refilled all medications \par \par #HCM\par Flu shot given today.\par **RTC in 1 week given complexity of pt's current home situation and multiple active medical issues. Will also reach out to Kathy and other staff members to determine if patient qualifies for any help/home services given she lives in a SRO.**\par \par **Of note, upon departure from office today pt complained of dizziness. Patient was examined and vitals were obtained showing BP of 122/77, HR in 90s and O2 100%. Finger stick also checked which was 105. Patient refused EMS and further evaluation in ED at this time. Advised patient to get evaluated in ED if symptoms return/persist or if she develops chest pain, SOB, LOC, etc.***

## 2022-11-10 DIAGNOSIS — E11.9 TYPE 2 DIABETES MELLITUS WITHOUT COMPLICATIONS: ICD-10-CM

## 2022-11-10 DIAGNOSIS — R10.9 UNSPECIFIED ABDOMINAL PAIN: ICD-10-CM

## 2022-11-10 DIAGNOSIS — E78.5 HYPERLIPIDEMIA, UNSPECIFIED: ICD-10-CM

## 2022-11-10 DIAGNOSIS — I10 ESSENTIAL (PRIMARY) HYPERTENSION: ICD-10-CM

## 2022-11-10 DIAGNOSIS — R19.7 DIARRHEA, UNSPECIFIED: ICD-10-CM

## 2022-11-10 DIAGNOSIS — T81.31XA DISRUPTION OF EXTERNAL OPERATION (SURGICAL) WOUND, NOT ELSEWHERE CLASSIFIED, INITIAL ENCOUNTER: ICD-10-CM

## 2022-11-10 DIAGNOSIS — N17.9 ACUTE KIDNEY FAILURE, UNSPECIFIED: ICD-10-CM

## 2022-11-10 DIAGNOSIS — N39.0 URINARY TRACT INFECTION, SITE NOT SPECIFIED: ICD-10-CM

## 2022-11-10 DIAGNOSIS — D50.9 IRON DEFICIENCY ANEMIA, UNSPECIFIED: ICD-10-CM

## 2022-11-10 DIAGNOSIS — Z90.710 ACQUIRED ABSENCE OF BOTH CERVIX AND UTERUS: ICD-10-CM

## 2022-11-10 DIAGNOSIS — R53.1 WEAKNESS: ICD-10-CM

## 2022-11-10 DIAGNOSIS — R11.2 NAUSEA WITH VOMITING, UNSPECIFIED: ICD-10-CM
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
## 2022-11-10 DIAGNOSIS — Z88.0 ALLERGY STATUS TO PENICILLIN: ICD-10-CM

## 2022-11-10 DIAGNOSIS — S31.109A UNSPECIFIED OPEN WOUND OF ABDOMINAL WALL, UNSPECIFIED QUADRANT WITHOUT PENETRATION INTO PERITONEAL CAVITY, INITIAL ENCOUNTER: ICD-10-CM

## 2022-11-10 LAB
ALBUMIN SERPL ELPH-MCNC: 3.8 G/DL
ALP BLD-CCNC: 78 U/L
ALT SERPL-CCNC: 13 U/L
ANION GAP SERPL CALC-SCNC: 13 MMOL/L
AST SERPL-CCNC: 18 U/L
BASOPHILS # BLD AUTO: 0.04 K/UL
BASOPHILS NFR BLD AUTO: 0.4 %
BILIRUB SERPL-MCNC: 0.5 MG/DL
BUN SERPL-MCNC: 13 MG/DL
CALCIUM SERPL-MCNC: 9.6 MG/DL
CHLORIDE SERPL-SCNC: 102 MMOL/L
CO2 SERPL-SCNC: 24 MMOL/L
CREAT SERPL-MCNC: 1.63 MG/DL
EGFR: 34 ML/MIN/1.73M2
EOSINOPHIL # BLD AUTO: 0.21 K/UL
EOSINOPHIL NFR BLD AUTO: 2.3 %
GLUCOSE SERPL-MCNC: 108 MG/DL
HCT VFR BLD CALC: 29.7 %
HGB BLD-MCNC: 8.5 G/DL
IMM GRANULOCYTES NFR BLD AUTO: 0.7 %
LYMPHOCYTES # BLD AUTO: 2.07 K/UL
LYMPHOCYTES NFR BLD AUTO: 23 %
MAN DIFF?: NORMAL
MCHC RBC-ENTMCNC: 26.2 PG
MCHC RBC-ENTMCNC: 28.6 GM/DL
MCV RBC AUTO: 91.7 FL
MONOCYTES # BLD AUTO: 0.78 K/UL
MONOCYTES NFR BLD AUTO: 8.7 %
NEUTROPHILS # BLD AUTO: 5.83 K/UL
NEUTROPHILS NFR BLD AUTO: 64.9 %
PLATELET # BLD AUTO: 377 K/UL
POTASSIUM SERPL-SCNC: 4.9 MMOL/L
PROT SERPL-MCNC: 6.9 G/DL
RBC # BLD: 3.24 M/UL
RBC # FLD: 16.9 %
SODIUM SERPL-SCNC: 139 MMOL/L
WBC # FLD AUTO: 8.99 K/UL

## 2022-11-14 LAB
CULTURE RESULTS: SIGNIFICANT CHANGE UP
ORGANISM # SPEC MICROSCOPIC CNT: SIGNIFICANT CHANGE UP
SPECIMEN SOURCE: SIGNIFICANT CHANGE UP

## 2022-11-15 ENCOUNTER — LABORATORY RESULT (OUTPATIENT)
Age: 72
End: 2022-11-15

## 2022-11-15 ENCOUNTER — APPOINTMENT (OUTPATIENT)
Dept: INTERNAL MEDICINE | Facility: CLINIC | Age: 72
End: 2022-11-15
Payer: MEDICARE

## 2022-11-15 VITALS
TEMPERATURE: 98.1 F | OXYGEN SATURATION: 100 % | DIASTOLIC BLOOD PRESSURE: 82 MMHG | SYSTOLIC BLOOD PRESSURE: 133 MMHG | RESPIRATION RATE: 18 BRPM | HEART RATE: 105 BPM

## 2022-11-15 DIAGNOSIS — R06.02 SHORTNESS OF BREATH: ICD-10-CM

## 2022-11-15 PROCEDURE — 99215 OFFICE O/P EST HI 40 MIN: CPT | Mod: GC,25

## 2022-11-15 PROCEDURE — 36415 COLL VENOUS BLD VENIPUNCTURE: CPT

## 2022-11-16 ENCOUNTER — INPATIENT (INPATIENT)
Facility: HOSPITAL | Age: 72
LOS: 5 days | Discharge: NURSING, REHAB OR LONG-TERM CARE | DRG: 645 | End: 2022-11-22
Attending: INTERNAL MEDICINE | Admitting: INTERNAL MEDICINE
Payer: MEDICARE

## 2022-11-16 VITALS
HEIGHT: 66 IN | DIASTOLIC BLOOD PRESSURE: 89 MMHG | HEART RATE: 108 BPM | SYSTOLIC BLOOD PRESSURE: 164 MMHG | TEMPERATURE: 98 F | WEIGHT: 179.9 LBS | OXYGEN SATURATION: 98 % | RESPIRATION RATE: 18 BRPM

## 2022-11-16 DIAGNOSIS — Z90.49 ACQUIRED ABSENCE OF OTHER SPECIFIED PARTS OF DIGESTIVE TRACT: Chronic | ICD-10-CM

## 2022-11-16 DIAGNOSIS — Z90.710 ACQUIRED ABSENCE OF BOTH CERVIX AND UTERUS: Chronic | ICD-10-CM

## 2022-11-16 DIAGNOSIS — Z98.891 HISTORY OF UTERINE SCAR FROM PREVIOUS SURGERY: Chronic | ICD-10-CM

## 2022-11-16 LAB
ALBUMIN SERPL ELPH-MCNC: 2.5 G/DL — LOW (ref 3.4–5)
ALP SERPL-CCNC: 63 U/L — SIGNIFICANT CHANGE UP (ref 40–120)
ALT FLD-CCNC: 13 U/L — SIGNIFICANT CHANGE UP (ref 12–42)
ANION GAP SERPL CALC-SCNC: 9 MMOL/L — SIGNIFICANT CHANGE UP (ref 9–16)
APTT BLD: 30.6 SEC — SIGNIFICANT CHANGE UP (ref 27.5–35.5)
AST SERPL-CCNC: 34 U/L — SIGNIFICANT CHANGE UP (ref 15–37)
BASOPHILS # BLD AUTO: 0.01 K/UL — SIGNIFICANT CHANGE UP (ref 0–0.2)
BASOPHILS NFR BLD AUTO: 0.2 % — SIGNIFICANT CHANGE UP (ref 0–2)
BILIRUB SERPL-MCNC: 0.6 MG/DL — SIGNIFICANT CHANGE UP (ref 0.2–1.2)
BUN SERPL-MCNC: 16 MG/DL — SIGNIFICANT CHANGE UP (ref 7–23)
CALCIUM SERPL-MCNC: 9.4 MG/DL — SIGNIFICANT CHANGE UP (ref 8.5–10.5)
CHLORIDE SERPL-SCNC: 109 MMOL/L — HIGH (ref 96–108)
CO2 SERPL-SCNC: 24 MMOL/L — SIGNIFICANT CHANGE UP (ref 22–31)
CREAT SERPL-MCNC: 1.06 MG/DL — SIGNIFICANT CHANGE UP (ref 0.5–1.3)
D DIMER BLD IA.RAPID-MCNC: 502 NG/ML DDU — HIGH
EGFR: 57 ML/MIN/1.73M2 — LOW
EOSINOPHIL # BLD AUTO: 0.15 K/UL — SIGNIFICANT CHANGE UP (ref 0–0.5)
EOSINOPHIL NFR BLD AUTO: 2.4 % — SIGNIFICANT CHANGE UP (ref 0–6)
GLUCOSE SERPL-MCNC: 130 MG/DL — HIGH (ref 70–99)
HCT VFR BLD CALC: 25.2 % — LOW (ref 34.5–45)
HGB BLD-MCNC: 7.6 G/DL — LOW (ref 11.5–15.5)
IMM GRANULOCYTES NFR BLD AUTO: 0.5 % — SIGNIFICANT CHANGE UP (ref 0–0.9)
INR BLD: 1.12 — SIGNIFICANT CHANGE UP (ref 0.88–1.16)
LYMPHOCYTES # BLD AUTO: 1.42 K/UL — SIGNIFICANT CHANGE UP (ref 1–3.3)
LYMPHOCYTES # BLD AUTO: 22.4 % — SIGNIFICANT CHANGE UP (ref 13–44)
MCHC RBC-ENTMCNC: 27.3 PG — SIGNIFICANT CHANGE UP (ref 27–34)
MCHC RBC-ENTMCNC: 30.2 GM/DL — LOW (ref 32–36)
MCV RBC AUTO: 90.6 FL — SIGNIFICANT CHANGE UP (ref 80–100)
MONOCYTES # BLD AUTO: 0.55 K/UL — SIGNIFICANT CHANGE UP (ref 0–0.9)
MONOCYTES NFR BLD AUTO: 8.7 % — SIGNIFICANT CHANGE UP (ref 2–14)
NEUTROPHILS # BLD AUTO: 4.19 K/UL — SIGNIFICANT CHANGE UP (ref 1.8–7.4)
NEUTROPHILS NFR BLD AUTO: 65.8 % — SIGNIFICANT CHANGE UP (ref 43–77)
NRBC # BLD: 0 /100 WBCS — SIGNIFICANT CHANGE UP (ref 0–0)
NT-PROBNP SERPL-SCNC: 405 PG/ML — HIGH
PLATELET # BLD AUTO: 246 K/UL — SIGNIFICANT CHANGE UP (ref 150–400)
POTASSIUM SERPL-MCNC: 4.6 MMOL/L — SIGNIFICANT CHANGE UP (ref 3.5–5.3)
POTASSIUM SERPL-SCNC: 4.6 MMOL/L — SIGNIFICANT CHANGE UP (ref 3.5–5.3)
PROT SERPL-MCNC: 6.8 G/DL — SIGNIFICANT CHANGE UP (ref 6.4–8.2)
PROTHROM AB SERPL-ACNC: 13.1 SEC — SIGNIFICANT CHANGE UP (ref 10.5–13.4)
RBC # BLD: 2.78 M/UL — LOW (ref 3.8–5.2)
RBC # FLD: 16.8 % — HIGH (ref 10.3–14.5)
SARS-COV-2 RNA SPEC QL NAA+PROBE: SIGNIFICANT CHANGE UP
SODIUM SERPL-SCNC: 142 MMOL/L — SIGNIFICANT CHANGE UP (ref 132–145)
TROPONIN I, HIGH SENSITIVITY RESULT: 11.3 NG/L — SIGNIFICANT CHANGE UP
TROPONIN I, HIGH SENSITIVITY RESULT: 8.2 NG/L — SIGNIFICANT CHANGE UP
TSH SERPL-MCNC: <0.007 UIU/ML — SIGNIFICANT CHANGE UP (ref 0.36–3.74)
WBC # BLD: 6.35 K/UL — SIGNIFICANT CHANGE UP (ref 3.8–10.5)
WBC # FLD AUTO: 6.35 K/UL — SIGNIFICANT CHANGE UP (ref 3.8–10.5)

## 2022-11-16 PROCEDURE — 99284 EMERGENCY DEPT VISIT MOD MDM: CPT

## 2022-11-16 PROCEDURE — 93010 ELECTROCARDIOGRAM REPORT: CPT

## 2022-11-16 PROCEDURE — 71275 CT ANGIOGRAPHY CHEST: CPT | Mod: 26

## 2022-11-16 RX ORDER — ACETAMINOPHEN 500 MG
650 TABLET ORAL ONCE
Refills: 0 | Status: COMPLETED | OUTPATIENT
Start: 2022-11-16 | End: 2022-11-16

## 2022-11-16 RX ADMIN — Medication 650 MILLIGRAM(S): at 16:47

## 2022-11-16 RX ADMIN — Medication 650 MILLIGRAM(S): at 17:17

## 2022-11-16 NOTE — ED ADULT NURSE NOTE - OBJECTIVE STATEMENT
c/o chest pain and SOB for a couple of days. Sent in by provider for worsening symptoms. Pt reports having recent abdominal surgery and stating she has not been able to take care of herself at home.

## 2022-11-16 NOTE — ED PROVIDER NOTE - CHPI ED SYMPTOMS NEG
no leg swelling or pain, no numbness, no weakness, no tingling./no nausea/no vomiting/no diaphoresis

## 2022-11-16 NOTE — ED PROVIDER NOTE - PROGRESS NOTE DETAILS
Pt is well appearing. Has heart score of 4. Pt consulted to cardiology and awaiting call back for admission for rule out of ACS. OLAMIDE: Patient is resting comfortably, NAD. Signed out to Dr. Huston, waiting for transport. OLAMIDE: Patient signed out to me by Dr. Reid. Has bed assigned at [facility], awaiting transport. OLAMIDE: Patient is resting comfortably, NAD. Signed out to Dr. Hensley, waiting for transport.

## 2022-11-16 NOTE — ED PROVIDER NOTE - CLINICAL SUMMARY MEDICAL DECISION MAKING FREE TEXT BOX
71 yo F with recent surgery and high risk for PE. D-dimer positive. Will do chest CTA for PE. Given pt's age, will trend troponin and get chest X-ray. Plan for admission given risk factors. Will get consult social work.

## 2022-11-16 NOTE — ED PROVIDER NOTE - OBJECTIVE STATEMENT
71 yo F with PMHx of DM (says she has not been on medication because her sugar normalized), was recently admitted to [facility]. Pt says she has hemicolectomy from diverticulitis and went to subacute rehab. Since she got out, for the past 3 weeks, she has been feeling fatigue, intermittent chest tightness, and dyspnea on exertion. No hx of VTE. Denies leg swelling or pain. She states she saw her surgeon yesterday who wanted to send her to the ER to rule out pulmonary embolism but she refused. Pt here today for pain and tightness intermittently over the sternum with associated shortness of breath. Further denies diaphoresis, nausea, vomiting, numbness, weakness, tingling. Pt also very concerned about her home situation and would like to speak with social work.

## 2022-11-16 NOTE — ED ADULT NURSE NOTE - NS ED NURSE LEVEL OF CONSCIOUSNESS ORIENTATION
Patient is a 60 year old female w/ post-polio syndrome, Stage IV Endometrial Carcinoma s/p staging surgery 7/2018 and 1 cycle of chemo in 2/2019, s/p exlap, end colostomy, rectovaginal fistula w/ recurrent admissions UTIs/bacteremia, who presented from [facility] in urosepsis, intubated in ED for airway protection and admitted to MICU. Patient was weaned off levophed and was extubated on 5/2 then transferred to GYN service. Surveillance blood cultures were negative. Both urine and initial blood culture growing ESBL e coli sensitive to meropenem and transitioned to ertapenem. Pt then transferred to stepdown unit for worsening neuro exam, concern for airway protection. He was started on IVIG (Privigen) 5/7. In 5/8 am, patient intubated on 7Lac for hypercapneic respiratory failure and stepped up to MICU for CO2 narcosis 2/2 respiratory muscle insufficiency from AIDP. In the MICU, patient received 3 more doses of IVIG (total 4 doses) with good response. She was extubated on 5/10 and was stable until 5/13 when she was reintubated again for CO2 narcosis. Patient underwent tx w plasmapheresis. Patient was made DNR. Tracheostomy placed as patient was never fully able to wean from ventilator, though she did tolerate CPAP trials. PEG was placed on 5/21, patient with antibiotic course of vanc/meropenem for additional fever, found to have MRSA in sputum and treated as VAP with linezolid. Patient HD stable for discharge and will receive IVIG after discharge. Patient is a 60 year old female w/ post-polio syndrome, Stage IV Endometrial Carcinoma s/p staging surgery 7/2018 and 1 cycle of chemo in 2/2019, s/p exlap, end colostomy, rectovaginal fistula w/ recurrent admissions UTIs/bacteremia, who presented from [facility] in urosepsis, intubated in ED for airway protection and admitted to MICU. Patient was weaned off levophed and was extubated on 5/2 then transferred to GYN service. Surveillance blood cultures were negative. Both urine and initial blood culture growing ESBL e coli sensitive to meropenem and transitioned to ertapenem. Pt then transferred to stepdown unit for worsening neuro exam, concern for airway protection. He was started on IVIG (Privigen) 5/7. In 5/8 am, patient intubated on 7Lac for hypercapneic respiratory failure and stepped up to MICU for CO2 narcosis 2/2 respiratory muscle insufficiency from AIDP. In the MICU, patient received 3 more doses of IVIG (total 4 doses) with good response. She was extubated on 5/10 and was stable until 5/13 when she was reintubated again for CO2 narcosis. Patient underwent tx w plasmapheresis. Patient was made DNR. Tracheostomy placed as patient was never fully able to wean from ventilator, though she did tolerate CPAP trials. PEG was placed on 5/21, patient with antibiotic course of vanc/meropenem for additional fever, found to have MRSA in sputum and treated as VAP with linezolid. Patient HD stable for discharge and will receive IVIG after discharge. Patient is a 60 year old female w/ post-polio syndrome, Stage IV Endometrial Carcinoma s/p staging surgery 7/2018 and 1 cycle of chemo in 2/2019, s/p exlap, end colostomy, rectovaginal fistula w/ recurrent admissions UTIs/bacteremia, who presented from [facility] in urosepsis, intubated in ED for airway protection and admitted to MICU. Patient was weaned off levophed and was extubated on 5/2 then transferred to GYN service. Surveillance blood cultures were negative. Both urine and initial blood culture growing ESBL e coli sensitive to meropenem and transitioned to ertapenem. Pt then transferred to stepdown unit for worsening neuro exam, concern for airway protection. He was started on IVIG (Privigen) 5/7. In 5/8 am, patient intubated on 7Lac for hypercapneic respiratory failure and stepped up to MICU for CO2 narcosis 2/2 respiratory muscle insufficiency from AIDP. In the MICU, patient received 3 more doses of IVIG (total 4 doses) with good response. She was extubated on 5/10 and was stable until 5/13 when she was reintubated again for CO2 narcosis. Patient underwent tx w plasmapheresis. Patient was made DNR. Tracheostomy placed as patient was never fully able to wean from ventilator, though she did tolerate CPAP trials. PEG was placed on 5/21, patient with antibiotic course of vanc/meropenem for additional fever, found to have MRSA in sputum and treated as VAP with linezolid. Patient had tunneled HD cath placed and patient received additional 2 days of plasmapheresis that completed on 6/6. Patient also treated for 7 day course of vanc and zosyn for tracheitis. Patient's enterovaginal fistula re-opened as seen on CT abdomen and pelvis. She was started on TPN, which she will be discharged on. Patient HD stable for discharge and will return from LTAC on 6/30/19 at noon for next round of plasmapheresis. She will be a direct admit from the LTAC likely to the neurology service. Current plan is to receive 2 rounds of plex every month for three months and then neurology will re-evaluate necessity for further treatments. Patient is a 60 year old female w/ post-polio syndrome, Stage IV Endometrial Carcinoma s/p staging surgery 7/2018 and 1 cycle of chemo in 2/2019, s/p exlap, end colostomy, rectovaginal fistula w/ recurrent admissions UTIs/bacteremia, who presented from [facility] in urosepsis, intubated in ED for airway protection and admitted to MICU. Patient was weaned off levophed and was extubated on 5/2 then transferred to GYN service. Surveillance blood cultures were negative. Both urine and initial blood culture growing ESBL e coli sensitive to meropenem and transitioned to ertapenem. Pt then transferred to stepdown unit for worsening neuro exam, concern for airway protection. He was started on IVIG (Privigen) 5/7. In 5/8 am, patient intubated on 7Lac for hypercapneic respiratory failure and stepped up to MICU for CO2 narcosis 2/2 respiratory muscle insufficiency from AIDP. In the MICU, patient received 3 more doses of IVIG (total 4 doses) with good response. She was extubated on 5/10 and was stable until 5/13 when she was reintubated again for CO2 narcosis. Patient underwent tx w plasmapheresis. Patient was made DNR. Tracheostomy placed as patient was never fully able to wean from ventilator, though she did tolerate CPAP trials. PEG was placed on 5/21, patient with antibiotic course of vanc/meropenem for additional fever, found to have MRSA in sputum and treated as VAP with linezolid. Patient had tunneled HD cath placed and patient received additional 2 days of plasmapheresis that completed on 6/6. Patient also treated for 7 day course of vanc and zosyn for tracheitis. Patient's enterovaginal fistula re-opened as seen on CT abdomen and pelvis. She was started on TPN, which she will be discharged on. Patient HD stable for discharge and will return from LTAC on 6/30/19 at noon for next round of plasmapheresis. She will be a direct admit from the LTAC likely to the neurology service under Dr. Sellers. Current plan is to receive 2 rounds of plex every month for three months and then neurology will re-evaluate necessity for further treatments. Patient is a 60 year old female w/ post-polio syndrome, Stage IV Endometrial Carcinoma s/p staging surgery 7/2018 and 1 cycle of chemo in 2/2019, s/p exlap, end colostomy, rectovaginal fistula w/ recurrent admissions UTIs/bacteremia, who presented from [facility] in urosepsis, intubated in ED for airway protection and admitted to MICU. Patient was weaned off levophed and was extubated on 5/2 then transferred to GYN service. Both urine and initial blood culture growing ESBL e coli sensitive to meropenem and transitioned to ertapenem. Pt then transferred to stepdown unit for worsening neuro exam, concern for airway protection. He was started on IVIG (Privigen) 5/7. In 5/8 am, patient intubated on 7Lac for hypercapneic respiratory failure and stepped up to MICU for CO2 narcosis 2/2 respiratory muscle insufficiency from AIDP. In the MICU, patient received 3 more doses of IVIG (total 4 doses) with good response. She was extubated on 5/10 and was stable until 5/13 when she was reintubated again for CO2 narcosis. Patient underwent tx w plasmapheresis. Patient was made DNR. Tracheostomy placed as patient was never fully able to wean from ventilator, though she did tolerate CPAP trials. PEG was placed on 5/21, patient with antibiotic course of vanc/meropenem for additional fever, found to have MRSA in sputum and treated as VAP with linezolid. Patient had tunneled HD cath placed and patient received additional 2 days of plasmapheresis that completed on 6/6. Patient also treated for 7 day course of vanc and zosyn for tracheitis. Patient's enterovaginal fistula re-opened as seen on CT abdomen and pelvis. She was started on TPN, which she will be discharged on. TPN administration will be re-addressed by GYN during next admission. Patient HD stable for discharge and will return from [facility] on 7/1/19 at noon for next round of plasmapheresis. She will be a direct admit from the [facility] to the neurology service under Dr. Sellers. Current plan is to receive 2 rounds of plex every month for three months and then neurology will re-evaluate necessity for further treatments. Oriented - self; Oriented - place; Oriented - time

## 2022-11-16 NOTE — ED ADULT NURSE NOTE - NSIMPLEMENTINTERV_GEN_ALL_ED
Implemented All Universal Safety Interventions:  [place] to call system. Call bell, personal items and telephone within reach. Instruct patient to call for assistance. Room bathroom lighting operational. Non-slip footwear when patient is off stretcher. Physically safe environment: no spills, clutter or unnecessary equipment. Stretcher in lowest position, wheels locked, appropriate side rails in place.

## 2022-11-17 ENCOUNTER — NON-APPOINTMENT (OUTPATIENT)
Age: 72
End: 2022-11-17

## 2022-11-17 DIAGNOSIS — I10 ESSENTIAL (PRIMARY) HYPERTENSION: ICD-10-CM

## 2022-11-17 DIAGNOSIS — D64.9 ANEMIA, UNSPECIFIED: ICD-10-CM

## 2022-11-17 DIAGNOSIS — R07.9 CHEST PAIN, UNSPECIFIED: ICD-10-CM

## 2022-11-17 DIAGNOSIS — Z98.890 OTHER SPECIFIED POSTPROCEDURAL STATES: ICD-10-CM

## 2022-11-17 DIAGNOSIS — R06.02 SHORTNESS OF BREATH: ICD-10-CM

## 2022-11-17 DIAGNOSIS — E11.9 TYPE 2 DIABETES MELLITUS WITHOUT COMPLICATIONS: ICD-10-CM

## 2022-11-17 LAB
A1C WITH ESTIMATED AVERAGE GLUCOSE RESULT: 5.2 % — SIGNIFICANT CHANGE UP (ref 4–5.6)
ALBUMIN SERPL ELPH-MCNC: 3.1 G/DL — LOW (ref 3.3–5)
ALP SERPL-CCNC: 73 U/L — SIGNIFICANT CHANGE UP (ref 40–120)
ALT FLD-CCNC: 12 U/L — SIGNIFICANT CHANGE UP (ref 10–45)
ANION GAP SERPL CALC-SCNC: 11 MMOL/L — SIGNIFICANT CHANGE UP (ref 5–17)
AST SERPL-CCNC: 22 U/L — SIGNIFICANT CHANGE UP (ref 10–40)
BILIRUB SERPL-MCNC: 0.6 MG/DL — SIGNIFICANT CHANGE UP (ref 0.2–1.2)
BUN SERPL-MCNC: 11 MG/DL — SIGNIFICANT CHANGE UP (ref 7–23)
CALCIUM SERPL-MCNC: 9.1 MG/DL — SIGNIFICANT CHANGE UP (ref 8.4–10.5)
CHLORIDE SERPL-SCNC: 106 MMOL/L — SIGNIFICANT CHANGE UP (ref 96–108)
CHOLEST SERPL-MCNC: 123 MG/DL — SIGNIFICANT CHANGE UP
CK MB CFR SERPL CALC: 1.1 NG/ML — SIGNIFICANT CHANGE UP (ref 0–6.7)
CK MB CFR SERPL CALC: <1 NG/ML — SIGNIFICANT CHANGE UP (ref 0–6.7)
CK SERPL-CCNC: 24 U/L — LOW (ref 25–170)
CK SERPL-CCNC: 31 U/L — SIGNIFICANT CHANGE UP (ref 25–170)
CO2 SERPL-SCNC: 23 MMOL/L — SIGNIFICANT CHANGE UP (ref 22–31)
CREAT SERPL-MCNC: 0.79 MG/DL — SIGNIFICANT CHANGE UP (ref 0.5–1.3)
EGFR: 80 ML/MIN/1.73M2 — SIGNIFICANT CHANGE UP
ESTIMATED AVERAGE GLUCOSE: 103 MG/DL — SIGNIFICANT CHANGE UP (ref 68–114)
FERRITIN SERPL-MCNC: 714 NG/ML — HIGH (ref 15–150)
GLUCOSE BLDC GLUCOMTR-MCNC: 104 MG/DL — HIGH (ref 70–99)
GLUCOSE BLDC GLUCOMTR-MCNC: 113 MG/DL — HIGH (ref 70–99)
GLUCOSE BLDC GLUCOMTR-MCNC: 97 MG/DL — SIGNIFICANT CHANGE UP (ref 70–99)
GLUCOSE SERPL-MCNC: 102 MG/DL — HIGH (ref 70–99)
HCT VFR BLD CALC: 25.9 % — LOW (ref 34.5–45)
HDLC SERPL-MCNC: 49 MG/DL — LOW
HGB BLD-MCNC: 7.7 G/DL — LOW (ref 11.5–15.5)
IRON SATN MFR SERPL: 31 % — SIGNIFICANT CHANGE UP (ref 14–50)
IRON SATN MFR SERPL: 42 UG/DL — SIGNIFICANT CHANGE UP (ref 30–160)
LIPID PNL WITH DIRECT LDL SERPL: 56 MG/DL — SIGNIFICANT CHANGE UP
MAGNESIUM SERPL-MCNC: 1.7 MG/DL — SIGNIFICANT CHANGE UP (ref 1.6–2.6)
MCHC RBC-ENTMCNC: 26.6 PG — LOW (ref 27–34)
MCHC RBC-ENTMCNC: 29.7 GM/DL — LOW (ref 32–36)
MCV RBC AUTO: 89.6 FL — SIGNIFICANT CHANGE UP (ref 80–100)
NON HDL CHOLESTEROL: 74 MG/DL — SIGNIFICANT CHANGE UP
NRBC # BLD: 0 /100 WBCS — SIGNIFICANT CHANGE UP (ref 0–0)
PLATELET # BLD AUTO: 268 K/UL — SIGNIFICANT CHANGE UP (ref 150–400)
POTASSIUM SERPL-MCNC: 4.3 MMOL/L — SIGNIFICANT CHANGE UP (ref 3.5–5.3)
POTASSIUM SERPL-SCNC: 4.3 MMOL/L — SIGNIFICANT CHANGE UP (ref 3.5–5.3)
PROT SERPL-MCNC: 6.6 G/DL — SIGNIFICANT CHANGE UP (ref 6–8.3)
RBC # BLD: 2.89 M/UL — LOW (ref 3.8–5.2)
RBC # FLD: 17 % — HIGH (ref 10.3–14.5)
SODIUM SERPL-SCNC: 140 MMOL/L — SIGNIFICANT CHANGE UP (ref 135–145)
T4 AB SER-ACNC: 8.47 UG/DL — SIGNIFICANT CHANGE UP (ref 4.5–11.7)
TIBC SERPL-MCNC: 137 UG/DL — LOW (ref 220–430)
TRIGL SERPL-MCNC: 92 MG/DL — SIGNIFICANT CHANGE UP
TROPONIN T SERPL-MCNC: 0.02 NG/ML — HIGH (ref 0–0.01)
TSH SERPL-ACNC: 0.01 UIU/ML
TSH SERPL-MCNC: <0.118 UIU/ML — LOW (ref 0.27–4.2)
UIBC SERPL-MCNC: 95 UG/DL — LOW (ref 110–370)
WBC # BLD: 6.31 K/UL — SIGNIFICANT CHANGE UP (ref 3.8–10.5)
WBC # FLD AUTO: 6.31 K/UL — SIGNIFICANT CHANGE UP (ref 3.8–10.5)

## 2022-11-17 PROCEDURE — 93018 CV STRESS TEST I&R ONLY: CPT

## 2022-11-17 PROCEDURE — 99222 1ST HOSP IP/OBS MODERATE 55: CPT

## 2022-11-17 PROCEDURE — 78452 HT MUSCLE IMAGE SPECT MULT: CPT | Mod: 26

## 2022-11-17 PROCEDURE — 93306 TTE W/DOPPLER COMPLETE: CPT | Mod: 26

## 2022-11-17 PROCEDURE — 93010 ELECTROCARDIOGRAM REPORT: CPT

## 2022-11-17 PROCEDURE — 93016 CV STRESS TEST SUPVJ ONLY: CPT

## 2022-11-17 PROCEDURE — 99497 ADVNCD CARE PLAN 30 MIN: CPT

## 2022-11-17 RX ORDER — DEXTROSE 50 % IN WATER 50 %
12.5 SYRINGE (ML) INTRAVENOUS ONCE
Refills: 0 | Status: DISCONTINUED | OUTPATIENT
Start: 2022-11-17 | End: 2022-11-22

## 2022-11-17 RX ORDER — SODIUM CHLORIDE 9 MG/ML
1000 INJECTION, SOLUTION INTRAVENOUS
Refills: 0 | Status: DISCONTINUED | OUTPATIENT
Start: 2022-11-17 | End: 2022-11-22

## 2022-11-17 RX ORDER — ATORVASTATIN CALCIUM 80 MG/1
1 TABLET, FILM COATED ORAL
Qty: 0 | Refills: 0 | DISCHARGE

## 2022-11-17 RX ORDER — LABETALOL HCL 100 MG
1 TABLET ORAL
Qty: 0 | Refills: 0 | DISCHARGE

## 2022-11-17 RX ORDER — GLUCAGON INJECTION, SOLUTION 0.5 MG/.1ML
1 INJECTION, SOLUTION SUBCUTANEOUS ONCE
Refills: 0 | Status: DISCONTINUED | OUTPATIENT
Start: 2022-11-17 | End: 2022-11-22

## 2022-11-17 RX ORDER — METFORMIN HYDROCHLORIDE 850 MG/1
1 TABLET ORAL
Qty: 0 | Refills: 0 | DISCHARGE

## 2022-11-17 RX ORDER — DEXTROSE 50 % IN WATER 50 %
25 SYRINGE (ML) INTRAVENOUS ONCE
Refills: 0 | Status: DISCONTINUED | OUTPATIENT
Start: 2022-11-17 | End: 2022-11-22

## 2022-11-17 RX ORDER — LOSARTAN POTASSIUM 100 MG/1
50 TABLET, FILM COATED ORAL DAILY
Refills: 0 | Status: DISCONTINUED | OUTPATIENT
Start: 2022-11-17 | End: 2022-11-22

## 2022-11-17 RX ORDER — POLYETHYLENE GLYCOL 3350 17 G/17G
17 POWDER, FOR SOLUTION ORAL ONCE
Refills: 0 | Status: COMPLETED | OUTPATIENT
Start: 2022-11-17 | End: 2022-11-17

## 2022-11-17 RX ORDER — FERROUS SULFATE 325(65) MG
1 TABLET ORAL
Qty: 0 | Refills: 0 | DISCHARGE

## 2022-11-17 RX ORDER — SENNA PLUS 8.6 MG/1
2 TABLET ORAL AT BEDTIME
Refills: 0 | Status: DISCONTINUED | OUTPATIENT
Start: 2022-11-17 | End: 2022-11-22

## 2022-11-17 RX ORDER — FERROUS SULFATE 325(65) MG
325 TABLET ORAL DAILY
Refills: 0 | Status: DISCONTINUED | OUTPATIENT
Start: 2022-11-17 | End: 2022-11-22

## 2022-11-17 RX ORDER — METOPROLOL TARTRATE 50 MG
12.5 TABLET ORAL DAILY
Refills: 0 | Status: DISCONTINUED | OUTPATIENT
Start: 2022-11-17 | End: 2022-11-21

## 2022-11-17 RX ORDER — INSULIN LISPRO 100/ML
VIAL (ML) SUBCUTANEOUS
Refills: 0 | Status: DISCONTINUED | OUTPATIENT
Start: 2022-11-17 | End: 2022-11-22

## 2022-11-17 RX ORDER — DEXTROSE 50 % IN WATER 50 %
15 SYRINGE (ML) INTRAVENOUS ONCE
Refills: 0 | Status: DISCONTINUED | OUTPATIENT
Start: 2022-11-17 | End: 2022-11-22

## 2022-11-17 RX ORDER — MAGNESIUM OXIDE 400 MG ORAL TABLET 241.3 MG
400 TABLET ORAL ONCE
Refills: 0 | Status: COMPLETED | OUTPATIENT
Start: 2022-11-17 | End: 2022-11-17

## 2022-11-17 RX ADMIN — LOSARTAN POTASSIUM 50 MILLIGRAM(S): 100 TABLET, FILM COATED ORAL at 12:04

## 2022-11-17 RX ADMIN — Medication 12.5 MILLIGRAM(S): at 12:04

## 2022-11-17 RX ADMIN — SENNA PLUS 2 TABLET(S): 8.6 TABLET ORAL at 22:17

## 2022-11-17 RX ADMIN — POLYETHYLENE GLYCOL 3350 17 GRAM(S): 17 POWDER, FOR SOLUTION ORAL at 19:30

## 2022-11-17 RX ADMIN — MAGNESIUM OXIDE 400 MG ORAL TABLET 400 MILLIGRAM(S): 241.3 TABLET ORAL at 12:04

## 2022-11-17 RX ADMIN — Medication 325 MILLIGRAM(S): at 19:30

## 2022-11-17 NOTE — H&P ADULT - PROBLEM SELECTOR PLAN 2
p/w SOB x 3 weeks, euvolemic on exam, satting % RA,   -CTA Chest 11/16/2022: Limited evaluation of the subsegmental pulmonary arteries at lung bases secondary to respiratory motion. Within this limitation, no pulmonary embolism is identified. Since September 24, 2022, unchanged left upper lobe groundglass nodule 5 mm. Unchanged probable mucoid impacted distal airway in right lung apex. Unchanged enlarged multinodular goiter. No pleural effusion. No pericardial effusion.  -continue to monitor O2 stat p/w SOB x 3 weeks, euvolemic on exam, satting % RA,   -CTA Chest 11/16/2022: Limited evaluation of the subsegmental pulmonary arteries at lung bases secondary to respiratory motion. Within this limitation, no pulmonary embolism is identified. Since September 24, 2022, unchanged left upper lobe groundglass nodule 5 mm. Unchanged probable mucoid impacted distal airway in right lung apex. Unchanged enlarged multinodular goiter. No pleural effusion. No pericardial effusion.  -continue to monitor O2 stat  -Pending ECHO

## 2022-11-17 NOTE — PHYSICAL EXAM
[Well Developed] : well developed [Well-Appearing] : well-appearing [No Accessory Muscle Use] : no accessory muscle use [Clear to Auscultation] : lungs were clear to auscultation bilaterally [Regular Rhythm] : with a regular rhythm [Tachycardia] : tachycardic [No Edema] : there was no peripheral edema [Soft] : abdomen soft [Normal] : affect was normal and insight and judgment were intact [No Acute Distress] : no acute distress [No Respiratory Distress] : no respiratory distress  [de-identified] : Tearful, anxious disposition  [de-identified] : R thyroid nodules  [de-identified] : mildly tachypneic and SOB at rest on exam  [de-identified] : Tachycardic  [de-identified] : abdominal wound clean with minimal discharge, clean dry gauze dressing

## 2022-11-17 NOTE — H&P ADULT - GASTROINTESTINAL
No lip focality was evident./within functional limits dressing noted on the mid abdominal/soft/nontender/nondistended/normal active bowel sounds

## 2022-11-17 NOTE — ED ADULT NURSE REASSESSMENT NOTE - GENERAL PATIENT STATE
comfortable appearance Arava Pregnancy And Lactation Text: This medication is Pregnancy Category X and is absolutely contraindicated during pregnancy. It is unknown if it is excreted in breast milk.

## 2022-11-17 NOTE — H&P ADULT - NSHPADDITIONALINFOADULT_GEN_ALL_CORE
Attending Attestation:  I was physically present for the key portions of the evaluation and management (E/M) service provided.  I agree with the above history, physical, and plan which I have reviewed with the following edits/addendum:    72F w HTN, DM2, recent ex-lap adhesiolysis, bowel resection w a prolonged hosp'n 9/11-10/21/22 readmitted for bacteremia 10/28-11/1/22 dcd to [facility]. 11/16 ED p/w intermittent chest tightness, SOB with activity. CT ruled out PE. ECG sinus tach, non-ischemic. Trop 0.02. TSH low <0.118. On exam, euvolemic, 2-3cm nodule on thyroid palpation.  - sinus tach sec to thyroid hormone imbalance + anemia +/- occult infxn? 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 Attending Attestation:  I was physically present for the key portions of the evaluation and management (E/M) service provided.  I agree with the above history, physical, and plan which I have reviewed with the following edits/addendum:    72F w HTN, DM2, recent ex-lap adhesiolysis, bowel resection w a prolonged hosp'n 9/11-10/21/22 readmitted for bacteremia 10/28-11/1/22 dcd to [facility]. 11/16 ED p/w intermittent chest tightness, SOB with activity. CT ruled out PE. ECG sinus tach, non-ischemic. Trop 0.02. TSH low <0.118. On exam, euvolemic, 2-3cm nodule on thyroid palpation.  - sinus tach sec to thyroid hormone imbalance + anemia +/- occult infxn? consult endo, surgery  - anemia of chr dse based on Fe studies  - TTE, NST today to work up chest tightness    Ashu Francis MD  Cardiology    50 minutes spent on total encounter; more than 50% of the visit was spent counseling and/or coordinating care by the attending physician.

## 2022-11-17 NOTE — H&P ADULT - HISTORY OF PRESENT ILLNESS
71 y/o female with PMH of HTN, DM and PSH of  x2, hysterectomy and lap dev ( w/ Dr. Clifford) recently discharged (-10/21) from surgery service for ex-lap w/ lysis of adhesions and small bowel resection rehospitalized on 10/28/2022 for 3 days of N/V and diarrhea and found to have wound dehiscence w/ bacterimia discharged to [facility]. Patient now BIBA from the shelter to [facility] ED 2022 c/o fatigue, intermittent midsternal chest tightness and HUNG x 3 weeks. Stated she saw her surgeon on 11/15/2022 and they wanted to send her to the ER to r/o PE but she refused. Pt came to the ED yesterday with intermittently over the sternum with associated SOB. Pt is also very concerned about her home situation and would like to speak with social work.  Denies palpitations, orthnopnea/PND, fever/chill, n/v/d, abdominal pain, and LE edema.     Upon arrival to the ED: 164/89, 108, 18, 98.2F, 98% RA. Lab significant for H/H 7.6/25.2 (baseline 7-8), D-dimer 502, Trop I neg x 2, . EKG revealed sinus tachycardia. CTPE 2022 revealed limited evaluation of the subsegmental pulmonary arteries at lung bases secondary to respiratory motion. No PE. Unchanged left upper lobe groundglass nodule 5 mm. Unchanged probable mucoid impacted distal airway in right lung apex. Unchanged enlarged multinodular goiter.    Given in the ED: Tylenol 650mg PO x 1    Pt is now admitted to cardiology/tel monitor for chest pain.   71 y/o female with PMH of HTN, DM and PSH of  x2, hysterectomy and lap dev ( w/ Dr. Clifford) recently discharged (-10/21) from surgery service for ex-lap w/ lysis of adhesions and small bowel resection rehospitalized on 10/28/2022 for 3 days of N/V and diarrhea and found to have wound dehiscence w/ bacterimia discharged to [facility]. Patient now BIBA from the shelter to [facility] ED 2022 c/o fatigue, intermittent midsternal chest tightness and HUNG x 3 weeks. Stated she saw her surgeon on 11/15/2022, noted to be tachycardia with palpitation and they wanted to send her to the ER to r/o PE but she refused. Pt came to the ED yesterday with intermittently chest tightness over the sternum lasting for a few mins and with intermittent radiation to the back, started when she was trying to get up to go to the bathroom and associated SOB. Per pt, she is currently under a lot of stress regarding to her home situation and would like to speak with social work. Denies orthnopnea/PND, fever/chill, n/v/d, abdominal pain, and LE edema. No ischemic workup done prior.     Upon arrival to the ED: 164/89, 108, 18, 98.2F, 98% RA. Lab significant for H/H 7.6/25.2 (baseline 7-8), D-dimer 502, Trop I neg x 2, . EKG revealed sinus tachycardia. CTPE 2022 revealed limited evaluation of the subsegmental pulmonary arteries at lung bases secondary to respiratory motion. No PE. Unchanged left upper lobe groundglass nodule 5 mm. Unchanged probable mucoid impacted distal airway in right lung apex. Unchanged enlarged multinodular goiter.    Given in the ED: Tylenol 650mg PO x 1    Pt is now admitted to cardiology/tele monitor to r/o ACS.    73 y/o female with PMH of HTN, DM and PSH of  x2, hysterectomy and lap dev ( w/ Dr. Clifford) recently discharged (-10/21) from surgery service for ex-lap w/ lysis of adhesions and small bowel resection rehospitalized on 10/28/2022 for 3 days of N/V and diarrhea and found to have wound dehiscence w/ bacterimia discharged to [facility] Patient now BIBA from the shelter to [facility] ED 2022 c/o fatigue, intermittent midsternal chest tightness and HUNG x 3 weeks. Stated she saw her PCP on 11/15/2022, noted to be tachycardia with palpitation and they wanted to send her to the ER to r/o PE but she refused. The morning of 2022 started to have intermittently chest tightness over the sternum lasting for a few mins and with intermittent radiation to the back, started when she was trying to get up to go to the bathroom and associated SOB. Per pt, she is currently under a lot of stress regarding to her home situation and would like to speak with social work. Denies orthnopnea/PND, fever/chill, n/v/d, abdominal pain, and LE edema. No ischemic workup done prior.     Upon arrival to the ED: 164/89, 108, 18, 98.2F, 98% RA. Lab significant for H/H 7.6/25.2 (baseline 7-8), D-dimer 502, Trop I neg x 2, . EKG revealed sinus tachycardia. CTPE 2022 revealed limited evaluation of the subsegmental pulmonary arteries at lung bases secondary to respiratory motion. No PE. Unchanged left upper lobe groundglass nodule 5 mm. Unchanged probable mucoid impacted distal airway in right lung apex. Unchanged enlarged multinodular goiter.    Given in the ED: Tylenol 650mg PO x 1    Pt is now admitted to cardiology/tele monitor to r/o ACS.

## 2022-11-17 NOTE — H&P ADULT - PROBLEM/PLAN-5
After Visit Summary   6/22/2018    Rona Krishnamurthy    MRN: [id number]           Patient Information     Date Of Birth          1977        Visit Information        Provider Department      6/22/2018 11:30 AM [facility] ULTRASOUND II [facility]        Today's Diagnoses     High-risk pregnancy, elderly primigravida - [facility]        Gestational Hypertension           Follow-ups after your visit        Your next 10 appointments already scheduled     Jun 26, 2018 10:00 AM CDT   ULTRASOUND with [facility] ULTRASOUND II   [facility] ([facility])    [facility] [address]  [address]  [facility] [id number] [phone]            Jun 29, 2018 10:30 AM CDT   US FETAL BIOPHYS PROFILE W/O NON STRESS TEST with URUS1   [facility] [place], Ultrasound ([facility])    [facility] [address] [phone]           Please bring a list of your medicines (including vitamins, minerals and over-the-counter drugs). Also, tell your doctor about any allergies you may have. Wear comfortable clothes and leave your valuables at home.  If you re less than 20 weeks drink four 8-ounce glasses of fluid an hour before your exam. If you need to empty your bladder before your exam, try to release only a little urine. Then, drink another glass of fluid.  You may have up to two family members in the exam room. If you bring a small child, an adult must be there to care for him or her.  Please call the Imaging Department at your exam site with any questions.            Jun 29, 2018 11:45 AM CDT   RETURN OB with Salud Alfaro CNM   [facility] ([facility])    [facility] [address]  [address]  [facility] [address] [phone]              Future tests that were ordered for you today     Open Future  Orders        Priority Expected Expires Ordered    US OB Fetal Biophys Prf wo NonStrs Singls Sgl Routine  6/22/2019 6/22/2018            Who to contact     Please call your clinic at [phone] to:    Ask questions about your health    Make or cancel appointments    Discuss your medicines    Learn about your test results    Speak to your doctor            Additional Information About Your Visit        SendmailharAnatexis Information     [email or web address] gives you secure access to your electronic health record. If you see a primary care provider, you can also send messages to your care team and make appointments. If you have questions, please call your primary care clinic.  If you do not have a primary care provider, please call [phone] and they will assist you.      [email or web address] is an electronic gateway that provides easy, online access to your medical records. With [email or web address], you can request a clinic appointment, read your test results, renew a prescription or communicate with your care team.     To access your existing account, please contact your [facility] or call [phone] for assistance.        Care EveryWhere ID     This is your Care EveryWhere ID. This could be used by other organizations to access your [place] medical records  [id number]        Your Vitals Were     Last Period                   10/24/2017            Blood Pressure from Last 3 Encounters:   06/15/18 135/86   06/01/18 148/86   05/18/18 153/75    Weight from Last 3 Encounters:   06/15/18 82.9 kg (182 lb 12.8 oz)   06/01/18 83.5 kg (184 lb)   05/18/18 83.5 kg (184 lb 1.6 oz)              We Performed the Following     BPP (Single) w/out NST (In Clinic)        Primary Care Provider Office Phone # Fax #    Dahlia Noriega [phone]       [address] [facility] [address] [place] [address]        Equal Access to Services     BERT ACUNA : chana Mora qaybta kaalmada  kim fortejaya roshnilucas ruizaakiran ah. Manuela [facility] [phone].    ATENCIÓN: Si habla heraclio, tiene a trevizo disposición servicios gratuitos de asistencia lingüística. Jean-Claude al [phone].    We comply with applicable federal civil rights laws and [place] laws. We do not discriminate on the basis of race, color, national origin, age, disability, sex, sexual orientation, or gender identity.            Thank you!     Thank you for choosing [facility]  for your care. Our goal is always to provide you with excellent care. Hearing back from our patients is one way we can continue to improve our services. Please take a few minutes to complete the written survey that you may receive in the mail after your visit with us. Thank you!             Your Updated Medication List - Protect others around you: Learn how to safely use, store and throw away your medicines at www.disposemymeds.org.          This list is accurate as of 6/22/18  5:05 PM.  Always use your most recent med list.                   Brand Name Dispense Instructions for use Diagnosis    acetone (Urine) test Strp     50 each    Test first voiding of the day.    Gestational diabetes       blood glucose monitoring test strip    no brand specified    200 strip    One Touch Verio test strips.  Test 6 times daily.    Gestational diabetes       DiphenhydrAMINE HCl (Sleep) 50 MG Caps      50 mg as needed        L-THEANINE PO      Take 200 mg by mouth daily        prenatal multivitamin plus iron 27-0.8 MG Tabs per tablet      Take 1 tablet by mouth daily           DISPLAY PLAN FREE TEXT

## 2022-11-17 NOTE — ED ADULT NURSE NOTE - EXTENSIONS OF SELF_ADULT
Use cough and cold medications for symptoms.  Use heat and ice.   Take ibuprofen 600 mg 4 times a day. With food and water.   Follow up after xrays.   Can use pain patches over the counter.   Further treat ment pending results. If xray has abnormality then will order MRI.   May then refer to neurosurgery for evalution and pain management.     
None

## 2022-11-17 NOTE — H&P ADULT - PROBLEM SELECTOR PLAN 1
-currently chest pain free, trop I @ LHGV neg x 2, EKG sinus tachycardia   --Trop T 0.02, CKMB <1.0, CK 24  --Repeat Trop @ 12pm   -No h/o ischemic workup or cardiologist   -NPO for ischemic work workup   -TTE ordered  -NST ordered  -START Metoprolol Tartrate 12.5mg daily

## 2022-11-17 NOTE — H&P ADULT - PROBLEM SELECTOR PROBLEM 4
Patient called and stated that she needs to be scheduled for a follow up soon. Patient stated that she is having swelling under her arms. Patient stated that she went to an urgent center and had an ultrasound done. Patient stated that she was told they did see a cyst. Patient was prescribed medication. Per NP, patient can be scheduled for 9/1 @3:15. [facility]
S/P exploratory laparotomy

## 2022-11-17 NOTE — H&P ADULT - ASSESSMENT
73 y/o female with PMH of HTN, DM and PSH of  x2, hysterectomy and lap dev ( w/ Dr. Clifford) recently discharged (-10/21) from surgery service for ex-lap w/ lysis of adhesions and small bowel resection rehospitalized on 10/28/2022 for 3 days of N/V and diarrhea and found to have wound dehiscence w/ bacterimia discharged to [facility]. Patient now BIBA from the shelter to [facility] ED 2022 c/o fatigue, intermittent midsternal chest tightness and HUNG x 3 weeks. Seen her PCP on 11/15/2022, noted to be tachycardia with palpitation and they wanted to send her to the ER to r/o PE but she refused. The morning of 2022 started to have intermittently chest tightness over the sternum lasting for a few mins and with intermittent radiation to the back, started when she was trying to get up to go to the bathroom and associated SOB. CTPE 2022 No PE. Pt is now admitted to cardiology/tele monitor to r/o ACS.

## 2022-11-17 NOTE — GOALS OF CARE CONVERSATION - ADVANCED CARE PLANNING - CONVERSATION DETAILS
LACE > 11, GOC discussed with patient.  Discussion included but not limited to who is Health Care Proxy, Code status, MOLST, diagnosis, prognosis, treatment options, risks and benefits, comfort measures, pain management, Home Care, and palliative care referral reviewed as best as possible.  Per Patient’s wishes She confirmed to be FULL CODE.

## 2022-11-17 NOTE — ED ADULT NURSE REASSESSMENT NOTE - NS ED NURSE REASSESS COMMENT FT1
pt resting on stretcher in NAD, on tele, VSS.  on room air.
Patient remains resting on the stretcher, awaiting transfer to assigned unit. Patient made aware of status and plan of care. Patient denies acute pain, appears comfortable.

## 2022-11-17 NOTE — H&P ADULT - PROBLEM SELECTOR PLAN 3
p/w 164/89, upon arrival to [facility] 146/70  -Home meds: Losartan 50mg daily  -START Metoprolol Tartrate 12.5mg PO daily  -CONT Losartan 50mg daily

## 2022-11-17 NOTE — H&P ADULT - PROBLEM SELECTOR PLAN 6
p/w H/H 7.6/25.2 (baseline 7-8), denies any bleeding   -Iron studies 9/2022: Ferritin 442, Iron total 29, unsaturated iron binding 101, iron-total binding 130, % sat iron 22  -f/u repeat iron studies   -Home meds: Ferrous Sulfate 325mg TID  -CONT Ferrous sulfate 325mg    F: none  E: Replete if K+ <4 and Mg <2   N: NPO for ischemic workup  DVT ppx: none   Dispo: SW consulted    Case discussed with Dr. Francis

## 2022-11-17 NOTE — H&P ADULT - PROBLEM SELECTOR PLAN 4
-s/p exploratory laparotomy with lysis of adhesions, incisional hernia repair, and small bowel resection on 9/15/2022 with Dr. So  -Recently admitted to medicine 10/28/2022 for wound management of laparotomy wound   --No surgical intervention at that time  -CT A/P 10/27/2022: Since 10/8/2022, increased inflammatory changes and foci of extraluminal air surrounding left lower quadrant small bowel anastomosis. Although no extravasated oral contrast to suggest active leak, occult anastomotic leak/infection should be considered. Inflammatory changes extend to the urinary bladder and abdominal wall incision, follow-up clinically for evolving fistula.  -Continue to monitor surgical dressing

## 2022-11-17 NOTE — REVIEW OF SYSTEMS
[Chills] : chills [Fatigue] : fatigue [Palpitations] : palpitations [Shortness Of Breath] : shortness of breath [Dyspnea on Exertion] : dyspnea on exertion [Anxiety] : anxiety [Negative] : Neurological [Fever] : no fever [Night Sweats] : no night sweats [Recent Change In Weight] : ~T no recent weight change [Chest Pain] : no chest pain [Leg Claudication] : no leg claudication [Lower Ext Edema] : no lower extremity edema [Orthopnea] : no orthopnea [Paroxysmal Nocturnal Dyspnea] : no paroxysmal nocturnal dyspnea [Wheezing] : no wheezing [Cough] : no cough [Suicidal] : not suicidal [Insomnia] : no insomnia [Depression] : no depression

## 2022-11-17 NOTE — HISTORY OF PRESENT ILLNESS
[FreeTextEntry1] : Follow up [de-identified] : 72 y F PMH HTN, diabetes, recent ex-lap due to SBO c/b wound infection, c-diff, bacteremia, presenting one week follow-up. Pt endorses anxiety regarding her living situation (small, little to no social support) and continued symptoms of palpitations that are now per pt associated with exertional dyspnea. Pt states prior to surgery, was able to walk long distances and grocery shop without SOB and now is limited due to SOB and fatigue. Pt otherwise denies CP, pleurisy, leg swelling, PND. Re: abdominal wound pt endorses mild pain around wound site but states she is consistently having BM. \par \par Pt additioanlly is tearful today on exam multiple times explaining she has poor social support and would like help with day to day functional activities like grocery shopping and housekeeping.

## 2022-11-17 NOTE — ASSESSMENT
[FreeTextEntry1] : 72 y F PMH diabetes, htn, recent ex-lap due to SBO c/b wound infection, c-diff, bacteremia, presenting one week follow-up and continued complaint of palpitations with worsening SOB

## 2022-11-17 NOTE — PLAN
[FreeTextEntry1] : #SOB\par Pt reports exertional and occasionally at rest SOB, with associated palpitations, tachycardia. Could be 2/2 anxiety surrounding social situation per pt, however i/s/o recent surgery and new onset SOB with tachycardia, counseled pt that our recommendation is to present to the ED now for further work-up and r/o PE. Well's score: 3 (moderate) \par -Pt given  several times that recommendation is to present to ED for r/o PE. Offered transportation, however pt refused at this time stating she is not ready and will go tonight/tomorrow AM if symptoms worsen \par -RTC 1 week for close follow-up \par \par #Bowel obstruction s/p ex lap\par #Midline abdominal wound\par Initially admitted to [facility] under surgery service with many complications during stay. Discharged to [facility] 10/21. Readmitted to [facility] 10/28 for abdominal pain and wound care. Abdominal wound still present on exam today with mild drainage. Concern for healing properly given size of wound and lack of wound care. Hasn't followed Surgery  since discharge. \par - Wound dressing changed again in clinic today. Educated on importance of wound care and provided pt with gauze to change dressings \par -Pt states attempting to make surgery appt but was told she called the wrong office. Provided the name and number of surgeon (Dr. Hope, [phone]) to pt and emphasized importance of surgery follow-up\par - RTC in 1 week for close follow up\par \par #Palpitations\par Patient admits to feeling anxious and nervous since being home from hospital which has been causing palpitations. Previously not associated with SOB but on 11/15 visit, now with reported exertional SOB, tachycardia. Pt states had prior hx of thyroid nodule removal, was noncancerous and was not directed to take medical therapy \par - EKG at visit last week with no ischemic changes \par -F/u repeat TSH (WNL earlier this year) \par -See above plan for SOB\par \par #Anemia \par Hgb in 7s upon discharge from [facility]. Previous iron studies show mix of ASAEL and ACD. Denies any melena or bloody stools. Improving \par -Continue to monitor at next visit \par \par #HTN\par Home meds include Losartan 50 mg daily, HCTZ 25 mg and Labetalol 200 mg BID. Controlled today in clinic \par - Continue home regimen \par \par #HCM\par -RTC in 1 week given complexity of pt's current home situation and multiple active medical issues. \par -RAS Payne tasked to contact pt if eligible for HHA as pt has limited social support and functional status for day to day activities like housekeeping and grocery shopping

## 2022-11-17 NOTE — PATIENT PROFILE ADULT - NSPROMEDSBROUGHTTOHOSP_GEN_A_NUR
Teachings given to not take home medications while in the hospital.  medication brought with her   Atorvastatin 40 mg  Ferrous Sulfate 325 mg  Losartan potassium 50 mg    Will endorse to oncoming shift/yes

## 2022-11-17 NOTE — H&P ADULT - NSHPLABSRESULTS_GEN_ALL_CORE
7.7    6.31  )-----------( 268      ( 17 Nov 2022 07:12 )             25.9       11-17    140  |  106  |  11  ----------------------------<  102<H>  4.3   |  23  |  0.79    Ca    9.1      17 Nov 2022 07:12  Mg     1.7     11-17    TPro  6.6  /  Alb  3.1<L>  /  TBili  0.6  /  DBili  x   /  AST  22  /  ALT  12  /  AlkPhos  73  11-17      PT/INR - ( 16 Nov 2022 12:14 )   PT: 13.1 sec;   INR: 1.12          PTT - ( 16 Nov 2022 12:14 )  PTT:30.6 sec    CARDIAC MARKERS ( 17 Nov 2022 07:12 )  x     / 0.02 ng/mL / 24 U/L / x     / <1.0 ng/mL            EKG: sinus tachycardia

## 2022-11-17 NOTE — CONSULT NOTE ADULT - ASSESSMENT
71 y/o female with PMH of HTN, DM and PSH of  x2, hysterectomy and lap dev ( w/ Dr. Clifford) recently discharged (-10/21) from surgery service for ex-lap w/ lysis of adhesions and small bowel resection rehospitalized on 10/28/2022 for 3 days of N/V and diarrhea and found to have wound dehiscence w/ bacterimia discharged to [facility]. Patient now BIBA from the shelter to [facility] ED 2022 c/o fatigue, intermittent midsternal chest tightness and HUNG x 3 weeks. Stated she saw her PCP on 11/15/2022, noted to be tachycardia with palpitation and they wanted to send her to the ER to r/o PE but she refused. The morning of 2022 started to have intermittently chest tightness over the sternum lasting for a few mins and with intermittent radiation to the back, started when she was trying to get up to go to the bathroom and associated SOB. Recently seen in the ED with concerns of wound problems. She says that ever since she had her surgery the wound has looked the same without any changes. She indicates an intermittent abdominal pain over her left midabdomen since the surgery. Because of the pain her appetite is decreased and has not been eating much. Since admission patient has been afebrile, without elevation in her white count. Primary team consulted general surgery team with concerns of wound problem due to tachycardia of unknown etiology in the setting of normal cardiac workup.    Recommendations:    -No acute surgical intervention  -Daily dressing changes  -Rest of care per primary team  -Surgery Team 4C will continue to follow. Please page Team 4 with questions/clinical changes. [phone]   71 y/o female with PMH of HTN, DM and PSH of  x2, hysterectomy and lap dev ( w/ Dr. Clifford) recently discharged (-10/21) from surgery service for ex-lap w/ lysis of adhesions and small bowel resection rehospitalized on 10/28/2022 for 3 days of N/V and diarrhea and found to have wound infection w/ bacterimia discharged to [facility]. Patient now BIBA from the shelter to [facility] ED 2022 c/o fatigue, intermittent midsternal chest tightness and HUNG x 3 weeks. Stated she saw her PCP on 11/15/2022, noted to be tachycardia with palpitation and they wanted to send her to the ER to r/o PE but she refused. The morning of 2022 started to have intermittently chest tightness over the sternum lasting for a few mins and with intermittent radiation to the back, started when she was trying to get up to go to the bathroom and associated SOB. Recently seen in the ED with concerns of wound problems. She says that ever since she had her surgery the wound has looked the same without any changes. She indicates an intermittent abdominal pain over her left midabdomen since the surgery. Because of the pain her appetite is decreased and has not been eating much. Since admission patient has been afebrile, without elevation in her white count. Primary team consulted general surgery team with concerns of wound problem due to tachycardia of unknown etiology in the setting of normal cardiac workup.    Recommendations:    -No acute surgical intervention  -Daily dressing changes  -Rest of care per primary team  -Surgery Team 4C will continue to follow. Please page Team 4 with questions/clinical changes. [phone]    CHIEF RESIDENT ADDENDUM  Agree with above. 72F s/p laparotomy and 100cm small bowel resection for obstruction 2022, c/b superficial surgical site infection requiring opening of the wound, Enterococcus bacteremia, rhabdomyolysis, C diff colitis, discharged and returned 10/2022 for failure to thrive, readmitted with chest pain for ACS rule out. Surgery consulted to evaluate the laparotomy wound. No change in color, output or quantity. VSS, exam with small ~2cm area of granulation at midline of wound, minimal serosanguinous drainage, no foul smell, no purulence, no induration, no fluctuance. WBC normal. No imaging necessary. No evidence of wound infection. Discussed with attending surgeon.

## 2022-11-17 NOTE — CONSULT NOTE ADULT - SUBJECTIVE AND OBJECTIVE BOX
Surgery Consult    Consulting Surgical Team:   [ ] Team 1 - Colorectal/Surgical Oncology ([phone])    [ ] Team 2 - MIS/Bariatric Surgery ([phone])     [X] Team 4 - Acute Care Surgery ([phone])     [ ] Team 5 - General Surgery/Breast/Pediatric Surgery ([phone])    Consulting Attending: Dr. So.    HPI:  71 y/o female with PMH of HTN, DM and PSH of  x2, hysterectomy and lap dev ( w/ Dr. Clifford) recently discharged (-10/21) from surgery service for ex-lap w/ lysis of adhesions and small bowel resection rehospitalized on 10/28/2022 for 3 days of N/V and diarrhea and found to have wound dehiscence w/ bacterimia discharged to [facility]. Patient now BIBA from the shelter to [facility] ED 2022 c/o fatigue, intermittent midsternal chest tightness and HUNG x 3 weeks. Stated she saw her PCP on 11/15/2022, noted to be tachycardia with palpitation and they wanted to send her to the ER to r/o PE but she refused. The morning of 2022 started to have intermittently chest tightness over the sternum lasting for a few mins and with intermittent radiation to the back, started when she was trying to get up to go to the bathroom and associated SOB. Per pt, she is currently under a lot of stress regarding to her home situation and would like to speak with social work. Denies orthnopnea/PND, fever/chill, n/v/d, abdominal pain, and LE edema. No ischemic workup done prior.     Upon arrival to the ED: 164/89, 108, 18, 98.2F, 98% RA. Lab significant for H/H 7.6/25.2 (baseline 7-8), D-dimer 502, Trop I neg x 2, . EKG revealed sinus tachycardia. CTPE 2022 revealed limited evaluation of the subsegmental pulmonary arteries at lung bases secondary to respiratory motion. No PE. Unchanged left upper lobe groundglass nodule 5 mm. Unchanged probable mucoid impacted distal airway in right lung apex. Unchanged enlarged multinodular goiter.    Given in the ED: Tylenol 650mg PO x 1    Pt is now admitted to cardiology/tele monitor to r/o ACS.    (2022 07:39)      General surgery consulted for abdominal wound.     General surgery addendum.    71 y/o F with HPI as above. Recently seen in the ED with concerns of wound problems. She says that ever since she had her surgery the wound has looked the same without any changes. She indicates an intermittent abdominal pain over her left midabdomen since the surgery. Because of the pain her appetite is decreased and has not been eating much. Since admission patient has been afebrile, without elevation in her white count. Primary team consulted general surgery team with concerns of wound problem due to tachycardia of unknown etiology in the setting of normal cardiac workup.    PMH: HTN, DM  PSHx: S/P total abdominal hysterectomy, laparoscopic cholecystectomy,  x2  Meds: Cozaar, metformin, atorvastatin  Allergies: Penicillin      PAST MEDICAL HISTORY:  Diabetes    H/O thyroid disease    HTN (hypertension)        PAST SURGICAL HISTORY:  S/P total abdominal hysterectomy    History of laparoscopic cholecystectomy    History of         MEDICATIONS:      ALLERGIES:  daptomycin (Muscle Pain)  penicillin (Rash)      SOCHX:   Social History:      FAMHX:   FAMILY HISTORY:        Vitals:  Vital Signs Last 24 Hrs  T(C): 36.3 (2022 18:17), Max: 36.9 (2022 02:59)  T(F): 97.4 (2022 18:17), Max: 98.4 (2022 02:59)  HR: 107 (2022 17:06) (95 - 116)  BP: 141/70 (2022 17:06) (141/70 - 178/75)  BP(mean): 100 (2022 06:57) (100 - 100)  RR: 18 (2022 17:06) (16 - 18)  SpO2: 97% (2022 17:06) (95% - 100%)    Parameters below as of 2022 17:06  Patient On (Oxygen Delivery Method): room air          PHYSICAL EXAM:    General: NAD, resting comfortably in bed  Pulm: Nonlabored breathing, no respiratory distress  Abd: Soft, NTND, incision with minimal ss fluid, no obvious signs of bleeding/hematoma/infection  Extrem: No edema  Neuro: AAOx3  Pulses: Palpable distal pulses     I&o's:  I&O's Summary    2022 07:01  -  2022 20:29  --------------------------------------------------------  IN: 0 mL / OUT: 500 mL / NET: -500 mL        LABS:                        7.7    6.31  )-----------( 268      ( 2022 07:12 )             25.9         140  |  106  |  11  ----------------------------<  102<H>  4.3   |  23  |  0.79    Ca    9.1      2022 07:12  Mg     1.7         TPro  6.6  /  Alb  3.1<L>  /  TBili  0.6  /  DBili  x   /  AST  22  /  ALT  12  /  AlkPhos  73  -    Lactate:    PT/INR - ( 2022 12:14 )   PT: 13.1 sec;   INR: 1.12          PTT - ( 2022 12:14 )  PTT:30.6 sec    CARDIAC MARKERS ( 2022 19:24 )  x     / 0.02 ng/mL / 31 U/L / x     / 1.1 ng/mL  CARDIAC MARKERS ( 2022 11:34 )  x     / 0.02 ng/mL / x     / x     / x      CARDIAC MARKERS ( 2022 07:12 )  x     / 0.02 ng/mL / 24 U/L / x     / <1.0 ng/mL            MICRO:     IMAGING:

## 2022-11-18 DIAGNOSIS — E05.90 THYROTOXICOSIS, UNSPECIFIED WITHOUT THYROTOXIC CRISIS OR STORM: ICD-10-CM

## 2022-11-18 DIAGNOSIS — R00.0 TACHYCARDIA, UNSPECIFIED: ICD-10-CM

## 2022-11-18 LAB
ANION GAP SERPL CALC-SCNC: 9 MMOL/L — SIGNIFICANT CHANGE UP (ref 5–17)
BUN SERPL-MCNC: 10 MG/DL — SIGNIFICANT CHANGE UP (ref 7–23)
CALCIUM SERPL-MCNC: 9.2 MG/DL — SIGNIFICANT CHANGE UP (ref 8.4–10.5)
CHLORIDE SERPL-SCNC: 104 MMOL/L — SIGNIFICANT CHANGE UP (ref 96–108)
CO2 SERPL-SCNC: 24 MMOL/L — SIGNIFICANT CHANGE UP (ref 22–31)
CREAT SERPL-MCNC: 0.77 MG/DL — SIGNIFICANT CHANGE UP (ref 0.5–1.3)
EGFR: 83 ML/MIN/1.73M2 — SIGNIFICANT CHANGE UP
GLUCOSE BLDC GLUCOMTR-MCNC: 107 MG/DL — HIGH (ref 70–99)
GLUCOSE BLDC GLUCOMTR-MCNC: 112 MG/DL — HIGH (ref 70–99)
GLUCOSE BLDC GLUCOMTR-MCNC: 86 MG/DL — SIGNIFICANT CHANGE UP (ref 70–99)
GLUCOSE BLDC GLUCOMTR-MCNC: 94 MG/DL — SIGNIFICANT CHANGE UP (ref 70–99)
GLUCOSE SERPL-MCNC: 81 MG/DL — SIGNIFICANT CHANGE UP (ref 70–99)
HCT VFR BLD CALC: 27.1 % — LOW (ref 34.5–45)
HGB BLD-MCNC: 8.1 G/DL — LOW (ref 11.5–15.5)
MAGNESIUM SERPL-MCNC: 1.9 MG/DL — SIGNIFICANT CHANGE UP (ref 1.6–2.6)
MCHC RBC-ENTMCNC: 26.7 PG — LOW (ref 27–34)
MCHC RBC-ENTMCNC: 29.9 GM/DL — LOW (ref 32–36)
MCV RBC AUTO: 89.4 FL — SIGNIFICANT CHANGE UP (ref 80–100)
NRBC # BLD: 0 /100 WBCS — SIGNIFICANT CHANGE UP (ref 0–0)
PLATELET # BLD AUTO: 244 K/UL — SIGNIFICANT CHANGE UP (ref 150–400)
POTASSIUM SERPL-MCNC: 4.2 MMOL/L — SIGNIFICANT CHANGE UP (ref 3.5–5.3)
POTASSIUM SERPL-SCNC: 4.2 MMOL/L — SIGNIFICANT CHANGE UP (ref 3.5–5.3)
RBC # BLD: 3.03 M/UL — LOW (ref 3.8–5.2)
RBC # FLD: 17.1 % — HIGH (ref 10.3–14.5)
SODIUM SERPL-SCNC: 137 MMOL/L — SIGNIFICANT CHANGE UP (ref 135–145)
T4 AB SER-ACNC: 7.78 UG/DL — SIGNIFICANT CHANGE UP (ref 4.5–11.7)
T4 FREE SERPL-MCNC: 1.41 NG/DL — SIGNIFICANT CHANGE UP (ref 0.93–1.7)
VIT B12 SERPL-MCNC: 676 PG/ML — SIGNIFICANT CHANGE UP (ref 232–1245)
WBC # BLD: 6.37 K/UL — SIGNIFICANT CHANGE UP (ref 3.8–10.5)
WBC # FLD AUTO: 6.37 K/UL — SIGNIFICANT CHANGE UP (ref 3.8–10.5)

## 2022-11-18 PROCEDURE — 99233 SBSQ HOSP IP/OBS HIGH 50: CPT

## 2022-11-18 PROCEDURE — 99222 1ST HOSP IP/OBS MODERATE 55: CPT | Mod: GC

## 2022-11-18 RX ORDER — SODIUM CHLORIDE 9 MG/ML
500 INJECTION INTRAMUSCULAR; INTRAVENOUS; SUBCUTANEOUS
Refills: 0 | Status: DISCONTINUED | OUTPATIENT
Start: 2022-11-18 | End: 2022-11-18

## 2022-11-18 RX ORDER — LIDOCAINE 4 G/100G
1 CREAM TOPICAL DAILY
Refills: 0 | Status: DISCONTINUED | OUTPATIENT
Start: 2022-11-18 | End: 2022-11-22

## 2022-11-18 RX ORDER — SODIUM CHLORIDE 9 MG/ML
500 INJECTION INTRAMUSCULAR; INTRAVENOUS; SUBCUTANEOUS
Refills: 0 | Status: DISCONTINUED | OUTPATIENT
Start: 2022-11-18 | End: 2022-11-19

## 2022-11-18 RX ORDER — MAGNESIUM OXIDE 400 MG ORAL TABLET 241.3 MG
400 TABLET ORAL ONCE
Refills: 0 | Status: COMPLETED | OUTPATIENT
Start: 2022-11-18 | End: 2022-11-18

## 2022-11-18 RX ADMIN — SENNA PLUS 2 TABLET(S): 8.6 TABLET ORAL at 22:50

## 2022-11-18 RX ADMIN — Medication 325 MILLIGRAM(S): at 11:36

## 2022-11-18 RX ADMIN — SODIUM CHLORIDE 100 MILLILITER(S): 9 INJECTION INTRAMUSCULAR; INTRAVENOUS; SUBCUTANEOUS at 16:49

## 2022-11-18 RX ADMIN — MAGNESIUM OXIDE 400 MG ORAL TABLET 400 MILLIGRAM(S): 241.3 TABLET ORAL at 11:36

## 2022-11-18 RX ADMIN — LOSARTAN POTASSIUM 50 MILLIGRAM(S): 100 TABLET, FILM COATED ORAL at 06:10

## 2022-11-18 RX ADMIN — Medication 12.5 MILLIGRAM(S): at 06:08

## 2022-11-18 NOTE — PROGRESS NOTE ADULT - ASSESSMENT
73 y/o female with PMH hyperthyroidism, HTN, DM and PSH of  x2, hysterectomy and lap dev () recently discharged (-10/21) from surgery service for ex-lap w/ lysis of adhesions and small bowel resection rehospitalized on 10/28/2022 for 3 days of N/V/D and found to have wound dehiscence w/ bacteremia then discharged to [facility]. Presented to [facility]  for fatigue, intermittent CP, found to have elevated D dimer, transferred to [facility] for r/o ACS. CTPE 22 negative for PE. NST  normal, echo  normal. Found to be anemic with Hgb in 7s (baseline 7-8). Found to have low TSH with nodules on thyroid, endocrine consulted and following for possible hyperthyroid induced tachycardia.

## 2022-11-18 NOTE — PROGRESS NOTE ADULT - PROBLEM SELECTOR PLAN 8
p/w H/H 7.6/25.2 (baseline 7-8), denies any bleeding   -Iron studies 9/2022: Ferritin 442, Iron total 29, unsaturated iron binding 101, iron-total binding 130, % sat iron 22  -f/u repeat iron studies   -Home meds: Ferrous Sulfate 325mg TID  -CONT Ferrous sulfate 325mg    F: none  E: Replete if K+ <4 and Mg <2   N: DASH  DVT ppx: none   Dispo: PT eval YOSEPH    Case discussed with Dr. Francis

## 2022-11-18 NOTE — PROGRESS NOTE ADULT - SUBJECTIVE AND OBJECTIVE BOX
Interventional Cardiology PA Adult Progress Note    Subjective Assessment: Seen and examined bedside. Denies chest pain, SOB, HUNG, palpitations, dizziness, LOC, N/V/D, fever/chills/sick contact, diaphoresis, orthopnea/PND, and leg swelling.    ROS negative except as noted above.    MEDICATIONS:  losartan 50 milliGRAM(s) Oral daily  metoprolol tartrate 12.5 milliGRAM(s) Oral daily  senna 2 Tablet(s) Oral at bedtime  dextrose 50% Injectable 25 Gram(s) IV Push once  dextrose 50% Injectable 12.5 Gram(s) IV Push once  dextrose 50% Injectable 25 Gram(s) IV Push once  dextrose Oral Gel 15 Gram(s) Oral once PRN  glucagon  Injectable 1 milliGRAM(s) IntraMuscular once  insulin lispro (ADMELOG) corrective regimen sliding scale   SubCutaneous Before meals and at bedtime  dextrose 5%. 1000 milliLiter(s) IV Continuous <Continuous>  dextrose 5%. 1000 milliLiter(s) IV Continuous <Continuous>  ferrous    sulfate 325 milliGRAM(s) Oral daily  sodium chloride 0.9%. 500 milliLiter(s) IV Continuous <Continuous>      	    [PHYSICAL EXAM:  TELEMETRY:  T(C): 36.5 (11-18-22 @ 14:37), Max: 36.7 (11-17-22 @ 22:07)  HR: 109 (11-18-22 @ 17:21) (94 - 109)  BP: 145/67 (11-18-22 @ 17:21) (132/67 - 151/77)  RR: 18 (11-18-22 @ 17:21) (17 - 18)  SpO2: 98% (11-18-22 @ 17:21) (96% - 100%)  Wt(kg): --  I&O's Summary    17 Nov 2022 07:01  -  18 Nov 2022 07:00  --------------------------------------------------------  IN: 360 mL / OUT: 500 mL / NET: -140 mL    18 Nov 2022 07:01  -  18 Nov 2022 18:28  --------------------------------------------------------  IN: 480 mL / OUT: 0 mL / NET: 480 mL                                              Appearance: Normal	  HEENT:   Normal oral mucosa, PERRLA, EOMI	  Neck: Supple, - JVD; Carotid Bruit   Cardiovascular: Normal S1 S2, No JVD, No murmurs,   Respiratory: Lungs clear to auscultation No Rales, Rhonchi, Wheezing	  Gastrointestinal:  Soft, Non-tender, + BS	  Skin: No rashes, No ecchymoses, No cyanosis  Extremities: Normal range of motion, No clubbing, cyanosis or edema  Vascular: Peripheral pulses palpable 2+ bilaterally  Neurologic: Non-focal  Psychiatry: A & O x 3, Mood & affect appropriate                          8.1    6.37  )-----------( 244      ( 18 Nov 2022 05:30 )             27.1     11-18    137  |  104  |  10  ----------------------------<  81  4.2   |  24  |  0.77    Ca    9.2      18 Nov 2022 05:30  Mg     1.9     11-18    TPro  6.6  /  Alb  3.1<L>  /  TBili  0.6  /  DBili  x   /  AST  22  /  ALT  12  /  AlkPhos  73  11-17    proBNP:   Lipid Profile:   HgA1c:   TSH:

## 2022-11-18 NOTE — PROGRESS NOTE ADULT - ASSESSMENT
This is a 71 y/o female with PMH of HTN, DM and PSH of  x2, hysterectomy and lap dev ( w/ Dr. Clifford) recently discharged (-10/21) from surgery service for ex-lap w/ lysis of adhesions and small bowel resection rehospitalized on 10/28/2022 for 3 days of N/V and diarrhea and found to have wound dehiscence w/ bacterimia discharged to [facility] presenting with fatigue, intermittent midsternal chest tightness and HUNG x 3 week admitted to cardiology/tele monitor to r/o ACS for whom surgery was consulted for management of midline laparotomy wound (chronic).      - No acute surgical interventions indicated.   - Would continue daily dressing changes as at rehab facility.   - Surgery to sign off at this time. Please reconsult with new questions.

## 2022-11-18 NOTE — PHYSICAL THERAPY INITIAL EVALUATION ADULT - PERTINENT HX OF CURRENT PROBLEM, REHAB EVAL
71 y/o female recently discharged (9/11-10/21) from surgery service for ex-lap w/ lysis of adhesions and small bowel resection rehospitalized on 10/28/2022 for 3 days of N/V and diarrhea and found to have wound dehiscence w/ bacterimia discharged to [facility]. Patient BIBA from the shelter to [facility] ED 11/16/2022 c/o fatigue, intermittent midsternal chest tightness and HUNG x 3 weeks. Stated she saw her PCP on 11/15/2022, noted to be tachycardia with palpitation and they wanted to send her to the ER to r/o PE but she refused. The morning of 11/16/2022 started to have intermittently chest tightness over the sternum lasting for a few mins and with intermittent radiation to the back, started when she was trying to get up to go to the bathroom and associated SOB. Per pt, she is currently under a lot of stress regarding to her home situation.

## 2022-11-18 NOTE — PROGRESS NOTE ADULT - NSPROGADDITIONALINFOA_GEN_ALL_CORE
Attending Attestation:  I was physically present for the key portions of the evaluation and management (E/M) service provided.  I agree with the above history, physical, and plan which I have reviewed with the following edits/addendum:    72F w HTN, DM2, recent ex-lap adhesiolysis, bowel resection w a prolonged hosp'n 9/11-10/21/22 readmitted for bacteremia 10/28-11/1/22 dcd to [facility]. 11/16 ED p/w intermittent chest tightness, SOB with activity. CT ruled out PE. ECG sinus tach, non-ischemic. Trop 0.02. TSH low <0.118. On exam, euvolemic, 2-3cm nodule on thyroid palpation.  #sinus tach: hydrate, f/u endo recs. 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 address underlying cause    Ashu Francis MD  Cardiology    35 minutes spent on total encounter; more than 50% of the visit was spent counseling and/or coordinating care by the attending physician.  Encounter date 11/18/22

## 2022-11-18 NOTE — CONSULT NOTE ADULT - SUBJECTIVE AND OBJECTIVE BOX
HPI:  73 y/o female with PMH of HTN, DM and PSH of  x2, hysterectomy and lap dev ( w/ Dr. Clifford) recently discharged (-10/21) from surgery service for ex-lap w/ lysis of adhesions and small bowel resection rehospitalized on 10/28/2022 for 3 days of N/V and diarrhea and found to have wound dehiscence w/ bacterimia discharged to [facility]. Patient now BIBA from the shelter to [facility] ED 2022 c/o fatigue, intermittent midsternal chest tightness and HUNG x 3 weeks. Stated she saw her PCP on 11/15/2022, noted to be tachycardia with palpitation and they wanted to send her to the ER to r/o PE but she refused. The morning of 2022 started to have intermittently chest tightness over the sternum lasting for a few mins and with intermittent radiation to the back, started when she was trying to get up to go to the bathroom and associated SOB. Per pt, she is currently under a lot of stress regarding to her home situation and would like to speak with social work. Denies orthnopnea/PND, fever/chill, n/v/d, abdominal pain, and LE edema. No ischemic workup done prior.     Upon arrival to the ED: 164/89, 108, 18, 98.2F, 98% RA. Lab significant for H/H 7.6/25.2 (baseline 7-8), D-dimer 502, Trop I neg x 2, . EKG revealed sinus tachycardia. CTPE 2022 revealed limited evaluation of the subsegmental pulmonary arteries at lung bases secondary to respiratory motion. No PE. Unchanged left upper lobe groundglass nodule 5 mm. Unchanged probable mucoid impacted distal airway in right lung apex. Unchanged enlarged multinodular goiter.    Given in the ED: Tylenol 650mg PO x 1    Pt is now admitted to cardiology/tele monitor to r/o ACS.    (2022 07:39)    ENDO HISTORY: Patient is a 71 yo Female, from shelter, lives alone, with med hx significant for HTN, DM, Csection x 2, Hysterectomy, Lap dev , Bowel obstruction failed conservative management s/p ex-lap w/ lysis of adhesions and small bowel resection w/extended hospital stay -10/21, course c/b C.d diff colitis and Rhabo from Daptomycin rx for wound dehiscence, rehospitalized on 10/28/2022 for 3 days of N/V and diarrhea  discharged to [facility], now patient was send from [facility] ER due to complaints of worsening HUNG over the last 1 month. Pt says she was worried about the poor living conditions of her apartment and she lives alone, says she was 'barely eating much' at home after discharge, and believes that that anxiety contributed to her HUNG. Normally prior to surgery, pt was able to walk from [address] with no shortness of breath, comfortably with her shopping cart. Endocrinology was consulted for abnormal TFTs.     In regards to her thyroid history:  Patient has known that she has an enlarged thyroid since she was 34 yrs of age, follows up at [facility], claims to have had a left thyroid lobectomy and was told that her biopsy was negative for cancer.   She has noticed that her voice has changed, when she argues, her voice becomes more hoarse. Denies swallowing difficulty although pt seems to be exerting HUNG. Says she has been having her TFTs checked every 6 months and they were normal. She was prescribed a medication when she was in her 30s to see if her thyroid would shrink prior to claimed surgical intervention however she developed nausea hence her doctor stopped the medication.  Off note, pt has positive family hx of thyroid disease in her sister.   2022-- TSH normal 0.6  Yesterday TSH 0.01, day before the TSH collection pt had CT Angio with contrast- results showed multinodular goitre    Enlarged multinodular thyroid gland extends   into the superior mediastinum to level of clavicles. Unchanged mild   narrowing on adjacent esophagus and trachea from mass effect.    In regards to DM history:  A1c 6.8 in Sep 2022, A1c this admission 5.2, pt received 2 units of blood- 1 PRBC in Sep and 1 PRBC in Oct 2022 during hospital admissions.  Pt was diagnosed ~5 years ago, positive family history in her mother and sister. Pt shares that she had a rough upbringing and negative influences and does not maintain contact with her family hence she does not specifically know the cause of death in her mother nor sister other than positive smoking history. Metformin is her only regimen, never been on Insulin.  Typical diet at home over the last month has been reduced due to abdominal surgery, pain from the surgery and generally decreased appetite, although she is tolerating food well at the hospital      PAST MEDICAL & SURGICAL HISTORY:  Diabetes  H/O thyroid disease  HTN (hypertension)  S/P total abdominal hysterectomy  History of laparoscopic cholecystectomy  History of       FAMILY HISTORY:      Social History:    Outpatient Medications:    MEDICATIONS  (STANDING):  dextrose 5%. 1000 milliLiter(s) (50 mL/Hr) IV Continuous <Continuous>  dextrose 5%. 1000 milliLiter(s) (100 mL/Hr) IV Continuous <Continuous>  dextrose 50% Injectable 25 Gram(s) IV Push once  dextrose 50% Injectable 12.5 Gram(s) IV Push once  dextrose 50% Injectable 25 Gram(s) IV Push once  ferrous    sulfate 325 milliGRAM(s) Oral daily  glucagon  Injectable 1 milliGRAM(s) IntraMuscular once  insulin lispro (ADMELOG) corrective regimen sliding scale   SubCutaneous Before meals and at bedtime  losartan 50 milliGRAM(s) Oral daily  metoprolol tartrate 12.5 milliGRAM(s) Oral daily  senna 2 Tablet(s) Oral at bedtime    MEDICATIONS  (PRN):  dextrose Oral Gel 15 Gram(s) Oral once PRN Blood Glucose LESS THAN 70 milliGRAM(s)/deciliter      Allergies    penicillin (Rash)    Intolerances    daptomycin (Muscle Pain)    Review of Systems:  Constitutional: No fever  Eyes: No blurry vision  Neuro: No tremors  HEENT: No pain  Cardiovascular: No chest pain, palpitations  Respiratory: No SOB, no cough  GI: No nausea, vomiting, abdominal pain  : No dysuria  Skin: no rash  Psych: no depression  Endocrine: no polyuria, polydipsia  Hem/lymph: no swelling  Osteoporosis: no fractures    ALL OTHER SYSTEMS REVIEWED AND NEGATIVE    UNABLE TO OBTAIN    PHYSICAL EXAM:  VITALS: T(C): 36.1 (22 @ 10:46)  T(F): 96.9 (22 @ 10:46), Max: 98.1 (22 @ 22:07)  HR: 103 (22 @ 10:01) (94 - 107)  BP: 143/71 (22 @ 10:01) (132/67 - 151/77)  RR:  (18 - 18)  SpO2:  (97% - 100%)  Wt(kg): --  GENERAL: NAD, well-groomed, well-developed  EYES: No proptosis, no lid lag, anicteric  HEENT:  Atraumatic, Normocephalic, moist mucous membranes  THYROID: Normal size, no palpable nodules  RESPIRATORY: Clear to auscultation bilaterally; No rales, rhonchi, wheezing  CARDIOVASCULAR: Regular rate and rhythm; No murmurs; no peripheral edema  GI: Soft, nontender, non distended, normal bowel sounds  SKIN: Dry, intact, No rashes or lesions  MUSCULOSKELETAL: Full range of motion, normal strength  NEURO: sensation intact, extraocular movements intact, no tremor  PSYCH: Alert and oriented x 3, normal affect, normal mood  CUSHING'S SIGNS: no striae      CAPILLARY BLOOD GLUCOSE      POCT Blood Glucose.: 94 mg/dL (2022 12:04)  POCT Blood Glucose.: 86 mg/dL (2022 06:52)  POCT Blood Glucose.: 97 mg/dL (2022 22:40)  POCT Blood Glucose.: 104 mg/dL (2022 21:34)  POCT Blood Glucose.: 113 mg/dL (2022 17:38)                            8.1    6.37  )-----------( 244      ( 2022 05:30 )             27.1           137  |  104  |  10  ----------------------------<  81  4.2   |  24  |  0.77    eGFR: 83    Ca    9.2        Mg     1.9         TPro  6.6  /  Alb  3.1<L>  /  TBili  0.6  /  DBili  x   /  AST  22  /  ALT  12  /  AlkPhos  73        Thyroid Function Tests:   @ 05:30 TSH -- FreeT4 1.410 T3 -- Anti TPO -- Anti Thyroglobulin Ab -- TSI --   @ 07:12 TSH <0.118 FreeT4 -- T3 -- Anti TPO -- Anti Thyroglobulin Ab -- TSI --           Chol 123 Direct LDL -- LDL calculated 56 HDL 49<L> Trig 92    Radiology:                HPI:  73 y/o female with PMH of HTN, DM and PSH of  x2, hysterectomy and lap dev ( w/ Dr. Clifford) recently discharged (-10/21) from surgery service for ex-lap w/ lysis of adhesions and small bowel resection rehospitalized on 10/28/2022 for 3 days of N/V and diarrhea and found to have wound dehiscence w/ bacterimia discharged to [facility]. Patient now BIBA from the shelter to [facility] ED 2022 c/o fatigue, intermittent midsternal chest tightness and HUNG x 3 weeks. Stated she saw her PCP on 11/15/2022, noted to be tachycardia with palpitation and they wanted to send her to the ER to r/o PE but she refused. The morning of 2022 started to have intermittently chest tightness over the sternum lasting for a few mins and with intermittent radiation to the back, started when she was trying to get up to go to the bathroom and associated SOB. Per pt, she is currently under a lot of stress regarding to her home situation and would like to speak with social work. Denies orthnopnea/PND, fever/chill, n/v/d, abdominal pain, and LE edema. No ischemic workup done prior.     Upon arrival to the ED: 164/89, 108, 18, 98.2F, 98% RA. Lab significant for H/H 7.6/25.2 (baseline 7-8), D-dimer 502, Trop I neg x 2, . EKG revealed sinus tachycardia. CTPE 2022 revealed limited evaluation of the subsegmental pulmonary arteries at lung bases secondary to respiratory motion. No PE. Unchanged left upper lobe groundglass nodule 5 mm. Unchanged probable mucoid impacted distal airway in right lung apex. Unchanged enlarged multinodular goiter.    Given in the ED: Tylenol 650mg PO x 1    Pt is now admitted to cardiology/tele monitor to r/o ACS.    (2022 07:39)    ENDO HISTORY: Patient is a 71 yo Female, from shelter, lives alone, with med hx significant for HTN, DM, Csection x 2, Hysterectomy, Lap dev , Bowel obstruction failed conservative management s/p ex-lap w/ lysis of adhesions and small bowel resection w/extended hospital stay -10/21, course c/b C.d diff colitis and Rhabo from Daptomycin rx for wound dehiscence, rehospitalized on 10/28/2022 for 3 days of N/V and diarrhea  discharged to [facility], now patient was send from [facility] ER due to complaints of worsening HUNG over the last 1 month. Pt says she was worried about the poor living conditions of her apartment and she lives alone, says she was 'barely eating much' at home after discharge, and believes that that anxiety contributed to her HUNG. Normally prior to surgery, pt was able to walk from [address] with no shortness of breath, comfortably with her shopping cart. Endocrinology was consulted for abnormal TFTs.     In regards to her thyroid history:  Patient has known that she has an enlarged thyroid since she was 34 yrs of age, follows up at [facility], claims to have had a left thyroid lobectomy and was told that her biopsy was negative for cancer.   She has noticed that her voice has changed, when she argues, her voice becomes more hoarse. Denies swallowing difficulty although pt seems to be exerting HNUG. Says she has been having her TFTs checked every 6 months and they were normal. She was prescribed a medication when she was in her 30s to see if her thyroid would shrink prior to claimed surgical intervention however she developed nausea hence her doctor stopped the medication.  Off note, pt has positive family hx of thyroid disease in her sister.   2022-- TSH normal 0.6  Yesterday TSH 0.01, day before the TSH collection pt had CT Angio with contrast- results showed multinodular goitre--->Enlarged multinodular thyroid gland extends   into the superior mediastinum to level of clavicles. Unchanged mild   narrowing on adjacent esophagus and trachea from mass effect.    In regards to DM history:  A1c 6.8 in Sep 2022, A1c this admission 5.2, pt received 2 units of blood- 1 PRBC in Sep and 1 PRBC in Oct 2022 during hospital admissions.  Pt was diagnosed ~5 years ago, positive family history in her mother and sister. Pt shares that she had a rough upbringing and negative influences and does not maintain contact with her family hence she does not specifically know the cause of death in her mother nor sister other than positive smoking history. Metformin is her only regimen, never been on Insulin.  Typical diet at home over the last month has been reduced due to abdominal surgery, pain from the surgery and generally decreased appetite, although she is tolerating food well at the hospital      PAST MEDICAL & SURGICAL HISTORY:  Diabetes  H/O thyroid disease  HTN (hypertension)  S/P total abdominal hysterectomy  History of laparoscopic cholecystectomy  History of       FAMILY HISTORY:      Social History:    Outpatient Medications:    MEDICATIONS  (STANDING):  dextrose 5%. 1000 milliLiter(s) (50 mL/Hr) IV Continuous <Continuous>  dextrose 5%. 1000 milliLiter(s) (100 mL/Hr) IV Continuous <Continuous>  dextrose 50% Injectable 25 Gram(s) IV Push once  dextrose 50% Injectable 12.5 Gram(s) IV Push once  dextrose 50% Injectable 25 Gram(s) IV Push once  ferrous    sulfate 325 milliGRAM(s) Oral daily  glucagon  Injectable 1 milliGRAM(s) IntraMuscular once  insulin lispro (ADMELOG) corrective regimen sliding scale   SubCutaneous Before meals and at bedtime  losartan 50 milliGRAM(s) Oral daily  metoprolol tartrate 12.5 milliGRAM(s) Oral daily  senna 2 Tablet(s) Oral at bedtime    MEDICATIONS  (PRN):  dextrose Oral Gel 15 Gram(s) Oral once PRN Blood Glucose LESS THAN 70 milliGRAM(s)/deciliter      Allergies    penicillin (Rash)    Intolerances    daptomycin (Muscle Pain)    Review of Systems:  Constitutional: No fever  Eyes: No blurry vision  Neuro: No tremors  HEENT: No pain  Cardiovascular: No chest pain, +palpitations  Respiratory: No SOB, no cough  GI: No nausea, vomiting, abdominal pain  : No dysuria  Skin: no rash  Psych: no depression  Endocrine: no polyuria, polydipsia  Hem/lymph: no swelling  Osteoporosis: no fractures    ALL OTHER SYSTEMS REVIEWED AND NEGATIVE    UNABLE TO OBTAIN    PHYSICAL EXAM:  VITALS: T(C): 36.1 (22 @ 10:46)  T(F): 96.9 (22 @ 10:46), Max: 98.1 (22 @ 22:07)  HR: 103 (22 @ 10:01) (94 - 107)  BP: 143/71 (22 @ 10:01) (132/67 - 151/77)  RR:  (18 - 18)  SpO2:  (97% - 100%)  Wt(kg): --  GENERAL: NAD, well-groomed, well-developed  EYES: No proptosis, no lid lag, anicteric  HEENT:  Atraumatic, Normocephalic, moist mucous membranes  THYROID: +thyromegaly, nodules on both right and left lobe  RESPIRATORY: Clear to auscultation bilaterally; No rales, rhonchi, wheezing  CARDIOVASCULAR: Regular rate and rhythm; No murmurs; no peripheral edema  GI: Soft, nontender, non distended, normal bowel sounds  SKIN: Dry, intact, No rashes or lesions  MUSCULOSKELETAL: Full range of motion, normal strength  NEURO: sensation intact, extraocular movements intact, no tremor  PSYCH: Alert and oriented x 3, normal affect, normal mood  CUSHING'S SIGNS: no striae      CAPILLARY BLOOD GLUCOSE      POCT Blood Glucose.: 94 mg/dL (2022 12:04)  POCT Blood Glucose.: 86 mg/dL (2022 06:52)  POCT Blood Glucose.: 97 mg/dL (2022 22:40)  POCT Blood Glucose.: 104 mg/dL (2022 21:34)  POCT Blood Glucose.: 113 mg/dL (2022 17:38)                            8.1    6.37  )-----------( 244      ( 2022 05:30 )             27.1           137  |  104  |  10  ----------------------------<  81  4.2   |  24  |  0.77    eGFR: 83    Ca    9.2        Mg     1.9         TPro  6.6  /  Alb  3.1<L>  /  TBili  0.6  /  DBili  x   /  AST  22  /  ALT  12  /  AlkPhos  73        Thyroid Function Tests:   @ 05:30 TSH -- FreeT4 1.410 T3 -- Anti TPO -- Anti Thyroglobulin Ab -- TSI --   @ 07:12 TSH <0.118 FreeT4 -- T3 -- Anti TPO -- Anti Thyroglobulin Ab -- TSI --           Chol 123 Direct LDL -- LDL calculated 56 HDL 49<L> Trig 92    Radiology:

## 2022-11-18 NOTE — PROGRESS NOTE ADULT - PROBLEM SELECTOR PLAN 1
consistently tachycardic with rates 100-110s  -- TSH low, T4 elevated, thyroid nodules noted, endocrine consulted  -- f/u thyroid labs  -- f/u endocrine recs  -- started on gentle hydration 100cc/5hrs, reassess HRs

## 2022-11-18 NOTE — PHYSICAL THERAPY INITIAL EVALUATION ADULT - PHYSICAL ASSIST/NONPHYSICAL ASSIST: SIT/SUPINE, REHAB EVAL
Muscle weakness M68.2; deficits in balance, coordination, endurance, ADL tasks & functional mobility
verbal cues/1 person assist

## 2022-11-18 NOTE — PROGRESS NOTE ADULT - PROBLEM SELECTOR PLAN 3
currently chest pain free, trop I @ LHGV neg x 2, EKG sinus tachycardia   -- No h/o ischemic workup or cardiologist   -- TTE 11/17/22: EF 60-65%, mild LVH, aortic sclerosis without significant stenosis, PASP 22mmHg, trivial pericardial effusion  -- NST 11/17/22: imaging normal, overall LVSF hyperdynamic without RWMA, EKG stress is normal, mildly increased tracer uptake incidentally noted in right lung field  -- c/w lopressor 12.5mg qd

## 2022-11-18 NOTE — PHYSICAL THERAPY INITIAL EVALUATION ADULT - ADDITIONAL COMMENTS
As per pt, she was independent in all functional mobility however reports needing assistance and not getting it. Pt has lived in a shelter for 23 years and does not feel safe there. Pt refuses to use AD. Reports needing assistance getting up stairs recently secondary to decreased strength.

## 2022-11-18 NOTE — CONSULT NOTE ADULT - ATTENDING COMMENTS
Pt seen on rounds this afternoon.  72-yo woman with a history of HBP, type 2 DM, multinodular goiter admitted with worsening HUNG over the past 2-3 weeks, along with mid-sternal chest tightness accompanying the HUNG in the past few days.  Was admitted to [facility] in September for SBO which necessitated ex-lap with lysis of adhesions and small bowel resection.    Has a history of a left thyroid lobectomy many years ago for nodular disease which was apparently benign.  Has had gradual enlargement of her gland since that time, though apparently without any thyroid dysfunction.  Has had palpitations and weight loss during the past few months, but the weight loss may be due to decrease in appetite with her recent abdominal surgery.  Admits to hoarseness, but only if she has been talking for a long period of time.  Denies dysphagia  Recent outpatient TFTs were mildly elevated, and current TSH is < 0.118 with a borderline high free T4 of 1.8 ng/dl  On exam she has a large multinodular gland, mostly the R side but with nodular tissue also palpable on the left.  She has no obvious hoarseness, and no stridor.  Lungs are clear.  Her skin is fine but not unduly moist.  Cannot elicit DTRs to evaluate relaxation phase.  CT scan confirms multinodular gland with mediastinal extension and mild compression of trachea and esophagus.  Pt almost certainly has sub-clinical hyperthyroidism at this point, but will await free T3 level.  Will probably start on methimazole.  Unable to do thyroid scan given the recent IV contrast,    Needs PFTs to evaluate for upper airway obstruction.  Should also have ENT evaluation to check for vocal cord dysfunction

## 2022-11-18 NOTE — PROGRESS NOTE ADULT - SUBJECTIVE AND OBJECTIVE BOX
IDENTIFICATION: This is a 73 y/o female with PMH of HTN, DM and PSH of  x2, hysterectomy and lap dev ( w/ Dr. Clifford) recently discharged (-10/21) from surgery service for ex-lap w/ lysis of adhesions and small bowel resection rehospitalized on 10/28/2022 for 3 days of N/V and diarrhea and found to have wound dehiscence w/ bacterimia discharged to [facility] presenting with fatigue, intermittent midsternal chest tightness and HUNG x 3 week admitted to cardiology/tele monitor to r/o ACS.     EVENTS:   - No significant events in last 24 hr from surgical perspective.    SUBJECTIVE:   - Tolerating diet, pain well controlled.   - Denies N/V  - Passing flatus  - Last BM     MEDICATIONS  (STANDING):  dextrose 5%. 1000 milliLiter(s) (50 mL/Hr) IV Continuous <Continuous>  dextrose 5%. 1000 milliLiter(s) (100 mL/Hr) IV Continuous <Continuous>  dextrose 50% Injectable 25 Gram(s) IV Push once  dextrose 50% Injectable 12.5 Gram(s) IV Push once  dextrose 50% Injectable 25 Gram(s) IV Push once  ferrous    sulfate 325 milliGRAM(s) Oral daily  glucagon  Injectable 1 milliGRAM(s) IntraMuscular once  insulin lispro (ADMELOG) corrective regimen sliding scale   SubCutaneous Before meals and at bedtime  losartan 50 milliGRAM(s) Oral daily  magnesium oxide 400 milliGRAM(s) Oral once  metoprolol tartrate 12.5 milliGRAM(s) Oral daily  senna 2 Tablet(s) Oral at bedtime    MEDICATIONS  (PRN):  dextrose Oral Gel 15 Gram(s) Oral once PRN Blood Glucose LESS THAN 70 milliGRAM(s)/deciliter      Vital Signs Last 24 Hrs  T(C): 36.1 (2022 06:34), Max: 36.7 (2022 22:07)  T(F): 96.9 (2022 06:34), Max: 98.1 (2022 22:07)  HR: 94 (2022 08:45) (94 - 110)  BP: 137/64 (2022 08:45) (135/81 - 172/74)  BP(mean): --  RR: 18 (2022 08:45) (17 - 18)  SpO2: 98% (2022 08:45) (97% - 100%)    Parameters below as of 2022 08:45  Patient On (Oxygen Delivery Method): room air        General: NAD, resting comfortably in bed  Pulm: Nonlabored breathing, no respiratory distress  Abd: Soft, NTND, incision with minimal ss fluid, no obvious signs of bleeding/hematoma/infection  Extrem: No edema; P  Neuro: AAOx3        I&O's Detail    2022 07:01  -  2022 07:00  --------------------------------------------------------  IN:    Oral Fluid: 360 mL  Total IN: 360 mL    OUT:    Voided (mL): 500 mL  Total OUT: 500 mL    Total NET: -140 mL          LABS:                        8.1    6.37  )-----------( 244      ( 2022 05:30 )             27.1         137  |  104  |  10  ----------------------------<  81  4.2   |  24  |  0.77    Ca    9.2      2022 05:30  Mg     1.9         TPro  6.6  /  Alb  3.1<L>  /  TBili  0.6  /  DBili  x   /  AST  22  /  ALT  12  /  AlkPhos  73  11-17    PT/INR - ( 2022 12:14 )   PT: 13.1 sec;   INR: 1.12          PTT - ( 2022 12:14 )  PTT:30.6 sec

## 2022-11-18 NOTE — PROGRESS NOTE ADULT - PROBLEM SELECTOR PLAN 5
p/w 164/89, upon arrival to [facility] 146/70  -Home meds: Losartan 50mg daily  -START Metoprolol Tartrate 12.5mg PO daily  -CONT Losartan 50mg daily p/w 164/89, upon arrival to [facility] 146/70  -Home meds: Losartan 50mg daily  -c/w Metoprolol Tartrate 12.5mg PO daily, Losartan 50mg daily

## 2022-11-18 NOTE — PROGRESS NOTE ADULT - PROBLEM SELECTOR PLAN 2
upon interview patient states she has been diagnosed hyperthyroidism in the past  -- no home med  -- TSH low, T4 elevated, f/u thyroid labs  -- f/u endocrine recs in AM

## 2022-11-18 NOTE — CONSULT NOTE ADULT - ASSESSMENT
Patient is a 73 yo Female, from shelter, lives alone, with med hx significant for HTN, DM, Csection x 2, Hysterectomy, Lap dev 2020, Bowel obstruction failed conservative management s/p ex-lap w/ lysis of adhesions and small bowel resection w/extended hospital stay 9/11-10/21, course c/b C. diff colitis and Rhabo from Daptomycin rx for wound dehiscence, rehospitalized on 10/28/2022 for 3 days of N/V and diarrhea  discharged to [facility], now patient was sent from [facility] ER due to complaints of worsening HUNG over the last 1 month, with negative Echo and Stress test, with recent imaging concerning for Enlarged multinodular goitre w/TSH of < 0.118, Free T4 1.4, total T3 pending.    #Multinodular Goitre  Pt p/w increasing dyspnea on exertion and palpitations w/HR ranging in the 90s to low 100s w/negative cardiac workup  Recent imaging with evidence of Multinodular goitre extending into superior mediastinum, with mild narrowing on esophagus and trachea from mass effect  Pt reports hoarseness of voice, has noted a difference in her swallowing, and could possibly be having HUNG secondary from the enlarged nodules  ---Please consider obtaining Pulmonary function tests in order to evaluate for tracheal compression. Patient will need an ENT exam to visualize the vocal cord as well  --Will await results of total T3 as patient will need to be started on Methimazole    #Hx of DM  Pt had an A1c of 6.8 in Sep 2022, A1c of 5.2 in Nov 2022 s/p 1 unit of PRBC in Sep 2022 and 1 unit of PRBC in Oct 2022 during hospitalization  With her history of decreased appetite at home after surgery and not taking Metformin after hospital discharge with associated weight loss points towards true reduction in A1c to 5.2, will not need any medications at this point    Case discussed with Dr. Mulligan, Primary team updated

## 2022-11-18 NOTE — PHYSICAL THERAPY INITIAL EVALUATION ADULT - IMPAIRED TRANSFERS: SIT/STAND, REHAB EVAL
Mod unsteadiness and required VC to stand up straight to complete transfer. Pt c/o dizziness when standing. Returned to seated to take BP, remained stable./impaired balance/decreased strength

## 2022-11-18 NOTE — PROGRESS NOTE ADULT - PROBLEM SELECTOR PLAN 4
p/w SOB x 3 weeks, euvolemic on exam, satting % RA,   -CTA Chest 11/16/2022: Limited evaluation of the subsegmental pulmonary arteries at lung bases secondary to respiratory motion. Within this limitation, no pulmonary embolism is identified. Since September 24, 2022, unchanged left upper lobe groundglass nodule 5 mm. Unchanged probable mucoid impacted distal airway in right lung apex. Unchanged enlarged multinodular goiter. No pleural effusion. No pericardial effusion.  -continue to monitor O2 stat  -Pending ECHO p/w SOB x 3 weeks, euvolemic on exam, satting % RA,   -CTA Chest 11/16/2022: Limited evaluation of the subsegmental pulmonary arteries at lung bases secondary to respiratory motion. Within this limitation, no pulmonary embolism is identified. Since September 24, 2022, unchanged left upper lobe groundglass nodule 5 mm. Unchanged probable mucoid impacted distal airway in right lung apex. Unchanged enlarged multinodular goiter. No pleural effusion. No pericardial effusion.  -TTE 11/17/22: EF 60-65%, mild LVH, aortic sclerosis without significant stenosis, PASP 22mmHg, trivial pericardial effusion  -continue to monitor O2 stat

## 2022-11-19 ENCOUNTER — TRANSCRIPTION ENCOUNTER (OUTPATIENT)
Age: 72
End: 2022-11-19

## 2022-11-19 PROBLEM — E11.9 TYPE 2 DIABETES MELLITUS WITHOUT COMPLICATIONS: Chronic | Status: ACTIVE | Noted: 2020-09-13

## 2022-11-19 LAB
ANION GAP SERPL CALC-SCNC: 8 MMOL/L — SIGNIFICANT CHANGE UP (ref 5–17)
BUN SERPL-MCNC: 9 MG/DL — SIGNIFICANT CHANGE UP (ref 7–23)
CALCIUM SERPL-MCNC: 9.1 MG/DL — SIGNIFICANT CHANGE UP (ref 8.4–10.5)
CHLORIDE SERPL-SCNC: 107 MMOL/L — SIGNIFICANT CHANGE UP (ref 96–108)
CO2 SERPL-SCNC: 25 MMOL/L — SIGNIFICANT CHANGE UP (ref 22–31)
CREAT SERPL-MCNC: 0.83 MG/DL — SIGNIFICANT CHANGE UP (ref 0.5–1.3)
EGFR: 76 ML/MIN/1.73M2 — SIGNIFICANT CHANGE UP
GLUCOSE BLDC GLUCOMTR-MCNC: 102 MG/DL — HIGH (ref 70–99)
GLUCOSE BLDC GLUCOMTR-MCNC: 115 MG/DL — HIGH (ref 70–99)
GLUCOSE BLDC GLUCOMTR-MCNC: 118 MG/DL — HIGH (ref 70–99)
GLUCOSE BLDC GLUCOMTR-MCNC: 123 MG/DL — HIGH (ref 70–99)
GLUCOSE SERPL-MCNC: 91 MG/DL — SIGNIFICANT CHANGE UP (ref 70–99)
HCT VFR BLD CALC: 25.3 % — LOW (ref 34.5–45)
HGB BLD-MCNC: 7.5 G/DL — LOW (ref 11.5–15.5)
MAGNESIUM SERPL-MCNC: 2 MG/DL — SIGNIFICANT CHANGE UP (ref 1.6–2.6)
MCHC RBC-ENTMCNC: 26.9 PG — LOW (ref 27–34)
MCHC RBC-ENTMCNC: 29.6 GM/DL — LOW (ref 32–36)
MCV RBC AUTO: 90.7 FL — SIGNIFICANT CHANGE UP (ref 80–100)
NRBC # BLD: 0 /100 WBCS — SIGNIFICANT CHANGE UP (ref 0–0)
PLATELET # BLD AUTO: 257 K/UL — SIGNIFICANT CHANGE UP (ref 150–400)
POTASSIUM SERPL-MCNC: 4 MMOL/L — SIGNIFICANT CHANGE UP (ref 3.5–5.3)
POTASSIUM SERPL-SCNC: 4 MMOL/L — SIGNIFICANT CHANGE UP (ref 3.5–5.3)
RBC # BLD: 2.79 M/UL — LOW (ref 3.8–5.2)
RBC # FLD: 16.8 % — HIGH (ref 10.3–14.5)
SODIUM SERPL-SCNC: 140 MMOL/L — SIGNIFICANT CHANGE UP (ref 135–145)
T4 FREE SERPL-MCNC: 1.34 NG/DL — SIGNIFICANT CHANGE UP (ref 0.93–1.7)
THYROPEROXIDASE AB SERPL-ACNC: 14.5 IU/ML — SIGNIFICANT CHANGE UP
WBC # BLD: 5.58 K/UL — SIGNIFICANT CHANGE UP (ref 3.8–10.5)
WBC # FLD AUTO: 5.58 K/UL — SIGNIFICANT CHANGE UP (ref 3.8–10.5)

## 2022-11-19 PROCEDURE — 99231 SBSQ HOSP IP/OBS SF/LOW 25: CPT

## 2022-11-19 RX ORDER — POLYETHYLENE GLYCOL 3350 17 G/17G
17 POWDER, FOR SOLUTION ORAL DAILY
Refills: 0 | Status: DISCONTINUED | OUTPATIENT
Start: 2022-11-19 | End: 2022-11-22

## 2022-11-19 RX ORDER — SODIUM CHLORIDE 9 MG/ML
500 INJECTION INTRAMUSCULAR; INTRAVENOUS; SUBCUTANEOUS
Refills: 0 | Status: DISCONTINUED | OUTPATIENT
Start: 2022-11-19 | End: 2022-11-22

## 2022-11-19 RX ADMIN — Medication 5 MILLIGRAM(S): at 07:13

## 2022-11-19 RX ADMIN — POLYETHYLENE GLYCOL 3350 17 GRAM(S): 17 POWDER, FOR SOLUTION ORAL at 23:30

## 2022-11-19 RX ADMIN — LIDOCAINE 1 PATCH: 4 CREAM TOPICAL at 12:00

## 2022-11-19 RX ADMIN — Medication 12.5 MILLIGRAM(S): at 07:13

## 2022-11-19 RX ADMIN — Medication 325 MILLIGRAM(S): at 11:36

## 2022-11-19 RX ADMIN — SENNA PLUS 2 TABLET(S): 8.6 TABLET ORAL at 23:30

## 2022-11-19 RX ADMIN — LIDOCAINE 1 PATCH: 4 CREAM TOPICAL at 06:49

## 2022-11-19 RX ADMIN — LOSARTAN POTASSIUM 50 MILLIGRAM(S): 100 TABLET, FILM COATED ORAL at 07:13

## 2022-11-19 RX ADMIN — LIDOCAINE 1 PATCH: 4 CREAM TOPICAL at 00:02

## 2022-11-19 NOTE — PROGRESS NOTE ADULT - PROBLEM SELECTOR PLAN 5
SBP 140s, stable  -- Home meds: Losartan 50mg daily  -- c/w Metoprolol Tartrate 12.5mg PO daily, Losartan 50mg daily

## 2022-11-19 NOTE — PROGRESS NOTE ADULT - PROBLEM SELECTOR PLAN 8
p/w H/H 7.6/25.2 (baseline 7-8), denies any bleeding   -- Iron studies 9/2022: Ferritin 442, Iron total 29, unsaturated iron binding 101, iron-total binding 130, % sat iron 22  --iron normal, B12 normal, decreased TIBC 137, increased ferritin 714  -- Home meds: Ferrous Sulfate 325mg TID  -- CONT Ferrous sulfate 325mg    F: none  E: Replete if K+ <4 and Mg <2   N: DASH  DVT ppx: none   Dispo: PT [facility]; auth started Friday 11/19    Case discussed with Dr. Francis

## 2022-11-19 NOTE — DISCHARGE NOTE PROVIDER - CARE PROVIDERS DIRECT ADDRESSES
,DirectAddress_Unknown ,DirectAddress_Unknown,[email or web address]

## 2022-11-19 NOTE — PROGRESS NOTE ADULT - PROBLEM SELECTOR PLAN 1
consistently tachycardic with rates 100-110s  -- TSH low, T4 elevated, thyroid nodules noted, endocrine consulted  -- free thyroxine wnl, TPO and TSI pending  -- f/u endocrine recs  -- s/p NS 100cc/5hrs x 1 with slight improveement of HRs in 80s-low 100s  -- ordered for NS 100cc/5hrs, reassess HRs

## 2022-11-19 NOTE — PROGRESS NOTE ADULT - PROBLEM SELECTOR PLAN 6
s/p exploratory laparotomy with lysis of adhesions, incisional hernia repair, and small bowel resection on 9/15/2022 with Dr. So  -- Recently admitted to medicine 10/28/2022 for wound management of laparotomy wound   -- No surgical intervention at that time  -- CT A/P 10/27/2022: Since 10/8/2022, increased inflammatory changes and foci of extraluminal air surrounding left lower quadrant small bowel anastomosis. Although no extravasated oral contrast to suggest active leak, occult anastomotic leak/infection should be considered. Inflammatory changes extend to the urinary bladder and abdominal wall incision, follow-up clinically for evolving fistula.  -- surgery consulted and determined tachycardia not due to infectious causes   -- Continue to monitor surgical dressing

## 2022-11-19 NOTE — DISCHARGE NOTE PROVIDER - NSDCMRMEDTOKEN_GEN_ALL_CORE_FT
atorvastatin 40 mg oral tablet: 1 tab(s) orally once a day  Cozaar 50 mg oral tablet: 1 tab(s) orally once a day  ferrous sulfate 325 mg (65 mg elemental iron) oral tablet: 1 tab(s) orally 3 times a day  metFORMIN 500 mg oral tablet, extended release: 1 tab(s) orally once a day   atorvastatin 40 mg oral tablet: 1 tab(s) orally once a day  Cozaar 50 mg oral tablet: 1 tab(s) orally once a day  ferrous sulfate 325 mg (65 mg elemental iron) oral tablet: 1 tab(s) orally 3 times a day  metFORMIN 500 mg oral tablet, extended release: 1 tab(s) orally once a day  methIMAzole 5 mg oral tablet: 0.5 tab(s) orally once a day   propranolol 20 mg oral tablet: 1 tab(s) orally once a day

## 2022-11-19 NOTE — PROGRESS NOTE ADULT - PROBLEM SELECTOR PLAN 3
currently chest pain free, trop I @ [facility] neg x 2, EKG sinus tachycardia   -- No h/o ischemic workup or cardiologist   -- TTE 11/17/22: EF 60-65%, mild LVH, aortic sclerosis without significant stenosis, PASP 22mmHg, trivial pericardial effusion  -- NST 11/17/22: imaging normal, overall LVSF hyperdynamic without RWMA, EKG stress is normal, mildly increased tracer uptake incidentally noted in right lung field  -- c/w lopressor 12.5mg qd; holding uptitration of BB to determine cause of tachycardia

## 2022-11-19 NOTE — DISCHARGE NOTE PROVIDER - PROVIDER TOKENS
PROVIDER:[TOKEN:[[id number]],FOLLOWUP:[1 month]] PROVIDER:[TOKEN:[[id number]],FOLLOWUP:[1 month]],PROVIDER:[TOKEN:[[id number]],FOLLOWUP:[1 month]]

## 2022-11-19 NOTE — PROGRESS NOTE ADULT - PROBLEM SELECTOR PLAN 7
A1c 5.2   -- Home med: Metformin 500mg daily (last dose couple of months ago)  -- monitor FS  -- continue MISS

## 2022-11-19 NOTE — PROGRESS NOTE ADULT - SUBJECTIVE AND OBJECTIVE BOX
Interventional Cardiology PA Adult Progress Note    Subjective Assessment: Seen and examined bedside. Denies chest pain, SOB, HUNG, palpitations, dizziness, LOC, N/V/D, fever/chills/sick contact, diaphoresis, orthopnea/PND, and leg swelling. States she needs to be set up with a OYSEPH.    ROS negative except as noted above.  	  MEDICATIONS:  losartan 50 milliGRAM(s) Oral daily  metoprolol tartrate 12.5 milliGRAM(s) Oral daily  bisacodyl 5 milliGRAM(s) Oral at bedtime  senna 2 Tablet(s) Oral at bedtime  dextrose 50% Injectable 25 Gram(s) IV Push once  dextrose 50% Injectable 12.5 Gram(s) IV Push once  dextrose 50% Injectable 25 Gram(s) IV Push once  dextrose Oral Gel 15 Gram(s) Oral once PRN  glucagon  Injectable 1 milliGRAM(s) IntraMuscular once  insulin lispro (ADMELOG) corrective regimen sliding scale   SubCutaneous Before meals and at bedtime  dextrose 5%. 1000 milliLiter(s) IV Continuous <Continuous>  dextrose 5%. 1000 milliLiter(s) IV Continuous <Continuous>  ferrous    sulfate 325 milliGRAM(s) Oral daily  lidocaine   4% Patch 1 Patch Transdermal daily PRN  sodium chloride 0.9%. 500 milliLiter(s) IV Continuous <Continuous>      	    [PHYSICAL EXAM:  TELEMETRY:  T(C): 36.8 (11-19-22 @ 18:33), Max: 36.8 (11-19-22 @ 18:33)  HR: 96 (11-19-22 @ 14:28) (96 - 103)  BP: 135/64 (11-19-22 @ 14:28) (135/64 - 155/71)  RR: 18 (11-19-22 @ 14:28) (18 - 18)  SpO2: 98% (11-19-22 @ 14:28) (96% - 99%)  Wt(kg): --  I&O's Summary    18 Nov 2022 07:01  -  19 Nov 2022 07:00  --------------------------------------------------------  IN: 480 mL / OUT: 0 mL / NET: 480 mL    19 Nov 2022 07:01  -  19 Nov 2022 18:45  --------------------------------------------------------  IN: 480 mL / OUT: 0 mL / NET: 480 mL                                              Appearance: Normal	  HEENT:   Normal oral mucosa, PERRLA, EOMI, thyroid noduels palpated  Neck: Supple, - JVD; Carotid Bruit   Cardiovascular: Normal S1 S2, No JVD, No murmurs,   Respiratory: Lungs clear to auscultation, No Rales, Rhonchi, Wheezing	  Gastrointestinal:  Soft, Non-tender, + BS	  Skin: No rashes, No ecchymoses, No cyanosis  Extremities: Normal range of motion, No clubbing, cyanosis or edema  Vascular: Peripheral pulses palpable 2+ bilaterally  Neurologic: Non-focal  Psychiatry: A & O x 3, Mood & affect appropriate                        7.5    5.58  )-----------( 257      ( 19 Nov 2022 05:59 )             25.3     11-19    140  |  107  |  9   ----------------------------<  91  4.0   |  25  |  0.83    Ca    9.1      19 Nov 2022 05:59  Mg     2.0     11-19      proBNP:   Lipid Profile:   HgA1c:   TSH:

## 2022-11-19 NOTE — DISCHARGE NOTE PROVIDER - HOSPITAL COURSE
73 y/o female with PMH of HTN, DM and PSH of  x2, hysterectomy and lap dev ( w/ Dr. Clifford) recently discharged (-10/21) from surgery service for ex-lap w/ lysis of adhesions and small bowel resection rehospitalized on 10/28/2022 for 3 days of N/V and diarrhea and found to have wound dehiscence w/ bacterimia discharged to [facility]. Patient now BIBA from the shelter to [facility] ED 2022 c/o fatigue, intermittent midsternal chest tightness and HUNG x 3 weeks. Stated she saw her PCP on 11/15/2022, noted to be tachycardia with palpitation and they wanted to send her to the ER to r/o PE but she refused. The morning of 2022 started to have intermittently chest tightness over the sternum lasting for a few mins and with intermittent radiation to the back, started when she was trying to get up to go to the bathroom and associated SOB. Per pt, she is currently under a lot of stress regarding to her home situation and would like to speak with social work. Upon arrival to the ED: 164/89, 108, 18, 98.2F, 98% RA. Lab significant for H/H 7.6/25.2 (baseline 7-8), D-dimer 502, Trop I neg x 2, . EKG revealed sinus tachycardia. CTPE 2022 revealed limited evaluation of the subsegmental pulmonary arteries at lung bases secondary to respiratory motion. No PE. Unchanged left upper lobe groundglass nodule 5 mm. Unchanged probable mucoid impacted distal airway in right lung apex. Unchanged enlarged multinodular goiter. Given in the ED: Tylenol 650mg PO x 1. Pt is now admitted to cardiology/tele monitor to r/o ACS.    KIKI 22: EF 60-65, mild LVH, aortic sclerosis without significant stneosis, PASP 22mmHg.  NST 22: normal, no ischemia noted.  Patient treated with NS 500cc with moderate improvement of HRs.   Endocrine consulted given low TSH, determined _______.    Pt is asymptomatic at this time and denies chest pain, SOB, HUNG, palpitations, dizziness, LOC, N/V, diaphoresis, orthopnea/PND, and leg swelling. Pt able to ambulate and void without complication. VSS. Labs and telemetry reviewed. ___________ access site stable and dressing C/D/I.  Pt is a candidate for discharge per . ________. Pt given appropriate discharge instructions, pt states they have an appropriate amount of their previous home meds unchanged from this visit at home, and any new medications were sent to their pharmacy. Pt instructed to f/u with ________ in 1-2 weeks. 73 y/o F with PMH of HTN, DM, recent discharge (-10/21) from surgery service for ex-lap w/ lysis of adhesions and small bowel resection rehospitalized on 10/28/2022 for 3 days of N/V and diarrhea and found to have wound dehiscence w/ bacteriemia discharged to [facility] who was BIBA on  for fatigue, intermittent midsternal chest tightness and  x3 weeks. Stated she saw her PCP on 11/15/2022, noted to be tachycardia with palpitation and they wanted to send her to the ER to r/o PE but she refused. Pt negative for PE, and now admitted for r/o ACS. Patient ruled out ACS, s/p TTE 22: EF 60-65, mild LVH, aortic sclerosis without significant stneosis, PASP 22mmHg and NST 22: normal, no ischemia noted. Patient remained in sinus tachycardia which was intermittently treated with NS IVF. Endocrine was also consulted and intial concern for sub clinical hyperthyroidism. As per Endocrine, no plan to start Methimazole at this time, will follow up with Endocrine clinic in 2 weeks for repeat thyroid studies. Patient was also seen by surgery for tachycardia given recent bacteremia, stated tachycardia was not related to infection and signed off. CT A/P was reviewed by them as well. PT rec [facility].    Patient seen and examined on the day of discharge. No complaints at this time. HD stable, remains sinus tach to 90s-100s for which lopressor 12.5 daily was changed to Propranolol 10mg BID (plan to transition to 20mg QD on discharge). As per Dr. Radford, patient is stable for discharge to [facility] on propranolol 20mg QD, Losartan 50mg QD with instruction to follow up with Endocrine clinic and given information for Dr. Francis as regular cardiac follow up.    Reasons for No Cardiac Rehab Referral Rx (Must select 1 or more options):                Patient Refused            Medical Reason: ex needs Home Care, Home PT            Patient lacks medical coverage for Cardiac Rehab            Pt discharged to Nursing Care/[facility]/Long term Care Facility            Patient Lacks Transportation or no cardiac rehab within 60 minutes driving range            Patient already participates in Cardiac Rehab            Other: (provide details) ex: Hospice patient           73 y/o F with PMH of HTN, DM, recent discharge (-10/21) from surgery service for ex-lap w/ lysis of adhesions and small bowel resection rehospitalized on 10/28/2022 for 3 days of N/V and diarrhea and found to have wound dehiscence w/ bacteriemia discharged to [facility] who was BIBA on  for fatigue, intermittent midsternal chest tightness and  x3 weeks. Stated she saw her PCP on 11/15/2022, noted to be tachycardia with palpitation and they wanted to send her to the ER to r/o PE but she refused. Pt negative for PE, and now admitted for r/o ACS. Patient ruled out ACS, s/p TTE 22: EF 60-65, mild LVH, aortic sclerosis without significant stneosis, PASP 22mmHg and NST 22: normal, no ischemia noted. Patient remained in sinus tachycardia which was intermittently treated with NS IVF. Endocrine was also consulted and intial concern for sub clinical hyperthyroidism. As per Endocrine, will start Methimazole 2.5mg QD and pt should follow up with Endocrine clinic in 1 month for repeat thyroid studies. Patient was also seen by surgery for tachycardia given recent bacteremia, stated tachycardia was not related to infection and signed off. CT A/P was reviewed by them as well. PT rec [facility].    Patient seen and examined on the day of discharge. No complaints at this time. HD stable, remains sinus tach to 90s-100s for which lopressor 12.5 daily was changed to Propranolol 10mg BID (plan to transition to 20mg QD on discharge). As per Dr. Radford, patient is stable for discharge to [facility] on propranolol 20mg QD, Losartan 50mg QD with instruction to follow up with Endocrine clinic and given information for Dr. Francis as regular cardiac follow up.    Reasons for No Cardiac Rehab Referral Rx (Must select 1 or more options):                Patient Refused            Medical Reason: ex needs Home Care, Home PT            Patient lacks medical coverage for Cardiac Rehab            Pt discharged to Nursing Care/[facility]/Long term Care Facility            Patient Lacks Transportation or no cardiac rehab within 60 minutes driving range            Patient already participates in Cardiac Rehab            Other: (provide details) ex: Hospice patient           73 y/o F with PMH of HTN, DM, recent discharge (-10/21) from surgery service for ex-lap w/ lysis of adhesions and small bowel resection rehospitalized on 10/28/2022 for 3 days of N/V and diarrhea and found to have wound dehiscence w/ bacteriemia discharged to [facility] who was BIBA on  for fatigue, intermittent midsternal chest tightness and  x3 weeks. Stated she saw her PCP on 11/15/2022, noted to be tachycardia with palpitation and they wanted to send her to the ER to r/o PE but she refused. Pt negative for PE, and now admitted for r/o ACS. Patient ruled out ACS, s/p TTE 22: EF 60-65, mild LVH, aortic sclerosis without significant stneosis, PASP 22mmHg and NST 22: normal, no ischemia noted. Patient remained in sinus tachycardia which was intermittently treated with NS IVF. Endocrine was also consulted and intial concern for sub clinical hyperthyroidism. As per Endocrine, will start Methimazole 2.5mg QD.   Pt should follow up with Endocrine clinic at discharge with [facility] by calling [phone] to make an appointment. She should be seen in one month from now for further work-up of nodules and management of hyperthyroidism.  Patient was also seen by surgery for tachycardia given recent bacteremia, stated tachycardia was not related to infection and signed off. CT A/P was reviewed by them as well. PT rec [facility].    Patient seen and examined on the day of discharge. No complaints at this time. HD stable, remains sinus tach to 90s-100s for which lopressor 12.5 daily was changed to Propranolol 10mg BID (plan to transition to 20mg QD on discharge). As per Dr. Radford, patient is stable for discharge to [facility] on propranolol 20mg QD, Losartan 50mg QD, Methimazole 2.5mg QD with instructions to follow up with Endocrine clinic and given information for Dr. Francis as regular cardiac follow up.    Reasons for No Cardiac Rehab Referral Rx: (deconditioned)            Patient Refused            Medical Reason: ex needs Home Care, Home PT            Patient lacks medical coverage for Cardiac Rehab            Pt discharged to Nursing Care/[facility]/Long term Care Facility            Patient Lacks Transportation or no cardiac rehab within 60 minutes driving range            Patient already participates in Cardiac Rehab            Other: (provide details) ex: Hospice patient           73 y/o F with PMH of HTN, DM, recent discharge (-10/21) from surgery service for ex-lap w/ lysis of adhesions and small bowel resection rehospitalized on 10/28/2022 for 3 days of N/V and diarrhea and found to have wound dehiscence w/ bacteriemia discharged to [facility] who was BIBA on  for fatigue, intermittent midsternal chest tightness and  x3 weeks. Stated she saw her PCP on 11/15/2022, noted to be tachycardia with palpitation and they wanted to send her to the ER to r/o PE but she refused. Pt negative for PE, and now admitted for r/o ACS. Patient ruled out ACS, s/p TTE 22: EF 60-65, mild LVH, aortic sclerosis without significant stneosis, PASP 22mmHg and NST 22: normal, no ischemia noted. Patient remained in sinus tachycardia which was intermittently treated with NS IVF. Endocrine was also consulted and intial concern for sub clinical hyperthyroidism. As per Endocrine, will start Methimazole 2.5mg QD.  At trial of Propranolol 10mg QD was initiated, transitioned to Propranolol 20mg QD.  Pt should follow up with Endocrine clinic at discharge with [facility] by calling [phone] to make an appointment. She should be seen in one month from now for further work-up of nodules and management of hyperthyroidism.  Patient was also seen by surgery for tachycardia given recent bacteremia, stated tachycardia was not related to infection and signed off. CT A/P was reviewed by them as well. PT rec [facility].    Patient seen and examined on the day of discharge. No complaints at this time. HD stable, remains sinus tach to 90s-100s for which lopressor 12.5 daily was changed to Propranolol 10mg BID (plan to transition to 20mg QD on discharge). As per Dr. Radford, patient is stable for discharge to [facility] on propranolol 20mg QD, Losartan 50mg QD, Methimazole 2.5mg QD with instructions to follow up with Endocrine clinic and given information for Dr. Francis as regular cardiac follow up.    Reasons for No Cardiac Rehab Referral Rx: (deconditioned)            Patient Refused            Medical Reason: ex needs Home Care, Home PT            Patient lacks medical coverage for Cardiac Rehab            Pt discharged to Nursing Care/[facility]/Long term Care Facility            Patient Lacks Transportation or no cardiac rehab within 60 minutes driving range            Patient already participates in Cardiac Rehab            Other: (provide details) ex: Hospice patient           73 y/o F with PMH of HTN, DM, recent discharge (-10/21) from surgery service for ex-lap w/ lysis of adhesions and small bowel resection rehospitalized on 10/28/2022 for 3 days of N/V and diarrhea and found to have wound dehiscence w/ bacteriemia discharged to [facility] who was BIBA on  for fatigue, intermittent midsternal chest tightness and  x3 weeks. Stated she saw her PCP on 11/15/2022, noted to be tachycardia with palpitation and they wanted to send her to the ER to r/o PE but she refused. Pt negative for PE, and now admitted for r/o ACS. Patient ruled out ACS, s/p TTE 22: EF 60-65, mild LVH, aortic sclerosis without significant stneosis, PASP 22mmHg and NST 22: normal, no ischemia noted. Patient remained in sinus tachycardia which was intermittently treated with NS IVF. Endocrine was also consulted and intial concern for sub clinical hyperthyroidism. As per Endocrine, will start Methimazole 2.5mg QD.  At trial of Propranolol 10mg QD was initiated, transitioned to Propranolol 20mg QD.  Pt should follow up with Endocrine clinic at discharge with [facility] by calling [phone] to make an appointment. She should be seen in one month from now for further work-up of nodules and management of hyperthyroidism.  Patient was also seen by surgery for tachycardia given recent bacteremia, stated tachycardia was not related to infection and signed off. CT A/P was reviewed by them as well. PT rec [facility].    Patient seen and examined on the day of discharge. No complaints at this time. HD stable, remains sinus tach to 90s-100s for which lopressor 12.5 daily was changed to Propranolol 10mg BID (plan to transition to 20mg QD on discharge). As per Dr. Radford, patient is stable for discharge to [facility].  Pt is to continue Propranolol 20mg QD, Losartan 50mg QD, Methimazole 2.5mg QD with instructions to follow up with Endocrine clinic and given information for Dr. Francis as regular cardiac follow up.    Reasons for No Cardiac Rehab Referral Rx: (deconditioned)            Patient Refused            Medical Reason: ex needs Home Care, Home PT            Patient lacks medical coverage for Cardiac Rehab            Pt discharged to Nursing Care/[facility]/Long term Care Facility            Patient Lacks Transportation or no cardiac rehab within 60 minutes driving range            Patient already participates in Cardiac Rehab            Other: (provide details) ex: Hospice patient           71 y/o F with PMH of HTN, DM, recent discharge (-10/21) from surgery service for ex-lap w/ lysis of adhesions and small bowel resection rehospitalized on 10/28/2022 for 3 days of N/V and diarrhea and found to have wound dehiscence w/ bacteriemia discharged to [facility] who was BIBA on  for fatigue, intermittent midsternal chest tightness and  x3 weeks. Stated she saw her PCP on 11/15/2022, noted to be tachycardia with palpitation and they wanted to send her to the ER to r/o PE but she refused. Pt negative for PE, and now admitted for r/o ACS. Patient ruled out ACS, s/p TTE 22: EF 60-65, mild LVH, aortic sclerosis without significant stneosis, PASP 22mmHg and NST 22: normal, no ischemia noted. Patient remained in sinus tachycardia which was intermittently treated with NS IVF. Endocrine was also consulted and intial concern for sub clinical hyperthyroidism. As per Endocrine, will start Methimazole 2.5mg QD.  At trial of Propranolol 10mg QD was initiated, transitioned to Propranolol 20mg QD.  Pt should follow up with Endocrine clinic at discharge with [facility] by calling [phone] to make an appointment. She should be seen in one month from now for further work-up of nodules and management of hyperthyroidism.  Patient was also seen by surgery for tachycardia given recent bacteremia, stated tachycardia was not related to infection and signed off. CT A/P was reviewed by them as well. PT rec [facility].    Patient seen and examined on the day of discharge. No complaints at this time. HD stable, remains sinus tach to 90s-100s for which lopressor 12.5 daily was changed to Propranolol 10mg BID, now transitioned to 20mg QD on discharge). As per Dr. Radford, patient is stable for discharge to [facility].  Pt is to continue Propranolol 20mg QD, Losartan 50mg QD, Methimazole 2.5mg QD with instructions to follow up with Endocrine clinic and given information for Dr. Francis as regular cardiac follow up.    Reasons for No Cardiac Rehab Referral Rx: (deconditioned)            Patient Refused            Medical Reason: ex needs Home Care, Home PT            Patient lacks medical coverage for Cardiac Rehab            Pt discharged to Nursing Care/[facility]/Long term Care Facility            Patient Lacks Transportation or no cardiac rehab within 60 minutes driving range            Patient already participates in Cardiac Rehab            Other: (provide details) ex: Hospice patient

## 2022-11-19 NOTE — PROGRESS NOTE ADULT - PROBLEM SELECTOR PLAN 4
p/w SOB x 3 weeks, euvolemic on exam, satting % RA, , currently asymptomatic  -- CTA Chest 11/16/2022: Limited evaluation of the subsegmental pulmonary arteries at lung bases secondary to respiratory motion. Within this limitation, no pulmonary embolism is identified. Since September 24, 2022, unchanged left upper lobe groundglass nodule 5 mm. Unchanged probable mucoid impacted distal airway in right lung apex. Unchanged enlarged multinodular goiter. No pleural effusion. No pericardial effusion.  -- TTE 11/17/22: EF 60-65%, mild LVH, aortic sclerosis without significant stenosis, PASP 22mmHg, trivial pericardial effusion  -continue to monitor O2 stat

## 2022-11-19 NOTE — DISCHARGE NOTE PROVIDER - CARE PROVIDER_API CALL
Ashu Francis)  Cardiology; Internal Medicine  [address] [place] [address]  Phone: [phone]  Fax: [phone]  Follow Up Time: 1 month   Ashu Francis)  Cardiology; Internal Medicine  [address] [address]  Phone: [phone]  Fax: [phone]  Follow Up Time: 1 month    Ian José)  EndocrinologyMetabDiabetes; Internal Medicine  [address]  [place] [address]  Phone: [phone]  Fax: [phone]  Follow Up Time: 1 month

## 2022-11-19 NOTE — PROGRESS NOTE ADULT - PROBLEM SELECTOR PLAN 2
upon interview patient states she has been diagnosed hyperthyroidism in the past, has palpable thyroid nodules; has previously had them biopsied and told they were noncancerous  -- no home med  -- TSH low, T4 elevated,  free thyroxine wnl, TPO and TSI pending  -- f/u endocrine recs in AM

## 2022-11-19 NOTE — DISCHARGE NOTE PROVIDER - NSDCCPCAREPLAN_GEN_ALL_CORE_FT
PRINCIPAL DISCHARGE DIAGNOSIS  Diagnosis: Tachycardia  Assessment and Plan of Treatment: Your heart rate was found to be consistently elevated. The cause of this was determined to be due to ____. You were treated with fluids, which helped bring your heart rate down, and started on ____.      SECONDARY DISCHARGE DIAGNOSES  Diagnosis: Hyperthyroidism  Assessment and Plan of Treatment: You have been found to have elevated thyroid levels. You were seen by endocrinology while in the hospital, who determined you should be started on _____ to control your thyroid function.     PRINCIPAL DISCHARGE DIAGNOSIS  Diagnosis: Tachycardia  Assessment and Plan of Treatment: Your heart rate was found to be consistently elevated. The cause of this was determined to be due to ____. You were treated with fluids, which helped bring your heart rate down, and started on ____.  -You had a normal echocardiogram (heart ultrasound) and stress test.      SECONDARY DISCHARGE DIAGNOSES  Diagnosis: Hyperthyroidism  Assessment and Plan of Treatment: You have been found to have elevated thyroid levels. You were seen by endocrinology while in the hospital, who determined you should be started on _____ to control your thyroid function.     PRINCIPAL DISCHARGE DIAGNOSIS  Diagnosis: Tachycardia  Assessment and Plan of Treatment: Your heart rate was found to be consistently elevated. The cause of this was likely related to your thyroid. You were treated with fluids, which helped bring your heart rate down, and started on propranolol for heart rate control.  -Endocrine started you on Methimazole 2.5mg daily for likely sub-clinical hyperthyroidism  -You had a normal echocardiogram (heart ultrasound) and stress test.      SECONDARY DISCHARGE DIAGNOSES  Diagnosis: Hyperthyroidism  Assessment and Plan of Treatment: You have been found to have elevated thyroid levels. You were seen by endocrinology while in the hospital, who determined you should be started on Methimazole 2.5mg daily to control your thyroid function.     PRINCIPAL DISCHARGE DIAGNOSIS  Diagnosis: Tachycardia  Assessment and Plan of Treatment: Your heart rate was found to be consistently elevated. The cause of this was likely related to your thyroid. You were treated with fluids, which helped bring your heart rate down, and started on propranolol for heart rate control.  -Endocrine started you on Methimazole 2.5mg daily and Propranolol 20mg daily for likely sub-clinical hyperthyroidism  -You had a normal echocardiogram (heart ultrasound) and stress test.  - Please follow up at discharge with [facility] by calling [phone] to make an appointment.    You should be seen in one month from now for further work-up of nodules and management of hyperthyroidism.          SECONDARY DISCHARGE DIAGNOSES  Diagnosis: Hyperthyroidism  Assessment and Plan of Treatment: You have been found to have elevated thyroid levels. You were seen by endocrinology while in the hospital, who determined you should be started on Methimazole 2.5mg daily and Propranolol 20mg daily to control your thyroid function.    Diagnosis: Diabetes mellitus  Assessment and Plan of Treatment: Continue Metformin 500mg daily as prescribed (no changes)      Diagnosis: Voice hoarseness  Assessment and Plan of Treatment: please follow up with an outpatient ENT for further evaluation    Diagnosis: HLD (hyperlipidemia)  Assessment and Plan of Treatment: please continue Atorvastatin 40mg daily     PRINCIPAL DISCHARGE DIAGNOSIS  Diagnosis: Tachycardia  Assessment and Plan of Treatment: Your heart rate was found to be consistently elevated. The cause of this was likely related to your thyroid. You were treated with fluids, which helped bring your heart rate down, and started on propranolol for heart rate control.  -Endocrine started you on Methimazole 2.5mg daily and Propranolol 20mg daily for likely sub-clinical hyperthyroidism  -You had a normal echocardiogram (heart ultrasound) and stress test.  - Please follow up at discharge with [facility] by calling [phone] to make an appointment.    You should be seen in one month from now for further work-up of nodules and management of hyperthyroidism.          SECONDARY DISCHARGE DIAGNOSES  Diagnosis: Hyperthyroidism  Assessment and Plan of Treatment: You have been found to have elevated thyroid levels. You were seen by endocrinology while in the hospital, who determined you should be started on Methimazole 2.5mg daily and Propranolol 20mg daily to control your thyroid function.    Diagnosis: Diabetes mellitus  Assessment and Plan of Treatment: Continue Metformin 500mg daily as prescribed (no changes)      Diagnosis: Voice hoarseness  Assessment and Plan of Treatment: please follow up with an outpatient ENT for further evaluation    Diagnosis: HLD (hyperlipidemia)  Assessment and Plan of Treatment: please continue Atorvastatin 40mg daily    Diagnosis: HTN (hypertension)  Assessment and Plan of Treatment: please continue Losartan 50mg daily

## 2022-11-19 NOTE — PROGRESS NOTE ADULT - ASSESSMENT
73 y/o female with PMHx hyperthyroidism, HTN, DM and PSH of  x2, hysterectomy and lap dev () recently discharged (-10/21) from surgery service for ex-lap w/ lysis of adhesions and small bowel resection rehospitalized on 10/28/2022 for 3 days of N/V/D and found to have wound dehiscence w/ bacteremia then discharged to [facility]. Presented to [facility]  for fatigue, intermittent CP, found to have elevated D dimer, transferred to [facility] for r/o ACS. CTPE 22 negative for PE. NST  normal, echo  normal. Found to be anemic with Hgb in 7s (baseline 7-8). Found to have low TSH with nodules on thyroid, endocrine consulted and following for possible hyperthyroid induced tachycardia. s/p gentle hydration 500cc x 2 with slight improvement of HRs 71 y/o female with PMHx hyperthyroidism, HTN, DM and PSH of  x2, hysterectomy and lap dev () recently discharged (-10/21) from surgery service for ex-lap w/ lysis of adhesions and small bowel resection rehospitalized on 10/28/2022 for 3 days of N/V/D and found to have wound dehiscence w/ bacteremia then discharged to [facility]. Presented to [facility]  for fatigue, intermittent CP, found to have elevated D dimer, transferred to [facility] for r/o ACS. CTPE 22 negative for PE. NST  normal, echo  normal. Found to be anemic with Hgb in 7s (baseline 7-8). Found to have low TSH with nodules on thyroid, endocrine consulted and following for possible hyperthyroid induced tachycardia. s/p gentle hydration 500cc x 1 with slight improvement of HRs

## 2022-11-19 NOTE — PROGRESS NOTE ADULT - NSPROGADDITIONALINFOA_GEN_ALL_CORE
Attending Attestation:  I was physically present for the key portions of the evaluation and management (E/M) service provided.  I agree with the above history, physical, and plan which I have reviewed with the following edits/addendum:    72F w HTN, DM2, recent ex-lap adhesiolysis, bowel resection w a prolonged hosp'n 9/11-10/21/22 readmitted for bacteremia 10/28-11/1/22 dcd to [facility]. 11/16 ED p/w intermittent chest tightness, SOB with activity. CT ruled out PE. ECG sinus tach, non-ischemic. Trop 0.02. TSH low <0.118. On exam, euvolemic, 2-3cm nodule on thyroid palpation.  #sinus tach: hydrate, f/u endo recs. 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 address underlying cause    Ashu Francis MD  Cardiology    15 minutes spent on total encounter; more than 50% of the visit was spent counseling and/or coordinating care by the attending physician.

## 2022-11-20 LAB
ANION GAP SERPL CALC-SCNC: 9 MMOL/L — SIGNIFICANT CHANGE UP (ref 5–17)
BUN SERPL-MCNC: 8 MG/DL — SIGNIFICANT CHANGE UP (ref 7–23)
CALCIUM SERPL-MCNC: 9 MG/DL — SIGNIFICANT CHANGE UP (ref 8.4–10.5)
CHLORIDE SERPL-SCNC: 106 MMOL/L — SIGNIFICANT CHANGE UP (ref 96–108)
CO2 SERPL-SCNC: 28 MMOL/L — SIGNIFICANT CHANGE UP (ref 22–31)
CREAT SERPL-MCNC: 0.93 MG/DL — SIGNIFICANT CHANGE UP (ref 0.5–1.3)
EGFR: 66 ML/MIN/1.73M2 — SIGNIFICANT CHANGE UP
GLUCOSE BLDC GLUCOMTR-MCNC: 101 MG/DL — HIGH (ref 70–99)
GLUCOSE BLDC GLUCOMTR-MCNC: 119 MG/DL — HIGH (ref 70–99)
GLUCOSE BLDC GLUCOMTR-MCNC: 92 MG/DL — SIGNIFICANT CHANGE UP (ref 70–99)
GLUCOSE BLDC GLUCOMTR-MCNC: 94 MG/DL — SIGNIFICANT CHANGE UP (ref 70–99)
GLUCOSE SERPL-MCNC: 130 MG/DL — HIGH (ref 70–99)
HCT VFR BLD CALC: 26.3 % — LOW (ref 34.5–45)
HGB BLD-MCNC: 7.8 G/DL — LOW (ref 11.5–15.5)
MAGNESIUM SERPL-MCNC: 1.8 MG/DL — SIGNIFICANT CHANGE UP (ref 1.6–2.6)
MCHC RBC-ENTMCNC: 26.7 PG — LOW (ref 27–34)
MCHC RBC-ENTMCNC: 29.7 GM/DL — LOW (ref 32–36)
MCV RBC AUTO: 90.1 FL — SIGNIFICANT CHANGE UP (ref 80–100)
NRBC # BLD: 0 /100 WBCS — SIGNIFICANT CHANGE UP (ref 0–0)
PLATELET # BLD AUTO: 284 K/UL — SIGNIFICANT CHANGE UP (ref 150–400)
POTASSIUM SERPL-MCNC: 3.8 MMOL/L — SIGNIFICANT CHANGE UP (ref 3.5–5.3)
POTASSIUM SERPL-SCNC: 3.8 MMOL/L — SIGNIFICANT CHANGE UP (ref 3.5–5.3)
RBC # BLD: 2.92 M/UL — LOW (ref 3.8–5.2)
RBC # FLD: 16.9 % — HIGH (ref 10.3–14.5)
SODIUM SERPL-SCNC: 143 MMOL/L — SIGNIFICANT CHANGE UP (ref 135–145)
T3FREE SERPL-MCNC: 3.41 PG/ML — SIGNIFICANT CHANGE UP (ref 2–4.4)
WBC # BLD: 7.48 K/UL — SIGNIFICANT CHANGE UP (ref 3.8–10.5)
WBC # FLD AUTO: 7.48 K/UL — SIGNIFICANT CHANGE UP (ref 3.8–10.5)

## 2022-11-20 PROCEDURE — 76536 US EXAM OF HEAD AND NECK: CPT | Mod: 26

## 2022-11-20 PROCEDURE — 99231 SBSQ HOSP IP/OBS SF/LOW 25: CPT

## 2022-11-20 RX ORDER — MAGNESIUM OXIDE 400 MG ORAL TABLET 241.3 MG
800 TABLET ORAL ONCE
Refills: 0 | Status: COMPLETED | OUTPATIENT
Start: 2022-11-20 | End: 2022-11-20

## 2022-11-20 RX ORDER — SODIUM CHLORIDE 9 MG/ML
250 INJECTION INTRAMUSCULAR; INTRAVENOUS; SUBCUTANEOUS ONCE
Refills: 0 | Status: COMPLETED | OUTPATIENT
Start: 2022-11-20 | End: 2022-11-20

## 2022-11-20 RX ORDER — POTASSIUM CHLORIDE 20 MEQ
20 PACKET (EA) ORAL ONCE
Refills: 0 | Status: COMPLETED | OUTPATIENT
Start: 2022-11-20 | End: 2022-11-20

## 2022-11-20 RX ADMIN — Medication 20 MILLIEQUIVALENT(S): at 15:30

## 2022-11-20 RX ADMIN — SODIUM CHLORIDE 250 MILLILITER(S): 9 INJECTION INTRAMUSCULAR; INTRAVENOUS; SUBCUTANEOUS at 11:36

## 2022-11-20 RX ADMIN — Medication 12.5 MILLIGRAM(S): at 08:45

## 2022-11-20 RX ADMIN — Medication 5 MILLIGRAM(S): at 21:53

## 2022-11-20 RX ADMIN — LOSARTAN POTASSIUM 50 MILLIGRAM(S): 100 TABLET, FILM COATED ORAL at 08:46

## 2022-11-20 RX ADMIN — POLYETHYLENE GLYCOL 3350 17 GRAM(S): 17 POWDER, FOR SOLUTION ORAL at 11:30

## 2022-11-20 RX ADMIN — MAGNESIUM OXIDE 400 MG ORAL TABLET 800 MILLIGRAM(S): 241.3 TABLET ORAL at 15:28

## 2022-11-20 RX ADMIN — Medication 325 MILLIGRAM(S): at 11:30

## 2022-11-20 RX ADMIN — SENNA PLUS 2 TABLET(S): 8.6 TABLET ORAL at 21:53

## 2022-11-20 NOTE — PROGRESS NOTE ADULT - PROBLEM SELECTOR PLAN 3
currently chest pain free, trop I @ [facility] neg x 2, EKG sinus tachycardia   -- No h/o ischemic workup or cardiologist   -- TTE 11/17/22: EF 60-65%, mild LVH, aortic sclerosis without significant stenosis, PASP 22mmHg, trivial pericardial effusion  -- NST 11/17/22: imaging normal, overall LVSF hyperdynamic without RWMA, EKG stress is normal, mildly increased tracer uptake incidentally noted in right lung field  -- c/w lopressor 12.5mg qd; holding uptitration of BB to determine cause of tachycardia Chest pain free -- No h/o ischemic workup or cardiologist   -TTE 11/17/22: EF 60-65%, mild LVH, aortic sclerosis without significant stenosis, PASP 22mmHg, trivial pericardial effusion  -NST 11/17/22: imaging normal, overall LVSF hyperdynamic without RWMA, EKG stress is normal, mildly increased tracer uptake incidentally noted in right lung field  -C/w lopressor 12.5mg qd; holding uptitration of BB to determine cause of tachycardia

## 2022-11-20 NOTE — PROGRESS NOTE ADULT - PROBLEM SELECTOR PLAN 1
consistently tachycardic with rates 100-110s  -- TSH low, T4 elevated, thyroid nodules noted, endocrine consulted  -- free thyroxine wnl, TPO and TSI pending  -- f/u endocrine recs  -- s/p NS 100cc/5hrs x 1 with slight improveement of HRs in 80s-low 100s  -- ordered for NS 100cc/5hrs, reassess HRs continues to be tachycardic with rates 100-110s  -- TSH low, T4 elevated, thyroid nodules noted, endocrine consulted  -- free thyroxine wnl, TPO and TSI pending  -- f/u endocrine recs  -- s/p NS 100cc/5hrs x 1 with slight improveement of HRs in 80s-low 100s  -- ordered for NS 100cc/5hrs, reassess HRs Continues to be tachycardic with rates 100-110s  -TSH low, T4 elevated, free thyroxine, TPO and TSI WNL, thyroid nodules noted  -Endocrine following: finding c/w subclinical hyperthyroidism - plan to likely start Methimazole   -S/p NS 500cc on 11/19, s/p 250cc NS on 11/20 Continues to be tachycardic with rates 100-110s  -TSH low, T4 elevated, free thyroxine, TPO and TSI WNL, thyroid nodules noted  -Endocrine following: finding c/w subclinical hyperthyroidism. Will need f/u with Endo in 2 weeks on d/c for repeat thyroid panel. NO Methimazole at this time given nml T4/T3  -S/p NS 500cc on 11/19, s/p 250cc NS on 11/20

## 2022-11-20 NOTE — PROGRESS NOTE ADULT - PROBLEM SELECTOR PLAN 6
s/p exploratory laparotomy with lysis of adhesions, incisional hernia repair, and small bowel resection on 9/15/2022 with Dr. So  -- Recently admitted to medicine 10/28/2022 for wound management of laparotomy wound   -- No surgical intervention at that time  -- CT A/P 10/27/2022: Since 10/8/2022, increased inflammatory changes and foci of extraluminal air surrounding left lower quadrant small bowel anastomosis. Although no extravasated oral contrast to suggest active leak, occult anastomotic leak/infection should be considered. Inflammatory changes extend to the urinary bladder and abdominal wall incision, follow-up clinically for evolving fistula.  -- surgery consulted and determined tachycardia not due to infectious causes   -- Continue to monitor surgical dressing S/p exploratory laparotomy with lysis of adhesions, incisional hernia repair, and small bowel resection on 9/15/2022 with Dr. So  -Recently admitted to medicine 10/28/2022 for wound management of laparotomy wound   -No surgical intervention at that time  -CT A/P 10/27/2022: Since 10/8/2022, increased inflammatory changes and foci of extraluminal air surrounding left lower quadrant small bowel anastomosis. Although no extravasated oral contrast to suggest active leak, occult anastomotic leak/infection should be considered. Inflammatory changes extend to the urinary bladder and abdominal wall incision, follow-up clinically for evolving fistula.  -- surgery consulted and determined tachycardia not due to infectious causes   -- Continue to monitor surgical dressing

## 2022-11-20 NOTE — PROGRESS NOTE ADULT - PROBLEM SELECTOR PLAN 5
SBP 140s, stable  -- Home meds: Losartan 50mg daily  -- c/w Metoprolol Tartrate 12.5mg PO daily, Losartan 50mg daily SBP 140s, stable  -C/w Metoprolol Tartrate 12.5mg PO daily, Losartan 50mg daily

## 2022-11-20 NOTE — PROGRESS NOTE ADULT - PROBLEM SELECTOR PLAN 7
A1c 5.2   -- Home med: Metformin 500mg daily (last dose couple of months ago)  -- monitor FS  -- continue MISS A1c 5.2   -Home med: Metformin 500mg daily (last dose couple of months ago)  -C/w MISS

## 2022-11-20 NOTE — PROGRESS NOTE ADULT - PROBLEM SELECTOR PLAN 2
upon interview patient states she has been diagnosed hyperthyroidism in the past, has palpable thyroid nodules; has previously had them biopsied and told they were noncancerous  -- no home med  -- TSH low, T4 elevated,  free thyroxine wnl, TPO and TSI pending  -- f/u endocrine recs in AM Upon interview patient states she has been diagnosed hyperthyroidism in the past, has palpable thyroid nodules; has previously had them biopsied and told they were noncancerous  -Endocrine recs as above

## 2022-11-20 NOTE — PROGRESS NOTE ADULT - SUBJECTIVE AND OBJECTIVE BOX
Interventional Cardiology PA Adult Progress Note    Subjective Assessment:  	  MEDICATIONS:  losartan 50 milliGRAM(s) Oral daily  metoprolol tartrate 12.5 milliGRAM(s) Oral daily          bisacodyl 5 milliGRAM(s) Oral at bedtime  polyethylene glycol 3350 17 Gram(s) Oral daily  senna 2 Tablet(s) Oral at bedtime    dextrose 50% Injectable 25 Gram(s) IV Push once  dextrose 50% Injectable 12.5 Gram(s) IV Push once  dextrose 50% Injectable 25 Gram(s) IV Push once  dextrose Oral Gel 15 Gram(s) Oral once PRN  glucagon  Injectable 1 milliGRAM(s) IntraMuscular once  insulin lispro (ADMELOG) corrective regimen sliding scale   SubCutaneous Before meals and at bedtime    dextrose 5%. 1000 milliLiter(s) IV Continuous <Continuous>  dextrose 5%. 1000 milliLiter(s) IV Continuous <Continuous>  ferrous    sulfate 325 milliGRAM(s) Oral daily  lidocaine   4% Patch 1 Patch Transdermal daily PRN  sodium chloride 0.9%. 500 milliLiter(s) IV Continuous <Continuous>      	    [PHYSICAL EXAM:  TELEMETRY:  T(C): 36.4 (11-20-22 @ 10:46), Max: 36.9 (11-19-22 @ 22:50)  HR: 103 (11-20-22 @ 09:51) (93 - 106)  BP: 166/74 (11-20-22 @ 09:51) (119/79 - 166/74)  RR: 16 (11-20-22 @ 08:28) (16 - 18)  SpO2: 99% (11-20-22 @ 08:28) (96% - 100%)  Wt(kg): --  I&O's Summary    19 Nov 2022 07:01  -  20 Nov 2022 07:00  --------------------------------------------------------  IN: 1140 mL / OUT: 1 mL / NET: 1139 mL    20 Nov 2022 07:01  -  20 Nov 2022 11:05  --------------------------------------------------------  IN: 240 mL / OUT: 0 mL / NET: 240 mL        Rios:  Central/PICC/Mid Line:                                         Appearance: Normal	  HEENT:   Normal oral mucosa, PERRL, EOMI	  Neck: Supple, + JVD/ - JVD; Carotid Bruit   Cardiovascular: Normal S1 S2, No JVD, No murmurs,   Respiratory: Lungs clear to auscultation/Decreased Breath Sounds/No Rales, Rhonchi, Wheezing	  Gastrointestinal:  Soft, Non-tender, + BS	  Skin: No rashes, No ecchymoses, No cyanosis  Extremities: Normal range of motion, No clubbing, cyanosis or edema  Vascular: Peripheral pulses palpable 2+ bilaterally  Neurologic: Non-focal  Psychiatry: A & O x 3, Mood & affect appropriate      	    ECG:  	  RADIOLOGY:   DIAGNOSTIC TESTING:  [ ] Echocardiogram:  [ ]  Catheterization:  [ ] Stress Test:    [ ] KIKI  OTHER: 	    LABS:	 	  CARDIAC MARKERS:                                  7.5    5.58  )-----------( 257      ( 19 Nov 2022 05:59 )             25.3     11-19    140  |  107  |  9   ----------------------------<  91  4.0   |  25  |  0.83    Ca    9.1      19 Nov 2022 05:59  Mg     2.0     11-19      proBNP:   Lipid Profile:   HgA1c:   TSH:       ASSESSMENT/PLAN: 	        DVT ppx:  Dispo:     Interventional Cardiology PA Adult Progress Note    Subjective Assessment: Patient seen and examined at the bedside. C/o mild dizziness this morning but has improved. All other ROS negative except those listed in subjective assessment.   	  MEDICATIONS:  losartan 50 milliGRAM(s) Oral daily  metoprolol tartrate 12.5 milliGRAM(s) Oral daily  bisacodyl 5 milliGRAM(s) Oral at bedtime  polyethylene glycol 3350 17 Gram(s) Oral daily  senna 2 Tablet(s) Oral at bedtime  dextrose 50% Injectable 25 Gram(s) IV Push once  dextrose 50% Injectable 12.5 Gram(s) IV Push once  dextrose 50% Injectable 25 Gram(s) IV Push once  dextrose Oral Gel 15 Gram(s) Oral once PRN  glucagon  Injectable 1 milliGRAM(s) IntraMuscular once  insulin lispro (ADMELOG) corrective regimen sliding scale   SubCutaneous Before meals and at bedtime  dextrose 5%. 1000 milliLiter(s) IV Continuous <Continuous>  dextrose 5%. 1000 milliLiter(s) IV Continuous <Continuous>  ferrous    sulfate 325 milliGRAM(s) Oral daily  lidocaine   4% Patch 1 Patch Transdermal daily PRN  sodium chloride 0.9%. 500 milliLiter(s) IV Continuous <Continuous>	    [PHYSICAL EXAM:  TELEMETRY:  T(C): 36.4 (11-20-22 @ 10:46), Max: 36.9 (11-19-22 @ 22:50)  HR: 103 (11-20-22 @ 09:51) (93 - 106)  BP: 166/74 (11-20-22 @ 09:51) (119/79 - 166/74)  RR: 16 (11-20-22 @ 08:28) (16 - 18)  SpO2: 99% (11-20-22 @ 08:28) (96% - 100%)  I&O's Summary    19 Nov 2022 07:01  -  20 Nov 2022 07:00  --------------------------------------------------------  IN: 1140 mL / OUT: 1 mL / NET: 1139 mL    20 Nov 2022 07:01  -  20 Nov 2022 11:05  --------------------------------------------------------  IN: 240 mL / OUT: 0 mL / NET: 240 mL                                        Appearance: Normal	  Neck: Supple, - JVD; no Carotid Bruit b/l  Cardiovascular: Normal S1 S2, tachycardic, No murmurs, rubs, gallops   Respiratory: Lungs clear to auscultation/No Rales, Rhonchi, Wheezing	  Gastrointestinal:  Soft, Non-tender, ND, + BS x4	  Extremities: Normal range of motion, No clubbing, cyanosis or edema b/l LE  Vascular: Peripheral pulses palpable 2+ bilaterally carotids radial, DP/PT  Neurologic: A & O x 3, Mood & affect appropriate      	    ECG:  	  RADIOLOGY:   DIAGNOSTIC TESTING:  [ x] Echocardiogram: < from: TTE Echo Complete w/o Contrast w/ Doppler (11.17.22 @ 16:02) >  -----  CONCLUSIONS:     1. Normal left ventricular size and systolic function.   2. Mild symmetric left ventricular hypertrophy.   3. Normal right ventricular size and systolic function.   4. Aortic sclerosis without significant stenosis.   5. No evidence of pulmonary hypertension, pulmonary artery systolic   pressure is 22 mmHg.   6. Trivial pericardial effusion.   7. No prior echo is available for comparison.    < end of copied text >    [ ]  Catheterization:  [ ] Stress Test:    [ ] KIKI  OTHER: 	    LABS:	 	  CARDIAC MARKERS:                        7.5    5.58  )-----------( 257      ( 19 Nov 2022 05:59 )             25.3     11-19    140  |  107  |  9   ----------------------------<  91  4.0   |  25  |  0.83    Ca    9.1      19 Nov 2022 05:59  Mg     2.0     11-19

## 2022-11-20 NOTE — PROGRESS NOTE ADULT - NSPROGADDITIONALINFOA_GEN_ALL_CORE
Attending Attestation:  I was physically present for the key portions of the evaluation and management (E/M) service provided.  I agree with the above history, physical, and plan which I have reviewed with the following edits/addendum:    72F w HTN, DM2, recent ex-lap adhesiolysis, bowel resection w a prolonged hosp'n 9/11-10/21/22 readmitted for bacteremia 10/28-11/1/22 dcd to [facility]. 11/16 ED p/w intermittent chest tightness, SOB with activity. CT ruled out PE. ECG sinus tach, non-ischemic. Trop 0.02. TSH low <0.118. On exam, euvolemic, 2-3cm nodule on thyroid palpation.  #sinus tach: hydrate, f/u endo recs on thyroid w/u. 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 address underlying cause    Ashu Francis MD  Cardiology    15 minutes spent on total encounter; more than 50% of the visit was spent counseling and/or coordinating care by the attending physician. Attending Attestation:  I was physically present for the key portions of the evaluation and management (E/M) service provided.  I agree with the above history, physical, and plan which I have reviewed with the following edits/addendum:    72F w HTN, DM2, recent ex-lap adhesiolysis, bowel resection w a prolonged hosp'n 9/11-10/21/22 readmitted for bacteremia 10/28-11/1/22 dcd to [facility]. 11/16 ED p/w intermittent chest tightness, SOB with activity. CT ruled out PE. ECG sinus tach, non-ischemic. Trop 0.02. TSH low <0.118. On exam, euvolemic, 2-3cm nodule on thyroid palpation.  #sinus tach: hydrate, f/u endo recs on possible methimazole rx. 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

## 2022-11-20 NOTE — PROGRESS NOTE ADULT - PROBLEM SELECTOR PLAN 8
p/w H/H 7.6/25.2 (baseline 7-8), denies any bleeding   -- Iron studies 9/2022: Ferritin 442, Iron total 29, unsaturated iron binding 101, iron-total binding 130, % sat iron 22  --iron normal, B12 normal, decreased TIBC 137, increased ferritin 714  -- Home meds: Ferrous Sulfate 325mg TID  -- CONT Ferrous sulfate 325mg    F: none  E: Replete if K+ <4 and Mg <2   N: DASH  DVT ppx: none   Dispo: [facility]; auth started Friday 11/19    Case discussed with Dr. Francis P/w H/H 7.6/25.2 (baseline 7-8), denies any bleeding   -Iron studies 9/2022: Ferritin 442, Iron total 29, unsaturated iron binding 101, iron-total binding 130, % sat iron 22  -Iron normal, B12 normal, decreased TIBC 137, increased ferritin 714  -C/w home Ferrous sulfate 325mg    DVT: ambulatory   Dispo: [facility]; auth started Friday 11/19  Case discussed with Dr. Francis

## 2022-11-20 NOTE — PROGRESS NOTE ADULT - ASSESSMENT
71 y/o female with PMHx hyperthyroidism, HTN, DM and PSH of  x2, hysterectomy and lap dev () recently discharged (-10/21) from surgery service for ex-lap w/ lysis of adhesions and small bowel resection rehospitalized on 10/28/2022 for 3 days of N/V/D and found to have wound dehiscence w/ bacteremia then discharged to [facility]. Presented to [facility]  for fatigue, intermittent CP, found to have elevated D dimer, transferred to [facility] for r/o ACS. CTPE 22 negative for PE. NST  normal, echo  normal. Found to be anemic with Hgb in 7s (baseline 7-8). Found to have low TSH with nodules on thyroid, endocrine consulted and following for possible hyperthyroid induced tachycardia. s/p gentle hydration 500cc x 1 with slight improvement of HRs 71 y/o F with PMH of hyperthyroidism, HTN, DM, recent discharge (9/11-10/21) from surgery service for ex-lap w/ lysis of adhesions and small bowel resection rehospitalized on 10/28/2022 for 3 days of N/V/D and found to have wound dehiscence w/ bacteremia then discharged to [facility] who presented to [facility] 11/16 for fatigue, intermittent CP. Ruled out ACS, s/p normal stress test/Echo. Endocrine consulted for presumed subclinical hyperthyroidism, pending possible initiation of Methimazole. PT rec [facility], pending auth

## 2022-11-21 LAB
T3 SERPL-MCNC: 137 NG/DL — SIGNIFICANT CHANGE UP (ref 80–200)
TSI ACT/NOR SER: <0.1 IU/L — SIGNIFICANT CHANGE UP (ref 0–0.55)

## 2022-11-21 PROCEDURE — 99232 SBSQ HOSP IP/OBS MODERATE 35: CPT

## 2022-11-21 PROCEDURE — 99232 SBSQ HOSP IP/OBS MODERATE 35: CPT | Mod: GC

## 2022-11-21 PROCEDURE — 94010 BREATHING CAPACITY TEST: CPT | Mod: 26

## 2022-11-21 RX ORDER — SIMETHICONE 80 MG/1
80 TABLET, CHEWABLE ORAL ONCE
Refills: 0 | Status: COMPLETED | OUTPATIENT
Start: 2022-11-21 | End: 2022-11-21

## 2022-11-21 RX ADMIN — Medication 30 MILLILITER(S): at 15:47

## 2022-11-21 RX ADMIN — LOSARTAN POTASSIUM 50 MILLIGRAM(S): 100 TABLET, FILM COATED ORAL at 10:25

## 2022-11-21 RX ADMIN — SENNA PLUS 2 TABLET(S): 8.6 TABLET ORAL at 21:50

## 2022-11-21 RX ADMIN — Medication 325 MILLIGRAM(S): at 10:25

## 2022-11-21 RX ADMIN — SIMETHICONE 80 MILLIGRAM(S): 80 TABLET, CHEWABLE ORAL at 15:46

## 2022-11-21 RX ADMIN — Medication 5 MILLIGRAM(S): at 21:50

## 2022-11-21 RX ADMIN — LIDOCAINE 1 PATCH: 4 CREAM TOPICAL at 19:13

## 2022-11-21 RX ADMIN — LIDOCAINE 1 PATCH: 4 CREAM TOPICAL at 14:12

## 2022-11-21 NOTE — PROGRESS NOTE ADULT - SUBJECTIVE AND OBJECTIVE BOX
Interventional Cardiology PA Adult Progress Note    Subjective Assessment: Patient seen and examined at the bedside. No complaints at this time, aware of plan for d/c to [facility] once auth obtained. All other ROS negative except those listed in subjective assessment.   	  MEDICATIONS:  losartan 50 milliGRAM(s) Oral daily  propranolol 10 milliGRAM(s) Oral two times a day  bisacodyl 5 milliGRAM(s) Oral at bedtime  polyethylene glycol 3350 17 Gram(s) Oral daily  senna 2 Tablet(s) Oral at bedtime  dextrose 50% Injectable 25 Gram(s) IV Push once  dextrose 50% Injectable 12.5 Gram(s) IV Push once  dextrose 50% Injectable 25 Gram(s) IV Push once  dextrose Oral Gel 15 Gram(s) Oral once PRN  glucagon  Injectable 1 milliGRAM(s) IntraMuscular once  insulin lispro (ADMELOG) corrective regimen sliding scale   SubCutaneous Before meals and at bedtime  dextrose 5%. 1000 milliLiter(s) IV Continuous <Continuous>  dextrose 5%. 1000 milliLiter(s) IV Continuous <Continuous>  ferrous    sulfate 325 milliGRAM(s) Oral daily  lidocaine   4% Patch 1 Patch Transdermal daily PRN  sodium chloride 0.9%. 500 milliLiter(s) IV Continuous <Continuous>	    [PHYSICAL EXAM:  TELEMETRY:  T(C): 36.7 (11-21-22 @ 08:43), Max: 36.9 (11-20-22 @ 22:10)  HR: 99 (11-21-22 @ 11:46) (90 - 103)  BP: 135/79 (11-21-22 @ 11:46) (131/71 - 179/77)  RR: 16 (11-21-22 @ 11:46) (16 - 17)  SpO2: 100% (11-21-22 @ 11:46) (95% - 100%)  I&O's Summary    20 Nov 2022 07:01  -  21 Nov 2022 07:00  --------------------------------------------------------  IN: 1150 mL / OUT: 200 mL / NET: 950 mL                                   Appearance: Normal		  Neck: Supple, - JVD; Carotid Bruit   Cardiovascular: tachycardic, No murmurs, rubs, gallops  Respiratory: Lungs clear to auscultation/No Rales, Rhonchi, Wheezing	  Gastrointestinal:  Soft, Non-tender, ND, + BSx 4	  Extremities: Normal range of motion, No clubbing, cyanosis or edema b/l LE  Vascular: Peripheral pulses palpable 2+ bilaterally carotids, radial, DP/PT  Neurologic:  A & O x 3, Mood & affect appropriate      	    ECG:  	  RADIOLOGY:   DIAGNOSTIC TESTING:  [ x] Echocardiogram: < from: TTE Echo Complete w/o Contrast w/ Doppler (11.17.22 @ 16:02) >  -----  CONCLUSIONS:     1. Normal left ventricular size and systolic function.   2. Mild symmetric left ventricular hypertrophy.   3. Normal right ventricular size and systolic function.   4. Aortic sclerosis without significant stenosis.   5. No evidence of pulmonary hypertension, pulmonary artery systolic   pressure is 22 mmHg.   6. Trivial pericardial effusion.   7. No prior echo is available for comparison.      < end of copied text >    [ ]  Catheterization:  [ ] Stress Test:    [ ] KIIK  OTHER: 	    LABS:	 	  CARDIAC MARKERS:                        7.8    7.48  )-----------( 284      ( 20 Nov 2022 11:09 )             26.3     11-20    143  |  106  |  8   ----------------------------<  130<H>  3.8   |  28  |  0.93    Ca    9.0      20 Nov 2022 11:09  Mg     1.8     11-20

## 2022-11-21 NOTE — PROGRESS NOTE ADULT - PROBLEM SELECTOR PLAN 4
Chest pain free -- No h/o ischemic workup or cardiologist   -TTE 11/17/22: EF 60-65%, mild LVH, aortic sclerosis without significant stenosis, PASP 22mmHg, trivial pericardial effusion  -NST 11/17/22: imaging normal, overall LVSF hyperdynamic without RWMA, EKG stress is normal, mildly increased tracer uptake incidentally noted in right lung field  -Changed Lopressor to Propranolol 10mg BID given thyroid disease (transition to 20mg QD on d/c)

## 2022-11-21 NOTE — PROGRESS NOTE ADULT - SUBJECTIVE AND OBJECTIVE BOX
OVERNIGHT: No acute events overnight.   SUBJECTIVE: Patient was seen and examined this morning. He didn't have any new concerns or complaints.     CAPILLARY BLOOD GLUCOSE & INSULIN RECEIVED  Yesterday  - Dinner FSG: *** mg/dL = *** units of premeal Lispro + *** units of Lispro sliding scale.   - Bedtime FSG: *** mg/dL = *** units of Lantus + *** units Lispro sliding scale.     Today  - Breakfast FSG: *** mg/dL = *** units of premeal Lispro + *** units of Lispro sliding scale.   - Lunch FSG: *** mg/dL = *** units of premeal Lispro + *** units of Lispro sliding scale.     92 mg/dL (11-20 @ 21:46)  101 mg/dL (11-20 @ 17:31)    REVIEW OF SYSTEMS  Constitutional:  Negative fever, chills or loss of appetite.  Eyes:  Negative blurry vision or double vision.  Cardiovascular:  Negative for chest pain or palpitations.  Respiratory:  Negative for cough, wheezing, or shortness of breath.    Gastrointestinal:  Negative for nausea, vomiting, diarrhea, constipation, or abdominal pain.  Genitourinary:  Negative frequency, urgency or dysuria.  Neurologic:  No headache, confusion, dizziness, lightheadedness.    PHYSICAL EXAM  Vital Signs Last 24 Hrs  T(C): 36.7 (21 Nov 2022 08:43), Max: 36.9 (20 Nov 2022 22:10)  T(F): 98.1 (21 Nov 2022 08:43), Max: 98.4 (20 Nov 2022 22:10)  HR: 103 (21 Nov 2022 08:44) (90 - 103)  BP: 133/69 (21 Nov 2022 08:44) (131/71 - 179/77)  BP(mean): --  RR: 16 (21 Nov 2022 08:44) (16 - 17)  SpO2: 95% (21 Nov 2022 08:44) (95% - 99%)    Parameters below as of 21 Nov 2022 08:44  Patient On (Oxygen Delivery Method): room air        Constitutional: Awake, alert, in no acute distress.   HEENT: Normocephalic, atraumatic, KATHIA, no proptosis or lid retraction.   Neck: supple, no acanthosis, no thyromegaly or palpable thyroid nodules.  Respiratory: Lungs clear to ausculation bilaterally.   Cardiovascular: regular rhythm, normal S1 and S2, no audible murmurs.   GI: soft, non-tender, non-distended, bowel sounds present, no masses appreciated.  Extremities: No lower extremity edema, peripheral pulses present.   Skin: no rashes.   Psychiatric: AAO x 3. Normal affect/mood.     LABS  CBC - WBC/HGB/HTC/PLT: 7.48/7.8/26.3/284 (11-20-22)  BMP - Na/K/Cl/Bicarb/BUN/Cr/Gluc/AG/eGFR: 143/3.8/106/28/8/0.93/130/9/66 (11-20-22)  Ca - 9.0 (11-20-22)  Phos - -- (11-20-22)  Mg - 1.8 (11-20-22)  LFT - Alb/Tprot/Tbili/Dbili/AlkPhos/ALT/AST: 3.1/--/0.6/--/73/12/22 (11-17-22)  PT/aPTT/INR: 13.1/30.6/1.12 (11-16-22)   Thyroid Stimulating Hormone, Serum: <0.118 (11-17-22)  Total T4/Free T4: --/1.340 (11-19-22)    MEDICATIONS  MEDICATIONS  (STANDING):  bisacodyl 5 milliGRAM(s) Oral at bedtime  dextrose 5%. 1000 milliLiter(s) (50 mL/Hr) IV Continuous <Continuous>  dextrose 5%. 1000 milliLiter(s) (100 mL/Hr) IV Continuous <Continuous>  dextrose 50% Injectable 25 Gram(s) IV Push once  dextrose 50% Injectable 12.5 Gram(s) IV Push once  dextrose 50% Injectable 25 Gram(s) IV Push once  ferrous    sulfate 325 milliGRAM(s) Oral daily  glucagon  Injectable 1 milliGRAM(s) IntraMuscular once  insulin lispro (ADMELOG) corrective regimen sliding scale   SubCutaneous Before meals and at bedtime  losartan 50 milliGRAM(s) Oral daily  polyethylene glycol 3350 17 Gram(s) Oral daily  propranolol 10 milliGRAM(s) Oral two times a day  senna 2 Tablet(s) Oral at bedtime  sodium chloride 0.9%. 500 milliLiter(s) (100 mL/Hr) IV Continuous <Continuous>    MEDICATIONS  (PRN):  dextrose Oral Gel 15 Gram(s) Oral once PRN Blood Glucose LESS THAN 70 milliGRAM(s)/deciliter  lidocaine   4% Patch 1 Patch Transdermal daily PRN pain    ASSESSMENT / RECOMMENDATIONS    A1C: 5.2 %  BUN: 8  Creatinine: 0.93  GFR: 66  Weight:70.6  BMI: 25.1  Ejection Fraction: 60%    # Type 2 diabetes mellitus  - Please continue lantus *** units at bedtime.   - Continue lispro *** units before each meal.  - Continue lispro moderate / low dose sliding scale four times daily with meals and at bedtime.  - Patient's fingerstick glucose goal is 100-180 mg/dL.    - For discharge, patient can ***.    - Patient can follow up at discharge with [facility] by calling [phone] to make an appointment.      Case discussed with Dr. Mulligan. Primary team updated.       Camron Tenorio    Endocrinology Fellow    Service Pager: [phone]    OVERNIGHT: No acute events overnight.   SUBJECTIVE: Patient was seen and examined this morning. Patient continues to complain of voice change which has been going on for many years; however, denied having any dysphagia or odynophagia. Patient's thyroid function tests were consistent with subclinical hyperthyroidism. In addition, she had a thyroid ultrasound which confirmed the presence of multiple thyroid nodules, 6 of them which meet criteria for fine needle aspiration.     REVIEW OF SYSTEMS  Constitutional:  Negative fever, chills or loss of appetite.  Eyes:  Negative blurry vision or double vision.  Cardiovascular:  Negative for chest pain or palpitations.  Respiratory: (+) Shortness of breath, hoarseness. Negative for cough, wheezing.  Gastrointestinal:  Negative for nausea, vomiting, diarrhea, constipation, or abdominal pain.  Neurologic:  No headache, confusion, dizziness, lightheadedness.    PHYSICAL EXAM  Vital Signs Last 24 Hrs  T(C): 36.7 (21 Nov 2022 08:43), Max: 36.9 (20 Nov 2022 22:10)  T(F): 98.1 (21 Nov 2022 08:43), Max: 98.4 (20 Nov 2022 22:10)  HR: 103 (21 Nov 2022 08:44) (90 - 103)  BP: 133/69 (21 Nov 2022 08:44) (131/71 - 179/77)  BP(mean): --  RR: 16 (21 Nov 2022 08:44) (16 - 17)  SpO2: 95% (21 Nov 2022 08:44) (95% - 99%)    Parameters below as of 21 Nov 2022 08:44  Patient On (Oxygen Delivery Method): room air    Constitutional: Awake, alert, in no acute distress.   HEENT: Normocephalic, atraumatic, KATHIA, no proptosis or lid retraction.   Neck: supple, asymmetric goiter (enlargement most predominant over left lobe and isthmus)   Respiratory: Lungs clear to ausculation bilaterally.   Cardiovascular: regular rhythm, normal S1 and S2, no audible murmurs.   GI: soft, non-tender, non-distended, bowel sounds present.  Extremities: Trace lower extremity edema.  Psychiatric: AAO x 3. Normal affect/mood.     LABS  CBC - WBC/HGB/HTC/PLT: 7.48/7.8/26.3/284 (11-20-22)  BMP - Na/K/Cl/Bicarb/BUN/Cr/Gluc/AG/eGFR: 143/3.8/106/28/8/0.93/130/9/66 (11-20-22)  Ca - 9.0 (11-20-22)  Phos - -- (11-20-22)  Mg - 1.8 (11-20-22)  LFT - Alb/Tprot/Tbili/Dbili/AlkPhos/ALT/AST: 3.1/--/0.6/--/73/12/22 (11-17-22)  PT/aPTT/INR: 13.1/30.6/1.12 (11-16-22)   Thyroid Stimulating Hormone, Serum: <0.118 (11-17-22)  Total T4/Free T4: --/1.340 (11-19-22)    MEDICATIONS  MEDICATIONS  (STANDING):  bisacodyl 5 milliGRAM(s) Oral at bedtime  dextrose 5%. 1000 milliLiter(s) (50 mL/Hr) IV Continuous <Continuous>  dextrose 5%. 1000 milliLiter(s) (100 mL/Hr) IV Continuous <Continuous>  dextrose 50% Injectable 25 Gram(s) IV Push once  dextrose 50% Injectable 12.5 Gram(s) IV Push once  dextrose 50% Injectable 25 Gram(s) IV Push once  ferrous    sulfate 325 milliGRAM(s) Oral daily  glucagon  Injectable 1 milliGRAM(s) IntraMuscular once  insulin lispro (ADMELOG) corrective regimen sliding scale   SubCutaneous Before meals and at bedtime  losartan 50 milliGRAM(s) Oral daily  polyethylene glycol 3350 17 Gram(s) Oral daily  propranolol 10 milliGRAM(s) Oral two times a day  senna 2 Tablet(s) Oral at bedtime  sodium chloride 0.9%. 500 milliLiter(s) (100 mL/Hr) IV Continuous <Continuous>    MEDICATIONS  (PRN):  dextrose Oral Gel 15 Gram(s) Oral once PRN Blood Glucose LESS THAN 70 milliGRAM(s)/deciliter  lidocaine   4% Patch 1 Patch Transdermal daily PRN pain    ASSESSMENT / RECOMMENDATIONS  Ms. Kam is a 72-year-old female with past medical history of type 2 diabetes mellitus, hypertension, bowel obstruction (failed conservative management s/p ex-lap w/ lysis of adhesions and small bowel resection w/extended hospital stay 9/11-10/21, course c/b C. diff colitis and Rhabo from Daptomycin treatment for wound dehiscence, rehospitalized on 10/28/2022 for 3 days of N/V and diarrhea discharged to [facility]) who was sent from [facility] ER due to complaints of worsening dyspnea on exertion for the past month. Echocardiogram and Stress test were negative. CTA chest revealed enlarged multinodular goiter extending into superior mediastinum with mild narrowing on esophagus and trachea from mass effect. Thyroid function tests were consistent with subclinical hyperthyroidism (TSH < 0.118, Free T4 1.4, Free T3 3.41). Thyroid ultrasound showing multiple nodules that meet criteria for biopsy.     A1C: 5.2 %  BUN: 8  Creatinine: 0.93  GFR: 66  Weight:70.6  BMI: 25.1  Ejection Fraction: 60%    # Subclinical hyperthyroidism secondary to toxic multinodular goiter  - Patient reports hoarseness of voice, with dyspnea on exertion which could possibly be secondary from the enlarged nodules.   - Please consider pulmonary function tests to evaluate tracheal compression seen in recent imaging. She will need ENT follow-up.   - Start methimazole 2.5 mg daily.   - Patient can follow up at discharge with [facility] by calling [phone] to make an appointment. She should be seen in one month from now for further work-up of nodules and management of hyperthyroidism.     Case discussed with Dr. Mulligan. Primary team updated.       Camron Tenorio    Endocrinology Fellow    Service Pager: [phone]

## 2022-11-21 NOTE — PROGRESS NOTE ADULT - PROBLEM SELECTOR PLAN 8
P/w H/H 7.6/25.2 (baseline 7-8), denies any bleeding   -Iron studies 9/2022: Ferritin 442, Iron total 29, unsaturated iron binding 101, iron-total binding 130, % sat iron 22  -Iron normal, B12 normal, decreased TIBC 137, increased ferritin 714  -C/w home Ferrous sulfate 325mg    DVT: ambulatory   Dispo: PT [facility]; auth started Friday 11/19  Case discussed with Dr. Radford P/w H/H 7.6/25.2 (baseline 7-8), denies any bleeding   -Iron studies 9/2022: Ferritin 442, Iron total 29, unsaturated iron binding 101, iron-total binding 130, % sat iron 22  -Iron normal, B12 normal, decreased TIBC 137, increased ferritin 714  -C/w home Ferrous sulfate 325mg    DVT: ambulatory   Dispo: 11/22 AM to [facility]  Case discussed with Dr. Radford

## 2022-11-21 NOTE — PROGRESS NOTE ADULT - ATTENDING COMMENTS
Pt seen on rounds this afternoon.  Additional data since our initial visit includes a free T3 level of 3.41 pg/ml and a thyroid ultrasound which shows multiple nodules, mostly the left lobe and isthmus.  Nearly all of the nodules are quite large and warrant biopsy, though there is one sub-centimeter lesion which also warrants biopsy because of its shape.  On exam the most easily palpable nodule is over the isthmus, and is quite firm.  --Given the suppressed TSH level, a free T4 level which was initially high normal, and a T3 level which is in the higher part of the normal range, she is likely sub-clinically hyperthyroid.  Given her age, she is at risk for atrial fib even with sub-clinical disease, and will therefore start a small dose of methimazole (2.5 mg/day)  --Emphasized to her the need for outpatient follow-up:  Needs thyroid scan to identify which nodules are functioning and possibly do not warrant biopsy  Needs ENT exam to check for vocal cord compromise  Needs PFTs to rule out upper airway obstruction as a cause for her increasing HUNG.

## 2022-11-21 NOTE — PROGRESS NOTE ADULT - ASSESSMENT
73 y/o F with PMH of hyperthyroidism, HTN, DM, recent discharge (9/11-10/21) from surgery service for ex-lap w/ lysis of adhesions and small bowel resection rehospitalized on 10/28/2022 for 3 days of N/V/D and found to have wound dehiscence w/ bacteremia then discharged to [facility] who presented to [facility] 11/16 for fatigue, intermittent CP. Ruled out ACS, s/p normal stress test/Echo. Endocrine consulted for presumed subclinical hyperthyroidism, plan for f/u as an outpatient for repeat labs. PT rec [facility], pending auth.     71 y/o F with PMH of hyperthyroidism, HTN, DM, recent discharge (9/11-10/21) from surgery service for ex-lap w/ lysis of adhesions and small bowel resection rehospitalized on 10/28/2022 for 3 days of N/V/D and found to have wound dehiscence w/ bacteremia then discharged to [facility] who [facility] for fatigue, intermittent CP. Ruled out ACS, s/p normal stress test/Echo. Endocrine consulted for presumed subclinical hyperthyroidism, started Methimazole with plan for f/u as an outpatient in 1 month. Has bed at [facility], plan for d/c 11/22 AM.

## 2022-11-21 NOTE — PROGRESS NOTE ADULT - PROBLEM SELECTOR PLAN 3
S/p exploratory laparotomy with lysis of adhesions, incisional hernia repair, and small bowel resection on 9/15/2022 with Dr. So  -Recently admitted to medicine 10/28/2022 for wound management of laparotomy wound   -No surgical intervention at that time  -CT A/P 10/27/2022: Since 10/8/2022, increased inflammatory changes and foci of extraluminal air surrounding left lower quadrant small bowel anastomosis. Although no extravasated oral contrast to suggest active leak, occult anastomotic leak/infection should be considered. Inflammatory changes extend to the urinary bladder and abdominal wall incision, follow-up clinically for evolving fistula.  -Surgery consulted and determined tachycardia not due to infectious causes. RECONSULTED on 11/21 for continued abdominal discomfort. Again, NTD, signed off.  -C/w Maalox PRN, Simethicone PRN  -Continue to monitor surgical dressing

## 2022-11-21 NOTE — PROGRESS NOTE ADULT - PROBLEM SELECTOR PLAN 2
Upon interview patient states she has been diagnosed hyperthyroidism in the past, has palpable thyroid nodules; has previously had them biopsied and told they were noncancerous  -Endocrine recs as above

## 2022-11-21 NOTE — PROGRESS NOTE ADULT - NS ATTEND AMEND GEN_ALL_CORE FT
See PA note written above, for details. I reviewed the PA documentation.  I have personally seen and examined this patient today. I reviewed vitals, labs, medications, cardiac studies and additional imaging.  I agree with the PA's findings and plans as written above with the following additions/amendments:  72F w HTN, DM2, Anemia of Chronic Disease, recent ex-lap adhesiolysis, bowel resection w a prolonged hosp'n 9/11-10/21/22 readmitted for bacteremia 10/28-11/1/22 dcd to [facility]. 11/16 ED p/w intermittent chest tightness, SOB with activity. CT ruled out PE. ECG sinus tach, non-ischemic. Trop 0.02. TSH low <0.118.  Chest pain work up negative: no inducible ischemia on NST, unremarkable TTE  Physical Exam notable for: elderly patient laying in bed in NAD, flat neck veins, regular rhythm, borderline tachycardic, no MGR detected, clear lungs, overly nourished abdomen, no fluid wave detected, no pretibial pitting edema, no ankle edema, skin WWP, A&Ox3  Patient hydrated for sinus tachycardia  Endocrine following for hyperthyroidism  -->awaiting methimzaole recommendations from endocrine  Initiate Propranolol 10mg po BID, if tolerated will dc on Propranolol 20mg po daily  Losartan 50mg po daily for Essential HTN  Surgery evaluated and ruled out surgical related complications, no infection  Awaiting transfer to [facility] anticipated 11/22 AM  Florencia Seals M.D.  Cardiology Attending

## 2022-11-21 NOTE — PROGRESS NOTE ADULT - PROBLEM SELECTOR PLAN 1
Continues to be tachycardic with rates 100-110s  -TSH low, T4 elevated, free thyroxine, TPO and TSI WNL, thyroid nodules noted  -Endocrine following: finding c/w subclinical hyperthyroidism. Will need f/u with Endo in 2 weeks on d/c for repeat thyroid panel. NO Methimazole at this time given nml T4/T3  -S/p NS 500cc on 11/19, s/p 250cc NS on 11/20 Continues to be tachycardic with rates 100-110s  -TSH low, T4 elevated, free thyroxine, TPO and TSI WNL, thyroid nodules noted  -Endocrine following: finding c/w subclinical hyperthyroidism. Started on Methimazole 2.5mg QD on 11/21 and pt instructed to f/u with Endocrine Clinic on [address] within 1 month (Dr. Ian Reyes)  -S/p NS 500cc on 11/19, s/p 250cc NS on 11/20

## 2022-11-21 NOTE — PROGRESS NOTE ADULT - PROBLEM SELECTOR PLAN 5
P/w SOB x 3 weeks, euvolemic on exam, satting % RA, currently asymptomatic  -CTA Chest 11/16/2022: Limited evaluation of the subsegmental pulmonary arteries at lung bases secondary to respiratory motion. Within this limitation, no pulmonary embolism is identified. Since September 24, 2022, unchanged left upper lobe groundglass nodule 5 mm. Unchanged probable mucoid impacted distal airway in right lung apex. Unchanged enlarged multinodular goiter. No pleural effusion. No pericardial effusion.  -NTD

## 2022-11-22 ENCOUNTER — TRANSCRIPTION ENCOUNTER (OUTPATIENT)
Age: 72
End: 2022-11-22

## 2022-11-22 ENCOUNTER — APPOINTMENT (OUTPATIENT)
Dept: INTERNAL MEDICINE | Facility: CLINIC | Age: 72
End: 2022-11-22

## 2022-11-22 VITALS
HEART RATE: 97 BPM | SYSTOLIC BLOOD PRESSURE: 126 MMHG | OXYGEN SATURATION: 99 % | RESPIRATION RATE: 16 BRPM | DIASTOLIC BLOOD PRESSURE: 70 MMHG

## 2022-11-22 PROCEDURE — 99239 HOSP IP/OBS DSCHRG MGMT >30: CPT

## 2022-11-22 PROCEDURE — 97161 PT EVAL LOW COMPLEX 20 MIN: CPT

## 2022-11-22 PROCEDURE — 99285 EMERGENCY DEPT VISIT HI MDM: CPT

## 2022-11-22 PROCEDURE — 78452 HT MUSCLE IMAGE SPECT MULT: CPT

## 2022-11-22 PROCEDURE — 97116 GAIT TRAINING THERAPY: CPT

## 2022-11-22 PROCEDURE — A9500: CPT

## 2022-11-22 PROCEDURE — 85379 FIBRIN DEGRADATION QUANT: CPT

## 2022-11-22 PROCEDURE — 85610 PROTHROMBIN TIME: CPT

## 2022-11-22 PROCEDURE — 80048 BASIC METABOLIC PNL TOTAL CA: CPT

## 2022-11-22 PROCEDURE — 83540 ASSAY OF IRON: CPT

## 2022-11-22 PROCEDURE — 80053 COMPREHEN METABOLIC PANEL: CPT

## 2022-11-22 PROCEDURE — 84481 FREE ASSAY (FT-3): CPT

## 2022-11-22 PROCEDURE — 83036 HEMOGLOBIN GLYCOSYLATED A1C: CPT

## 2022-11-22 PROCEDURE — 85730 THROMBOPLASTIN TIME PARTIAL: CPT

## 2022-11-22 PROCEDURE — 87635 SARS-COV-2 COVID-19 AMP PRB: CPT

## 2022-11-22 PROCEDURE — 84445 ASSAY OF TSI GLOBULIN: CPT

## 2022-11-22 PROCEDURE — 84480 ASSAY TRIIODOTHYRONINE (T3): CPT

## 2022-11-22 PROCEDURE — 82962 GLUCOSE BLOOD TEST: CPT

## 2022-11-22 PROCEDURE — 82728 ASSAY OF FERRITIN: CPT

## 2022-11-22 PROCEDURE — 83735 ASSAY OF MAGNESIUM: CPT

## 2022-11-22 PROCEDURE — 36415 COLL VENOUS BLD VENIPUNCTURE: CPT

## 2022-11-22 PROCEDURE — 83550 IRON BINDING TEST: CPT

## 2022-11-22 PROCEDURE — 93306 TTE W/DOPPLER COMPLETE: CPT

## 2022-11-22 PROCEDURE — 84439 ASSAY OF FREE THYROXINE: CPT

## 2022-11-22 PROCEDURE — 82550 ASSAY OF CK (CPK): CPT

## 2022-11-22 PROCEDURE — 85025 COMPLETE CBC W/AUTO DIFF WBC: CPT

## 2022-11-22 PROCEDURE — 71275 CT ANGIOGRAPHY CHEST: CPT

## 2022-11-22 PROCEDURE — 80061 LIPID PANEL: CPT

## 2022-11-22 PROCEDURE — 84436 ASSAY OF TOTAL THYROXINE: CPT

## 2022-11-22 PROCEDURE — 82607 VITAMIN B-12: CPT

## 2022-11-22 PROCEDURE — 82553 CREATINE MB FRACTION: CPT

## 2022-11-22 PROCEDURE — 84484 ASSAY OF TROPONIN QUANT: CPT

## 2022-11-22 PROCEDURE — 86376 MICROSOMAL ANTIBODY EACH: CPT

## 2022-11-22 PROCEDURE — 93017 CV STRESS TEST TRACING ONLY: CPT

## 2022-11-22 PROCEDURE — 76536 US EXAM OF HEAD AND NECK: CPT

## 2022-11-22 PROCEDURE — 93005 ELECTROCARDIOGRAM TRACING: CPT

## 2022-11-22 PROCEDURE — 94150 VITAL CAPACITY TEST: CPT

## 2022-11-22 PROCEDURE — 85027 COMPLETE CBC AUTOMATED: CPT

## 2022-11-22 PROCEDURE — 83880 ASSAY OF NATRIURETIC PEPTIDE: CPT

## 2022-11-22 PROCEDURE — 84443 ASSAY THYROID STIM HORMONE: CPT

## 2022-11-22 RX ORDER — PROPRANOLOL HCL 160 MG
1 CAPSULE, EXTENDED RELEASE 24HR ORAL
Qty: 30 | Refills: 3
Start: 2022-11-22 | End: 2023-03-21

## 2022-11-22 RX ORDER — LOSARTAN POTASSIUM 100 MG/1
1 TABLET, FILM COATED ORAL
Qty: 30 | Refills: 3
Start: 2022-11-22 | End: 2023-03-21

## 2022-11-22 RX ORDER — METHIMAZOLE 10 MG/1
0.5 TABLET ORAL
Qty: 15 | Refills: 3
Start: 2022-11-22 | End: 2023-03-21

## 2022-11-22 RX ADMIN — Medication 325 MILLIGRAM(S): at 13:33

## 2022-11-22 RX ADMIN — LOSARTAN POTASSIUM 50 MILLIGRAM(S): 100 TABLET, FILM COATED ORAL at 06:28

## 2022-11-22 RX ADMIN — LIDOCAINE 1 PATCH: 4 CREAM TOPICAL at 02:19

## 2022-11-22 NOTE — DISCHARGE NOTE NURSING/CASE MANAGEMENT/SOCIAL WORK - PATIENT PORTAL LINK FT
You can access the FollowMyHealth Patient Portal offered by [facility] by registering at the following website: [email or web address]. By joining [employer]’s FollowMyHealth portal, you will also be able to view your health information using other applications (apps) compatible with our system.

## 2022-11-22 NOTE — DISCHARGE NOTE NURSING/CASE MANAGEMENT/SOCIAL WORK - NSDCVIVACCINE_GEN_ALL_CORE_FT
influenza, high-dose, quadrivalent; 15-Sep-2020 15:47; Rosalia Martin (RN); [employer]; [id number] (Exp. Date: 30-Jun-2021); IntraMuscular; Deltoid Right.; 0.7 milliLiter(s); VIS (VIS Published: 15-Aug-2019, VIS Presented: 15-Sep-2020);

## 2022-11-28 DIAGNOSIS — R00.0 TACHYCARDIA, UNSPECIFIED: ICD-10-CM

## 2022-11-28 DIAGNOSIS — Z79.4 LONG TERM (CURRENT) USE OF INSULIN: ICD-10-CM

## 2022-11-28 DIAGNOSIS — R91.1 SOLITARY PULMONARY NODULE: ICD-10-CM

## 2022-11-28 DIAGNOSIS — E05.20 THYROTOXICOSIS WITH TOXIC MULTINODULAR GOITER WITHOUT THYROTOXIC CRISIS OR STORM: ICD-10-CM

## 2022-11-28 DIAGNOSIS — E78.5 HYPERLIPIDEMIA, UNSPECIFIED: ICD-10-CM

## 2022-11-28 DIAGNOSIS — D63.8 ANEMIA IN OTHER CHRONIC DISEASES CLASSIFIED ELSEWHERE: ICD-10-CM

## 2022-11-28 DIAGNOSIS — Z88.1 ALLERGY STATUS TO OTHER ANTIBIOTIC AGENTS STATUS: ICD-10-CM

## 2022-11-28 DIAGNOSIS — T81.31XD DISRUPTION OF EXTERNAL OPERATION (SURGICAL) WOUND, NOT ELSEWHERE CLASSIFIED, SUBSEQUENT ENCOUNTER: ICD-10-CM

## 2022-11-28 DIAGNOSIS — I10 ESSENTIAL (PRIMARY) HYPERTENSION: ICD-10-CM

## 2022-11-28 DIAGNOSIS — R07.89 OTHER CHEST PAIN: ICD-10-CM
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
## 2022-11-28 DIAGNOSIS — E11.9 TYPE 2 DIABETES MELLITUS WITHOUT COMPLICATIONS: ICD-10-CM

## 2022-11-28 DIAGNOSIS — Z88.0 ALLERGY STATUS TO PENICILLIN: ICD-10-CM
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
## 2022-11-28 DIAGNOSIS — R49.0 DYSPHONIA: ICD-10-CM

## 2022-11-28 DIAGNOSIS — Z83.3 FAMILY HISTORY OF DIABETES MELLITUS: ICD-10-CM

## 2022-11-28 DIAGNOSIS — Z79.84 LONG TERM (CURRENT) USE OF ORAL HYPOGLYCEMIC DRUGS: ICD-10-CM

## 2022-11-28 DIAGNOSIS — R06.09 OTHER FORMS OF DYSPNEA: ICD-10-CM

## 2022-12-07 PROBLEM — I10 ESSENTIAL (PRIMARY) HYPERTENSION: Chronic | Status: ACTIVE | Noted: 2022-11-17

## 2022-12-23 ENCOUNTER — APPOINTMENT (OUTPATIENT)
Dept: INTERNAL MEDICINE | Facility: CLINIC | Age: 72
End: 2022-12-23

## 2022-12-23 PROCEDURE — PCNS1: CPT

## 2022-12-27 ENCOUNTER — APPOINTMENT (OUTPATIENT)
Dept: INTERNAL MEDICINE | Facility: CLINIC | Age: 72
End: 2022-12-27

## 2022-12-27 VITALS
HEART RATE: 91 BPM | BODY MASS INDEX: 25.27 KG/M2 | OXYGEN SATURATION: 100 % | DIASTOLIC BLOOD PRESSURE: 70 MMHG | HEIGHT: 67 IN | SYSTOLIC BLOOD PRESSURE: 175 MMHG | WEIGHT: 161 LBS | TEMPERATURE: 96.8 F

## 2022-12-27 DIAGNOSIS — K21.9 GASTRO-ESOPHAGEAL REFLUX DISEASE W/OUT ESOPHAGITIS: ICD-10-CM

## 2022-12-27 DIAGNOSIS — R00.2 PALPITATIONS: ICD-10-CM

## 2022-12-27 PROCEDURE — 99214 OFFICE O/P EST MOD 30 MIN: CPT | Mod: GC

## 2022-12-29 PROBLEM — K21.9 GERD (GASTROESOPHAGEAL REFLUX DISEASE): Status: ACTIVE | Noted: 2022-06-16

## 2023-01-06 ENCOUNTER — APPOINTMENT (OUTPATIENT)
Dept: INTERNAL MEDICINE | Facility: CLINIC | Age: 73
End: 2023-01-06
Payer: MEDICARE

## 2023-01-06 ENCOUNTER — LABORATORY RESULT (OUTPATIENT)
Age: 73
End: 2023-01-06

## 2023-01-06 VITALS
BODY MASS INDEX: 24.33 KG/M2 | DIASTOLIC BLOOD PRESSURE: 76 MMHG | SYSTOLIC BLOOD PRESSURE: 130 MMHG | OXYGEN SATURATION: 99 % | TEMPERATURE: 97.9 F | HEART RATE: 84 BPM | HEIGHT: 67 IN | WEIGHT: 155 LBS

## 2023-01-06 PROCEDURE — 99214 OFFICE O/P EST MOD 30 MIN: CPT | Mod: 25

## 2023-01-09 ENCOUNTER — EMERGENCY (EMERGENCY)
Facility: HOSPITAL | Age: 73
LOS: 1 days | Discharge: HOME | End: 2023-01-09
Attending: EMERGENCY MEDICINE | Admitting: EMERGENCY MEDICINE
Payer: MEDICARE

## 2023-01-09 VITALS
SYSTOLIC BLOOD PRESSURE: 147 MMHG | HEART RATE: 69 BPM | DIASTOLIC BLOOD PRESSURE: 88 MMHG | OXYGEN SATURATION: 99 % | RESPIRATION RATE: 17 BRPM | TEMPERATURE: 98 F

## 2023-01-09 VITALS
HEART RATE: 77 BPM | HEIGHT: 66 IN | TEMPERATURE: 98 F | SYSTOLIC BLOOD PRESSURE: 146 MMHG | WEIGHT: 151.02 LBS | RESPIRATION RATE: 18 BRPM | OXYGEN SATURATION: 98 % | DIASTOLIC BLOOD PRESSURE: 65 MMHG

## 2023-01-09 DIAGNOSIS — Z90.710 ACQUIRED ABSENCE OF BOTH CERVIX AND UTERUS: Chronic | ICD-10-CM

## 2023-01-09 DIAGNOSIS — Z98.891 HISTORY OF UTERINE SCAR FROM PREVIOUS SURGERY: Chronic | ICD-10-CM

## 2023-01-09 DIAGNOSIS — Z90.49 ACQUIRED ABSENCE OF OTHER SPECIFIED PARTS OF DIGESTIVE TRACT: Chronic | ICD-10-CM

## 2023-01-09 LAB
ALBUMIN SERPL ELPH-MCNC: 3.8 G/DL — SIGNIFICANT CHANGE UP (ref 3.3–5)
ALBUMIN SERPL ELPH-MCNC: 4.1 G/DL
ALP BLD-CCNC: 77 U/L
ALP SERPL-CCNC: 77 U/L — SIGNIFICANT CHANGE UP (ref 40–120)
ALT FLD-CCNC: 9 U/L — LOW (ref 10–45)
ALT SERPL-CCNC: 5 U/L
ANION GAP SERPL CALC-SCNC: 11 MMOL/L
ANION GAP SERPL CALC-SCNC: 9 MMOL/L — SIGNIFICANT CHANGE UP (ref 5–17)
APPEARANCE UR: CLEAR — SIGNIFICANT CHANGE UP
APTT BLD: 30.1 SEC — SIGNIFICANT CHANGE UP (ref 27.5–35.5)
AST SERPL-CCNC: 12 U/L
AST SERPL-CCNC: 17 U/L — SIGNIFICANT CHANGE UP (ref 10–40)
BASOPHILS # BLD AUTO: 0.02 K/UL — SIGNIFICANT CHANGE UP (ref 0–0.2)
BASOPHILS # BLD AUTO: 0.03 K/UL
BASOPHILS NFR BLD AUTO: 0.3 % — SIGNIFICANT CHANGE UP (ref 0–2)
BASOPHILS NFR BLD AUTO: 0.4 %
BILIRUB SERPL-MCNC: 0.4 MG/DL
BILIRUB SERPL-MCNC: 0.5 MG/DL — SIGNIFICANT CHANGE UP (ref 0.2–1.2)
BILIRUB UR-MCNC: NEGATIVE — SIGNIFICANT CHANGE UP
BUN SERPL-MCNC: 33 MG/DL — HIGH (ref 7–23)
BUN SERPL-MCNC: 37 MG/DL
CALCIUM SERPL-MCNC: 9.3 MG/DL — SIGNIFICANT CHANGE UP (ref 8.4–10.5)
CALCIUM SERPL-MCNC: 9.9 MG/DL
CHLORIDE SERPL-SCNC: 103 MMOL/L — SIGNIFICANT CHANGE UP (ref 96–108)
CHLORIDE SERPL-SCNC: 104 MMOL/L
CK MB CFR SERPL CALC: 1.5 NG/ML — SIGNIFICANT CHANGE UP (ref 0–6.7)
CK SERPL-CCNC: 47 U/L — SIGNIFICANT CHANGE UP (ref 25–170)
CO2 SERPL-SCNC: 26 MMOL/L
CO2 SERPL-SCNC: 26 MMOL/L — SIGNIFICANT CHANGE UP (ref 22–31)
COLOR SPEC: YELLOW — SIGNIFICANT CHANGE UP
CREAT SERPL-MCNC: 1.15 MG/DL
CREAT SERPL-MCNC: 1.34 MG/DL — HIGH (ref 0.5–1.3)
DIFF PNL FLD: NEGATIVE — SIGNIFICANT CHANGE UP
EGFR: 43 ML/MIN/1.73M2 — LOW
EGFR: 51 ML/MIN/1.73M2
EOSINOPHIL # BLD AUTO: 0.21 K/UL
EOSINOPHIL # BLD AUTO: 0.23 K/UL — SIGNIFICANT CHANGE UP (ref 0–0.5)
EOSINOPHIL NFR BLD AUTO: 2.9 %
EOSINOPHIL NFR BLD AUTO: 3.1 % — SIGNIFICANT CHANGE UP (ref 0–6)
GLUCOSE SERPL-MCNC: 108 MG/DL — HIGH (ref 70–99)
GLUCOSE SERPL-MCNC: 111 MG/DL
GLUCOSE UR QL: NEGATIVE — SIGNIFICANT CHANGE UP
HCT VFR BLD CALC: 26.7 % — LOW (ref 34.5–45)
HCT VFR BLD CALC: 30.3 %
HGB BLD-MCNC: 8.2 G/DL — LOW (ref 11.5–15.5)
HGB BLD-MCNC: 8.7 G/DL
IMM GRANULOCYTES NFR BLD AUTO: 0.3 %
IMM GRANULOCYTES NFR BLD AUTO: 0.4 % — SIGNIFICANT CHANGE UP (ref 0–0.9)
INR BLD: 1.12 — SIGNIFICANT CHANGE UP (ref 0.88–1.16)
KETONES UR-MCNC: NEGATIVE — SIGNIFICANT CHANGE UP
LACTATE SERPL-SCNC: 0.5 MMOL/L — SIGNIFICANT CHANGE UP (ref 0.5–2)
LEUKOCYTE ESTERASE UR-ACNC: NEGATIVE — SIGNIFICANT CHANGE UP
LYMPHOCYTES # BLD AUTO: 1.51 K/UL
LYMPHOCYTES # BLD AUTO: 1.82 K/UL — SIGNIFICANT CHANGE UP (ref 1–3.3)
LYMPHOCYTES # BLD AUTO: 24.4 % — SIGNIFICANT CHANGE UP (ref 13–44)
LYMPHOCYTES NFR BLD AUTO: 20.6 %
MAGNESIUM SERPL-MCNC: 1.8 MG/DL — SIGNIFICANT CHANGE UP (ref 1.6–2.6)
MAN DIFF?: NORMAL
MCHC RBC-ENTMCNC: 26.8 PG
MCHC RBC-ENTMCNC: 28 PG — SIGNIFICANT CHANGE UP (ref 27–34)
MCHC RBC-ENTMCNC: 28.7 GM/DL
MCHC RBC-ENTMCNC: 30.7 GM/DL — LOW (ref 32–36)
MCV RBC AUTO: 91.1 FL — SIGNIFICANT CHANGE UP (ref 80–100)
MCV RBC AUTO: 93.2 FL
MONOCYTES # BLD AUTO: 0.51 K/UL
MONOCYTES # BLD AUTO: 0.57 K/UL — SIGNIFICANT CHANGE UP (ref 0–0.9)
MONOCYTES NFR BLD AUTO: 7 %
MONOCYTES NFR BLD AUTO: 7.6 % — SIGNIFICANT CHANGE UP (ref 2–14)
NEUTROPHILS # BLD AUTO: 4.79 K/UL — SIGNIFICANT CHANGE UP (ref 1.8–7.4)
NEUTROPHILS # BLD AUTO: 5.04 K/UL
NEUTROPHILS NFR BLD AUTO: 64.2 % — SIGNIFICANT CHANGE UP (ref 43–77)
NEUTROPHILS NFR BLD AUTO: 68.8 %
NITRITE UR-MCNC: NEGATIVE — SIGNIFICANT CHANGE UP
NRBC # BLD: 0 /100 WBCS — SIGNIFICANT CHANGE UP (ref 0–0)
PH UR: 6 — SIGNIFICANT CHANGE UP (ref 5–8)
PLATELET # BLD AUTO: 246 K/UL — SIGNIFICANT CHANGE UP (ref 150–400)
PLATELET # BLD AUTO: 288 K/UL
POTASSIUM SERPL-MCNC: 4.2 MMOL/L — SIGNIFICANT CHANGE UP (ref 3.5–5.3)
POTASSIUM SERPL-SCNC: 3.9 MMOL/L
POTASSIUM SERPL-SCNC: 4.2 MMOL/L — SIGNIFICANT CHANGE UP (ref 3.5–5.3)
PROT SERPL-MCNC: 6.8 G/DL
PROT SERPL-MCNC: 6.9 G/DL — SIGNIFICANT CHANGE UP (ref 6–8.3)
PROT UR-MCNC: NEGATIVE MG/DL — SIGNIFICANT CHANGE UP
PROTHROM AB SERPL-ACNC: 13.3 SEC — SIGNIFICANT CHANGE UP (ref 10.5–13.4)
RBC # BLD: 2.93 M/UL — LOW (ref 3.8–5.2)
RBC # BLD: 3.25 M/UL
RBC # FLD: 15.5 % — HIGH (ref 10.3–14.5)
RBC # FLD: 16.4 %
SARS-COV-2 RNA SPEC QL NAA+PROBE: NEGATIVE — SIGNIFICANT CHANGE UP
SODIUM SERPL-SCNC: 138 MMOL/L — SIGNIFICANT CHANGE UP (ref 135–145)
SODIUM SERPL-SCNC: 141 MMOL/L
SP GR SPEC: 1.01 — SIGNIFICANT CHANGE UP (ref 1–1.03)
T4 AB SER-ACNC: 8.38 UG/DL — SIGNIFICANT CHANGE UP (ref 4.5–11.7)
TROPONIN T SERPL-MCNC: <0.01 NG/ML — SIGNIFICANT CHANGE UP (ref 0–0.01)
TSH SERPL-ACNC: 0.01 UIU/ML
TSH SERPL-MCNC: <0.118 UIU/ML — LOW (ref 0.27–4.2)
UROBILINOGEN FLD QL: 0.2 E.U./DL — SIGNIFICANT CHANGE UP
WBC # BLD: 7.46 K/UL — SIGNIFICANT CHANGE UP (ref 3.8–10.5)
WBC # FLD AUTO: 7.32 K/UL
WBC # FLD AUTO: 7.46 K/UL — SIGNIFICANT CHANGE UP (ref 3.8–10.5)

## 2023-01-09 PROCEDURE — 85610 PROTHROMBIN TIME: CPT

## 2023-01-09 PROCEDURE — 99285 EMERGENCY DEPT VISIT HI MDM: CPT

## 2023-01-09 PROCEDURE — 70450 CT HEAD/BRAIN W/O DYE: CPT | Mod: 26,MA

## 2023-01-09 PROCEDURE — 93005 ELECTROCARDIOGRAM TRACING: CPT

## 2023-01-09 PROCEDURE — 70450 CT HEAD/BRAIN W/O DYE: CPT | Mod: MA

## 2023-01-09 PROCEDURE — 87635 SARS-COV-2 COVID-19 AMP PRB: CPT

## 2023-01-09 PROCEDURE — 83735 ASSAY OF MAGNESIUM: CPT

## 2023-01-09 PROCEDURE — 82553 CREATINE MB FRACTION: CPT

## 2023-01-09 PROCEDURE — 36415 COLL VENOUS BLD VENIPUNCTURE: CPT

## 2023-01-09 PROCEDURE — 85025 COMPLETE CBC W/AUTO DIFF WBC: CPT

## 2023-01-09 PROCEDURE — 99285 EMERGENCY DEPT VISIT HI MDM: CPT | Mod: 25

## 2023-01-09 PROCEDURE — 71045 X-RAY EXAM CHEST 1 VIEW: CPT | Mod: 26

## 2023-01-09 PROCEDURE — 84480 ASSAY TRIIODOTHYRONINE (T3): CPT

## 2023-01-09 PROCEDURE — 71045 X-RAY EXAM CHEST 1 VIEW: CPT

## 2023-01-09 PROCEDURE — 84436 ASSAY OF TOTAL THYROXINE: CPT

## 2023-01-09 PROCEDURE — 84484 ASSAY OF TROPONIN QUANT: CPT

## 2023-01-09 PROCEDURE — 81003 URINALYSIS AUTO W/O SCOPE: CPT

## 2023-01-09 PROCEDURE — 84443 ASSAY THYROID STIM HORMONE: CPT

## 2023-01-09 PROCEDURE — 83605 ASSAY OF LACTIC ACID: CPT

## 2023-01-09 PROCEDURE — 82550 ASSAY OF CK (CPK): CPT

## 2023-01-09 PROCEDURE — 80053 COMPREHEN METABOLIC PANEL: CPT

## 2023-01-09 PROCEDURE — 85730 THROMBOPLASTIN TIME PARTIAL: CPT

## 2023-01-09 RX ORDER — SODIUM CHLORIDE 9 MG/ML
1000 INJECTION INTRAMUSCULAR; INTRAVENOUS; SUBCUTANEOUS ONCE
Refills: 0 | Status: COMPLETED | OUTPATIENT
Start: 2023-01-09 | End: 2023-01-09

## 2023-01-09 RX ADMIN — SODIUM CHLORIDE 1000 MILLILITER(S): 9 INJECTION INTRAMUSCULAR; INTRAVENOUS; SUBCUTANEOUS at 18:17

## 2023-01-09 NOTE — ED PROVIDER NOTE - PATIENT PORTAL LINK FT
You can access the FollowMyHealth Patient Portal offered by [facility] by registering at the following website: [email or web address]. By joining [employer]’s FollowMyHealth portal, you will also be able to view your health information using other applications (apps) compatible with our system.

## 2023-01-09 NOTE — ED PROVIDER NOTE - OBJECTIVE STATEMENT
73 y/o F with PMH of HTN, DM, recent discharge (-10/21) from surgery service for ex-lap w/ lysis of adhesions and small bowel resection rehospitalized on 10/28/2022 for 3 days of N/V and diarrhea and found to have wound dehiscence w/ bacteriemia discharged to [facility] who was BIBA on  for fatigue, intermittent midsternal chest tightness and  x3 weeks. Stated she saw her PCP on 11/15/2022, noted to be tachycardia with palpitation and they wanted to send her to the ER to r/o PE but she refused. Pt negative for PE, and now admitted for r/o ACS. Patient ruled out ACS, s/p TTE 22: EF 60-65, mild LVH, aortic sclerosis without significant stneosis, PASP 22mmHg and NST 22: normal, no ischemia noted. Patient remained in sinus tachycardia which was intermittently treated with NS IVF. Endocrine was also consulted and intial concern for sub clinical hyperthyroidism. As per Endocrine, will start Methimazole 2.5mg QD.  At trial of Propranolol 10mg QD was initiated, transitioned to Propranolol 20mg QD.  Pt should follow up with Endocrine clinic at discharge with [facility] by calling [phone] to make an appointment. She should be seen in one month from now for further work-up of nodules and management of hyperthyroidism.  Patient was also seen by surgery for tachycardia given recent bacteremia, stated tachycardia was not related to infection and signed off. CT A/P was reviewed by them as well. PT rec [facility]. 72F with PMH of HTN, DM, admission to [facility] in Sept - Oct 2022 after surgery for ex-lap w/ lysis of adhesions and small bowel resection rehospitalized on 10/28/2022 for wound dehiscence w/ bacteriemia, admitted again in Nov 2022 for fatigue and chest tightness, r/o for PE and ACS (TTE 11/17/22: EF 60-65, mild LVH, aortic sclerosis without significant stenosis, PASP 22mmHg and NST 11/17/22: normal, no ischemia noted) started on Methimazole 2.5mg QD and transitioned to Propranolol 20mg QD who presents from home for generalized weakness and fall x 2 at home. Pt states she got up out of her recliner chair, felt dizzy and fell to the ground landing on her right side 4 days ago, no LOC, no head trauma. She had a similar fall 2 weeks ago. She states she feels generally weak. Denies cough, cold, CP/SOB, no new abdominal pain, no n/v or diarrhea. No headache or neck pain, no n/t/w in ext.

## 2023-01-09 NOTE — ED PROVIDER NOTE - CLINICAL SUMMARY MEDICAL DECISION MAKING FREE TEXT BOX
72F with PMH of HTN, DM, admission to [facility] in Sept - Oct 2022 after surgery for ex-lap w/ lysis of adhesions and small bowel resection rehospitalized on 10/28/2022 for wound dehiscence w/ bacteriemia, admitted again in Nov 2022 for fatigue and chest tightness, r/o for PE and ACS (TTE 11/17/22: EF 60-65, mild LVH, aortic sclerosis without significant stenosis, PASP 22mmHg and NST 11/17/22: normal, no ischemia noted) started on Methimazole 2.5mg QD and transitioned to Propranolol 20mg QD who presents from home for generalized weakness and fall x 2 at home. Pt states she got up out of her recliner chair, felt dizzy and fell to the ground landing on her right side 4 days ago, no LOC, no head trauma. She had a similar fall 2 weeks ago. She states she feels generally weak. Denies cough, cold, CP/SOB, no new abdominal pain, no n/v or diarrhea. No headache or neck pain, no n/t/w in ext.  Prior EMR notes as well as external notes reviewed. Case discussed with available family member.  Pt is elderly and generally weak-appearing on exam, VSS, no respiratory distress, no focal neuro deficits. Per my interpretation: EKG NSR, no ischemia.  Plan for labs, CT head given recent falls, CXR, IVFs,   Independent interpretation of labs and imaging performed by me, labs c/w dehydration, will hydrate. TSH low however T4 wnl. D/w endo on call at they do not feel sx are related to thyroid, recommend pt continue her methimazole dose (2.5) and they will help move up her upcoming endo appt.   Pt was hydrated, UA neg. pt feels better aand is now eager for DC. Pt feeling improved and is stable for DC. ED evaluation and management discussed with the patient in detail.  Close PMD follow up encouraged.  Strict ED return instructions discussed in detail and patient given the opportunity to ask any questions about their discharge diagnosis and instructions. Patient verbalized understanding.

## 2023-01-09 NOTE — ED PROVIDER NOTE - NSFOLLOWUPINSTRUCTIONS_ED_ALL_ED_FT
Please follow up with endocrine.   Please continue your medications as directed.     Dehydration    Dehydration is when there is not enough fluid or water in your body. This happens when you lose more fluids than you take in. People who are age 65 or older have a higher risk of getting dehydrated.    Dehydration can range from mild to very bad. It should be treated right away to keep it from getting very bad.     Symptoms of mild dehydration may include:    Thirst.  Dry lips.  Slightly dry mouth.  Dry, warm skin.  Dizziness.    Symptoms of moderate dehydration may include:    Very dry mouth.  Muscle cramps.  Dark pee (urine). Pee may be the color of tea.  Your body making less pee.  Your eyes making fewer tears.  Heartbeat that is uneven or faster than normal (palpitations).  Headache.  Light-headedness, especially when you stand up from sitting.  Fainting (syncope).    Symptoms of very bad dehydration may include:    Changes in skin, such as:  Cold and clammy skin.  Blotchy (mottled) or pale skin.  Skin that does not quickly return to normal after being lightly pinched and let go (poor skin turgor).  Changes in body fluids, such as:  Feeling very thirsty.  Your eyes making fewer tears.  Not sweating when body temperature is high, such as in hot weather.  Your body making very little pee.  Changes in vital signs, such as:  Weak pulse.  Pulse that is more than 100 beats a minute when you are sitting still.  Fast breathing.  Low blood pressure.  Other changes, such as:  Sunken eyes.  Cold hands and feet.  Confusion.  Lack of energy (lethargy).  Trouble waking up from sleep.  Short-term weight loss.  Unconsciousness.    Follow these instructions at home:  If told by your doctor, drink an ORS:  Make an ORS by using instructions on the package.  Start by drinking small amounts, about ½ cup (120 mL) every 5–10 minutes.  Slowly drink more until you have had the amount that your doctor said to have.  Drink enough clear fluid to keep your pee clear or pale yellow. If you were told to drink an ORS, finish the ORS first, then start slowly drinking clear fluids. Drink fluids such as:  Water. Do not drink only water by itself. Doing that can make the salt (sodium) level in your body get too low (hyponatremia).  Ice chips.  Fruit juice that you have added water to (diluted).  Low-calorie sports drinks.  Avoid:  Alcohol.  Drinks that have a lot of sugar. These include high-calorie sports drinks, fruit juice that does not have water added, and soda.  Caffeine.  Foods that are greasy or have a lot of fat or sugar.  Take over-the-counter and prescription medicines only as told by your doctor.  Do not take salt tablets. Doing that can make the salt level in your body get too high (hypernatremia).  Eat foods that have minerals (electrolytes). Examples include bananas, oranges, potatoes, tomatoes, and spinach.  Keep all follow-up visits as told by your doctor. This is important.     Contact a doctor if:  You have belly (abdominal) pain that:  Gets worse.  Stays in one area (localizes).  You have a rash.  You have a stiff neck.  You get angry or annoyed more easily than normal (irritability).  You are more sleepy than normal.  You have a harder time waking up than normal.  You feel:  Weak.  Dizzy.  Very thirsty.    Get help right away if:  You have symptoms of very bad dehydration.  You cannot drink fluids without throwing up (vomiting).  Your symptoms get worse with treatment.  You have a fever.  You have a very bad headache.  You are throwing up or having watery poop (diarrhea) and it:  Gets worse.  Does not go away.  You have diarrhea for more than 24 hours.  You have blood or something green (bile) in your throw-up.  You have blood in your poop (stool). This may cause poop to look black and tarry.  You have not peed in 6–8 hours.  You have peed (urinated) only a small amount of very dark pee during 6–8 hours.  You pass out (faint).  Your heart rate when you are sitting still is more than 100 beats a minute.  You have trouble breathing.    ADDITIONAL NOTES AND INSTRUCTIONS    Please follow up with your Primary MD in 24-48 hr.  Seek immediate medical care for any new/worsening signs or symptoms.      Hypothyroidism       Hypothyroidism is when the thyroid gland does not make enough of certain hormones (it is underactive). The thyroid gland is a small gland located in the lower front part of the neck, just in front of the windpipe (trachea). This gland makes hormones that help control how the body uses food for energy (metabolism) as well as how the heart and brain function. These hormones also play a role in keeping your bones strong. When the thyroid is underactive, it produces too little of the hormones thyroxine (T4) and triiodothyronine (T3).      What are the causes?    This condition may be caused by:  •Hashimoto's disease. This is a disease in which the body's disease-fighting system (immune system) attacks the thyroid gland. This is the most common cause.      •Viral infections.      •Pregnancy.      •Certain medicines.      •Birth defects.      •Past radiation treatments to the head or neck for cancer.      •Past treatment with radioactive iodine.      •Past exposure to radiation in the environment.      •Past surgical removal of part or all of the thyroid.      •Problems with a gland in the center of the brain (pituitary gland).      •Lack of enough iodine in the diet.        What increases the risk?    You are more likely to develop this condition if:  •You are female.      •You have a family history of thyroid conditions.      •You use a medicine called lithium.      •You take medicines that affect the immune system (immunosuppressants).        What are the signs or symptoms?    Symptoms of this condition include:  •Feeling as though you have no energy (lethargy).      •Not being able to tolerate cold.      •Weight gain that is not explained by a change in diet or exercise habits.      •Lack of appetite.      •Dry skin.      •Coarse hair.      •Menstrual irregularity.      •Slowing of thought processes.      •Constipation.      •Sadness or depression.        How is this diagnosed?    This condition may be diagnosed based on:  •Your symptoms, your medical history, and a physical exam.      •Blood tests.      You may also have imaging tests, such as an ultrasound or MRI.      How is this treated?    This condition is treated with medicine that replaces the thyroid hormones that your body does not make. After you begin treatment, it may take several weeks for symptoms to go away.      Follow these instructions at home:    •Take over-the-counter and prescription medicines only as told by your health care provider.      •If you start taking any new medicines, tell your health care provider.    •Keep all follow-up visits as told by your health care provider. This is important.   •As your condition improves, your dosage of thyroid hormone medicine may change.      •You will need to have blood tests regularly so that your health care provider can monitor your condition.          Contact a health care provider if:    •Your symptoms do not get better with treatment.    •You are taking thyroid hormone replacement medicine and you:  •Sweat a lot.      •Have tremors.      •Feel anxious.      •Lose weight rapidly.      •Cannot tolerate heat.      •Have emotional swings.      •Have diarrhea.      •Feel weak.          Get help right away if you have:    •Chest pain.      •An irregular heartbeat.      •A rapid heartbeat.      •Difficulty breathing.        Summary    •Hypothyroidism is when the thyroid gland does not make enough of certain hormones (it is underactive).      •When the thyroid is underactive, it produces too little of the hormones thyroxine (T4) and triiodothyronine (T3).      •The most common cause is Hashimoto's disease, a disease in which the body's disease-fighting system (immune system) attacks the thyroid gland. The condition can also be caused by viral infections, medicine, pregnancy, or past radiation treatment to the head or neck.      •Symptoms may include weight gain, dry skin, constipation, feeling as though you do not have energy, and not being able to tolerate cold.      •This condition is treated with medicine to replace the thyroid hormones that your body does not make.      This information is not intended to replace advice given to you by your health care provider. Make sure you discuss any questions you have with your health care provider.

## 2023-01-09 NOTE — ED ADULT NURSE NOTE - OBJECTIVE STATEMENT
72y female c/o frequent falls. states her PCP sent her in to r/o thyroid storm. pt states, "I had part of my thyroid removed years ago and now it is overactive." pt states she is experiencing dizziness, CP, palpitations, SOb, and frequent falls. states she last fell Friday and has pain to R arm and leg. denies head trauma. ambulating steadily with walker in ED. Alert and oriented x 4. Patient is awake and alert. Respirations even and unlabored. speaking in clear, full sentences. denies f/c, HA, n/v/d, numbness/tingling. PIV placed. ekg completed.

## 2023-01-09 NOTE — ED ADULT TRIAGE NOTE - CHIEF COMPLAINT QUOTE
Pt sent in by MD for "overactive thyroid." Pt reports frequent falls, no obvious deformities noted. Denies head injury.

## 2023-01-09 NOTE — ED PROVIDER NOTE - PHYSICAL EXAMINATION
GEN: Elderly, well developed, awake, alert, oriented to person, place, time/situation and in no apparent distress. NTAF  ENT: Airway patent, Nasal mucosa clear. Mouth with somewhat dry mucosa.  EYES: Clear bilaterally. PERRL, EOMI  RESPIRATORY: Breathing comfortably with normal RR. No W/C/R, no hypoxia or resp distress.  CARDIAC: Regular rate and rhythm, no M/R/G  ABDOMEN: Soft, nontender, +bowel sounds, no rebound, rigidity, or guarding.  MSK: Range of motion is not limited, no deformities noted.  NEURO: Alert and oriented x 3. Cn 2-12 intact. Strength 5/5 and sensation intact in all 4 extremities. no pronator drift. FTN normal. . Gait normal.   SKIN: Skin normal color for race, warm, dry and intact. No evidence of rash.  PSYCH: Alert and oriented to person, place, time/situation. normal mood and affect. no apparent risk to self or others.

## 2023-01-10 LAB — T3 SERPL-MCNC: 133 NG/DL — SIGNIFICANT CHANGE UP (ref 80–200)

## 2023-01-13 DIAGNOSIS — R53.1 WEAKNESS: ICD-10-CM

## 2023-01-13 DIAGNOSIS — Z88.0 ALLERGY STATUS TO PENICILLIN: ICD-10-CM

## 2023-01-13 DIAGNOSIS — E11.9 TYPE 2 DIABETES MELLITUS WITHOUT COMPLICATIONS: ICD-10-CM

## 2023-01-13 DIAGNOSIS — Z20.822 CONTACT WITH AND (SUSPECTED) EXPOSURE TO COVID-19: ICD-10-CM

## 2023-01-13 DIAGNOSIS — E86.0 DEHYDRATION: ICD-10-CM

## 2023-01-13 DIAGNOSIS — I10 ESSENTIAL (PRIMARY) HYPERTENSION: ICD-10-CM

## 2023-01-13 DIAGNOSIS — Z79.84 LONG TERM (CURRENT) USE OF ORAL HYPOGLYCEMIC DRUGS: ICD-10-CM

## 2023-01-13 DIAGNOSIS — Z86.79 PERSONAL HISTORY OF OTHER DISEASES OF THE CIRCULATORY SYSTEM: ICD-10-CM

## 2023-01-13 DIAGNOSIS — Z98.890 OTHER SPECIFIED POSTPROCEDURAL STATES: ICD-10-CM

## 2023-01-13 DIAGNOSIS — Z86.19 PERSONAL HISTORY OF OTHER INFECTIOUS AND PARASITIC DISEASES: ICD-10-CM

## 2023-01-13 NOTE — PROGRESS NOTE ADULT - SUBJECTIVE AND OBJECTIVE BOX
SUBJECTIVE: Patient seen and examined at bedside. Patient sleeping comfortably. Pain well controlled.     enoxaparin Injectable 40 milliGRAM(s) SubCutaneous every 24 hours  labetalol 200 milliGRAM(s) Oral every 12 hours  NIFEdipine XL 60 milliGRAM(s) Oral every 24 hours  vancomycin    Solution 125 milliGRAM(s) Oral every 6 hours    MEDICATIONS  (PRN):  acetaminophen     Tablet .. 325 milliGRAM(s) Oral every 4 hours PRN Mild Pain (1 - 3)  dextrose Oral Gel 15 Gram(s) Oral once PRN Blood Glucose LESS THAN 70 milliGRAM(s)/deciliter  HYDROmorphone  Injectable 0.5 milliGRAM(s) IV Push every 4 hours PRN Severe Pain (7 - 10)  ondansetron Injectable 4 milliGRAM(s) IV Push every 8 hours PRN Nausea and/or Vomiting  oxyCODONE    IR 5 milliGRAM(s) Oral every 6 hours PRN Moderate Pain (4 - 6)  sodium chloride 0.9% lock flush 10 milliLiter(s) IV Push every 1 hour PRN Pre/post blood products, medications, blood draw, and to maintain line patency      I&O's Detail    16 Oct 2022 07:01  -  17 Oct 2022 07:00  --------------------------------------------------------  IN:    Oral Fluid: 960 mL  Total IN: 960 mL    OUT:    VAC (Vacuum Assisted Closure) System (mL): 25 mL    Voided (mL): 3050 mL  Total OUT: 3075 mL    Total NET: -2115 mL          T(C): 37.3 (10-17-22 @ 05:25), Max: 37.3 (10-17-22 @ 05:25)  HR: 80 (10-17-22 @ 05:25) (75 - 80)  BP: 150/66 (10-17-22 @ 05:25) (136/75 - 150/68)  RR: 18 (10-17-22 @ 05:25) (17 - 19)  SpO2: 98% (10-17-22 @ 05:25) (98% - 99%)    GENERAL: NAD, Resting comfortably in bed, awake, opens eyes spontaneously  HEENT: NCAT, MMM, Normal conjunctiva, PERRL  RESP: Nonlabored breathing, No respiratory distress  CARD: Normal rate, Normal peripheral perfusion  GI: Soft, ND, NT, No guarding, No rebound tenderness, midline incision with wound vac in place  EXTREM: WWP, No edema, No gross deformity of extremities  SKIN: No rashes, no lesions  NEURO: AAOx3, No focal motor or sensory deficits  PSYCH: Affect and characteristics of appearance, verbalizations, and behaviors are appropriate    LABS:                        7.7    7.39  )-----------( 292      ( 16 Oct 2022 07:11 )             24.8     10-16    138  |  100  |  13  ----------------------------<  84  4.0   |  28  |  1.51<H>    Ca    8.3<L>      16 Oct 2022 07:11  Phos  4.5     10-16  Mg     1.9     10-16            RADIOLOGY & ADDITIONAL STUDIES:     Patient requests all Lab, Cardiology, and Radiology Results on their Discharge Instructions

## 2023-02-03 ENCOUNTER — APPOINTMENT (OUTPATIENT)
Dept: ENDOCRINOLOGY | Facility: CLINIC | Age: 73
End: 2023-02-03

## 2023-02-03 ENCOUNTER — APPOINTMENT (OUTPATIENT)
Dept: HEART AND VASCULAR | Facility: CLINIC | Age: 73
End: 2023-02-03
Payer: MEDICARE

## 2023-02-03 ENCOUNTER — NON-APPOINTMENT (OUTPATIENT)
Age: 73
End: 2023-02-03

## 2023-02-03 VITALS
BODY MASS INDEX: 25.55 KG/M2 | WEIGHT: 159 LBS | TEMPERATURE: 97.9 F | HEIGHT: 66 IN | OXYGEN SATURATION: 100 % | DIASTOLIC BLOOD PRESSURE: 65 MMHG | HEART RATE: 78 BPM | SYSTOLIC BLOOD PRESSURE: 136 MMHG

## 2023-02-03 PROCEDURE — 99214 OFFICE O/P EST MOD 30 MIN: CPT

## 2023-02-03 NOTE — REVIEW OF SYSTEMS
[Feeling Fatigued] : feeling fatigued [Weight Loss (___ Lbs)] : [unfilled] ~Ulb weight loss [Weakness] : weakness [Under Stress] : under stress [Negative] : Heme/Lymph [SOB] : no shortness of breath [Dyspnea on exertion] : not dyspnea during exertion [Chest Discomfort] : no chest discomfort [Lower Ext Edema] : no extremity edema [Leg Claudication] : no intermittent leg claudication [Palpitations] : no palpitations [Orthopnea] : no orthopnea [PND] : no PND [Syncope] : no syncope [de-identified] : hair loss [de-identified] : gait imbalance

## 2023-02-03 NOTE — HISTORY OF PRESENT ILLNESS
[FreeTextEntry1] : 73yo F w HTN, DM, recent exlap surgery and hospitalization from small bowel resection adhesiolysis then wound dehiscence bacteremia in Sept-Oct 2022 who presented to [facility] 22 with SSCP and . PE, ACS ruled out. Surgery evaluated and cleared from post op complications. She had persistent sinus tachycardia and goiter on exam, hyperthyroid on labs. DCd on propranolol and methimazole. She has been seen in the IM clinic but is still awaiting for endocrinology scheduling. She had insurance issues that prevented her from obtaining her meds. She confirmed active coverage start 23. She has been off most of her prescribed meds due to this. \par \par She is presently living in a studio apartment at [address] [place] [address] but is planning to move out because of space. The most movement she does is to and from the bathroom. She sleeps on a recliner. She feels wobbly when walking longer distances but she has a walker and able to use public transportation when needing to travel to appointments or for shopping.\par \par

## 2023-02-03 NOTE — PHYSICAL EXAM
[Well Developed] : well developed [Well Nourished] : well nourished [No Acute Distress] : no acute distress [Normal Conjunctiva] : normal conjunctiva [Normal Venous Pressure] : normal venous pressure [No Carotid Bruit] : no carotid bruit [Normal S1, S2] : normal S1, S2 [No Murmur] : no murmur [No Rub] : no rub [No Gallop] : no gallop [Clear Lung Fields] : clear lung fields [Good Air Entry] : good air entry [No Respiratory Distress] : no respiratory distress  [Soft] : abdomen soft [Non Tender] : non-tender [No Masses/organomegaly] : no masses/organomegaly [Normal Bowel Sounds] : normal bowel sounds [Abnormal Gait] : abnormal gait [No Edema] : no edema [No Cyanosis] : no cyanosis [No Clubbing] : no clubbing [No Varicosities] : no varicosities [No Rash] : no rash [No Skin Lesions] : no skin lesions [Moves all extremities] : moves all extremities [No Focal Deficits] : no focal deficits [Normal Speech] : normal speech [Alert and Oriented] : alert and oriented [Normal memory] : normal memory [Appears Anxious] : appears anxious [de-identified] : + goiter + incision scar [de-identified] : unsteady gait with walker [de-identified] : + thin hair, wearing wig [de-identified] : tearful throughout exam

## 2023-02-03 NOTE — REASON FOR VISIT
[CV Risk Factors and Non-Cardiac Disease] : CV risk factors and non-cardiac disease [FreeTextEntry1] : CVD Data Reviewed:\par \par 11/17/22 Nuclear Stress MPI: No inducible ischemia\par ----------------------------------\par 11/17/22 TTE: mild LV hypertrophy, normal BiV function\par ----------------------------------\par 01/09/23 ECG: sinus rhythm 68 bpm, normal axis, intervals, similar to 2020 ecg\par 11/17/22 ECG: sinus tachycardia 105 bpm\par 09/13/20 ECG: sinus rhythm 76 bpm, normal axis, intervals

## 2023-02-14 ENCOUNTER — APPOINTMENT (OUTPATIENT)
Dept: INTERNAL MEDICINE | Facility: CLINIC | Age: 73
End: 2023-02-14
Payer: MEDICARE

## 2023-02-14 VITALS
DIASTOLIC BLOOD PRESSURE: 76 MMHG | HEART RATE: 76 BPM | TEMPERATURE: 97 F | OXYGEN SATURATION: 99 % | HEIGHT: 66 IN | BODY MASS INDEX: 25.55 KG/M2 | SYSTOLIC BLOOD PRESSURE: 116 MMHG | WEIGHT: 159 LBS

## 2023-02-14 DIAGNOSIS — Z99.89 DEPENDENCE ON OTHER ENABLING MACHINES AND DEVICES: ICD-10-CM

## 2023-02-14 DIAGNOSIS — R19.7 DIARRHEA, UNSPECIFIED: ICD-10-CM

## 2023-02-14 PROCEDURE — 99214 OFFICE O/P EST MOD 30 MIN: CPT | Mod: 25,GC

## 2023-02-20 NOTE — ED PROVIDER NOTE - PHYSICAL EXAMINATION
Follows with outpatient oncology General:  Well appearing, no respiratory distress   HEENT:  No conjunctival injection, no ocular discharge, Mm mildly dry   Chest:  Non-tender, no crepitance  Lungs:  Clear to auscultation bilaterally   Heart:  s1s2 normal, no murmur  Abdomen:  soft, Midline incision with small area of open wound that is granulating.  No drainage, no surrounding erythema or underlying swelling or fluctuance.  Mild tenderness to the left mid abdomen.  NO distention, no guarding no rebound.  :  Deferred  Rectal:  Deferred  Extremities: No edema, normal perfusion, no joint swelling or tenderness  Neuro:  Alert and oriented x 3, cn2-12 intact, full strength, sensory grossly intact  Psychiatry:  Calm, cooperative, no expression of suicidal or homicidal ideation

## 2023-02-24 ENCOUNTER — APPOINTMENT (OUTPATIENT)
Dept: INTERNAL MEDICINE | Facility: CLINIC | Age: 73
End: 2023-02-24

## 2023-03-02 LAB
BASOPHILS # BLD AUTO: 0.02 K/UL
BASOPHILS NFR BLD AUTO: 0.4 %
EOSINOPHIL # BLD AUTO: 0.1 K/UL
EOSINOPHIL NFR BLD AUTO: 1.9 %
ESTIMATED AVERAGE GLUCOSE: 117 MG/DL
HBA1C MFR BLD HPLC: 5.7 %
HCT VFR BLD CALC: 34 %
HGB BLD-MCNC: 9.9 G/DL
IMM GRANULOCYTES NFR BLD AUTO: 0.2 %
LYMPHOCYTES # BLD AUTO: 1.21 K/UL
LYMPHOCYTES NFR BLD AUTO: 22.9 %
MAN DIFF?: NORMAL
MCHC RBC-ENTMCNC: 27.3 PG
MCHC RBC-ENTMCNC: 29.1 GM/DL
MCV RBC AUTO: 93.7 FL
MONOCYTES # BLD AUTO: 0.54 K/UL
MONOCYTES NFR BLD AUTO: 10.2 %
NEUTROPHILS # BLD AUTO: 3.4 K/UL
NEUTROPHILS NFR BLD AUTO: 64.4 %
PLATELET # BLD AUTO: 210 K/UL
RBC # BLD: 3.63 M/UL
RBC # FLD: 13.7 %
WBC # FLD AUTO: 5.28 K/UL

## 2023-03-06 LAB
ALBUMIN SERPL ELPH-MCNC: 4.2 G/DL
ALP BLD-CCNC: 85 U/L
ALT SERPL-CCNC: 16 U/L
ANION GAP SERPL CALC-SCNC: 14 MMOL/L
AST SERPL-CCNC: 27 U/L
BILIRUB SERPL-MCNC: 0.3 MG/DL
BUN SERPL-MCNC: 36 MG/DL
CALCIUM SERPL-MCNC: 9.6 MG/DL
CHLORIDE SERPL-SCNC: 104 MMOL/L
CO2 SERPL-SCNC: 21 MMOL/L
CREAT SERPL-MCNC: 1.36 MG/DL
EGFR: 42 ML/MIN/1.73M2
GLUCOSE SERPL-MCNC: 102 MG/DL
POTASSIUM SERPL-SCNC: 3.7 MMOL/L
PROT SERPL-MCNC: 7 G/DL
SODIUM SERPL-SCNC: 139 MMOL/L
T4 FREE SERPL-MCNC: 0.9 NG/DL
TSH SERPL-ACNC: 0.06 UIU/ML

## 2023-03-08 ENCOUNTER — NON-APPOINTMENT (OUTPATIENT)
Age: 73
End: 2023-03-08

## 2023-03-21 ENCOUNTER — APPOINTMENT (OUTPATIENT)
Dept: ENDOCRINOLOGY | Facility: CLINIC | Age: 73
End: 2023-03-21
Payer: MEDICARE

## 2023-03-21 VITALS
SYSTOLIC BLOOD PRESSURE: 161 MMHG | DIASTOLIC BLOOD PRESSURE: 69 MMHG | BODY MASS INDEX: 27.44 KG/M2 | WEIGHT: 170 LBS | HEART RATE: 70 BPM

## 2023-03-21 LAB — GLUCOSE BLDC GLUCOMTR-MCNC: 91

## 2023-03-21 PROCEDURE — 99203 OFFICE O/P NEW LOW 30 MIN: CPT | Mod: 25

## 2023-03-21 PROCEDURE — 82962 GLUCOSE BLOOD TEST: CPT

## 2023-03-22 LAB
ALBUMIN SERPL ELPH-MCNC: 4 G/DL
ALP BLD-CCNC: 87 U/L
ALT SERPL-CCNC: 13 U/L
ANION GAP SERPL CALC-SCNC: 11 MMOL/L
AST SERPL-CCNC: 18 U/L
BASOPHILS # BLD AUTO: 0.02 K/UL
BASOPHILS NFR BLD AUTO: 0.3 %
BILIRUB SERPL-MCNC: 0.3 MG/DL
BUN SERPL-MCNC: 25 MG/DL
CALCIUM SERPL-MCNC: 9.5 MG/DL
CHLORIDE SERPL-SCNC: 107 MMOL/L
CO2 SERPL-SCNC: 24 MMOL/L
CREAT SERPL-MCNC: 0.98 MG/DL
EGFR: 62 ML/MIN/1.73M2
EOSINOPHIL # BLD AUTO: 0.2 K/UL
EOSINOPHIL NFR BLD AUTO: 2.8 %
GLUCOSE SERPL-MCNC: 86 MG/DL
HCT VFR BLD CALC: 31.2 %
HGB BLD-MCNC: 9.1 G/DL
IMM GRANULOCYTES NFR BLD AUTO: 0.3 %
LYMPHOCYTES # BLD AUTO: 1.66 K/UL
LYMPHOCYTES NFR BLD AUTO: 22.9 %
MAN DIFF?: NORMAL
MCHC RBC-ENTMCNC: 27.2 PG
MCHC RBC-ENTMCNC: 29.2 GM/DL
MCV RBC AUTO: 93.1 FL
MONOCYTES # BLD AUTO: 0.51 K/UL
MONOCYTES NFR BLD AUTO: 7 %
NEUTROPHILS # BLD AUTO: 4.84 K/UL
NEUTROPHILS NFR BLD AUTO: 66.7 %
PLATELET # BLD AUTO: 199 K/UL
POTASSIUM SERPL-SCNC: 4 MMOL/L
PROT SERPL-MCNC: 6.9 G/DL
RBC # BLD: 3.35 M/UL
RBC # FLD: 14.5 %
SODIUM SERPL-SCNC: 143 MMOL/L
T3 SERPL-MCNC: 106 NG/DL
T4 FREE SERPL-MCNC: 1.1 NG/DL
TSH SERPL-ACNC: 1.28 UIU/ML
WBC # FLD AUTO: 7.25 K/UL

## 2023-03-27 LAB
TSH RECEPTOR AB: <1.1 IU/L
TSI ACT/NOR SER: <0.1 IU/L

## 2023-04-10 ENCOUNTER — NON-APPOINTMENT (OUTPATIENT)
Age: 73
End: 2023-04-10

## 2023-04-19 ENCOUNTER — NON-APPOINTMENT (OUTPATIENT)
Age: 73
End: 2023-04-19

## 2023-04-19 ENCOUNTER — APPOINTMENT (OUTPATIENT)
Dept: NUCLEAR MEDICINE | Facility: HOSPITAL | Age: 73
End: 2023-04-19
Payer: MEDICARE

## 2023-04-19 ENCOUNTER — OUTPATIENT (OUTPATIENT)
Dept: OUTPATIENT SERVICES | Facility: HOSPITAL | Age: 73
LOS: 1 days | End: 2023-04-19
Payer: MEDICARE

## 2023-04-19 DIAGNOSIS — Z98.891 HISTORY OF UTERINE SCAR FROM PREVIOUS SURGERY: Chronic | ICD-10-CM

## 2023-04-19 DIAGNOSIS — Z90.710 ACQUIRED ABSENCE OF BOTH CERVIX AND UTERUS: Chronic | ICD-10-CM

## 2023-04-19 DIAGNOSIS — Z90.49 ACQUIRED ABSENCE OF OTHER SPECIFIED PARTS OF DIGESTIVE TRACT: Chronic | ICD-10-CM

## 2023-04-20 ENCOUNTER — APPOINTMENT (OUTPATIENT)
Dept: NUCLEAR MEDICINE | Facility: HOSPITAL | Age: 73
End: 2023-04-20

## 2023-04-20 PROCEDURE — 78014 THYROID IMAGING W/BLOOD FLOW: CPT | Mod: 26

## 2023-04-20 PROCEDURE — 78014 THYROID IMAGING W/BLOOD FLOW: CPT

## 2023-04-20 PROCEDURE — A9516: CPT

## 2023-04-21 ENCOUNTER — NON-APPOINTMENT (OUTPATIENT)
Age: 73
End: 2023-04-21

## 2023-04-25 NOTE — DATA REVIEWED
[FreeTextEntry1] : US THYROID, 11/20/2022  \par History: Low TSH, normal T4, thyroid nodules present.\par Prior study: CT chest dated 11/16/2022.\par \par Findings:\par \par RIGHT LOBE:\par Dimensions: 5.4 x 2.4 x 2.3 cm (sagittal x AP x transverse), enlarged in \par AP diameter.\par Echotexture: Heterogeneous.\par Vascularity: Normovascular.\par The right lobe contains three visible nodules.\par \par Nodule 1:\par Location: Upper pole.\par Dimensions: 0.8 x 0.9 x 0.7 cm (sagittal x AP x transverse)\par Composition: Solid.\par For solid nodules or for the solid portion of a partially cystic nodule, \par does the solid portion have any of the following suspicious features?\par -  No: Hypoechoic\par -  No: Microcalcifications\par -  No: Irregular margin (infiltrative or microlobulated)\par -  Yes: Taller than wide (AP dimension > transverse)\par -  No: Extrathyroidal extension\par -  No: Interrupted rim calcification with soft tissue extrusion\par \par Nodule 2:\par Location: Interpolar\par Dimensions: 0.8 x 0.6 x 0.7 cm (sagittal x AP x transverse)\par Composition: Spongiform.\par For solid nodules or for the solid portion of a partially cystic nodule, \par does the solid portion have any of the following suspicious features?\par \par Nodule 3:\par Location: Interpolar.\par Dimensions: 0.6 x 0.5 x 0.4 cm (sagittal x AP x transverse)\par Composition: Spongiform.\par For solid nodules or for the solid portion of a partially cystic nodule, \par does the solid portion have any of the following suspicious features?\par \par LEFT LOBE:\par Dimensions: 3.8 x 2.2 x 2.5 cm (sagittal x AP x transverse), enlarged.\par Echotexture: Heterogeneous.\par Vascularity: Normovascular.\par The left lobe contains three visible nodules.\par \par Nodule 1:\par Location: Upper pole\par Dimensions: 1.0 x 1.1 x 1.2 cm (sagittal x AP x transverse)\par Composition: Predominantly solid.\par For solid nodules or for the solid portion of a partially cystic nodule, \par does the solid portion have any of the following suspicious features?\par -  No: Hypoechoic\par -  No: Microcalcifications\par -  No: Irregular margin (infiltrative or microlobulated)\par -  No: Taller than wide (AP dimension > transverse)\par -  No: Extrathyroidal extension\par -  No: Interrupted rim calcification with soft tissue extrusion\par \par Nodule 2:\par Location: Interpolar.\par Dimensions: 2.0 x 1.4 x 1.5 cm (sagittal x AP x transverse)\par Composition: Solid.\par For solid nodules or for the solid portion of a partially cystic nodule, \par does the solid portion have any of the following suspicious features?\par -  No: Hypoechoic\par -  No: Microcalcifications\par -  No: Irregular margin (infiltrative or microlobulated)\par -  No: Taller than wide (AP dimension > transverse)\par -  No: Extrathyroidal extension\par -  No: Interrupted rim calcification with soft tissue extrusion\par \par Nodule 3:\par Location: Lower pole.\par Dimensions: 1.1 x 0.7 x 1.1  cm (sagittal x AP x transverse)\par Composition: Solid.\par For solid nodules or for the solid portion of a partially cystic nodule, \par does the solid portion have any of the following suspicious features?\par -  No: Hypoechoic\par -  No: Microcalcifications\par -  No: Irregular margin (infiltrative or microlobulated)\par -  No: Taller than wide (AP dimension > transverse)\par -  No: Extrathyroidal extension\par -  No: Interrupted rim calcification with soft tissue extrusion\par \par \par ISTHMUS:\par Dimensions: Measures up to 2.3 cm in the AP dimension superiorly \par secondary to nodule.\par The isthmus lobe contains two visible nodules.\par \par Nodule 1:\par Location: Upper left isthmus.\par Dimensions: 1.8 x 2.3 x 2.3 cm (sagittal x AP x transverse)\par Composition: Predominantly solid with eccentric cystic areas.\par For solid nodules or for the solid portion of a partially cystic nodule, \par does the solid portion have any of the following suspicious features?\par -  No: Hypoechoic\par -  No: Microcalcifications\par -  No: Irregular margin (infiltrative or microlobulated)\par -  No: Taller than wide (AP dimension > transverse)\par -  No: Extrathyroidal extension\par -  No: Interrupted rim calcification with soft tissue extrusion\par \par Nodule 2:\par Location: Lower right isthmus.\par Dimensions: 1.9 x 0.7 x 1.4 cm (sagittal x AP x transverse)\par Composition: Partially cystic with eccentric solid areas.\par For solid nodules or for the solid portion of a partially cystic nodule, \par does the solid portion have any of the following suspicious features?\par -  No: Hypoechoic\par -  No: Microcalcifications\par -  No: Irregular margin (infiltrative or microlobulated)\par -  No: Taller than wide (AP dimension > transverse)\par -  No: Extrathyroidal extension\par -  No: Interrupted rim calcification with soft tissue extrusion\par \par Cervical Lymph Node Evaluation: No abnormal lymph nodes are identified in \par the neck.\par \par Parathyroid Examination: Parathyroid glands not visualized.\par \par \par IMPRESSION:\par Multinodular thyroid goiter as detailed above. The following nodules meet \par the sonographic criteria for FNA: Interpolar solid nodule left lobe \par (nodule #2), solid nodule upper isthmus and the partially cystic nodule \par with eccentric solid areas in the lower isthmus.\par \par ----------------------------------------------------------------\par RECOMMENDATIONS ARE BASED UPON THE 2015 [employer] \par MANAGEMENT GUIDELINES. Thresholds for recommended fine needle aspiration \par are as follows:\par *  Solid nodule with one or more suspicious sonographic features greater \par than or equal to 1.0-cm\par *  Solid nodule without suspicious sonographic features greater than or \par equal to 1.5-cm\par *  Partially cystic nodule in which the solid component has one or more \par suspicious sonographic features greater than or equal to 1.0-cm\par *  Partially cystic nodule with eccentric solid area(s) without \par suspicious sonographic features greater than or equal to 1.5-cm\par *  Partially cystic nodule without suspicious sonographic features \par greater than or equal to 2.0-cm\par *  Spongiform nodule greater than or equal to 2.0-cm\par

## 2023-04-25 NOTE — HISTORY OF PRESENT ILLNESS
[FreeTextEntry1] : Ms. WHELAN is a 73 year female with pmhx of T2D, HTN, HLD, thyroid nodules, anemia who presents for evaluation.\par \par Hospitalized in 9/2022 for abdominal pain, per patient informed to FU with endocrine after this visit\par With HIE review, endocrine follow-up outpatient recommendation occurred from 11/2022 hospital visit\par At time of 11/2022 hospitalization: + hair loss, + weight loss, starting ~summer 2022\par Endorses history, many years ago (?30) of partial thyroidectomy.  Per patient FNA prior to this, no cancer. ?hyperthyroidism at that time\par Since that time, thyroid function was normal until fall 2022\par \par TSH suppressed on labs since 11/15/2022 with normal FT4\par Started on methimazole in 11/2022\par +MNG on thyroid US inpatient 11/2022, multiple nodules meet FNA criteria\par Endorses intermittent dysphagia that is mitigated with cutting food up into small pieces, occasional trouble swallowing\par \par Current regimen: methimazole 5mg, taking 2.5/2.5/5mg, alternating doses \par - endorses adherence to regimen, missed just one day\par \par 2. T2D\par Managed by PCP\par A1C 5.7% (2/14/23) from 7% (5/12/2022)\par Office fingerstick- 91 (fasting)\par Current regimen: Metformin 500mg daily

## 2023-04-25 NOTE — ASSESSMENT
[FreeTextEntry1] : 1. Subclinical hyperthyroidism\par TSH suppressed on labs since 11/15/2022 with normal FT4\par Started methimazole 2.5mg daily at that time\par Current regimen: methimazole 5mg, taking 2.5/2.5/5mg, alternating doses \par Hair loss has improved, as has weight loss\par Thyroid US when in hospital revealed MNG\par Counseling provided regarding thyroid disease with focus on hyperthyroidism, inclusive of normal thyroid physiology, interpretation of current thyroid functioning tests, etiology (graves disease vs toxic nodular goiter vs thyroiditis), evaluation for determination of etiology of hyperthyroidism, as well as treatment (anti-thyroid medication, radioactive iodine ablation and thyroidectomy).\par -- repeat TFTs today \par -- Thyroid uptake scan ordered, will have patient schedule once PA obtained given subclinical hyperthyroidism and MNG, 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 MNG\par 11/2022 Thyroid US with multiple nodules, including multiple that meet FNA criteria\par Given subclinical hyperthyroidism, will proceed with thyroid uptake scan to evaluate for toxic nodule \par If nodules meeting criteria are not hot nodules, will then refer for FNA for those nodules\par -- proceed with thyroid uptake scan and then will revisit need for FNA for nodules again\par \par \par Labs today, I will call/[facility] message with results\par Schedule Thyroid uptake scan once we obtain auth and follow-up 2 weeks after it is performed\par Follow up in 2-3 months\par \par Rebecca Brasher, MS, FNP-BC, [facility]\par 03/21/2023

## 2023-04-25 NOTE — ADDENDUM
[FreeTextEntry1] : 3/22/23, addendum, SG--\par I will call when all labs finalized and PA for NM thyroid uptake scan obtained (initiated via PHX with case #: [id number])\par \par 3/27/23, addendum, SG--\par VM left requesting return call to discuss labs\par TSH normalized on current MMI reigmen with normal FT4/TT3\par Neg TSI/TRAb\par Will plan for NM uptake scan, no auth REQ per insurance\par Will need to hold methimazole x 5 days before uptake scan.  I will mail her this reminder and # to call to schedule NM exam\par \par 4/4/23, addendum, SG\par Returned patients call with preferred #[phone]\par Will proceed with NM uptake scan, Order mailed to patients home address verified on file\par \par 4/25/23, addendum, SG\par Called and reviewed Thyroid uptake scan with patient\par NM Thyroid uptake scan c/w toxic MNG, hot nodule region not consistent with nodules meeting FNA criteria\par -- referral for FNA of left 2cm solid interpolar nodule, as well as both isthmus nodules (2.3 upper left and 1.9 lower right nodule)\par -- plan for repeat labs in 2 months

## 2023-05-11 ENCOUNTER — APPOINTMENT (OUTPATIENT)
Dept: INTERNAL MEDICINE | Facility: CLINIC | Age: 73
End: 2023-05-11
Payer: MEDICARE

## 2023-05-11 ENCOUNTER — EMERGENCY (EMERGENCY)
Facility: HOSPITAL | Age: 73
LOS: 1 days | Discharge: HOME | End: 2023-05-11
Attending: EMERGENCY MEDICINE | Admitting: EMERGENCY MEDICINE
Payer: MEDICARE

## 2023-05-11 VITALS
SYSTOLIC BLOOD PRESSURE: 202 MMHG | WEIGHT: 151.02 LBS | TEMPERATURE: 98 F | DIASTOLIC BLOOD PRESSURE: 84 MMHG | RESPIRATION RATE: 16 BRPM | HEART RATE: 72 BPM | OXYGEN SATURATION: 100 %

## 2023-05-11 VITALS
WEIGHT: 151 LBS | HEIGHT: 66 IN | DIASTOLIC BLOOD PRESSURE: 77 MMHG | HEART RATE: 67 BPM | BODY MASS INDEX: 24.27 KG/M2 | OXYGEN SATURATION: 100 % | RESPIRATION RATE: 14 BRPM | SYSTOLIC BLOOD PRESSURE: 175 MMHG | TEMPERATURE: 97.1 F

## 2023-05-11 VITALS
HEART RATE: 58 BPM | SYSTOLIC BLOOD PRESSURE: 192 MMHG | RESPIRATION RATE: 18 BRPM | OXYGEN SATURATION: 100 % | DIASTOLIC BLOOD PRESSURE: 74 MMHG

## 2023-05-11 DIAGNOSIS — H53.8 OTHER VISUAL DISTURBANCES: ICD-10-CM

## 2023-05-11 DIAGNOSIS — Z90.49 ACQUIRED ABSENCE OF OTHER SPECIFIED PARTS OF DIGESTIVE TRACT: Chronic | ICD-10-CM

## 2023-05-11 DIAGNOSIS — E11.9 TYPE 2 DIABETES MELLITUS WITHOUT COMPLICATIONS: ICD-10-CM

## 2023-05-11 DIAGNOSIS — Z90.49 ACQUIRED ABSENCE OF OTHER SPECIFIED PARTS OF DIGESTIVE TRACT: ICD-10-CM

## 2023-05-11 DIAGNOSIS — Z91.148 PATIENT'S OTHER NONCOMPLIANCE WITH MEDICATION REGIMEN FOR OTHER REASON: ICD-10-CM

## 2023-05-11 DIAGNOSIS — Z88.0 ALLERGY STATUS TO PENICILLIN: ICD-10-CM

## 2023-05-11 DIAGNOSIS — Z12.39 ENCOUNTER FOR OTHER SCREENING FOR MALIGNANT NEOPLASM OF BREAST: ICD-10-CM

## 2023-05-11 DIAGNOSIS — R42 DIZZINESS AND GIDDINESS: ICD-10-CM

## 2023-05-11 DIAGNOSIS — Z00.00 ENCOUNTER FOR GENERAL ADULT MEDICAL EXAMINATION W/OUT ABNORMAL FINDINGS: ICD-10-CM

## 2023-05-11 DIAGNOSIS — Z90.710 ACQUIRED ABSENCE OF BOTH CERVIX AND UTERUS: Chronic | ICD-10-CM

## 2023-05-11 DIAGNOSIS — Z90.710 ACQUIRED ABSENCE OF BOTH CERVIX AND UTERUS: ICD-10-CM

## 2023-05-11 DIAGNOSIS — Z79.84 LONG TERM (CURRENT) USE OF ORAL HYPOGLYCEMIC DRUGS: ICD-10-CM

## 2023-05-11 DIAGNOSIS — I10 ESSENTIAL (PRIMARY) HYPERTENSION: ICD-10-CM

## 2023-05-11 DIAGNOSIS — Z98.891 HISTORY OF UTERINE SCAR FROM PREVIOUS SURGERY: Chronic | ICD-10-CM

## 2023-05-11 DIAGNOSIS — R51.9 HEADACHE, UNSPECIFIED: ICD-10-CM

## 2023-05-11 LAB
ALBUMIN SERPL ELPH-MCNC: 4.2 G/DL — SIGNIFICANT CHANGE UP (ref 3.3–5)
ALP SERPL-CCNC: 100 U/L — SIGNIFICANT CHANGE UP (ref 40–120)
ALT FLD-CCNC: 13 U/L — SIGNIFICANT CHANGE UP (ref 10–45)
ANION GAP SERPL CALC-SCNC: 9 MMOL/L — SIGNIFICANT CHANGE UP (ref 5–17)
AST SERPL-CCNC: 23 U/L — SIGNIFICANT CHANGE UP (ref 10–40)
BASOPHILS # BLD AUTO: 0.02 K/UL — SIGNIFICANT CHANGE UP (ref 0–0.2)
BASOPHILS NFR BLD AUTO: 0.3 % — SIGNIFICANT CHANGE UP (ref 0–2)
BILIRUB SERPL-MCNC: 0.7 MG/DL — SIGNIFICANT CHANGE UP (ref 0.2–1.2)
BUN SERPL-MCNC: 22 MG/DL — SIGNIFICANT CHANGE UP (ref 7–23)
CALCIUM SERPL-MCNC: 9.8 MG/DL — SIGNIFICANT CHANGE UP (ref 8.4–10.5)
CHLORIDE SERPL-SCNC: 105 MMOL/L — SIGNIFICANT CHANGE UP (ref 96–108)
CO2 SERPL-SCNC: 28 MMOL/L — SIGNIFICANT CHANGE UP (ref 22–31)
CREAT SERPL-MCNC: 0.93 MG/DL — SIGNIFICANT CHANGE UP (ref 0.5–1.3)
EGFR: 66 ML/MIN/1.73M2 — SIGNIFICANT CHANGE UP
EOSINOPHIL # BLD AUTO: 0.23 K/UL — SIGNIFICANT CHANGE UP (ref 0–0.5)
EOSINOPHIL NFR BLD AUTO: 2.9 % — SIGNIFICANT CHANGE UP (ref 0–6)
GLUCOSE SERPL-MCNC: 87 MG/DL — SIGNIFICANT CHANGE UP (ref 70–99)
HCT VFR BLD CALC: 33.3 % — LOW (ref 34.5–45)
HGB BLD-MCNC: 10.3 G/DL — LOW (ref 11.5–15.5)
IMM GRANULOCYTES NFR BLD AUTO: 0.3 % — SIGNIFICANT CHANGE UP (ref 0–0.9)
LYMPHOCYTES # BLD AUTO: 1.67 K/UL — SIGNIFICANT CHANGE UP (ref 1–3.3)
LYMPHOCYTES # BLD AUTO: 21.4 % — SIGNIFICANT CHANGE UP (ref 13–44)
MAGNESIUM SERPL-MCNC: 2 MG/DL — SIGNIFICANT CHANGE UP (ref 1.6–2.6)
MCHC RBC-ENTMCNC: 27.7 PG — SIGNIFICANT CHANGE UP (ref 27–34)
MCHC RBC-ENTMCNC: 30.9 GM/DL — LOW (ref 32–36)
MCV RBC AUTO: 89.5 FL — SIGNIFICANT CHANGE UP (ref 80–100)
MONOCYTES # BLD AUTO: 0.49 K/UL — SIGNIFICANT CHANGE UP (ref 0–0.9)
MONOCYTES NFR BLD AUTO: 6.3 % — SIGNIFICANT CHANGE UP (ref 2–14)
NEUTROPHILS # BLD AUTO: 5.37 K/UL — SIGNIFICANT CHANGE UP (ref 1.8–7.4)
NEUTROPHILS NFR BLD AUTO: 68.8 % — SIGNIFICANT CHANGE UP (ref 43–77)
NRBC # BLD: 0 /100 WBCS — SIGNIFICANT CHANGE UP (ref 0–0)
NT-PROBNP SERPL-SCNC: 904 PG/ML — HIGH (ref 0–300)
PLATELET # BLD AUTO: 207 K/UL — SIGNIFICANT CHANGE UP (ref 150–400)
POTASSIUM SERPL-MCNC: 4.2 MMOL/L — SIGNIFICANT CHANGE UP (ref 3.5–5.3)
POTASSIUM SERPL-SCNC: 4.2 MMOL/L — SIGNIFICANT CHANGE UP (ref 3.5–5.3)
PROT SERPL-MCNC: 7.4 G/DL — SIGNIFICANT CHANGE UP (ref 6–8.3)
RBC # BLD: 3.72 M/UL — LOW (ref 3.8–5.2)
RBC # FLD: 13.8 % — SIGNIFICANT CHANGE UP (ref 10.3–14.5)
SODIUM SERPL-SCNC: 142 MMOL/L — SIGNIFICANT CHANGE UP (ref 135–145)
TROPONIN T SERPL-MCNC: 0.01 NG/ML — SIGNIFICANT CHANGE UP (ref 0–0.01)
WBC # BLD: 7.8 K/UL — SIGNIFICANT CHANGE UP (ref 3.8–10.5)
WBC # FLD AUTO: 7.8 K/UL — SIGNIFICANT CHANGE UP (ref 3.8–10.5)

## 2023-05-11 PROCEDURE — 85025 COMPLETE CBC W/AUTO DIFF WBC: CPT

## 2023-05-11 PROCEDURE — 36415 COLL VENOUS BLD VENIPUNCTURE: CPT

## 2023-05-11 PROCEDURE — 80053 COMPREHEN METABOLIC PANEL: CPT

## 2023-05-11 PROCEDURE — 93005 ELECTROCARDIOGRAM TRACING: CPT

## 2023-05-11 PROCEDURE — 96374 THER/PROPH/DIAG INJ IV PUSH: CPT

## 2023-05-11 PROCEDURE — 83735 ASSAY OF MAGNESIUM: CPT

## 2023-05-11 PROCEDURE — 83880 ASSAY OF NATRIURETIC PEPTIDE: CPT

## 2023-05-11 PROCEDURE — 84484 ASSAY OF TROPONIN QUANT: CPT

## 2023-05-11 PROCEDURE — 99285 EMERGENCY DEPT VISIT HI MDM: CPT

## 2023-05-11 PROCEDURE — 70450 CT HEAD/BRAIN W/O DYE: CPT | Mod: 26,MA

## 2023-05-11 PROCEDURE — G0439: CPT

## 2023-05-11 PROCEDURE — 70450 CT HEAD/BRAIN W/O DYE: CPT | Mod: MA

## 2023-05-11 PROCEDURE — 71045 X-RAY EXAM CHEST 1 VIEW: CPT | Mod: 26

## 2023-05-11 PROCEDURE — 99285 EMERGENCY DEPT VISIT HI MDM: CPT | Mod: 25

## 2023-05-11 PROCEDURE — 71045 X-RAY EXAM CHEST 1 VIEW: CPT

## 2023-05-11 RX ORDER — LABETALOL HCL 100 MG
10 TABLET ORAL ONCE
Refills: 0 | Status: COMPLETED | OUTPATIENT
Start: 2023-05-11 | End: 2023-05-11

## 2023-05-11 RX ORDER — LOSARTAN POTASSIUM 100 MG/1
50 TABLET, FILM COATED ORAL ONCE
Refills: 0 | Status: COMPLETED | OUTPATIENT
Start: 2023-05-11 | End: 2023-05-11

## 2023-05-11 RX ORDER — PROPRANOLOL HCL 160 MG
1 CAPSULE, EXTENDED RELEASE 24HR ORAL
Qty: 30 | Refills: 1
Start: 2023-05-11 | End: 2023-07-09

## 2023-05-11 RX ORDER — ACETAMINOPHEN 500 MG
975 TABLET ORAL ONCE
Refills: 0 | Status: COMPLETED | OUTPATIENT
Start: 2023-05-11 | End: 2023-05-11

## 2023-05-11 RX ORDER — LOSARTAN POTASSIUM 100 MG/1
1 TABLET, FILM COATED ORAL
Qty: 30 | Refills: 1
Start: 2023-05-11 | End: 2023-07-09

## 2023-05-11 RX ADMIN — Medication 975 MILLIGRAM(S): at 17:47

## 2023-05-11 RX ADMIN — LOSARTAN POTASSIUM 50 MILLIGRAM(S): 100 TABLET, FILM COATED ORAL at 13:22

## 2023-05-11 RX ADMIN — Medication 10 MILLIGRAM(S): at 13:21

## 2023-05-11 NOTE — ED PROVIDER NOTE - PATIENT PORTAL LINK FT
You can access the FollowMyHealth Patient Portal offered by [facility] by registering at the following website: [email or web address]. By joining [facility]’s FollowMyHealth portal, you will also be able to view your health information using other applications (apps) compatible with our system.

## 2023-05-11 NOTE — ED PROVIDER NOTE - OBJECTIVE STATEMENT
Patient with history of hypertension noncompliant with medications for last 3 months, on propranolol and losartan per records, presenting with headache dizziness and blurred vision for the past few weeks.  Today had left-sided chest pain resolved.  No other complaints. States ambulating steady, no leg swelling, facial droop, focal weakness, numbness, speech changes or any other acute complaints.

## 2023-05-11 NOTE — ED ADULT NURSE NOTE - OBJECTIVE STATEMENT
Pt w PMh HTN, DM reports headaches, dizziness, and blurred vision X5 days. Pt reports running out of her meds. Today, at her PCP office SBP was as high as 190. pt reports developing left sided CP while on her way to the ED. Pain is described as constant pins/needles that radiates to left shoulder. Pt denies SOB, cough, fevers

## 2023-05-11 NOTE — ED PROVIDER NOTE - PHYSICAL EXAMINATION
CONSTITUTIONAL: Awake, alert and in no apparent distress.  HEENT: Head is atraumatic. Eyes clear bilaterally, normal EOMI. Airway patent.  CARDIAC: Normal rate, regular rhythm.  Heart sounds S1, S2.   RESPIRATORY: Breath sounds clear and equal bilaterally. no tachypnea, respiratory distress.   GASTROINTESTINAL: Abdomen soft, non-tender, no guarding, distension.  MUSCULOSKELETAL: Spine appears normal, no midline spinal tenderness, range of motion is not limited, no muscle or joint tenderness. no bony tenderness.   NEUROLOGICAL: Alert, no focal deficits, no motor or sensory deficits.  SKIN: Skin normal color for race, warm, dry and intact. No evidence of rash.  PSYCHIATRIC: Normal mood and affect. no apparent risk to self or others.    Patient is alert, oriented x person, place and time.  Normal gait and speech.  Cerebellar testing normal:   No pronator drift.  5/5 bl upper extremity and lower extremity strength.

## 2023-05-11 NOTE — ED PROVIDER NOTE - NSFOLLOWUPINSTRUCTIONS_ED_ALL_ED_FT
Can take tylenol 650mg every 6hrs as needed for pain.  Stay well hydrated.  Return for fevers, persistent vomit, uncontrolled pain, worsening breathing, focal weakness/numbness, worsening lightheaded.  Follow up with primary doctor within 1-2 days.   Continue to take medications as prescribed.     Hypertension    Hypertension, commonly called high blood pressure, is when the force of blood pumping through your arteries is too strong. Hypertension forces your heart to work harder to pump blood. Your arteries may become narrow or stiff. Having untreated or uncontrolled hypertension for a long period of time can cause heart attack, stroke, kidney disease, and other problems. If started on a medication, take exactly as prescribed by your health care professional. Maintain a healthy lifestyle and follow up with your primary care physician.    SEEK IMMEDIATE MEDICAL CARE IF YOU HAVE ANY OF THE FOLLOWING SYMPTOMS: severe headache, confusion, chest pain, abdominal pain, vomiting, or shortness of breath.

## 2023-05-11 NOTE — ED PROVIDER NOTE - CLINICAL SUMMARY MEDICAL DECISION MAKING FREE TEXT BOX
Patient with history of hypertension noncompliant with medications for last 3 months, on propranolol and losartan per records, presenting with headache dizziness and blurred vision for the past few weeks.  Today had left-sided chest pain resolved.  No other complaints. States ambulating steady, no leg swelling, facial droop, focal weakness, numbness, speech changes or any other acute complaints.  no focal neuro deficits, CT head wnl, given home medications /tylenol improvement in headache, no sudden onset, other focal defcits, ambulating steady, trop negative, ekg no acute ischemia, advised cont'd compliance with meds, fu with pmd, strict return prec.

## 2023-05-11 NOTE — ED ADULT NURSE NOTE - NURSING GU BLADDER
non-distended/non-tender Skin Substitute Text: The defect edges were debeveled with a #15 scalpel blade.  Given the location of the defect, shape of the defect and the proximity to free margins a skin substitute graft was deemed most appropriate.  The graft material was trimmed to fit the size of the defect. The graft was then placed in the primary defect and oriented appropriately.

## 2023-05-11 NOTE — ED ADULT NURSE NOTE - NSFALLRISKINTERV_ED_ALL_ED
Assistance OOB with selected safe patient handling equipment if applicable/Assistance with ambulation/Communicate fall risk and risk factors to all staff, patient, and family/Monitor gait and stability/Provide visual cue: yellow wristband, yellow gown, etc/Reinforce activity limits and safety measures with patient and family/Call bell, personal items and telephone in reach/Instruct patient to call for assistance before getting out of bed/chair/stretcher/Non-slip footwear applied when patient is off stretcher/[place] to call system/Physically safe environment - no spills, clutter or unnecessary equipment/Purposeful Proactive Rounding/Room/bathroom lighting operational, light cord in reach

## 2023-05-12 LAB
CREAT SPEC-SCNC: 54 MG/DL
MICROALBUMIN 24H UR DL<=1MG/L-MCNC: 54 MG/DL
MICROALBUMIN/CREAT 24H UR-RTO: 995 MG/G

## 2023-05-22 ENCOUNTER — APPOINTMENT (OUTPATIENT)
Dept: INTERNAL MEDICINE | Facility: CLINIC | Age: 73
End: 2023-05-22
Payer: MEDICARE

## 2023-05-22 VITALS
HEIGHT: 66 IN | DIASTOLIC BLOOD PRESSURE: 78 MMHG | BODY MASS INDEX: 27.16 KG/M2 | TEMPERATURE: 97.2 F | WEIGHT: 169 LBS | HEART RATE: 69 BPM | SYSTOLIC BLOOD PRESSURE: 146 MMHG | OXYGEN SATURATION: 98 %

## 2023-05-22 DIAGNOSIS — I12.9 HYPERTENSIVE CHRONIC KIDNEY DISEASE WITH STAGE 1 THROUGH STAGE 4 CHRONIC KIDNEY DISEASE, OR UNSPECIFIED CHRONIC KIDNEY DISEASE: ICD-10-CM

## 2023-05-22 DIAGNOSIS — M54.50 LOW BACK PAIN, UNSPECIFIED: ICD-10-CM

## 2023-05-22 DIAGNOSIS — E66.9 OBESITY, UNSPECIFIED: ICD-10-CM

## 2023-05-22 DIAGNOSIS — G89.29 LOW BACK PAIN, UNSPECIFIED: ICD-10-CM

## 2023-05-22 PROCEDURE — 99215 OFFICE O/P EST HI 40 MIN: CPT | Mod: GC,25

## 2023-05-22 PROCEDURE — 36415 COLL VENOUS BLD VENIPUNCTURE: CPT

## 2023-05-22 RX ORDER — PANTOPRAZOLE 40 MG/1
40 TABLET, DELAYED RELEASE ORAL
Qty: 14 | Refills: 0 | Status: DISCONTINUED | COMMUNITY
Start: 2022-06-16 | End: 2023-05-22

## 2023-05-22 RX ORDER — FAMOTIDINE 20 MG/1
20 TABLET, FILM COATED ORAL
Qty: 60 | Refills: 1 | Status: DISCONTINUED | COMMUNITY
Start: 2022-07-11 | End: 2023-05-22

## 2023-05-22 RX ORDER — LOSARTAN POTASSIUM 50 MG/1
50 TABLET, FILM COATED ORAL
Qty: 90 | Refills: 1 | Status: DISCONTINUED | OUTPATIENT
Start: 2022-06-16 | End: 2023-05-22

## 2023-05-22 RX ORDER — ASPIRIN 81 MG
6.5 TABLET, DELAYED RELEASE (ENTERIC COATED) ORAL
Qty: 1 | Refills: 0 | Status: DISCONTINUED | COMMUNITY
Start: 2022-06-16 | End: 2023-05-22

## 2023-05-22 RX ORDER — FERROUS SULFATE 325(65) MG
325 (65 FE) TABLET ORAL DAILY
Qty: 30 | Refills: 3 | Status: DISCONTINUED | COMMUNITY
Start: 2022-11-08 | End: 2023-05-22

## 2023-05-23 PROBLEM — I12.9 NEPHROPATHY HYPERTENSIVE: Status: ACTIVE | Noted: 2023-05-23

## 2023-05-23 PROBLEM — E66.9 OBESITY: Status: ACTIVE | Noted: 2022-07-11

## 2023-05-25 ENCOUNTER — APPOINTMENT (OUTPATIENT)
Dept: INTERNAL MEDICINE | Facility: CLINIC | Age: 73
End: 2023-05-25
Payer: MEDICARE

## 2023-05-25 VITALS
WEIGHT: 169 LBS | BODY MASS INDEX: 27.16 KG/M2 | SYSTOLIC BLOOD PRESSURE: 136 MMHG | DIASTOLIC BLOOD PRESSURE: 75 MMHG | OXYGEN SATURATION: 100 % | HEIGHT: 66 IN | HEART RATE: 64 BPM | TEMPERATURE: 97 F

## 2023-05-25 DIAGNOSIS — R26.89 OTHER ABNORMALITIES OF GAIT AND MOBILITY: ICD-10-CM

## 2023-05-25 DIAGNOSIS — R53.81 OTHER MALAISE: ICD-10-CM

## 2023-05-25 LAB
ANION GAP SERPL CALC-SCNC: 17 MMOL/L
BUN SERPL-MCNC: 45 MG/DL
CALCIUM SERPL-MCNC: 9.9 MG/DL
CHLORIDE SERPL-SCNC: 101 MMOL/L
CO2 SERPL-SCNC: 22 MMOL/L
CREAT SERPL-MCNC: 1.17 MG/DL
EGFR: 50 ML/MIN/1.73M2
ESTIMATED AVERAGE GLUCOSE: 120 MG/DL
GLUCOSE SERPL-MCNC: 98 MG/DL
HBA1C MFR BLD HPLC: 5.8 %
POTASSIUM SERPL-SCNC: 4.6 MMOL/L
SODIUM SERPL-SCNC: 140 MMOL/L

## 2023-05-25 PROCEDURE — 99215 OFFICE O/P EST HI 40 MIN: CPT | Mod: GC

## 2023-05-25 RX ORDER — ATORVASTATIN CALCIUM 40 MG/1
40 TABLET, FILM COATED ORAL DAILY
Qty: 90 | Refills: 1 | Status: DISCONTINUED | OUTPATIENT
Start: 2022-05-12 | End: 2023-05-25

## 2023-05-25 RX ORDER — METFORMIN HYDROCHLORIDE 500 MG/1
500 TABLET, COATED ORAL DAILY
Qty: 90 | Refills: 1 | Status: DISCONTINUED | COMMUNITY
Start: 2022-05-12 | End: 2023-05-25

## 2023-05-30 ENCOUNTER — APPOINTMENT (OUTPATIENT)
Dept: ENDOCRINOLOGY | Facility: CLINIC | Age: 73
End: 2023-05-30
Payer: MEDICARE

## 2023-05-30 ENCOUNTER — RESULT REVIEW (OUTPATIENT)
Age: 73
End: 2023-05-30

## 2023-05-30 VITALS
SYSTOLIC BLOOD PRESSURE: 153 MMHG | BODY MASS INDEX: 27.44 KG/M2 | HEART RATE: 77 BPM | DIASTOLIC BLOOD PRESSURE: 82 MMHG | WEIGHT: 170 LBS

## 2023-05-30 PROCEDURE — 10005 FNA BX W/US GDN 1ST LES: CPT

## 2023-05-30 PROCEDURE — 99214 OFFICE O/P EST MOD 30 MIN: CPT | Mod: 25

## 2023-05-30 PROCEDURE — 10006 FNA BX W/US GDN EA ADDL: CPT

## 2023-06-02 NOTE — ASSESSMENT
[FreeTextEntry1] : Thyroid nodule:\par \par Left mid lobe nodule measuring 2 x 1.4 x 1.5 cm was successfully biopsied under sonographic guidance. An extra sample for PRN Afirma testing was obtained if indeterminate results are present. r/b/a to thyroid biopsy, management of thyroid nodules reviewed. Verbalized understanding and agrees with treatment plan, will contact MD and seek emergency medical care if condition changes.\par \par \par Isthmus nodule measuring 1.8 x 2.3 x 2.3 cm was successfully biopsied under sonographic guidance. An extra sample for PRN Afirma testing was obtained if indeterminate results are present. r/b/a to thyroid biopsy, management of thyroid nodules reviewed. Verbalized understanding and agrees with treatment plan, will contact MD and seek emergency medical care if condition changes.\par

## 2023-06-05 ENCOUNTER — APPOINTMENT (OUTPATIENT)
Dept: SURGERY | Facility: CLINIC | Age: 73
End: 2023-06-05

## 2023-06-08 ENCOUNTER — APPOINTMENT (OUTPATIENT)
Dept: INTERNAL MEDICINE | Facility: CLINIC | Age: 73
End: 2023-06-08

## 2023-06-22 ENCOUNTER — APPOINTMENT (OUTPATIENT)
Dept: INTERNAL MEDICINE | Facility: CLINIC | Age: 73
End: 2023-06-22
Payer: MEDICARE

## 2023-06-22 VITALS
SYSTOLIC BLOOD PRESSURE: 145 MMHG | OXYGEN SATURATION: 99 % | BODY MASS INDEX: 35.68 KG/M2 | HEART RATE: 68 BPM | TEMPERATURE: 97 F | HEIGHT: 66 IN | DIASTOLIC BLOOD PRESSURE: 81 MMHG | WEIGHT: 222 LBS

## 2023-06-22 VITALS
DIASTOLIC BLOOD PRESSURE: 79 MMHG | BODY MASS INDEX: 28.45 KG/M2 | WEIGHT: 177 LBS | HEART RATE: 63 BPM | HEIGHT: 66 IN | TEMPERATURE: 97.2 F | OXYGEN SATURATION: 100 % | SYSTOLIC BLOOD PRESSURE: 137 MMHG

## 2023-06-22 DIAGNOSIS — N17.9 ACUTE KIDNEY FAILURE, UNSPECIFIED: ICD-10-CM

## 2023-06-22 DIAGNOSIS — K56.609 UNSPECIFIED INTESTINAL OBSTRUCTION, UNSPECIFIED AS TO PARTIAL VERSUS COMPLETE OBSTRUCTION: ICD-10-CM

## 2023-06-22 PROCEDURE — 99215 OFFICE O/P EST HI 40 MIN: CPT | Mod: GC

## 2023-06-26 ENCOUNTER — NON-APPOINTMENT (OUTPATIENT)
Age: 73
End: 2023-06-26

## 2023-06-30 ENCOUNTER — OUTPATIENT (OUTPATIENT)
Dept: OUTPATIENT SERVICES | Facility: HOSPITAL | Age: 73
LOS: 1 days | End: 2023-06-30
Payer: MEDICARE

## 2023-06-30 ENCOUNTER — APPOINTMENT (OUTPATIENT)
Dept: ULTRASOUND IMAGING | Facility: HOSPITAL | Age: 73
End: 2023-06-30
Payer: MEDICARE

## 2023-06-30 DIAGNOSIS — Z98.891 HISTORY OF UTERINE SCAR FROM PREVIOUS SURGERY: Chronic | ICD-10-CM

## 2023-06-30 DIAGNOSIS — Z90.49 ACQUIRED ABSENCE OF OTHER SPECIFIED PARTS OF DIGESTIVE TRACT: Chronic | ICD-10-CM

## 2023-06-30 DIAGNOSIS — Z90.710 ACQUIRED ABSENCE OF BOTH CERVIX AND UTERUS: Chronic | ICD-10-CM

## 2023-06-30 PROCEDURE — 93880 EXTRACRANIAL BILAT STUDY: CPT

## 2023-06-30 PROCEDURE — 93880 EXTRACRANIAL BILAT STUDY: CPT | Mod: 26

## 2023-07-03 ENCOUNTER — NON-APPOINTMENT (OUTPATIENT)
Age: 73
End: 2023-07-03

## 2023-07-10 ENCOUNTER — APPOINTMENT (OUTPATIENT)
Dept: SURGERY | Facility: CLINIC | Age: 73
End: 2023-07-10

## 2023-07-18 ENCOUNTER — APPOINTMENT (OUTPATIENT)
Dept: INTERNAL MEDICINE | Facility: CLINIC | Age: 73
End: 2023-07-18

## 2023-07-27 NOTE — ED ADULT NURSE NOTE - NSIMPLEMENTINTERV_GEN_ALL_ED
Clear Implemented All Universal Safety Interventions:  [place] to call system. Call bell, personal items and telephone within reach. Instruct patient to call for assistance. Room bathroom lighting operational. Non-slip footwear when patient is off stretcher. Physically safe environment: no spills, clutter or unnecessary equipment. Stretcher in lowest position, wheels locked, appropriate side rails in place.

## 2023-08-18 ENCOUNTER — APPOINTMENT (OUTPATIENT)
Dept: ENDOCRINOLOGY | Facility: CLINIC | Age: 73
End: 2023-08-18

## 2023-09-10 ENCOUNTER — LABORATORY RESULT (OUTPATIENT)
Age: 73
End: 2023-09-10

## 2023-09-11 ENCOUNTER — APPOINTMENT (OUTPATIENT)
Dept: INTERNAL MEDICINE | Facility: CLINIC | Age: 73
End: 2023-09-11
Payer: MEDICARE

## 2023-09-11 VITALS
SYSTOLIC BLOOD PRESSURE: 147 MMHG | HEART RATE: 73 BPM | OXYGEN SATURATION: 100 % | HEIGHT: 66 IN | WEIGHT: 184 LBS | BODY MASS INDEX: 29.57 KG/M2 | DIASTOLIC BLOOD PRESSURE: 73 MMHG | TEMPERATURE: 97.2 F

## 2023-09-11 PROCEDURE — 99214 OFFICE O/P EST MOD 30 MIN: CPT | Mod: 25,GC

## 2023-09-11 PROCEDURE — 36415 COLL VENOUS BLD VENIPUNCTURE: CPT

## 2023-09-25 ENCOUNTER — APPOINTMENT (OUTPATIENT)
Dept: INTERNAL MEDICINE | Facility: CLINIC | Age: 73
End: 2023-09-25
Payer: MEDICARE

## 2023-09-25 VITALS
BODY MASS INDEX: 29.25 KG/M2 | OXYGEN SATURATION: 100 % | SYSTOLIC BLOOD PRESSURE: 138 MMHG | TEMPERATURE: 97.2 F | WEIGHT: 182 LBS | HEIGHT: 66 IN | DIASTOLIC BLOOD PRESSURE: 78 MMHG | HEART RATE: 67 BPM

## 2023-09-25 DIAGNOSIS — Z23 ENCOUNTER FOR IMMUNIZATION: ICD-10-CM

## 2023-09-25 PROCEDURE — 90662 IIV NO PRSV INCREASED AG IM: CPT

## 2023-09-25 PROCEDURE — G0008: CPT

## 2023-09-25 PROCEDURE — 90677 PCV20 VACCINE IM: CPT

## 2023-09-25 PROCEDURE — G0009: CPT

## 2023-09-25 PROCEDURE — 99214 OFFICE O/P EST MOD 30 MIN: CPT | Mod: 25,GC

## 2023-09-25 RX ORDER — LOSARTAN POTASSIUM 50 MG/1
50 TABLET, FILM COATED ORAL DAILY
Qty: 30 | Refills: 3 | Status: DISCONTINUED | COMMUNITY
Start: 2023-06-23 | End: 2023-09-25

## 2023-10-11 ENCOUNTER — APPOINTMENT (OUTPATIENT)
Dept: MRI IMAGING | Facility: HOSPITAL | Age: 73
End: 2023-10-11

## 2023-10-11 ENCOUNTER — APPOINTMENT (OUTPATIENT)
Dept: CT IMAGING | Facility: HOSPITAL | Age: 73
End: 2023-10-11

## 2023-10-16 ENCOUNTER — APPOINTMENT (OUTPATIENT)
Dept: INTERNAL MEDICINE | Facility: CLINIC | Age: 73
End: 2023-10-16
Payer: MEDICARE

## 2023-10-16 VITALS
WEIGHT: 189 LBS | OXYGEN SATURATION: 98 % | BODY MASS INDEX: 30.37 KG/M2 | SYSTOLIC BLOOD PRESSURE: 138 MMHG | TEMPERATURE: 98 F | HEART RATE: 66 BPM | HEIGHT: 66 IN | DIASTOLIC BLOOD PRESSURE: 76 MMHG

## 2023-10-16 DIAGNOSIS — Z23 ENCOUNTER FOR IMMUNIZATION: ICD-10-CM

## 2023-10-16 DIAGNOSIS — I10 ESSENTIAL (PRIMARY) HYPERTENSION: ICD-10-CM

## 2023-10-16 DIAGNOSIS — R42 DIZZINESS AND GIDDINESS: ICD-10-CM

## 2023-10-16 DIAGNOSIS — R26.81 UNSTEADINESS ON FEET: ICD-10-CM

## 2023-10-16 DIAGNOSIS — D64.9 ANEMIA, UNSPECIFIED: ICD-10-CM

## 2023-10-16 PROCEDURE — 90715 TDAP VACCINE 7 YRS/> IM: CPT

## 2023-10-16 PROCEDURE — 90472 IMMUNIZATION ADMIN EACH ADD: CPT

## 2023-10-16 PROCEDURE — 36415 COLL VENOUS BLD VENIPUNCTURE: CPT

## 2023-10-16 PROCEDURE — 99215 OFFICE O/P EST HI 40 MIN: CPT | Mod: GC,25

## 2023-10-18 PROBLEM — Z23 NEED FOR TDAP VACCINATION: Status: ACTIVE | Noted: 2023-10-16

## 2023-10-18 PROBLEM — R42 DIZZINESS: Status: ACTIVE | Noted: 2023-05-27

## 2023-10-18 PROBLEM — R26.81 GAIT INSTABILITY: Status: ACTIVE | Noted: 2023-02-14

## 2023-10-18 PROBLEM — I10 ESSENTIAL HYPERTENSION: Status: ACTIVE | Noted: 2022-05-12

## 2023-10-19 LAB
BASOPHILS # BLD AUTO: 0.04 K/UL
BASOPHILS NFR BLD AUTO: 0.6 %
EOSINOPHIL # BLD AUTO: 0.23 K/UL
EOSINOPHIL NFR BLD AUTO: 3.2 %
ESTIMATED AVERAGE GLUCOSE: 137 MG/DL
FERRITIN SERPL-MCNC: 130 NG/ML
HBA1C MFR BLD HPLC: 6.4 %
HCT VFR BLD CALC: 34.6 %
HGB BLD-MCNC: 10.4 G/DL
IMM GRANULOCYTES NFR BLD AUTO: 0.6 %
IRON SATN MFR SERPL: 23 %
IRON SERPL-MCNC: 71 UG/DL
LYMPHOCYTES # BLD AUTO: 1.71 K/UL
LYMPHOCYTES NFR BLD AUTO: 23.7 %
MAN DIFF?: NORMAL
MCHC RBC-ENTMCNC: 27.9 PG
MCHC RBC-ENTMCNC: 30.1 GM/DL
MCV RBC AUTO: 92.8 FL
MONOCYTES # BLD AUTO: 0.5 K/UL
MONOCYTES NFR BLD AUTO: 6.9 %
NEUTROPHILS # BLD AUTO: 4.71 K/UL
NEUTROPHILS NFR BLD AUTO: 65 %
PLATELET # BLD AUTO: 196 K/UL
RBC # BLD: 3.73 M/UL
RBC # FLD: 14.5 %
T4 FREE SERPL-MCNC: 1.1 NG/DL
TIBC SERPL-MCNC: 313 UG/DL
TRANSFERRIN SERPL-MCNC: 255 MG/DL
TSH SERPL-ACNC: 2.68 UIU/ML
UIBC SERPL-MCNC: 243 UG/DL
WBC # FLD AUTO: 7.23 K/UL

## 2023-10-25 ENCOUNTER — APPOINTMENT (OUTPATIENT)
Dept: BARIATRICS | Facility: CLINIC | Age: 73
End: 2023-10-25
Payer: MEDICARE

## 2023-10-25 VITALS
TEMPERATURE: 97.9 F | SYSTOLIC BLOOD PRESSURE: 152 MMHG | WEIGHT: 188 LBS | HEART RATE: 69 BPM | OXYGEN SATURATION: 97 % | DIASTOLIC BLOOD PRESSURE: 73 MMHG | HEIGHT: 66 IN | BODY MASS INDEX: 30.22 KG/M2

## 2023-10-25 DIAGNOSIS — R59.0 LOCALIZED ENLARGED LYMPH NODES: ICD-10-CM

## 2023-10-25 PROCEDURE — 99204 OFFICE O/P NEW MOD 45 MIN: CPT

## 2023-11-02 NOTE — H&P ADULT - NSHPSOCIALHISTORY_GEN_ALL_CORE
[address]                                OPERATIVE REPORT    PATIENT NAME: Na Ding                    :        1942  MED REC NO:   [id number]                             ROOM:  ACCOUNT NO:   [de-identified]                           ADMIT DATE: 2023  PROVIDER:     Lara Pressley MD    DATE OF PROCEDURE:  2023    SURGEON:  Lara Pressley MD    ANESTHESIA:  General inhalation. PREOPERATIVE DIAGNOSIS:  Lumbar stenosis L2 and L3 with neurogenic  claudication and radiculopathy. POSTOPERATIVE DIAGNOSIS:  Lumbar stenosis L2 and L3 with neurogenic  claudication and radiculopathy. PROCEDURE:  Decompressive lumbar laminectomy L2 and L3 with bilateral  partial medial facetectomies, L2-L3. INDICATION:  The patient is a 60-year-old gentleman with significant  pain and tenderness, neurogenic claudication and radiculopathy with  severe weakness to the proximal thigh who had failed a nonoperative  treatment program and presents for surgical treatment. MRI scan showed  significant and severe stenosis due to disc herniation and also  significant hypertrophic ligamentum flavum and facet capsule with severe  impingement along the L3 root. The surgical procedure, risks, benefits  and complications were discussed with good comprehension and informed  consent obtained. NARRATIVE PROCEDURE:  The patient was brought in the operating room,  placed under appropriate general anesthesia, and transferred to the  operating table in the prone position on a Joseluis frame. Bony  prominences, axillae and eyes were protected. Perioperative antibiotics  were given prior to incision time, and VTE prophylaxis done  intraoperatively through SCD cuffs. Back was prepped and draped in the  usual fashion. Time-out performed. We identified the level through his prior incision.   We infiltrated the  skin with
No tobacco

## 2023-11-16 ENCOUNTER — APPOINTMENT (OUTPATIENT)
Dept: INTERNAL MEDICINE | Facility: CLINIC | Age: 73
End: 2023-11-16
Payer: MEDICARE

## 2023-11-16 VITALS — HEART RATE: 64 BPM | DIASTOLIC BLOOD PRESSURE: 82 MMHG | TEMPERATURE: 97.6 F | SYSTOLIC BLOOD PRESSURE: 130 MMHG

## 2023-11-16 DIAGNOSIS — Z13.820 ENCOUNTER FOR SCREENING FOR OSTEOPOROSIS: ICD-10-CM

## 2023-11-16 DIAGNOSIS — M25.549 PAIN IN JOINTS OF UNSPECIFIED HAND: ICD-10-CM

## 2023-11-16 DIAGNOSIS — Z98.890 OTHER SPECIFIED POSTPROCEDURAL STATES: ICD-10-CM

## 2023-11-16 DIAGNOSIS — Z86.2 PERSONAL HISTORY OF DISEASES OF THE BLOOD AND BLOOD-FORMING ORGANS AND CERTAIN DISORDERS INVOLVING THE IMMUNE MECHANISM: ICD-10-CM

## 2023-11-16 DIAGNOSIS — G47.00 INSOMNIA, UNSPECIFIED: ICD-10-CM

## 2023-11-16 DIAGNOSIS — D64.9 ANEMIA, UNSPECIFIED: ICD-10-CM

## 2023-11-16 DIAGNOSIS — Z87.19 OTHER SPECIFIED POSTPROCEDURAL STATES: ICD-10-CM

## 2023-11-16 DIAGNOSIS — I63.81 OTHER CEREBRAL INFARCTION DUE TO OCCLUSION OR STENOSIS OF SMALL ARTERY: ICD-10-CM

## 2023-11-16 PROCEDURE — 36415 COLL VENOUS BLD VENIPUNCTURE: CPT

## 2023-11-16 PROCEDURE — 99214 OFFICE O/P EST MOD 30 MIN: CPT | Mod: 25

## 2023-11-16 RX ORDER — DICLOFENAC SODIUM 1% 10 MG/G
1 GEL TOPICAL DAILY
Qty: 1 | Refills: 2 | Status: ACTIVE | COMMUNITY
Start: 2023-11-16 | End: 1900-01-01

## 2023-11-16 RX ORDER — GLUCOSAMINE HCL/CHONDROITIN SU 500-400 MG
3 CAPSULE ORAL AT BEDTIME
Qty: 30 | Refills: 3 | Status: ACTIVE | COMMUNITY
Start: 2023-11-16 | End: 1900-01-01

## 2023-11-17 ENCOUNTER — NON-APPOINTMENT (OUTPATIENT)
Age: 73
End: 2023-11-17

## 2023-11-17 RX ORDER — ATORVASTATIN CALCIUM 10 MG/1
10 TABLET, FILM COATED ORAL
Qty: 90 | Refills: 2 | Status: DISCONTINUED | COMMUNITY
Start: 2023-11-16 | End: 2023-11-17

## 2023-11-21 LAB
CHOLEST SERPL-MCNC: 208 MG/DL
HDLC SERPL-MCNC: 67 MG/DL
LDLC SERPL CALC-MCNC: 128 MG/DL
NONHDLC SERPL-MCNC: 142 MG/DL
TRIGL SERPL-MCNC: 78 MG/DL

## 2023-12-08 NOTE — ED PROVIDER NOTE - CARE PLAN
Nasal packing removed by MD, however, Pt began bleeding immediately after packing was removed. Packing replaced by MD. Pt denies dizziness/lightheadedness. Instructed to utilize call light if she begins to bleed through the packing   Principal Discharge DX:	Chest pain   1

## 2023-12-14 ENCOUNTER — APPOINTMENT (OUTPATIENT)
Dept: ENDOCRINOLOGY | Facility: CLINIC | Age: 73
End: 2023-12-14
Payer: MEDICARE

## 2023-12-14 VITALS
BODY MASS INDEX: 31.47 KG/M2 | SYSTOLIC BLOOD PRESSURE: 175 MMHG | DIASTOLIC BLOOD PRESSURE: 84 MMHG | WEIGHT: 195 LBS | HEART RATE: 75 BPM

## 2023-12-14 PROCEDURE — 99213 OFFICE O/P EST LOW 20 MIN: CPT

## 2023-12-15 LAB
ALBUMIN SERPL ELPH-MCNC: 4.1 G/DL
ALP BLD-CCNC: 92 U/L
ALT SERPL-CCNC: 11 U/L
ANION GAP SERPL CALC-SCNC: 12 MMOL/L
AST SERPL-CCNC: 15 U/L
BILIRUB SERPL-MCNC: 0.3 MG/DL
BUN SERPL-MCNC: 34 MG/DL
CALCIUM SERPL-MCNC: 9.5 MG/DL
CHLORIDE SERPL-SCNC: 104 MMOL/L
CO2 SERPL-SCNC: 25 MMOL/L
CREAT SERPL-MCNC: 1.37 MG/DL
EGFR: 41 ML/MIN/1.73M2
GLUCOSE SERPL-MCNC: 92 MG/DL
HCT VFR BLD CALC: 34 %
HGB BLD-MCNC: 10.4 G/DL
MCHC RBC-ENTMCNC: 27.7 PG
MCHC RBC-ENTMCNC: 30.6 GM/DL
MCV RBC AUTO: 90.4 FL
PLATELET # BLD AUTO: 206 K/UL
POTASSIUM SERPL-SCNC: 4.8 MMOL/L
PROT SERPL-MCNC: 7.2 G/DL
RBC # BLD: 3.76 M/UL
RBC # FLD: 13.8 %
SODIUM SERPL-SCNC: 141 MMOL/L
T3 SERPL-MCNC: 110 NG/DL
T4 FREE SERPL-MCNC: 0.9 NG/DL
TSH SERPL-ACNC: 1.56 UIU/ML
WBC # FLD AUTO: 9.46 K/UL

## 2023-12-15 NOTE — HISTORY OF PRESENT ILLNESS
[FreeTextEntry1] : Ms. WHELAN is a 73 year female with pmhx of T2D, HTN, HLD, thyroid nodules, anemia who presents for follow-up.  Last (initial) visit, 3/21/2023 with myself  Interval change: Since last visit repeat TFTs in March and October euthyroid on current methimazole regimen S/p Thyroid uptake scan in April 2023 c/w toxic MNG (hot nodule was not c/w nodules that met criteria for FNA) S/p FNA in May 2023 with Dr Reyes of left mid nodule (2 x 1.4 x 1.5cm) and isthmus nodule of 1.8 x 2.3 x 2.3cm, [place] II (benign) Continues current methimazole regimen, tolerating this regimen well Endorses needing to chew meat well to not choke No positional dyspnea, occasionally SOB with exertion +fatigue r/t not sleeping well (mice in apartment)   Current regimen: Methimazole 2.5mg/2.5mg/5mg alternating dose  1. Hyperthyroidism Hospitalized in 9/2022 for abdominal pain, per patient informed to FU with endocrine after this visit With HIE review, endocrine follow-up outpatient recommendation occurred from 11/2022 hospital visit At time of 11/2022 hospitalization: + hair loss, + weight loss, starting ~summer 2022 Endorses history, many years ago (?30) of partial thyroidectomy.  Per patient FNA prior to this, no cancer. ?hyperthyroidism at that time Since that time, thyroid function was normal until fall 2022 TSH suppressed on labs since 11/15/2022 with normal FT4 Started on methimazole in 11/2022 +MNG on thyroid US inpatient 11/2022, multiple nodules meet FNA criteria Endorses intermittent dysphagia that is mitigated with cutting food up into small pieces, occasional trouble swallowing  2. T2D Managed by PCP A1C 5.7% (2/14/23) from 7% (5/12/2022) Office fingerstick- 91 (fasting) Current regimen: Metformin 500mg daily

## 2023-12-15 NOTE — ADDENDUM
[FreeTextEntry1] : 12/14/23, addendum, SG Called and reviewed labs with patient  TFT at goal on current methimazole 2.5/2.5/5mg alt dosing CMP and CBC stable -- continue current regimen and FU in 4 -6 months

## 2023-12-15 NOTE — ASSESSMENT
[FreeTextEntry1] : 1. Subclinical hyperthyroidism TSH suppressed on labs since 11/15/2022 with normal FT4 Started methimazole 2.5mg daily at that time Current regimen: methimazole 5mg, taking 2.5/2.5/5mg, alternating doses Hair loss has improved, as has weight loss on current regimen Most recent TFT with TSH and FT4 at goal Thyroid US when in hospital revealed MNG S/p Thyroid uptake scan in April 2023 c/w toxic MNG (hot nodule was not c/w nodules that met criteria for FNA) S/p FNA in May 2023 with Dr Reyes of left mid nodule (2 x 1.4 x 1.5cm) and isthmus nodule of 1.8 x 2.3 x 2.3cm, [place] II (benign) -- repeat TFTs today -- continue current methimazole regimen for now  2. MNG 11/2022 Thyroid US with multiple nodules, including multiple that meet FNA criteria S/p Thyroid uptake scan in April 2023 c/w toxic MNG (hot nodule was not c/w nodules that met criteria for FNA) S/p FNA in May 2023 with Dr Reyes of left mid nodule (2 x 1.4 x 1.5cm) and isthmus nodule of 1.8 x 2.3 x 2.3cm, [place] II (benign) -- continue plan for interval surveillance in Spring 2024 (~1y following bx)  Labs today, I will call/[facility] message with results  Follow up in 4 months, or sooner, as needed  Rebecca Brasher MS, FNP-BC, [facility] 12/14/2023

## 2023-12-15 NOTE — PHYSICAL EXAM
[Alert] : alert [No Acute Distress] : no acute distress [Normal Voice/Communication] : normal voice communication [Normal Hearing] : hearing was normal [No Respiratory Distress] : no respiratory distress [Normal Rate and Effort] : normal respiratory rate and effort [Oriented x3] : oriented to person, place, and time [Normal Insight/Judgement] : insight and judgment were intact [de-identified] : slow gait, prefers to not use walker.

## 2023-12-16 ENCOUNTER — APPOINTMENT (OUTPATIENT)
Dept: CT IMAGING | Facility: HOSPITAL | Age: 73
End: 2023-12-16

## 2023-12-16 ENCOUNTER — RESULT REVIEW (OUTPATIENT)
Age: 73
End: 2023-12-16

## 2023-12-16 ENCOUNTER — OUTPATIENT (OUTPATIENT)
Dept: OUTPATIENT SERVICES | Facility: HOSPITAL | Age: 73
LOS: 1 days | End: 2023-12-16
Payer: MEDICARE

## 2023-12-16 DIAGNOSIS — Z90.49 ACQUIRED ABSENCE OF OTHER SPECIFIED PARTS OF DIGESTIVE TRACT: Chronic | ICD-10-CM

## 2023-12-16 DIAGNOSIS — Z98.891 HISTORY OF UTERINE SCAR FROM PREVIOUS SURGERY: Chronic | ICD-10-CM

## 2023-12-16 DIAGNOSIS — Z90.710 ACQUIRED ABSENCE OF BOTH CERVIX AND UTERUS: Chronic | ICD-10-CM

## 2023-12-16 PROCEDURE — 82565 ASSAY OF CREATININE: CPT

## 2023-12-16 PROCEDURE — 74177 CT ABD & PELVIS W/CONTRAST: CPT | Mod: 26

## 2023-12-16 PROCEDURE — 74177 CT ABD & PELVIS W/CONTRAST: CPT

## 2023-12-20 ENCOUNTER — APPOINTMENT (OUTPATIENT)
Dept: BARIATRICS | Facility: CLINIC | Age: 73
End: 2023-12-20
Payer: MEDICARE

## 2023-12-20 VITALS
SYSTOLIC BLOOD PRESSURE: 136 MMHG | WEIGHT: 193 LBS | BODY MASS INDEX: 31.02 KG/M2 | DIASTOLIC BLOOD PRESSURE: 78 MMHG | TEMPERATURE: 97.3 F | HEART RATE: 67 BPM | OXYGEN SATURATION: 97 % | HEIGHT: 66 IN

## 2023-12-20 DIAGNOSIS — K58.9 IRRITABLE BOWEL SYNDROME W/OUT DIARRHEA: ICD-10-CM

## 2023-12-20 LAB
POCT ISTAT CREATININE: 1.3 MG/DL — SIGNIFICANT CHANGE UP (ref 0.5–1.3)
POCT ISTAT CREATININE: 1.3 MG/DL — SIGNIFICANT CHANGE UP (ref 0.5–1.3)

## 2023-12-20 PROCEDURE — 99214 OFFICE O/P EST MOD 30 MIN: CPT

## 2023-12-20 NOTE — ASSESSMENT
[FreeTextEntry1] : Sandy Kam is a 73 year old woman with significant medical and surgical history including HTN, HLD, DM2, hyperthyroidism s/p partial resection 40 years ago, toxic multinodular goiter, lacunar infarct, history of incarcerated ventral hernia in September 2022 for which she underwent exploratory laparotomy, lysis of adhesions, small bowel resection, and primary hernia repair with myofasciocutaneous advancement flaps and no mesh (Dr. So) who presents now for complaints of generalized abdominal pain.  No acute findings on clinical exam, no evidence of incisional hernia or obstruction CT A/P obtained which demonstrates no obstruction or hernia recurrence, no clear abdominal pathology Will refer to GI for further evaluation of abdominal pain Encourage to follow-up with medicine regarding dizziness and fatigue  I, Dr. Carri Patten, spent 40 minutes with the patient >50% counseling/coordination of care including, reviewing the patient's history, performing an examination, reviewing relevant labs and radiographic imaging, reviewing PCP and consultant notes, discussion of medical and surgical management of the diagnosis as well as associated risks and benefits, and completing documentation.[id number]

## 2023-12-20 NOTE — HISTORY OF PRESENT ILLNESS
[de-identified] : Sandy Kam is a 73 year old woman with significant medical and surgical history including HTN, HLD, DM2, hyperthyroidism s/p partial resection 40 years ago, toxic multinodular goiter, lacunar infarct, history of incarcerated ventral hernia in September 2022 for which she underwent exploratory laparotomy, lysis of adhesions, small bowel resection, and primary hernia repair with myofasciocutaneous advancement flaps and no mesh (Dr. So) who presents now for complaints of generalized abdominal pain. Patient reports she has had "twisting" sensation and abdominal discomfort since her surgery. She reports she has continued to have intermittent periumbilical and lower pelvic pain that comes and goes, not associated with any particular triggers. She reports she is tolerating oral intake and having bowel function, no evidence of obstruction or obstipation. No fevers, chills, chest pain, dyspnea, dysuria, hematochezia, melena. At time of evaluation, afebrile, hemodynamically stable, abdomen is soft, nondistended, previous midline laparotomy incision is well healed, no appreciable hernias or masses or evidence of infection. Since last visit, we obtained CT imaging which demonstrated no evidence of obstruction or recurrent hernia.

## 2023-12-21 ENCOUNTER — NON-APPOINTMENT (OUTPATIENT)
Age: 73
End: 2023-12-21

## 2024-01-11 ENCOUNTER — APPOINTMENT (OUTPATIENT)
Dept: INTERNAL MEDICINE | Facility: CLINIC | Age: 74
End: 2024-01-11

## 2024-01-23 ENCOUNTER — APPOINTMENT (OUTPATIENT)
Dept: INTERNAL MEDICINE | Facility: CLINIC | Age: 74
End: 2024-01-23

## 2024-02-14 ENCOUNTER — APPOINTMENT (OUTPATIENT)
Dept: GASTROENTEROLOGY | Facility: CLINIC | Age: 74
End: 2024-02-14

## 2024-03-07 NOTE — ED ADULT NURSE NOTE - NS ED NURSE LEVEL OF CONSCIOUSNESS MENTAL STATUS
[facility]  Bobby Mcdonald PA-C  [address] [place] [address]  [phone]      INTERVAL HPI/OVERNIGHT EVENTS:  Pt s/e  No new GI events    MEDICATIONS  (STANDING):  amLODIPine   Tablet 10 milliGRAM(s) Oral daily  apixaban 10 milliGRAM(s) Oral every 12 hours  budesonide 160 MICROgram(s)/formoterol 4.5 MICROgram(s) Inhaler 2 Puff(s) Inhalation two times a day  influenza  Vaccine (HIGH DOSE) 0.7 milliLiter(s) IntraMuscular once  pantoprazole    Tablet 40 milliGRAM(s) Oral before breakfast  tiotropium 2.5 MICROgram(s) Inhaler 2 Puff(s) Inhalation daily    MEDICATIONS  (PRN):  acetaminophen     Tablet .. 650 milliGRAM(s) Oral every 6 hours PRN Mild Pain (1 - 3), Moderate Pain (4 - 6)  albuterol    90 MICROgram(s) HFA Inhaler 2 Puff(s) Inhalation every 6 hours PRN Shortness of Breath and/or Wheezing  ALPRAZolam 0.25 milliGRAM(s) Oral daily PRN for anxiety      Allergies    No Known Allergies    Intolerances        PHYSICAL EXAM:   Vital Signs:  Vital Signs Last 24 Hrs  T(C): 36.6 (07 Mar 2024 05:30), Max: 37.2 (06 Mar 2024 13:46)  T(F): 97.9 (07 Mar 2024 05:30), Max: 98.9 (06 Mar 2024 13:46)  HR: 94 (07 Mar 2024 05:30) (92 - 114)  BP: 127/68 (07 Mar 2024 05:30) (121/72 - 127/68)  BP(mean): --  RR: 19 (07 Mar 2024 05:30) (18 - 20)  SpO2: 95% (07 Mar 2024 05:30) (86% - 95%)    Parameters below as of 07 Mar 2024 05:30  Patient On (Oxygen Delivery Method): nasal cannula  O2 Flow (L/min): 2    Daily     Daily Weight in k.3 (07 Mar 2024 05:30)    GENERAL:  Appears stated age  HEENT:  NC/AT  CHEST:  Full & symmetric excursion  HEART:  Regular rhythm  ABDOMEN:  Soft, non-tender, non-distended  EXTEREMITIES:  no cyanosis  SKIN:  No rash  NEURO:  Alert      LABS:                        9.2    5.78  )-----------( 339      ( 07 Mar 2024 07:33 )             30.9     03-07    144  |  106  |  5<L>  ----------------------------<  130<H>  3.6   |  32<H>  |  0.68    Ca    9.7      07 Mar 2024 07:33  Phos  3.0     03-06  Mg     1.8     03-06      PT/INR - ( 06 Mar 2024 04:50 )   PT: 17.5 sec;   INR: 1.51 ratio         PTT - ( 06 Mar 2024 11:35 )  PTT:44.0 sec  Urinalysis Basic - ( 07 Mar 2024 07:33 )    Color: x / Appearance: x / SG: x / pH: x  Gluc: 130 mg/dL / Ketone: x  / Bili: x / Urobili: x   Blood: x / Protein: x / Nitrite: x   Leuk Esterase: x / RBC: x / WBC x   Sq Epi: x / Non Sq Epi: x / Bacteria: x        RADIOLOGY & ADDITIONAL TESTS:   Awake

## 2024-03-28 ENCOUNTER — APPOINTMENT (OUTPATIENT)
Dept: INTERNAL MEDICINE | Facility: CLINIC | Age: 74
End: 2024-03-28

## 2024-04-04 ENCOUNTER — APPOINTMENT (OUTPATIENT)
Dept: INTERNAL MEDICINE | Facility: CLINIC | Age: 74
End: 2024-04-04
Payer: MEDICARE

## 2024-04-04 VITALS
TEMPERATURE: 97 F | SYSTOLIC BLOOD PRESSURE: 137 MMHG | HEIGHT: 66 IN | DIASTOLIC BLOOD PRESSURE: 79 MMHG | BODY MASS INDEX: 31.82 KG/M2 | WEIGHT: 198 LBS | HEART RATE: 77 BPM | OXYGEN SATURATION: 100 %

## 2024-04-04 DIAGNOSIS — E05.90 THYROTOXICOSIS, UNSPECIFIED W/OUT THYROTOXIC CRISIS OR STORM: ICD-10-CM

## 2024-04-04 DIAGNOSIS — E78.5 HYPERLIPIDEMIA, UNSPECIFIED: ICD-10-CM

## 2024-04-04 DIAGNOSIS — R10.9 UNSPECIFIED ABDOMINAL PAIN: ICD-10-CM

## 2024-04-04 DIAGNOSIS — I10 ESSENTIAL (PRIMARY) HYPERTENSION: ICD-10-CM

## 2024-04-04 DIAGNOSIS — R13.10 DYSPHAGIA, UNSPECIFIED: ICD-10-CM

## 2024-04-04 PROCEDURE — G2211 COMPLEX E/M VISIT ADD ON: CPT | Mod: NC,1L

## 2024-04-04 PROCEDURE — 99397 PER PM REEVAL EST PAT 65+ YR: CPT | Mod: 25

## 2024-04-04 RX ORDER — HYDROCHLOROTHIAZIDE 25 MG/1
25 TABLET ORAL
Qty: 90 | Refills: 1 | Status: ACTIVE | COMMUNITY
Start: 2022-12-27 | End: 1900-01-01

## 2024-04-04 RX ORDER — PROPRANOLOL HYDROCHLORIDE 20 MG/1
20 TABLET ORAL
Qty: 60 | Refills: 2 | Status: ACTIVE | COMMUNITY
Start: 2022-12-27 | End: 1900-01-01

## 2024-04-04 RX ORDER — ASPIRIN ENTERIC COATED TABLETS 81 MG 81 MG/1
81 TABLET, DELAYED RELEASE ORAL DAILY
Qty: 30 | Refills: 3 | Status: ACTIVE | COMMUNITY
Start: 2023-05-25 | End: 1900-01-01

## 2024-04-04 RX ORDER — LOSARTAN POTASSIUM 50 MG/1
50 TABLET, FILM COATED ORAL DAILY
Qty: 90 | Refills: 2 | Status: ACTIVE | COMMUNITY
Start: 2022-12-27 | End: 1900-01-01

## 2024-04-04 RX ORDER — METHIMAZOLE 5 MG/1
5 TABLET ORAL
Qty: 44 | Refills: 1 | Status: ACTIVE | COMMUNITY
Start: 2022-12-27 | End: 1900-01-01

## 2024-04-04 RX ORDER — ATORVASTATIN CALCIUM 40 MG/1
40 TABLET, FILM COATED ORAL
Qty: 90 | Refills: 3 | Status: ACTIVE | COMMUNITY
Start: 2023-11-17 | End: 1900-01-01

## 2024-04-04 NOTE — PROGRESS NOTE ADULT - SUBJECTIVE AND OBJECTIVE BOX
Patient is a 72y old  Female who presents with a chief complaint of Large bowel obstruction (23 Sep 2022 17:27)      INTERVAL HPI/OVERNIGHT EVENTS: wbc trending down, pt is complaining of weakness     MEDICATIONS  (STANDING):  atorvastatin 20 milliGRAM(s) Oral at bedtime  BUpivacaine 0.5% On-Q Pump 400 milliLiter(s) (4 mL/Hr) IntraDermal. <Continuous>  chlorhexidine 2% Cloths 1 Application(s) Topical <User Schedule>  dextrose 5%. 1000 milliLiter(s) (100 mL/Hr) IV Continuous <Continuous>  dextrose 5%. 1000 milliLiter(s) (50 mL/Hr) IV Continuous <Continuous>  dextrose 50% Injectable 25 Gram(s) IV Push once  dextrose 50% Injectable 12.5 Gram(s) IV Push once  dextrose 50% Injectable 25 Gram(s) IV Push once  glucagon  Injectable 1 milliGRAM(s) IntraMuscular once  heparin   Injectable 5000 Unit(s) SubCutaneous every 8 hours  influenza  Vaccine (HIGH DOSE) 0.7 milliLiter(s) IntraMuscular once  insulin lispro (ADMELOG) corrective regimen sliding scale   SubCutaneous three times a day before meals  insulin lispro (ADMELOG) corrective regimen sliding scale   SubCutaneous at bedtime  labetalol 200 milliGRAM(s) Oral every 12 hours  losartan 50 milliGRAM(s) Oral daily  meropenem  IVPB 1000 milliGRAM(s) IV Intermittent every 8 hours  NIFEdipine XL 60 milliGRAM(s) Oral every 24 hours    MEDICATIONS  (PRN):  acetaminophen     Tablet .. 650 milliGRAM(s) Oral every 6 hours PRN Mild Pain (1 - 3), Moderate Pain (4 - 6)  dextrose Oral Gel 15 Gram(s) Oral once PRN Blood Glucose LESS THAN 70 milliGRAM(s)/deciliter  ondansetron Injectable 4 milliGRAM(s) IV Push every 8 hours PRN Nausea and/or Vomiting  sodium chloride 0.9% lock flush 10 milliLiter(s) IV Push every 1 hour PRN Pre/post blood products, medications, blood draw, and to maintain line patency      __________________________________________________  REVIEW OF SYSTEMS:    CONSTITUTIONAL: No fever, +fatigue  EYES: no acute visual disturbances  NECK: No pain or stiffness  RESPIRATORY: No cough; No shortness of breath  CARDIOVASCULAR: No chest pain, no palpitations  GASTROINTESTINAL: + drainage from abdomen   NEUROLOGICAL: No headache or numbness, no tremors  MUSCULOSKELETAL: No joint pain, no muscle pain  GENITOURINARY: no dysuria, no frequency, no hesitancy  PSYCHIATRY: no depression , no anxiety  ALL OTHER  ROS negative        Vital Signs Last 24 Hrs  T(C): 36.8 (28 Sep 2022 08:30), Max: 37.4 (27 Sep 2022 21:47)  T(F): 98.2 (28 Sep 2022 08:30), Max: 99.4 (27 Sep 2022 21:47)  HR: 81 (28 Sep 2022 08:30) (77 - 85)  BP: 114/66 (28 Sep 2022 08:30) (113/68 - 119/61)  BP(mean): 83 (28 Sep 2022 05:02) (79 - 83)  RR: 19 (28 Sep 2022 08:30) (17 - 19)  SpO2: 97% (28 Sep 2022 08:30) (95% - 99%)    Parameters below as of 28 Sep 2022 08:30  Patient On (Oxygen Delivery Method): room air        ________________________________________________  PHYSICAL EXAM:  GENERAL: NAD  HEENT: Normocephalic;  conjunctivae and sclerae clear; moist mucous membranes;   NECK : supple  CHEST/LUNG: Clear to auscultation bilaterally with good air entry   HEART: S1 S2  regular; no murmurs, gallops or rubs  ABDOMEN: Soft, tender to palpate, Nondistended; Bowel sounds present  EXTREMITIES: no cyanosis; no edema; no calf tenderness  SKIN: warm and dry; no rash  NERVOUS SYSTEM:  Awake and alert; Oriented  to place, person and time ; no new deficits    _________________________________________________  LABS:                        8.0    11.25 )-----------( 719      ( 28 Sep 2022 05:30 )             26.4     09-28    136  |  96  |  15  ----------------------------<  105<H>  4.2   |  29  |  0.91    Ca    8.7      28 Sep 2022 05:30  Phos  3.1     09-28  Mg     2.2     09-28      PT/INR - ( 28 Sep 2022 05:30 )   PT: 13.9 sec;   INR: 1.17          PTT - ( 28 Sep 2022 05:30 )  PTT:32.3 sec    CAPILLARY BLOOD GLUCOSE      POCT Blood Glucose.: 122 mg/dL (28 Sep 2022 07:01)  POCT Blood Glucose.: 116 mg/dL (27 Sep 2022 22:05)  POCT Blood Glucose.: 110 mg/dL (27 Sep 2022 17:26)  POCT Blood Glucose.: 129 mg/dL (27 Sep 2022 12:18)        RADIOLOGY & ADDITIONAL TESTS:      Plan of care was discussed with patient and /or primary care giver; all questions and concerns were addressed and care was aligned with patient's wishes.       Patient is a 72y old  Female who presents with a chief complaint of Large bowel obstruction (23 Sep 2022 17:27)      INTERVAL HPI/OVERNIGHT EVENTS: wbc trending down, pt is complaining of weakness     MEDICATIONS  (STANDING):  atorvastatin 20 milliGRAM(s) Oral at bedtime  BUpivacaine 0.5% On-Q Pump 400 milliLiter(s) (4 mL/Hr) IntraDermal. <Continuous>  chlorhexidine 2% Cloths 1 Application(s) Topical <User Schedule>  dextrose 5%. 1000 milliLiter(s) (100 mL/Hr) IV Continuous <Continuous>  dextrose 5%. 1000 milliLiter(s) (50 mL/Hr) IV Continuous <Continuous>  dextrose 50% Injectable 25 Gram(s) IV Push once  dextrose 50% Injectable 12.5 Gram(s) IV Push once  dextrose 50% Injectable 25 Gram(s) IV Push once  glucagon  Injectable 1 milliGRAM(s) IntraMuscular once  heparin   Injectable 5000 Unit(s) SubCutaneous every 8 hours  influenza  Vaccine (HIGH DOSE) 0.7 milliLiter(s) IntraMuscular once  insulin lispro (ADMELOG) corrective regimen sliding scale   SubCutaneous three times a day before meals  insulin lispro (ADMELOG) corrective regimen sliding scale   SubCutaneous at bedtime  labetalol 200 milliGRAM(s) Oral every 12 hours  losartan 50 milliGRAM(s) Oral daily  meropenem  IVPB 1000 milliGRAM(s) IV Intermittent every 8 hours  NIFEdipine XL 60 milliGRAM(s) Oral every 24 hours    MEDICATIONS  (PRN):  acetaminophen     Tablet .. 650 milliGRAM(s) Oral every 6 hours PRN Mild Pain (1 - 3), Moderate Pain (4 - 6)  dextrose Oral Gel 15 Gram(s) Oral once PRN Blood Glucose LESS THAN 70 milliGRAM(s)/deciliter  ondansetron Injectable 4 milliGRAM(s) IV Push every 8 hours PRN Nausea and/or Vomiting  sodium chloride 0.9% lock flush 10 milliLiter(s) IV Push every 1 hour PRN Pre/post blood products, medications, blood draw, and to maintain line patency      __________________________________________________  REVIEW OF SYSTEMS:    CONSTITUTIONAL: No fever, +fatigue  EYES: no acute visual disturbances  NECK: No pain or stiffness  RESPIRATORY: No cough; No shortness of breath  CARDIOVASCULAR: No chest pain, no palpitations  GASTROINTESTINAL: + drainage from abdomen   NEUROLOGICAL: No headache or numbness, no tremors  MUSCULOSKELETAL: No joint pain, no muscle pain  GENITOURINARY: no dysuria, no frequency, no hesitancy  PSYCHIATRY: no depression , no anxiety  ALL OTHER  ROS negative        Vital Signs Last 24 Hrs  T(C): 36.8 (28 Sep 2022 08:30), Max: 37.4 (27 Sep 2022 21:47)  T(F): 98.2 (28 Sep 2022 08:30), Max: 99.4 (27 Sep 2022 21:47)  HR: 81 (28 Sep 2022 08:30) (77 - 85)  BP: 114/66 (28 Sep 2022 08:30) (113/68 - 119/61)  BP(mean): 83 (28 Sep 2022 05:02) (79 - 83)  RR: 19 (28 Sep 2022 08:30) (17 - 19)  SpO2: 97% (28 Sep 2022 08:30) (95% - 99%)    Parameters below as of 28 Sep 2022 08:30  Patient On (Oxygen Delivery Method): room air        ________________________________________________  PHYSICAL EXAM:  GENERAL: NAD  HEENT: Normocephalic;  conjunctivae and sclerae clear; moist mucous membranes;   NECK : supple  CHEST/LUNG: Clear to auscultation bilaterally with good air entry   HEART: S1 S2  regular; no murmurs, gallops or rubs  ABDOMEN: Soft, tender to palpate, mildly soaked dressing with brown discharge, Nondistended; Bowel sounds present  EXTREMITIES: no cyanosis; no edema; no calf tenderness  SKIN: warm and dry; no rash  NERVOUS SYSTEM:  Awake and alert; Oriented  to place, person and time ; no new deficits    _________________________________________________  LABS:                        8.0    11.25 )-----------( 719      ( 28 Sep 2022 05:30 )             26.4     09-28    136  |  96  |  15  ----------------------------<  105<H>  4.2   |  29  |  0.91    Ca    8.7      28 Sep 2022 05:30  Phos  3.1     09-28  Mg     2.2     09-28      PT/INR - ( 28 Sep 2022 05:30 )   PT: 13.9 sec;   INR: 1.17          PTT - ( 28 Sep 2022 05:30 )  PTT:32.3 sec    CAPILLARY BLOOD GLUCOSE      POCT Blood Glucose.: 122 mg/dL (28 Sep 2022 07:01)  POCT Blood Glucose.: 116 mg/dL (27 Sep 2022 22:05)  POCT Blood Glucose.: 110 mg/dL (27 Sep 2022 17:26)  POCT Blood Glucose.: 129 mg/dL (27 Sep 2022 12:18)        RADIOLOGY & ADDITIONAL TESTS:      Plan of care was discussed with patient and /or primary care giver; all questions and concerns were addressed and care was aligned with patient's wishes.     Quality 226: Preventive Care And Screening: Tobacco Use: Screening And Cessation Intervention: Patient screened for tobacco use and is an ex/non-smoker Detail Level: Detailed

## 2024-04-05 NOTE — ASSESSMENT
[FreeTextEntry1] : 74F with PMHx of HTN, HLD, DM2, hyperthyroidism (s/p partial resection 40 years ago c/b toxic multinodular goiter), lacunar infarct, and hx of incarcerated ventral hernia (s/p ex-lap, lysis of adhesions, small bowel resection, and hernia repair in 09/2022), cholecystectomy (2020), multiple c-sections and hysterectomy (40 years ago), presents to [facility] for CPE.  #Neuropathy Endorsed intermittent bilateral hand/feet numbness (worse RUE). Per patient, she often sleeps on the right side due to positional abdominal pain. Numbness more apparent when she awakens from sleep. Passive/Active ROM intact in all extremities. Likely 2/2 neuropathy vs. less likely nerve impingement. - DM work-up as below - If renal function is stable, can trial Gabapentin. If neuropathy worsens or remains despite medical management, would obtain imaging.   #DM Known history of DM. Last A1c 6.4 (10/23), uptrending from 5.8. Not on meds - Obtain A1c today - If continues to worsen, would start Metformin 500mg qd, uptitrate as tolerated - Podiatry referral today - Has established Optho care, patient will make an appointment  #HTN /79 in office today. Has been in the 130s/80s the last few visit. On home HCTZ 25mg PO qd and Losartan 50mg PO qd. Cr increased from 1.13 to 1.37 on recent labs  - Obtain BMP today - C/w HCTZ 25mg PO qd and Losartan 50mg PO qd for now - Depending on renal function, will need to consider uptitrating or adding an alternative agent for BP control (CCB) - Encouraged home BP monitoring  #Hyperthyroidism s/p partial resection 40 years ago now c/b toxic multinodular goiter. TSH/T3/T4 wnl 12/23. Endorsed intermittent dysphagia requiring her to eat smaller portions, however denied other symptoms. - Refilled methimazole 2.5mg PO qd, 5mg every 3rd day - Refilled propranolol 20 mg PO BID - Has Endo appt (Rebecca Brasher) scheduled for 06/11/23 - Obtain TSH/T3/T4 today  #HLD ASVCD 30%. On Lipitor 40mg. Lipid panel 11/23  - Refilled Lipitor 40mg   #Lacunar Infarct CTH showing unchanged small low-density focus at R superior corpus stratum compatible w/ lacunar infarct. Carotid US 06/2023 unremarkable. - Refilled ASA 81mg  #Intermitent Dysphagia Per patient, endorsed intermittent dysphagia with solid foods and even instances of choking from food getting stuck. As a result, she needs to cut her food into small pieces. Likely 2/2 extrinsic compression from multinodular thyroid vs. esophageal strictures/obstruction.  - Has GI appointment 06/18/24 - patient would benefit from EGD to rule out obstruction  #Chronic Abdominal Pain Hx of incarcerated ventral hernia (s/p ex-lap, lysis of adhesions, small bowel resection, and hernia repair in 09/2022 w/ Dr. So). Endorsed intermittent "twisting" and gnawing periumbical pain for over a year, that worsens with nonspecific positional movements and occasionally food. CTAP - No small bowel obstruction, small bowel-small bowel anastomosis noted patent. Seen Surgery since with no active interventions, recommended GI - Has GI appointment 06/18/24 - Patient is also due for C-scope and would benefit from EGD given intermittent dysphagia  #HCM - advised to obtain shingles vaccine at pharmacy - advised to obtain RSV vaccine at pharmacy - Last C-scope 8/2020 showing 4mm hyperplastic polyp and 0.8cm submucosal carcinoid tumor. due for c-scope, seeing GI on 06/18/24 - mammo ordered - DEXA ordered  RTC in 6 months or PRN  Discussed case with Dr. Piedra

## 2024-04-05 NOTE — REVIEW OF SYSTEMS
[Dryness] : dryness  [Abdominal Pain] : abdominal pain [Negative] : Heme/Lymph [de-identified] : +feet and hand numbness

## 2024-04-05 NOTE — HISTORY OF PRESENT ILLNESS
[FreeTextEntry1] : Pt is here for a CPE visit.   [de-identified] : 74F with PMHx of HTN, HLD, DM2, hyperthyroidism (s/p partial resection 40 years ago c/b toxic multinodular goiter), lacunar infarct, and hx of incarcerated ventral hernia (s/p ex-lap, lysis of adhesions, small bowel resection, and hernia repair in 09/2022), cholecystectomy (2020), multiple c-sections and hysterectomy (40 years ago), presents to [facility] for CPE. Patient is requesting refills on her medications and presenting with multiple medical problems. She continues to endorse chronic intermittent "twisting" and gnawing periumbical pain for over a year, that worsens with nonspecific positional movements and occasionally food. She obtained an interval CTAP - No small bowel obstruction, small bowel-small bowel anastomosis noted patent. Seen Surgery since with no active interventions, recommended GI Also, endorsed new onset intermittent bilateral hand/feet numbness (worse RUE). Per patient, she often sleeps on the right side due to positional abdominal pain. Numbness more apparent when she awakens from sleep. ROM has not been affected. Also she endorsed intermittent dysphagia with solid foods and even instances of choking from food getting stuck. As a result, she needs to cut her food into small pieces. Per patient, she feels this is from her enlarged thyroid, however at this time would not like to undergo any sort of surgical interventions.

## 2024-04-05 NOTE — PHYSICAL EXAM
[No Acute Distress] : no acute distress [Well Nourished] : well nourished [Well Developed] : well developed [Well-Appearing] : well-appearing [Normal Sclera/Conjunctiva] : normal sclera/conjunctiva [PERRL] : pupils equal round and reactive to light [EOMI] : extraocular movements intact [Normal Outer Ear/Nose] : the outer ears and nose were normal in appearance [Normal Oropharynx] : the oropharynx was normal [No JVD] : no jugular venous distention [No Lymphadenopathy] : no lymphadenopathy [Supple] : supple [Thyroid Normal, No Nodules] : the thyroid was normal and there were no nodules present [No Respiratory Distress] : no respiratory distress  [No Accessory Muscle Use] : no accessory muscle use [Clear to Auscultation] : lungs were clear to auscultation bilaterally [Normal Rate] : normal rate  [Regular Rhythm] : with a regular rhythm [Normal S1, S2] : normal S1 and S2 [No Murmur] : no murmur heard [No Carotid Bruits] : no carotid bruits [No Abdominal Bruit] : a ~M bruit was not heard ~T in the abdomen [No Varicosities] : no varicosities [Pedal Pulses Present] : the pedal pulses are present [No Edema] : there was no peripheral edema [No Palpable Aorta] : no palpable aorta [No Extremity Clubbing/Cyanosis] : no extremity clubbing/cyanosis [Soft] : abdomen soft [Non-distended] : non-distended [No Masses] : no abdominal mass palpated [No HSM] : no HSM [Normal Bowel Sounds] : normal bowel sounds [Normal Posterior Cervical Nodes] : no posterior cervical lymphadenopathy [Normal Anterior Cervical Nodes] : no anterior cervical lymphadenopathy [No CVA Tenderness] : no CVA  tenderness [No Spinal Tenderness] : no spinal tenderness [Grossly Normal Strength/Tone] : grossly normal strength/tone [No Rash] : no rash [Coordination Grossly Intact] : coordination grossly intact [No Focal Deficits] : no focal deficits [Normal Gait] : normal gait [Deep Tendon Reflexes (DTR)] : deep tendon reflexes were 2+ and symmetric [Normal Affect] : the affect was normal [Normal Insight/Judgement] : insight and judgment were intact [de-identified] : +multiple well healing scar tissue. Diffuse minimal tenderness, however no guarding or rebound. [de-identified] : proximal forearm adipose deposit/swelling [de-identified] : No sensory deficits illicited. Proprioception intact.

## 2024-04-21 ENCOUNTER — EMERGENCY (EMERGENCY)
Facility: HOSPITAL | Age: 74
LOS: 1 days | Discharge: HOME | End: 2024-04-21
Admitting: EMERGENCY MEDICINE
Payer: MEDICARE

## 2024-04-21 VITALS
RESPIRATION RATE: 18 BRPM | SYSTOLIC BLOOD PRESSURE: 156 MMHG | DIASTOLIC BLOOD PRESSURE: 81 MMHG | TEMPERATURE: 98 F | OXYGEN SATURATION: 100 % | HEART RATE: 89 BPM | WEIGHT: 218.04 LBS

## 2024-04-21 DIAGNOSIS — Z90.710 ACQUIRED ABSENCE OF BOTH CERVIX AND UTERUS: Chronic | ICD-10-CM

## 2024-04-21 DIAGNOSIS — Z90.49 ACQUIRED ABSENCE OF OTHER SPECIFIED PARTS OF DIGESTIVE TRACT: Chronic | ICD-10-CM

## 2024-04-21 DIAGNOSIS — Z98.891 HISTORY OF UTERINE SCAR FROM PREVIOUS SURGERY: Chronic | ICD-10-CM

## 2024-04-21 PROCEDURE — 99283 EMERGENCY DEPT VISIT LOW MDM: CPT | Mod: 25

## 2024-04-21 PROCEDURE — 99284 EMERGENCY DEPT VISIT MOD MDM: CPT

## 2024-04-21 PROCEDURE — 73564 X-RAY EXAM KNEE 4 OR MORE: CPT | Mod: 26,RT

## 2024-04-21 PROCEDURE — 73564 X-RAY EXAM KNEE 4 OR MORE: CPT

## 2024-04-21 RX ORDER — IBUPROFEN 200 MG
600 TABLET ORAL ONCE
Refills: 0 | Status: COMPLETED | OUTPATIENT
Start: 2024-04-21 | End: 2024-04-21

## 2024-04-21 RX ADMIN — Medication 600 MILLIGRAM(S): at 14:07

## 2024-04-21 NOTE — ED PROVIDER NOTE - CARE PROVIDER_API CALL
Fabrice Ansari  Orthopaedic Surgery  [address]  [place] [address]  Phone: [phone]  Fax: [phone]  Follow Up Time:

## 2024-04-21 NOTE — ED ADULT NURSE NOTE - NSFALLUNIVINTERV_ED_ALL_ED
Bed/Stretcher in lowest position, wheels locked, appropriate side rails in place/Call bell, personal items and telephone in reach/Instruct patient to call for assistance before getting out of bed/chair/stretcher/Non-slip footwear applied when patient is off stretcher/[place] to call system/Physically safe environment - no spills, clutter or unnecessary equipment/Purposeful proactive rounding/Room/bathroom lighting operational, light cord in reach

## 2024-04-21 NOTE — ED ADULT NURSE NOTE - CHIEF COMPLAINT QUOTE
+ R knee pain s/p "almost falling but I caught myself" last sunday when walking down steps  + worsened today when on stairs again, able to ambulate to and in ED

## 2024-04-21 NOTE — ED ADULT NURSE NOTE - OBJECTIVE STATEMENT
Pt is a 73yo female presenting to ED c/o knee pain/injury. Pt states, "My right knee gave out on me last sunday at [employer], it has plagued me all week and again today when going to [employer] my knee gave out so I came to the ER. The pain is unbearable, it hurts only when I walk." Pt is A&Ox4, breathing even and unlabored

## 2024-04-21 NOTE — ED PROVIDER NOTE - PATIENT PORTAL LINK FT
You can access the FollowMyHealth Patient Portal offered by [facility] by registering at the following website: [email or web address]. By joining [email or web address]’s FollowMyHealth portal, you will also be able to view your health information using other applications (apps) compatible with our system.

## 2024-04-21 NOTE — ED PROVIDER NOTE - OBJECTIVE STATEMENT
74 F pmh htn, dm p/w R knee pain x one week.  pt reports twisting R knee while going down steps one week ago, since has had dull aching pain in the knee worse w/ walking.  she has been doing warm soaks w/ some improvement. did not take anything for pain.  this morning while walking to [employer] she felt the knee give out and now w/ worsening pain- no direct trauma or fall.  still able to ambulate just w/ increased pain.  denies f/c, HA, dizziness, numbness/weakness, paresthesias, skin changes, limited ROM, calf pain/swelling, or other concerns

## 2024-04-21 NOTE — ED PROVIDER NOTE - CLINICAL SUMMARY MEDICAL DECISION MAKING FREE TEXT BOX
74 F pmh htn, dm p/w R knee pain x one week; no direct trauma but thinks she twisted knee.  has not taken anything for pain.  on exam vss, afebrile, ambulating w/ limp, RLE: pain w/ ROM knee but not limited, no joint ttp or effusion, no erythema or warmth, no calf ttp or edema, DP/PT pulse 2+ SILT, brisk cap refill.  suspect knee sprain, will obtain xray to r/o fx.  motrin for pain   xray shows no e/o fx or dislocation, + degenerative changes.  recommend RICE, given ace wrap.  offered cane but declined. will f/u outpt in ortho clinic.  discussed strict return parameters

## 2024-04-21 NOTE — ED PROVIDER NOTE - NSFOLLOWUPINSTRUCTIONS_ED_ALL_ED_FT
Take tylenol 650mg or motrin 400-800mg as needed every 4-6 hours for pain.   REST- Rest your hurting/injured joint or extremity to decrease pain and swelling for 24-48 hours    ICE- Apply ice to area of pain to decreased inflammation and pain, put towel/barrier between ice and skin. You can keep ice on for 20 minutes at a time 4-8 times daily   COMPRESSION- Wear ace wrap or brace for support to reduce swelling.  Make sure not to wrap too tight, loosen if skin feeling numb/tingling or skin turns blue   ELEVATION- Elevate hurting/injured area 6 or more inches about level of heart to decrease swelling/inflammation.  Use pillow under joint to elevate area    [facility] (Thursday afternoons) - call [phone] to schedule    Knee Sprain, Adult  A person's knee joint, with a close-up of the ligament.  A knee sprain is a stretch or tear in a knee ligament. Knee ligaments are tissues that connect the bones of the knee joint to each other.    What are the causes?  This condition often results from:  A fall.  An injury to the knee.  What are the signs or symptoms?  Symptoms of this condition include:  Trouble straightening or bending the leg.  Swelling in the knee.  Bruising around the knee.  Tenderness or pain in the knee.  Sudden muscle tightening (spasm) around the knee.  How is this diagnosed?  This condition may be diagnosed based on:  A physical exam.  A history of what happened just before you started to have symptoms.  Tests. These may include:  An X-ray. This may be done to make sure no bones are broken or moved out of position (dislocated).  An MRI. This may be done to check if any of the ligaments are torn and to check for other damage.  A physical exam that includes stress testing of the knee. This may be done to check for damage to ligaments.  How is this treated?  Treatment for this condition may involve:  Keeping the knee in a straightened position (immobilized) with a cast, brace, or splint.  Applying ice to the knee. This helps with pain and swelling.  Raising (elevating) the knee above the level of your heart when you are resting. This helps with pain and swelling.  Taking medicine for pain.  Doing exercises to prevent or limit long-term weakness or stiffness in your knee.  Having surgery to reconnect ligaments to the bone or to reconstruct ligaments. This may be needed if one or more ligaments are fully torn.  Follow these instructions at home:  If you have a removable splint or brace:    Wear the splint or brace as told by your health care provider. Remove it only as told by your health care provider.  Check the skin around the splint or brace every day. Tell your health care provider about any concerns.  Loosen the splint or brace if your toes tingle, become numb, or turn cold and blue.  Keep the splint or brace clean and dry.  If you have a nonremovable cast:    Do not put pressure on any part of the cast until it is fully hardened. This may take several hours.  Do not stick anything inside the cast to scratch your skin. Doing that increases your risk of infection.  Check the skin around the cast every day. Tell your health care provider about any concerns.  You may put lotion on dry skin around the edges of the cast. Do not put lotion on the skin underneath the cast.  Keep the cast clean and dry.  Bathing    If the splint, brace, or cast is not waterproof:  Do not let it get wet.  Cover it with a watertight covering when you take a bath or a shower.  Managing pain, stiffness, and swelling    A bag of ice on a towel on the skin.   If directed, put ice on the injured area. To do this:  If you have a removable splint or brace, remove it as told by your health care provider.  Put ice in a plastic bag.  Place a towel between your skin and the bag or between your cast and the bag.  Leave the ice on for 20 minutes, 2–3 times a day.  If your skin turns bright red, remove the ice right away to prevent skin damage. The risk of skin damage is higher if you cannot feel pain, heat, or cold.  Move your toes often to reduce stiffness and swelling.  Elevate the injured area above the level of your heart while you are sitting or lying down.  General instructions    Take over-the-counter and prescription medicines only as told by your health care provider.  Do not use any products that contain nicotine or tobacco. These products include cigarettes, chewing tobacco, and vaping devices, such as e-cigarettes. These can delay healing. If you need help quitting, ask your health care provider.  Do exercises as told by your health care provider.  Contact a health care provider if:  You have pain that gets worse.  The cast, brace, or splint does not fit right or gets damaged.  Get help right away if:  You cannot use your injured knee to support any of your body weight (cannot bear weight).  You cannot move the injured joint.  You cannot walk more than a few steps without pain or without your knee buckling.  You have a lot of pain, swelling, or numbness in the leg below the cast, brace, or splint.  Your foot or toes are numb, cold, or blue after you loosen your splint or brace.  This information is not intended to replace advice given to you by your health care provider. Make sure you discuss any questions you have with your health care provider.

## 2024-04-21 NOTE — ED PROVIDER NOTE - PHYSICAL EXAMINATION
Gen: well appearing, no acute distress  Skin: warm/dry, no rash noted  Resp: breathing comfortably, speaking in full sentences, no dyspnea  RLE: pain w/ ROM knee but not limited, no joint ttp or effusion, no erythema or warmth, no calf ttp or edema, DP/PT pulse 2+ SILT, brisk cap refill   Neuro: alert/oriented, ambulatory w/ limp

## 2024-04-21 NOTE — ED ADULT TRIAGE NOTE - CHIEF COMPLAINT QUOTE
+ CP and SOB since 4am, began when boarding [employer], so took nitro and asa 324 on flight  Denies sob. PMH "a few pvcs and bigeminy a few yearsa go, but it hasn't happened since" + CP and SOB since 4am, began when boarding flight, so took nitro and asa 324 on flight  Denies sob. PMH "a few pvcs and bigeminy a few yearsa go, but it hasn't happened since" + R knee pain s/p "almost falling but I caught myself" last sunday when walking down steps  + worsened today when on stairs again, able to ambulate to and in ED

## 2024-04-23 DIAGNOSIS — M25.561 PAIN IN RIGHT KNEE: ICD-10-CM
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
## 2024-04-23 DIAGNOSIS — S83.91XA SPRAIN OF UNSPECIFIED SITE OF RIGHT KNEE, INITIAL ENCOUNTER: ICD-10-CM
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
## 2024-04-23 DIAGNOSIS — Z88.0 ALLERGY STATUS TO PENICILLIN: ICD-10-CM
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
## 2024-05-08 ENCOUNTER — APPOINTMENT (OUTPATIENT)
Dept: ORTHOPEDIC SURGERY | Facility: CLINIC | Age: 74
End: 2024-05-08
Payer: MEDICARE

## 2024-05-08 DIAGNOSIS — Z87.39 PERSONAL HISTORY OF OTHER DISEASES OF THE MUSCULOSKELETAL SYSTEM AND CONNECTIVE TISSUE: ICD-10-CM

## 2024-05-08 DIAGNOSIS — Z86.39 PERSONAL HISTORY OF OTHER ENDOCRINE, NUTRITIONAL AND METABOLIC DISEASE: ICD-10-CM

## 2024-05-08 DIAGNOSIS — Z60.2 PROBLEMS RELATED TO LIVING ALONE: ICD-10-CM

## 2024-05-08 DIAGNOSIS — Z72.3 LACK OF PHYSICAL EXERCISE: ICD-10-CM

## 2024-05-08 DIAGNOSIS — Z78.9 OTHER SPECIFIED HEALTH STATUS: ICD-10-CM

## 2024-05-08 PROCEDURE — 20611 DRAIN/INJ JOINT/BURSA W/US: CPT | Mod: RT

## 2024-05-08 PROCEDURE — 99203 OFFICE O/P NEW LOW 30 MIN: CPT | Mod: 25

## 2024-05-08 RX ORDER — MELOXICAM 7.5 MG/1
7.5 TABLET ORAL
Qty: 30 | Refills: 1 | Status: ACTIVE | COMMUNITY
Start: 2024-05-08 | End: 1900-01-01

## 2024-05-08 SDOH — SOCIAL STABILITY - SOCIAL INSECURITY: PROBLEMS RELATED TO LIVING ALONE: Z60.2

## 2024-05-09 ENCOUNTER — NON-APPOINTMENT (OUTPATIENT)
Age: 74
End: 2024-05-09

## 2024-05-09 PROBLEM — Z78.9 DOES NOT USE ILLICIT DRUGS: Status: ACTIVE | Noted: 2024-05-09

## 2024-05-09 PROBLEM — Z72.3 DOES NOT EXERCISE: Status: ACTIVE | Noted: 2024-05-09

## 2024-05-09 PROBLEM — Z78.9 NON-SMOKER: Status: ACTIVE | Noted: 2024-05-09

## 2024-05-09 PROBLEM — Z87.39 HISTORY OF ARTHRITIS: Status: RESOLVED | Noted: 2024-05-09 | Resolved: 2024-05-09

## 2024-05-09 PROBLEM — Z60.2 LIVES ALONE: Status: ACTIVE | Noted: 2024-05-09

## 2024-05-09 PROBLEM — Z86.39 HISTORY OF DIABETES MELLITUS: Status: RESOLVED | Noted: 2024-05-09 | Resolved: 2024-05-09

## 2024-05-09 LAB
ANION GAP SERPL CALC-SCNC: 11 MMOL/L
BUN SERPL-MCNC: 27 MG/DL
CALCIUM SERPL-MCNC: 9.2 MG/DL
CHLORIDE SERPL-SCNC: 104 MMOL/L
CO2 SERPL-SCNC: 25 MMOL/L
CREAT SERPL-MCNC: 1.17 MG/DL
EGFR: 50 ML/MIN/1.73M2
ESTIMATED AVERAGE GLUCOSE: 143 MG/DL
GLUCOSE SERPL-MCNC: 116 MG/DL
HBA1C MFR BLD HPLC: 6.6 %
POTASSIUM SERPL-SCNC: 4.6 MMOL/L
SODIUM SERPL-SCNC: 140 MMOL/L
T3 SERPL-MCNC: 130 NG/DL
TSH SERPL-ACNC: 1.67 UIU/ML

## 2024-05-10 NOTE — END OF VISIT
[FreeTextEntry3] : All medical record entries made by the Ayeibdeep were at my, Dr. Dolores Stanley, direction and personally dictated by me on 05/06/2024. I have reviewed the chart and agree that the record accurately reflects my personal performance of the history, physical exam, assessment and plan. I have also personally directed, reviewed, and agreed with the chart.

## 2024-05-10 NOTE — ADDENDUM
[FreeTextEntry1] : Documented by Nyasia Chi acting a as a scribe for Dr. Dolores Stanley. 05/06/2024.

## 2024-05-10 NOTE — ASSESSMENT
[FreeTextEntry1] : CARYN WHELAN is a 74 year old female with right knee pain. I discussed with the patient that their symptoms, signs, and imaging are most consistent with osteoarthritis. We reviewed the natural history of this condition and treatment options. We agreed on the following plan:     X-rays were reviewed with the patient today.  U.S guided CSI as detauked above Recommend 150 min per week of moderate intensity aerobic activity Start Home Exercises for knee conditioning. Demonstration and handout provided. Physical therapy. Referral provided. Medication: Meloxicam PRN, prescription provided.  Kinesio tape applied today Advanced imaging: X-ray weight bearing and merchant views next visit, consider MRI if no symptomatic improvement.  Follow up in 6-8 weeks.

## 2024-05-10 NOTE — PHYSICAL EXAM
[de-identified] : General: Well-nourished, well-developed, alert, and in no acute distress. Head: Normocephalic. Eyes: Pupils equal, extraocular muscles intact, normal sclera. Nose: No nasal discharge. Cardiovascular: Extremities are warm and well perfused. Distal pulses are symmetric bilaterally. Respiratory: No labored breathing. Extremities: Sensation is intact distally bilaterally. Distal pulses are symmetric bilaterally Lymphatic: No regional lymphadenopathy, no lymphedema Neurologic: No focal deficits Skin: Normal skin color, texture, and turgor Psychiatric: Normal affect  MSK: Examination of [right] knee:   Gait: antalgic alignment: genu valgum  Effusion: mild No erythema, hematoma or skin lesion Tender to palpation: pain point medial joint line.  Nontender to palpation: lateral joint line, medial patellar facet, lateral patellar facet, quad tendon, patellar tendon, pes, Gerdy's tubercle, tibial tuberosity, popliteal fossa, hamstrings, ITB No warmth No Baker's cyst palpable ROM: 0-100 with pain at the end of flexion. [No] patellar crepitus   Log roll negative Lachman negative Anterior drawer negative Posterior drawer negative Varus/valgus stress negative at 0 and 30 deg Ketur[place] negative Patellar grind negative Extensor mechanism intact     Examination of [left] knee:   No effusion, erythema, hematoma or skin lesion Nontender to palpation: medial joint line, lateral joint line, medial patellar facet, lateral patellar facet, quad tendon, patellar tendon, pes, Gerdy's tubercle, tibial tuberosity, popliteal fossa, hamstrings, ITB No warmth No Baker's cyst palpable ROM: 0-120 [No] patellar crepitus   Log roll negative Lachman negative Anterior drawer negative Posterior drawer negative Varus/valgus stress negative at 0 and 30 deg [place]ur[place] negative Patellar grind negative Extensor mechanism intact   Sensation is intact to light touch over the superficial and deep peroneal nerve distributions and the posterior tibial nerve distribution. Capillary refill is less than two seconds. Posterior tibial and dorsalis pedis pulses 2+ equal bilaterally. No calf swelling or tenderness bilaterally. Strength testing shows hip flexion 5/5, hip adduction 5/5, hip abduction 5/5, knee extension 5/5, knee flexion 5/5, dorsiflexion 5/5, plantar flexion 5/5, EHL 5/5 Reflexes: Patellar 2+, Achilles 2+

## 2024-05-10 NOTE — HISTORY OF PRESENT ILLNESS
[de-identified] : CARYN WHELAN is a 74 year old female presenting for right knee pain. The right knee pain started April 21st. She takes Tylenol for the pain. She states that the swelling has gone away but her feet are swollen. After the fall a month ago, she did go to the emergency room and they told her to elevate it and put ice on it. She states she has had physical therapy years go. She has arthritis in her hands and diabetes.

## 2024-05-10 NOTE — PROCEDURE
[de-identified] :   Ultrasound guided intra-articular steroid injection of right knee:   Following a discussion of the risks (bleeding, infection) and benefits, verbal consent was obtained. Patient placed in supine position. The suprapatellar recess and surrounding structures (patella, femur, quadriceps tendon, suprapatellar fat pad and prefemoral fat pad) were visualized in SAX and LAX with Sonosite 15 Hz linear transducer.    Superolateral knee was anaesthetised with ethyl chloride spray. Under strict sterile technique the superolateral knee was prepped with chlorhexadine. Using ultrasound guidance (superolateral approach) a 20 G 1.5 inch needle was inserted into the suprapatellar recess and after negative aspiration, a mixture of 1mL Triamcinolone, 4mL 0.5% Bupivacaine and 2mL 1% lidocaine was injected intra-articularly.   The use of direct ultrasound visualization was necessary to increase patient safety by identifying and avoiding inadvertent needle placement within the neurovascular and osteochondral structures. Additionally, the increased accuracy of needle placement may improve therapeutic efficacy and allow higher diagnostic specificity when evaluating the effectiveness of this injection.   The patient tolerated the procedure well. Post-injection instructions given (no strenuous activity for 48 hours, ice, elevate). Patient verbalized understanding.

## 2024-05-13 ENCOUNTER — RESULT REVIEW (OUTPATIENT)
Age: 74
End: 2024-05-13

## 2024-05-13 ENCOUNTER — OUTPATIENT (OUTPATIENT)
Dept: OUTPATIENT SERVICES | Facility: HOSPITAL | Age: 74
LOS: 1 days | End: 2024-05-13
Payer: MEDICARE

## 2024-05-13 ENCOUNTER — APPOINTMENT (OUTPATIENT)
Dept: ORTHOPEDIC SURGERY | Facility: CLINIC | Age: 74
End: 2024-05-13
Payer: MEDICARE

## 2024-05-13 VITALS
WEIGHT: 198 LBS | HEART RATE: 64 BPM | HEIGHT: 66 IN | OXYGEN SATURATION: 96 % | BODY MASS INDEX: 31.82 KG/M2 | DIASTOLIC BLOOD PRESSURE: 81 MMHG | SYSTOLIC BLOOD PRESSURE: 158 MMHG

## 2024-05-13 DIAGNOSIS — Z90.710 ACQUIRED ABSENCE OF BOTH CERVIX AND UTERUS: Chronic | ICD-10-CM

## 2024-05-13 DIAGNOSIS — Z90.49 ACQUIRED ABSENCE OF OTHER SPECIFIED PARTS OF DIGESTIVE TRACT: Chronic | ICD-10-CM

## 2024-05-13 DIAGNOSIS — Z98.891 HISTORY OF UTERINE SCAR FROM PREVIOUS SURGERY: Chronic | ICD-10-CM

## 2024-05-13 DIAGNOSIS — M17.11 UNILATERAL PRIMARY OSTEOARTHRITIS, RIGHT KNEE: ICD-10-CM

## 2024-05-13 PROCEDURE — 73564 X-RAY EXAM KNEE 4 OR MORE: CPT

## 2024-05-13 PROCEDURE — 99214 OFFICE O/P EST MOD 30 MIN: CPT

## 2024-05-13 PROCEDURE — 73564 X-RAY EXAM KNEE 4 OR MORE: CPT | Mod: 26,50

## 2024-05-13 RX ORDER — NAPROXEN 375 MG/1
375 TABLET ORAL
Qty: 30 | Refills: 1 | Status: ACTIVE | COMMUNITY
Start: 2024-05-13 | End: 1900-01-01

## 2024-05-13 RX ORDER — HYALURONATE SODIUM 20 MG/2 ML
20 SYRINGE (ML) INTRAARTICULAR
Qty: 1 | Refills: 0 | Status: ACTIVE | COMMUNITY
Start: 2024-05-13

## 2024-05-29 ENCOUNTER — APPOINTMENT (OUTPATIENT)
Dept: MRI IMAGING | Facility: CLINIC | Age: 74
End: 2024-05-29

## 2024-06-03 NOTE — END OF VISIT
[Time Spent: ___ minutes] : I have spent [unfilled] minutes of time on the encounter. [FreeTextEntry3] : All medical record entries made by the Ayeibe were at my, Dr. Dolores Stanley, direction and personally dictated by me on 05/13/2024. I have reviewed the chart and agree that the record accurately reflects my personal performance of the history, physical exam, assessment and plan. I have also personally directed, reviewed, and agreed with the chart.

## 2024-06-03 NOTE — ADDENDUM
[FreeTextEntry1] :   Documented by Day Guerrero acting a as a scribe for Dr. Dolores Stanley. 05/13/2024.

## 2024-06-03 NOTE — CONSULT LETTER
[Dear  ___] : Dear  [unfilled], [Consult Letter:] : I had the pleasure of evaluating your patient, [unfilled]. [Please see my note below.] : Please see my note below. [Consult Closing:] : Thank you very much for allowing me to participate in the care of this patient.  If you have any questions, please do not hesitate to contact me. [Sincerely,] : Sincerely, [FreeTextEntry3] : Dolores Stanley MD

## 2024-06-03 NOTE — HISTORY OF PRESENT ILLNESS
[de-identified] : CARYN WHELAN is a 74 year old female present to follow up for right knee pain. Last visit was 5/8/24 at which time the patient had US guided CSI of the right knee joint.  Post-injection, the patient experienced temporary relief on the day of the procedure but reported that the pain persisted thereafter. She resorted to taking Tylenol around 12:30, which eventually allowed her to sleep, although she was awakened by pain at 3:00 AM. The pain then caused a painful weekend that led to episodes of crying and difficulty attending [employer]. She had taken the Meloxicam that was prescribed although she noted that it caused paranoia and other side effects that caused difficulty sleeping. She is currently primarily relying on Tylenol for pain management although she expresses not wanting to take it anymore. She has not yet started physical therapy.

## 2024-06-03 NOTE — DISCUSSION/SUMMARY
[de-identified] : Total clinician time on the date of this encounter was at least 35 minutes including:  Preparing to see the patient and review of prior notes, tests and other records. Obtaining and reviewing and/ or confirming the history. Performing a medically necessary, pertinent and appropriate examination. Ordering medications and supplements explaining side effects to look for tests, procedures, therapy and or specialty referrals. Explaining the diagnosis or differential to the patient. Communicating with other physicians or providers before during or after the visit.

## 2024-06-03 NOTE — PHYSICAL EXAM
[de-identified] :  General: Well-nourished, well-developed, alert, and in no acute distress. Head: Normocephalic. Eyes: Pupils equal, extraocular muscles intact, normal sclera. Nose: No nasal discharge. Cardiovascular: Extremities are warm and well perfused. Distal pulses are symmetric bilaterally. Respiratory: No labored breathing. Extremities: Sensation is intact distally bilaterally. Distal pulses are symmetric bilaterally Lymphatic: No regional lymphadenopathy, no lymphedema Neurologic: No focal deficits Skin: Normal skin color, texture, and turgor Psychiatric: Normal affect  Gait [antalgic]  MSK: Examination of [right] knee: No effusion, erythema, hematoma or skin lesion Tender to palpation: medial joint line and lateral joint line, Nontender to palpation: medial patellar facet, lateral patellar facet, quad tendon, patellar tendon, pes, Gerdy's tubercle, tibial tuberosity, popliteal fossa, hamstrings, ITB No warmth No Baker's cyst palpable ROM: 0-100 [No] patellar crepitus  Log roll negative Lachman negative Anterior drawer negative Posterior drawer negative Varus/valgus stress negative at 0 and 30 deg Keturah positive Patellar grind negative  Examination of [left] knee:  No effusion, erythema, hematoma or skin lesion Nontender to palpation: medial joint line, lateral joint line, medial patellar facet, lateral patellar facet, quad tendon, patellar tendon, pes, Gerdy's tubercle, tibial tuberosity, popliteal fossa, hamstrings, ITB No warmth No Baker's cyst palpable ROM: 0-120 No patellar crepitus  Log roll negative Lachman negative Anterior drawer negative Posterior drawer negative Varus/valgus stress negative at 0 and 30 deg [place] negative Patellar grind negative  Sensation is intact to light touch over the superficial and deep peroneal nerve distributions and the posterior tibial nerve distribution. Capillary refill is less than two seconds. Posterior tibial and dorsalis pedis pulses 2+ equal bilaterally. No calf swelling or tenderness bilaterally. Strength testing shows Hip flexion 5/5, Hip adduction 5/5, Hip abduction 5/5, Knee Extension 5/5, Knee Flexion 5/5, dorsiflexion 5/5, plantar flexion 5/5, EHL 5/5 Reflexes: Patellar 2+, Achilles 2+

## 2024-06-08 ENCOUNTER — EMERGENCY (EMERGENCY)
Facility: HOSPITAL | Age: 74
LOS: 1 days | Discharge: HOME | End: 2024-06-08
Attending: EMERGENCY MEDICINE | Admitting: EMERGENCY MEDICINE
Payer: MEDICARE

## 2024-06-08 VITALS
HEART RATE: 70 BPM | TEMPERATURE: 98 F | SYSTOLIC BLOOD PRESSURE: 110 MMHG | RESPIRATION RATE: 17 BRPM | OXYGEN SATURATION: 98 % | DIASTOLIC BLOOD PRESSURE: 63 MMHG

## 2024-06-08 VITALS
RESPIRATION RATE: 18 BRPM | WEIGHT: 192.02 LBS | SYSTOLIC BLOOD PRESSURE: 108 MMHG | HEART RATE: 87 BPM | DIASTOLIC BLOOD PRESSURE: 69 MMHG | OXYGEN SATURATION: 98 % | HEIGHT: 66 IN | TEMPERATURE: 98 F

## 2024-06-08 DIAGNOSIS — Z98.891 HISTORY OF UTERINE SCAR FROM PREVIOUS SURGERY: Chronic | ICD-10-CM

## 2024-06-08 DIAGNOSIS — Z90.49 ACQUIRED ABSENCE OF OTHER SPECIFIED PARTS OF DIGESTIVE TRACT: Chronic | ICD-10-CM

## 2024-06-08 DIAGNOSIS — Z90.710 ACQUIRED ABSENCE OF BOTH CERVIX AND UTERUS: Chronic | ICD-10-CM

## 2024-06-08 LAB
ALBUMIN SERPL ELPH-MCNC: 4 G/DL — SIGNIFICANT CHANGE UP (ref 3.3–5)
ALP SERPL-CCNC: 95 U/L — SIGNIFICANT CHANGE UP (ref 40–120)
ALT FLD-CCNC: 22 U/L — SIGNIFICANT CHANGE UP (ref 10–45)
ANION GAP SERPL CALC-SCNC: 12 MMOL/L — SIGNIFICANT CHANGE UP (ref 5–17)
AST SERPL-CCNC: 37 U/L — SIGNIFICANT CHANGE UP (ref 10–40)
BASOPHILS # BLD AUTO: 0.01 K/UL — SIGNIFICANT CHANGE UP (ref 0–0.2)
BASOPHILS NFR BLD AUTO: 0.2 % — SIGNIFICANT CHANGE UP (ref 0–2)
BILIRUB SERPL-MCNC: 0.9 MG/DL — SIGNIFICANT CHANGE UP (ref 0.2–1.2)
BUN SERPL-MCNC: 25 MG/DL — HIGH (ref 7–23)
CALCIUM SERPL-MCNC: 9.4 MG/DL — SIGNIFICANT CHANGE UP (ref 8.4–10.5)
CHLORIDE SERPL-SCNC: 100 MMOL/L — SIGNIFICANT CHANGE UP (ref 96–108)
CO2 SERPL-SCNC: 25 MMOL/L — SIGNIFICANT CHANGE UP (ref 22–31)
CREAT SERPL-MCNC: 1.63 MG/DL — HIGH (ref 0.5–1.3)
EGFR: 33 ML/MIN/1.73M2 — LOW
EOSINOPHIL # BLD AUTO: 0.01 K/UL — SIGNIFICANT CHANGE UP (ref 0–0.5)
EOSINOPHIL NFR BLD AUTO: 0.2 % — SIGNIFICANT CHANGE UP (ref 0–6)
GLUCOSE SERPL-MCNC: 139 MG/DL — HIGH (ref 70–99)
HCT VFR BLD CALC: 33.1 % — LOW (ref 34.5–45)
HGB BLD-MCNC: 10.1 G/DL — LOW (ref 11.5–15.5)
IMM GRANULOCYTES NFR BLD AUTO: 0.7 % — SIGNIFICANT CHANGE UP (ref 0–0.9)
LIDOCAIN IGE QN: 37 U/L — SIGNIFICANT CHANGE UP (ref 7–60)
LYMPHOCYTES # BLD AUTO: 1.03 K/UL — SIGNIFICANT CHANGE UP (ref 1–3.3)
LYMPHOCYTES # BLD AUTO: 23.1 % — SIGNIFICANT CHANGE UP (ref 13–44)
MCHC RBC-ENTMCNC: 27.6 PG — SIGNIFICANT CHANGE UP (ref 27–34)
MCHC RBC-ENTMCNC: 30.5 GM/DL — LOW (ref 32–36)
MCV RBC AUTO: 90.4 FL — SIGNIFICANT CHANGE UP (ref 80–100)
MONOCYTES # BLD AUTO: 0.57 K/UL — SIGNIFICANT CHANGE UP (ref 0–0.9)
MONOCYTES NFR BLD AUTO: 12.8 % — SIGNIFICANT CHANGE UP (ref 2–14)
NEUTROPHILS # BLD AUTO: 2.81 K/UL — SIGNIFICANT CHANGE UP (ref 1.8–7.4)
NEUTROPHILS NFR BLD AUTO: 63 % — SIGNIFICANT CHANGE UP (ref 43–77)
NRBC # BLD: 0 /100 WBCS — SIGNIFICANT CHANGE UP (ref 0–0)
PLATELET # BLD AUTO: 180 K/UL — SIGNIFICANT CHANGE UP (ref 150–400)
POTASSIUM SERPL-MCNC: 3.9 MMOL/L — SIGNIFICANT CHANGE UP (ref 3.5–5.3)
POTASSIUM SERPL-SCNC: 3.9 MMOL/L — SIGNIFICANT CHANGE UP (ref 3.5–5.3)
PROT SERPL-MCNC: 7 G/DL — SIGNIFICANT CHANGE UP (ref 6–8.3)
RBC # BLD: 3.66 M/UL — LOW (ref 3.8–5.2)
RBC # FLD: 13.6 % — SIGNIFICANT CHANGE UP (ref 10.3–14.5)
SODIUM SERPL-SCNC: 137 MMOL/L — SIGNIFICANT CHANGE UP (ref 135–145)
WBC # BLD: 4.46 K/UL — SIGNIFICANT CHANGE UP (ref 3.8–10.5)
WBC # FLD AUTO: 4.46 K/UL — SIGNIFICANT CHANGE UP (ref 3.8–10.5)

## 2024-06-08 PROCEDURE — 36415 COLL VENOUS BLD VENIPUNCTURE: CPT

## 2024-06-08 PROCEDURE — 99285 EMERGENCY DEPT VISIT HI MDM: CPT

## 2024-06-08 PROCEDURE — 99284 EMERGENCY DEPT VISIT MOD MDM: CPT | Mod: 25

## 2024-06-08 PROCEDURE — 83690 ASSAY OF LIPASE: CPT

## 2024-06-08 PROCEDURE — 74177 CT ABD & PELVIS W/CONTRAST: CPT | Mod: MC

## 2024-06-08 PROCEDURE — 85025 COMPLETE CBC W/AUTO DIFF WBC: CPT

## 2024-06-08 PROCEDURE — 74177 CT ABD & PELVIS W/CONTRAST: CPT | Mod: 26,MC

## 2024-06-08 PROCEDURE — 80053 COMPREHEN METABOLIC PANEL: CPT

## 2024-06-08 RX ORDER — SODIUM CHLORIDE 9 MG/ML
1000 INJECTION INTRAMUSCULAR; INTRAVENOUS; SUBCUTANEOUS ONCE
Refills: 0 | Status: COMPLETED | OUTPATIENT
Start: 2024-06-08 | End: 2024-06-08

## 2024-06-08 RX ORDER — MUPIROCIN 20 MG/G
1 OINTMENT TOPICAL
Qty: 1 | Refills: 0
Start: 2024-06-08 | End: 2024-06-14

## 2024-06-08 RX ORDER — CEPHALEXIN 500 MG
1 CAPSULE ORAL
Qty: 28 | Refills: 0
Start: 2024-06-08 | End: 2024-06-14

## 2024-06-08 RX ORDER — IOHEXOL 300 MG/ML
30 INJECTION, SOLUTION INTRAVENOUS ONCE
Refills: 0 | Status: COMPLETED | OUTPATIENT
Start: 2024-06-08 | End: 2024-06-08

## 2024-06-08 RX ORDER — CEPHALEXIN 500 MG
500 CAPSULE ORAL ONCE
Refills: 0 | Status: COMPLETED | OUTPATIENT
Start: 2024-06-08 | End: 2024-06-08

## 2024-06-08 RX ADMIN — SODIUM CHLORIDE 1000 MILLILITER(S): 9 INJECTION INTRAMUSCULAR; INTRAVENOUS; SUBCUTANEOUS at 16:30

## 2024-06-08 RX ADMIN — IOHEXOL 30 MILLILITER(S): 300 INJECTION, SOLUTION INTRAVENOUS at 15:16

## 2024-06-08 RX ADMIN — Medication 500 MILLIGRAM(S): at 18:59

## 2024-06-08 NOTE — ED ADULT TRIAGE NOTE - CHIEF COMPLAINT QUOTE
pt c/o painful abd wound s/p hernia repair. site noted to have foul smelling purulent drainage for 3x wks. surrounding skin is raw an painful. pt denies any fever, CP, SOB, N/V/D.

## 2024-06-08 NOTE — ED ADULT NURSE NOTE - NS ED NURSE LEVEL OF CONSCIOUSNESS ORIENTATION
Oriented - self; Oriented - place; Oriented - time/Age appropriate behavior Curvilinear Excision Additional Text (Leave Blank If You Do Not Want): The margin was drawn around the clinically apparent lesion.  A curvilinear shape was then drawn on the skin incorporating the lesion and margins.  Incisions were then made along these lines to the appropriate tissue plane and the lesion was extirpated.

## 2024-06-08 NOTE — ED ADULT NURSE NOTE - NSFALLUNIVINTERV_ED_ALL_ED
Bed/Stretcher in lowest position, wheels locked, appropriate side rails in place/Call bell, personal items and telephone in reach/Instruct patient to call for assistance before getting out of bed/chair/stretcher/Non-slip footwear applied when patient is off stretcher/[place] to call system/Physically safe environment - no spills, clutter or unnecessary equipment/Purposeful proactive rounding/Room/bathroom lighting operational, light cord in reach

## 2024-06-08 NOTE — ED PROVIDER NOTE - NSFOLLOWUPINSTRUCTIONS_ED_ALL_ED_FT
Please see your surgeon and primary care provider for followup.  Call for appointment.  If you have any problems with followup, please call the ED Referral Coordinator at [phone].  Return to the ER if symptoms worsen or other concerns.    Wound Infection  A wound infection happens when germs start to grow in a wound. An infection can cause the wound to break open or get worse. Wound infections need treatment. If a wound infection is not treated, problems can happen. Problems could include getting an infection in your blood or bones.    What are the causes?  A wound infection is most often caused by a germ called bacteria. The bacteria grow in the wound. Other germs, such as yeast and funguses, can also cause wound infections.    What increases the risk?  Having a weak body defense system (immune system).  Having diabetes.  Taking steroid medicines for a long time.  Smoking.  Being an older adult.  Being very overweight.  Taking certain medicines for cancer treatment (chemotherapy).  Having bad nutrition.  What are the signs or symptoms?  Having more redness, swelling, or pain at the wound site.  Having more blood or fluid at the wound site.  A bad smell coming from a wound or bandage (dressing).  Having a fever.  Feeling very tired (fatigue).  Having warmth at or around the wound.  Having pus at the wound site.  How is this treated?  A wound infection is most often treated with antibiotics and medicines that lower inflammation.  The infection should get better in 24–48 hours after you start antibiotics.  After 24–48 hours, redness around the wound should stop spreading. The wound should also be less painful.  If the condition is very bad, you may need to stay at the hospital or get antibiotics through an IV tube.  Follow these instructions at home:  Medicines    Take or apply over-the-counter and prescription medicines only as told by your doctor.  If you were prescribed antibiotics, take or apply them as told by your doctor. Do not stop using them even if you start to feel better.  Wound care    Two stitched incisions. One is normal. The other is red with pus and infected.  Clean the wound each day or as told by your doctor.  Wash the wound with mild soap and water.  Rinse the wound with water to remove all soap.  Pat the wound dry with a clean towel. Do not rub it.  Follow instructions from your doctor about how to take care of your wound. Make sure you:  Wash your hands with soap and water for at least 20 seconds before and after you change your dressing. If you cannot use soap and water, use hand .  Change your dressing as told by your doctor.  Leave stitches (sutures) or skin glue in place for at least 2 weeks.  Leave tape strips alone unless you are told to take them off. You may trim the edges of the tape strips if they curl up.  Check your wound every day for signs of infection. Check for:  More redness, swelling, or pain.  More fluid or blood.  Warmth.  Pus or a bad smell.  General instructions    Drink enough fluid to keep your pee (urine) pale yellow.  Do not take baths, swim, or use a hot tub. Ask your doctor about taking showers or sponge baths.  Raise (elevate) the wound area above the level of your heart while you are sitting or lying down.  Do not scratch or pick at the wound.  Keep all follow-up visits. These visits help your doctor make sure a more serious infection is not developing.  Contact a doctor if:  Your infection does not get better in 24–48 hours.  You have signs of infection.  You have a fever.  Your wound gets larger, turns dark in color, or is more painful.  You feel generally sick (malaise) with muscle aches and weakness.  Your symptoms get worse.  You have vomiting or watery poop (diarrhea) that does not stop.  Get help right away if:  Your wound that was closed breaks open.  You see red streaks coming from the infected area.  This information is not intended to replace advice given to you by your health care provider. Make sure you discuss any questions you have with your health care provider.

## 2024-06-08 NOTE — ED PROVIDER NOTE - CLINICAL SUMMARY MEDICAL DECISION MAKING FREE TEXT BOX
localized redness/scant discharge from prior surgical wound. reports chronic abd pain, mild tenderness around area. vitals stable. labs/ct ordered to r/o deep space infection/ abscess. imaging neg, wbc wnl. suspect localized skin infection. will treat with keflex/ mupirocin ointment. f/u pmd/surgery. return precautions discussed

## 2024-06-08 NOTE — ED PROVIDER NOTE - CARE PROVIDER_API CALL
[facility]  [address], [address]  [place] [address]  Phone: [phone]  Fax: [phone]  Follow Up Time:

## 2024-06-08 NOTE — ED PROVIDER NOTE - OBJECTIVE STATEMENT
history of HTN, HLD, DM2, hyperthyroidism s/p partial resection 40 years ago, toxic multinodular goiter, lacunar infarct, history of incarcerated ventral hernia in September 2022 for which she underwent exploratory laparotomy, lysis of adhesions, small bowel resection, and primary hernia repair with myofasciocutaneous advancement flaps and no mesh (Dr. So), here with 3 weeks of abdominal pain and some discharge from prior surgical wound. Reports foul smell, chills, burning sensation. No fever.

## 2024-06-08 NOTE — ED ADULT NURSE NOTE - OBJECTIVE STATEMENT
Alert and orientedx4 presenting to the ed with steady gait, c/o umbilical discharge/foul odor/fever/chills. Patient has hernia repair through that site. She states the discharge has been ongoing since 3 weeks ago. Did not follow up with pmd or specialist about the discharge. Patient endorsing she is able to eat and drink normally. Denies diarrhea/urinary symptoms. Denies sob or chest pain. Patient c/o headache and generalized weakness.

## 2024-06-10 DIAGNOSIS — E05.90 THYROTOXICOSIS, UNSPECIFIED WITHOUT THYROTOXIC CRISIS OR STORM: ICD-10-CM

## 2024-06-10 DIAGNOSIS — E78.5 HYPERLIPIDEMIA, UNSPECIFIED: ICD-10-CM

## 2024-06-10 DIAGNOSIS — T81.41XA INFECTION FOLLOWING A PROCEDURE, SUPERFICIAL INCISIONAL SURGICAL SITE, INITIAL ENCOUNTER: ICD-10-CM

## 2024-06-10 DIAGNOSIS — R68.83 CHILLS (WITHOUT FEVER): ICD-10-CM

## 2024-06-10 DIAGNOSIS — E11.9 TYPE 2 DIABETES MELLITUS WITHOUT COMPLICATIONS: ICD-10-CM

## 2024-06-10 DIAGNOSIS — Z88.0 ALLERGY STATUS TO PENICILLIN: ICD-10-CM

## 2024-06-10 DIAGNOSIS — R10.9 UNSPECIFIED ABDOMINAL PAIN: ICD-10-CM

## 2024-06-10 DIAGNOSIS — I10 ESSENTIAL (PRIMARY) HYPERTENSION: ICD-10-CM

## 2024-06-11 ENCOUNTER — APPOINTMENT (OUTPATIENT)
Dept: ENDOCRINOLOGY | Facility: CLINIC | Age: 74
End: 2024-06-11
Payer: MEDICARE

## 2024-06-11 VITALS
DIASTOLIC BLOOD PRESSURE: 67 MMHG | HEART RATE: 75 BPM | BODY MASS INDEX: 30.83 KG/M2 | WEIGHT: 191 LBS | SYSTOLIC BLOOD PRESSURE: 120 MMHG

## 2024-06-11 DIAGNOSIS — E05.90 THYROTOXICOSIS, UNSPECIFIED W/OUT THYROTOXIC CRISIS OR STORM: ICD-10-CM

## 2024-06-11 DIAGNOSIS — E11.9 TYPE 2 DIABETES MELLITUS W/OUT COMPLICATIONS: ICD-10-CM

## 2024-06-11 DIAGNOSIS — E04.2 NONTOXIC MULTINODULAR GOITER: ICD-10-CM

## 2024-06-11 PROCEDURE — 99213 OFFICE O/P EST LOW 20 MIN: CPT

## 2024-06-11 NOTE — REVIEW OF SYSTEMS
[Dysphagia] : no dysphagia [Joint Pain] : joint pain [Myalgia] : myalgia  [Stress] : stress [Negative] : Endocrine

## 2024-06-11 NOTE — HISTORY OF PRESENT ILLNESS
[FreeTextEntry1] : Ms. WHELAN is a 74 year female with pmhx of T2D, HTN, HLD, subclinical hyperthyroidism, thyroid nodules, anemia who presents for follow-up.  Last visit, 3/21/2023 with myself  Interval change: Recent presentation to hospitalization to [facility] following 3 weeks of abdominal pain and wound drainage, treated with antibiotics, imaging without abscess. Following ophthal for cataract +fatigue r/t not sleeping well (mice in apartment), ongoing issue since last visit 1 year ago Continues current methimazole regimen, tolerating this regimen well Labs from May 2024 with TSH at goal (1.67), as well as TT3 (130) No positional dyspnea, occasionally SOB with exertion, denies dysphagia Made diet modifications since last labs where A1C increased, as desires to remain off of diabetes medication  Current regimen: Methimazole 2.5mg/2.5mg/5mg alternating dose  1. Hyperthyroidism Hospitalized in 9/2022 for abdominal pain, per patient informed to FU with endocrine after this visit With HIE review, endocrine follow-up outpatient recommendation occurred from 11/2022 hospital visit At time of 11/2022 hospitalization: + hair loss, + weight loss, starting ~summer 2022 Endorses history, many years ago (?30) of partial thyroidectomy.  Per patient FNA prior to this, no cancer. ?hyperthyroidism at that time Since that time, thyroid function was normal until fall 2022 TSH suppressed on labs since 11/15/2022 with normal FT4, as low as 0.01 Started on methimazole in 11/2022 +MNG on thyroid US inpatient 11/2022, multiple nodules meet FNA criteria S/p Thyroid uptake scan in April 2023 c/w toxic MNG (hot nodule was not c/w nodules that met criteria for FNA) S/p FNA in May 2023 with Dr Reyes of left mid nodule (2 x 1.4 x 1.5cm) and isthmus nodule of 1.8 x 2.3 x 2.3cm, Potter II (benign)  2. T2D Managed by PCP A1C 6.6% (5/9/24) from 5.7% (2/14/23) from 7% (5/12/2022) Current regimen: None Past regimen: Metformin 500mg daily, stopped r/t improvement of sugar

## 2024-06-11 NOTE — DATA REVIEWED
[FreeTextEntry1] : US THYROID, 11/20/2022   History: Low TSH, normal T4, thyroid nodules present. Prior study: CT chest dated 11/16/2022.  Findings: RIGHT LOBE: Dimensions: 5.4 x 2.4 x 2.3 cm (sagittal x AP x transverse), enlarged in  AP diameter. Echotexture: Heterogeneous. Vascularity: Normovascular. The right lobe contains three visible nodules.  Nodule 1: Location: Upper pole. Dimensions: 0.8 x 0.9 x 0.7 cm (sagittal x AP x transverse) Composition: Solid. For solid nodules or for the solid portion of a partially cystic nodule,  does the solid portion have any of the following suspicious features? -  No: Hypoechoic -  No: Microcalcifications -  No: Irregular margin (infiltrative or microlobulated) -  Yes: Taller than wide (AP dimension > transverse) -  No: Extrathyroidal extension -  No: Interrupted rim calcification with soft tissue extrusion  Nodule 2: Location: Interpolar Dimensions: 0.8 x 0.6 x 0.7 cm (sagittal x AP x transverse) Composition: Spongiform. For solid nodules or for the solid portion of a partially cystic nodule,  does the solid portion have any of the following suspicious features?  Nodule 3: Location: Interpolar. Dimensions: 0.6 x 0.5 x 0.4 cm (sagittal x AP x transverse) Composition: Spongiform. For solid nodules or for the solid portion of a partially cystic nodule,  does the solid portion have any of the following suspicious features?  LEFT LOBE: Dimensions: 3.8 x 2.2 x 2.5 cm (sagittal x AP x transverse), enlarged. Echotexture: Heterogeneous. Vascularity: Normovascular. The left lobe contains three visible nodules.  Nodule 1: Location: Upper pole Dimensions: 1.0 x 1.1 x 1.2 cm (sagittal x AP x transverse) Composition: Predominantly solid. For solid nodules or for the solid portion of a partially cystic nodule,  does the solid portion have any of the following suspicious features? -  No: Hypoechoic -  No: Microcalcifications -  No: Irregular margin (infiltrative or microlobulated) -  No: Taller than wide (AP dimension > transverse) -  No: Extrathyroidal extension -  No: Interrupted rim calcification with soft tissue extrusion  Nodule 2: Location: Interpolar. Dimensions: 2.0 x 1.4 x 1.5 cm (sagittal x AP x transverse) Composition: Solid. For solid nodules or for the solid portion of a partially cystic nodule,  does the solid portion have any of the following suspicious features? -  No: Hypoechoic -  No: Microcalcifications -  No: Irregular margin (infiltrative or microlobulated) -  No: Taller than wide (AP dimension > transverse) -  No: Extrathyroidal extension -  No: Interrupted rim calcification with soft tissue extrusion  Nodule 3: Location: Lower pole. Dimensions: 1.1 x 0.7 x 1.1  cm (sagittal x AP x transverse) Composition: Solid. For solid nodules or for the solid portion of a partially cystic nodule,  does the solid portion have any of the following suspicious features? -  No: Hypoechoic -  No: Microcalcifications -  No: Irregular margin (infiltrative or microlobulated) -  No: Taller than wide (AP dimension > transverse) -  No: Extrathyroidal extension -  No: Interrupted rim calcification with soft tissue extrusion   ISTHMUS: Dimensions: Measures up to 2.3 cm in the AP dimension superiorly  secondary to nodule. The isthmus lobe contains two visible nodules.  Nodule 1: Location: Upper left isthmus. Dimensions: 1.8 x 2.3 x 2.3 cm (sagittal x AP x transverse) Composition: Predominantly solid with eccentric cystic areas. For solid nodules or for the solid portion of a partially cystic nodule,  does the solid portion have any of the following suspicious features? -  No: Hypoechoic -  No: Microcalcifications -  No: Irregular margin (infiltrative or microlobulated) -  No: Taller than wide (AP dimension > transverse) -  No: Extrathyroidal extension -  No: Interrupted rim calcification with soft tissue extrusion  Nodule 2: Location: Lower right isthmus. Dimensions: 1.9 x 0.7 x 1.4 cm (sagittal x AP x transverse) Composition: Partially cystic with eccentric solid areas. For solid nodules or for the solid portion of a partially cystic nodule,  does the solid portion have any of the following suspicious features? -  No: Hypoechoic -  No: Microcalcifications -  No: Irregular margin (infiltrative or microlobulated) -  No: Taller than wide (AP dimension > transverse) -  No: Extrathyroidal extension -  No: Interrupted rim calcification with soft tissue extrusion  Cervical Lymph Node Evaluation: No abnormal lymph nodes are identified in  the neck.  Parathyroid Examination: Parathyroid glands not visualized.   IMPRESSION: Multinodular thyroid goiter as detailed above. The following nodules meet  the sonographic criteria for FNA: Interpolar solid nodule left lobe  (nodule #2), solid nodule upper isthmus and the partially cystic nodule  with eccentric solid areas in the lower isthmus.  ---------------------------------------------------------------- RECOMMENDATIONS ARE BASED UPON THE 2015 AMERICAN [employer]YRO[employer] ASSOCIATION  MANAGEMENT GUIDELINES. Thresholds for recommended fine needle aspiration  are as follows: *  Solid nodule with one or more suspicious sonographic features greater  than or equal to 1.0-cm *  Solid nodule without suspicious sonographic features greater than or  equal to 1.5-cm *  Partially cystic nodule in which the solid component has one or more  suspicious sonographic features greater than or equal to 1.0-cm *  Partially cystic nodule with eccentric solid area(s) without  suspicious sonographic features greater than or equal to 1.5-cm *  Partially cystic nodule without suspicious sonographic features  greater than or equal to 2.0-cm *  Spongiform nodule greater than or equal to 2.0-cm

## 2024-06-11 NOTE — ASSESSMENT
[FreeTextEntry1] : 1. Subclinical hyperthyroidism TSH suppressed on labs since 11/15/2022 with normal FT4 Started methimazole 2.5mg daily at that time Current regimen: methimazole 5mg, taking 2.5/2.5/5mg, alternating doses Most recent TFTs from May 2024 reveal TSH and TT3 at goal Thyroid US when in hospital revealed MNG S/p Thyroid uptake scan in April 2023 c/w toxic MNG (hot nodule was not c/w nodules that met criteria for FNA) S/p FNA in May 2023 with Dr Reyes of left mid nodule (2 x 1.4 x 1.5cm) and isthmus nodule of 1.8 x 2.3 x 2.3cm, Nahma II (benign) -- continue current methimazole regimen  -- thyroid US surveillance, as below  2. MNG 11/2022 Thyroid US with multiple nodules, including multiple that meet FNA criteria S/p Thyroid uptake scan in April 2023 c/w toxic MNG (hot nodule was not c/w nodules that met criteria for FNA) S/p FNA in May 2023 with Dr Reyes of left mid nodule (2 x 1.4 x 1.5cm) and isthmus nodule of 1.8 x 2.3 x 2.3cm, Nahma II (benign) -- RX provided to schedule thyroid US to proceed with thyroid nodule surveillance  3.. T2D Managed by PCP A1C 6.6% (5/9/24) from 5.7% (2/14/23) from 7% (5/12/2022) Currently managed with diet/lifestyle H/o metformin 500mg    Schedule Thyroid US, I will call/[facility] message with results Follow up in ~1 year, pending thyroid US results  Rebecca Brasher MS, FNP-BC, [facility] 06/11/2024

## 2024-06-11 NOTE — PHYSICAL EXAM
[Alert] : alert [No Acute Distress] : no acute distress [Normal Voice/Communication] : normal voice communication [Normal Hearing] : hearing was normal [No Respiratory Distress] : no respiratory distress [Normal Rate and Effort] : normal respiratory rate and effort [Oriented x3] : oriented to person, place, and time [Normal Insight/Judgement] : insight and judgment were intact [de-identified] : +thyromegaly bilaterally with left sided nodularity, nontender to palpation [de-identified] : slow gait, prefers to not use walker.

## 2024-06-18 ENCOUNTER — APPOINTMENT (OUTPATIENT)
Dept: GASTROENTEROLOGY | Facility: CLINIC | Age: 74
End: 2024-06-18

## 2024-06-18 VITALS
WEIGHT: 193.6 LBS | RESPIRATION RATE: 18 BRPM | BODY MASS INDEX: 31.12 KG/M2 | HEART RATE: 83 BPM | HEIGHT: 66 IN | SYSTOLIC BLOOD PRESSURE: 123 MMHG | TEMPERATURE: 97.2 F | OXYGEN SATURATION: 98 % | DIASTOLIC BLOOD PRESSURE: 74 MMHG

## 2024-06-18 PROCEDURE — G2211 COMPLEX E/M VISIT ADD ON: CPT

## 2024-06-18 PROCEDURE — 99204 OFFICE O/P NEW MOD 45 MIN: CPT

## 2024-06-19 ENCOUNTER — APPOINTMENT (OUTPATIENT)
Dept: MRI IMAGING | Facility: CLINIC | Age: 74
End: 2024-06-19
Payer: MEDICARE

## 2024-06-19 PROCEDURE — 73721 MRI JNT OF LWR EXTRE W/O DYE: CPT | Mod: RT

## 2024-06-20 NOTE — HISTORY OF PRESENT ILLNESS
[FreeTextEntry1] : 74F HTN, HLD, DM2, hyperthyroidism s/p partial resection 40 years ago, toxic multinodular goiter, lacunar infarct, history of incarcerated ventral hernia in September 2022 for which she underwent exploratory laparotomy, lysis of adhesions, small bowel resection, and primary hernia repair with myofasciocutaneous advancement flaps and no mesh (Dr. So) who presents now for complaints of generalized abdominal pain.  Generalized abdominal pain, started after hernia repair. Feels cramping. Bowel habits mixed between constipation and straining with defecation v diarrhea with urgency depending on what she eats Sometimes has small blood on tp after wiping Has difficulty swallowing large pieces of food due to goiter so has to cut up meat in small pieces. Does not get stuck. Sometimes has burning reflux  EGD: never Colonoscopy: 2012 (12 years ago)  Meds: Amlodipine, hctz, losartan, methimazole, propranolol Supplements: Tylenol AC/ NSAIDS: ASA 81, meloxicam ETOH: denies Tob: denies Drug use: denies  Fhx: DM, denies GI malignancy

## 2024-06-20 NOTE — ASSESSMENT
[FreeTextEntry1] : 74F HTN, HLD, DM2, hyperthyroidism s/p partial resection 40 years ago, toxic multinodular goiter, lacunar infarct, history of incarcerated ventral hernia in September 2022 for which she underwent exploratory laparotomy, lysis of adhesions, small bowel resection, and primary hernia repair with myofasciocutaneous advancement flaps and no mesh (Dr. So) who presents now for complaints of generalized abdominal pain.  #Abdominal pain #Rectal bleeding - Increase dietary fiber + supplementation - Minimize NSAID use - IBGard for abdominal pain - Plan for EGD with bx for HP/ colonoscopy @ [facility] with golytely - Details of procedure, including risks of anesthesia and procedure which include but not limited to bleeding, perforation, infection, missed polyp discussed and patient gives verbal consent. Written consent to be obtained at time of procedure. - Prep instructions discussed at length - Pt advised an escort is needed to  from procedure  Nilsa Hess MD GI Fellow

## 2024-06-20 NOTE — END OF VISIT
[] : Fellow [Time Spent: ___ minutes] : I have spent [unfilled] minutes of time on the encounter. [FreeTextEntry3] : 74F, w toxic multinodular goiter, incarcerated ventral hernia in 9/2022, was referred to GI for eval of dysphagia, intermittent generalized abd pain, and crc screening.  Recent CTAP and Gen surg recent note reviewed.  Agree w EGD/Colonoscopy. Risks/benefits/alternatives discussed.  A trial of fiber supplements and IBGard prn.

## 2024-06-28 ENCOUNTER — APPOINTMENT (OUTPATIENT)
Dept: ORTHOPEDIC SURGERY | Facility: CLINIC | Age: 74
End: 2024-06-28
Payer: MEDICARE

## 2024-06-28 VITALS — OXYGEN SATURATION: 97 % | SYSTOLIC BLOOD PRESSURE: 136 MMHG | DIASTOLIC BLOOD PRESSURE: 79 MMHG | HEART RATE: 83 BPM

## 2024-06-28 DIAGNOSIS — S83.511S SPRAIN OF ANTERIOR CRUCIATE LIGAMENT OF RIGHT KNEE, SEQUELA: ICD-10-CM

## 2024-06-28 DIAGNOSIS — S83.241S OTHER TEAR OF MEDIAL MENISCUS, CURRENT INJURY, RIGHT KNEE, SEQUELA: ICD-10-CM

## 2024-06-28 DIAGNOSIS — M84.469S: ICD-10-CM

## 2024-06-28 PROCEDURE — 99214 OFFICE O/P EST MOD 30 MIN: CPT

## 2024-07-09 ENCOUNTER — EMERGENCY (EMERGENCY)
Facility: HOSPITAL | Age: 74
LOS: 1 days | Discharge: HOME | End: 2024-07-09
Admitting: EMERGENCY MEDICINE
Payer: MEDICARE

## 2024-07-09 VITALS
TEMPERATURE: 98 F | HEART RATE: 84 BPM | DIASTOLIC BLOOD PRESSURE: 71 MMHG | OXYGEN SATURATION: 97 % | SYSTOLIC BLOOD PRESSURE: 156 MMHG | RESPIRATION RATE: 16 BRPM

## 2024-07-09 DIAGNOSIS — I10 ESSENTIAL (PRIMARY) HYPERTENSION: ICD-10-CM

## 2024-07-09 DIAGNOSIS — E78.5 HYPERLIPIDEMIA, UNSPECIFIED: ICD-10-CM

## 2024-07-09 DIAGNOSIS — E11.9 TYPE 2 DIABETES MELLITUS WITHOUT COMPLICATIONS: ICD-10-CM

## 2024-07-09 DIAGNOSIS — R55 SYNCOPE AND COLLAPSE: ICD-10-CM

## 2024-07-09 DIAGNOSIS — Z86.73 PERSONAL HISTORY OF TRANSIENT ISCHEMIC ATTACK (TIA), AND CEREBRAL INFARCTION WITHOUT RESIDUAL DEFICITS: ICD-10-CM

## 2024-07-09 LAB
ALBUMIN SERPL ELPH-MCNC: 3.6 G/DL — SIGNIFICANT CHANGE UP (ref 3.4–5)
ALP SERPL-CCNC: 95 U/L — SIGNIFICANT CHANGE UP (ref 40–120)
ALT FLD-CCNC: 14 U/L — SIGNIFICANT CHANGE UP (ref 12–42)
ANION GAP SERPL CALC-SCNC: 12 MMOL/L — SIGNIFICANT CHANGE UP (ref 9–16)
APPEARANCE UR: ABNORMAL
APTT BLD: 34.8 SEC — SIGNIFICANT CHANGE UP (ref 24.5–35.6)
AST SERPL-CCNC: 29 U/L — SIGNIFICANT CHANGE UP (ref 15–37)
BACTERIA # UR AUTO: ABNORMAL /HPF
BASOPHILS # BLD AUTO: 0.03 K/UL — SIGNIFICANT CHANGE UP (ref 0–0.2)
BASOPHILS NFR BLD AUTO: 0.3 % — SIGNIFICANT CHANGE UP (ref 0–2)
BILIRUB SERPL-MCNC: 0.8 MG/DL — SIGNIFICANT CHANGE UP (ref 0.2–1.2)
BILIRUB UR-MCNC: NEGATIVE — SIGNIFICANT CHANGE UP
BUN SERPL-MCNC: 36 MG/DL — HIGH (ref 7–23)
CALCIUM SERPL-MCNC: 9.4 MG/DL — SIGNIFICANT CHANGE UP (ref 8.5–10.5)
CHLORIDE SERPL-SCNC: 105 MMOL/L — SIGNIFICANT CHANGE UP (ref 96–108)
CO2 SERPL-SCNC: 23 MMOL/L — SIGNIFICANT CHANGE UP (ref 22–31)
COLOR SPEC: YELLOW — SIGNIFICANT CHANGE UP
CREAT SERPL-MCNC: 1.77 MG/DL — HIGH (ref 0.5–1.3)
DIFF PNL FLD: NEGATIVE — SIGNIFICANT CHANGE UP
EGFR: 30 ML/MIN/1.73M2 — LOW
EOSINOPHIL # BLD AUTO: 0.24 K/UL — SIGNIFICANT CHANGE UP (ref 0–0.5)
EOSINOPHIL NFR BLD AUTO: 2.5 % — SIGNIFICANT CHANGE UP (ref 0–6)
EPI CELLS # UR: 28 — SIGNIFICANT CHANGE UP
GLUCOSE BLDC GLUCOMTR-MCNC: 106 MG/DL — HIGH (ref 70–99)
GLUCOSE SERPL-MCNC: 108 MG/DL — HIGH (ref 70–99)
GLUCOSE UR QL: NEGATIVE MG/DL — SIGNIFICANT CHANGE UP
HCT VFR BLD CALC: 31.9 % — LOW (ref 34.5–45)
HGB BLD-MCNC: 9.9 G/DL — LOW (ref 11.5–15.5)
IMM GRANULOCYTES NFR BLD AUTO: 0.6 % — SIGNIFICANT CHANGE UP (ref 0–0.9)
INR BLD: 1.07 — SIGNIFICANT CHANGE UP (ref 0.85–1.18)
KETONES UR-MCNC: NEGATIVE MG/DL — SIGNIFICANT CHANGE UP
LEUKOCYTE ESTERASE UR-ACNC: ABNORMAL
LYMPHOCYTES # BLD AUTO: 1.8 K/UL — SIGNIFICANT CHANGE UP (ref 1–3.3)
LYMPHOCYTES # BLD AUTO: 18.7 % — SIGNIFICANT CHANGE UP (ref 13–44)
MAGNESIUM SERPL-MCNC: 2 MG/DL — SIGNIFICANT CHANGE UP (ref 1.6–2.6)
MCHC RBC-ENTMCNC: 27.7 PG — SIGNIFICANT CHANGE UP (ref 27–34)
MCHC RBC-ENTMCNC: 31 GM/DL — LOW (ref 32–36)
MCV RBC AUTO: 89.4 FL — SIGNIFICANT CHANGE UP (ref 80–100)
MONOCYTES # BLD AUTO: 0.58 K/UL — SIGNIFICANT CHANGE UP (ref 0–0.9)
MONOCYTES NFR BLD AUTO: 6 % — SIGNIFICANT CHANGE UP (ref 2–14)
NEUTROPHILS # BLD AUTO: 6.91 K/UL — SIGNIFICANT CHANGE UP (ref 1.8–7.4)
NEUTROPHILS NFR BLD AUTO: 71.9 % — SIGNIFICANT CHANGE UP (ref 43–77)
NITRITE UR-MCNC: NEGATIVE — SIGNIFICANT CHANGE UP
NRBC # BLD: 0 /100 WBCS — SIGNIFICANT CHANGE UP (ref 0–0)
PH UR: 5.5 — SIGNIFICANT CHANGE UP (ref 5–8)
PLATELET # BLD AUTO: 219 K/UL — SIGNIFICANT CHANGE UP (ref 150–400)
POTASSIUM SERPL-MCNC: 4.7 MMOL/L — SIGNIFICANT CHANGE UP (ref 3.5–5.3)
POTASSIUM SERPL-SCNC: 4.7 MMOL/L — SIGNIFICANT CHANGE UP (ref 3.5–5.3)
PROT SERPL-MCNC: 7.9 G/DL — SIGNIFICANT CHANGE UP (ref 6.4–8.2)
PROT UR-MCNC: NEGATIVE MG/DL — SIGNIFICANT CHANGE UP
PROTHROM AB SERPL-ACNC: 12.1 SEC — SIGNIFICANT CHANGE UP (ref 9.5–13)
RBC # BLD: 3.57 M/UL — LOW (ref 3.8–5.2)
RBC # FLD: 14.3 % — SIGNIFICANT CHANGE UP (ref 10.3–14.5)
RBC CASTS # UR COMP ASSIST: 0 /HPF — SIGNIFICANT CHANGE UP (ref 0–4)
SODIUM SERPL-SCNC: 140 MMOL/L — SIGNIFICANT CHANGE UP (ref 132–145)
SP GR SPEC: 1.03 — HIGH (ref 1–1.03)
TROPONIN I, HIGH SENSITIVITY RESULT: 5.7 NG/L — SIGNIFICANT CHANGE UP
UROBILINOGEN FLD QL: 1 MG/DL — SIGNIFICANT CHANGE UP (ref 0.2–1)
WBC # BLD: 9.62 K/UL — SIGNIFICANT CHANGE UP (ref 3.8–10.5)
WBC # FLD AUTO: 9.62 K/UL — SIGNIFICANT CHANGE UP (ref 3.8–10.5)
WBC UR QL: 3 /HPF — SIGNIFICANT CHANGE UP (ref 0–5)

## 2024-07-09 RX ORDER — CEFPODOXIME PROXETIL 50 MG/5 ML
1 SUSPENSION, RECONSTITUTED, ORAL (ML) ORAL
Qty: 14 | Refills: 0
Start: 2024-07-09 | End: 2024-07-15

## 2024-07-09 RX ORDER — ACETAMINOPHEN 325 MG
650 TABLET ORAL ONCE
Refills: 0 | Status: COMPLETED | OUTPATIENT
Start: 2024-07-09 | End: 2024-07-09

## 2024-07-09 RX ORDER — SODIUM CHLORIDE 0.9 % (FLUSH) 0.9 %
1000 SYRINGE (ML) INJECTION ONCE
Refills: 0 | Status: COMPLETED | OUTPATIENT
Start: 2024-07-09 | End: 2024-07-09

## 2024-07-09 RX ADMIN — Medication 1000 MILLILITER(S): at 17:26

## 2024-07-09 RX ADMIN — Medication 650 MILLIGRAM(S): at 19:40

## 2024-07-10 VITALS
OXYGEN SATURATION: 96 % | HEART RATE: 78 BPM | SYSTOLIC BLOOD PRESSURE: 138 MMHG | TEMPERATURE: 98 F | RESPIRATION RATE: 16 BRPM | DIASTOLIC BLOOD PRESSURE: 74 MMHG

## 2024-07-11 ENCOUNTER — APPOINTMENT (OUTPATIENT)
Dept: INTERNAL MEDICINE | Facility: CLINIC | Age: 74
End: 2024-07-11
Payer: MEDICARE

## 2024-07-11 VITALS
OXYGEN SATURATION: 98 % | BODY MASS INDEX: 31.08 KG/M2 | DIASTOLIC BLOOD PRESSURE: 72 MMHG | SYSTOLIC BLOOD PRESSURE: 119 MMHG | WEIGHT: 193.4 LBS | HEIGHT: 66 IN | TEMPERATURE: 97.3 F | HEART RATE: 87 BPM

## 2024-07-11 DIAGNOSIS — R82.81 PYURIA: ICD-10-CM

## 2024-07-11 DIAGNOSIS — N17.9 ACUTE KIDNEY FAILURE, UNSPECIFIED: ICD-10-CM

## 2024-07-11 DIAGNOSIS — S83.511S SPRAIN OF ANTERIOR CRUCIATE LIGAMENT OF RIGHT KNEE, SEQUELA: ICD-10-CM

## 2024-07-11 DIAGNOSIS — E11.9 TYPE 2 DIABETES MELLITUS W/OUT COMPLICATIONS: ICD-10-CM

## 2024-07-11 PROCEDURE — G2211 COMPLEX E/M VISIT ADD ON: CPT

## 2024-07-11 PROCEDURE — 99214 OFFICE O/P EST MOD 30 MIN: CPT | Mod: GC

## 2024-07-12 ENCOUNTER — NON-APPOINTMENT (OUTPATIENT)
Age: 74
End: 2024-07-12

## 2024-07-19 LAB
APPEARANCE: CLEAR
BILIRUBIN URINE: NEGATIVE
BLOOD URINE: NEGATIVE
COLOR: YELLOW
GLUCOSE QUALITATIVE U: NEGATIVE MG/DL
KETONES URINE: NEGATIVE MG/DL
LEUKOCYTE ESTERASE URINE: NEGATIVE
NITRITE URINE: NEGATIVE
PH URINE: 5.5
PROTEIN URINE: NEGATIVE MG/DL
SPECIFIC GRAVITY URINE: 1.02
UROBILINOGEN URINE: 1 MG/DL

## 2024-07-23 ENCOUNTER — APPOINTMENT (OUTPATIENT)
Dept: RADIOLOGY | Facility: HOSPITAL | Age: 74
End: 2024-07-23

## 2024-07-25 ENCOUNTER — APPOINTMENT (OUTPATIENT)
Dept: INTERNAL MEDICINE | Facility: CLINIC | Age: 74
End: 2024-07-25

## 2024-07-27 PROBLEM — Z78.9 OTHER SPECIFIED HEALTH STATUS: Chronic | Status: ACTIVE | Noted: 2024-07-09

## 2024-08-06 ENCOUNTER — APPOINTMENT (OUTPATIENT)
Dept: INTERNAL MEDICINE | Facility: CLINIC | Age: 74
End: 2024-08-06

## 2024-08-06 PROBLEM — R55 SYNCOPE: Status: ACTIVE | Noted: 2024-08-06

## 2024-08-06 PROCEDURE — 99215 OFFICE O/P EST HI 40 MIN: CPT

## 2024-08-06 PROCEDURE — 36415 COLL VENOUS BLD VENIPUNCTURE: CPT

## 2024-08-06 PROCEDURE — G2211 COMPLEX E/M VISIT ADD ON: CPT

## 2024-08-06 NOTE — PHYSICAL EXAM
[No JVD] : no jugular venous distention [No Lymphadenopathy] : no lymphadenopathy [Supple] : supple [No Respiratory Distress] : no respiratory distress  [No Accessory Muscle Use] : no accessory muscle use [Clear to Auscultation] : lungs were clear to auscultation bilaterally [Normal Rate] : normal rate  [Regular Rhythm] : with a regular rhythm [No Edema] : there was no peripheral edema [Normal S1, S2] : normal S1 and S2 [Soft] : abdomen soft [Non Tender] : non-tender [Non-distended] : non-distended [No Joint Swelling] : no joint swelling [de-identified] : pain with palpitation to b/l knee [de-identified] : moderate distress while ambulating d/t pain

## 2024-08-06 NOTE — ASSESSMENT
[FreeTextEntry1] : #Syncope Hx of syncopal event 7/10 after which she followed up at [facility], considered to be d/t dehydration but upon further questioning c/f exertional syncope. No significant cardiac hx. States she had a TTE in June which was normal, but no documentation of results.  - Referral for pharmocologic stress ecoh  - Return to clinic in 6 weeks for f/u  #CKD 3a-3b, unclear baseline but appears to be Cr 1.15-1.3. GFR 40-57 on prior BMP in past year. Last albumin/Cr ratio 995 in 2023. Has never seen nephrology. Nephrology given at last visit, but has not scheduled appointment. She is focused on knee pain at the moment. - F/u repeat BMP and urine protein creatinine ratio - F/u 6 weeks to evaluate kidney function after meeting with ortho on 8/9   #Knee Pain Complaining of knee pain and hoping to get injection Friday 8/9. Does not want PT or walker/cane. Discussed options of surgery.  - F/u with orthopedic surgery  #T2DM Last A1c 6.6%, controlled with diet.   #HCM  Colonoscopy 8/14 UTD vaccinations   F/u in 6 weeks to discuss results of stress test and kidney function after further evaluation by orthopedic surgery on 8/9. RTC sooner if needed.  Discussed case with Dr. Mitchell

## 2024-08-06 NOTE — HISTORY OF PRESENT ILLNESS
[de-identified] : 74F with PMHx of HTN, HLD, DM2, hyperthyroidism (s/p partial resection 40 years ago c/b toxic multinodular goiter), lacunar infarct, and hx of incarcerated ventral hernia (s/p ex-lap, lysis of adhesions, small bowel resection, and hernia repair in 09/2022), cholecystectomy (2020), multiple c-sections and hysterectomy (40 years ago), R knee pain (current ACL rupture, medial menicus tear, tibial insufficiency fracture) presents for f/u d/t worsening knee pain.   Complex history of knee pain, pending appointment with ortho/sports medicine on Friday 8/9 and hoping to get injection. States she does not want physical therapy because it hurts and does not want to ambulate with walker/cane. States she is in a lot of pain and nothing helps. Takes Tylenol and meloxicam for pain.   Recent syncopal episode 7/10 after which she followed up here. States she was picking something up from her bed when she blacked out. Was unable to get up d/t knee pain and abdominal pain (hx of many abdominal surgeries). Has not had syncopal episode since but does feel lightheaded and dizzy at times. Also states she has b/l LE edema that resolves with leg elevation. Denies chest pain, palpitations, SOB with exertion (though difficult to assess d/t knee pain), dysuria or URI symptoms.

## 2024-08-09 ENCOUNTER — APPOINTMENT (OUTPATIENT)
Dept: ORTHOPEDIC SURGERY | Facility: CLINIC | Age: 74
End: 2024-08-09

## 2024-08-12 RX ORDER — POLYETHYLENE GLYCOL 3350 AND ELECTROLYTES WITH LEMON FLAVOR 236; 22.74; 6.74; 5.86; 2.97 G/4L; G/4L; G/4L; G/4L; G/4L
236 POWDER, FOR SOLUTION ORAL
Qty: 1 | Refills: 0 | Status: ACTIVE | COMMUNITY
Start: 2024-08-12 | End: 1900-01-01

## 2024-08-12 RX ORDER — SIMETHICONE 80 MG/1
80 TABLET, CHEWABLE ORAL
Qty: 2 | Refills: 0 | Status: ACTIVE | COMMUNITY
Start: 2024-08-12 | End: 1900-01-01

## 2024-08-14 ENCOUNTER — RESULT REVIEW (OUTPATIENT)
Age: 74
End: 2024-08-14

## 2024-08-14 ENCOUNTER — OUTPATIENT (OUTPATIENT)
Dept: OUTPATIENT SERVICES | Facility: HOSPITAL | Age: 74
LOS: 1 days | Discharge: HOME | End: 2024-08-14
Payer: MEDICARE

## 2024-08-14 ENCOUNTER — APPOINTMENT (OUTPATIENT)
Dept: GASTROENTEROLOGY | Facility: HOSPITAL | Age: 74
End: 2024-08-14

## 2024-08-14 VITALS
OXYGEN SATURATION: 100 % | SYSTOLIC BLOOD PRESSURE: 153 MMHG | HEART RATE: 76 BPM | RESPIRATION RATE: 18 BRPM | HEIGHT: 66 IN | DIASTOLIC BLOOD PRESSURE: 86 MMHG | TEMPERATURE: 97 F | WEIGHT: 192.9 LBS

## 2024-08-14 DIAGNOSIS — Z98.891 HISTORY OF UTERINE SCAR FROM PREVIOUS SURGERY: Chronic | ICD-10-CM

## 2024-08-14 DIAGNOSIS — Z90.49 ACQUIRED ABSENCE OF OTHER SPECIFIED PARTS OF DIGESTIVE TRACT: Chronic | ICD-10-CM

## 2024-08-14 DIAGNOSIS — Z90.710 ACQUIRED ABSENCE OF BOTH CERVIX AND UTERUS: Chronic | ICD-10-CM

## 2024-08-14 LAB — GLUCOSE BLDC GLUCOMTR-MCNC: 100 MG/DL — HIGH (ref 70–99)

## 2024-08-14 PROCEDURE — 45380 COLONOSCOPY AND BIOPSY: CPT

## 2024-08-14 PROCEDURE — 45380 COLONOSCOPY AND BIOPSY: CPT | Mod: PT

## 2024-08-14 PROCEDURE — 88305 TISSUE EXAM BY PATHOLOGIST: CPT

## 2024-08-14 PROCEDURE — 88305 TISSUE EXAM BY PATHOLOGIST: CPT | Mod: 26

## 2024-08-14 PROCEDURE — 82962 GLUCOSE BLOOD TEST: CPT

## 2024-08-14 PROCEDURE — 43239 EGD BIOPSY SINGLE/MULTIPLE: CPT

## 2024-08-14 NOTE — PACU DISCHARGE NOTE - HYDRATION STATUS:
PROCEDURE:   right posterior iliac crest bone marrow aspirate and biopsy.    INDICATIONS:   Eosinophilia  Eval for mastocytosis    DESCRIPTION OF PROCEDURE:   After verbal and written informed consent obtained, a \"Timeout\" was performed, verifing correct patient by using patient name and date of birth as well as verified the correct procedure.    The patient was prepped and draped in the usual fashion for a right posterior iliac crest bone marrow aspirate and biopsy. 10 cc of 2% Lidocaine were used by local infiltration to achieve local anesthesia. A scalpel blade was used to create a nick in the skin. A Jamshidi needle was used to obtain bone marrow aspirate for studies, then used to obtain a generous core biopsy for studies. The patient tolerated the procedure. Path tech did not see spicules despite multiple attempts at aspirate. Additional core was submitted for flow/chromosome analysis.    Estimated Blood Loss: Less than 5 cc.     Post procedure written and verbal instructions were given.  Patient cleared for discharge.   Patient will be called with results and appropriate recommendations will be made.     Satisfactory

## 2024-08-14 NOTE — PRE-ANESTHESIA EVALUATION ADULT - NSANTHOSAYNRD_GEN_A_CORE
No. CASIMIRO screening performed.  STOP BANG Legend: 0-2 = LOW Risk; 3-4 = INTERMEDIATE Risk; 5-8 = HIGH Risk

## 2024-08-14 NOTE — PRE-ANESTHESIA EVALUATION ADULT - NSANTHPMHFT_GEN_ALL_CORE
74F c PMH of HTN, DMII, HLD, SO, Hypothyroid, ASAEL, CVA and PSH of , Cholecystectomy, Hysterectomy, Thyroidectomy, Ventral Hernia Repair, Bowel Resection

## 2024-08-16 LAB — SURGICAL PATHOLOGY STUDY: SIGNIFICANT CHANGE UP

## 2024-08-19 ENCOUNTER — APPOINTMENT (OUTPATIENT)
Dept: ORTHOPEDIC SURGERY | Facility: CLINIC | Age: 74
End: 2024-08-19
Payer: MEDICARE

## 2024-08-19 VITALS — OXYGEN SATURATION: 99 % | SYSTOLIC BLOOD PRESSURE: 127 MMHG | HEART RATE: 84 BPM | DIASTOLIC BLOOD PRESSURE: 81 MMHG

## 2024-08-19 PROCEDURE — 20611 DRAIN/INJ JOINT/BURSA W/US: CPT | Mod: RT

## 2024-08-19 NOTE — PROCEDURE
[de-identified] : CARYN WHELAN is a 74 year old female presents today for Right knee Euflexxa injection (1/3). No recent infections, fever or skin lesions.  Ultrasound-guided intra-articular viscosupplementation injection of right knee:  Following a discussion of the risks (bleeding, infection) and benefits, verbal consent was obtained. Patient placed in supine position. The suprapatellar recess and surrounding structures (patella, femur, quadriceps tendon, suprapatellar fat pad and prefemoral fat pad) were visualized in SAX and LAX with Sonosite 15 Hz linear transducer.   Superolateral knee was anaesthetised with ethyl chloride spray. Under strict sterile technique the right knee was prepped with chlorhexadine. Using ultrasound guidance (superolateral approach) a 25 G 1.5 inch needle was inserted at the superolateral to the patella and after negative aspiration, a mixture of 1mL of 1% lidocaine and 2 mL of 0.5% Bupivacaine was injected subcutaneously.  After adequate anaesthesia, a 20 G 1.5 inch needle was inserted into the suprapatellar recess and after aspiration,of 6 mL of serosanguinous fluid, 2mL of Euflexxa HA gel was injected intra-articularly without resistance.   The use of direct ultrasound visualization was necessary to increase patient safety by identifying and avoiding inadvertent needle placement within the neurovascular and osteochondral structures. Additionally, the increased accuracy of needle placement may improve therapeutic efficacy and allow higher diagnostic specificity when evaluating the effectiveness of this injection.   The patient tolerated the procedures well. Post-injection instructions given (no strenuous activity for 48 hours, ice, elevate). Patient verbalized understanding. Follow up in 1 week for #2/3 injection.  RIGHT KNEE EUFLEXXA (1/3) LOT: [id number] EXP: 11- MAN: Sayda NDIC: [id number]

## 2024-08-19 NOTE — PROCEDURE
[de-identified] : CARYN WHELAN is a 74 year old female presents today for Right knee Euflexxa injection (1/3). No recent infections, fever or skin lesions.  Ultrasound-guided intra-articular viscosupplementation injection of right knee:  Following a discussion of the risks (bleeding, infection) and benefits, verbal consent was obtained. Patient placed in supine position. The suprapatellar recess and surrounding structures (patella, femur, quadriceps tendon, suprapatellar fat pad and prefemoral fat pad) were visualized in SAX and LAX with Sonosite 15 Hz linear transducer.   Superolateral knee was anaesthetised with ethyl chloride spray. Under strict sterile technique the right knee was prepped with chlorhexadine. Using ultrasound guidance (superolateral approach) a 25 G 1.5 inch needle was inserted at the superolateral to the patella and after negative aspiration, a mixture of 1mL of 1% lidocaine and 2 mL of 0.5% Bupivacaine was injected subcutaneously.  After adequate anaesthesia, a 20 G 1.5 inch needle was inserted into the suprapatellar recess and after aspiration,of 6 mL of serosanguinous fluid, 2mL of Euflexxa HA gel was injected intra-articularly without resistance.   The use of direct ultrasound visualization was necessary to increase patient safety by identifying and avoiding inadvertent needle placement within the neurovascular and osteochondral structures. Additionally, the increased accuracy of needle placement may improve therapeutic efficacy and allow higher diagnostic specificity when evaluating the effectiveness of this injection.   The patient tolerated the procedures well. Post-injection instructions given (no strenuous activity for 48 hours, ice, elevate). Patient verbalized understanding. Follow up in 1 week for #2/3 injection.  RIGHT KNEE EUFLEXXA (1/3) LOT: [id number] EXP: 11- MAN: Sayda NDIC: [id number]

## 2024-08-19 NOTE — PROCEDURE
[de-identified] : CARYN WHELAN is a 74 year old female presents today for Right knee Euflexxa injection (1/3). No recent infections, fever or skin lesions.  Ultrasound-guided intra-articular viscosupplementation injection of right knee:  Following a discussion of the risks (bleeding, infection) and benefits, verbal consent was obtained. Patient placed in supine position. The suprapatellar recess and surrounding structures (patella, femur, quadriceps tendon, suprapatellar fat pad and prefemoral fat pad) were visualized in SAX and LAX with Sonosite 15 Hz linear transducer.   Superolateral knee was anaesthetised with ethyl chloride spray. Under strict sterile technique the right knee was prepped with chlorhexadine. Using ultrasound guidance (superolateral approach) a 25 G 1.5 inch needle was inserted at the superolateral to the patella and after negative aspiration, a mixture of 1mL of 1% lidocaine and 2 mL of 0.5% Bupivacaine was injected subcutaneously.  After adequate anaesthesia, a 20 G 1.5 inch needle was inserted into the suprapatellar recess and after aspiration,of 6 mL of serosanguinous fluid, 2mL of Euflexxa HA gel was injected intra-articularly without resistance.   The use of direct ultrasound visualization was necessary to increase patient safety by identifying and avoiding inadvertent needle placement within the neurovascular and osteochondral structures. Additionally, the increased accuracy of needle placement may improve therapeutic efficacy and allow higher diagnostic specificity when evaluating the effectiveness of this injection.   The patient tolerated the procedures well. Post-injection instructions given (no strenuous activity for 48 hours, ice, elevate). Patient verbalized understanding. Follow up in 1 week for #2/3 injection.  RIGHT KNEE EUFLEXXA (1/3) LOT: [id number] EXP: 11- MAN: Sayda NDIC: [id number]

## 2024-08-26 ENCOUNTER — APPOINTMENT (OUTPATIENT)
Dept: ORTHOPEDIC SURGERY | Facility: CLINIC | Age: 74
End: 2024-08-26
Payer: MEDICARE

## 2024-08-26 VITALS
HEART RATE: 73 BPM | WEIGHT: 196 LBS | BODY MASS INDEX: 31.5 KG/M2 | HEIGHT: 66 IN | DIASTOLIC BLOOD PRESSURE: 85 MMHG | SYSTOLIC BLOOD PRESSURE: 136 MMHG | OXYGEN SATURATION: 100 %

## 2024-08-26 DIAGNOSIS — S83.511S SPRAIN OF ANTERIOR CRUCIATE LIGAMENT OF RIGHT KNEE, SEQUELA: ICD-10-CM

## 2024-08-26 DIAGNOSIS — S83.241S OTHER TEAR OF MEDIAL MENISCUS, CURRENT INJURY, RIGHT KNEE, SEQUELA: ICD-10-CM

## 2024-08-26 DIAGNOSIS — M84.469S: ICD-10-CM

## 2024-08-26 PROCEDURE — 20611 DRAIN/INJ JOINT/BURSA W/US: CPT | Mod: RT

## 2024-08-26 RX ORDER — TIZANIDINE 4 MG/1
4 TABLET ORAL
Qty: 30 | Refills: 0 | Status: ACTIVE | COMMUNITY
Start: 2024-08-26 | End: 1900-01-01

## 2024-08-26 RX ORDER — CYCLOBENZAPRINE HYDROCHLORIDE 5 MG/1
5 TABLET, FILM COATED ORAL
Qty: 30 | Refills: 0 | Status: ACTIVE | COMMUNITY
Start: 2024-08-26 | End: 1900-01-01

## 2024-08-27 ENCOUNTER — APPOINTMENT (OUTPATIENT)
Dept: NEPHROLOGY | Facility: CLINIC | Age: 74
End: 2024-08-27
Payer: MEDICARE

## 2024-08-27 VITALS
DIASTOLIC BLOOD PRESSURE: 63 MMHG | BODY MASS INDEX: 31.5 KG/M2 | WEIGHT: 196 LBS | SYSTOLIC BLOOD PRESSURE: 128 MMHG | HEIGHT: 66 IN

## 2024-08-27 DIAGNOSIS — I10 ESSENTIAL (PRIMARY) HYPERTENSION: ICD-10-CM

## 2024-08-27 DIAGNOSIS — N17.9 ACUTE KIDNEY FAILURE, UNSPECIFIED: ICD-10-CM

## 2024-08-27 DIAGNOSIS — N18.32 CHRONIC KIDNEY DISEASE, STAGE 3B: ICD-10-CM

## 2024-08-27 DIAGNOSIS — I12.9 HYPERTENSIVE CHRONIC KIDNEY DISEASE WITH STAGE 1 THROUGH STAGE 4 CHRONIC KIDNEY DISEASE, OR UNSPECIFIED CHRONIC KIDNEY DISEASE: ICD-10-CM

## 2024-08-27 PROCEDURE — G2211 COMPLEX E/M VISIT ADD ON: CPT

## 2024-08-27 PROCEDURE — 99214 OFFICE O/P EST MOD 30 MIN: CPT

## 2024-08-27 NOTE — HISTORY OF PRESENT ILLNESS
[FreeTextEntry1] : 74-year-old woman with a history of hypertension, type 2 diabetes, hyperlipidemia, hypothyroidism status post partial thyroid resection 40 years ago, history of lacunar infarct, incarcerated ventral hernia 2 years ago requiring exploratory laparotomy and lysis of adhesions with small bowel resection.  On that visit in February, for cardiac follow-up, her BP was 136/65 and it was noted that she had mild LVH on echo.  In June, she had a follow-up visit for thyroid nodule, and BP was 120/67, and A1c was 6.6.  She was seen in early August with a BP of 150/64 and there was mention of meloxicam, probably for knee pain.  She does not recall taking it, or other NSAIDs.  She is particularly concerned about her right knee pain and immobility.  She apparently did not benefit from shots in the knee but did feel better after fluid was removed.,

## 2024-08-27 NOTE — PROCEDURE
[de-identified] : CARYN WHELAN is a 74 year old female presents today for Right knee Euflexxa injection (2/3). No recent infections, fever or skin lesions.  Ultrasound-guided intra-articular viscosupplementation injection of right knee: Following a discussion of the risks (bleeding, infection) and benefits, verbal consent was obtained. Patient placed in supine position. The suprapatellar recess and surrounding structures (patella, femur, quadriceps tendon, suprapatellar fat pad and prefemoral fat pad) were visualized in SAX and LAX with Sonosite 15 Hz linear transducer.  Superolateral knee was anaesthetised with ethyl chloride spray. Under strict sterile technique the right knee was prepped with chlorhexadine. Using ultrasound guidance (superolateral approach) a 25 G 1.5 inch needle was inserted at the superolateral to the patella and after negative aspiration, a mixture of 1mL of 1% lidocaine and 2 mL of 0.5% Bupivacaine was injected subcutaneously. After adequate anaesthesia, a 20 G 1.5 inch needle was inserted into the suprapatellar recess and after negative aspiration, 2mL of Euflexxa HA gel was injected intra-articularly without resistance.  The use of direct ultrasound visualization was necessary to increase patient safety by identifying and avoiding inadvertent needle placement within the neurovascular and osteochondral structures. Additionally, the increased accuracy of needle placement may improve therapeutic efficacy and allow higher diagnostic specificity when evaluating the effectiveness of this injection.  The patient tolerated the procedures well. Post-injection instructions given (no strenuous activity for 48 hours, ice, elevate). Patient verbalized understanding. Encouraged to take Meloxicam prn, Acetaminophen 1g TID prn. Cyclobenzaprine 5-10mg HS prn. Prescription provided. Medial  brace referral provided. Discussed cane or walker use. Deferred per patient. Start physical therapy upon completion of the series. Referral provided. Follow up in 1 week for #3/3 injection.  RIGHT KNEE EUFLEXXA (2/3) LOT: [id number] EXP: 11- MAN: Sayda ND: [id number].

## 2024-08-27 NOTE — PROCEDURE
[de-identified] : CARYN WHELAN is a 74 year old female presents today for Right knee Euflexxa injection (2/3). No recent infections, fever or skin lesions.  Ultrasound-guided intra-articular viscosupplementation injection of right knee: Following a discussion of the risks (bleeding, infection) and benefits, verbal consent was obtained. Patient placed in supine position. The suprapatellar recess and surrounding structures (patella, femur, quadriceps tendon, suprapatellar fat pad and prefemoral fat pad) were visualized in SAX and LAX with Sonosite 15 Hz linear transducer.  Superolateral knee was anaesthetised with ethyl chloride spray. Under strict sterile technique the right knee was prepped with chlorhexadine. Using ultrasound guidance (superolateral approach) a 25 G 1.5 inch needle was inserted at the superolateral to the patella and after negative aspiration, a mixture of 1mL of 1% lidocaine and 2 mL of 0.5% Bupivacaine was injected subcutaneously. After adequate anaesthesia, a 20 G 1.5 inch needle was inserted into the suprapatellar recess and after negative aspiration, 2mL of Euflexxa HA gel was injected intra-articularly without resistance.  The use of direct ultrasound visualization was necessary to increase patient safety by identifying and avoiding inadvertent needle placement within the neurovascular and osteochondral structures. Additionally, the increased accuracy of needle placement may improve therapeutic efficacy and allow higher diagnostic specificity when evaluating the effectiveness of this injection.  The patient tolerated the procedures well. Post-injection instructions given (no strenuous activity for 48 hours, ice, elevate). Patient verbalized understanding. Encouraged to take Meloxicam prn, Acetaminophen 1g TID prn. Cyclobenzaprine 5-10mg HS prn. Prescription provided. Medial  brace referral provided. Discussed cane or walker use. Deferred per patient. Start physical therapy upon completion of the series. Referral provided. Follow up in 1 week for #3/3 injection.  RIGHT KNEE EUFLEXXA (2/3) LOT: [id number] EXP: 11- MAN: Sayda ND: [id number].

## 2024-08-27 NOTE — ASSESSMENT
[FreeTextEntry1] : 74-year-old woman with stage IIIb CKD of uncertain etiology, but likely related to her history of hypertension and type 2 diabetes.  She denies regular use of NSAIDs.  I have ordered a renal ultrasound to assess kidney size, echogenicity, and rule out obstructive uropathy.  I have ordered labs to include urine microalbumin, BMP, Cystatin C, PTH, SPEP, RORO.  She will return in 3 to 4 months.

## 2024-08-27 NOTE — PROCEDURE
[de-identified] : CARYN WHELAN is a 74 year old female presents today for Right knee Euflexxa injection (2/3). No recent infections, fever or skin lesions.  Ultrasound-guided intra-articular viscosupplementation injection of right knee: Following a discussion of the risks (bleeding, infection) and benefits, verbal consent was obtained. Patient placed in supine position. The suprapatellar recess and surrounding structures (patella, femur, quadriceps tendon, suprapatellar fat pad and prefemoral fat pad) were visualized in SAX and LAX with Sonosite 15 Hz linear transducer.  Superolateral knee was anaesthetised with ethyl chloride spray. Under strict sterile technique the right knee was prepped with chlorhexadine. Using ultrasound guidance (superolateral approach) a 25 G 1.5 inch needle was inserted at the superolateral to the patella and after negative aspiration, a mixture of 1mL of 1% lidocaine and 2 mL of 0.5% Bupivacaine was injected subcutaneously. After adequate anaesthesia, a 20 G 1.5 inch needle was inserted into the suprapatellar recess and after negative aspiration, 2mL of Euflexxa HA gel was injected intra-articularly without resistance.  The use of direct ultrasound visualization was necessary to increase patient safety by identifying and avoiding inadvertent needle placement within the neurovascular and osteochondral structures. Additionally, the increased accuracy of needle placement may improve therapeutic efficacy and allow higher diagnostic specificity when evaluating the effectiveness of this injection.  The patient tolerated the procedures well. Post-injection instructions given (no strenuous activity for 48 hours, ice, elevate). Patient verbalized understanding. Encouraged to take Meloxicam prn, Acetaminophen 1g TID prn. Cyclobenzaprine 5-10mg HS prn. Prescription provided. Medial  brace referral provided. Discussed cane or walker use. Deferred per patient. Start physical therapy upon completion of the series. Referral provided. Follow up in 1 week for #3/3 injection.  RIGHT KNEE EUFLEXXA (2/3) LOT: [id number] EXP: 11- MAN: Sayda ND: [id number].

## 2024-09-04 ENCOUNTER — APPOINTMENT (OUTPATIENT)
Dept: ORTHOPEDIC SURGERY | Facility: CLINIC | Age: 74
End: 2024-09-04
Payer: MEDICARE

## 2024-09-04 VITALS — DIASTOLIC BLOOD PRESSURE: 79 MMHG | SYSTOLIC BLOOD PRESSURE: 124 MMHG | HEART RATE: 70 BPM | OXYGEN SATURATION: 99 %

## 2024-09-04 DIAGNOSIS — M17.11 UNILATERAL PRIMARY OSTEOARTHRITIS, RIGHT KNEE: ICD-10-CM

## 2024-09-04 PROCEDURE — 20611 DRAIN/INJ JOINT/BURSA W/US: CPT | Mod: RT

## 2024-09-06 NOTE — PROCEDURE
[de-identified] : CARYN WHELAN is a 74 year old female presents today for Right knee Euflexxa injection (3/3). No recent infections, fever or skin lesions.  Ultrasound-guided intra-articular viscosupplementation injection of right knee: Following a discussion of the risks (bleeding, infection) and benefits, verbal consent was obtained. Patient placed in supine position. The suprapatellar recess and surrounding structures (patella, femur, quadriceps tendon, suprapatellar fat pad and prefemoral fat pad) were visualized in SAX and LAX with Sonosite 15 Hz linear transducer.  Superolateral knee was anaesthetised with ethyl chloride spray. Under strict sterile technique the right knee was prepped with chlorhexadine. Using ultrasound guidance (superolateral approach) a 25 G 1.5 inch needle was inserted at the superolateral to the patella and after negative aspiration, a mixture of 1mL of 1% lidocaine and 2 mL of 0.5% Bupivacaine was injected subcutaneously. After adequate anaesthesia, a 20 G 1.5 inch needle was inserted into the suprapatellar recess and after negative aspiration, 2mL of Euflexxa HA gel was injected intra-articularly without resistance.  The use of direct ultrasound visualization was necessary to increase patient safety by identifying and avoiding inadvertent needle placement within the neurovascular and osteochondral structures. Additionally, the increased accuracy of needle placement may improve therapeutic efficacy and allow higher diagnostic specificity when evaluating the effectiveness of this injection.  The patient tolerated the procedures well. Post-injection instructions given (no strenuous activity for 48 hours, ice, elevate). Patient verbalized understanding. Continue home exercises per handout.  Follow up in 8 weeks.  RIGHT KNEE EUFLEXXA (3/3) LOT: [id number] EXP: 11- MAN: Sayda ND: [id number].

## 2024-09-06 NOTE — PROCEDURE
[de-identified] : CARYN WHELAN is a 74 year old female presents today for Right knee Euflexxa injection (3/3). No recent infections, fever or skin lesions.  Ultrasound-guided intra-articular viscosupplementation injection of right knee: Following a discussion of the risks (bleeding, infection) and benefits, verbal consent was obtained. Patient placed in supine position. The suprapatellar recess and surrounding structures (patella, femur, quadriceps tendon, suprapatellar fat pad and prefemoral fat pad) were visualized in SAX and LAX with Sonosite 15 Hz linear transducer.  Superolateral knee was anaesthetised with ethyl chloride spray. Under strict sterile technique the right knee was prepped with chlorhexadine. Using ultrasound guidance (superolateral approach) a 25 G 1.5 inch needle was inserted at the superolateral to the patella and after negative aspiration, a mixture of 1mL of 1% lidocaine and 2 mL of 0.5% Bupivacaine was injected subcutaneously. After adequate anaesthesia, a 20 G 1.5 inch needle was inserted into the suprapatellar recess and after negative aspiration, 2mL of Euflexxa HA gel was injected intra-articularly without resistance.  The use of direct ultrasound visualization was necessary to increase patient safety by identifying and avoiding inadvertent needle placement within the neurovascular and osteochondral structures. Additionally, the increased accuracy of needle placement may improve therapeutic efficacy and allow higher diagnostic specificity when evaluating the effectiveness of this injection.  The patient tolerated the procedures well. Post-injection instructions given (no strenuous activity for 48 hours, ice, elevate). Patient verbalized understanding. Continue home exercises per handout.  Follow up in 8 weeks.  RIGHT KNEE EUFLEXXA (3/3) LOT: [id number] EXP: 11- MAN: Sayda ND: [id number].

## 2024-09-10 ENCOUNTER — APPOINTMENT (OUTPATIENT)
Dept: INTERNAL MEDICINE | Facility: CLINIC | Age: 74
End: 2024-09-10
Payer: MEDICARE

## 2024-09-10 ENCOUNTER — RESULT CHARGE (OUTPATIENT)
Age: 74
End: 2024-09-10

## 2024-09-10 VITALS
HEIGHT: 66 IN | DIASTOLIC BLOOD PRESSURE: 77 MMHG | WEIGHT: 196 LBS | HEART RATE: 77 BPM | BODY MASS INDEX: 31.5 KG/M2 | OXYGEN SATURATION: 100 % | TEMPERATURE: 98 F | SYSTOLIC BLOOD PRESSURE: 147 MMHG

## 2024-09-10 DIAGNOSIS — I10 ESSENTIAL (PRIMARY) HYPERTENSION: ICD-10-CM

## 2024-09-10 DIAGNOSIS — E05.90 THYROTOXICOSIS, UNSPECIFIED W/OUT THYROTOXIC CRISIS OR STORM: ICD-10-CM

## 2024-09-10 DIAGNOSIS — N18.32 CHRONIC KIDNEY DISEASE, STAGE 3B: ICD-10-CM

## 2024-09-10 DIAGNOSIS — E11.9 TYPE 2 DIABETES MELLITUS W/OUT COMPLICATIONS: ICD-10-CM

## 2024-09-10 DIAGNOSIS — R06.02 SHORTNESS OF BREATH: ICD-10-CM

## 2024-09-10 PROCEDURE — G2211 COMPLEX E/M VISIT ADD ON: CPT

## 2024-09-10 PROCEDURE — 83036 HEMOGLOBIN GLYCOSYLATED A1C: CPT | Mod: QW

## 2024-09-10 PROCEDURE — 99214 OFFICE O/P EST MOD 30 MIN: CPT

## 2024-09-10 NOTE — HEALTH RISK ASSESSMENT
[0] : 2) Feeling down, depressed, or hopeless: Not at all (0) [PHQ-9 Negative - No further assessment needed] : PHQ-9 Negative - No further assessment needed [[id number]] : 0 [Yes] : Yes [Monthly or less (1 pt)] : Monthly or less (1 point) [1 or 2 (0 pts)] : 1 or 2 (0 points) [Never (0 pts)] : Never (0 points) [Never] : Never

## 2024-09-10 NOTE — HEALTH RISK ASSESSMENT
[0] : 2) Feeling down, depressed, or hopeless: Not at all (0) [PHQ-9 Negative - No further assessment needed] : PHQ-9 Negative - No further assessment needed [[id number]] : 0 [Yes] : Yes [Monthly or less (1 pt)] : Monthly or less (1 point) [1 or 2 (0 pts)] : 1 or 2 (0 points) [Never (0 pts)] : Never (0 points) [Never] : Never

## 2024-09-10 NOTE — HISTORY OF PRESENT ILLNESS
[FreeTextEntry1] : Follow up  [de-identified] : 74y F w/ PMHx HTN, HLD, DM2, hyperthyroidism lacunar infarct, incarcerated ventral hernia s/p ex-lap, small bowel resection, CCY, hysterectomy, recent R knee ACL tear, presenting for follow up visit. Pt reports last HA injection in R knee last week w/ plans for PT and follow up w/ ortho. Ongoing R knee decreased mobility, minimal swelling, unsteadiness when walking. Also reports SOB on exertion for several months, walked 7-8 blocks today d/t missing bus stop requiring rest, diaphoretic, slightly unsteady. Does note some swelling in b/l LE. Intermittent abdominal pain at site of ventral hernia repair.

## 2024-09-10 NOTE — HISTORY OF PRESENT ILLNESS
[FreeTextEntry1] : Follow up  [de-identified] : 74y F w/ PMHx HTN, HLD, DM2, hyperthyroidism lacunar infarct, incarcerated ventral hernia s/p ex-lap, small bowel resection, CCY, hysterectomy, recent R knee ACL tear, presenting for follow up visit. Pt reports last HA injection in R knee last week w/ plans for PT and follow up w/ ortho. Ongoing R knee decreased mobility, minimal swelling, unsteadiness when walking. Also reports SOB on exertion for several months, walked 7-8 blocks today d/t missing bus stop requiring rest, diaphoretic, slightly unsteady. Does note some swelling in b/l LE. Intermittent abdominal pain at site of ventral hernia repair.

## 2024-09-10 NOTE — HISTORY OF PRESENT ILLNESS
[FreeTextEntry1] : Follow up  [de-identified] : 74y F w/ PMHx HTN, HLD, DM2, hyperthyroidism lacunar infarct, incarcerated ventral hernia s/p ex-lap, small bowel resection, CCY, hysterectomy, recent R knee ACL tear, presenting for follow up visit. Pt reports last HA injection in R knee last week w/ plans for PT and follow up w/ ortho. Ongoing R knee decreased mobility, minimal swelling, unsteadiness when walking. Also reports SOB on exertion for several months, walked 7-8 blocks today d/t missing bus stop requiring rest, diaphoretic, slightly unsteady. Does note some swelling in b/l LE. Intermittent abdominal pain at site of ventral hernia repair.

## 2024-09-10 NOTE — PHYSICAL EXAM
[No Acute Distress] : no acute distress [PERRL] : pupils equal round and reactive to light [EOMI] : extraocular movements intact [No JVD] : no jugular venous distention [No Lymphadenopathy] : no lymphadenopathy [No Respiratory Distress] : no respiratory distress  [No Accessory Muscle Use] : no accessory muscle use [Clear to Auscultation] : lungs were clear to auscultation bilaterally [Normal Rate] : normal rate  [Regular Rhythm] : with a regular rhythm [No Murmur] : no murmur heard [Soft] : abdomen soft [Non Tender] : non-tender [Non-distended] : non-distended [No CVA Tenderness] : no CVA  tenderness [No Joint Swelling] : no joint swelling [Grossly Normal Strength/Tone] : grossly normal strength/tone [No Focal Deficits] : no focal deficits [Normal Insight/Judgement] : insight and judgment were intact

## 2024-09-10 NOTE — REVIEW OF SYSTEMS
[Fatigue] : fatigue [Lower Ext Edema] : lower extremity edema [Dyspnea on Exertion] : dyspnea on exertion [Abdominal Pain] : abdominal pain [Joint Stiffness] : joint stiffness [Unsteady Walking] : ataxia [Fever] : no fever [Chills] : no chills [Recent Change In Weight] : ~T no recent weight change [Vision Problems] : no vision problems [Chest Pain] : no chest pain [Palpitations] : no palpitations [Shortness Of Breath] : no shortness of breath [Wheezing] : no wheezing [Nausea] : no nausea [Diarrhea] : diarrhea [Vomiting] : no vomiting [Melena] : no melena [Dysuria] : no dysuria [Headache] : no headache [Dizziness] : no dizziness

## 2024-09-10 NOTE — HEALTH RISK ASSESSMENT
[0] : 2) Feeling down, depressed, or hopeless: Not at all (0) [PHQ-9 Negative - No further assessment needed] : PHQ-9 Negative - No further assessment needed [[id number]] : 0 [Yes] : Yes [Monthly or less (1 pt)] : Monthly or less (1 point) [1 or 2 (0 pts)] : 1 or 2 (0 points) [Never (0 pts)] : Never (0 points) [Never] : Never

## 2024-09-27 ENCOUNTER — APPOINTMENT (OUTPATIENT)
Dept: ENDOCRINOLOGY | Facility: CLINIC | Age: 74
End: 2024-09-27
Payer: MEDICARE

## 2024-09-27 VITALS
DIASTOLIC BLOOD PRESSURE: 74 MMHG | HEART RATE: 80 BPM | BODY MASS INDEX: 30.83 KG/M2 | WEIGHT: 191 LBS | SYSTOLIC BLOOD PRESSURE: 133 MMHG

## 2024-09-27 DIAGNOSIS — E04.2 NONTOXIC MULTINODULAR GOITER: ICD-10-CM

## 2024-09-27 DIAGNOSIS — E05.90 THYROTOXICOSIS, UNSPECIFIED W/OUT THYROTOXIC CRISIS OR STORM: ICD-10-CM

## 2024-09-27 LAB — GLUCOSE BLDC GLUCOMTR-MCNC: 128

## 2024-09-27 PROCEDURE — 99213 OFFICE O/P EST LOW 20 MIN: CPT

## 2024-09-30 LAB
ALBUMIN SERPL ELPH-MCNC: 4.4 G/DL
ALP BLD-CCNC: 102 U/L
ALT SERPL-CCNC: 9 U/L
ANION GAP SERPL CALC-SCNC: 13 MMOL/L
AST SERPL-CCNC: 15 U/L
BASOPHILS # BLD AUTO: 0.03 K/UL
BASOPHILS NFR BLD AUTO: 0.3 %
BILIRUB SERPL-MCNC: 0.4 MG/DL
BUN SERPL-MCNC: 36 MG/DL
CALCIUM SERPL-MCNC: 9.8 MG/DL
CHLORIDE SERPL-SCNC: 103 MMOL/L
CO2 SERPL-SCNC: 26 MMOL/L
CREAT SERPL-MCNC: 1.21 MG/DL
EGFR: 48 ML/MIN/1.73M2
EOSINOPHIL # BLD AUTO: 0.26 K/UL
EOSINOPHIL NFR BLD AUTO: 2.7 %
GLUCOSE SERPL-MCNC: 103 MG/DL
HCT VFR BLD CALC: 32.9 %
HGB BLD-MCNC: 9.8 G/DL
IMM GRANULOCYTES NFR BLD AUTO: 0.4 %
LYMPHOCYTES # BLD AUTO: 2 K/UL
LYMPHOCYTES NFR BLD AUTO: 20.8 %
MAN DIFF?: NORMAL
MCHC RBC-ENTMCNC: 27.3 PG
MCHC RBC-ENTMCNC: 29.8 GM/DL
MCV RBC AUTO: 91.6 FL
MONOCYTES # BLD AUTO: 0.65 K/UL
MONOCYTES NFR BLD AUTO: 6.8 %
NEUTROPHILS # BLD AUTO: 6.63 K/UL
NEUTROPHILS NFR BLD AUTO: 69 %
PLATELET # BLD AUTO: 265 K/UL
POTASSIUM SERPL-SCNC: 4.7 MMOL/L
PROT SERPL-MCNC: 7 G/DL
RBC # BLD: 3.59 M/UL
RBC # FLD: 14.2 %
SODIUM SERPL-SCNC: 142 MMOL/L
T3 SERPL-MCNC: 106 NG/DL
T4 FREE SERPL-MCNC: 1 NG/DL
TSH SERPL-ACNC: 1.27 UIU/ML
WBC # FLD AUTO: 9.61 K/UL

## 2024-10-03 NOTE — HISTORY OF PRESENT ILLNESS
[FreeTextEntry1] : Ms. WHELAN is a 74 year female with pmhx of T2D, HTN, HLD, subclinical hyperthyroidism, thyroid nodules, anemia who presents for follow-up.  Last visit, 12/14/2023 with myself  Interval change: Since last visit, has continued methimazole regimen, as below Endorses hot flashes intermittently, increased tiredness Repeat thyroid US not performed since last visit, will schedule Labs from May 2024 with TSH at goal (1.67), as well as TT3 (130) Following ophthal for cataract Continues current methimazole regimen, tolerating this regimen well No positional dyspnea, occasionally SOB with exertion, denies dysphagia  Current regimen: Methimazole 2.5mg/2.5mg/5mg alternating dose  1. Hyperthyroidism Hospitalized in 9/2022 for abdominal pain, per patient informed to FU with endocrine after this visit With HIE review, endocrine follow-up outpatient recommendation occurred from 11/2022 hospital visit At time of 11/2022 hospitalization: + hair loss, + weight loss, starting ~summer 2022 Endorses history, many years ago (?30) of partial thyroidectomy.  Per patient FNA prior to this, no cancer. 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 T2D Managed by PCP A1C 6.5% (9/10/24) from 6.6% (5/9/24) from 5.7% (2/14/23) from 7% (5/12/2022) Current regimen: None Past regimen: Metformin 500mg daily, stopped r/t improvement of sugar

## 2024-10-03 NOTE — PHYSICAL EXAM
[Alert] : alert [No Acute Distress] : no acute distress [Normal Voice/Communication] : normal voice communication [Normal Hearing] : hearing was normal [No Respiratory Distress] : no respiratory distress [Normal Rate and Effort] : normal respiratory rate and effort [Oriented x3] : oriented to person, place, and time [Normal Insight/Judgement] : insight and judgment were intact [de-identified] : +thyromegaly bilaterally with left sided nodularity, nontender to palpation [de-identified] : slow gait, prefers to not use walker.

## 2024-10-03 NOTE — ASSESSMENT
[FreeTextEntry1] : 1. 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 T2D Managed by PCP A1C 6.5% (9/10/24) from 6.6% (5/9/24) from 5.7% (2/14/23) from 7% (5/12/2022) Currently managed with diet/lifestyle H/o metformin 500mg  Labs today, I will call with results Schedule Thyroid US, I will call/[facility] message with results Follow up in 6-12 months, pending labs/thyroid US results  Rebecca Brasher MS, FNP-BC, [facility] 09/27/2024

## 2024-10-03 NOTE — ADDENDUM
[FreeTextEntry1] : 09/30/2024, addendum, SG Detailed VM left with results TFTs at goal on current methimazole regimen of 2.5mg/2.5mg/5mg alternation, continue CMP stable CBC with mild anemia, overall stable  10/03/2024, addendum, SG Returned patients call and reviewed lab results Recommend FU with PCP for anemia

## 2024-10-03 NOTE — PHYSICAL EXAM
[Alert] : alert [No Acute Distress] : no acute distress [Normal Voice/Communication] : normal voice communication [Normal Hearing] : hearing was normal [No Respiratory Distress] : no respiratory distress [Normal Rate and Effort] : normal respiratory rate and effort [Oriented x3] : oriented to person, place, and time [Normal Insight/Judgement] : insight and judgment were intact [de-identified] : +thyromegaly bilaterally with left sided nodularity, nontender to palpation [de-identified] : slow gait, prefers to not use walker.

## 2024-10-03 NOTE — PHYSICAL EXAM
[Alert] : alert [No Acute Distress] : no acute distress [Normal Voice/Communication] : normal voice communication [Normal Hearing] : hearing was normal [No Respiratory Distress] : no respiratory distress [Normal Rate and Effort] : normal respiratory rate and effort [Oriented x3] : oriented to person, place, and time [Normal Insight/Judgement] : insight and judgment were intact [de-identified] : +thyromegaly bilaterally with left sided nodularity, nontender to palpation [de-identified] : slow gait, prefers to not use walker.

## 2024-10-03 NOTE — ASSESSMENT
[FreeTextEntry1] : 1. Subclinical hyperthyroidism TSH suppressed on labs since 11/15/2022 with normal FT4 Started methimazole 2.5mg daily at that time Current regimen: methimazole 5mg, taking 2.5/2.5/5mg, alternating doses Most recent TFTs from May 2024 reveal TSH and TT3 at goal Thyroid US when in hospital revealed MNG S/p Thyroid uptake scan in April 2023 c/w toxic MNG (hot nodule was not c/w nodules that met criteria for FNA) S/p FNA in May 2023 with Dr Reyes of left mid nodule (2 x 1.4 x 1.5cm) and isthmus nodule of 1.8 x 2.3 x 2.3cm, [place] II (benign) -- continue current methimazole regimen -- repeat TFTs today -- thyroid US surveillance, as below  2. MNG 11/2022 Thyroid US with multiple nodules, including multiple that meet FNA criteria S/p Thyroid uptake scan in April 2023 c/w toxic MNG (hot nodule was not c/w nodules that met criteria for FNA) S/p FNA in May 2023 with Dr Reyes of left mid nodule (2 x 1.4 x 1.5cm) and isthmus nodule of 1.8 x 2.3 x 2.3cm, [place] II (benign) -- RX provided to schedule thyroid US to proceed with thyroid nodule surveillance  3.. T2D Managed by PCP A1C 6.5% (9/10/24) from 6.6% (5/9/24) from 5.7% (2/14/23) from 7% (5/12/2022) Currently managed with diet/lifestyle H/o metformin 500mg  Labs today, I will call with results Schedule Thyroid US, I will call/[facility] message with results Follow up in 6-12 months, pending labs/thyroid US results  Rebecca Brasher MS, FNP-BC, [facility] 09/27/2024

## 2024-10-03 NOTE — ASSESSMENT
[FreeTextEntry1] : 1. 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 MNG 11/2022 Thyroid US with multiple nodules, including multiple that meet FNA criteria S/p Thyroid uptake scan in April 2023 c/w toxic MNG (hot nodule was not c/w nodules that met criteria for FNA) S/p FNA in May 2023 with Dr Reyes of left mid nodule (2 x 1.4 x 1.5cm) and isthmus nodule of 1.8 x 2.3 x 2.3cm, [place] II (benign) -- RX provided to schedule thyroid US to proceed with thyroid nodule surveillance  3.. T2D Managed by PCP A1C 6.5% (9/10/24) from 6.6% (5/9/24) from 5.7% (2/14/23) from 7% (5/12/2022) Currently managed with diet/lifestyle H/o metformin 500mg  Labs today, I will call with results Schedule Thyroid US, I will call/[facility] message with results Follow up in 6-12 months, pending labs/thyroid US results  Rebecca Brasher MS, FNP-BC, [facility] 09/27/2024

## 2024-10-03 NOTE — HISTORY OF PRESENT ILLNESS
[FreeTextEntry1] : Ms. WHELAN is a 74 year female with pmhx of T2D, HTN, HLD, subclinical hyperthyroidism, thyroid nodules, anemia who presents for follow-up.  Last visit, 12/14/2023 with myself  Interval change: Since last visit, has continued methimazole regimen, as below Endorses hot flashes intermittently, increased tiredness Repeat thyroid US not performed since last visit, will schedule Labs from May 2024 with TSH at goal (1.67), as well as TT3 (130) Following ophthal for cataract Continues current methimazole regimen, tolerating this regimen well No positional dyspnea, occasionally SOB with exertion, denies dysphagia  Current regimen: Methimazole 2.5mg/2.5mg/5mg alternating dose  1. Hyperthyroidism Hospitalized in 9/2022 for abdominal pain, per patient informed to FU with endocrine after this visit With HIE review, endocrine follow-up outpatient recommendation occurred from 11/2022 hospital visit At time of 11/2022 hospitalization: + hair loss, + weight loss, starting ~summer 2022 Endorses history, many years ago (?30) of partial thyroidectomy.  Per patient FNA prior to this, no cancer. 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 T2D Managed by PCP A1C 6.5% (9/10/24) from 6.6% (5/9/24) from 5.7% (2/14/23) from 7% (5/12/2022) Current regimen: None Past regimen: Metformin 500mg daily, stopped r/t improvement of sugar

## 2024-10-03 NOTE — REVIEW OF SYSTEMS
[Fatigue] : fatigue [Joint Pain] : joint pain [Negative] : Endocrine [Shortness Of Breath] : no shortness of breath

## 2024-10-03 NOTE — PHYSICAL EXAM
[Alert] : alert [No Acute Distress] : no acute distress [Normal Voice/Communication] : normal voice communication [Normal Hearing] : hearing was normal [No Respiratory Distress] : no respiratory distress [Normal Rate and Effort] : normal respiratory rate and effort [Oriented x3] : oriented to person, place, and time [Normal Insight/Judgement] : insight and judgment were intact [de-identified] : +thyromegaly bilaterally with left sided nodularity, nontender to palpation [de-identified] : slow gait, prefers to not use walker.

## 2024-10-03 NOTE — HISTORY OF PRESENT ILLNESS
[FreeTextEntry1] : Ms. WHELAN is a 74 year female with pmhx of T2D, HTN, HLD, subclinical hyperthyroidism, thyroid nodules, anemia who presents for follow-up.  Last visit, 12/14/2023 with myself  Interval change: Since last visit, has continued methimazole regimen, as below Endorses hot flashes intermittently, increased tiredness Repeat thyroid US not performed since last visit, will schedule Labs from May 2024 with TSH at goal (1.67), as well as TT3 (130) Following ophthal for cataract Continues current methimazole regimen, tolerating this regimen well No positional dyspnea, occasionally SOB with exertion, denies dysphagia  Current regimen: Methimazole 2.5mg/2.5mg/5mg alternating dose  1. Hyperthyroidism Hospitalized in 9/2022 for abdominal pain, per patient informed to FU with endocrine after this visit With HIE review, endocrine follow-up outpatient recommendation occurred from 11/2022 hospital visit At time of 11/2022 hospitalization: + hair loss, + weight loss, starting ~summer 2022 Endorses history, many years ago (?30) of partial thyroidectomy.  Per patient FNA prior to this, no cancer. 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 T2D Managed by PCP A1C 6.5% (9/10/24) from 6.6% (5/9/24) from 5.7% (2/14/23) from 7% (5/12/2022) Current regimen: None Past regimen: Metformin 500mg daily, stopped r/t improvement of sugar

## 2024-10-03 NOTE — ASSESSMENT
[FreeTextEntry1] : 1. Subclinical hyperthyroidism TSH suppressed on labs since 11/15/2022 with normal FT4 Started methimazole 2.5mg daily at that time Current regimen: methimazole 5mg, taking 2.5/2.5/5mg, alternating doses Most recent TFTs from May 2024 reveal TSH and TT3 at goal Thyroid US when in hospital revealed MNG S/p Thyroid uptake scan in April 2023 c/w toxic MNG (hot nodule was not c/w nodules that met criteria for FNA) S/p FNA in May 2023 with Dr Reyes of left mid nodule (2 x 1.4 x 1.5cm) and isthmus nodule of 1.8 x 2.3 x 2.3cm, [place] II (benign) -- continue current methimazole regimen -- repeat TFTs today -- thyroid US surveillance, as below  2. MNG 11/2022 Thyroid US with multiple nodules, including multiple that meet FNA criteria S/p Thyroid uptake scan in April 2023 c/w toxic MNG (hot nodule was not c/w nodules that met criteria for FNA) S/p FNA in May 2023 with Dr Reyes of left mid nodule (2 x 1.4 x 1.5cm) and isthmus nodule of 1.8 x 2.3 x 2.3cm, [place] II (benign) -- RX provided to schedule thyroid US to proceed with thyroid nodule surveillance  3.. T2D Managed by PCP A1C 6.5% (9/10/24) from 6.6% (5/9/24) from 5.7% (2/14/23) from 7% (5/12/2022) Currently managed with diet/lifestyle H/o metformin 500mg  Labs today, I will call with results Schedule Thyroid US, I will call/[facility] message with results Follow up in 6-12 months, pending labs/thyroid US results  Rebecca Brasher MS, FNP-BC, [facility] [place] 09/27/2024

## 2024-10-04 NOTE — ED PROVIDER NOTE - ATTESTATION, MLM
Pt received reclined in b/s chair on 4 East, (+) external pacer, (+) tele, (+) chest tube x 2 to LWS, (+) hanks, NAD, Agreeable to PT
I have reviewed and confirmed nurses' notes for patient's medications, allergies, medical history, and surgical history.

## 2024-10-30 ENCOUNTER — APPOINTMENT (OUTPATIENT)
Dept: ORTHOPEDIC SURGERY | Facility: CLINIC | Age: 74
End: 2024-10-30

## 2024-12-03 ENCOUNTER — APPOINTMENT (OUTPATIENT)
Dept: NEPHROLOGY | Facility: CLINIC | Age: 74
End: 2024-12-03
Payer: MEDICARE

## 2024-12-03 VITALS
BODY MASS INDEX: 30.53 KG/M2 | HEIGHT: 66 IN | WEIGHT: 190 LBS | DIASTOLIC BLOOD PRESSURE: 62 MMHG | SYSTOLIC BLOOD PRESSURE: 128 MMHG

## 2024-12-03 DIAGNOSIS — E11.9 TYPE 2 DIABETES MELLITUS W/OUT COMPLICATIONS: ICD-10-CM

## 2024-12-03 DIAGNOSIS — I10 ESSENTIAL (PRIMARY) HYPERTENSION: ICD-10-CM

## 2024-12-03 DIAGNOSIS — N17.9 ACUTE KIDNEY FAILURE, UNSPECIFIED: ICD-10-CM

## 2024-12-03 DIAGNOSIS — N18.32 CHRONIC KIDNEY DISEASE, STAGE 3B: ICD-10-CM

## 2024-12-03 PROCEDURE — 99214 OFFICE O/P EST MOD 30 MIN: CPT

## 2024-12-03 PROCEDURE — G2211 COMPLEX E/M VISIT ADD ON: CPT

## 2024-12-04 LAB
ANION GAP SERPL CALC-SCNC: 13 MMOL/L
BUN SERPL-MCNC: 26 MG/DL
CALCIUM SERPL-MCNC: 9.9 MG/DL
CALCIUM SERPL-MCNC: 9.9 MG/DL
CHLORIDE SERPL-SCNC: 105 MMOL/L
CO2 SERPL-SCNC: 24 MMOL/L
CREAT SERPL-MCNC: 1.13 MG/DL
CREAT SPEC-SCNC: 109 MG/DL
CYSTATIN C SERPL-MCNC: 1.72 MG/L
EGFR: 52 ML/MIN/1.73M2
ESTIMATED AVERAGE GLUCOSE: 134 MG/DL
GFR/BSA.PRED SERPLBLD CYS-BASED-ARV: 34 ML/MIN/1.73M2
GLUCOSE SERPL-MCNC: 108 MG/DL
HBA1C MFR BLD HPLC: 6.3 %
MICROALBUMIN 24H UR DL<=1MG/L-MCNC: 7.1 MG/DL
MICROALBUMIN/CREAT 24H UR-RTO: 65 MG/G
PARATHYROID HORMONE INTACT: 73 PG/ML
POTASSIUM SERPL-SCNC: 4.2 MMOL/L
SODIUM SERPL-SCNC: 141 MMOL/L

## 2024-12-09 ENCOUNTER — APPOINTMENT (OUTPATIENT)
Dept: ENDOCRINOLOGY | Facility: CLINIC | Age: 74
End: 2024-12-09

## 2024-12-19 ENCOUNTER — APPOINTMENT (OUTPATIENT)
Dept: INTERNAL MEDICINE | Facility: CLINIC | Age: 74
End: 2024-12-19
Payer: MEDICARE

## 2024-12-19 VITALS
HEART RATE: 72 BPM | DIASTOLIC BLOOD PRESSURE: 83 MMHG | SYSTOLIC BLOOD PRESSURE: 158 MMHG | TEMPERATURE: 97.8 F | WEIGHT: 197 LBS | BODY MASS INDEX: 31.66 KG/M2 | HEIGHT: 66 IN | OXYGEN SATURATION: 98 %

## 2024-12-19 VITALS — DIASTOLIC BLOOD PRESSURE: 82 MMHG | SYSTOLIC BLOOD PRESSURE: 163 MMHG

## 2024-12-19 DIAGNOSIS — R06.09 OTHER FORMS OF DYSPNEA: ICD-10-CM

## 2024-12-19 DIAGNOSIS — R60.0 LOCALIZED EDEMA: ICD-10-CM

## 2024-12-19 DIAGNOSIS — M79.89 OTHER SPECIFIED SOFT TISSUE DISORDERS: ICD-10-CM

## 2024-12-19 DIAGNOSIS — I10 ESSENTIAL (PRIMARY) HYPERTENSION: ICD-10-CM

## 2024-12-19 PROCEDURE — 99213 OFFICE O/P EST LOW 20 MIN: CPT | Mod: GC

## 2024-12-19 PROCEDURE — G2211 COMPLEX E/M VISIT ADD ON: CPT

## 2024-12-19 RX ORDER — DICLOFENAC SODIUM 10 MG/G
1 GEL TOPICAL
Qty: 100 | Refills: 3 | Status: ACTIVE | COMMUNITY
Start: 2024-12-19 | End: 1900-01-01

## 2024-12-20 PROBLEM — R06.09 DYSPNEA ON EXERTION: Status: ACTIVE | Noted: 2024-12-20

## 2024-12-20 PROBLEM — M79.89 LEG SWELLING: Status: ACTIVE | Noted: 2024-12-20

## 2025-01-18 ENCOUNTER — APPOINTMENT (OUTPATIENT)
Dept: ULTRASOUND IMAGING | Facility: CLINIC | Age: 75
End: 2025-01-18
Payer: MEDICARE

## 2025-01-18 ENCOUNTER — APPOINTMENT (OUTPATIENT)
Dept: MAMMOGRAPHY | Facility: CLINIC | Age: 75
End: 2025-01-18
Payer: MEDICARE

## 2025-01-18 ENCOUNTER — RESULT REVIEW (OUTPATIENT)
Age: 75
End: 2025-01-18

## 2025-01-18 ENCOUNTER — OUTPATIENT (OUTPATIENT)
Dept: OUTPATIENT SERVICES | Facility: HOSPITAL | Age: 75
LOS: 1 days | End: 2025-01-18

## 2025-01-18 DIAGNOSIS — Z98.891 HISTORY OF UTERINE SCAR FROM PREVIOUS SURGERY: Chronic | ICD-10-CM

## 2025-01-18 DIAGNOSIS — Z90.49 ACQUIRED ABSENCE OF OTHER SPECIFIED PARTS OF DIGESTIVE TRACT: Chronic | ICD-10-CM

## 2025-01-18 DIAGNOSIS — Z90.710 ACQUIRED ABSENCE OF BOTH CERVIX AND UTERUS: Chronic | ICD-10-CM

## 2025-01-18 PROCEDURE — 77067 SCR MAMMO BI INCL CAD: CPT | Mod: 26

## 2025-01-18 PROCEDURE — 77063 BREAST TOMOSYNTHESIS BI: CPT | Mod: 26

## 2025-01-18 PROCEDURE — 93971 EXTREMITY STUDY: CPT | Mod: 26,LT

## 2025-01-21 ENCOUNTER — NON-APPOINTMENT (OUTPATIENT)
Age: 75
End: 2025-01-21

## 2025-03-06 ENCOUNTER — OUTPATIENT (OUTPATIENT)
Dept: OUTPATIENT SERVICES | Facility: HOSPITAL | Age: 75
LOS: 1 days | End: 2025-03-06
Payer: MEDICARE

## 2025-03-06 ENCOUNTER — RESULT REVIEW (OUTPATIENT)
Age: 75
End: 2025-03-06

## 2025-03-06 ENCOUNTER — APPOINTMENT (OUTPATIENT)
Dept: ORTHOPEDIC SURGERY | Facility: CLINIC | Age: 75
End: 2025-03-06
Payer: MEDICARE

## 2025-03-06 VITALS — SYSTOLIC BLOOD PRESSURE: 159 MMHG | HEART RATE: 90 BPM | OXYGEN SATURATION: 95 % | DIASTOLIC BLOOD PRESSURE: 82 MMHG

## 2025-03-06 DIAGNOSIS — Z90.710 ACQUIRED ABSENCE OF BOTH CERVIX AND UTERUS: Chronic | ICD-10-CM

## 2025-03-06 DIAGNOSIS — M17.0 BILATERAL PRIMARY OSTEOARTHRITIS OF KNEE: ICD-10-CM

## 2025-03-06 DIAGNOSIS — Z98.891 HISTORY OF UTERINE SCAR FROM PREVIOUS SURGERY: Chronic | ICD-10-CM

## 2025-03-06 DIAGNOSIS — Z90.49 ACQUIRED ABSENCE OF OTHER SPECIFIED PARTS OF DIGESTIVE TRACT: Chronic | ICD-10-CM

## 2025-03-06 PROCEDURE — 73564 X-RAY EXAM KNEE 4 OR MORE: CPT | Mod: 26,50,77

## 2025-03-06 PROCEDURE — 73564 X-RAY EXAM KNEE 4 OR MORE: CPT | Mod: 50

## 2025-03-06 PROCEDURE — 73521 X-RAY EXAM HIPS BI 2 VIEWS: CPT

## 2025-03-06 PROCEDURE — 73564 X-RAY EXAM KNEE 4 OR MORE: CPT

## 2025-03-06 PROCEDURE — 73522 X-RAY EXAM HIPS BI 3-4 VIEWS: CPT

## 2025-03-06 PROCEDURE — 73521 X-RAY EXAM HIPS BI 2 VIEWS: CPT | Mod: 26

## 2025-03-06 PROCEDURE — 20611 DRAIN/INJ JOINT/BURSA W/US: CPT | Mod: RT

## 2025-03-06 PROCEDURE — 99214 OFFICE O/P EST MOD 30 MIN: CPT | Mod: 25

## 2025-03-06 RX ORDER — HYALURONATE SODIUM 20 MG/2 ML
20 SYRINGE (ML) INTRAARTICULAR
Qty: 2 | Refills: 0 | Status: ACTIVE | COMMUNITY
Start: 2025-03-06

## 2025-03-13 ENCOUNTER — OUTPATIENT (OUTPATIENT)
Dept: OUTPATIENT SERVICES | Facility: HOSPITAL | Age: 75
LOS: 1 days | End: 2025-03-13
Payer: MEDICARE

## 2025-03-13 ENCOUNTER — RESULT REVIEW (OUTPATIENT)
Age: 75
End: 2025-03-13

## 2025-03-13 DIAGNOSIS — Z90.710 ACQUIRED ABSENCE OF BOTH CERVIX AND UTERUS: Chronic | ICD-10-CM

## 2025-03-13 DIAGNOSIS — R60.0 LOCALIZED EDEMA: ICD-10-CM

## 2025-03-13 DIAGNOSIS — Z90.49 ACQUIRED ABSENCE OF OTHER SPECIFIED PARTS OF DIGESTIVE TRACT: Chronic | ICD-10-CM

## 2025-03-13 DIAGNOSIS — Z98.891 HISTORY OF UTERINE SCAR FROM PREVIOUS SURGERY: Chronic | ICD-10-CM

## 2025-03-13 PROCEDURE — 93306 TTE W/DOPPLER COMPLETE: CPT | Mod: 26

## 2025-03-13 PROCEDURE — 93306 TTE W/DOPPLER COMPLETE: CPT

## 2025-04-03 ENCOUNTER — APPOINTMENT (OUTPATIENT)
Dept: NEPHROLOGY | Facility: CLINIC | Age: 75
End: 2025-04-03
Payer: MEDICARE

## 2025-04-03 VITALS
WEIGHT: 197 LBS | SYSTOLIC BLOOD PRESSURE: 124 MMHG | DIASTOLIC BLOOD PRESSURE: 63 MMHG | BODY MASS INDEX: 31.66 KG/M2 | HEIGHT: 66 IN

## 2025-04-03 DIAGNOSIS — I10 ESSENTIAL (PRIMARY) HYPERTENSION: ICD-10-CM

## 2025-04-03 DIAGNOSIS — N17.9 ACUTE KIDNEY FAILURE, UNSPECIFIED: ICD-10-CM

## 2025-04-03 DIAGNOSIS — E11.9 TYPE 2 DIABETES MELLITUS W/OUT COMPLICATIONS: ICD-10-CM

## 2025-04-03 DIAGNOSIS — N18.32 CHRONIC KIDNEY DISEASE, STAGE 3B: ICD-10-CM

## 2025-04-03 PROCEDURE — G2211 COMPLEX E/M VISIT ADD ON: CPT

## 2025-04-03 PROCEDURE — 99214 OFFICE O/P EST MOD 30 MIN: CPT

## 2025-04-04 NOTE — ED PROVIDER NOTE - PSYCHIATRIC, MLM
(M6) obeys commands
Alert and oriented to person, place, time/situation. normal mood and affect. no apparent risk to self or others.
no joint pain/no neck pain/no calf pain/no limited range of motion

## 2025-04-06 LAB
ANION GAP SERPL CALC-SCNC: 12 MMOL/L
BUN SERPL-MCNC: 24 MG/DL
CALCIUM SERPL-MCNC: 9.4 MG/DL
CALCIUM SERPL-MCNC: 9.4 MG/DL
CHLORIDE SERPL-SCNC: 106 MMOL/L
CO2 SERPL-SCNC: 25 MMOL/L
CREAT SERPL-MCNC: 1.05 MG/DL
CREAT SPEC-SCNC: 263 MG/DL
CYSTATIN C SERPL-MCNC: 1.56 MG/L
EGFRCR SERPLBLD CKD-EPI 2021: 56 ML/MIN/1.73M2
GFR/BSA.PRED SERPLBLD CYS-BASED-ARV: 38 ML/MIN/1.73M2
GLUCOSE SERPL-MCNC: 108 MG/DL
MICROALBUMIN 24H UR DL<=1MG/L-MCNC: 19.8 MG/DL
MICROALBUMIN/CREAT 24H UR-RTO: 75 MG/G
PARATHYROID HORMONE INTACT: 69 PG/ML
POTASSIUM SERPL-SCNC: 4.3 MMOL/L
SODIUM SERPL-SCNC: 142 MMOL/L

## 2025-04-15 ENCOUNTER — NON-APPOINTMENT (OUTPATIENT)
Age: 75
End: 2025-04-15

## 2025-04-17 ENCOUNTER — APPOINTMENT (OUTPATIENT)
Dept: INTERNAL MEDICINE | Facility: CLINIC | Age: 75
End: 2025-04-17
Payer: MEDICARE

## 2025-04-17 VITALS
TEMPERATURE: 97.1 F | DIASTOLIC BLOOD PRESSURE: 89 MMHG | BODY MASS INDEX: 30.86 KG/M2 | OXYGEN SATURATION: 99 % | HEART RATE: 69 BPM | HEIGHT: 66 IN | SYSTOLIC BLOOD PRESSURE: 156 MMHG | WEIGHT: 192 LBS

## 2025-04-17 DIAGNOSIS — R73.03 PREDIABETES.: ICD-10-CM

## 2025-04-17 DIAGNOSIS — R60.0 LOCALIZED EDEMA: ICD-10-CM

## 2025-04-17 DIAGNOSIS — H93.11 TINNITUS, RIGHT EAR: ICD-10-CM

## 2025-04-17 DIAGNOSIS — I10 ESSENTIAL (PRIMARY) HYPERTENSION: ICD-10-CM

## 2025-04-17 DIAGNOSIS — E78.5 HYPERLIPIDEMIA, UNSPECIFIED: ICD-10-CM

## 2025-04-17 PROCEDURE — 99214 OFFICE O/P EST MOD 30 MIN: CPT | Mod: GC,25

## 2025-04-17 PROCEDURE — 36415 COLL VENOUS BLD VENIPUNCTURE: CPT

## 2025-04-17 RX ORDER — LOSARTAN POTASSIUM AND HYDROCHLOROTHIAZIDE 12.5; 5 MG/1; MG/1
50-12.5 TABLET ORAL DAILY
Qty: 90 | Refills: 0 | Status: ACTIVE | COMMUNITY
Start: 2025-04-17 | End: 1900-01-01

## 2025-04-21 LAB
24R-OH-CALCIDIOL SERPL-MCNC: 34.1 PG/ML
25(OH)D3 SERPL-MCNC: 23.1 NG/ML
ALBUMIN SERPL ELPH-MCNC: 4.3 G/DL
ALP BLD-CCNC: 94 U/L
ALT SERPL-CCNC: 12 U/L
ANION GAP SERPL CALC-SCNC: 11 MMOL/L
AST SERPL-CCNC: 17 U/L
BASOPHILS # BLD AUTO: 0.02 K/UL
BASOPHILS NFR BLD AUTO: 0.2 %
BILIRUB SERPL-MCNC: 0.6 MG/DL
BUN SERPL-MCNC: 33 MG/DL
CALCIUM SERPL-MCNC: 9.8 MG/DL
CHLORIDE SERPL-SCNC: 103 MMOL/L
CHOLEST SERPL-MCNC: 176 MG/DL
CO2 SERPL-SCNC: 26 MMOL/L
CREAT SERPL-MCNC: 1.06 MG/DL
EGFRCR SERPLBLD CKD-EPI 2021: 55 ML/MIN/1.73M2
EOSINOPHIL # BLD AUTO: 0.2 K/UL
EOSINOPHIL NFR BLD AUTO: 2.2 %
ESTIMATED AVERAGE GLUCOSE: 137 MG/DL
GLUCOSE SERPL-MCNC: 104 MG/DL
HBA1C MFR BLD HPLC: 6.4 %
HCT VFR BLD CALC: 37.3 %
HDLC SERPL-MCNC: 52 MG/DL
HGB BLD-MCNC: 10.9 G/DL
IMM GRANULOCYTES NFR BLD AUTO: 0.4 %
LDLC SERPL-MCNC: 109 MG/DL
LYMPHOCYTES # BLD AUTO: 1.89 K/UL
LYMPHOCYTES NFR BLD AUTO: 21.1 %
MAN DIFF?: NORMAL
MCHC RBC-ENTMCNC: 26.7 PG
MCHC RBC-ENTMCNC: 29.2 G/DL
MCV RBC AUTO: 91.4 FL
MONOCYTES # BLD AUTO: 0.54 K/UL
MONOCYTES NFR BLD AUTO: 6 %
NEUTROPHILS # BLD AUTO: 6.28 K/UL
NEUTROPHILS NFR BLD AUTO: 70.1 %
NONHDLC SERPL-MCNC: 125 MG/DL
PLATELET # BLD AUTO: 249 K/UL
POTASSIUM SERPL-SCNC: 4.2 MMOL/L
PROT SERPL-MCNC: 7 G/DL
RBC # BLD: 4.08 M/UL
RBC # FLD: 15.5 %
SODIUM SERPL-SCNC: 139 MMOL/L
TRIGL SERPL-MCNC: 84 MG/DL
WBC # FLD AUTO: 8.97 K/UL

## 2025-04-21 NOTE — PROGRESS NOTE ADULT - PROBLEM SELECTOR PLAN 4
p/w SOB x 3 weeks, euvolemic on exam, satting % RA, , currently asymptomatic  -- CTA Chest 11/16/2022: Limited evaluation of the subsegmental pulmonary arteries at lung bases secondary to respiratory motion. Within this limitation, no pulmonary embolism is identified. Since September 24, 2022, unchanged left upper lobe groundglass nodule 5 mm. Unchanged probable mucoid impacted distal airway in right lung apex. Unchanged enlarged multinodular goiter. No pleural effusion. No pericardial effusion.  -- TTE 11/17/22: EF 60-65%, mild LVH, aortic sclerosis without significant stenosis, PASP 22mmHg, trivial pericardial effusion  -continue to monitor O2 stat yes P/w SOB x 3 weeks, euvolemic on exam, satting % RA, currently asymptomatic  -CTA Chest 11/16/2022: Limited evaluation of the subsegmental pulmonary arteries at lung bases secondary to respiratory motion. Within this limitation, no pulmonary embolism is identified. Since September 24, 2022, unchanged left upper lobe groundglass nodule 5 mm. Unchanged probable mucoid impacted distal airway in right lung apex. Unchanged enlarged multinodular goiter. No pleural effusion. No pericardial effusion.

## 2025-04-29 ENCOUNTER — APPOINTMENT (OUTPATIENT)
Dept: OTOLARYNGOLOGY | Facility: CLINIC | Age: 75
End: 2025-04-29
Payer: MEDICARE

## 2025-04-29 VITALS
DIASTOLIC BLOOD PRESSURE: 75 MMHG | WEIGHT: 190 LBS | HEIGHT: 66 IN | SYSTOLIC BLOOD PRESSURE: 124 MMHG | BODY MASS INDEX: 30.53 KG/M2 | TEMPERATURE: 97.8 F

## 2025-04-29 DIAGNOSIS — H61.23 IMPACTED CERUMEN, BILATERAL: ICD-10-CM

## 2025-04-29 PROCEDURE — 31575 DIAGNOSTIC LARYNGOSCOPY: CPT

## 2025-04-29 PROCEDURE — 69210 REMOVE IMPACTED EAR WAX UNI: CPT

## 2025-04-29 PROCEDURE — 95992 CANALITH REPOSITIONING PROC: CPT

## 2025-04-29 PROCEDURE — 99204 OFFICE O/P NEW MOD 45 MIN: CPT | Mod: 25

## 2025-05-01 ENCOUNTER — APPOINTMENT (OUTPATIENT)
Dept: OTOLARYNGOLOGY | Facility: CLINIC | Age: 75
End: 2025-05-01
Payer: MEDICARE

## 2025-05-01 ENCOUNTER — APPOINTMENT (OUTPATIENT)
Dept: INTERNAL MEDICINE | Facility: CLINIC | Age: 75
End: 2025-05-01

## 2025-05-01 VITALS
WEIGHT: 190 LBS | SYSTOLIC BLOOD PRESSURE: 147 MMHG | BODY MASS INDEX: 30.53 KG/M2 | DIASTOLIC BLOOD PRESSURE: 72 MMHG | HEIGHT: 66 IN | TEMPERATURE: 97.2 F

## 2025-05-01 DIAGNOSIS — H93.13 TINNITUS, BILATERAL: ICD-10-CM

## 2025-05-01 DIAGNOSIS — E04.2 NONTOXIC MULTINODULAR GOITER: ICD-10-CM

## 2025-05-01 DIAGNOSIS — H81.12 BENIGN PAROXYSMAL VERTIGO, LEFT EAR: ICD-10-CM

## 2025-05-01 DIAGNOSIS — R04.0 EPISTAXIS: ICD-10-CM

## 2025-05-01 DIAGNOSIS — R13.10 DYSPHAGIA, UNSPECIFIED: ICD-10-CM

## 2025-05-01 PROCEDURE — G2211 COMPLEX E/M VISIT ADD ON: CPT

## 2025-05-01 PROCEDURE — 99214 OFFICE O/P EST MOD 30 MIN: CPT

## 2025-05-05 ENCOUNTER — APPOINTMENT (OUTPATIENT)
Dept: OTOLARYNGOLOGY | Facility: CLINIC | Age: 75
End: 2025-05-05
Payer: MEDICARE

## 2025-05-05 PROCEDURE — 92550 TYMPANOMETRY & REFLEX THRESH: CPT | Mod: 52

## 2025-05-05 PROCEDURE — 92557 COMPREHENSIVE HEARING TEST: CPT

## 2025-05-06 NOTE — ED PROVIDER NOTE - WR INTERPRETATION DATE TIME  1
[facility]  Progress Note      Suzie Ferris is a 51 year old female.   Chief Complaint   Patient presents with    Foot Pain     Pt in for left foot pain- pain is 9/10- would like cortisone injection today        HPI:     Patient is a pleasant 51-year-old female presents to clinic for evaluation of left second interspace neuroma pain.  Relates that the last cortisone injection she received in January 2024 provided her with relief for about 4 months.  Admits that 2 weeks ago she began experiencing pain again.  Patient would like to discuss another cortisone injection at this time.    Allergies: Patient has no known allergies.    Current Outpatient Medications   Medication Sig Dispense Refill    rosuvastatin 5 MG Oral Tab Take 1 tablet (5 mg total) by mouth nightly.      Fluticasone Propionate 50 MCG/ACT Nasal Suspension APPLY 1 SPRAY IN EACH NOSTRIL DAILY. GENTLY BLOW NOSE PRIOR TO USE      NON FORMULARY [employer]        Past Medical History:    Anemia    Anxiety    Colon polyp    None in 2022. REpeat CLN in 2032.    Herpes simplex type 1 infection    Hyperlipidemia    Menorrhagia    Morbid obesity with BMI of 40.0-44.9, adult (HCC)    Obesity      Past Surgical History:   Procedure Laterality Date    Colonoscopy N/A 9/24/2019    Procedure: COLONOSCOPY;  Surgeon: POLLO Holland MD;  Location: [facility] ENDOSCOPY    D & c      x 2    Salpingectomy Bilateral 03/2017    Tubal ligation        Family History   Problem Relation Age of Onset    Diabetes Mother     Hypertension Mother     Diabetes Sister     Diabetes Father     Other (Other) Father       Social History     Socioeconomic History    Marital status: Single   Tobacco Use    Smoking status: Never    Smokeless tobacco: Never   Vaping Use    Vaping status: Never Used   Substance and Sexual Activity    Alcohol use: Yes     Alcohol/week: 3.0 standard drinks of alcohol     Types: 3 Shots of liquor per week     Comment: rarely    Drug use: No     Sexual activity: Not Currently     Partners: Male   Other Topics Concern    Blood Transfusions Yes           REVIEW OF SYSTEMS:     Denies nause, fever, chills  No calf pain  Denies chest pain or SOB      EXAM:   /68   Pulse 85   GENERAL: well developed, well nourished, in no apparent distress  EXTREMITIES:   1. Integument: Normal skin temperature and turgor  2. Vascular: Dorsalis pedis two out of four bilateral and posterior tibial pulses two out of   four bilateral, capillary refill normal.   3. Musculoskeletal: All muscle groups are graded 5 out of 5 in the foot and ankle.  Palpable left second interdigital Celia's click.  Pain with palpation to left third interspace.   4. Neurological: Normal sharp dull sensation; reflexes normal.             ASSESSMENT AND PLAN:   There are no diagnoses linked to this encounter.      Plan:       Discussed both the pathology and etiology of a neuroma condition.   Discussed with patient regarding the biomechanical factors that cause or aggravate the neuroma condition.   Discussed various treatment options with patient which consists of continued observation vs surgical intervention or neuroma injection   Advised on purchasing metatarsal pad to offload forefoot in the shoe.  Recommend steroid injection this date.    Prepped area with alcohol. The second interspace of left  foot  was injected through the dorsal aspect using  1cc Lidocaine  Plain, 1cc of Dexamethasone and 1cc of Kenalog 40 . Pt tolerated the procedure well hemostasis achieved and injection site covered with bandaid. . There were no complications. Patient educated on steroid flare up along with acute signs of infection and advised to seek immediate medical attention if symptoms arise    RTC in 3 months     The patient indicates understanding of these issues and agrees to the plan.        Kayy Resendez DPM    09-Jan-2023 19:42

## 2025-05-12 ENCOUNTER — APPOINTMENT (OUTPATIENT)
Dept: ORTHOPEDIC SURGERY | Facility: CLINIC | Age: 75
End: 2025-05-12
Payer: MEDICARE

## 2025-05-12 VITALS — DIASTOLIC BLOOD PRESSURE: 70 MMHG | SYSTOLIC BLOOD PRESSURE: 129 MMHG | OXYGEN SATURATION: 97 % | HEART RATE: 83 BPM

## 2025-05-12 DIAGNOSIS — M17.0 BILATERAL PRIMARY OSTEOARTHRITIS OF KNEE: ICD-10-CM

## 2025-05-12 PROCEDURE — 20611 DRAIN/INJ JOINT/BURSA W/US: CPT | Mod: 50

## 2025-05-13 ENCOUNTER — APPOINTMENT (OUTPATIENT)
Dept: ULTRASOUND IMAGING | Facility: CLINIC | Age: 75
End: 2025-05-13
Payer: MEDICARE

## 2025-05-13 ENCOUNTER — OUTPATIENT (OUTPATIENT)
Dept: OUTPATIENT SERVICES | Facility: HOSPITAL | Age: 75
LOS: 1 days | End: 2025-05-13

## 2025-05-13 DIAGNOSIS — Z98.891 HISTORY OF UTERINE SCAR FROM PREVIOUS SURGERY: Chronic | ICD-10-CM

## 2025-05-13 DIAGNOSIS — Z90.710 ACQUIRED ABSENCE OF BOTH CERVIX AND UTERUS: Chronic | ICD-10-CM

## 2025-05-13 PROCEDURE — 76536 US EXAM OF HEAD AND NECK: CPT | Mod: 26

## 2025-06-12 ENCOUNTER — APPOINTMENT (OUTPATIENT)
Dept: OTOLARYNGOLOGY | Facility: CLINIC | Age: 75
End: 2025-06-12
Payer: MEDICARE

## 2025-06-12 VITALS
BODY MASS INDEX: 30.53 KG/M2 | WEIGHT: 190 LBS | HEART RATE: 60 BPM | SYSTOLIC BLOOD PRESSURE: 133 MMHG | DIASTOLIC BLOOD PRESSURE: 72 MMHG | HEIGHT: 66 IN | OXYGEN SATURATION: 100 %

## 2025-06-12 PROBLEM — H91.93 HIGH FREQUENCY HEARING LOSS OF BOTH EARS: Status: ACTIVE | Noted: 2025-06-12

## 2025-06-12 PROCEDURE — 99214 OFFICE O/P EST MOD 30 MIN: CPT

## 2025-06-12 PROCEDURE — G2211 COMPLEX E/M VISIT ADD ON: CPT

## 2025-06-12 NOTE — ED PROVIDER NOTE - INTERNATIONAL TRAVEL
MEDICATIONS  (STANDING):  atorvastatin 40 milliGRAM(s) Oral at bedtime  pantoprazole  Injectable 40 milliGRAM(s) IV Push daily  polyethylene glycol 3350 17 Gram(s) Oral daily  potassium chloride   Powder 40 milliEquivalent(s) Oral every 4 hours  senna 2 Tablet(s) Oral at bedtime  sodium chloride 0.9%. 1000 milliLiter(s) (75 mL/Hr) IV Continuous <Continuous>  
No

## 2025-06-23 ENCOUNTER — APPOINTMENT (OUTPATIENT)
Dept: OTOLARYNGOLOGY | Facility: CLINIC | Age: 75
End: 2025-06-23
Payer: MEDICARE

## 2025-06-23 VITALS
SYSTOLIC BLOOD PRESSURE: 136 MMHG | DIASTOLIC BLOOD PRESSURE: 76 MMHG | HEIGHT: 66 IN | WEIGHT: 192 LBS | BODY MASS INDEX: 30.86 KG/M2 | HEART RATE: 73 BPM

## 2025-06-23 PROCEDURE — 31575 DIAGNOSTIC LARYNGOSCOPY: CPT

## 2025-06-23 PROCEDURE — 99215 OFFICE O/P EST HI 40 MIN: CPT | Mod: 25

## 2025-06-23 RX ORDER — FAMOTIDINE 20 MG/1
20 TABLET, FILM COATED ORAL
Qty: 90 | Refills: 3 | Status: ACTIVE | COMMUNITY
Start: 2025-06-23 | End: 1900-01-01

## 2025-06-23 RX ORDER — OMEPRAZOLE 20 MG/1
20 CAPSULE, DELAYED RELEASE ORAL DAILY
Qty: 90 | Refills: 3 | Status: ACTIVE | COMMUNITY
Start: 2025-06-23 | End: 1900-01-01

## 2025-07-06 NOTE — BRIEF OPERATIVE NOTE - NSICDXBRIEFPREOP_GEN_ALL_CORE_FT
PRE-OP DIAGNOSIS:  Incisional hernia 15-Sep-2022 01:44:08  Juwan Oneal  
Alert and oriented to person, place and time

## 2025-07-18 ENCOUNTER — APPOINTMENT (OUTPATIENT)
Dept: ENDOCRINOLOGY | Facility: CLINIC | Age: 75
End: 2025-07-18
Payer: MEDICARE

## 2025-07-18 VITALS
SYSTOLIC BLOOD PRESSURE: 151 MMHG | HEART RATE: 62 BPM | BODY MASS INDEX: 30.51 KG/M2 | DIASTOLIC BLOOD PRESSURE: 74 MMHG | WEIGHT: 189 LBS

## 2025-07-18 LAB
GLUCOSE BLDC GLUCOMTR-MCNC: 160
HBA1C MFR BLD HPLC: 6.5

## 2025-07-18 PROCEDURE — 83036 HEMOGLOBIN GLYCOSYLATED A1C: CPT | Mod: QW

## 2025-07-18 PROCEDURE — 82962 GLUCOSE BLOOD TEST: CPT

## 2025-07-18 PROCEDURE — 99215 OFFICE O/P EST HI 40 MIN: CPT

## 2025-07-18 PROCEDURE — 36415 COLL VENOUS BLD VENIPUNCTURE: CPT

## 2025-07-25 LAB
ALBUMIN SERPL ELPH-MCNC: 4.6 G/DL
ALBUMIN, RANDOM URINE: 4.5 MG/DL
ALP BLD-CCNC: 107 U/L
ALT SERPL-CCNC: 10 U/L
ANION GAP SERPL CALC-SCNC: 14 MMOL/L
AST SERPL-CCNC: 21 U/L
BILIRUB SERPL-MCNC: 0.3 MG/DL
BUN SERPL-MCNC: 37 MG/DL
CALCIUM SERPL-MCNC: 10.1 MG/DL
CHLORIDE SERPL-SCNC: 108 MMOL/L
CO2 SERPL-SCNC: 22 MMOL/L
CREAT SERPL-MCNC: 1.28 MG/DL
CREAT SPEC-SCNC: 60 MG/DL
EGFRCR SERPLBLD CKD-EPI 2021: 44 ML/MIN/1.73M2
FOLATE SERPL-MCNC: 10.1 NG/ML
GLUCOSE SERPL-MCNC: 89 MG/DL
MICROALBUMIN/CREAT 24H UR-RTO: 74 MG/G
POTASSIUM SERPL-SCNC: 3.8 MMOL/L
PROT SERPL-MCNC: 7.4 G/DL
SODIUM SERPL-SCNC: 143 MMOL/L
T3 SERPL-MCNC: 105 NG/DL
T4 FREE SERPL-MCNC: 1.1 NG/DL
THYROGLOB AB SERPL-ACNC: 16.2 IU/ML
THYROPEROXIDASE AB SERPL IA-ACNC: 12.6 IU/ML
TSH RECEPTOR AB: <1.1 IU/L
TSH SERPL-ACNC: 2.04 UIU/ML
VIT B12 SERPL-MCNC: 569 PG/ML

## 2025-08-05 ENCOUNTER — APPOINTMENT (OUTPATIENT)
Dept: ENDOCRINOLOGY | Facility: CLINIC | Age: 75
End: 2025-08-05
Payer: MEDICARE

## 2025-08-05 ENCOUNTER — LABORATORY RESULT (OUTPATIENT)
Age: 75
End: 2025-08-05

## 2025-08-05 VITALS
BODY MASS INDEX: 30.34 KG/M2 | DIASTOLIC BLOOD PRESSURE: 70 MMHG | HEART RATE: 65 BPM | SYSTOLIC BLOOD PRESSURE: 130 MMHG | WEIGHT: 188 LBS

## 2025-08-05 DIAGNOSIS — E04.2 NONTOXIC MULTINODULAR GOITER: ICD-10-CM

## 2025-08-05 PROCEDURE — 10006 FNA BX W/US GDN EA ADDL: CPT

## 2025-08-05 PROCEDURE — 10005 FNA BX W/US GDN 1ST LES: CPT

## 2025-08-05 PROCEDURE — 99214 OFFICE O/P EST MOD 30 MIN: CPT | Mod: 25

## 2025-08-06 ENCOUNTER — LABORATORY RESULT (OUTPATIENT)
Age: 75
End: 2025-08-06

## 2025-08-28 ENCOUNTER — APPOINTMENT (OUTPATIENT)
Dept: OTOLARYNGOLOGY | Facility: HOSPITAL | Age: 75
End: 2025-08-28

## 2025-09-04 ENCOUNTER — APPOINTMENT (OUTPATIENT)
Dept: NEPHROLOGY | Facility: CLINIC | Age: 75
End: 2025-09-04

## (undated) DEVICE — PACK EXPLORATORY LAPAROTOMY

## (undated) DEVICE — DRAIN JACKSON PRATT 10MM FLAT 3/4 NO TROCAR

## (undated) DEVICE — DRSG PREVENA PLUS SYSTEM

## (undated) DEVICE — DRAPE SPLIT SHEET 77" X 120"

## (undated) DEVICE — SUT PROLENE 0 30" CT-2

## (undated) DEVICE — DRAPE 1/2 SHEET 40X57"

## (undated) DEVICE — SUT PROLENE 2-0 36" SH

## (undated) DEVICE — SUT VICRYL 0 27" SH UNDYED

## (undated) DEVICE — FORCEP RADIAL JAW 4 W NDL 2.2MM 2.8MM 240CM ORANGE DISP

## (undated) DEVICE — LIGASURE IMPACT

## (undated) DEVICE — SUT PDS II 1 27" CT

## (undated) DEVICE — SUT PDS II PLUS 1 96" TP-1

## (undated) DEVICE — CANISTER W/O GEL INFOVAC 500ML

## (undated) DEVICE — SUT VICRYL 3-0 27" SH

## (undated) DEVICE — VENODYNE/SCD SLEEVE CALF MEDIUM

## (undated) DEVICE — SUT MONOCRYL 3-0 27" PS-2 UNDYED

## (undated) DEVICE — DRAPE TOWEL BLUE 17" X 24"

## (undated) DEVICE — DRAIN RESERVOIR FOR JACKSON PRATT 100CC CARDINAL

## (undated) DEVICE — STAPLER SKIN PROXIMATE

## (undated) DEVICE — POSITIONER FOAM EGG CRATE ULNAR 2PCS (PINK)

## (undated) DEVICE — PACK GENERAL MINOR

## (undated) DEVICE — FOLEY TRAY 16FR 5CC LF UMETER CLOSED

## (undated) DEVICE — SUT VICRYL 2-0 18" TIES

## (undated) DEVICE — ATS CANNISTERS FOR INFO VAC

## (undated) DEVICE — SUT VICRYL 2-0 27" SH

## (undated) DEVICE — SUT PDS II 1 27" CT-1

## (undated) DEVICE — SUT PDS II 2-0 27" SH

## (undated) DEVICE — WARMING BLANKET UPPER ADULT

## (undated) DEVICE — ABDOMINAL BINDER XL 9" X 62"-74"

## (undated) DEVICE — GLV 7.5 SENSICARE W ALOE

## (undated) DEVICE — SUT VICRYL 3-0 18" TIES UNDYED

## (undated) DEVICE — PUMP ON-Q SILVERSOAKER 400ML X 4ML/HR

## (undated) DEVICE — SUT MONOCRYL 4-0 18" PS-2